# Patient Record
Sex: FEMALE | Race: WHITE | NOT HISPANIC OR LATINO
[De-identification: names, ages, dates, MRNs, and addresses within clinical notes are randomized per-mention and may not be internally consistent; named-entity substitution may affect disease eponyms.]

---

## 2017-02-06 ENCOUNTER — APPOINTMENT (OUTPATIENT)
Dept: NEUROLOGY | Facility: CLINIC | Age: 82
End: 2017-02-06

## 2017-02-06 VITALS
HEART RATE: 72 BPM | DIASTOLIC BLOOD PRESSURE: 68 MMHG | RESPIRATION RATE: 16 BRPM | SYSTOLIC BLOOD PRESSURE: 130 MMHG | WEIGHT: 150 LBS | HEIGHT: 62 IN | BODY MASS INDEX: 27.6 KG/M2

## 2017-02-14 NOTE — ASU PATIENT PROFILE, ADULT - PMH
Glaucoma    Cerebral vascular accident  2005  TIA (transient ischemic attack)  8/07  Polymyalgia rheumatica    Osteoporosis    Chronic pain  mid back  Urinary retention    GERD (gastroesophageal reflux disease)    Cholelithiases    Hyperlipemia    Carotid artery stenosis    Lung nodule  biopsied 2003, benign  COPD (chronic obstructive pulmonary disease)    Asthma Alzheimers disease    Asthma    Carotid artery stenosis    Cerebral vascular accident  2005  Cholelithiases    Chronic pain  mid back  Compression fracture of spine  T7  COPD (chronic obstructive pulmonary disease)    GERD (gastroesophageal reflux disease)    Glaucoma    HTN (hypertension)    Hyperlipemia    Lung nodule  biopsied 2003, benign  Osteoporosis    Polymyalgia rheumatica    TIA (transient ischemic attack)  8/07  Urinary retention

## 2017-02-15 ENCOUNTER — OUTPATIENT (OUTPATIENT)
Dept: OUTPATIENT SERVICES | Facility: HOSPITAL | Age: 82
LOS: 1 days | Discharge: HOME | End: 2017-02-15
Payer: MEDICARE

## 2017-02-15 ENCOUNTER — TRANSCRIPTION ENCOUNTER (OUTPATIENT)
Age: 82
End: 2017-02-15

## 2017-02-15 VITALS
SYSTOLIC BLOOD PRESSURE: 128 MMHG | RESPIRATION RATE: 25 BRPM | HEIGHT: 60 IN | OXYGEN SATURATION: 92 % | TEMPERATURE: 98 F | HEART RATE: 75 BPM | DIASTOLIC BLOOD PRESSURE: 58 MMHG | WEIGHT: 134.48 LBS

## 2017-02-15 VITALS
RESPIRATION RATE: 26 BRPM | OXYGEN SATURATION: 92 % | HEART RATE: 89 BPM | DIASTOLIC BLOOD PRESSURE: 50 MMHG | SYSTOLIC BLOOD PRESSURE: 152 MMHG

## 2017-02-15 DIAGNOSIS — M81.0 AGE-RELATED OSTEOPOROSIS WITHOUT CURRENT PATHOLOGICAL FRACTURE: ICD-10-CM

## 2017-02-15 DIAGNOSIS — J45.909 UNSPECIFIED ASTHMA, UNCOMPLICATED: ICD-10-CM

## 2017-02-15 DIAGNOSIS — J44.9 CHRONIC OBSTRUCTIVE PULMONARY DISEASE, UNSPECIFIED: ICD-10-CM

## 2017-02-15 DIAGNOSIS — I10 ESSENTIAL (PRIMARY) HYPERTENSION: ICD-10-CM

## 2017-02-15 DIAGNOSIS — H40.2210 CHRONIC ANGLE-CLOSURE GLAUCOMA, RIGHT EYE, STAGE UNSPECIFIED: ICD-10-CM

## 2017-02-15 DIAGNOSIS — G30.9 ALZHEIMER'S DISEASE, UNSPECIFIED: ICD-10-CM

## 2017-02-15 DIAGNOSIS — Z79.52 LONG TERM (CURRENT) USE OF SYSTEMIC STEROIDS: ICD-10-CM

## 2017-02-15 DIAGNOSIS — Z79.51 LONG TERM (CURRENT) USE OF INHALED STEROIDS: ICD-10-CM

## 2017-02-15 DIAGNOSIS — K21.9 GASTRO-ESOPHAGEAL REFLUX DISEASE WITHOUT ESOPHAGITIS: ICD-10-CM

## 2017-02-15 DIAGNOSIS — Z87.891 PERSONAL HISTORY OF NICOTINE DEPENDENCE: ICD-10-CM

## 2017-02-15 DIAGNOSIS — F02.80 DEMENTIA IN OTHER DISEASES CLASSIFIED ELSEWHERE, UNSPECIFIED SEVERITY, WITHOUT BEHAVIORAL DISTURBANCE, PSYCHOTIC DISTURBANCE, MOOD DISTURBANCE, AND ANXIETY: ICD-10-CM

## 2017-02-15 DIAGNOSIS — Z86.73 PERSONAL HISTORY OF TRANSIENT ISCHEMIC ATTACK (TIA), AND CEREBRAL INFARCTION WITHOUT RESIDUAL DEFICITS: ICD-10-CM

## 2017-02-15 DIAGNOSIS — E78.5 HYPERLIPIDEMIA, UNSPECIFIED: ICD-10-CM

## 2017-02-15 DIAGNOSIS — M19.90 UNSPECIFIED OSTEOARTHRITIS, UNSPECIFIED SITE: ICD-10-CM

## 2017-02-15 DIAGNOSIS — M06.9 RHEUMATOID ARTHRITIS, UNSPECIFIED: ICD-10-CM

## 2017-02-15 DIAGNOSIS — H40.1193 PRIMARY OPEN-ANGLE GLAUCOMA, UNSPECIFIED EYE, SEVERE STAGE: ICD-10-CM

## 2017-02-15 DIAGNOSIS — E78.00 PURE HYPERCHOLESTEROLEMIA, UNSPECIFIED: ICD-10-CM

## 2017-02-15 DIAGNOSIS — Z79.899 OTHER LONG TERM (CURRENT) DRUG THERAPY: ICD-10-CM

## 2017-02-15 PROCEDURE — C1783: CPT

## 2017-02-15 PROCEDURE — 66183 INSERT ANT DRAINAGE DEVICE: CPT | Mod: RT

## 2017-02-15 RX ORDER — MITOMYCIN 5 MG/10ML
1 INJECTION, POWDER, LYOPHILIZED, FOR SOLUTION INTRAVENOUS ONCE
Qty: 0 | Refills: 0 | Status: DISCONTINUED | OUTPATIENT
Start: 2017-02-15 | End: 2017-03-02

## 2017-02-15 NOTE — ASU DISCHARGE PLAN (ADULT/PEDIATRIC). - NOTIFY
Bleeding that does not stop/Fever greater than 101/Pain not relieved by Medications/Persistent Nausea and Vomiting/Swelling that continues

## 2017-02-27 ENCOUNTER — INPATIENT (INPATIENT)
Facility: HOSPITAL | Age: 82
LOS: 3 days | Discharge: HOME | End: 2017-03-03
Attending: FAMILY MEDICINE | Admitting: FAMILY MEDICINE
Payer: COMMERCIAL

## 2017-02-27 VITALS
SYSTOLIC BLOOD PRESSURE: 117 MMHG | TEMPERATURE: 98 F | HEART RATE: 85 BPM | DIASTOLIC BLOOD PRESSURE: 66 MMHG | OXYGEN SATURATION: 96 % | RESPIRATION RATE: 20 BRPM | HEIGHT: 64 IN | WEIGHT: 145.95 LBS

## 2017-02-27 DIAGNOSIS — I25.10 ATHEROSCLEROTIC HEART DISEASE OF NATIVE CORONARY ARTERY WITHOUT ANGINA PECTORIS: ICD-10-CM

## 2017-02-27 DIAGNOSIS — G30.8 OTHER ALZHEIMER'S DISEASE: ICD-10-CM

## 2017-02-27 DIAGNOSIS — J18.9 PNEUMONIA, UNSPECIFIED ORGANISM: ICD-10-CM

## 2017-02-27 DIAGNOSIS — J44.1 CHRONIC OBSTRUCTIVE PULMONARY DISEASE WITH (ACUTE) EXACERBATION: ICD-10-CM

## 2017-02-27 DIAGNOSIS — I10 ESSENTIAL (PRIMARY) HYPERTENSION: ICD-10-CM

## 2017-02-27 DIAGNOSIS — M35.3 POLYMYALGIA RHEUMATICA: ICD-10-CM

## 2017-02-27 LAB
ALBUMIN SERPL ELPH-MCNC: 3.2 G/DL — LOW (ref 3.3–5)
ALP SERPL-CCNC: 59 U/L — SIGNIFICANT CHANGE UP (ref 40–120)
ALT FLD-CCNC: 49 U/L — SIGNIFICANT CHANGE UP (ref 12–78)
ANION GAP SERPL CALC-SCNC: 11 MMOL/L — SIGNIFICANT CHANGE UP (ref 5–17)
APTT BLD: 26 SEC — LOW (ref 27.5–37.4)
AST SERPL-CCNC: 28 U/L — SIGNIFICANT CHANGE UP (ref 15–37)
BASOPHILS # BLD AUTO: 0 K/UL — SIGNIFICANT CHANGE UP (ref 0–0.2)
BASOPHILS NFR BLD AUTO: 0.2 % — SIGNIFICANT CHANGE UP (ref 0–2)
BILIRUB SERPL-MCNC: 0.6 MG/DL — SIGNIFICANT CHANGE UP (ref 0.2–1.2)
BUN SERPL-MCNC: 25 MG/DL — HIGH (ref 7–23)
CALCIUM SERPL-MCNC: 9.2 MG/DL — SIGNIFICANT CHANGE UP (ref 8.5–10.1)
CHLORIDE SERPL-SCNC: 103 MMOL/L — SIGNIFICANT CHANGE UP (ref 96–108)
CO2 SERPL-SCNC: 26 MMOL/L — SIGNIFICANT CHANGE UP (ref 22–31)
CREAT SERPL-MCNC: 1.03 MG/DL — SIGNIFICANT CHANGE UP (ref 0.5–1.3)
EOSINOPHIL # BLD AUTO: 0 K/UL — SIGNIFICANT CHANGE UP (ref 0–0.5)
EOSINOPHIL NFR BLD AUTO: 0 % — SIGNIFICANT CHANGE UP (ref 0–6)
GLUCOSE SERPL-MCNC: 116 MG/DL — HIGH (ref 70–99)
HCT VFR BLD CALC: 38 % — SIGNIFICANT CHANGE UP (ref 34.5–45)
HGB BLD-MCNC: 12.1 G/DL — SIGNIFICANT CHANGE UP (ref 11.5–15.5)
INR BLD: 1.35 RATIO — HIGH (ref 0.88–1.16)
LACTATE SERPL-SCNC: 1.4 MMOL/L — SIGNIFICANT CHANGE UP (ref 0.7–2)
LYMPHOCYTES # BLD AUTO: 0.8 K/UL — LOW (ref 1–3.3)
LYMPHOCYTES # BLD AUTO: 3.7 % — LOW (ref 13–44)
MCHC RBC-ENTMCNC: 27.4 PG — SIGNIFICANT CHANGE UP (ref 27–34)
MCHC RBC-ENTMCNC: 31.9 GM/DL — LOW (ref 32–36)
MCV RBC AUTO: 85.9 FL — SIGNIFICANT CHANGE UP (ref 80–100)
MONOCYTES # BLD AUTO: 1.1 K/UL — HIGH (ref 0–0.9)
MONOCYTES NFR BLD AUTO: 5.2 % — SIGNIFICANT CHANGE UP (ref 2–14)
NEUTROPHILS # BLD AUTO: 20 K/UL — HIGH (ref 1.8–7.4)
NEUTROPHILS NFR BLD AUTO: 90.9 % — HIGH (ref 43–77)
PLATELET # BLD AUTO: 245 K/UL — SIGNIFICANT CHANGE UP (ref 150–400)
POTASSIUM SERPL-MCNC: 4 MMOL/L — SIGNIFICANT CHANGE UP (ref 3.5–5.3)
POTASSIUM SERPL-SCNC: 4 MMOL/L — SIGNIFICANT CHANGE UP (ref 3.5–5.3)
PROT SERPL-MCNC: 6.4 GM/DL — SIGNIFICANT CHANGE UP (ref 6–8.3)
PROTHROM AB SERPL-ACNC: 14.9 SEC — HIGH (ref 10–13.1)
RAPID RVP RESULT: SIGNIFICANT CHANGE UP
RBC # BLD: 4.42 M/UL — SIGNIFICANT CHANGE UP (ref 3.8–5.2)
RBC # FLD: 16.8 % — HIGH (ref 10.3–14.5)
SODIUM SERPL-SCNC: 140 MMOL/L — SIGNIFICANT CHANGE UP (ref 135–145)
WBC # BLD: 22 K/UL — HIGH (ref 3.8–10.5)
WBC # FLD AUTO: 22 K/UL — HIGH (ref 3.8–10.5)

## 2017-02-27 PROCEDURE — 71250 CT THORAX DX C-: CPT | Mod: 26

## 2017-02-27 PROCEDURE — 99285 EMERGENCY DEPT VISIT HI MDM: CPT

## 2017-02-27 PROCEDURE — 71010: CPT | Mod: 26

## 2017-02-27 PROCEDURE — 93010 ELECTROCARDIOGRAM REPORT: CPT

## 2017-02-27 RX ORDER — ATROPINE SULFATE 1 %
1 DROPS OPHTHALMIC (EYE) DAILY
Qty: 0 | Refills: 0 | Status: DISCONTINUED | OUTPATIENT
Start: 2017-02-27 | End: 2017-03-03

## 2017-02-27 RX ORDER — ASPIRIN AND DIPYRIDAMOLE 25; 200 MG/1; MG/1
1 CAPSULE, EXTENDED RELEASE ORAL
Qty: 0 | Refills: 0 | Status: DISCONTINUED | OUTPATIENT
Start: 2017-02-27 | End: 2017-02-28

## 2017-02-27 RX ORDER — LACTULOSE 10 G/15ML
20 SOLUTION ORAL AT BEDTIME
Qty: 0 | Refills: 0 | Status: DISCONTINUED | OUTPATIENT
Start: 2017-02-27 | End: 2017-03-03

## 2017-02-27 RX ORDER — GLUCAGON INJECTION, SOLUTION 0.5 MG/.1ML
1 INJECTION, SOLUTION SUBCUTANEOUS ONCE
Qty: 0 | Refills: 0 | Status: DISCONTINUED | OUTPATIENT
Start: 2017-02-27 | End: 2017-03-03

## 2017-02-27 RX ORDER — TIOTROPIUM BROMIDE 18 UG/1
1 CAPSULE ORAL; RESPIRATORY (INHALATION) DAILY
Qty: 0 | Refills: 0 | Status: DISCONTINUED | OUTPATIENT
Start: 2017-02-27 | End: 2017-03-01

## 2017-02-27 RX ORDER — DEXTROSE 50 % IN WATER 50 %
1 SYRINGE (ML) INTRAVENOUS ONCE
Qty: 0 | Refills: 0 | Status: DISCONTINUED | OUTPATIENT
Start: 2017-02-27 | End: 2017-03-03

## 2017-02-27 RX ORDER — CEFTRIAXONE 500 MG/1
1 INJECTION, POWDER, FOR SOLUTION INTRAMUSCULAR; INTRAVENOUS ONCE
Qty: 0 | Refills: 0 | Status: COMPLETED | OUTPATIENT
Start: 2017-02-27 | End: 2017-02-27

## 2017-02-27 RX ORDER — CEFEPIME 1 G/1
1000 INJECTION, POWDER, FOR SOLUTION INTRAMUSCULAR; INTRAVENOUS EVERY 12 HOURS
Qty: 0 | Refills: 0 | Status: DISCONTINUED | OUTPATIENT
Start: 2017-02-28 | End: 2017-02-28

## 2017-02-27 RX ORDER — SODIUM CHLORIDE 9 MG/ML
1000 INJECTION, SOLUTION INTRAVENOUS
Qty: 0 | Refills: 0 | Status: DISCONTINUED | OUTPATIENT
Start: 2017-02-27 | End: 2017-02-28

## 2017-02-27 RX ORDER — HYDROXYCHLOROQUINE SULFATE 200 MG
200 TABLET ORAL
Qty: 0 | Refills: 0 | Status: DISCONTINUED | OUTPATIENT
Start: 2017-02-27 | End: 2017-02-28

## 2017-02-27 RX ORDER — CEFEPIME 1 G/1
INJECTION, POWDER, FOR SOLUTION INTRAMUSCULAR; INTRAVENOUS
Qty: 0 | Refills: 0 | Status: DISCONTINUED | OUTPATIENT
Start: 2017-02-27 | End: 2017-02-28

## 2017-02-27 RX ORDER — SODIUM CHLORIDE 9 MG/ML
1000 INJECTION INTRAMUSCULAR; INTRAVENOUS; SUBCUTANEOUS
Qty: 0 | Refills: 0 | Status: DISCONTINUED | OUTPATIENT
Start: 2017-02-27 | End: 2017-02-28

## 2017-02-27 RX ORDER — DEXTROSE 50 % IN WATER 50 %
12.5 SYRINGE (ML) INTRAVENOUS ONCE
Qty: 0 | Refills: 0 | Status: DISCONTINUED | OUTPATIENT
Start: 2017-02-27 | End: 2017-03-03

## 2017-02-27 RX ORDER — MEMANTINE HYDROCHLORIDE 10 MG/1
14 TABLET ORAL AT BEDTIME
Qty: 0 | Refills: 0 | Status: DISCONTINUED | OUTPATIENT
Start: 2017-02-27 | End: 2017-02-28

## 2017-02-27 RX ORDER — METOCLOPRAMIDE HCL 10 MG
10 TABLET ORAL EVERY 6 HOURS
Qty: 0 | Refills: 0 | Status: DISCONTINUED | OUTPATIENT
Start: 2017-02-27 | End: 2017-03-03

## 2017-02-27 RX ORDER — INSULIN LISPRO 100/ML
VIAL (ML) SUBCUTANEOUS AT BEDTIME
Qty: 0 | Refills: 0 | Status: DISCONTINUED | OUTPATIENT
Start: 2017-02-27 | End: 2017-03-03

## 2017-02-27 RX ORDER — INSULIN LISPRO 100/ML
VIAL (ML) SUBCUTANEOUS
Qty: 0 | Refills: 0 | Status: DISCONTINUED | OUTPATIENT
Start: 2017-02-27 | End: 2017-03-03

## 2017-02-27 RX ORDER — IPRATROPIUM/ALBUTEROL SULFATE 18-103MCG
3 AEROSOL WITH ADAPTER (GRAM) INHALATION EVERY 6 HOURS
Qty: 0 | Refills: 0 | Status: DISCONTINUED | OUTPATIENT
Start: 2017-02-27 | End: 2017-03-03

## 2017-02-27 RX ORDER — CEFEPIME 1 G/1
1000 INJECTION, POWDER, FOR SOLUTION INTRAMUSCULAR; INTRAVENOUS ONCE
Qty: 0 | Refills: 0 | Status: COMPLETED | OUTPATIENT
Start: 2017-02-27 | End: 2017-02-27

## 2017-02-27 RX ORDER — HEPARIN SODIUM 5000 [USP'U]/ML
5000 INJECTION INTRAVENOUS; SUBCUTANEOUS EVERY 8 HOURS
Qty: 0 | Refills: 0 | Status: DISCONTINUED | OUTPATIENT
Start: 2017-02-27 | End: 2017-03-03

## 2017-02-27 RX ORDER — TRAZODONE HCL 50 MG
50 TABLET ORAL AT BEDTIME
Qty: 0 | Refills: 0 | Status: DISCONTINUED | OUTPATIENT
Start: 2017-02-27 | End: 2017-03-03

## 2017-02-27 RX ORDER — OFLOXACIN 0.3 %
1 DROPS OPHTHALMIC (EYE)
Qty: 0 | Refills: 0 | Status: DISCONTINUED | OUTPATIENT
Start: 2017-02-27 | End: 2017-03-03

## 2017-02-27 RX ORDER — AZITHROMYCIN 500 MG/1
500 TABLET, FILM COATED ORAL ONCE
Qty: 0 | Refills: 0 | Status: COMPLETED | OUTPATIENT
Start: 2017-02-27 | End: 2017-02-27

## 2017-02-27 RX ORDER — PANTOPRAZOLE SODIUM 20 MG/1
40 TABLET, DELAYED RELEASE ORAL
Qty: 0 | Refills: 0 | Status: DISCONTINUED | OUTPATIENT
Start: 2017-02-27 | End: 2017-02-28

## 2017-02-27 RX ORDER — VANCOMYCIN HCL 1 G
1000 VIAL (EA) INTRAVENOUS ONCE
Qty: 0 | Refills: 0 | Status: COMPLETED | OUTPATIENT
Start: 2017-02-27 | End: 2017-02-27

## 2017-02-27 RX ORDER — DOCUSATE SODIUM 100 MG
100 CAPSULE ORAL THREE TIMES A DAY
Qty: 0 | Refills: 0 | Status: DISCONTINUED | OUTPATIENT
Start: 2017-02-27 | End: 2017-03-03

## 2017-02-27 RX ORDER — SODIUM CHLORIDE 9 MG/ML
2000 INJECTION, SOLUTION INTRAVENOUS
Qty: 0 | Refills: 0 | Status: DISCONTINUED | OUTPATIENT
Start: 2017-02-27 | End: 2017-02-28

## 2017-02-27 RX ORDER — SODIUM CHLORIDE 9 MG/ML
3 INJECTION INTRAMUSCULAR; INTRAVENOUS; SUBCUTANEOUS ONCE
Qty: 0 | Refills: 0 | Status: COMPLETED | OUTPATIENT
Start: 2017-02-27 | End: 2017-02-27

## 2017-02-27 RX ORDER — SIMVASTATIN 20 MG/1
20 TABLET, FILM COATED ORAL AT BEDTIME
Qty: 0 | Refills: 0 | Status: DISCONTINUED | OUTPATIENT
Start: 2017-02-27 | End: 2017-03-03

## 2017-02-27 RX ORDER — SENNA PLUS 8.6 MG/1
2 TABLET ORAL AT BEDTIME
Qty: 0 | Refills: 0 | Status: DISCONTINUED | OUTPATIENT
Start: 2017-02-27 | End: 2017-03-03

## 2017-02-27 RX ORDER — DUREZOL 0.5 MG/ML
1 EMULSION OPHTHALMIC EVERY 6 HOURS
Qty: 0 | Refills: 0 | Status: DISCONTINUED | OUTPATIENT
Start: 2017-02-27 | End: 2017-02-28

## 2017-02-27 RX ORDER — IPRATROPIUM/ALBUTEROL SULFATE 18-103MCG
3 AEROSOL WITH ADAPTER (GRAM) INHALATION ONCE
Qty: 0 | Refills: 0 | Status: COMPLETED | OUTPATIENT
Start: 2017-02-27 | End: 2017-02-27

## 2017-02-27 RX ADMIN — Medication 1 DROP(S): at 19:52

## 2017-02-27 RX ADMIN — Medication 3 MILLILITER(S): at 16:10

## 2017-02-27 RX ADMIN — SODIUM CHLORIDE 3 MILLILITER(S): 9 INJECTION INTRAMUSCULAR; INTRAVENOUS; SUBCUTANEOUS at 14:50

## 2017-02-27 RX ADMIN — CEFEPIME 100 MILLIGRAM(S): 1 INJECTION, POWDER, FOR SOLUTION INTRAMUSCULAR; INTRAVENOUS at 20:00

## 2017-02-27 RX ADMIN — CEFTRIAXONE 100 GRAM(S): 500 INJECTION, POWDER, FOR SOLUTION INTRAMUSCULAR; INTRAVENOUS at 16:08

## 2017-02-27 RX ADMIN — SODIUM CHLORIDE 100 MILLILITER(S): 9 INJECTION INTRAMUSCULAR; INTRAVENOUS; SUBCUTANEOUS at 16:07

## 2017-02-27 RX ADMIN — Medication 125 MILLIGRAM(S): at 16:10

## 2017-02-27 RX ADMIN — SODIUM CHLORIDE 100 MILLILITER(S): 9 INJECTION INTRAMUSCULAR; INTRAVENOUS; SUBCUTANEOUS at 21:05

## 2017-02-27 RX ADMIN — Medication 250 MILLIGRAM(S): at 21:05

## 2017-02-27 RX ADMIN — AZITHROMYCIN 255 MILLIGRAM(S): 500 TABLET, FILM COATED ORAL at 16:09

## 2017-02-27 NOTE — H&P ADULT - NSHPLABSRESULTS_GEN_ALL_CORE
T(C): 36.4, Max: 36.4 (02-27 @ 14:30)  HR: 95 (85 - 95)  BP: 130/47 (104/45 - 130/47)  RR: 22 (20 - 22)  SpO2: 99% (96% - 99%)  Wt(kg): --                              12.1   22.0  )-----------( 245      ( 27 Feb 2017 14:54 )             38.0     27 Feb 2017 14:54    140    |  103    |  25     ----------------------------<  116    4.0     |  26     |  1.03     Ca    9.2        27 Feb 2017 14:54    TPro  6.4    /  Alb  3.2    /  TBili  0.6    /  DBili  x      /  AST  28     /  ALT  49     /  AlkPhos  59     27 Feb 2017 14:54    PT/INR - ( 27 Feb 2017 14:54 )   PT: 14.9 sec;   INR: 1.35 ratio         PTT - ( 27 Feb 2017 14:54 )  PTT:26.0 sec  CAPILLARY BLOOD GLUCOSE    LIVER FUNCTIONS - ( 27 Feb 2017 14:54 )  Alb: 3.2 g/dL / Pro: 6.4 gm/dL / ALK PHOS: 59 U/L / ALT: 49 U/L / AST: 28 U/L / GGT: x

## 2017-02-27 NOTE — ED PROVIDER NOTE - NS ED MD SCRIBE ATTENDING SCRIBE SECTIONS
PROGRESS NOTE/PHYSICAL EXAM/HISTORY OF PRESENT ILLNESS/DISPOSITION/REVIEW OF SYSTEMS/RESULTS/PAST MEDICAL/SURGICAL/SOCIAL HISTORY

## 2017-02-27 NOTE — ED ADULT NURSE NOTE - CHPI ED SYMPTOMS NEG
no chest pain/no cough/no diaphoresis/no hemoptysis/no fever/no body aches/no headache/no edema/no chills/no wheezing

## 2017-02-27 NOTE — H&P ADULT - ASSESSMENT
87 year old female resident of [facility] with history of COPD not on home O2, CVA, CAD, Rheumatoid arthritis, PMR, Glaucoma s/p ocular surgery, Hyperlipidemia, Dementia who presents with c/o worsening SOB, cough and sputum for 2 weeks.  Pt was treated with azithromycin and medrol pack at [facility] without improvement.  Pt denies fever, chills, anorexia, malaise, myalgia, dysuria, confusion or headache.      Pt seen bed side and evaluated in presence of patient's daughter.  Pt is saturating well but is coughing and wheezing.

## 2017-02-27 NOTE — ED PROVIDER NOTE - CONSTITUTIONAL, MLM
normal... Well appearing, elderly female, awake, alert, oriented to person, place, time presenting mild respiratory distress.

## 2017-02-27 NOTE — H&P ADULT - HISTORY OF PRESENT ILLNESS
87 year old female resident of [facility] with history of COPD not on home O2, CVA, Rheumatoid arthritis, PMR, Glaucoma s/p ocular surgery, Hyperlipidemia, Dementia who presents with c/o worsening SOB, cough and sputum for 2 weeks.  Pt was treated with azithromycin and medrol pack at [place] without improvement.  Pt denies fever, chills, anorexia, malaise, myalgia, dysuria, confusion or headache.      Pt seen bed side and evaluated in presence of patient's daughter.  Pt is saturating well but is coughing and wheezing.

## 2017-02-27 NOTE — H&P ADULT - NSHPPHYSICALEXAM_GEN_ALL_CORE
PHYSICAL EXAM:      Constitutional: NAD, well-groomed, well-developed  HEENT: PERRLA, EOMI, Normal Hearing  Neck: No LAD, No JVD  Back: Normal spine flexure, No CVA tenderness  Respiratory: Wheezing b/l   Cardiovascular: S1 and S2, RRR, no M/G/R  Gastrointestinal: BS+, soft, NT/ND  Extremities: No peripheral edema  Vascular: 2+ peripheral pulses  Neurological: A/O x 3, no focal deficits  Psychiatric: Normal mood, normal affect  Musculoskeletal: 5/5 strength b/l upper and lower extremities  Skin: No rashes

## 2017-02-27 NOTE — ED PROVIDER NOTE - OBJECTIVE STATEMENT
86 y/o F with a PMHx of COPD, CVA, TIA, Alzheimers, asthma presents to the ED c/o SOB with productive cough that has been worsening over the past few days. Pt daughter at bedside states that she was recently dx with bronchitis and was advised by Dr Guardado to come into ED for evaluation. Pt currently receiving nebulizer tx presenting mild respiratory distress and denies any other acute c/o at this time.

## 2017-02-27 NOTE — ED PROVIDER NOTE - SHIFT CHANGE DETAILS
Jose MENDEZ:  Rc'd care from Dr. Alves at 16:00.  Pt pending RVP then plan is to admit.  COPD exacerbation.  Given Steroids, Duonebs, CTX, Azithro.  Elevated WBC, lungs are tight, coughing.  WBC elevated.   Recently on steroids too, failed outpatient treatment.    I have admitted the patient.  RVP neg.

## 2017-02-27 NOTE — ED PROVIDER NOTE - PROGRESS NOTE DETAILS
(got signout from prior ED MD.  Pending admissionCOPD exacerbation w/ PNA.  Covered with antibiotics.  Steroids.  Duonebs.  WIll admit.

## 2017-02-27 NOTE — ED PROVIDER NOTE - PMH
Alzheimers disease    Asthma    Carotid artery stenosis    Cerebral vascular accident  2005  Cholelithiases    Chronic pain  mid back  Compression fracture of spine  T7  COPD (chronic obstructive pulmonary disease)    GERD (gastroesophageal reflux disease)    Glaucoma    HTN (hypertension)    Hyperlipemia    Lung nodule  biopsied 2003, benign  Osteoporosis    Polymyalgia rheumatica    TIA (transient ischemic attack)  8/07  Urinary retention

## 2017-02-27 NOTE — H&P ADULT - PROBLEM SELECTOR PROBLEM 6
Coronary artery disease involving native coronary artery without angina pectoris, unspecified whether native or transplanted heart

## 2017-02-27 NOTE — H&P ADULT - PROBLEM SELECTOR PLAN 2
- Possible pneumonia   - CT chest stat  - IV antibiotics started  - RVP negative  - Gentle IV hydration

## 2017-02-27 NOTE — H&P ADULT - NSHPREVIEWOFSYSTEMS_GEN_ALL_CORE
REVIEW OF SYSTEMS:    CONSTITUTIONAL: No weakness, fevers or chills  EYES/ENT: No visual changes;  No vertigo or throat pain   NECK: No pain or stiffness  RESPIRATORY: + cough, + sputum, + wheezing, no hemoptysis; + Dyspnea  CARDIOVASCULAR: No chest pain or palpitations  GASTROINTESTINAL: No abdominal or epigastric pain. No nausea, vomiting, or hematemesis; No diarrhea or constipation. No melena or hematochezia.  GENITOURINARY: No dysuria, frequency or hematuria  NEUROLOGICAL: No numbness or weakness  SKIN: No itching, burning, rashes, or lesions   All other review of systems is negative unless indicated above.

## 2017-02-28 LAB
ANION GAP SERPL CALC-SCNC: 12 MMOL/L — SIGNIFICANT CHANGE UP (ref 5–17)
APPEARANCE UR: CLEAR — SIGNIFICANT CHANGE UP
BILIRUB UR-MCNC: NEGATIVE — SIGNIFICANT CHANGE UP
BUN SERPL-MCNC: 25 MG/DL — HIGH (ref 7–23)
CALCIUM SERPL-MCNC: 8.6 MG/DL — SIGNIFICANT CHANGE UP (ref 8.5–10.1)
CHLORIDE SERPL-SCNC: 109 MMOL/L — HIGH (ref 96–108)
CO2 SERPL-SCNC: 23 MMOL/L — SIGNIFICANT CHANGE UP (ref 22–31)
COLOR SPEC: YELLOW — SIGNIFICANT CHANGE UP
CREAT SERPL-MCNC: 0.95 MG/DL — SIGNIFICANT CHANGE UP (ref 0.5–1.3)
DIFF PNL FLD: NEGATIVE — SIGNIFICANT CHANGE UP
GLUCOSE SERPL-MCNC: 147 MG/DL — HIGH (ref 70–99)
GLUCOSE UR QL: NEGATIVE MG/DL — SIGNIFICANT CHANGE UP
HBA1C BLD-MCNC: 6 % — HIGH (ref 4–5.6)
HCT VFR BLD CALC: 33.1 % — LOW (ref 34.5–45)
HGB BLD-MCNC: 10.3 G/DL — LOW (ref 11.5–15.5)
KETONES UR-MCNC: NEGATIVE — SIGNIFICANT CHANGE UP
LEUKOCYTE ESTERASE UR-ACNC: NEGATIVE — SIGNIFICANT CHANGE UP
MAGNESIUM SERPL-MCNC: 1.9 MG/DL — SIGNIFICANT CHANGE UP (ref 1.8–2.4)
MCHC RBC-ENTMCNC: 26.5 PG — LOW (ref 27–34)
MCHC RBC-ENTMCNC: 31.1 GM/DL — LOW (ref 32–36)
MCV RBC AUTO: 85 FL — SIGNIFICANT CHANGE UP (ref 80–100)
NITRITE UR-MCNC: NEGATIVE — SIGNIFICANT CHANGE UP
PH UR: 6 — SIGNIFICANT CHANGE UP (ref 4.8–8)
PLATELET # BLD AUTO: 226 K/UL — SIGNIFICANT CHANGE UP (ref 150–400)
POTASSIUM SERPL-MCNC: 3.5 MMOL/L — SIGNIFICANT CHANGE UP (ref 3.5–5.3)
POTASSIUM SERPL-SCNC: 3.5 MMOL/L — SIGNIFICANT CHANGE UP (ref 3.5–5.3)
PROT UR-MCNC: NEGATIVE MG/DL — SIGNIFICANT CHANGE UP
RBC # BLD: 3.89 M/UL — SIGNIFICANT CHANGE UP (ref 3.8–5.2)
RBC # FLD: 16.1 % — HIGH (ref 10.3–14.5)
SODIUM SERPL-SCNC: 144 MMOL/L — SIGNIFICANT CHANGE UP (ref 135–145)
SP GR SPEC: 1.02 — SIGNIFICANT CHANGE UP (ref 1.01–1.02)
UROBILINOGEN FLD QL: NEGATIVE MG/DL — SIGNIFICANT CHANGE UP
WBC # BLD: 20.4 K/UL — HIGH (ref 3.8–10.5)
WBC # FLD AUTO: 20.4 K/UL — HIGH (ref 3.8–10.5)

## 2017-02-28 PROCEDURE — 93010 ELECTROCARDIOGRAM REPORT: CPT

## 2017-02-28 RX ORDER — DUREZOL 0.5 MG/ML
1 EMULSION OPHTHALMIC DAILY
Qty: 0 | Refills: 0 | Status: DISCONTINUED | OUTPATIENT
Start: 2017-03-02 | End: 2017-03-03

## 2017-02-28 RX ORDER — ATROPINE SULFATE 1 %
1 DROPS OPHTHALMIC (EYE)
Qty: 0 | Refills: 0 | COMMUNITY

## 2017-02-28 RX ORDER — OFLOXACIN 0.3 %
1 DROPS OPHTHALMIC (EYE)
Qty: 0 | Refills: 0 | COMMUNITY

## 2017-02-28 RX ORDER — MEMANTINE HYDROCHLORIDE 10 MG/1
5 TABLET ORAL
Qty: 0 | Refills: 0 | Status: DISCONTINUED | OUTPATIENT
Start: 2017-02-28 | End: 2017-02-28

## 2017-02-28 RX ORDER — ASPIRIN AND DIPYRIDAMOLE 25; 200 MG/1; MG/1
1 CAPSULE, EXTENDED RELEASE ORAL EVERY 12 HOURS
Qty: 0 | Refills: 0 | Status: DISCONTINUED | OUTPATIENT
Start: 2017-03-01 | End: 2017-03-01

## 2017-02-28 RX ORDER — DUREZOL 0.5 MG/ML
1 EMULSION OPHTHALMIC ONCE
Qty: 0 | Refills: 0 | Status: COMPLETED | OUTPATIENT
Start: 2017-02-28 | End: 2017-02-28

## 2017-02-28 RX ORDER — HYDROXYCHLOROQUINE SULFATE 200 MG
200 TABLET ORAL EVERY 12 HOURS
Qty: 0 | Refills: 0 | Status: DISCONTINUED | OUTPATIENT
Start: 2017-03-01 | End: 2017-03-03

## 2017-02-28 RX ORDER — PANTOPRAZOLE SODIUM 20 MG/1
40 TABLET, DELAYED RELEASE ORAL
Qty: 0 | Refills: 0 | Status: DISCONTINUED | OUTPATIENT
Start: 2017-02-28 | End: 2017-03-03

## 2017-02-28 RX ORDER — ASPIRIN AND DIPYRIDAMOLE 25; 200 MG/1; MG/1
1 CAPSULE, EXTENDED RELEASE ORAL EVERY 12 HOURS
Qty: 0 | Refills: 0 | Status: DISCONTINUED | OUTPATIENT
Start: 2017-02-28 | End: 2017-02-28

## 2017-02-28 RX ORDER — DUREZOL 0.5 MG/ML
1 EMULSION OPHTHALMIC
Qty: 0 | Refills: 0 | Status: COMPLETED | OUTPATIENT
Start: 2017-02-28 | End: 2017-03-01

## 2017-02-28 RX ORDER — MEMANTINE HYDROCHLORIDE 10 MG/1
14 TABLET ORAL AT BEDTIME
Qty: 0 | Refills: 0 | Status: DISCONTINUED | OUTPATIENT
Start: 2017-02-28 | End: 2017-03-03

## 2017-02-28 RX ORDER — CEFUROXIME AXETIL 250 MG
250 TABLET ORAL EVERY 12 HOURS
Qty: 0 | Refills: 0 | Status: DISCONTINUED | OUTPATIENT
Start: 2017-02-28 | End: 2017-03-03

## 2017-02-28 RX ORDER — SODIUM CHLORIDE 9 MG/ML
1000 INJECTION INTRAMUSCULAR; INTRAVENOUS; SUBCUTANEOUS
Qty: 0 | Refills: 0 | Status: DISCONTINUED | OUTPATIENT
Start: 2017-02-28 | End: 2017-03-02

## 2017-02-28 RX ORDER — ASPIRIN AND DIPYRIDAMOLE 25; 200 MG/1; MG/1
1 CAPSULE, EXTENDED RELEASE ORAL ONCE
Qty: 0 | Refills: 0 | Status: COMPLETED | OUTPATIENT
Start: 2017-02-28 | End: 2017-02-28

## 2017-02-28 RX ORDER — HYDROXYCHLOROQUINE SULFATE 200 MG
200 TABLET ORAL ONCE
Qty: 0 | Refills: 0 | Status: COMPLETED | OUTPATIENT
Start: 2017-02-28 | End: 2017-02-28

## 2017-02-28 RX ADMIN — Medication 1 DROP(S): at 11:34

## 2017-02-28 RX ADMIN — Medication 1 DROP(S): at 06:07

## 2017-02-28 RX ADMIN — ASPIRIN AND DIPYRIDAMOLE 1 CAPSULE(S): 25; 200 CAPSULE, EXTENDED RELEASE ORAL at 00:17

## 2017-02-28 RX ADMIN — Medication 1 DROP(S): at 00:29

## 2017-02-28 RX ADMIN — SIMVASTATIN 20 MILLIGRAM(S): 20 TABLET, FILM COATED ORAL at 22:43

## 2017-02-28 RX ADMIN — DUREZOL 1 DROP(S): 0.5 EMULSION OPHTHALMIC at 17:12

## 2017-02-28 RX ADMIN — HEPARIN SODIUM 5000 UNIT(S): 5000 INJECTION INTRAVENOUS; SUBCUTANEOUS at 22:36

## 2017-02-28 RX ADMIN — Medication 40 MILLIGRAM(S): at 13:44

## 2017-02-28 RX ADMIN — Medication 40 MILLIGRAM(S): at 22:37

## 2017-02-28 RX ADMIN — Medication 3 MILLILITER(S): at 08:27

## 2017-02-28 RX ADMIN — Medication 50 MILLIGRAM(S): at 22:42

## 2017-02-28 RX ADMIN — Medication 200 MILLIGRAM(S): at 13:43

## 2017-02-28 RX ADMIN — Medication 100 MILLIGRAM(S): at 13:42

## 2017-02-28 RX ADMIN — HEPARIN SODIUM 5000 UNIT(S): 5000 INJECTION INTRAVENOUS; SUBCUTANEOUS at 00:13

## 2017-02-28 RX ADMIN — Medication 2: at 12:05

## 2017-02-28 RX ADMIN — Medication 200 MILLIGRAM(S): at 23:48

## 2017-02-28 RX ADMIN — Medication 100 MILLIGRAM(S): at 06:06

## 2017-02-28 RX ADMIN — DUREZOL 1 DROP(S): 0.5 EMULSION OPHTHALMIC at 00:28

## 2017-02-28 RX ADMIN — Medication 3 MILLILITER(S): at 13:58

## 2017-02-28 RX ADMIN — Medication 1 TABLET(S): at 11:33

## 2017-02-28 RX ADMIN — DUREZOL 1 DROP(S): 0.5 EMULSION OPHTHALMIC at 09:06

## 2017-02-28 RX ADMIN — Medication 2: at 08:41

## 2017-02-28 RX ADMIN — HEPARIN SODIUM 5000 UNIT(S): 5000 INJECTION INTRAVENOUS; SUBCUTANEOUS at 06:06

## 2017-02-28 RX ADMIN — Medication 40 MILLIGRAM(S): at 00:12

## 2017-02-28 RX ADMIN — Medication 3 MILLILITER(S): at 19:56

## 2017-02-28 RX ADMIN — ASPIRIN AND DIPYRIDAMOLE 1 CAPSULE(S): 25; 200 CAPSULE, EXTENDED RELEASE ORAL at 13:43

## 2017-02-28 RX ADMIN — Medication 200 MILLIGRAM(S): at 00:17

## 2017-02-28 RX ADMIN — Medication 250 MILLIGRAM(S): at 17:10

## 2017-02-28 RX ADMIN — SODIUM CHLORIDE 100 MILLILITER(S): 9 INJECTION INTRAMUSCULAR; INTRAVENOUS; SUBCUTANEOUS at 15:14

## 2017-02-28 RX ADMIN — MEMANTINE HYDROCHLORIDE 5 MILLIGRAM(S): 10 TABLET ORAL at 17:10

## 2017-02-28 RX ADMIN — Medication 200 MILLIGRAM(S): at 15:34

## 2017-02-28 RX ADMIN — SIMVASTATIN 20 MILLIGRAM(S): 20 TABLET, FILM COATED ORAL at 00:13

## 2017-02-28 RX ADMIN — Medication 1 DROP(S): at 14:00

## 2017-02-28 RX ADMIN — Medication 1 DROP(S): at 23:48

## 2017-02-28 RX ADMIN — PANTOPRAZOLE SODIUM 40 MILLIGRAM(S): 20 TABLET, DELAYED RELEASE ORAL at 06:06

## 2017-02-28 RX ADMIN — Medication 40 MILLIGRAM(S): at 06:07

## 2017-02-28 RX ADMIN — CEFEPIME 100 MILLIGRAM(S): 1 INJECTION, POWDER, FOR SOLUTION INTRAMUSCULAR; INTRAVENOUS at 06:06

## 2017-02-28 RX ADMIN — Medication 2: at 17:13

## 2017-02-28 RX ADMIN — MEMANTINE HYDROCHLORIDE 14 MILLIGRAM(S): 10 TABLET ORAL at 22:40

## 2017-02-28 RX ADMIN — Medication 100 MILLIGRAM(S): at 22:35

## 2017-02-28 RX ADMIN — SODIUM CHLORIDE 50 MILLILITER(S): 9 INJECTION INTRAMUSCULAR; INTRAVENOUS; SUBCUTANEOUS at 15:35

## 2017-02-28 RX ADMIN — HEPARIN SODIUM 5000 UNIT(S): 5000 INJECTION INTRAVENOUS; SUBCUTANEOUS at 13:43

## 2017-02-28 RX ADMIN — Medication 100 MILLIGRAM(S): at 00:13

## 2017-02-28 RX ADMIN — Medication 3 MILLILITER(S): at 02:59

## 2017-02-28 RX ADMIN — Medication 1 DROP(S): at 17:14

## 2017-02-28 NOTE — CONSULT NOTE ADULT - SUBJECTIVE AND OBJECTIVE BOX
Patient is a 87y old  Female who presents with a chief complaint of Cough, SOB, sputum (27 Feb 2017 19:41)    HPI:  86 y/o female resident of [facility] with h/o COPD not on home O2, old CVA, Rheumatoid arthritis, PMR, Glaucoma s/p ocular surgery, Hyperlipidemia, Dementia was admitted on 2/28 for worsening SOB, cough and sputum for 2 weeks.  Pt was treated with azithromycin and medrol pack at [place] without improvement.  Pt denies fever, chills, anorexia, malaise, myalgia, dysuria, confusion or headache. In ER, the patient was noted with coughing and wheezing.       PMH: as above  PSH: as above  Meds: per reconciliation sheet, noted below  MEDICATIONS  (STANDING):  sodium chloride 0.9%. 1000milliLiter(s) IV Continuous <Continuous>  traZODone 50milliGRAM(s) Oral at bedtime  simvastatin 20milliGRAM(s) Oral at bedtime  tiotropium 18 MICROgram(s) Capsule 1Capsule(s) Inhalation daily  atropine 1% Solution 1Drop(s) Right EYE daily  ofloxacin 0.3% Solution 1Drop(s) Right EYE four times a day  pantoprazole    Tablet 40milliGRAM(s) Oral two times a day before meals  heparin  Injectable 5000Unit(s) SubCutaneous every 8 hours  sodium chloride 0.45%. 2000milliLiter(s) IV Continuous <Continuous>  insulin lispro (HumaLOG) corrective regimen sliding scale  SubCutaneous three times a day before meals  insulin lispro (HumaLOG) corrective regimen sliding scale  SubCutaneous at bedtime  dextrose 5%. 1000milliLiter(s) IV Continuous <Continuous>  dextrose 50% Injectable 12.5Gram(s) IV Push once  docusate sodium 100milliGRAM(s) Oral three times a day  multivitamin 1Tablet(s) Oral daily  memantine ER 14milliGRAM(s) Oral at bedtime  cefepime  IVPB 1000milliGRAM(s) IV Intermittent every 12 hours  ALBUTerol/ipratropium for Nebulization 3milliLiter(s) Nebulizer every 6 hours  methylPREDNISolone sodium succinate Injectable 40milliGRAM(s) IV Push every 8 hours  cefepime  IVPB  IV Intermittent     MEDICATIONS  (PRN):  lactulose Syrup 20Gram(s) Oral at bedtime PRN constipation  dextrose Gel 1Dose(s) Oral once PRN Blood Glucose LESS THAN 70 milliGRAM(s)/deciliter  glucagon  Injectable 1milliGRAM(s) IntraMuscular once PRN Glucose LESS THAN 70 milligrams/deciliter  metoclopramide Injectable 10milliGRAM(s) IV Push every 6 hours PRN Nausea and/or Vomiting  senna 2Tablet(s) Oral at bedtime PRN Constipation    Allergies    Aciphex (Unknown)  Macrobid (Unknown)  Percocet 10/325 (Rash)    Intolerances      Social: no smoking, no alcohol, no illegal drugs; no recent travel, no exposure to TB  FAMILY HISTORY:    ROS is limited due to confusion: the patient seems comfortable; dose not seem in pain    Vital Signs Last 24 Hrs  T(C): 37.3, Max: 37.3 (02-28 @ 12:07)  T(F): 99.1, Max: 99.1 (02-28 @ 12:07)  HR: 84 (83 - 95)  BP: 123/44 (104/45 - 130/47)  BP(mean): --  RR: 18 (18 - 22)  SpO2: 93% (93% - 99%)  Daily Height in cm: 162.56 (27 Feb 2017 14:30)    Daily     PE:    Constitutional: frail looking  HEENT: NC/AT, EOMI, PERRLA  Neck: supple  Back: no tenderness  Respiratory: basal rales  Cardiovascular: S1S2 regular, no murmurs  Abdomen: soft, not tender, not distended, positive BS  Genitourinary: deferred  Rectal: deferred  Musculoskeletal: no muscle tenderness, no joint swelling or tenderness  Extremities: no pedal edema  Neurological: AxOx3, moving all extremities, no focal deficits  Skin: no rashes    Labs:                        10.3   20.4  )-----------( 226      ( 28 Feb 2017 05:42 )             33.1     28 Feb 2017 05:42    144    |  109    |  25     ----------------------------<  147    3.5     |  23     |  0.95     Ca    8.6        28 Feb 2017 05:42  Mg     1.9       28 Feb 2017 05:42    TPro  6.4    /  Alb  3.2    /  TBili  0.6    /  DBili  x      /  AST  28     /  ALT  49     /  AlkPhos  59     27 Feb 2017 14:54     LIVER FUNCTIONS - ( 27 Feb 2017 14:54 )  Alb: 3.2 g/dL / Pro: 6.4 gm/dL / ALK PHOS: 59 U/L / ALT: 49 U/L / AST: 28 U/L / GGT: x           Rapid RVP Result: NotDetec: The FilmArray RVP Rapid uses polymerase chain reaction (PCR) and melt  curve analysis to screen for adenovirus; coronavirus HKU1, NL63, 229E,  OC43; human metapneumovirus (hMPV); human enterovirus/rhinovirus  (Entero/RV); influenza A; influenza A/H1;NotDetec: influenza A/H3; influenza  A/H1-2009; influenza B; parainfluenza viruses 1, 2, 3, 4; respiratory  syncytial virus; Bordetella pertussis; Mycoplasma pneumoniae; and  Chlamydophila pneumoniae. (02.27.17 @ 14:58)    Radiology:    CT chest:   No evidence of pneumonia. Small amount of bibasilar segmental   and subsegmental atelectasis is present..  Mild to moderate centrilobular   emphysema. Rest of the chronic findings are unchanged as above.    Advanced directives addressed: full resuscitation Patient is a 87y old  Female who presents with a chief complaint of Cough, SOB, sputum (27 Feb 2017 19:41)    HPI:  86 y/o female resident of [facility] with h/o COPD not on home O2, old CVA, Rheumatoid arthritis, PMR, Glaucoma s/p ocular surgery, Hyperlipidemia, Dementia was admitted on 2/28 for worsening SOB, cough and sputum for 2 weeks.  Pt was treated with azithromycin and medrol pack at [place] without improvement.  Pt denies fever, chills, anorexia, malaise, myalgia, dysuria, confusion or headache. In ER, the patient was noted with coughing and wheezing.       PMH: as above  PSH: as above  Meds: per reconciliation sheet, noted below  MEDICATIONS  (STANDING):  sodium chloride 0.9%. 1000milliLiter(s) IV Continuous <Continuous>  traZODone 50milliGRAM(s) Oral at bedtime  simvastatin 20milliGRAM(s) Oral at bedtime  tiotropium 18 MICROgram(s) Capsule 1Capsule(s) Inhalation daily  atropine 1% Solution 1Drop(s) Right EYE daily  ofloxacin 0.3% Solution 1Drop(s) Right EYE four times a day  pantoprazole    Tablet 40milliGRAM(s) Oral two times a day before meals  heparin  Injectable 5000Unit(s) SubCutaneous every 8 hours  sodium chloride 0.45%. 2000milliLiter(s) IV Continuous <Continuous>  insulin lispro (HumaLOG) corrective regimen sliding scale  SubCutaneous three times a day before meals  insulin lispro (HumaLOG) corrective regimen sliding scale  SubCutaneous at bedtime  dextrose 5%. 1000milliLiter(s) IV Continuous <Continuous>  dextrose 50% Injectable 12.5Gram(s) IV Push once  docusate sodium 100milliGRAM(s) Oral three times a day  multivitamin 1Tablet(s) Oral daily  memantine ER 14milliGRAM(s) Oral at bedtime  cefepime  IVPB 1000milliGRAM(s) IV Intermittent every 12 hours  ALBUTerol/ipratropium for Nebulization 3milliLiter(s) Nebulizer every 6 hours  methylPREDNISolone sodium succinate Injectable 40milliGRAM(s) IV Push every 8 hours  cefepime  IVPB  IV Intermittent     MEDICATIONS  (PRN):  lactulose Syrup 20Gram(s) Oral at bedtime PRN constipation  dextrose Gel 1Dose(s) Oral once PRN Blood Glucose LESS THAN 70 milliGRAM(s)/deciliter  glucagon  Injectable 1milliGRAM(s) IntraMuscular once PRN Glucose LESS THAN 70 milligrams/deciliter  metoclopramide Injectable 10milliGRAM(s) IV Push every 6 hours PRN Nausea and/or Vomiting  senna 2Tablet(s) Oral at bedtime PRN Constipation    Allergies    Aciphex (Unknown)  Macrobid (Unknown)  Percocet 10/325 (Rash)    Intolerances      Social: no smoking, no alcohol, no illegal drugs; no recent travel, no exposure to TB  FAMILY HISTORY:    ROS is limited due to confusion: the patient seems comfortable; dose not seem in pain; denies SOB; noted with dry cough    Vital Signs Last 24 Hrs  T(C): 37.3, Max: 37.3 (02-28 @ 12:07)  T(F): 99.1, Max: 99.1 (02-28 @ 12:07)  HR: 84 (83 - 95)  BP: 123/44 (104/45 - 130/47)  BP(mean): --  RR: 18 (18 - 22)  SpO2: 93% (93% - 99%)  Daily Height in cm: 162.56 (27 Feb 2017 14:30)    Daily     PE:    Constitutional: frail looking  HEENT: NC/AT, EOMI, PERRLA  Neck: supple  Back: no tenderness  Respiratory: basal rales  Cardiovascular: S1S2 regular, no murmurs  Abdomen: soft, not tender, not distended, positive BS  Genitourinary: deferred  Rectal: deferred  Musculoskeletal: no muscle tenderness, no joint swelling or tenderness  Extremities: no pedal edema  Neurological: AxOx3, moving all extremities, no focal deficits  Skin: no rashes    Labs:                        10.3   20.4  )-----------( 226      ( 28 Feb 2017 05:42 )             33.1     28 Feb 2017 05:42    144    |  109    |  25     ----------------------------<  147    3.5     |  23     |  0.95     Ca    8.6        28 Feb 2017 05:42  Mg     1.9       28 Feb 2017 05:42    TPro  6.4    /  Alb  3.2    /  TBili  0.6    /  DBili  x      /  AST  28     /  ALT  49     /  AlkPhos  59     27 Feb 2017 14:54     LIVER FUNCTIONS - ( 27 Feb 2017 14:54 )  Alb: 3.2 g/dL / Pro: 6.4 gm/dL / ALK PHOS: 59 U/L / ALT: 49 U/L / AST: 28 U/L / GGT: x           Rapid RVP Result: NotDetec: The FilmArray RVP Rapid uses polymerase chain reaction (PCR) and melt  curve analysis to screen for adenovirus; coronavirus HKU1, NL63, 229E,  OC43; human metapneumovirus (hMPV); human enterovirus/rhinovirus  (Entero/RV); influenza A; influenza A/H1;NotDetec: influenza A/H3; influenza  A/H1-2009; influenza B; parainfluenza viruses 1, 2, 3, 4; respiratory  syncytial virus; Bordetella pertussis; Mycoplasma pneumoniae; and  Chlamydophila pneumoniae. (02.27.17 @ 14:58)    Radiology:    CT chest:   No evidence of pneumonia. Small amount of bibasilar segmental   and subsegmental atelectasis is present..  Mild to moderate centrilobular   emphysema. Rest of the chronic findings are unchanged as above.    Advanced directives addressed: full resuscitation

## 2017-02-28 NOTE — CONSULT NOTE ADULT - SUBJECTIVE AND OBJECTIVE BOX
Chief Complaint: Shortness of breath.    HPI:  87 year old female resident of [facility] with history of COPD not on home O2, CVA, Rheumatoid arthritis - chronically on plaquenil AND prednisone 5 mg daily for management of arthritis, PMR, Glaucoma s/p ocular surgery, Hyperlipidemia, Dementia who presents with c/o worsening SOB, cough and sputum for 2 weeks.  Pt was treated with azithromycin and medrol pack at [place] without improvement.  Pt denies fever, chills, anorexia, malaise, myalgia, dysuria, confusion or headache.      At time of my visit this am, patient is awake and alert. Still coughing BUT feels as though she is improving.     PAST MEDICAL & SURGICAL HISTORY:  Compression fracture of spine: T7  Alzheimers disease  HTN (hypertension)  Glaucoma  TIA (transient ischemic attack): 8/07  Cerebral vascular accident: 2005  Polymyalgia rheumatica  Osteoporosis  Chronic pain: mid back  Urinary retention  GERD (gastroesophageal reflux disease)  Cholelithiases  Hyperlipemia  Carotid artery stenosis  Lung nodule: biopsied 2003, benign  COPD (chronic obstructive pulmonary disease)  Asthma  Gall stones  History of hysterectomy: for bleeding  Hx of cholecystectomy  Rheumatoid arthritis    REVIEW OF SYSTEMS:    CONSTITUTIONAL: C/O fatigue, No fevers or chills  EYES/ENT: No visual changes;  No vertigo or throat pain   NECK: No pain or stiffness  RESPIRATORY: + cough, wheezing, shortness of breath  CARDIOVASCULAR: No chest pain or palpitations  GASTROINTESTINAL: No abdominal or epigastric pain. No nausea, vomiting, or hematemesis; No diarrhea or constipation. No melena or hematochezia.  GENITOURINARY: No dysuria, frequency or hematuria  NEUROLOGICAL: No numbness or weakness  SKIN: No itching, burning, rashes, or lesions   All other review of systems is negative unless indicated above    Vital Signs Last 24 Hrs  T(C): 36.2, Max: 36.4 (02-27 @ 14:30)  T(F): 97.2, Max: 97.5 (02-27 @ 14:30)  HR: 88 (83 - 95)  BP: 123/44 (104/45 - 130/47)  BP(mean): --  RR: 18 (18 - 22)  SpO2: 93% (93% - 99%)      PHYSICAL EXAM:    Constitutional: NAD, awake and alert, well-developed  Neck: Soft and supple, No LAD, No JVD  Respiratory: Breath sounds are equal bilaterally, bilat wheezing,scattered rhonchi  Cardiovascular: S1 and S2, regular rate and rhythm, no Murmurs, gallops or rubs  Gastrointestinal: Bowel Sounds present, soft, nontender, nondistended, no guarding, no rebound  Extremities: No peripheral edema  Neurological: A/O x 3, no focal deficits  Skin: No rashes    Medications:  MEDICATIONS  (STANDING):  sodium chloride 0.9%. 1000milliLiter(s) IV Continuous <Continuous>  traZODone 50milliGRAM(s) Oral at bedtime  simvastatin 20milliGRAM(s) Oral at bedtime  hydroxychloroquine 200milliGRAM(s) Oral two times a day with meals  dipyridamole 200 mG/aspirin 25 mG 1Capsule(s) Oral two times a day  tiotropium 18 MICROgram(s) Capsule 1Capsule(s) Inhalation daily  atropine 1% Solution 1Drop(s) Right EYE daily  ofloxacin 0.3% Solution 1Drop(s) Right EYE four times a day  pantoprazole    Tablet 40milliGRAM(s) Oral two times a day before meals  heparin  Injectable 5000Unit(s) SubCutaneous every 8 hours  sodium chloride 0.45%. 2000milliLiter(s) IV Continuous <Continuous>  insulin lispro (HumaLOG) corrective regimen sliding scale  SubCutaneous three times a day before meals  insulin lispro (HumaLOG) corrective regimen sliding scale  SubCutaneous at bedtime  dextrose 5%. 1000milliLiter(s) IV Continuous <Continuous>  dextrose 50% Injectable 12.5Gram(s) IV Push once  docusate sodium 100milliGRAM(s) Oral three times a day  multivitamin 1Tablet(s) Oral daily  memantine ER 14milliGRAM(s) Oral at bedtime  difluprednate 0.05% Ophthalmic Emulsion 1Drop(s) Right EYE every 6 hours  cefepime  IVPB 1000milliGRAM(s) IV Intermittent every 12 hours  ALBUTerol/ipratropium for Nebulization 3milliLiter(s) Nebulizer every 6 hours  methylPREDNISolone sodium succinate Injectable 40milliGRAM(s) IV Push every 8 hours  cefepime  IVPB  IV Intermittent       Labs: All Labs Reviewed:                        10.3   20.4  )-----------( 226      ( 28 Feb 2017 05:42 )             33.1     28 Feb 2017 05:42    144    |  109    |  25     ----------------------------<  147    3.5     |  23     |  0.95     Ca    8.6        28 Feb 2017 05:42  Mg     1.9       28 Feb 2017 05:42    TPro  6.4    /  Alb  3.2    /  TBili  0.6    /  DBili  x      /  AST  28     /  ALT  49     /  AlkPhos  59     27 Feb 2017 14:54    PT/INR - ( 27 Feb 2017 14:54 )   PT: 14.9 sec;   INR: 1.35 ratio         PTT - ( 27 Feb 2017 14:54 )  PTT:26.0 sec    Rapid Respiratory Viral Panel (02.27.17 @ 14:58)    Rapid RVP Result: NotDetec: The FilmArray RVP Rapid uses polymerase chain reaction (PCR) and melt  curve analysis to screen for adenovirus; coronavirus HKU1, NL63, 229E,  OC43; human metapneumovirus (hMPV); human enterovirus/rhinovirus  (Entero/RV); influenza A; influenza A/H1;NotDetec: influenza A/H3; influenza  A/H1-2009; influenza B; parainfluenza viruses 1, 2, 3, 4; respiratory  syncytial virus; Bordetella pertussis; Mycoplasma pneumoniae; and  Chlamydophila pneumoniae.      Blood Culture:     RADIOLOGY: PROCEDURE DATE:  02/27/2017        INTERPRETATION:  Exam Date: 2/27/2017 8:34 PM    CT chest without contrast    CLINICAL INFORMATION:  possible pneumonia  r/o pneumonia    TECHNIQUE:  Contiguous axial 3 mm sections were obtained through the   chest using single helical acquisition.  In addition, 2D sagittal and   coronal reformatted images obtained.   This scan was performed using   automatic exposure control (radiation dose reduction software) to obtain   a diagnostic image quality scan with patient dose as low as reasonably   achievable.        FINDINGS: Comparison is made to CT abdomen pelvis of June 25, 2016    The thyroid gland appears intact.    Small amount of bibasilar segmental and subsegmental atelectasis is   present..  Mild to moderate centrilobular emphysema. Small cyst at the   left lung base. No consolidations..   No pleural fluid is seen.  The   trachea and major bronchi are patent.    No enlarged axillary lymph nodes are found.   No enlarged hilar lymph   nodes are recognized, allowing for the noncontrast technique.  Within the   mediastinum no pathologic lymph nodes are found.  The heart size is   normal.      Limited evaluation of the upper abdomen demonstrates a cholecystectomy..    There is very heavy calcification of the aorta, with severe stenosis of   the infrarenal abdominal aorta, as seen on prior exams.      IMPRESSION: No evidence of pneumonia. Small amount of bibasilar segmental   and subsegmental atelectasis is present..  Mild to moderate centrilobular   emphysema. Rest of the chronic findings are unchanged as above.      Assessment/Plan: 1. COPD exacerbation. NO evidence of pneumonia on CT. Agree with IV steroids, nebulized bronchodilators. Can change to oral antibiotics. O2 supplement prn.     2. Hx of RA. Chronically on prednisone 5 mg daily with PLaquenil - per rheumatologist Dr. Gale.     3. Hx of ASHD. Continue meds; per hospitalist.    4. DVT prophylaxis. S.C. heparin.     D/W patient's daughter at bedside.             Time Span:

## 2017-02-28 NOTE — PROGRESS NOTE ADULT - ASSESSMENT
*Dyspnea and cough 2ndry to Acute on chronic COPD exacerbation and Acute Bronchitis.   - CT chest - No PNA  - RVP negative  - Gentle IV hydration.   - IV solumedrol q8H with slow taper as per pulm  - bronchodilators ATC and PRN,   - continue cefepime   - 2 L O2 NC PRN  - Pulmonology consult appreciated     *Leukocytosis   -FU UA and Cultures  *HypertensionPlan:- Stable.   -continue home meds     *Polymyalgia rheumatica.    -continue to monitor    -continue Plaquenil  -hold PO steroids    *Alzheimer's dementia Plan:- Stable   -continue Namenda XR 14mg HS.       *Coronary artery disease [place]:  - s/p Cath: 12/2015: Lcx 50% stenosis, LVEF 60%  - Aggrenox and statin continued

## 2017-02-28 NOTE — PROGRESS NOTE ADULT - SUBJECTIVE AND OBJECTIVE BOX
HOSPITALIST PROGRESS NOTE:  SUBJECTIVE:  PCP: PMD: Diana Otero  Pulm: Lynette Guardado  Cardio: [place]  Chief Complaint: Patient is a 87y old  Female who presents with a chief complaint of Cough, SOB, sputum (27 Feb 2017 19:41)      HPI:  87 year old female resident of [facility] with history of COPD not on home O2, CVA, Rheumatoid arthritis, PMR, Glaucoma s/p ocular surgery, Hyperlipidemia, Dementia who presents with c/o worsening SOB, cough and sputum for 2 weeks.  Pt was treated with azithromycin and medrol pack at [place] without improvement.  Pt denies fever, chills, anorexia, malaise, myalgia, dysuria, confusion or headache.      Pt seen bed side and evaluated in presence of patient's daughter.  Pt is saturating well but is coughing and wheezing. (27 Feb 2017 19:41)    2/28: patient has no complaints.       Allergies:  Aciphex (Unknown)  Macrobid (Unknown)  Percocet 10/325 (Rash)    REVIEW OF SYSTEMS:  See HPI. All other review of systems is negative unless indicated above.     OBJECTIVE  Physical Exam:  Vital Signs:    Vital Signs Last 24 Hrs  T(C): 37.3, Max: 37.3 (02-28 @ 12:07)  T(F): 99.1, Max: 99.1 (02-28 @ 12:07)  HR: 84 (83 - 95)  BP: 123/44 (104/45 - 130/47)  BP(mean): --  RR: 18 (18 - 22)  SpO2: 93% (93% - 99%)  I&O's Summary    I & Os for current day (as of 28 Feb 2017 15:53)  =============================================  IN: 991 ml / OUT: 0 ml / NET: 991 ml      Constitutional: NAD, awake and alert, well-developed  Neurological: AAO x 3, no focal deficits  HEENT: PERRLA, EOMI, MMM  Neck: Soft and supple, No LAD, No JVD  Respiratory: Breath sounds are clear bilaterally, No wheezing or rhonchi; Positive rales on LLL  Cardiovascular: S1 and S2, regular rate and rhythm; no Murmurs, gallops or rubs  Gastrointestinal: Bowel Sounds present, soft, nontender, nondistended, no guarding, no rebound tenderness  Back: No CVA tenderness   Extremities: No peripheral edema  Vascular: 2+ peripheral pulses  Musculoskeletal: 5/5 strength b/l upper and lower extremities  Skin: No rashes  Breast: Deferred  Rectal: Deferred    MEDICATIONS  (STANDING):  traZODone 50milliGRAM(s) Oral at bedtime  simvastatin 20milliGRAM(s) Oral at bedtime  tiotropium 18 MICROgram(s) Capsule 1Capsule(s) Inhalation daily  atropine 1% Solution 1Drop(s) Right EYE daily  ofloxacin 0.3% Solution 1Drop(s) Right EYE four times a day  heparin  Injectable 5000Unit(s) SubCutaneous every 8 hours  insulin lispro (HumaLOG) corrective regimen sliding scale  SubCutaneous three times a day before meals  insulin lispro (HumaLOG) corrective regimen sliding scale  SubCutaneous at bedtime  dextrose 50% Injectable 12.5Gram(s) IV Push once  docusate sodium 100milliGRAM(s) Oral three times a day  multivitamin 1Tablet(s) Oral daily  ALBUTerol/ipratropium for Nebulization 3milliLiter(s) Nebulizer every 6 hours  methylPREDNISolone sodium succinate Injectable 40milliGRAM(s) IV Push every 8 hours  cefuroxime   Tablet 250milliGRAM(s) Oral every 12 hours  pantoprazole    Tablet 40milliGRAM(s) Oral before breakfast  difluprednate 0.05% Ophthalmic Emulsion 1Drop(s) Right EYE once  guaiFENesin    Syrup 200milliGRAM(s) Oral every 6 hours  memantine 5milliGRAM(s) Oral two times a day  sodium chloride 0.9%. 1000milliLiter(s) IV Continuous <Continuous>      LABS: All Labs Reviewed:                        10.3   20.4  )-----------( 226      ( 28 Feb 2017 05:42 )             33.1     28 Feb 2017 05:42    144    |  109    |  25     ----------------------------<  147    3.5     |  23     |  0.95     Ca    8.6        28 Feb 2017 05:42  Mg     1.9       28 Feb 2017 05:42    TPro  6.4    /  Alb  3.2    /  TBili  0.6    /  DBili  x      /  AST  28     /  ALT  49     /  AlkPhos  59     27 Feb 2017 14:54    PT/INR - ( 27 Feb 2017 14:54 )   PT: 14.9 sec;   INR: 1.35 ratio         PTT - ( 27 Feb 2017 14:54 )  PTT:26.0 sec      RADIOLOGY/EKG:  EXAM:  CT CHEST  No evidence of pneumonia. Small amount of bibasilar segmental   and subsegmental atelectasis is present..  Mild to moderate centrilobular   emphysema. Rest of the chronic findings are unchanged as above.      EXAM:  CHEST SINGLE VIEW FRONTAL      Impression:subsegmental atelectasis at the lung bases

## 2017-02-28 NOTE — CONSULT NOTE ADULT - ASSESSMENT
86 y/o female resident of [facility] with h/o COPD not on home O2, old CVA, Rheumatoid arthritis, PMR, Glaucoma s/p ocular surgery, Hyperlipidemia, Dementia was admitted on 2/28 for worsening SOB, cough and sputum for 2 weeks.  Pt was treated with azithromycin and medrol pack at [place] without improvement.  Pt denies fever, chills, anorexia, malaise, myalgia, dysuria, confusion or headache. In ER, the patient was noted with coughing and wheezing.    1. Cough. Likely acute bronchitis.   -no evidence of pneumonia on CT chest  -she 86 y/o female resident of [facility] with h/o COPD not on home O2, old CVA, Rheumatoid arthritis, PMR, Glaucoma s/p ocular surgery, Hyperlipidemia, Dementia was admitted on 2/28 for worsening SOB, cough and sputum for 2 weeks.  Pt was treated with azithromycin and medrol pack at [place] without improvement.  Pt denies fever, chills, anorexia, malaise, myalgia, dysuria, confusion or headache. In ER, the patient was noted with coughing and wheezing.    1. Cough. Likely acute bronchitis. COPD on steroids. Immunocompromised host.   -leukocytosis is likely related to steroids  -no evidence of pneumonia on CT chest  -on cefepime 1 gm IV q12h # 1-2  -would change abx to ceftin 250 mg PO q12h for 5 more days  -monitor temps  -f/u CBC  -supportive care  2. Other issues: COPD not on home O2, old CVA, Rheumatoid arthritis, PMR, Glaucoma s/p ocular surgery, Hyperlipidemia, Dementia   -care per medicine

## 2017-03-01 LAB
ANION GAP SERPL CALC-SCNC: 11 MMOL/L — SIGNIFICANT CHANGE UP (ref 5–17)
ANISOCYTOSIS BLD QL: SLIGHT — SIGNIFICANT CHANGE UP
BASOPHILS # BLD AUTO: 0 K/UL — SIGNIFICANT CHANGE UP (ref 0–0.2)
BASOPHILS NFR BLD AUTO: 0.1 % — SIGNIFICANT CHANGE UP (ref 0–2)
BUN SERPL-MCNC: 23 MG/DL — SIGNIFICANT CHANGE UP (ref 7–23)
CALCIUM SERPL-MCNC: 8.8 MG/DL — SIGNIFICANT CHANGE UP (ref 8.5–10.1)
CHLORIDE SERPL-SCNC: 112 MMOL/L — HIGH (ref 96–108)
CO2 SERPL-SCNC: 23 MMOL/L — SIGNIFICANT CHANGE UP (ref 22–31)
CREAT SERPL-MCNC: 0.94 MG/DL — SIGNIFICANT CHANGE UP (ref 0.5–1.3)
DACRYOCYTES BLD QL SMEAR: SLIGHT — SIGNIFICANT CHANGE UP
ELLIPTOCYTES BLD QL SMEAR: SLIGHT — SIGNIFICANT CHANGE UP
EOSINOPHIL # BLD AUTO: 0 K/UL — SIGNIFICANT CHANGE UP (ref 0–0.5)
EOSINOPHIL NFR BLD AUTO: 0 % — SIGNIFICANT CHANGE UP (ref 0–6)
GLUCOSE SERPL-MCNC: 122 MG/DL — HIGH (ref 70–99)
HCT VFR BLD CALC: 33.4 % — LOW (ref 34.5–45)
HGB BLD-MCNC: 10.5 G/DL — LOW (ref 11.5–15.5)
LYMPHOCYTES # BLD AUTO: 0.4 K/UL — LOW (ref 1–3.3)
LYMPHOCYTES # BLD AUTO: 1.5 % — LOW (ref 13–44)
MANUAL DIF COMMENT BLD-IMP: SIGNIFICANT CHANGE UP
MCHC RBC-ENTMCNC: 27 PG — SIGNIFICANT CHANGE UP (ref 27–34)
MCHC RBC-ENTMCNC: 31.4 GM/DL — LOW (ref 32–36)
MCV RBC AUTO: 86.2 FL — SIGNIFICANT CHANGE UP (ref 80–100)
MICROCYTES BLD QL: SLIGHT — SIGNIFICANT CHANGE UP
MONOCYTES # BLD AUTO: 1.2 K/UL — HIGH (ref 0–0.9)
MONOCYTES NFR BLD AUTO: 4.4 % — SIGNIFICANT CHANGE UP (ref 2–14)
NEUTROPHILS # BLD AUTO: 25.9 K/UL — HIGH (ref 1.8–7.4)
NEUTROPHILS NFR BLD AUTO: 94 % — HIGH (ref 43–77)
PLAT MORPH BLD: NORMAL — SIGNIFICANT CHANGE UP
PLATELET # BLD AUTO: 229 K/UL — SIGNIFICANT CHANGE UP (ref 150–400)
POIKILOCYTOSIS BLD QL AUTO: SLIGHT — SIGNIFICANT CHANGE UP
POLYCHROMASIA BLD QL SMEAR: SLIGHT — SIGNIFICANT CHANGE UP
POTASSIUM SERPL-MCNC: 3.6 MMOL/L — SIGNIFICANT CHANGE UP (ref 3.5–5.3)
POTASSIUM SERPL-SCNC: 3.6 MMOL/L — SIGNIFICANT CHANGE UP (ref 3.5–5.3)
RBC # BLD: 3.88 M/UL — SIGNIFICANT CHANGE UP (ref 3.8–5.2)
RBC # FLD: 16.6 % — HIGH (ref 10.3–14.5)
RBC BLD AUTO: (no result)
SODIUM SERPL-SCNC: 146 MMOL/L — HIGH (ref 135–145)
WBC # BLD: 27.5 K/UL — HIGH (ref 3.8–10.5)
WBC # FLD AUTO: 27.5 K/UL — HIGH (ref 3.8–10.5)

## 2017-03-01 PROCEDURE — 93010 ELECTROCARDIOGRAM REPORT: CPT

## 2017-03-01 RX ORDER — ACETAMINOPHEN 500 MG
650 TABLET ORAL EVERY 6 HOURS
Qty: 0 | Refills: 0 | Status: DISCONTINUED | OUTPATIENT
Start: 2017-03-01 | End: 2017-03-03

## 2017-03-01 RX ORDER — ASPIRIN AND DIPYRIDAMOLE 25; 200 MG/1; MG/1
1 CAPSULE, EXTENDED RELEASE ORAL EVERY 12 HOURS
Qty: 0 | Refills: 0 | Status: DISCONTINUED | OUTPATIENT
Start: 2017-03-02 | End: 2017-03-03

## 2017-03-01 RX ADMIN — Medication 1 DROP(S): at 23:29

## 2017-03-01 RX ADMIN — Medication 2: at 08:52

## 2017-03-01 RX ADMIN — ASPIRIN AND DIPYRIDAMOLE 1 CAPSULE(S): 25; 200 CAPSULE, EXTENDED RELEASE ORAL at 10:45

## 2017-03-01 RX ADMIN — Medication 650 MILLIGRAM(S): at 11:03

## 2017-03-01 RX ADMIN — PANTOPRAZOLE SODIUM 40 MILLIGRAM(S): 20 TABLET, DELAYED RELEASE ORAL at 07:07

## 2017-03-01 RX ADMIN — Medication 100 MILLIGRAM(S): at 17:24

## 2017-03-01 RX ADMIN — Medication 1 DROP(S): at 17:27

## 2017-03-01 RX ADMIN — Medication 200 MILLIGRAM(S): at 10:45

## 2017-03-01 RX ADMIN — Medication 3 MILLILITER(S): at 20:38

## 2017-03-01 RX ADMIN — HEPARIN SODIUM 5000 UNIT(S): 5000 INJECTION INTRAVENOUS; SUBCUTANEOUS at 21:43

## 2017-03-01 RX ADMIN — HEPARIN SODIUM 5000 UNIT(S): 5000 INJECTION INTRAVENOUS; SUBCUTANEOUS at 07:05

## 2017-03-01 RX ADMIN — Medication 200 MILLIGRAM(S): at 23:30

## 2017-03-01 RX ADMIN — MEMANTINE HYDROCHLORIDE 14 MILLIGRAM(S): 10 TABLET ORAL at 23:32

## 2017-03-01 RX ADMIN — ASPIRIN AND DIPYRIDAMOLE 1 CAPSULE(S): 25; 200 CAPSULE, EXTENDED RELEASE ORAL at 07:07

## 2017-03-01 RX ADMIN — Medication 3 MILLILITER(S): at 08:37

## 2017-03-01 RX ADMIN — Medication 1 DROP(S): at 10:45

## 2017-03-01 RX ADMIN — Medication: at 17:13

## 2017-03-01 RX ADMIN — Medication 3 MILLILITER(S): at 01:16

## 2017-03-01 RX ADMIN — SIMVASTATIN 20 MILLIGRAM(S): 20 TABLET, FILM COATED ORAL at 21:43

## 2017-03-01 RX ADMIN — Medication 200 MILLIGRAM(S): at 17:25

## 2017-03-01 RX ADMIN — Medication 40 MILLIGRAM(S): at 17:23

## 2017-03-01 RX ADMIN — Medication 100 MILLIGRAM(S): at 07:07

## 2017-03-01 RX ADMIN — Medication 200 MILLIGRAM(S): at 07:06

## 2017-03-01 RX ADMIN — Medication 200 MILLIGRAM(S): at 07:07

## 2017-03-01 RX ADMIN — Medication 40 MILLIGRAM(S): at 07:04

## 2017-03-01 RX ADMIN — Medication 250 MILLIGRAM(S): at 17:24

## 2017-03-01 RX ADMIN — HEPARIN SODIUM 5000 UNIT(S): 5000 INJECTION INTRAVENOUS; SUBCUTANEOUS at 17:40

## 2017-03-01 RX ADMIN — Medication 50 MILLIGRAM(S): at 23:31

## 2017-03-01 RX ADMIN — Medication 250 MILLIGRAM(S): at 07:07

## 2017-03-01 RX ADMIN — Medication 200 MILLIGRAM(S): at 23:31

## 2017-03-01 RX ADMIN — DUREZOL 1 DROP(S): 0.5 EMULSION OPHTHALMIC at 07:17

## 2017-03-01 RX ADMIN — DUREZOL 1 DROP(S): 0.5 EMULSION OPHTHALMIC at 17:24

## 2017-03-01 RX ADMIN — Medication 1 DROP(S): at 10:51

## 2017-03-01 RX ADMIN — Medication 1 DROP(S): at 07:05

## 2017-03-01 RX ADMIN — Medication 1 TABLET(S): at 10:45

## 2017-03-01 RX ADMIN — Medication 100 MILLIGRAM(S): at 21:42

## 2017-03-01 NOTE — PROGRESS NOTE ADULT - SUBJECTIVE AND OBJECTIVE BOX
Patient still has cough with no wheeze or fever and breathing comfortably     PAST MEDICAL & SURGICAL HISTORY:  Compression fracture of spine: T7  Alzheimers disease  HTN (hypertension)  Glaucoma  TIA (transient ischemic attack): 8/07  Cerebral vascular accident: 2005  Polymyalgia rheumatica  Osteoporosis  Chronic pain: mid back  Urinary retention  GERD (gastroesophageal reflux disease)  Cholelithiases  Hyperlipemia  Carotid artery stenosis  Lung nodule: biopsied 2003, benign  COPD (chronic obstructive pulmonary disease)  Asthma  Gall stones  History of hysterectomy: for bleeding  Hx of cholecystectomy      Vitals:     T(C): 36.7, Max: 37.3 (02-28 @ 12:07)  HR: 79 (74 - 96)  BP: 153/57 (137/42 - 167/58)  RR: 17 (17 - 17)  SpO2: 94% (93% - 94%)  Wt(kg): --    PHYSICAL EXAMINATION:    GENERAL: The patient is awake and alert in no apparent distress.     HEENT: Head is normocephalic and atraumatic.     NECK: Supple, no jvd .    LUNGS: Bilateral air entry with no wheezing and has few rales bilateral both bases     HEART: Regular rate and rhythm without murmur.    ABDOMEN: Soft, nontender, and nondistended.      EXTREMITIES: No edema or calf tenderness     NEUROLOGIC: Grossly intact with no new focal defiects     Medications:   traZODone 50milliGRAM(s) Oral at bedtime  simvastatin 20milliGRAM(s) Oral at bedtime  tiotropium 18 MICROgram(s) Capsule 1Capsule(s) Inhalation daily  lactulose Syrup 20Gram(s) Oral at bedtime PRN  atropine 1% Solution 1Drop(s) Right EYE daily  ofloxacin 0.3% Solution 1Drop(s) Right EYE four times a day  heparin  Injectable 5000Unit(s) SubCutaneous every 8 hours  insulin lispro (HumaLOG) corrective regimen sliding scale  SubCutaneous three times a day before meals  insulin lispro (HumaLOG) corrective regimen sliding scale  SubCutaneous at bedtime  dextrose Gel 1Dose(s) Oral once PRN  dextrose 50% Injectable 12.5Gram(s) IV Push once  glucagon  Injectable 1milliGRAM(s) IntraMuscular once PRN  metoclopramide Injectable 10milliGRAM(s) IV Push every 6 hours PRN  senna 2Tablet(s) Oral at bedtime PRN  docusate sodium 100milliGRAM(s) Oral three times a day  multivitamin 1Tablet(s) Oral daily  ALBUTerol/ipratropium for Nebulization 3milliLiter(s) Nebulizer every 6 hours  methylPREDNISolone sodium succinate Injectable 40milliGRAM(s) IV Push every 8 hours  dipyridamole 200 mG/aspirin 25 mG 1Capsule(s) Oral every 12 hours  hydroxychloroquine 200milliGRAM(s) Oral every 12 hours  cefuroxime   Tablet 250milliGRAM(s) Oral every 12 hours  pantoprazole    Tablet 40milliGRAM(s) Oral before breakfast  guaiFENesin    Syrup 200milliGRAM(s) Oral every 6 hours  sodium chloride 0.9%. 1000milliLiter(s) IV Continuous <Continuous>  memantine ER 14milliGRAM(s) Oral at bedtime  difluprednate 0.05% Ophthalmic Emulsion 1Drop(s) Right EYE two times a day

## 2017-03-01 NOTE — PROGRESS NOTE ADULT - ASSESSMENT
copd exacerbation   dementia   no gross pneumonia on the ct scan of the chest     PLAN     - Taper the solumedrol twice daily dose and symptomatic relief for the cough and discontinue spiriva while on duoneb and continue with po antibiotic and mobilization.

## 2017-03-01 NOTE — PHYSICAL THERAPY INITIAL EVALUATION ADULT - MODALITIES TREATMENT COMMENTS
pt left seated in chair post Eval; chair alarm donned; IV intact; callbell in reach; pt instructed not to get up alone; call nursing for assist; bobby well; pt denied pain post Eval; RN Renetta made aware pt OOB

## 2017-03-01 NOTE — PROGRESS NOTE ADULT - ASSESSMENT
*Dyspnea and cough 2ndry to Acute on chronic COPD exacerbation and Acute Bronchitis.   - CT chest - No PNA  - RVP negative  - Gentle IV hydration.   - taper IV solumedrol q12H as per pulm  - bronchodilators ATC and PRN,   - continue ceftin; S/P Cefepime  - 2 L O2 NC PRN  - Pulmonology consult appreciated     *Leukocytosis   -UA-Neg    *HypertensionPlan:- Stable.   -continue home meds     *Polymyalgia rheumatica.    -continue to monitor    -continue Plaquenil  -hold PO steroids    *Alzheimer's dementia Plan:- Stable   -continue Namenda XR 14mg HS.       *Coronary artery disease [place]:  - s/p Cath: 12/2015: Lcx 50% stenosis, LVEF 60%  - Aggrenox and statin continued

## 2017-03-02 LAB
ANION GAP SERPL CALC-SCNC: 11 MMOL/L — SIGNIFICANT CHANGE UP (ref 5–17)
ANISOCYTOSIS BLD QL: SLIGHT — SIGNIFICANT CHANGE UP
BASOPHILS # BLD AUTO: 0.1 K/UL — SIGNIFICANT CHANGE UP (ref 0–0.2)
BUN SERPL-MCNC: 26 MG/DL — HIGH (ref 7–23)
CALCIUM SERPL-MCNC: 8.5 MG/DL — SIGNIFICANT CHANGE UP (ref 8.5–10.1)
CHLORIDE SERPL-SCNC: 112 MMOL/L — HIGH (ref 96–108)
CO2 SERPL-SCNC: 22 MMOL/L — SIGNIFICANT CHANGE UP (ref 22–31)
CREAT SERPL-MCNC: 0.82 MG/DL — SIGNIFICANT CHANGE UP (ref 0.5–1.3)
ELLIPTOCYTES BLD QL SMEAR: SLIGHT — SIGNIFICANT CHANGE UP
EOSINOPHIL # BLD AUTO: 0 K/UL — SIGNIFICANT CHANGE UP (ref 0–0.5)
FOLATE SERPL-MCNC: 14.5 NG/ML — SIGNIFICANT CHANGE UP (ref 4.8–24.2)
GLUCOSE SERPL-MCNC: 94 MG/DL — SIGNIFICANT CHANGE UP (ref 70–99)
HCT VFR BLD CALC: 31.1 % — LOW (ref 34.5–45)
HGB BLD-MCNC: 9.9 G/DL — LOW (ref 11.5–15.5)
IRON SATN MFR SERPL: 18 % — SIGNIFICANT CHANGE UP (ref 14–50)
IRON SATN MFR SERPL: 33 UG/DL — SIGNIFICANT CHANGE UP (ref 30–160)
LYMPHOCYTES # BLD AUTO: 0.6 K/UL — LOW (ref 1–3.3)
LYMPHOCYTES # BLD AUTO: 1 % — LOW (ref 13–44)
MACROCYTES BLD QL: SLIGHT — SIGNIFICANT CHANGE UP
MAGNESIUM SERPL-MCNC: 1.8 MG/DL — SIGNIFICANT CHANGE UP (ref 1.8–2.4)
MANUAL DIF COMMENT BLD-IMP: SIGNIFICANT CHANGE UP
MCHC RBC-ENTMCNC: 27 PG — SIGNIFICANT CHANGE UP (ref 27–34)
MCHC RBC-ENTMCNC: 31.9 GM/DL — LOW (ref 32–36)
MCV RBC AUTO: 84.7 FL — SIGNIFICANT CHANGE UP (ref 80–100)
MICROCYTES BLD QL: SLIGHT — SIGNIFICANT CHANGE UP
MONOCYTES # BLD AUTO: 1.5 K/UL — HIGH (ref 0–0.9)
MONOCYTES NFR BLD AUTO: 1 % — LOW (ref 2–14)
NEUTROPHILS # BLD AUTO: 22.4 K/UL — HIGH (ref 1.8–7.4)
NEUTROPHILS NFR BLD AUTO: 97 % — HIGH (ref 43–77)
NEUTS BAND # BLD: 1 % — SIGNIFICANT CHANGE UP (ref 0–8)
PHOSPHATE SERPL-MCNC: 1.3 MG/DL — LOW (ref 2.5–4.5)
PLAT MORPH BLD: NORMAL — SIGNIFICANT CHANGE UP
PLATELET # BLD AUTO: 261 K/UL — SIGNIFICANT CHANGE UP (ref 150–400)
POIKILOCYTOSIS BLD QL AUTO: SLIGHT — SIGNIFICANT CHANGE UP
POLYCHROMASIA BLD QL SMEAR: SLIGHT — SIGNIFICANT CHANGE UP
POTASSIUM SERPL-MCNC: 3.7 MMOL/L — SIGNIFICANT CHANGE UP (ref 3.5–5.3)
POTASSIUM SERPL-SCNC: 3.7 MMOL/L — SIGNIFICANT CHANGE UP (ref 3.5–5.3)
RBC # BLD: 3.68 M/UL — LOW (ref 3.8–5.2)
RBC # FLD: 16.4 % — HIGH (ref 10.3–14.5)
RBC BLD AUTO: (no result)
SODIUM SERPL-SCNC: 145 MMOL/L — SIGNIFICANT CHANGE UP (ref 135–145)
TIBC SERPL-MCNC: 183 UG/DL — LOW (ref 220–430)
UIBC SERPL-MCNC: 150 UG/DL — SIGNIFICANT CHANGE UP (ref 110–370)
VIT B12 SERPL-MCNC: 748 PG/ML — SIGNIFICANT CHANGE UP (ref 243–894)
WBC # BLD: 24.6 K/UL — HIGH (ref 3.8–10.5)
WBC # FLD AUTO: 24.6 K/UL — HIGH (ref 3.8–10.5)

## 2017-03-02 RX ORDER — SODIUM,POTASSIUM PHOSPHATES 278-250MG
2 POWDER IN PACKET (EA) ORAL
Qty: 0 | Refills: 0 | Status: COMPLETED | OUTPATIENT
Start: 2017-03-02 | End: 2017-03-03

## 2017-03-02 RX ADMIN — Medication 3 MILLILITER(S): at 01:16

## 2017-03-02 RX ADMIN — Medication 1 DROP(S): at 05:30

## 2017-03-02 RX ADMIN — Medication 200 MILLIGRAM(S): at 19:16

## 2017-03-02 RX ADMIN — PANTOPRAZOLE SODIUM 40 MILLIGRAM(S): 20 TABLET, DELAYED RELEASE ORAL at 15:20

## 2017-03-02 RX ADMIN — Medication 200 MILLIGRAM(S): at 05:31

## 2017-03-02 RX ADMIN — HEPARIN SODIUM 5000 UNIT(S): 5000 INJECTION INTRAVENOUS; SUBCUTANEOUS at 15:21

## 2017-03-02 RX ADMIN — Medication 3 MILLILITER(S): at 20:35

## 2017-03-02 RX ADMIN — Medication 250 MILLIGRAM(S): at 05:30

## 2017-03-02 RX ADMIN — ASPIRIN AND DIPYRIDAMOLE 1 CAPSULE(S): 25; 200 CAPSULE, EXTENDED RELEASE ORAL at 19:13

## 2017-03-02 RX ADMIN — Medication 200 MILLIGRAM(S): at 23:10

## 2017-03-02 RX ADMIN — Medication 2 TABLET(S): at 19:14

## 2017-03-02 RX ADMIN — HEPARIN SODIUM 5000 UNIT(S): 5000 INJECTION INTRAVENOUS; SUBCUTANEOUS at 05:30

## 2017-03-02 RX ADMIN — DUREZOL 1 DROP(S): 0.5 EMULSION OPHTHALMIC at 15:22

## 2017-03-02 RX ADMIN — Medication 200 MILLIGRAM(S): at 15:33

## 2017-03-02 RX ADMIN — ASPIRIN AND DIPYRIDAMOLE 1 CAPSULE(S): 25; 200 CAPSULE, EXTENDED RELEASE ORAL at 05:31

## 2017-03-02 RX ADMIN — Medication 50 MILLIGRAM(S): at 21:25

## 2017-03-02 RX ADMIN — MEMANTINE HYDROCHLORIDE 14 MILLIGRAM(S): 10 TABLET ORAL at 21:25

## 2017-03-02 RX ADMIN — Medication 1 DROP(S): at 23:11

## 2017-03-02 RX ADMIN — SIMVASTATIN 20 MILLIGRAM(S): 20 TABLET, FILM COATED ORAL at 21:25

## 2017-03-02 RX ADMIN — Medication 200 MILLIGRAM(S): at 21:30

## 2017-03-02 RX ADMIN — Medication 100 MILLIGRAM(S): at 05:30

## 2017-03-02 RX ADMIN — Medication 3 MILLILITER(S): at 14:22

## 2017-03-02 RX ADMIN — Medication 1 DROP(S): at 19:18

## 2017-03-02 RX ADMIN — Medication 250 MILLIGRAM(S): at 19:15

## 2017-03-02 RX ADMIN — Medication 1 DROP(S): at 15:22

## 2017-03-02 RX ADMIN — HEPARIN SODIUM 5000 UNIT(S): 5000 INJECTION INTRAVENOUS; SUBCUTANEOUS at 21:24

## 2017-03-02 RX ADMIN — Medication 100 MILLIGRAM(S): at 11:48

## 2017-03-02 RX ADMIN — Medication 1 TABLET(S): at 15:21

## 2017-03-02 RX ADMIN — Medication 200 MILLIGRAM(S): at 15:34

## 2017-03-02 RX ADMIN — Medication 40 MILLIGRAM(S): at 05:29

## 2017-03-02 RX ADMIN — Medication 2 TABLET(S): at 15:27

## 2017-03-02 RX ADMIN — Medication 100 MILLIGRAM(S): at 21:24

## 2017-03-02 NOTE — PROGRESS NOTE ADULT - SUBJECTIVE AND OBJECTIVE BOX
SHEELA GUPTA  MRN: [id number]    S: 3/2: Breathing comfortably. Confused. Denies shortness of breath. C/O difficulty sleeping.     PAST MEDICAL & SURGICAL HISTORY:  Compression fracture of spine: T7  Alzheimers disease  HTN (hypertension)  Glaucoma  TIA (transient ischemic attack): 8/07  Cerebral vascular accident: 2005  Polymyalgia rheumatica  Osteoporosis  Chronic pain: mid back  Urinary retention  GERD (gastroesophageal reflux disease)  Cholelithiases  Hyperlipemia  Carotid artery stenosis  Lung nodule: biopsied 2003, benign  COPD (chronic obstructive pulmonary disease)  Asthma  Gall stones  History of hysterectomy: for bleeding  Hx of cholecystectomy      O: T(C): 36.5, Max: 36.7 (03-01 @ 15:38)  HR: 110 (79 - 110)  BP: 139/81 (123/83 - 139/81)  RR: 18 (17 - 22)  SpO2: 95% (94% - 95%)  Wt(kg): --    PHYSICAL EXAM:      Constitutional: NAD, awake and alert, well-developed  Neck: Soft and supple, No LAD, No JVD  Respiratory: Breath sounds are equal bilaterally, bilat wheezing,scattered rhonchi  Cardiovascular: S1 and S2, regular rate and rhythm, no Murmurs, gallops or rubs  Gastrointestinal: Bowel Sounds present, soft, nontender, nondistended, no guarding, no rebound  Extremities: No peripheral edema  Neurological: no focal deficits; oriented to person; unsure as to where she is.   Skin: No rashes                            9.9    24.6  )-----------( 261      ( 02 Mar 2017 05:50 )             31.1       02 Mar 2017 05:50    145    |  112    |  26     ----------------------------<  94     3.7     |  22     |  0.82     Ca    8.5        02 Mar 2017 05:50  Phos  1.3       02 Mar 2017 05:50  Mg     1.8       02 Mar 2017 05:50        MEDICATIONS  (STANDING):  traZODone 50milliGRAM(s) Oral at bedtime  simvastatin 20milliGRAM(s) Oral at bedtime  atropine 1% Solution 1Drop(s) Right EYE daily  ofloxacin 0.3% Solution 1Drop(s) Right EYE four times a day  heparin  Injectable 5000Unit(s) SubCutaneous every 8 hours  insulin lispro (HumaLOG) corrective regimen sliding scale  SubCutaneous three times a day before meals  insulin lispro (HumaLOG) corrective regimen sliding scale  SubCutaneous at bedtime  dextrose 50% Injectable 12.5Gram(s) IV Push once  docusate sodium 100milliGRAM(s) Oral three times a day  multivitamin 1Tablet(s) Oral daily  ALBUTerol/ipratropium for Nebulization 3milliLiter(s) Nebulizer every 6 hours  hydroxychloroquine 200milliGRAM(s) Oral every 12 hours  cefuroxime   Tablet 250milliGRAM(s) Oral every 12 hours  pantoprazole    Tablet 40milliGRAM(s) Oral before breakfast  guaiFENesin    Syrup 200milliGRAM(s) Oral every 6 hours  sodium chloride 0.9%. 1000milliLiter(s) IV Continuous <Continuous>  memantine ER 14milliGRAM(s) Oral at bedtime  difluprednate 0.05% Ophthalmic Emulsion 1Drop(s) Right EYE daily  dipyridamole 200 mG/aspirin 25 mG 1Capsule(s) Oral every 12 hours  methylPREDNISolone sodium succinate Injectable 40milliGRAM(s) IV Push two times a day    MEDICATIONS  (PRN):  lactulose Syrup 20Gram(s) Oral at bedtime PRN constipation  dextrose Gel 1Dose(s) Oral once PRN Blood Glucose LESS THAN 70 milliGRAM(s)/deciliter  glucagon  Injectable 1milliGRAM(s) IntraMuscular once PRN Glucose LESS THAN 70 milligrams/deciliter  metoclopramide Injectable 10milliGRAM(s) IV Push every 6 hours PRN Nausea and/or Vomiting  senna 2Tablet(s) Oral at bedtime PRN Constipation  acetaminophen   Tablet 650milliGRAM(s) Oral every 6 hours PRN for HA        A/P: 1. COPD exacerbation. NO evidence of pneumonia on CT. Decrease steroids, continue nebulized bronchodilators. Oral antibiotics. O2 supplement prn.     2. Hx of RA. Chronically on prednisone 5 mg daily with PLaquenil - per rheumatologist Dr. Gale.     3. Hx of ASHD. Continue meds; per hospitalist.    4. DVT prophylaxis. S.C. heparin.    5. Chronic dementia. More confused than at time of admission. May be  to high dose steroids.     6. Leukocytosis. Possibly related to steroids. Follow up CBC.     Discussed management with patient's daughter.

## 2017-03-02 NOTE — PROGRESS NOTE ADULT - ASSESSMENT
*Dyspnea and cough 2ndry to Acute on chronic COPD exacerbation and Acute Bronchitis.   - CT chest - No PNA  - RVP negative  - S/P Gentle IV hydration.   - taper IV solumedrol QD as per pulm  - bronchodilators ATC and PRN,   - continue ceftin; S/P Cefepime  - 2 L O2 NC PRN  - Pulmonology consult appreciated     *Leukocytosis 2ndry to steroids  -UA-Neg; CT chest - No PNA  -Monitor WBC    *Toxic Encephalopathy 2ndry to possible steroids with a hx of Alzheimer's dementia Plan:  -continue Namenda XR 14mg HS.  -taper steroids    *HypertensionPlan:- Stable.   -continue home meds     *Polymyalgia rheumatica.    -continue to monitor    -continue Plaquenil  -hold PO steroids      *Coronary artery disease [place]:  - s/p Cath: 12/2015: Lcx 50% stenosis, LVEF 60%  - Aggrenox and statin continued

## 2017-03-02 NOTE — CDI QUERY NOTE - NSCDIOTHERTXTBX_GEN_ALL_CORE_HH
Per pulmonary :Chronic dementia. More confused than at time of admission. May be  to high dose steroids. Documented.  Patient on SM 40mg IV qday.    Can the increased confusion due to high dose steroids be further clarified ?  ie.. Toxic- encephalopathy ?  ..... Toxic-metabolic encephalopathy ?  ..... No evidence of encephalopathy ?   ..... Other ? Please clarify

## 2017-03-02 NOTE — PROGRESS NOTE ADULT - SUBJECTIVE AND OBJECTIVE BOX
HOSPITALIST PROGRESS NOTE:  SUBJECTIVE:  PCP: PMD: Diana Otero  Pulm: Lynette Guardado  Cardio: [place]  Chief Complaint: Patient is a 87y old  Female who presents with a chief complaint of Cough, SOB, sputum (2017 19:41)      HPI:  87 year old female resident of [facility] with history of COPD not on home O2, CVA, Rheumatoid arthritis, PMR, Glaucoma s/p ocular surgery, Hyperlipidemia, Dementia who presents with c/o worsening SOB, cough and sputum for 2 weeks.  Pt was treated with azithromycin and medrol pack at [place] without improvement.  Pt denies fever, chills, anorexia, malaise, myalgia, dysuria, confusion or headache.      Pt seen bed side and evaluated in presence of patient's daughter.  Pt is saturating well but is coughing and wheezing. (2017 19:41)    : patient has no complaints.   3/1:  Patient has no complaints. Discussed management in length with daughter yesterday.  3/2: patient more confused this morning compared to yesterday.      Allergies:  Aciphex (Unknown)  Macrobid (Unknown)  Percocet 10/325 (Rash)    REVIEW OF SYSTEMS:  See HPI. All other review of systems is negative unless indicated above.     OBJECTIVE  Physical Exam:  Vital Signs:  Vital Signs Last 24 Hrs  T(C): 36.7, Max: 36.7 (- @ 06:22)  T(F): 98.1, Max: 98.1 (03- @ 15:38)  HR: 84 (74 - 96)  BP: 127/69 (127/69 - 167/58)  BP(mean): --  RR: 17 (17 - 17)  SpO2: 94% (93% - 94%)    Constitutional: NAD, awake and alert, well-developed  Neurological: AAO x 3, no focal deficits  HEENT: PERRLA, EOMI, MMM  Neck: Soft and supple, No LAD, No JVD  Respiratory: Breath sounds are clear bilaterally, No wheezing or rhonchi; Positive rales on LLL  Cardiovascular: S1 and S2, regular rate and rhythm; no Murmurs, gallops or rubs  Gastrointestinal: Bowel Sounds present, soft, nontender, nondistended, no guarding, no rebound tenderness  Back: No CVA tenderness   Extremities: No peripheral edema  Vascular: 2+ peripheral pulses  Musculoskeletal: 5/5 strength b/l upper and lower extremities  Skin: No rashes  Breast: Deferred  Rectal: Deferred    MEDICATIONS  (STANDING):  traZODone 50milliGRAM(s) Oral at bedtime  simvastatin 20milliGRAM(s) Oral at bedtime  tiotropium 18 MICROgram(s) Capsule 1Capsule(s) Inhalation daily  atropine 1% Solution 1Drop(s) Right EYE daily  ofloxacin 0.3% Solution 1Drop(s) Right EYE four times a day  heparin  Injectable 5000Unit(s) SubCutaneous every 8 hours  insulin lispro (HumaLOG) corrective regimen sliding scale  SubCutaneous three times a day before meals  insulin lispro (HumaLOG) corrective regimen sliding scale  SubCutaneous at bedtime  dextrose 50% Injectable 12.5Gram(s) IV Push once  docusate sodium 100milliGRAM(s) Oral three times a day  multivitamin 1Tablet(s) Oral daily  ALBUTerol/ipratropium for Nebulization 3milliLiter(s) Nebulizer every 6 hours  methylPREDNISolone sodium succinate Injectable 40milliGRAM(s) IV Push every 8 hours  cefuroxime   Tablet 250milliGRAM(s) Oral every 12 hours  pantoprazole    Tablet 40milliGRAM(s) Oral before breakfast  difluprednate 0.05% Ophthalmic Emulsion 1Drop(s) Right EYE once  guaiFENesin    Syrup 200milliGRAM(s) Oral every 6 hours  memantine 5milliGRAM(s) Oral two times a day  sodium chloride 0.9%. 1000milliLiter(s) IV Continuous <Continuous>    Lab Results:  CBC  CBC Full  -  ( 01 Mar 2017 07:31 )  WBC Count : 27.5 K/uL  Hemoglobin : 10.5 g/dL  Hematocrit : 33.4 %  Platelet Count - Automated : 229 K/uL  Mean Cell Volume : 86.2 fl  Mean Cell Hemoglobin : 27.0 pg  Mean Cell Hemoglobin Concentration : 31.4 gm/dL  Auto Neutrophil # : 25.9 K/uL  Auto Lymphocyte # : 0.4 K/uL  Auto Monocyte # : 1.2 K/uL  Auto Eosinophil # : 0.0 K/uL  Auto Basophil # : 0.0 K/uL  Auto Neutrophil % : 94.0 %  Auto Lymphocyte % : 1.5 %  Auto Monocyte % : 4.4 %  Auto Eosinophil % : 0.0 %  Auto Basophil % : 0.1 %    .		Differential:	[] Automated		[] Manual  Chemistry                        10.5   27.5  )-----------( 229      ( 01 Mar 2017 07:31 )             33.4     01 Mar 2017 07:31    146    |  112    |  23     ----------------------------<  122    3.6     |  23     |  0.94     Ca    8.8        01 Mar 2017 07:31  Mg     1.9       2017 05:42          Urinalysis Basic - ( 2017 16:04 )    Color: Yellow / Appearance: Clear / S.020 / pH: x  Gluc: x / Ketone: Negative  / Bili: Negative / Urobili: Negative mg/dL   Blood: x / Protein: Negative mg/dL / Nitrite: Negative   Leuk Esterase: Negative / RBC: x / WBC x   Sq Epi: x / Non Sq Epi: x / Bacteria: x      MICROBIOLOGY/CULTURES:  Culture Results:   No growth to date. ( @ 16:41)  Culture Results:   No growth to date. ( @ 14:58)      RADIOLOGY/EKG:  EXAM:  CT CHEST  No evidence of pneumonia. Small amount of bibasilar segmental   and subsegmental atelectasis is present..  Mild to moderate centrilobular   emphysema. Rest of the chronic findings are unchanged as above.      EXAM:  CHEST SINGLE VIEW FRONTAL      Impression:subsegmental atelectasis at the lung bases

## 2017-03-03 ENCOUNTER — TRANSCRIPTION ENCOUNTER (OUTPATIENT)
Age: 82
End: 2017-03-03

## 2017-03-03 VITALS
HEART RATE: 98 BPM | OXYGEN SATURATION: 95 % | TEMPERATURE: 98 F | RESPIRATION RATE: 16 BRPM | SYSTOLIC BLOOD PRESSURE: 121 MMHG | DIASTOLIC BLOOD PRESSURE: 79 MMHG

## 2017-03-03 LAB
HCT VFR BLD CALC: 35.7 % — SIGNIFICANT CHANGE UP (ref 34.5–45)
HGB BLD-MCNC: 11.4 G/DL — LOW (ref 11.5–15.5)
MCHC RBC-ENTMCNC: 26.9 PG — LOW (ref 27–34)
MCHC RBC-ENTMCNC: 31.8 GM/DL — LOW (ref 32–36)
MCV RBC AUTO: 84.6 FL — SIGNIFICANT CHANGE UP (ref 80–100)
PLATELET # BLD AUTO: 268 K/UL — SIGNIFICANT CHANGE UP (ref 150–400)
RBC # BLD: 4.22 M/UL — SIGNIFICANT CHANGE UP (ref 3.8–5.2)
RBC # FLD: 16.3 % — HIGH (ref 10.3–14.5)
WBC # BLD: 25.5 K/UL — HIGH (ref 3.8–10.5)
WBC # FLD AUTO: 25.5 K/UL — HIGH (ref 3.8–10.5)

## 2017-03-03 RX ORDER — SIMVASTATIN 20 MG/1
1 TABLET, FILM COATED ORAL
Qty: 0 | Refills: 0 | COMMUNITY

## 2017-03-03 RX ORDER — ATROPINE SULFATE 1 %
1 DROPS OPHTHALMIC (EYE)
Qty: 0 | Refills: 0 | COMMUNITY

## 2017-03-03 RX ORDER — HYDROXYCHLOROQUINE SULFATE 200 MG
1 TABLET ORAL
Qty: 0 | Refills: 0 | COMMUNITY
Start: 2017-03-03

## 2017-03-03 RX ORDER — SIMVASTATIN 20 MG/1
1 TABLET, FILM COATED ORAL
Qty: 0 | Refills: 0 | COMMUNITY
Start: 2017-03-03

## 2017-03-03 RX ORDER — MEMANTINE HYDROCHLORIDE 10 MG/1
1 TABLET ORAL
Qty: 0 | Refills: 0 | COMMUNITY
Start: 2017-03-03

## 2017-03-03 RX ORDER — ASPIRIN AND DIPYRIDAMOLE 25; 200 MG/1; MG/1
1 CAPSULE, EXTENDED RELEASE ORAL
Qty: 0 | Refills: 0 | COMMUNITY
Start: 2017-03-03

## 2017-03-03 RX ORDER — TIOTROPIUM BROMIDE 18 UG/1
1 CAPSULE ORAL; RESPIRATORY (INHALATION) DAILY
Qty: 0 | Refills: 0 | Status: DISCONTINUED | OUTPATIENT
Start: 2017-03-03 | End: 2017-03-03

## 2017-03-03 RX ORDER — CEFUROXIME AXETIL 250 MG
1 TABLET ORAL
Qty: 4 | Refills: 0 | OUTPATIENT
Start: 2017-03-03 | End: 2017-03-05

## 2017-03-03 RX ORDER — ATROPINE SULFATE 1 %
1 DROPS OPHTHALMIC (EYE)
Qty: 0 | Refills: 0 | COMMUNITY
Start: 2017-03-03

## 2017-03-03 RX ORDER — ASPIRIN AND DIPYRIDAMOLE 25; 200 MG/1; MG/1
1 CAPSULE, EXTENDED RELEASE ORAL
Qty: 0 | Refills: 0 | COMMUNITY

## 2017-03-03 RX ORDER — ALBUTEROL 90 UG/1
2.5 AEROSOL, METERED ORAL EVERY 6 HOURS
Qty: 0 | Refills: 0 | Status: DISCONTINUED | OUTPATIENT
Start: 2017-03-04 | End: 2017-03-03

## 2017-03-03 RX ORDER — HYDROXYCHLOROQUINE SULFATE 200 MG
1 TABLET ORAL
Qty: 0 | Refills: 0 | COMMUNITY

## 2017-03-03 RX ORDER — LACTOBACILLUS ACIDOPHILUS 100MM CELL
1 CAPSULE ORAL
Qty: 0 | Refills: 0 | Status: DISCONTINUED | OUTPATIENT
Start: 2017-03-03 | End: 2017-03-03

## 2017-03-03 RX ORDER — ALBUTEROL 90 UG/1
3 AEROSOL, METERED ORAL
Qty: 100 | Refills: 0 | OUTPATIENT
Start: 2017-03-03 | End: 2017-03-18

## 2017-03-03 RX ORDER — IPRATROPIUM/ALBUTEROL SULFATE 18-103MCG
3 AEROSOL WITH ADAPTER (GRAM) INHALATION EVERY 6 HOURS
Qty: 0 | Refills: 0 | Status: DISCONTINUED | OUTPATIENT
Start: 2017-03-03 | End: 2017-03-03

## 2017-03-03 RX ORDER — MEMANTINE HYDROCHLORIDE 10 MG/1
1 TABLET ORAL
Qty: 0 | Refills: 0 | COMMUNITY

## 2017-03-03 RX ADMIN — Medication 100 MILLIGRAM(S): at 13:29

## 2017-03-03 RX ADMIN — Medication 1 TABLET(S): at 13:24

## 2017-03-03 RX ADMIN — PANTOPRAZOLE SODIUM 40 MILLIGRAM(S): 20 TABLET, DELAYED RELEASE ORAL at 08:36

## 2017-03-03 RX ADMIN — Medication 200 MILLIGRAM(S): at 13:25

## 2017-03-03 RX ADMIN — Medication 20 MILLIGRAM(S): at 13:25

## 2017-03-03 RX ADMIN — DUREZOL 1 DROP(S): 0.5 EMULSION OPHTHALMIC at 13:27

## 2017-03-03 RX ADMIN — Medication 200 MILLIGRAM(S): at 06:11

## 2017-03-03 RX ADMIN — Medication 100 MILLIGRAM(S): at 06:10

## 2017-03-03 RX ADMIN — Medication 1 DROP(S): at 13:39

## 2017-03-03 RX ADMIN — Medication 650 MILLIGRAM(S): at 08:36

## 2017-03-03 RX ADMIN — HEPARIN SODIUM 5000 UNIT(S): 5000 INJECTION INTRAVENOUS; SUBCUTANEOUS at 06:12

## 2017-03-03 RX ADMIN — ASPIRIN AND DIPYRIDAMOLE 1 CAPSULE(S): 25; 200 CAPSULE, EXTENDED RELEASE ORAL at 06:13

## 2017-03-03 RX ADMIN — Medication 2 TABLET(S): at 06:13

## 2017-03-03 RX ADMIN — Medication 3 MILLILITER(S): at 02:14

## 2017-03-03 RX ADMIN — Medication 250 MILLIGRAM(S): at 06:09

## 2017-03-03 RX ADMIN — Medication 1 DROP(S): at 13:27

## 2017-03-03 RX ADMIN — Medication 1 DROP(S): at 06:15

## 2017-03-03 NOTE — DISCHARGE NOTE ADULT - MEDICATION SUMMARY - MEDICATIONS TO STOP TAKING
I will STOP taking the medications listed below when I get home from the hospital:    Medrol Dosepak 4 mg oral tablet  --  by mouth    Medrol Dosepak 4 mg oral tablet  -- started on 2/21/17    Mucinex 600 mg oral tablet, extended release  -- 1 tab(s) by mouth every 12 hours    Atropine Care 1% ophthalmic solution  -- 1 drop(s) in the right eye once a day    ofloxacin 0.3% ophthalmic solution  -- 1 drop(s) in the right eye 4 times a day until 2/28/2017

## 2017-03-03 NOTE — DISCHARGE NOTE ADULT - PLAN OF CARE
prevent exacerbation continue nebulizer treatments  f/[facility] Dr Guardado as an outpatient  continue steroid taper 20mg for 2 days, 10mg for 2 days then 5mg thereafter  encourage deep breathing and coughing exercises  continue robitussin as needed prevent recurrence continue oral antibiotic as prescribed for the whole duration of the course- please do not skip or miss dosages prveent worsening troy current antihypertensive regimen prevent worsening troy aggrenox f/u with Dr regan as needed continue nebulizer treatments  f/[facility] Dr Guardado as an outpatient  continue steroid taper 20mg for 2 days, 10mg for 2 days then 5mg thereafter  encourage deep breathing and coughing exercises  continue robitussin as needed  continue oral antibiotic as prescribed. Please do not skip or miss doses continue current antihypertensive regimen

## 2017-03-03 NOTE — DISCHARGE NOTE ADULT - MEDICATION SUMMARY - MEDICATIONS TO CHANGE
I will SWITCH the dose or number of times a day I take the medications listed below when I get home from the hospital:    predniSONE 5 mg oral tablet  -- 1 tab(s) by mouth once a day

## 2017-03-03 NOTE — PROGRESS NOTE ADULT - SUBJECTIVE AND OBJECTIVE BOX
HOSPITALIST PROGRESS NOTE:  SUBJECTIVE:  PCP: PMD: Diana Otero  Pulm: Lynette Guardado  Cardio: [place]  Chief Complaint: Patient is a 87y old  Female who presents with a chief complaint of Cough, SOB, sputum (2017 19:41)      HPI:  87 year old female resident of [facility] with history of COPD not on home O2, CVA, Rheumatoid arthritis, PMR, Glaucoma s/p ocular surgery, Hyperlipidemia, Dementia who presents with c/o worsening SOB, cough and sputum for 2 weeks.  Pt was treated with azithromycin and medrol pack at [place] without improvement.  Pt denies fever, chills, anorexia, malaise, myalgia, dysuria, confusion or headache.      Pt seen bed side and evaluated in presence of patient's daughter.  Pt is saturating well but is coughing and wheezing. (2017 19:41)    : patient has no complaints.   3/1:  Patient has no complaints. Discussed management in length with daughter yesterday.  3/2: patient more confused this morning compared to yesterday.  3/3: Discussed with daughter yesterday and would like her mother to go home today.        Allergies:  Aciphex (Unknown)  Macrobid (Unknown)  Percocet 10/325 (Rash)    REVIEW OF SYSTEMS:  See HPI. All other review of systems is negative unless indicated above.     OBJECTIVE  Vital Signs Last 24 Hrs  T(C): 36.7, Max: 37.1 (03-02 @ 22:46)  T(F): 98.1, Max: 98.8 (03- @ 22:46)  HR: 98 (88 - 101)  BP: 121/79 (117/69 - 128/66)  BP(mean): --  RR: 16 (16 - 18)  SpO2: 95% (95% - 100%)    Constitutional: NAD, awake and alert, well-developed  Neurological: AAO x 3, no focal deficits  HEENT: PERRLA, EOMI, MMM  Neck: Soft and supple, No LAD, No JVD  Respiratory: Breath sounds are clear bilaterally, No wheezing or rhonchi; Positive rales on LLL  Cardiovascular: S1 and S2, regular rate and rhythm; no Murmurs, gallops or rubs  Gastrointestinal: Bowel Sounds present, soft, nontender, nondistended, no guarding, no rebound tenderness  Back: No CVA tenderness   Extremities: No peripheral edema  Vascular: 2+ peripheral pulses  Musculoskeletal: 5/5 strength b/l upper and lower extremities  Skin: No rashes  Breast: Deferred  Rectal: Deferred    MEDICATIONS  (STANDING):  traZODone 50milliGRAM(s) Oral at bedtime  simvastatin 20milliGRAM(s) Oral at bedtime  tiotropium 18 MICROgram(s) Capsule 1Capsule(s) Inhalation daily  atropine 1% Solution 1Drop(s) Right EYE daily  ofloxacin 0.3% Solution 1Drop(s) Right EYE four times a day  heparin  Injectable 5000Unit(s) SubCutaneous every 8 hours  insulin lispro (HumaLOG) corrective regimen sliding scale  SubCutaneous three times a day before meals  insulin lispro (HumaLOG) corrective regimen sliding scale  SubCutaneous at bedtime  dextrose 50% Injectable 12.5Gram(s) IV Push once  docusate sodium 100milliGRAM(s) Oral three times a day  multivitamin 1Tablet(s) Oral daily  ALBUTerol/ipratropium for Nebulization 3milliLiter(s) Nebulizer every 6 hours  methylPREDNISolone sodium succinate Injectable 40milliGRAM(s) IV Push every 8 hours  cefuroxime   Tablet 250milliGRAM(s) Oral every 12 hours  pantoprazole    Tablet 40milliGRAM(s) Oral before breakfast  difluprednate 0.05% Ophthalmic Emulsion 1Drop(s) Right EYE once  guaiFENesin    Syrup 200milliGRAM(s) Oral every 6 hours  memantine 5milliGRAM(s) Oral two times a day  sodium chloride 0.9%. 1000milliLiter(s) IV Continuous <Continuous>    Lab Results:  CBC  CBC Full  -  ( 02 Mar 2017 05:50 )  WBC Count : 24.6 K/uL  Hemoglobin : 9.9 g/dL  Hematocrit : 31.1 %  Platelet Count - Automated : 261 K/uL  Mean Cell Volume : 84.7 fl  Mean Cell Hemoglobin : 27.0 pg  Mean Cell Hemoglobin Concentration : 31.9 gm/dL  Auto Neutrophil # : 22.4 K/uL  Auto Lymphocyte # : 0.6 K/uL  Auto Monocyte # : 1.5 K/uL  Auto Eosinophil # : 0.0 K/uL  Auto Basophil # : 0.1 K/uL  Auto Neutrophil % : 97.0 %  Auto Lymphocyte % : 1.0 %  Auto Monocyte % : 1.0 %  Auto Eosinophil % : x  Auto Basophil % : x    .		Differential:	[] Automated		[] Manual  Chemistry                        9.9    24.6  )-----------( 261      ( 02 Mar 2017 05:50 )             31.1     02 Mar 2017 05:50    145    |  112    |  26     ----------------------------<  94     3.7     |  22     |  0.82     Ca    8.5        02 Mar 2017 05:50  Phos  1.3       02 Mar 2017 05:50  Mg     1.8       02 Mar 2017 05:50        MICROBIOLOGY/CULTURES:  Culture Results:   No growth to date. ( @ 16:41)  Culture Results:   No growth to date. ( @ 14:58)    Urinalysis Basic - ( 2017 16:04 )    Color: Yellow / Appearance: Clear / S.020 / pH: x  Gluc: x / Ketone: Negative  / Bili: Negative / Urobili: Negative mg/dL   Blood: x / Protein: Negative mg/dL / Nitrite: Negative   Leuk Esterase: Negative / RBC: x / WBC x   Sq Epi: x / Non Sq Epi: x / Bacteria: x    RADIOLOGY/EKG:  EXAM:  CT CHEST  No evidence of pneumonia. Small amount of bibasilar segmental   and subsegmental atelectasis is present..  Mild to moderate centrilobular   emphysema. Rest of the chronic findings are unchanged as above.      EXAM:  CHEST SINGLE VIEW FRONTAL      Impression:subsegmental atelectasis at the lung bases HOSPITALIST PROGRESS NOTE:  SUBJECTIVE:  PCP: PMD: Diana Otero  Pulm: Lynette Guardado  Cardio: [place]  Chief Complaint: Patient is a 87y old  Female who presents with a chief complaint of Cough, SOB, sputum (2017 19:41)      HPI:  87 year old female resident of [facility] with history of COPD not on home O2, CVA, Rheumatoid arthritis, PMR, Glaucoma s/p ocular surgery, Hyperlipidemia, Dementia who presents with c/o worsening SOB, cough and sputum for 2 weeks.  Pt was treated with azithromycin and medrol pack at [place] without improvement.  Pt denies fever, chills, anorexia, malaise, myalgia, dysuria, confusion or headache.      Pt seen bed side and evaluated in presence of patient's daughter.  Pt is saturating well but is coughing and wheezing. (2017 19:41)    : patient has no complaints.   3/1:  Patient has no complaints. Discussed management in length with daughter yesterday.  3/2: patient more confused this morning compared to yesterday.  3/3: Discussed with daughter yesterday and would like her mother to go home today.  confused.       Allergies:  Aciphex (Unknown)  Macrobid (Unknown)  Percocet 10/325 (Rash)    REVIEW OF SYSTEMS:  See HPI. All other review of systems is negative unless indicated above.     OBJECTIVE  Vital Signs Last 24 Hrs  T(C): 36.7, Max: 37.1 (03-02 @ 22:46)  T(F): 98.1, Max: 98.8 (03-02 @ 22:46)  HR: 98 (88 - 101)  BP: 121/79 (117/69 - 128/66)  BP(mean): --  RR: 16 (16 - 18)  SpO2: 95% (95% - 100%)    Constitutional: NAD, awake and alert, well-developed  Neurological: AAO x 3, no focal deficits  HEENT: PERRLA, EOMI, MMM  Neck: Soft and supple, No LAD, No JVD  Respiratory: Breath sounds are clear bilaterally, No wheezing or rhonchi; Positive rales on LLL  Cardiovascular: S1 and S2, regular rate and rhythm; no Murmurs, gallops or rubs  Gastrointestinal: Bowel Sounds present, soft, nontender, nondistended, no guarding, no rebound tenderness  Back: No CVA tenderness   Extremities: No peripheral edema  Vascular: 2+ peripheral pulses  Musculoskeletal: 5/5 strength b/l upper and lower extremities  Skin: No rashes  Breast: Deferred  Rectal: Deferred    MEDICATIONS  (STANDING):  traZODone 50milliGRAM(s) Oral at bedtime  simvastatin 20milliGRAM(s) Oral at bedtime  tiotropium 18 MICROgram(s) Capsule 1Capsule(s) Inhalation daily  atropine 1% Solution 1Drop(s) Right EYE daily  ofloxacin 0.3% Solution 1Drop(s) Right EYE four times a day  heparin  Injectable 5000Unit(s) SubCutaneous every 8 hours  insulin lispro (HumaLOG) corrective regimen sliding scale  SubCutaneous three times a day before meals  insulin lispro (HumaLOG) corrective regimen sliding scale  SubCutaneous at bedtime  dextrose 50% Injectable 12.5Gram(s) IV Push once  docusate sodium 100milliGRAM(s) Oral three times a day  multivitamin 1Tablet(s) Oral daily  ALBUTerol/ipratropium for Nebulization 3milliLiter(s) Nebulizer every 6 hours  methylPREDNISolone sodium succinate Injectable 40milliGRAM(s) IV Push every 8 hours  cefuroxime   Tablet 250milliGRAM(s) Oral every 12 hours  pantoprazole    Tablet 40milliGRAM(s) Oral before breakfast  difluprednate 0.05% Ophthalmic Emulsion 1Drop(s) Right EYE once  guaiFENesin    Syrup 200milliGRAM(s) Oral every 6 hours  memantine 5milliGRAM(s) Oral two times a day  sodium chloride 0.9%. 1000milliLiter(s) IV Continuous <Continuous>    Lab Results:  CBC  CBC Full  -  ( 02 Mar 2017 05:50 )  WBC Count : 24.6 K/uL  Hemoglobin : 9.9 g/dL  Hematocrit : 31.1 %  Platelet Count - Automated : 261 K/uL  Mean Cell Volume : 84.7 fl  Mean Cell Hemoglobin : 27.0 pg  Mean Cell Hemoglobin Concentration : 31.9 gm/dL  Auto Neutrophil # : 22.4 K/uL  Auto Lymphocyte # : 0.6 K/uL  Auto Monocyte # : 1.5 K/uL  Auto Eosinophil # : 0.0 K/uL  Auto Basophil # : 0.1 K/uL  Auto Neutrophil % : 97.0 %  Auto Lymphocyte % : 1.0 %  Auto Monocyte % : 1.0 %  Auto Eosinophil % : x  Auto Basophil % : x    .		Differential:	[] Automated		[] Manual  Chemistry                        9.9    24.6  )-----------( 261      ( 02 Mar 2017 05:50 )             31.1     02 Mar 2017 05:50    145    |  112    |  26     ----------------------------<  94     3.7     |  22     |  0.82     Ca    8.5        02 Mar 2017 05:50  Phos  1.3       02 Mar 2017 05:50  Mg     1.8       02 Mar 2017 05:50        MICROBIOLOGY/CULTURES:  Culture Results:   No growth to date. ( @ 16:41)  Culture Results:   No growth to date. ( @ 14:58)    Urinalysis Basic - ( 2017 16:04 )    Color: Yellow / Appearance: Clear / S.020 / pH: x  Gluc: x / Ketone: Negative  / Bili: Negative / Urobili: Negative mg/dL   Blood: x / Protein: Negative mg/dL / Nitrite: Negative   Leuk Esterase: Negative / RBC: x / WBC x   Sq Epi: x / Non Sq Epi: x / Bacteria: x    RADIOLOGY/EKG:  EXAM:  CT CHEST  No evidence of pneumonia. Small amount of bibasilar segmental   and subsegmental atelectasis is present..  Mild to moderate centrilobular   emphysema. Rest of the chronic findings are unchanged as above.      EXAM:  CHEST SINGLE VIEW FRONTAL      Impression:subsegmental atelectasis at the lung bases

## 2017-03-03 NOTE — PROGRESS NOTE ADULT - SUBJECTIVE AND OBJECTIVE BOX
SHEELA GUPTA  MRN: [id number]    S: 3/2: Breathing comfortably. Confused. Denies shortness of breath. C/O difficulty sleeping.    3/3: More confused today. No c/o dyspnea. Breathing comfortably.     PAST MEDICAL & SURGICAL HISTORY:  Compression fracture of spine: T7  Alzheimers disease  HTN (hypertension)  Glaucoma  TIA (transient ischemic attack): 8/07  Cerebral vascular accident: 2005  Polymyalgia rheumatica  Osteoporosis  Chronic pain: mid back  Urinary retention  GERD (gastroesophageal reflux disease)  Cholelithiases  Hyperlipemia  Carotid artery stenosis  Lung nodule: biopsied 2003, benign  COPD (chronic obstructive pulmonary disease)  Asthma  Gall stones  History of hysterectomy: for bleeding  Hx of cholecystectomy      O: T(C): 36.7, Max: 37.1 (03-02 @ 22:46)  HR: 98 (88 - 110)  BP: 121/79 (117/69 - 139/81)  RR: 16 (16 - 18)  SpO2: 95% (95% - 100%)  Wt(kg): --    PHYSICAL EXAM:      Constitutional: NAD, awake and alert, well-developed  Neck: Soft and supple, No LAD, No JVD  Respiratory: Breath sounds are equal bilaterally, bilat wheezing,scattered rhonchi  Cardiovascular: S1 and S2, regular rate and rhythm, no Murmurs, gallops or rubs  Gastrointestinal: Bowel Sounds present, soft, nontender, nondistended, no guarding, no rebound  Extremities: No peripheral edema  Neurological: no focal deficits; oriented to person; unsure as to where she is.   Skin: No rashes                          9.9    24.6  )-----------( 261      ( 02 Mar 2017 05:50 )             31.1       02 Mar 2017 05:50    145    |  112    |  26     ----------------------------<  94     3.7     |  22     |  0.82     Ca    8.5        02 Mar 2017 05:50  Phos  1.3       02 Mar 2017 05:50  Mg     1.8       02 Mar 2017 05:50        MEDICATIONS  (STANDING):  traZODone 50milliGRAM(s) Oral at bedtime  simvastatin 20milliGRAM(s) Oral at bedtime  atropine 1% Solution 1Drop(s) Right EYE daily  ofloxacin 0.3% Solution 1Drop(s) Right EYE four times a day  heparin  Injectable 5000Unit(s) SubCutaneous every 8 hours  insulin lispro (HumaLOG) corrective regimen sliding scale  SubCutaneous three times a day before meals  insulin lispro (HumaLOG) corrective regimen sliding scale  SubCutaneous at bedtime  dextrose 50% Injectable 12.5Gram(s) IV Push once  docusate sodium 100milliGRAM(s) Oral three times a day  multivitamin 1Tablet(s) Oral daily  ALBUTerol/ipratropium for Nebulization 3milliLiter(s) Nebulizer every 6 hours  hydroxychloroquine 200milliGRAM(s) Oral every 12 hours  cefuroxime   Tablet 250milliGRAM(s) Oral every 12 hours  pantoprazole    Tablet 40milliGRAM(s) Oral before breakfast  guaiFENesin    Syrup 200milliGRAM(s) Oral every 6 hours  memantine ER 14milliGRAM(s) Oral at bedtime  difluprednate 0.05% Ophthalmic Emulsion 1Drop(s) Right EYE daily  dipyridamole 200 mG/aspirin 25 mG 1Capsule(s) Oral every 12 hours  methylPREDNISolone sodium succinate Injectable 40milliGRAM(s) IV Push daily    MEDICATIONS  (PRN):  lactulose Syrup 20Gram(s) Oral at bedtime PRN constipation  dextrose Gel 1Dose(s) Oral once PRN Blood Glucose LESS THAN 70 milliGRAM(s)/deciliter  glucagon  Injectable 1milliGRAM(s) IntraMuscular once PRN Glucose LESS THAN 70 milligrams/deciliter  metoclopramide Injectable 10milliGRAM(s) IV Push every 6 hours PRN Nausea and/or Vomiting  senna 2Tablet(s) Oral at bedtime PRN Constipation  acetaminophen   Tablet 650milliGRAM(s) Oral every 6 hours PRN for HA        A/P: 1. COPD exacerbation. NO evidence of pneumonia on CT. Discontinue IV steroids; change to prednisone 20 mg daily. Continue nebulized bronchodilators. Oral antibiotics. O2 supplement prn.     2. Hx of RA. Chronically on prednisone 5 mg daily with PLaquenil - per rheumatologist Dr. Gale.     3. Hx of ASHD. Continue meds; per hospitalist.    4. DVT prophylaxis. S.C. heparin.    5. Chronic dementia. More confused than at time of admission. May be  to high dose steroids.     6. Leukocytosis. Possibly related to steroids. Follow up CBC.

## 2017-03-03 NOTE — DISCHARGE NOTE ADULT - HOSPITAL COURSE
87 year old female resident of [facility] with history of COPD not on home O2, CVA, Rheumatoid arthritis, PMR, Glaucoma s/p ocular surgery, Hyperlipidemia, Dementia who presents with c/o worsening SOB, cough and sputum for 2 weeks.  Pt was treated with azithromycin and medrol pack at [place] without improvement. Patient was admitted with Acute COPD exacerbation and Acute bronchitis. Patient is medically cleared for discahreg back to [facility]- completed IV antibitoic course now being switched to oral antibiotic for 2 more days. Patient with persistent leukocytosis- has been afebrile with no signs of infection. this is probably related to steroid administration. patient will need to have her CBC rechecked in a couple of days to have WBC check and f/u with Dr Otero. Patient will also be on steroid taper as prescribed    I spoke with daughter and updated her of the plan.    VSS on discharge- medications sent to preferred pharmacy - patient was given prescriptions for Home PT, CBC and nebulizer kit.

## 2017-03-03 NOTE — DISCHARGE NOTE ADULT - MEDICATION SUMMARY - MEDICATIONS TO TAKE
I will START or STAY ON the medications listed below when I get home from the hospital:    predniSONE 10 mg oral tablet  -- 20mg by mouth once a day x2 Days  10mg once a day for 2 days then   5mg once a day   -- It is very important that you take or use this exactly as directed.  Do not skip doses or discontinue unless directed by your doctor.  Obtain medical advice before taking any non-prescription drugs as some may affect the action of this medication.  Take with food or milk.    -- Indication: For COPD exacerbation    traZODone 50 mg oral tablet  -- 1 tab(s) by mouth once a day (at bedtime)  -- Indication: For general    loperamide  --  by mouth , As Needed  -- Indication: For diarrhea as needed    simvastatin 20 mg oral tablet  -- 1 tab(s) by mouth once a day (at bedtime)  -- Indication: For hyperlipidemi    hydroxychloroquine 200 mg oral tablet  -- 1 tab(s) by mouth every 12 hours  -- Indication: For general    aspirin-dipyridamole 25 mg-200 mg oral capsule, extended release  -- 1 cap(s) by mouth every 12 hours  -- Indication: For general    albuterol 2.5 mg/3 mL (0.083%) inhalation solution  -- 3 milliliter(s) inhaled every 6 hours, As Needed -for bronchospasm  -- Indication: For COPD exacerbation    Spiriva 18 mcg inhalation capsule  -- 1 cap(s) inhaled once a day  -- Indication: For COPD exacerbation    Ceftin 250 mg oral tablet  -- 1 tab(s) by mouth every 12 hours  -- Indication: For Pneumonia    Diabetic Tuss 100 mg/5 mL oral liquid  -- 10 milliliter(s) by mouth every 6 hours  -- Indication: For COugh    lactulose 20 g oral powder for reconstitution  -- 1 each by mouth once a day (at bedtime), As Needed  -- Indication: For COnstipation    memantine 14 mg oral capsule, extended release  -- 1 cap(s) by mouth once a day (at bedtime)  -- Indication: For general    atropine 1% ophthalmic solution  -- 1 drop(s) to each affected eye once a day  -- Indication: For general    ofloxacin 0.3% ophthalmic solution  -- 1 drop(s) in the right eye 4 times a day until 2/28/2017  -- Indication: For general    Durezol 0.05% ophthalmic emulsion  -- 1 drop(s) in the right eye 2 times a day until 2/28, then 1 drop into RIGHT eye QD on 2/29 thereafter  -- Indication: For general    esomeprazole 40 mg oral delayed release capsule  -- 1 cap(s) by mouth once a day  -- Indication: For GERD    Calcium 600+D oral tablet  -- 1 tab(s) by mouth once a day  -- Indication: For supplement    Multiple Vitamins oral tablet  -- 1 tab(s) by mouth once a day  -- Indication: For supplement

## 2017-03-03 NOTE — PROGRESS NOTE ADULT - ASSESSMENT
*Dyspnea and cough 2ndry to Acute on chronic COPD exacerbation and Acute Bronchitis.   - CT chest - No PNA  - RVP negative  - S/P Gentle IV hydration.   - taper IV solumedrol QD as per pulm  - bronchodilators ATC and PRN,   - continue ceftin; S/P Cefepime  - 2 L O2 NC PRN  - Pulmonology consult appreciated     *Leukocytosis 2ndry to steroids  -UA-Neg; CT chest - No PNA  -Monitor WBC    *Toxic Encephalopathy 2ndry to possible steroids with a hx of Alzheimer's dementia Plan:  -continue Namenda XR 14mg HS.  -taper steroids    *HypertensionPlan:- Stable.   -continue home meds     *Polymyalgia rheumatica.    -continue to monitor    -continue Plaquenil  -hold PO steroids      *Coronary artery disease [place]:  - s/p Cath: 12/2015: Lcx 50% stenosis, LVEF 60%  - Aggrenox and statin continued    *Discharge instructions/Follow up/Pending labs:  D/C patient home on steroid taper to home dose of prednisone. FU with PCP Dr Flavio Ramirez to monitor WBC  message left for Dr James MOY rheumatologist Dr. Gale.   total time: 45 minutes *Dyspnea and cough 2ndry to Acute on chronic COPD exacerbation and Acute Bronchitis.   - CT chest - No PNA  - RVP negative  - S/P Gentle IV hydration.   - taper IV solumedrol QD as per pulm  - bronchodilators ATC and PRN,   - continue ceftin; S/P Cefepime  - 2 L O2 NC PRN  - Pulmonology consult appreciated     *Leukocytosis 2ndry to steroids  -UA-Neg; CT chest - No PNA  -No fever or signs of infection here  -Monitor WBC    *Toxic Encephalopathy 2ndry to possible steroids with a hx of Alzheimer's dementia Plan:  -continue Namenda XR 14mg HS.  -taper steroids    *HypertensionPlan:- Stable.   -continue home meds     *Polymyalgia rheumatica.    -continue to monitor    -continue Plaquenil  -hold PO steroids      *Coronary artery disease [place]:  - s/p Cath: 12/2015: Lcx 50% stenosis, LVEF 60%  - Aggrenox and statin continued    *Discharge instructions/Follow up/Pending labs:  D/C patient home on steroid taper to home dose of prednisone, ceftin; FU with PCP Dr Flavio Ramirez to monitor WBC  message left for Dr Ramirez  [facility] rheumatologist Dr. Gale.   total time: 45 minutes

## 2017-03-03 NOTE — DISCHARGE NOTE ADULT - SECONDARY DIAGNOSIS.
Pneumonia due to infectious organism, unspecified laterality, unspecified part of lung Essential hypertension Cerebrovascular accident (CVA), unspecified mechanism Alzheimer's dementia without behavioral disturbance, unspecified timing of dementia onset

## 2017-03-03 NOTE — DISCHARGE NOTE ADULT - CARE PLAN
Principal Discharge DX:	Chronic obstructive pulmonary disease, unspecified COPD type  Goal:	prevent exacerbation  Instructions for follow-up, activity and diet:	continue nebulizer treatments  f/uw ith Dr Guardado as an outpatient  continue steroid taper 20mg for 2 days, 10mg for 2 days then 5mg thereafter  encourage deep breathing and coughing exercises  continue robitussin as needed  Secondary Diagnosis:	Pneumonia due to infectious organism, unspecified laterality, unspecified part of lung  Goal:	prevent recurrence  Instructions for follow-up, activity and diet:	continue oral antibiotic as prescribed for the whole duration of the course- please do not skip or miss dosages  Secondary Diagnosis:	Essential hypertension  Goal:	prveent worsening  Instructions for follow-up, activity and diet:	cotninue current antihypertensive regimen  Secondary Diagnosis:	Cerebrovascular accident (CVA), unspecified mechanism  Goal:	prevent worsening  Instructions for follow-up, activity and diet:	cotninue aggrenox f/u with Dr regan as needed  Secondary Diagnosis:	Alzheimer's dementia without behavioral disturbance, unspecified timing of dementia onset Principal Discharge DX:	Chronic obstructive pulmonary disease, unspecified COPD type  Goal:	prevent exacerbation  Instructions for follow-up, activity and diet:	continue nebulizer treatments  f/uw ith Dr Guardado as an outpatient  continue steroid taper 20mg for 2 days, 10mg for 2 days then 5mg thereafter  encourage deep breathing and coughing exercises  continue robitussin as needed  continue oral antibiotic as prescribed. Please do not skip or miss doses  Secondary Diagnosis:	Essential hypertension  Goal:	prevent recurrence  Instructions for follow-up, activity and diet:	continue oral antibiotic as prescribed for the whole duration of the course- please do not skip or miss dosages  Secondary Diagnosis:	Cerebrovascular accident (CVA), unspecified mechanism  Goal:	prevent worsening  Instructions for follow-up, activity and diet:	continue current antihypertensive regimen  Secondary Diagnosis:	Alzheimer's dementia without behavioral disturbance, unspecified timing of dementia onset  Goal:	prevent worsening  Instructions for follow-up, activity and diet:	cotninue aggrenox f/u with Dr regan as needed

## 2017-03-04 LAB
CULTURE RESULTS: SIGNIFICANT CHANGE UP
CULTURE RESULTS: SIGNIFICANT CHANGE UP
SPECIMEN SOURCE: SIGNIFICANT CHANGE UP
SPECIMEN SOURCE: SIGNIFICANT CHANGE UP

## 2017-03-06 DIAGNOSIS — G30.9 ALZHEIMER'S DISEASE, UNSPECIFIED: ICD-10-CM

## 2017-03-06 DIAGNOSIS — F02.81 DEMENTIA IN OTHER DISEASES CLASSIFIED ELSEWHERE, UNSPECIFIED SEVERITY, WITH BEHAVIORAL DISTURBANCE: ICD-10-CM

## 2017-03-06 DIAGNOSIS — M35.3 POLYMYALGIA RHEUMATICA: ICD-10-CM

## 2017-03-06 DIAGNOSIS — I25.10 ATHEROSCLEROTIC HEART DISEASE OF NATIVE CORONARY ARTERY WITHOUT ANGINA PECTORIS: ICD-10-CM

## 2017-03-06 DIAGNOSIS — I10 ESSENTIAL (PRIMARY) HYPERTENSION: ICD-10-CM

## 2017-03-06 DIAGNOSIS — J44.9 CHRONIC OBSTRUCTIVE PULMONARY DISEASE, UNSPECIFIED: ICD-10-CM

## 2017-03-07 DIAGNOSIS — M54.9 DORSALGIA, UNSPECIFIED: ICD-10-CM

## 2017-03-07 DIAGNOSIS — J20.9 ACUTE BRONCHITIS, UNSPECIFIED: ICD-10-CM

## 2017-03-07 DIAGNOSIS — Z86.73 PERSONAL HISTORY OF TRANSIENT ISCHEMIC ATTACK (TIA), AND CEREBRAL INFARCTION WITHOUT RESIDUAL DEFICITS: ICD-10-CM

## 2017-03-07 DIAGNOSIS — J45.909 UNSPECIFIED ASTHMA, UNCOMPLICATED: ICD-10-CM

## 2017-03-07 DIAGNOSIS — G92 TOXIC ENCEPHALOPATHY: ICD-10-CM

## 2017-03-07 DIAGNOSIS — K21.9 GASTRO-ESOPHAGEAL REFLUX DISEASE WITHOUT ESOPHAGITIS: ICD-10-CM

## 2017-03-07 DIAGNOSIS — E78.5 HYPERLIPIDEMIA, UNSPECIFIED: ICD-10-CM

## 2017-03-07 DIAGNOSIS — T38.0X5A ADVERSE EFFECT OF GLUCOCORTICOIDS AND SYNTHETIC ANALOGUES, INITIAL ENCOUNTER: ICD-10-CM

## 2017-03-07 DIAGNOSIS — G89.29 OTHER CHRONIC PAIN: ICD-10-CM

## 2017-03-07 DIAGNOSIS — J44.0 CHRONIC OBSTRUCTIVE PULMONARY DISEASE WITH ACUTE LOWER RESPIRATORY INFECTION: ICD-10-CM

## 2017-03-07 DIAGNOSIS — D72.829 ELEVATED WHITE BLOOD CELL COUNT, UNSPECIFIED: ICD-10-CM

## 2017-03-07 DIAGNOSIS — M81.0 AGE-RELATED OSTEOPOROSIS WITHOUT CURRENT PATHOLOGICAL FRACTURE: ICD-10-CM

## 2017-03-07 DIAGNOSIS — I65.22 OCCLUSION AND STENOSIS OF LEFT CAROTID ARTERY: ICD-10-CM

## 2017-03-07 DIAGNOSIS — H40.9 UNSPECIFIED GLAUCOMA: ICD-10-CM

## 2017-03-07 DIAGNOSIS — M06.9 RHEUMATOID ARTHRITIS, UNSPECIFIED: ICD-10-CM

## 2017-03-07 DIAGNOSIS — J44.1 CHRONIC OBSTRUCTIVE PULMONARY DISEASE WITH (ACUTE) EXACERBATION: ICD-10-CM

## 2017-03-12 ENCOUNTER — INPATIENT (INPATIENT)
Facility: HOSPITAL | Age: 82
LOS: 4 days | Discharge: NURSING, REHAB OR LONG-TERM CARE | End: 2017-03-17
Attending: INTERNAL MEDICINE | Admitting: INTERNAL MEDICINE
Payer: COMMERCIAL

## 2017-03-12 VITALS
HEART RATE: 90 BPM | OXYGEN SATURATION: 92 % | SYSTOLIC BLOOD PRESSURE: 166 MMHG | TEMPERATURE: 98 F | RESPIRATION RATE: 21 BRPM | WEIGHT: 145.06 LBS | DIASTOLIC BLOOD PRESSURE: 80 MMHG

## 2017-03-12 LAB
ALBUMIN SERPL ELPH-MCNC: 2.9 G/DL — LOW (ref 3.3–5)
ALP SERPL-CCNC: 66 U/L — SIGNIFICANT CHANGE UP (ref 40–120)
ALT FLD-CCNC: 55 U/L — SIGNIFICANT CHANGE UP (ref 12–78)
ANION GAP SERPL CALC-SCNC: 10 MMOL/L — SIGNIFICANT CHANGE UP (ref 5–17)
APPEARANCE UR: CLEAR — SIGNIFICANT CHANGE UP
APTT BLD: 26.5 SEC — LOW (ref 27.5–37.4)
AST SERPL-CCNC: 38 U/L — HIGH (ref 15–37)
BASOPHILS # BLD AUTO: 0.1 K/UL — SIGNIFICANT CHANGE UP (ref 0–0.2)
BASOPHILS NFR BLD AUTO: 0.8 % — SIGNIFICANT CHANGE UP (ref 0–2)
BILIRUB SERPL-MCNC: 0.5 MG/DL — SIGNIFICANT CHANGE UP (ref 0.2–1.2)
BILIRUB UR-MCNC: NEGATIVE — SIGNIFICANT CHANGE UP
BUN SERPL-MCNC: 18 MG/DL — SIGNIFICANT CHANGE UP (ref 7–23)
CALCIUM SERPL-MCNC: 9 MG/DL — SIGNIFICANT CHANGE UP (ref 8.5–10.1)
CHLORIDE SERPL-SCNC: 106 MMOL/L — SIGNIFICANT CHANGE UP (ref 96–108)
CO2 SERPL-SCNC: 29 MMOL/L — SIGNIFICANT CHANGE UP (ref 22–31)
COLOR SPEC: YELLOW — SIGNIFICANT CHANGE UP
CREAT SERPL-MCNC: 0.95 MG/DL — SIGNIFICANT CHANGE UP (ref 0.5–1.3)
DIFF PNL FLD: NEGATIVE — SIGNIFICANT CHANGE UP
EOSINOPHIL # BLD AUTO: 0 K/UL — SIGNIFICANT CHANGE UP (ref 0–0.5)
EOSINOPHIL NFR BLD AUTO: 0.5 % — SIGNIFICANT CHANGE UP (ref 0–6)
GLUCOSE SERPL-MCNC: 99 MG/DL — SIGNIFICANT CHANGE UP (ref 70–99)
GLUCOSE UR QL: NEGATIVE MG/DL — SIGNIFICANT CHANGE UP
HCT VFR BLD CALC: 36 % — SIGNIFICANT CHANGE UP (ref 34.5–45)
HGB BLD-MCNC: 11 G/DL — LOW (ref 11.5–15.5)
INR BLD: 1.04 RATIO — SIGNIFICANT CHANGE UP (ref 0.88–1.16)
KETONES UR-MCNC: (no result)
LACTATE SERPL-SCNC: 1.3 MMOL/L — SIGNIFICANT CHANGE UP (ref 0.7–2)
LEUKOCYTE ESTERASE UR-ACNC: NEGATIVE — SIGNIFICANT CHANGE UP
LYMPHOCYTES # BLD AUTO: 0.7 K/UL — LOW (ref 1–3.3)
LYMPHOCYTES # BLD AUTO: 8.1 % — LOW (ref 13–44)
MCHC RBC-ENTMCNC: 26.7 PG — LOW (ref 27–34)
MCHC RBC-ENTMCNC: 30.5 GM/DL — LOW (ref 32–36)
MCV RBC AUTO: 87.4 FL — SIGNIFICANT CHANGE UP (ref 80–100)
MONOCYTES # BLD AUTO: 0.6 K/UL — SIGNIFICANT CHANGE UP (ref 0–0.9)
MONOCYTES NFR BLD AUTO: 6.7 % — SIGNIFICANT CHANGE UP (ref 2–14)
NEUTROPHILS # BLD AUTO: 7.4 K/UL — SIGNIFICANT CHANGE UP (ref 1.8–7.4)
NEUTROPHILS NFR BLD AUTO: 83.8 % — HIGH (ref 43–77)
NITRITE UR-MCNC: NEGATIVE — SIGNIFICANT CHANGE UP
PH UR: 6.5 — SIGNIFICANT CHANGE UP (ref 4.8–8)
PLATELET # BLD AUTO: 201 K/UL — SIGNIFICANT CHANGE UP (ref 150–400)
POTASSIUM SERPL-MCNC: 4.3 MMOL/L — SIGNIFICANT CHANGE UP (ref 3.5–5.3)
POTASSIUM SERPL-SCNC: 4.3 MMOL/L — SIGNIFICANT CHANGE UP (ref 3.5–5.3)
PROT SERPL-MCNC: 6 GM/DL — SIGNIFICANT CHANGE UP (ref 6–8.3)
PROT UR-MCNC: NEGATIVE MG/DL — SIGNIFICANT CHANGE UP
PROTHROM AB SERPL-ACNC: 11.5 SEC — SIGNIFICANT CHANGE UP (ref 10–13.1)
RBC # BLD: 4.11 M/UL — SIGNIFICANT CHANGE UP (ref 3.8–5.2)
RBC # FLD: 17.6 % — HIGH (ref 10.3–14.5)
SODIUM SERPL-SCNC: 145 MMOL/L — SIGNIFICANT CHANGE UP (ref 135–145)
SP GR SPEC: 1.01 — SIGNIFICANT CHANGE UP (ref 1.01–1.02)
TROPONIN I SERPL-MCNC: <0.015 NG/ML — SIGNIFICANT CHANGE UP (ref 0.01–0.04)
TROPONIN I SERPL-MCNC: <0.015 NG/ML — SIGNIFICANT CHANGE UP (ref 0.01–0.04)
UROBILINOGEN FLD QL: NEGATIVE MG/DL — SIGNIFICANT CHANGE UP
WBC # BLD: 8.8 K/UL — SIGNIFICANT CHANGE UP (ref 3.8–10.5)
WBC # FLD AUTO: 8.8 K/UL — SIGNIFICANT CHANGE UP (ref 3.8–10.5)

## 2017-03-12 PROCEDURE — 70450 CT HEAD/BRAIN W/O DYE: CPT | Mod: 26

## 2017-03-12 PROCEDURE — 71010: CPT | Mod: 26

## 2017-03-12 PROCEDURE — 99285 EMERGENCY DEPT VISIT HI MDM: CPT

## 2017-03-12 PROCEDURE — 93010 ELECTROCARDIOGRAM REPORT: CPT

## 2017-03-12 RX ORDER — SIMVASTATIN 20 MG/1
20 TABLET, FILM COATED ORAL AT BEDTIME
Qty: 0 | Refills: 0 | Status: DISCONTINUED | OUTPATIENT
Start: 2017-03-12 | End: 2017-03-17

## 2017-03-12 RX ORDER — VANCOMYCIN HCL 1 G
750 VIAL (EA) INTRAVENOUS ONCE
Qty: 0 | Refills: 0 | Status: COMPLETED | OUTPATIENT
Start: 2017-03-12 | End: 2017-03-12

## 2017-03-12 RX ORDER — SIMVASTATIN 20 MG/1
20 TABLET, FILM COATED ORAL AT BEDTIME
Qty: 0 | Refills: 0 | Status: DISCONTINUED | OUTPATIENT
Start: 2017-03-12 | End: 2017-03-12

## 2017-03-12 RX ORDER — ASPIRIN AND DIPYRIDAMOLE 25; 200 MG/1; MG/1
1 CAPSULE, EXTENDED RELEASE ORAL
Qty: 0 | Refills: 0 | Status: DISCONTINUED | OUTPATIENT
Start: 2017-03-12 | End: 2017-03-12

## 2017-03-12 RX ORDER — MEMANTINE HYDROCHLORIDE 10 MG/1
14 TABLET ORAL AT BEDTIME
Qty: 0 | Refills: 0 | Status: DISCONTINUED | OUTPATIENT
Start: 2017-03-12 | End: 2017-03-17

## 2017-03-12 RX ORDER — PANTOPRAZOLE SODIUM 20 MG/1
40 TABLET, DELAYED RELEASE ORAL
Qty: 0 | Refills: 0 | Status: DISCONTINUED | OUTPATIENT
Start: 2017-03-13 | End: 2017-03-17

## 2017-03-12 RX ORDER — HEPARIN SODIUM 5000 [USP'U]/ML
5000 INJECTION INTRAVENOUS; SUBCUTANEOUS EVERY 8 HOURS
Qty: 0 | Refills: 0 | Status: DISCONTINUED | OUTPATIENT
Start: 2017-03-12 | End: 2017-03-17

## 2017-03-12 RX ORDER — OFLOXACIN 0.3 %
1 DROPS OPHTHALMIC (EYE)
Qty: 0 | Refills: 0 | COMMUNITY

## 2017-03-12 RX ORDER — CEFTRIAXONE 500 MG/1
1 INJECTION, POWDER, FOR SOLUTION INTRAMUSCULAR; INTRAVENOUS ONCE
Qty: 0 | Refills: 0 | Status: COMPLETED | OUTPATIENT
Start: 2017-03-12 | End: 2017-03-12

## 2017-03-12 RX ORDER — ALBUTEROL 90 UG/1
2.5 AEROSOL, METERED ORAL ONCE
Qty: 0 | Refills: 0 | Status: COMPLETED | OUTPATIENT
Start: 2017-03-12 | End: 2017-03-12

## 2017-03-12 RX ORDER — LACTULOSE 10 G/15ML
1 SOLUTION ORAL
Qty: 0 | Refills: 0 | COMMUNITY

## 2017-03-12 RX ORDER — HYDROXYCHLOROQUINE SULFATE 200 MG
200 TABLET ORAL ONCE
Qty: 0 | Refills: 0 | Status: DISCONTINUED | OUTPATIENT
Start: 2017-03-12 | End: 2017-03-12

## 2017-03-12 RX ORDER — TIOTROPIUM BROMIDE 18 UG/1
1 CAPSULE ORAL; RESPIRATORY (INHALATION) ONCE
Qty: 0 | Refills: 0 | Status: DISCONTINUED | OUTPATIENT
Start: 2017-03-12 | End: 2017-03-12

## 2017-03-12 RX ORDER — SODIUM CHLORIDE 9 MG/ML
1000 INJECTION INTRAMUSCULAR; INTRAVENOUS; SUBCUTANEOUS
Qty: 0 | Refills: 0 | Status: DISCONTINUED | OUTPATIENT
Start: 2017-03-12 | End: 2017-03-12

## 2017-03-12 RX ORDER — ASPIRIN AND DIPYRIDAMOLE 25; 200 MG/1; MG/1
1 CAPSULE, EXTENDED RELEASE ORAL
Qty: 0 | Refills: 0 | Status: DISCONTINUED | OUTPATIENT
Start: 2017-03-12 | End: 2017-03-17

## 2017-03-12 RX ORDER — PIPERACILLIN AND TAZOBACTAM 4; .5 G/20ML; G/20ML
3.38 INJECTION, POWDER, LYOPHILIZED, FOR SOLUTION INTRAVENOUS EVERY 8 HOURS
Qty: 0 | Refills: 0 | Status: DISCONTINUED | OUTPATIENT
Start: 2017-03-12 | End: 2017-03-12

## 2017-03-12 RX ORDER — TIOTROPIUM BROMIDE 18 UG/1
1 CAPSULE ORAL; RESPIRATORY (INHALATION) DAILY
Qty: 0 | Refills: 0 | Status: DISCONTINUED | OUTPATIENT
Start: 2017-03-13 | End: 2017-03-17

## 2017-03-12 RX ORDER — PIPERACILLIN AND TAZOBACTAM 4; .5 G/20ML; G/20ML
3.38 INJECTION, POWDER, LYOPHILIZED, FOR SOLUTION INTRAVENOUS ONCE
Qty: 0 | Refills: 0 | Status: COMPLETED | OUTPATIENT
Start: 2017-03-12 | End: 2017-03-12

## 2017-03-12 RX ORDER — VANCOMYCIN HCL 1 G
750 VIAL (EA) INTRAVENOUS EVERY 24 HOURS
Qty: 0 | Refills: 0 | Status: DISCONTINUED | OUTPATIENT
Start: 2017-03-13 | End: 2017-03-14

## 2017-03-12 RX ORDER — HYDROXYCHLOROQUINE SULFATE 200 MG
200 TABLET ORAL EVERY 12 HOURS
Qty: 0 | Refills: 0 | Status: DISCONTINUED | OUTPATIENT
Start: 2017-03-12 | End: 2017-03-17

## 2017-03-12 RX ORDER — TRAZODONE HCL 50 MG
50 TABLET ORAL ONCE
Qty: 0 | Refills: 0 | Status: DISCONTINUED | OUTPATIENT
Start: 2017-03-12 | End: 2017-03-12

## 2017-03-12 RX ORDER — DUREZOL 0.5 MG/ML
1 EMULSION OPHTHALMIC
Qty: 0 | Refills: 0 | COMMUNITY

## 2017-03-12 RX ORDER — VANCOMYCIN HCL 1 G
VIAL (EA) INTRAVENOUS
Qty: 0 | Refills: 0 | Status: DISCONTINUED | OUTPATIENT
Start: 2017-03-12 | End: 2017-03-14

## 2017-03-12 RX ORDER — ALBUTEROL 90 UG/1
2.5 AEROSOL, METERED ORAL ONCE
Qty: 0 | Refills: 0 | Status: DISCONTINUED | OUTPATIENT
Start: 2017-03-12 | End: 2017-03-12

## 2017-03-12 RX ORDER — PANTOPRAZOLE SODIUM 20 MG/1
40 TABLET, DELAYED RELEASE ORAL
Qty: 0 | Refills: 0 | Status: DISCONTINUED | OUTPATIENT
Start: 2017-03-12 | End: 2017-03-12

## 2017-03-12 RX ORDER — PIPERACILLIN AND TAZOBACTAM 4; .5 G/20ML; G/20ML
3.38 INJECTION, POWDER, LYOPHILIZED, FOR SOLUTION INTRAVENOUS EVERY 8 HOURS
Qty: 0 | Refills: 0 | Status: DISCONTINUED | OUTPATIENT
Start: 2017-03-12 | End: 2017-03-17

## 2017-03-12 RX ORDER — TRAZODONE HCL 50 MG
50 TABLET ORAL AT BEDTIME
Qty: 0 | Refills: 0 | Status: DISCONTINUED | OUTPATIENT
Start: 2017-03-12 | End: 2017-03-17

## 2017-03-12 RX ORDER — LOPERAMIDE HCL 2 MG
0 TABLET ORAL
Qty: 0 | Refills: 0 | COMMUNITY

## 2017-03-12 RX ORDER — SODIUM CHLORIDE 9 MG/ML
1000 INJECTION INTRAMUSCULAR; INTRAVENOUS; SUBCUTANEOUS
Qty: 0 | Refills: 0 | Status: DISCONTINUED | OUTPATIENT
Start: 2017-03-12 | End: 2017-03-15

## 2017-03-12 RX ADMIN — PIPERACILLIN AND TAZOBACTAM 25 GRAM(S): 4; .5 INJECTION, POWDER, LYOPHILIZED, FOR SOLUTION INTRAVENOUS at 22:22

## 2017-03-12 RX ADMIN — SIMVASTATIN 20 MILLIGRAM(S): 20 TABLET, FILM COATED ORAL at 22:24

## 2017-03-12 RX ADMIN — PIPERACILLIN AND TAZOBACTAM 200 GRAM(S): 4; .5 INJECTION, POWDER, LYOPHILIZED, FOR SOLUTION INTRAVENOUS at 14:26

## 2017-03-12 RX ADMIN — Medication 150 MILLIGRAM(S): at 18:45

## 2017-03-12 RX ADMIN — HEPARIN SODIUM 5000 UNIT(S): 5000 INJECTION INTRAVENOUS; SUBCUTANEOUS at 22:23

## 2017-03-12 RX ADMIN — SODIUM CHLORIDE 250 MILLILITER(S): 9 INJECTION INTRAMUSCULAR; INTRAVENOUS; SUBCUTANEOUS at 11:58

## 2017-03-12 RX ADMIN — SODIUM CHLORIDE 80 MILLILITER(S): 9 INJECTION INTRAMUSCULAR; INTRAVENOUS; SUBCUTANEOUS at 15:12

## 2017-03-12 RX ADMIN — MEMANTINE HYDROCHLORIDE 14 MILLIGRAM(S): 10 TABLET ORAL at 22:22

## 2017-03-12 RX ADMIN — ALBUTEROL 2.5 MILLIGRAM(S): 90 AEROSOL, METERED ORAL at 15:14

## 2017-03-12 RX ADMIN — Medication 200 MILLIGRAM(S): at 22:24

## 2017-03-12 RX ADMIN — ASPIRIN AND DIPYRIDAMOLE 1 CAPSULE(S): 25; 200 CAPSULE, EXTENDED RELEASE ORAL at 18:14

## 2017-03-12 RX ADMIN — CEFTRIAXONE 100 GRAM(S): 500 INJECTION, POWDER, FOR SOLUTION INTRAMUSCULAR; INTRAVENOUS at 11:57

## 2017-03-12 NOTE — ED ADULT NURSE REASSESSMENT NOTE - NS ED NURSE REASSESS COMMENT FT1
Patient's blood pressure has been running high. Dr. Harmon made aware. Pressure on right arm is much higher then left, around 40 points higher. Daughter was asked if she could get blood pressure medicine. Daughter states she never had high blood pressure and she doesn't want anything started. She wants her own doctor to assess her tomorrow. Dr. Harmon stated patient is safe for transfer to  and that her pressure will be monitored up there.

## 2017-03-12 NOTE — ED PROVIDER NOTE - NEUROLOGICAL, MLM
Mild dysarthria. Cranial nerves II-XII intact. 5/5 strength in flexion and extension of left upper and left lower extremities. Intact sensation and proprioception. Mild dysarthria. Cranial nerves II-XII intact. 5/5 strength in flexion and extension of left upper and left lower extremities. Intact sensation in all limbs. Unable to assess gait.

## 2017-03-12 NOTE — H&P ADULT - HISTORY OF PRESENT ILLNESS
Pt is 86yo female with PMHx of Dementia, RA, COPD, former smoker, glaucoma, HTN, CVA, TIA, GERD, HLD, osteoporosis, kidney stones, presents with acute with worsening cough and confusion at night. As per the daughter, the cough is dry non productive, not associated with fever, chills, chest pain, sob. Patient is more confused at night for the last couple of nights, no focal deficits. No other constitutional symptoms.

## 2017-03-12 NOTE — ED ADULT NURSE NOTE - CHIEF COMPLAINT QUOTE
Patient cornel from [place] with a complaint of change in mental status and right sided weakness along with difficulty swallowing. Unknown time that weakness and dysphagia started. Patien't daughter and primary caregiver is at bedside.

## 2017-03-12 NOTE — ED PROVIDER NOTE - CONSTITUTIONAL, MLM
normal... Well appearing, well nourished, and in no apparent distress. Well appearing, well nourished, and in no apparent distress. Pt oriented to self. Well appearing, well nourished, and in no apparent distress. Pt oriented to self. Nontoxic appearance.

## 2017-03-12 NOTE — ED PROVIDER NOTE - DETAILS:
I agree with monty's note and documentation. Monty documented under my supervision. I have seen and examined the patient on my own. Monty documented under my supervision. I saw and examined the patient on my own. I have modified/corrected monty's documentation pertaining to history, ROS, and physical examination where necessary to reflect my historical and physical exam findings.

## 2017-03-12 NOTE — ED PROVIDER NOTE - CARE PLAN
Principal Discharge DX:	Pneumonia of right lower lobe due to infectious organism Principal Discharge DX:	Pneumonia of right lower lobe due to infectious organism  Secondary Diagnosis:	Altered mental status, unspecified altered mental status type

## 2017-03-12 NOTE — H&P ADULT - NSHPLABSRESULTS_GEN_ALL_CORE
CARDIAC MARKERS ( 12 Mar 2017 11:37 )  <0.015 ng/mL / x     / x     / x     / x                                11.0   8.8   )-----------( 201      ( 12 Mar 2017 11:37 )             36.0     12 Mar 2017 11:37    145    |  106    |  18     ----------------------------<  99     4.3     |  29     |  0.95     Ca    9.0        12 Mar 2017 11:37    TPro  6.0    /  Alb  2.9    /  TBili  0.5    /  DBili  x      /  AST  38     /  ALT  55     /  AlkPhos  66     12 Mar 2017 11:37    PT/INR - ( 12 Mar 2017 11:37 )   PT: 11.5 sec;   INR: 1.04 ratio         PTT - ( 12 Mar 2017 11:37 )  PTT:26.5 sec  CAPILLARY BLOOD GLUCOSE  84 (12 Mar 2017 11:50)    LIVER FUNCTIONS - ( 12 Mar 2017 11:37 )  Alb: 2.9 g/dL / Pro: 6.0 gm/dL / ALK PHOS: 66 U/L / ALT: 55 U/L / AST: 38 U/L / GGT: x           Urinalysis Basic - ( 12 Mar 2017 11:35 )    Color: Yellow / Appearance: Clear / S.015 / pH: x  Gluc: x / Ketone: Trace  / Bili: Negative / Urobili: Negative mg/dL   Blood: x / Protein: Negative mg/dL / Nitrite: Negative   Leuk Esterase: Negative / RBC: x / WBC x   Sq Epi: x / Non Sq Epi: x / Bacteria: x

## 2017-03-12 NOTE — ED PROVIDER NOTE - NS ED MD SCRIBE ATTENDING SCRIBE SECTIONS
PHYSICAL EXAM/PAST MEDICAL/SURGICAL/SOCIAL HISTORY/REVIEW OF SYSTEMS/HISTORY OF PRESENT ILLNESS/PROGRESS NOTE/RESULTS/DISPOSITION

## 2017-03-12 NOTE — H&P ADULT - NSHPREVIEWOFSYSTEMS_GEN_ALL_CORE
(-)Fever, chills, chest pain, sob, headache, dizziness, palpitations, abd pain, n/v/d, leg swelling  (+) cough, confusion

## 2017-03-12 NOTE — ED PROVIDER NOTE - MEDICAL DECISION MAKING DETAILS
Patient with possible PNA on xray. S/p abx. Patient with possible PNA on xray. S/p abx. Admitted inpatient for further treatment.

## 2017-03-12 NOTE — ED ADULT NURSE NOTE - PLAN OF CARE
Bedside visitors/Explanation of exam/test/NPO/Fall precautions/Seizure precautions/Side rails/Call bell

## 2017-03-12 NOTE — ED PROVIDER NOTE - OBJECTIVE STATEMENT
88 y/o female with a h/o alzheimer's, COPD, glaucoma, BIBA from assisted living facility, p/w right sided weakness and difficulty swallowing since yesterday, associated with intermittent AMS over the passed few days, cough x1 week, per daughter. Pt denies abd pain, neck pain, or CP. Pt was seen in ED last week for low O2 sat, dx with PNA. Daughter denies h/o HTN, HLD, DM. Limited hx secondary to pt h/o alzheimer's. Dr. Vo (neuro), Dr. Solis, Dr. Otero (PMD). 88 y/o female with a h/o alzheimer's, COPD, glaucoma, HTN, CVA, TIA, GERD, HLD, osteoporosis, kidney stones, rheumatic fever, BIBA from [facility] living [facility], p/w right sided weakness and difficulty swallowing since yesterday (exact time of onset unknown), associated with intermittent AMS over the passed few days, and cough x1 week, per daughter. Pt denies abd pain, neck pain, or CP. Pt was seen in ED last week for low O2 sat, dx with PNA. Pt had a UTI a few weeks ago as well. Limited hx secondary to pt h/o alzheimer's. Dr. Vo (neuro), Dr. Solis, Dr. Otero (PMD). 88 y/o female with a h/o alzheimer's, COPD, glaucoma, HTN, CVA, TIA, GERD, HLD, osteoporosis, kidney stones, rheumatic fever, BIBA from [facility], complaining of generalized muscle weakness with right sided weakness and difficulty swallowing since yesterday (exact time of onset unknown but more than 24 hours), associated with AMS (as judged by patient's daughter) over the past few days, and cough x1 week, per daughter. Pt denies abd pain, neck pain, or CP. Pt was seen in ED last week for low O2 sat, dx'ed with PNA and was released after a period of inpatient treatment. Pt had a UTI a few weeks ago as well. Limited hx secondary to pt h/o alzheimer's dx with history mainly coming from patient's daughter/records. Dr. Vo (neuro), Dr. Solis, Dr. Otero (PMD).

## 2017-03-12 NOTE — H&P ADULT - ASSESSMENT
86yo female a/w HCAP    # HCAP  - afebrile, HD stable, comfortable on 2LNC, denies cp, sob  - CXR with RLL infiltrate, reports cough  - Sputum culture  - Fidel Whitlock  - duonebs as needed    # AMS  - currently awake, alert, oriented to person and place, which is baseline for the patient  - CTH negative    # RA  - cont with prednisone, Plaquenil    # COPD  - Spiriva albuterol    # Hx of CVA  - Aggrenox    # DVT ppx    # Admit, stable 88yo female a/w HCAP    # HCAP  - afebrile, HD stable, comfortable on 2LNC, denies cp, sob  - CXR with RLL infiltrate, reports cough  - Sputum culture  - Kate Whitlocksyn  - duonebs as needed    # AMS  - currently awake, alert, oriented to person and place, which is baseline for the patient  - CTH negative    # RA  - cont with prednisone, Plaquenil    # COPD  - Spiriva albuterol    # Hx of CVA  - Aggrenox    # Dysphagia  - speech and swallow eval, keep NPO for now    # DVT ppx    # Admit, stable

## 2017-03-13 LAB
ANION GAP SERPL CALC-SCNC: 9 MMOL/L — SIGNIFICANT CHANGE UP (ref 5–17)
BUN SERPL-MCNC: 14 MG/DL — SIGNIFICANT CHANGE UP (ref 7–23)
CALCIUM SERPL-MCNC: 8.1 MG/DL — LOW (ref 8.5–10.1)
CHLORIDE SERPL-SCNC: 107 MMOL/L — SIGNIFICANT CHANGE UP (ref 96–108)
CO2 SERPL-SCNC: 27 MMOL/L — SIGNIFICANT CHANGE UP (ref 22–31)
CREAT SERPL-MCNC: 0.76 MG/DL — SIGNIFICANT CHANGE UP (ref 0.5–1.3)
GLUCOSE SERPL-MCNC: 63 MG/DL — LOW (ref 70–99)
POTASSIUM SERPL-MCNC: 4 MMOL/L — SIGNIFICANT CHANGE UP (ref 3.5–5.3)
POTASSIUM SERPL-SCNC: 4 MMOL/L — SIGNIFICANT CHANGE UP (ref 3.5–5.3)
SODIUM SERPL-SCNC: 143 MMOL/L — SIGNIFICANT CHANGE UP (ref 135–145)

## 2017-03-13 PROCEDURE — 93010 ELECTROCARDIOGRAM REPORT: CPT

## 2017-03-13 PROCEDURE — 71250 CT THORAX DX C-: CPT | Mod: 26

## 2017-03-13 RX ORDER — ALBUTEROL 90 UG/1
2.5 AEROSOL, METERED ORAL EVERY 4 HOURS
Qty: 0 | Refills: 0 | Status: DISCONTINUED | OUTPATIENT
Start: 2017-03-13 | End: 2017-03-17

## 2017-03-13 RX ADMIN — Medication 200 MILLIGRAM(S): at 22:32

## 2017-03-13 RX ADMIN — PANTOPRAZOLE SODIUM 40 MILLIGRAM(S): 20 TABLET, DELAYED RELEASE ORAL at 05:51

## 2017-03-13 RX ADMIN — SIMVASTATIN 20 MILLIGRAM(S): 20 TABLET, FILM COATED ORAL at 22:32

## 2017-03-13 RX ADMIN — HEPARIN SODIUM 5000 UNIT(S): 5000 INJECTION INTRAVENOUS; SUBCUTANEOUS at 05:49

## 2017-03-13 RX ADMIN — Medication 1 TABLET(S): at 13:24

## 2017-03-13 RX ADMIN — TIOTROPIUM BROMIDE 1 CAPSULE(S): 18 CAPSULE ORAL; RESPIRATORY (INHALATION) at 09:12

## 2017-03-13 RX ADMIN — MEMANTINE HYDROCHLORIDE 14 MILLIGRAM(S): 10 TABLET ORAL at 22:33

## 2017-03-13 RX ADMIN — Medication 1 TABLET(S): at 13:12

## 2017-03-13 RX ADMIN — SODIUM CHLORIDE 80 MILLILITER(S): 9 INJECTION INTRAMUSCULAR; INTRAVENOUS; SUBCUTANEOUS at 10:43

## 2017-03-13 RX ADMIN — PIPERACILLIN AND TAZOBACTAM 25 GRAM(S): 4; .5 INJECTION, POWDER, LYOPHILIZED, FOR SOLUTION INTRAVENOUS at 05:50

## 2017-03-13 RX ADMIN — PIPERACILLIN AND TAZOBACTAM 25 GRAM(S): 4; .5 INJECTION, POWDER, LYOPHILIZED, FOR SOLUTION INTRAVENOUS at 13:27

## 2017-03-13 RX ADMIN — HEPARIN SODIUM 5000 UNIT(S): 5000 INJECTION INTRAVENOUS; SUBCUTANEOUS at 22:32

## 2017-03-13 RX ADMIN — PIPERACILLIN AND TAZOBACTAM 25 GRAM(S): 4; .5 INJECTION, POWDER, LYOPHILIZED, FOR SOLUTION INTRAVENOUS at 22:32

## 2017-03-13 RX ADMIN — Medication 200 MILLIGRAM(S): at 10:43

## 2017-03-13 RX ADMIN — Medication 150 MILLIGRAM(S): at 13:29

## 2017-03-13 RX ADMIN — ASPIRIN AND DIPYRIDAMOLE 1 CAPSULE(S): 25; 200 CAPSULE, EXTENDED RELEASE ORAL at 05:48

## 2017-03-13 RX ADMIN — HEPARIN SODIUM 5000 UNIT(S): 5000 INJECTION INTRAVENOUS; SUBCUTANEOUS at 13:24

## 2017-03-13 RX ADMIN — ASPIRIN AND DIPYRIDAMOLE 1 CAPSULE(S): 25; 200 CAPSULE, EXTENDED RELEASE ORAL at 18:41

## 2017-03-13 NOTE — SWALLOW BEDSIDE ASSESSMENT ADULT - SLP GENERAL OBSERVATIONS
Pt was alert and interactive. Internal distractibility was noted at times. Pt was able to verbalize during structured communication tasks and during Q/A dyads. Verbal output was produced without a primary motor speech or primary linguistic pathology. No overt dysarthria, verbal apraxia or aphasia were evident on exam. Of note is that pt was forgetful at times(oriented to name/setting and not date) which I suspect is a baseline cognitive deficiency due to dementia, With that being stated, she was able to make needs known verbally and communicative integrity is  reportedly at usual state. Pt was then asked about swallowing and she denied perceiving any Dysphagia or aspiration signs with meals.

## 2017-03-13 NOTE — SWALLOW BEDSIDE ASSESSMENT ADULT - SWALLOW EVAL: RECOMMENDED DIET
No oropharyngeal swallowing contraindications were evident for being on a regular texture diet with thin liquids as she tolerates same from an oropharyngeal swallowing stance.

## 2017-03-13 NOTE — PROGRESS NOTE ADULT - SUBJECTIVE AND OBJECTIVE BOX
Patient seen and examined earlier today. Was feeling better. Cough improving. no chest pain. Denies any focal weakness at this time. Had some difficulty swallowing yesterday but able to take pills today.     Review of system- All 10 systems reviewed and is as per HPI otherwise negative.           T(C): 37.1, Max: 37.1 (-13 @ 12:11)  HR: 92 (76 - 94)  BP: 155/74 (128/70 - 194/72)  RR: 18 (16 - 23)  SpO2: 95% (95% - 98%)  Wt(kg): --    PHYSICAL EXAM:    GENERAL: Comfortable, no acute distress  HEAD:  Atraumatic, Normocephalic  EYES: EOMI, PERRLA  HEENT: Moist mucous membranes  NECK: Supple, No JVD  NERVOUS SYSTEM:  Non focal  CHEST/LUNG: rales at b/l bases.  HEART: S1, S2+, Regular rate and rhythm  ABDOMEN: Soft, Nontender, Nondistended; Bowel sounds present  GENITOURINARY- Voiding, no palpable bladder  EXTREMITIES:  No clubbing, cyanosis, or edema  MUSCULOSKELTAL- No muscle tenderness, No joint tenderness  SKIN-no rash        LABS:                        11.0   8.8   )-----------( 201      ( 12 Mar 2017 11:37 )             36.0     13 Mar 2017 05:54    143    |  107    |  14     ----------------------------<  63     4.0     |  27     |  0.76     Ca    8.1        13 Mar 2017 05:54    TPro  6.0    /  Alb  2.9    /  TBili  0.5    /  DBili  x      /  AST  38     /  ALT  55     /  AlkPhos  66     12 Mar 2017 11:37    PT/INR - ( 12 Mar 2017 11:37 )   PT: 11.5 sec;   INR: 1.04 ratio         PTT - ( 12 Mar 2017 11:37 )  PTT:26.5 sec  Urinalysis Basic - ( 12 Mar 2017 11:35 )    Color: Yellow / Appearance: Clear / S.015 / pH: x  Gluc: x / Ketone: Trace  / Bili: Negative / Urobili: Negative mg/dL   Blood: x / Protein: Negative mg/dL / Nitrite: Negative   Leuk Esterase: Negative / RBC: x / WBC x   Sq Epi: x / Non Sq Epi: x / Bacteria: x        CAPILLARY BLOOD GLUCOSE  84 (12 Mar 2017 11:50)      CARDIAC MARKERS ( 12 Mar 2017 15:00 )  <0.015 ng/mL / x     / x     / x     / x      CARDIAC MARKERS ( 12 Mar 2017 11:37 )  <0.015 ng/mL / x     / x     / x     / x                    MEDS  piperacillin/tazobactam IVPB. 3.375Gram(s) IV Intermittent every 8 hours  vancomycin  IVPB  IV Intermittent   heparin  Injectable 5000Unit(s) SubCutaneous every 8 hours  sodium chloride 0.9%. 1000milliLiter(s) IV Continuous <Continuous>  predniSONE   Tablet 5milliGRAM(s) Oral daily  traZODone 50milliGRAM(s) Oral at bedtime  simvastatin 20milliGRAM(s) Oral at bedtime  vancomycin  IVPB 750milliGRAM(s) IV Intermittent every 24 hours  hydroxychloroquine 200milliGRAM(s) Oral every 12 hours  dipyridamole 200 mG/aspirin 25 mG 1Capsule(s) Oral two times a day  tiotropium 18 MICROgram(s) Capsule 1Capsule(s) Inhalation daily  calcium carbonate  625 mG + Vitamin D (OsCal 250 + D) 1Tablet(s) Oral daily  multivitamin 1Tablet(s) Oral daily  pantoprazole    Tablet 40milliGRAM(s) Oral before breakfast  memantine ER 14milliGRAM(s) Oral at bedtime  ALBUTerol   0.5% 2.5milliGRAM(s) Nebulizer every 4 hours PRN

## 2017-03-13 NOTE — PROGRESS NOTE ADULT - ASSESSMENT
87F with PMHx of Dementia, RA on chronic prednisone+plaquenil,  COPD, former smoker, glaucoma, HTN, CVA, TIA, GERD, HLD, osteoporosis, kidney stones, presents with acute with worsening cough and confusion at night. As per the daughter, the cough is dry non productive, not associated with fever, chills, chest pain, sob. Patient was more confused at night for the last couple of nights, no focal deficits. She was admitted with HCAP.    1. Pneumonia:  -CT with bilateral consolidation at bases  -continue iv cefepime/vanc for pna due to suspected GNR and or other resistant organisms including mrsa  -seen by speech and swallow, no s/o aspiration.    2. ?transient RIght sided weakness:  -neuro exam non focal  -doubt acute CVA  -CT head neg  -if symptoms recur, will order further w/u    3. Rheumatoid arthritis:  -continue plaquenil/prednisone    4. COPD  - stable  - pulm eval appreciated  -prn nebs    5.  Hx of CVA  - Aggrenox/statin    6. DVT px

## 2017-03-13 NOTE — SWALLOW BEDSIDE ASSESSMENT ADULT - COMMENTS
Pt admitted to  from [place] due to altered mentation, cough and possible right sided weakness. Hosp course notable for right PNA, and altered mentation which is reportedly not different then baseline. Acute CVA not suspected. This profile is superimposed upon a past medical history as stated below.

## 2017-03-13 NOTE — SWALLOW BEDSIDE ASSESSMENT ADULT - SWALLOW EVAL: DIAGNOSIS
1) Patient demonstrates Oropharyngeal Swallowing abilities which subjectively appears to be within functional parameters for age. No behavioral aspiration signs were exhibited on exam. Oropharyngeal swallow status appears stable.   2) Patient demonstrates baseline cognitive deficts associated with dementia 1) Patient demonstrates Oropharyngeal Swallowing abilities which subjectively appears to be within functional parameters for age. No behavioral aspiration signs were exhibited on exam. Oropharyngeal swallow status appears stable.   2) Patient demonstrates baseline cognitive deficits associated with dementia(i.e decreased STM). With that being stated, pt was able to verbalize her intents without an overt primary motor speech or primary linguistic pathology and her communicative integrity is at reported baseline,

## 2017-03-13 NOTE — SWALLOW BEDSIDE ASSESSMENT ADULT - PHARYNGEAL PHASE
Swallow triggered in an acceptable time frame for age and laryngeal lift on palpation was functional during swallow trials/within age acceptable parameters.  No behavioral aspiration signs were exhibited on exam.

## 2017-03-13 NOTE — CONSULT NOTE ADULT - SUBJECTIVE AND OBJECTIVE BOX
Chief Complaint: Weakness; cough; worsening confusion. ?? right sided weakness PTA.     HPI:  Pt is 88yo female resident of assisted living facility sent to ER for evaluation of ? right sided weakness, increased confusion, reported difficulty swallowing and worsening cough. As per the daughter, the cough is dry non productive, not associated with fever, chills, chest pain, sob. Patient has chronic dementia and was recently hospitalize with an exacerbation of COPD.       PAST MEDICAL & SURGICAL HISTORY:  Compression fracture of spine: T7  Alzheimers disease  HTN (hypertension)  Glaucoma  TIA (transient ischemic attack): 8/07  Cerebral vascular accident: 2005  Polymyalgia rheumatica  Osteoporosis  Chronic pain: mid back  Urinary retention  GERD (gastroesophageal reflux disease)  Cholelithiases  Hyperlipemia  Carotid artery stenosis  Lung nodule: biopsied 2003, benign  COPD (chronic obstructive pulmonary disease)  Asthma  Gall stones  History of hysterectomy: for bleeding  Hx of cholecystectomy      REVIEW OF SYSTEMS:    CONSTITUTIONAL: weakness, No fever or chills  EYES/ENT: No visual changes  NECK: No pain or stiffness  RESPIRATORY: Non productive cough  CARDIOVASCULAR: No chest pain or palpitationsGASTROINTESTINAL: C/o difficulty swallowing prior to admission - resolved at time of my evaluation. No abdominal or epigastric pain. No nausea, vomiting, or hematemesis; No diarrhea or constipation. No melena or hematochezia.  GENITOURINARY: No dysuria, frequency or hematuria  NEUROLOGICAL: right sided weakness reported PTA - resolved. Chronic dementia.  SKIN: No itching, burning, rashes, or lesions   All other review of systems is negative unless indicated above    Vital Signs Last 24 Hrs  T(C): 36.7, Max: 36.7 (03-13 @ 05:46)  T(F): 98.1, Max: 98.1 (03-13 @ 05:46)  HR: 76 (72 - 94)  BP: 156/63 (128/70 - 194/72)  BP(mean): --  RR: 18 (16 - 29)  SpO2: 97% (90% - 100%)    I&O's Summary      PHYSICAL EXAM:    Constitutional: awake and alert, chronic dementia but presently awake and alert; cooperative. Oriented to person and place.  Neck: Soft and supple, No LAD, No JVD  Respiratory: Breath sounds are clear bilaterally, No wheezing; minimal crackles at right base  Cardiovascular: S1 and S2, regular rate and rhythm, no Murmurs, gallops or rubs  Gastrointestinal: Bowel Sounds present, soft, nontender, nondistended, no guarding, no rebound  Extremities: No peripheral edema  Neurological: A/O x , no focal deficits - NO right sided weakness on exam this am      Medications:  MEDICATIONS  (STANDING):  piperacillin/tazobactam IVPB. 3.375Gram(s) IV Intermittent every 8 hours  vancomycin  IVPB  IV Intermittent   heparin  Injectable 5000Unit(s) SubCutaneous every 8 hours  sodium chloride 0.9%. 1000milliLiter(s) IV Continuous <Continuous>  predniSONE   Tablet 5milliGRAM(s) Oral daily  traZODone 50milliGRAM(s) Oral at bedtime  simvastatin 20milliGRAM(s) Oral at bedtime  vancomycin  IVPB 750milliGRAM(s) IV Intermittent every 24 hours  hydroxychloroquine 200milliGRAM(s) Oral every 12 hours  dipyridamole 200 mG/aspirin 25 mG 1Capsule(s) Oral two times a day  tiotropium 18 MICROgram(s) Capsule 1Capsule(s) Inhalation daily  calcium carbonate  625 mG + Vitamin D (OsCal 250 + D) 1Tablet(s) Oral daily  multivitamin 1Tablet(s) Oral daily  pantoprazole    Tablet 40milliGRAM(s) Oral before breakfast  memantine ER 14milliGRAM(s) Oral at bedtime      Labs: All Labs Reviewed:                        11.0   8.8   )-----------( 201      ( 12 Mar 2017 11:37 )             36.0     13 Mar 2017 05:54    143    |  107    |  14     ----------------------------<  63     4.0     |  27     |  0.76     Ca    8.1        13 Mar 2017 05:54    TPro  6.0    /  Alb  2.9    /  TBili  0.5    /  DBili  x      /  AST  38     /  ALT  55     /  AlkPhos  66     12 Mar 2017 11:37    PT/INR - ( 12 Mar 2017 11:37 )   PT: 11.5 sec;   INR: 1.04 ratio         PTT - ( 12 Mar 2017 11:37 )  PTT:26.5 sec  CARDIAC MARKERS ( 12 Mar 2017 15:00 )  <0.015 ng/mL / x     / x     / x     / x      CARDIAC MARKERS ( 12 Mar 2017 11:37 )  <0.015 ng/mL / x     / x     / x     / x          Blood Culture:     RADIOLOGY:  CXR: Official report pending. My impression: Increased RLL when compared to prior.     PROCEDURE DATE:  03/12/2017        INTERPRETATION:      CT head without IV contrast        CLINICAL INFORMATION:  Altered mental status   Intracranial hemorrhage.    TECHNIQUE: Contiguous axial 5 mm sections were obtained through the head.   Sagittal and coronal 2-D reformatted images were also obtained.   This   scan was performed using automatic exposure control (radiation dose   reduction software) to obtain a diagnostic image quality scan with   patient dose as low as reasonably achievable.     FINDINGS:   CT dated 3/4/2011 available for review.    The brain demonstrates mild periventricular white matter ischemia.   No   acute cerebral cortical infarct is seen.  No intracranial hemorrhage is   found.  No mass effect is found in the brain.      The ventricles, sulci and basal cisterns appear unremarkable.         The orbits are unremarkable.  The paranasal sinuses are clear.  The nasal   cavity appears intact.  The nasopharynx is symmetric.The central skull   base, petrous temporal bones and calvarium remain intact.      IMPRESSION:   Mild periventricular white matter ischemia.               Assessment/Plan: 1. RLL infiltrate - increased since prior CXR. ? aspiration pneumonia. Agree with Zosyn. Will order chest CT which can be compared to recent prior study for further evaluation. Aspiration precautions. Speach and swallow evaluation.    2. ??? episode of right sided weakness per patient's daughter - resolved; ? TIA. Head CT noted. Further eval per hospitalist.     3. COPD. Continue Spiriva. Nebulized albuterol prn. NOT on home O2. Chronically on 5 mg prednisone daily for management of RA - NOT FOR COPD.    4. Hx of rheumatoid arthritis/PMR. On plaquenil and prednisone 5 mg daily per rheumatology (Dr. Gale).    5. DVT prophylaxis. S.C. heparin.      D/W patient's daughter at bedside.    D/W Dr. Guillory.    Time Span: 60 minutes.

## 2017-03-13 NOTE — SWALLOW BEDSIDE ASSESSMENT ADULT - ORAL PHASE
Bolus formation/transfer were mechanically functional and she cleared oral debris on own after age acceptable piecemeal deglutition. Bolus formation/transfer were mechanically functional for age and she cleared oral debris on own after age acceptable piecemeal deglutition.

## 2017-03-14 LAB
ANION GAP SERPL CALC-SCNC: 10 MMOL/L — SIGNIFICANT CHANGE UP (ref 5–17)
ANISOCYTOSIS BLD QL: SLIGHT — SIGNIFICANT CHANGE UP
BASOPHILS # BLD AUTO: 0.1 K/UL — SIGNIFICANT CHANGE UP (ref 0–0.2)
BASOPHILS NFR BLD AUTO: 0.8 % — SIGNIFICANT CHANGE UP (ref 0–2)
BUN SERPL-MCNC: 11 MG/DL — SIGNIFICANT CHANGE UP (ref 7–23)
CALCIUM SERPL-MCNC: 8.2 MG/DL — LOW (ref 8.5–10.1)
CHLORIDE SERPL-SCNC: 111 MMOL/L — HIGH (ref 96–108)
CO2 SERPL-SCNC: 23 MMOL/L — SIGNIFICANT CHANGE UP (ref 22–31)
CREAT SERPL-MCNC: 0.8 MG/DL — SIGNIFICANT CHANGE UP (ref 0.5–1.3)
DACRYOCYTES BLD QL SMEAR: SLIGHT — SIGNIFICANT CHANGE UP
EOSINOPHIL # BLD AUTO: 0.1 K/UL — SIGNIFICANT CHANGE UP (ref 0–0.5)
EOSINOPHIL NFR BLD AUTO: 0.9 % — SIGNIFICANT CHANGE UP (ref 0–6)
GLUCOSE SERPL-MCNC: 72 MG/DL — SIGNIFICANT CHANGE UP (ref 70–99)
HCT VFR BLD CALC: 33.4 % — LOW (ref 34.5–45)
HGB BLD-MCNC: 10.4 G/DL — LOW (ref 11.5–15.5)
HYPOCHROMIA BLD QL: SLIGHT — SIGNIFICANT CHANGE UP
LYMPHOCYTES # BLD AUTO: 0.9 K/UL — LOW (ref 1–3.3)
LYMPHOCYTES # BLD AUTO: 11.6 % — LOW (ref 13–44)
MACROCYTES BLD QL: SLIGHT — SIGNIFICANT CHANGE UP
MAGNESIUM SERPL-MCNC: 2 MG/DL — SIGNIFICANT CHANGE UP (ref 1.8–2.4)
MANUAL DIF COMMENT BLD-IMP: SIGNIFICANT CHANGE UP
MCHC RBC-ENTMCNC: 27 PG — SIGNIFICANT CHANGE UP (ref 27–34)
MCHC RBC-ENTMCNC: 31.3 GM/DL — LOW (ref 32–36)
MCV RBC AUTO: 86.4 FL — SIGNIFICANT CHANGE UP (ref 80–100)
MICROCYTES BLD QL: SLIGHT — SIGNIFICANT CHANGE UP
MONOCYTES # BLD AUTO: 0.7 K/UL — SIGNIFICANT CHANGE UP (ref 0–0.9)
MONOCYTES NFR BLD AUTO: 8.9 % — SIGNIFICANT CHANGE UP (ref 2–14)
NEUTROPHILS # BLD AUTO: 5.8 K/UL — SIGNIFICANT CHANGE UP (ref 1.8–7.4)
NEUTROPHILS NFR BLD AUTO: 77.8 % — HIGH (ref 43–77)
OVALOCYTES BLD QL SMEAR: SLIGHT — SIGNIFICANT CHANGE UP
PHOSPHATE SERPL-MCNC: 2.1 MG/DL — LOW (ref 2.5–4.5)
PLAT MORPH BLD: NORMAL — SIGNIFICANT CHANGE UP
PLATELET # BLD AUTO: 164 K/UL — SIGNIFICANT CHANGE UP (ref 150–400)
POIKILOCYTOSIS BLD QL AUTO: SLIGHT — SIGNIFICANT CHANGE UP
POLYCHROMASIA BLD QL SMEAR: SLIGHT — SIGNIFICANT CHANGE UP
POTASSIUM SERPL-MCNC: 3.5 MMOL/L — SIGNIFICANT CHANGE UP (ref 3.5–5.3)
POTASSIUM SERPL-SCNC: 3.5 MMOL/L — SIGNIFICANT CHANGE UP (ref 3.5–5.3)
RBC # BLD: 3.86 M/UL — SIGNIFICANT CHANGE UP (ref 3.8–5.2)
RBC # FLD: 17.5 % — HIGH (ref 10.3–14.5)
RBC BLD AUTO: (no result)
SODIUM SERPL-SCNC: 144 MMOL/L — SIGNIFICANT CHANGE UP (ref 135–145)
WBC # BLD: 7.5 K/UL — SIGNIFICANT CHANGE UP (ref 3.8–10.5)
WBC # FLD AUTO: 7.5 K/UL — SIGNIFICANT CHANGE UP (ref 3.8–10.5)

## 2017-03-14 RX ORDER — SODIUM,POTASSIUM PHOSPHATES 278-250MG
2 POWDER IN PACKET (EA) ORAL
Qty: 0 | Refills: 0 | Status: COMPLETED | OUTPATIENT
Start: 2017-03-14 | End: 2017-03-15

## 2017-03-14 RX ORDER — VANCOMYCIN HCL 1 G
750 VIAL (EA) INTRAVENOUS EVERY 12 HOURS
Qty: 0 | Refills: 0 | Status: DISCONTINUED | OUTPATIENT
Start: 2017-03-14 | End: 2017-03-16

## 2017-03-14 RX ORDER — ALBUTEROL 90 UG/1
2.5 AEROSOL, METERED ORAL EVERY 6 HOURS
Qty: 0 | Refills: 0 | Status: DISCONTINUED | OUTPATIENT
Start: 2017-03-14 | End: 2017-03-17

## 2017-03-14 RX ORDER — ALBUTEROL 90 UG/1
1 AEROSOL, METERED ORAL EVERY 4 HOURS
Qty: 0 | Refills: 0 | Status: DISCONTINUED | OUTPATIENT
Start: 2017-03-14 | End: 2017-03-17

## 2017-03-14 RX ADMIN — Medication 150 MILLIGRAM(S): at 12:52

## 2017-03-14 RX ADMIN — HEPARIN SODIUM 5000 UNIT(S): 5000 INJECTION INTRAVENOUS; SUBCUTANEOUS at 22:09

## 2017-03-14 RX ADMIN — Medication 1 TABLET(S): at 11:40

## 2017-03-14 RX ADMIN — HEPARIN SODIUM 5000 UNIT(S): 5000 INJECTION INTRAVENOUS; SUBCUTANEOUS at 06:29

## 2017-03-14 RX ADMIN — PANTOPRAZOLE SODIUM 40 MILLIGRAM(S): 20 TABLET, DELAYED RELEASE ORAL at 06:30

## 2017-03-14 RX ADMIN — ASPIRIN AND DIPYRIDAMOLE 1 CAPSULE(S): 25; 200 CAPSULE, EXTENDED RELEASE ORAL at 06:29

## 2017-03-14 RX ADMIN — HEPARIN SODIUM 5000 UNIT(S): 5000 INJECTION INTRAVENOUS; SUBCUTANEOUS at 14:17

## 2017-03-14 RX ADMIN — Medication 200 MILLIGRAM(S): at 22:09

## 2017-03-14 RX ADMIN — Medication 2 TABLET(S): at 11:39

## 2017-03-14 RX ADMIN — ASPIRIN AND DIPYRIDAMOLE 1 CAPSULE(S): 25; 200 CAPSULE, EXTENDED RELEASE ORAL at 22:08

## 2017-03-14 RX ADMIN — Medication 200 MILLIGRAM(S): at 10:58

## 2017-03-14 RX ADMIN — Medication 5 MILLIGRAM(S): at 06:30

## 2017-03-14 RX ADMIN — PIPERACILLIN AND TAZOBACTAM 25 GRAM(S): 4; .5 INJECTION, POWDER, LYOPHILIZED, FOR SOLUTION INTRAVENOUS at 14:16

## 2017-03-14 RX ADMIN — PIPERACILLIN AND TAZOBACTAM 25 GRAM(S): 4; .5 INJECTION, POWDER, LYOPHILIZED, FOR SOLUTION INTRAVENOUS at 06:29

## 2017-03-14 RX ADMIN — SODIUM CHLORIDE 80 MILLILITER(S): 9 INJECTION INTRAMUSCULAR; INTRAVENOUS; SUBCUTANEOUS at 02:45

## 2017-03-14 RX ADMIN — SODIUM CHLORIDE 80 MILLILITER(S): 9 INJECTION INTRAMUSCULAR; INTRAVENOUS; SUBCUTANEOUS at 22:16

## 2017-03-14 RX ADMIN — Medication 2 TABLET(S): at 22:58

## 2017-03-14 RX ADMIN — MEMANTINE HYDROCHLORIDE 14 MILLIGRAM(S): 10 TABLET ORAL at 22:10

## 2017-03-14 RX ADMIN — SIMVASTATIN 20 MILLIGRAM(S): 20 TABLET, FILM COATED ORAL at 22:09

## 2017-03-14 RX ADMIN — TIOTROPIUM BROMIDE 1 CAPSULE(S): 18 CAPSULE ORAL; RESPIRATORY (INHALATION) at 11:01

## 2017-03-14 RX ADMIN — PIPERACILLIN AND TAZOBACTAM 25 GRAM(S): 4; .5 INJECTION, POWDER, LYOPHILIZED, FOR SOLUTION INTRAVENOUS at 22:10

## 2017-03-14 NOTE — PROGRESS NOTE ADULT - ASSESSMENT
87F with PMHx of Dementia, RA on chronic prednisone+plaquenil,  COPD, former smoker, glaucoma, HTN, CVA, TIA, GERD, HLD, osteoporosis, kidney stones, presents with worsening cough and confusion at night. As per the daughter, the cough is dry non productive, not associated with fever, chills, chest pain, sob. Patient was more confused at night for the last couple of nights, no focal deficits. She was admitted with HCAP.    1. Pneumonia:  -CT with multifocal pneumonia  -continue iv cefepime/vanc for pna due to suspected GNR and or other resistant organisms including mrsa  -seen by speech and swallow, no s/o aspiration.  -consult ID  -pulm eval appreciated    2. ?transient RIght sided weakness:  -neuro exam non focal  -doubt acute CVA  -CT head neg  -if symptoms recur, will order further w/u    3. Rheumatoid arthritis:  -continue plaquenil/prednisone    4. COPD  - stable  - pulm eval appreciated  - prn nebs    5.  Hx of CVA  - Aggrenox/statin    6. DVT px 87F with PMHx of Dementia, RA on chronic prednisone+plaquenil,  COPD, former smoker, glaucoma, HTN, CVA, TIA, GERD, HLD, osteoporosis, kidney stones, presents with worsening cough and confusion at night. As per the daughter, the cough is dry non productive, not associated with fever, chills, chest pain, sob. Patient was more confused at night for the last couple of nights, no focal deficits. She was admitted with HCAP.    1. Pneumonia:  -CT with multifocal pneumonia  -continue iv zosyn/vanc D3 for pna due to suspected GNR and or other resistant organisms including mrsa  -seen by speech and swallow, no s/o aspiration.  -consult ID  -pulm eval appreciated    2. ?transient RIght sided weakness:  -neuro exam non focal  -doubt acute CVA  -CT head neg  -if symptoms recur, will order further w/u    3. Rheumatoid arthritis:  -continue plaquenil/prednisone    4. COPD  - stable  - pulm eval appreciated  - prn nebs    5.  Hx of CVA  - Aggrenox/statin    6. DVT px

## 2017-03-14 NOTE — PROGRESS NOTE ADULT - SUBJECTIVE AND OBJECTIVE BOX
SHEELA GUPTA  MRN: [id number]    S: 3/14: Comfortable. No complaints of shortness of breath. Cooperative.     PAST MEDICAL & SURGICAL HISTORY:  Compression fracture of spine: T7  Alzheimers disease  HTN (hypertension)  Glaucoma  TIA (transient ischemic attack): 8/07  Cerebral vascular accident: 2005  Polymyalgia rheumatica  Osteoporosis  Chronic pain: mid back  Urinary retention  GERD (gastroesophageal reflux disease)  Cholelithiases  Hyperlipemia  Carotid artery stenosis  Lung nodule: biopsied 2003, benign  COPD (chronic obstructive pulmonary disease)  Asthma  Gall stones  History of hysterectomy: for bleeding  Hx of cholecystectomy      O: T(C): 36.7, Max: 37.1 (03-13 @ 12:11)  HR: 89 (89 - 92)  BP: 138/67 (138/67 - 155/74)  RR: 19 (18 - 19)  SpO2: 94% (94% - 96%)  Wt(kg): --    PHYSICAL EXAM:      Constitutional: awake and alert, chronic dementia but presently awake and alert; cooperative. Oriented to person and place.  Neck: Soft and supple, No LAD, No JVD  Respiratory: Breath sounds are clear bilaterally.  Cardiovascular: S1 and S2, regular rate and rhythm, no Murmurs, gallops or rubs  Gastrointestinal: Bowel Sounds present, soft, nontender, nondistended, no guarding, no rebound  Extremities: No peripheral edema  Neurological: A/O x , no focal deficits - NO right sided weakness on exam this am                            10.4   7.5   )-----------( 164      ( 14 Mar 2017 06:50 )             33.4       14 Mar 2017 06:50    144    |  111    |  11     ----------------------------<  72     3.5     |  23     |  0.80     Ca    8.2        14 Mar 2017 06:50  Phos  2.1       14 Mar 2017 06:50  Mg     2.0       14 Mar 2017 06:50    TPro  6.0    /  Alb  2.9    /  TBili  0.5    /  DBili  x      /  AST  38     /  ALT  55     /  AlkPhos  66     12 Mar 2017 11:37    Radiology:    EXAM:  CT CHEST                            PROCEDURE DATE:  03/13/2017        INTERPRETATION:  EXAMINATION DATE: March 13, 2017 at 1230 hours.  CLINICAL INFORMATION: Right lower lobe infiltrate on chest x-ray.    COMPARISON: February 27, 2017.    PROCEDURE:   CT of the Chest was performed without intravenous contrast.  Sagittal and coronal reformats were performed.    FINDINGS:    LUNGS AND LARGE AIRWAYS: Consolidation in both lower lobes with scattered   patchy opacities in the lingula and right upper lobe, possibly pneumonia.   Emphysema. Unchanged cyst in the left lower lobe.  PLEURA: Trace right pleural effusion.  VESSELS: Atherosclerotic calcifications.   HEART: Heart size is normal. No pericardial effusion.  MEDIASTINUM AND DOREEN: No lymphadenopathy.  CHEST WALL AND LOWER NECK: Mass in the right breast, measuring 2.1 x 1.7   cm, unchanged in size, suspicious for neoplasm.  VISUALIZED UPPER ABDOMEN: Moderate hiatal hernia. Unchanged severe   calcification of the proximal abdominal aorta.  BONES: Unchanged compression deformities of the T4, T7, and T9 vertebral   bodies.    IMPRESSION:   Consolidation in both lower lobes with scattered patchy opacities in the   lingula and right upper lobe, possibly pneumonia. Follow-up to complete   resolution is recommended.    MEDICATIONS  (STANDING):  piperacillin/tazobactam IVPB. 3.375Gram(s) IV Intermittent every 8 hours  heparin  Injectable 5000Unit(s) SubCutaneous every 8 hours  sodium chloride 0.9%. 1000milliLiter(s) IV Continuous <Continuous>  predniSONE   Tablet 5milliGRAM(s) Oral daily  traZODone 50milliGRAM(s) Oral at bedtime  simvastatin 20milliGRAM(s) Oral at bedtime  hydroxychloroquine 200milliGRAM(s) Oral every 12 hours  dipyridamole 200 mG/aspirin 25 mG 1Capsule(s) Oral two times a day  tiotropium 18 MICROgram(s) Capsule 1Capsule(s) Inhalation daily  calcium carbonate  625 mG + Vitamin D (OsCal 250 + D) 1Tablet(s) Oral daily  multivitamin 1Tablet(s) Oral daily  pantoprazole    Tablet 40milliGRAM(s) Oral before breakfast  memantine ER 14milliGRAM(s) Oral at bedtime  potassium acid phosphate/sodium acid phosphate tablet (K-PHOS No. 2) 2Tablet(s) Oral two times a day with meals  vancomycin  IVPB 750milliGRAM(s) IV Intermittent every 12 hours    MEDICATIONS  (PRN):  ALBUTerol   0.5% 2.5milliGRAM(s) Nebulizer every 4 hours PRN Shortness of Breath and/or Wheezing        A/P: 1. Multifocal pneumonia. Continue Zosyn/Vanco. Swallow evaluation appreciated.    2. Right sided weakness - reported. Resolved; ? TIA. Head CT noted. Further eval per hospitalist.     3. COPD. Continue Spiriva. Nebulized albuterol prn. NOT on home O2. Chronically on 5 mg prednisone daily for management of RA - NOT FOR COPD.    4. Hx of rheumatoid arthritis/PMR. On plaquenil and prednisone 5 mg daily per rheumatology (Dr. Gale).    5. DVT prophylaxis. S.C. heparin.

## 2017-03-15 LAB
ANION GAP SERPL CALC-SCNC: 13 MMOL/L — SIGNIFICANT CHANGE UP (ref 5–17)
BASOPHILS # BLD AUTO: 0.1 K/UL — SIGNIFICANT CHANGE UP (ref 0–0.2)
BASOPHILS NFR BLD AUTO: 1.4 % — SIGNIFICANT CHANGE UP (ref 0–2)
BUN SERPL-MCNC: 15 MG/DL — SIGNIFICANT CHANGE UP (ref 7–23)
C DIFF BY PCR RESULT: SIGNIFICANT CHANGE UP
C DIFF TOX GENS STL QL NAA+PROBE: SIGNIFICANT CHANGE UP
CALCIUM SERPL-MCNC: 7.7 MG/DL — LOW (ref 8.5–10.1)
CHLORIDE SERPL-SCNC: 115 MMOL/L — HIGH (ref 96–108)
CO2 SERPL-SCNC: 19 MMOL/L — LOW (ref 22–31)
CREAT SERPL-MCNC: 0.91 MG/DL — SIGNIFICANT CHANGE UP (ref 0.5–1.3)
EOSINOPHIL # BLD AUTO: 0.1 K/UL — SIGNIFICANT CHANGE UP (ref 0–0.5)
EOSINOPHIL NFR BLD AUTO: 1.3 % — SIGNIFICANT CHANGE UP (ref 0–6)
GLUCOSE SERPL-MCNC: 109 MG/DL — HIGH (ref 70–99)
HCT VFR BLD CALC: 33 % — LOW (ref 34.5–45)
HGB BLD-MCNC: 10.2 G/DL — LOW (ref 11.5–15.5)
LYMPHOCYTES # BLD AUTO: 1.3 K/UL — SIGNIFICANT CHANGE UP (ref 1–3.3)
LYMPHOCYTES # BLD AUTO: 17.3 % — SIGNIFICANT CHANGE UP (ref 13–44)
MAGNESIUM SERPL-MCNC: 1.9 MG/DL — SIGNIFICANT CHANGE UP (ref 1.8–2.4)
MCHC RBC-ENTMCNC: 27 PG — SIGNIFICANT CHANGE UP (ref 27–34)
MCHC RBC-ENTMCNC: 30.8 GM/DL — LOW (ref 32–36)
MCV RBC AUTO: 87.6 FL — SIGNIFICANT CHANGE UP (ref 80–100)
MONOCYTES # BLD AUTO: 0.6 K/UL — SIGNIFICANT CHANGE UP (ref 0–0.9)
MONOCYTES NFR BLD AUTO: 8.4 % — SIGNIFICANT CHANGE UP (ref 2–14)
NEUTROPHILS # BLD AUTO: 5.5 K/UL — SIGNIFICANT CHANGE UP (ref 1.8–7.4)
NEUTROPHILS NFR BLD AUTO: 71.6 % — SIGNIFICANT CHANGE UP (ref 43–77)
PHOSPHATE SERPL-MCNC: 3.2 MG/DL — SIGNIFICANT CHANGE UP (ref 2.5–4.5)
PLATELET # BLD AUTO: 156 K/UL — SIGNIFICANT CHANGE UP (ref 150–400)
POTASSIUM SERPL-MCNC: 3.1 MMOL/L — LOW (ref 3.5–5.3)
POTASSIUM SERPL-SCNC: 3.1 MMOL/L — LOW (ref 3.5–5.3)
RBC # BLD: 3.77 M/UL — LOW (ref 3.8–5.2)
RBC # FLD: 17.7 % — HIGH (ref 10.3–14.5)
SODIUM SERPL-SCNC: 147 MMOL/L — HIGH (ref 135–145)
WBC # BLD: 7.7 K/UL — SIGNIFICANT CHANGE UP (ref 3.8–10.5)
WBC # FLD AUTO: 7.7 K/UL — SIGNIFICANT CHANGE UP (ref 3.8–10.5)
WRIGHT STN STL: NEGATIVE — SIGNIFICANT CHANGE UP

## 2017-03-15 RX ORDER — LACTOBACILLUS ACIDOPHILUS 100MM CELL
1 CAPSULE ORAL EVERY 12 HOURS
Qty: 0 | Refills: 0 | Status: DISCONTINUED | OUTPATIENT
Start: 2017-03-15 | End: 2017-03-17

## 2017-03-15 RX ORDER — POTASSIUM CHLORIDE 20 MEQ
40 PACKET (EA) ORAL ONCE
Qty: 0 | Refills: 0 | Status: COMPLETED | OUTPATIENT
Start: 2017-03-15 | End: 2017-03-15

## 2017-03-15 RX ADMIN — Medication 1 TABLET(S): at 12:24

## 2017-03-15 RX ADMIN — Medication 1 TABLET(S): at 12:25

## 2017-03-15 RX ADMIN — Medication 150 MILLIGRAM(S): at 12:57

## 2017-03-15 RX ADMIN — Medication 200 MILLIGRAM(S): at 12:25

## 2017-03-15 RX ADMIN — Medication 200 MILLIGRAM(S): at 22:49

## 2017-03-15 RX ADMIN — ASPIRIN AND DIPYRIDAMOLE 1 CAPSULE(S): 25; 200 CAPSULE, EXTENDED RELEASE ORAL at 17:30

## 2017-03-15 RX ADMIN — Medication 150 MILLIGRAM(S): at 04:06

## 2017-03-15 RX ADMIN — Medication 40 MILLIEQUIVALENT(S): at 12:25

## 2017-03-15 RX ADMIN — Medication 1 TABLET(S): at 17:34

## 2017-03-15 RX ADMIN — Medication 2 TABLET(S): at 12:51

## 2017-03-15 RX ADMIN — PIPERACILLIN AND TAZOBACTAM 25 GRAM(S): 4; .5 INJECTION, POWDER, LYOPHILIZED, FOR SOLUTION INTRAVENOUS at 22:52

## 2017-03-15 RX ADMIN — PANTOPRAZOLE SODIUM 40 MILLIGRAM(S): 20 TABLET, DELAYED RELEASE ORAL at 06:28

## 2017-03-15 RX ADMIN — SIMVASTATIN 20 MILLIGRAM(S): 20 TABLET, FILM COATED ORAL at 22:51

## 2017-03-15 RX ADMIN — MEMANTINE HYDROCHLORIDE 14 MILLIGRAM(S): 10 TABLET ORAL at 22:52

## 2017-03-15 RX ADMIN — PIPERACILLIN AND TAZOBACTAM 25 GRAM(S): 4; .5 INJECTION, POWDER, LYOPHILIZED, FOR SOLUTION INTRAVENOUS at 15:08

## 2017-03-15 RX ADMIN — Medication 5 MILLIGRAM(S): at 06:28

## 2017-03-15 RX ADMIN — HEPARIN SODIUM 5000 UNIT(S): 5000 INJECTION INTRAVENOUS; SUBCUTANEOUS at 15:06

## 2017-03-15 RX ADMIN — HEPARIN SODIUM 5000 UNIT(S): 5000 INJECTION INTRAVENOUS; SUBCUTANEOUS at 22:47

## 2017-03-15 RX ADMIN — TIOTROPIUM BROMIDE 1 CAPSULE(S): 18 CAPSULE ORAL; RESPIRATORY (INHALATION) at 07:44

## 2017-03-15 RX ADMIN — PIPERACILLIN AND TAZOBACTAM 25 GRAM(S): 4; .5 INJECTION, POWDER, LYOPHILIZED, FOR SOLUTION INTRAVENOUS at 06:51

## 2017-03-15 RX ADMIN — ASPIRIN AND DIPYRIDAMOLE 1 CAPSULE(S): 25; 200 CAPSULE, EXTENDED RELEASE ORAL at 06:51

## 2017-03-15 RX ADMIN — HEPARIN SODIUM 5000 UNIT(S): 5000 INJECTION INTRAVENOUS; SUBCUTANEOUS at 06:28

## 2017-03-15 NOTE — PHYSICAL THERAPY INITIAL EVALUATION ADULT - IMPAIRMENTS CONTRIBUTING TO GAIT DEVIATIONS, PT EVAL
impaired balance/during ambulation pt became increasingly weak / knees buckling; returned to bed supine; RN Little / Dr. Judge made aware verbally post Eval

## 2017-03-15 NOTE — CONSULT NOTE ADULT - SUBJECTIVE AND OBJECTIVE BOX
Patient is a 87y old  Female who presents with a chief complaint of Cough, AMS (12 Mar 2017 14:20)      HPI:  Pt is 88yo female with PMHx of Dementia, RA, COPD, former smoker, glaucoma, HTN, CVA, TIA, GERD, HLD, osteoporosis, kidney stones, admitted 3/12 with worsening cough and confusion at night, R sided weakness. As per the daughter, the cough is dry non productive, no fever, no chills, pt noted to be more weak and confused along with these symptoms, here afebrile, no wbc ct, CT head stable, CT chest with patchy opacities in lingular and RUL c/w PNA, was given IV vancomycin/zosyn d/t concern of infection.     Feels better  No sob, comfortable, less cough  Afebrile      PMH: as above    PSH: as above    Meds: per reconciliation sheet, noted below    MEDICATIONS  (STANDING):  piperacillin/tazobactam IVPB. 3.375Gram(s) IV Intermittent every 8 hours  heparin  Injectable 5000Unit(s) SubCutaneous every 8 hours  sodium chloride 0.9%. 1000milliLiter(s) IV Continuous <Continuous>  predniSONE   Tablet 5milliGRAM(s) Oral daily  traZODone 50milliGRAM(s) Oral at bedtime  simvastatin 20milliGRAM(s) Oral at bedtime  hydroxychloroquine 200milliGRAM(s) Oral every 12 hours  dipyridamole 200 mG/aspirin 25 mG 1Capsule(s) Oral two times a day  tiotropium 18 MICROgram(s) Capsule 1Capsule(s) Inhalation daily  calcium carbonate  625 mG + Vitamin D (OsCal 250 + D) 1Tablet(s) Oral daily  multivitamin 1Tablet(s) Oral daily  pantoprazole    Tablet 40milliGRAM(s) Oral before breakfast  memantine ER 14milliGRAM(s) Oral at bedtime  potassium acid phosphate/sodium acid phosphate tablet (K-PHOS No. 2) 2Tablet(s) Oral two times a day with meals  vancomycin  IVPB 750milliGRAM(s) IV Intermittent every 12 hours  ALBUTerol    90 MICROgram(s) HFA Inhaler 1Puff(s) Inhalation every 4 hours  potassium chloride    Tablet ER 40milliEquivalent(s) Oral once  lactobacillus acidophilus 1Tablet(s) Oral every 12 hours      Allergies    Aciphex (Unknown)  Macrobid (Unknown)  Percocet 10/325 (Rash)    Intolerances        Social: no smoking, no alcohol, no illegal drugs; no recent travel, no exposure to TB    Family history:  No pertinent family history in first degree relatives      ROS: the patient denies fever, no chills, no HA, no dizziness, no sore throat, no blurry vision, no CP, no palpitations, no abdominal pain, no diarrhea, no N/V, no dysuria, no leg pain, no claudication, no rash, no joint aches, no rectal pain or bleeding, no night sweats    Vital Signs Last 24 Hrs  T(C): 36.6, Max: 37.2 (03-14 @ 12:26)  T(F): 97.9, Max: 98.9 (03-14 @ 12:26)  HR: 72 (72 - 93)  BP: 154/51 (99/72 - 154/51)  BP(mean): --  RR: 19 (18 - 19)  SpO2: 100% (95% - 100%)      PE:  Constitutional: frail looking  HEENT: NC/AT, EOMI, PERRLA  Neck: supple  Back: no tenderness  Respiratory: clear  Cardiovascular: S1S2 regular, no murmurs  Abdomen: soft, not tender, not distended, positive BS  Genitourinary: deferred  Rectal: deferred  Musculoskeletal: no muscle tenderness, no joint swelling or tenderness  Extremities: no pedal edema  Neurological: AxOx3, moving all extremities, no focal deficits  Skin: no rashes    Labs:                        10.2   7.7   )-----------( 156      ( 15 Mar 2017 06:52 )             33.0     15 Mar 2017 06:52    147    |  115    |  15     ----------------------------<  109    3.1     |  19     |  0.91     Ca    7.7        15 Mar 2017 06:52  Phos  3.2       15 Mar 2017 06:52  Mg     1.9       15 Mar 2017 06:52                 Radiology:    Advanced directives addressed: full resuscitation Patient is a 87y old  Female who presents with a chief complaint of Cough, AMS (12 Mar 2017 14:20)      HPI:  Pt is 88yo female with PMHx of Dementia, RA, COPD, former smoker, glaucoma, HTN, CVA, TIA, GERD, HLD, osteoporosis, kidney stones, admitted 3/12 with worsening cough and confusion at night, R sided weakness. As per the daughter, the cough is dry non productive, no fever, no chills, pt noted to be more weak and confused along with these symptoms, here afebrile, no wbc ct, CT head stable, CT chest with patchy opacities in lingular and RUL c/w PNA, was given IV vancomycin/zosyn d/t concern of infection.     Feels better  No sob, comfortable, denies cough  Has had several episodes of loose stools  No abd pain  Afebrile      PMH: as above    PSH: as above    Meds: per reconciliation sheet, noted below    MEDICATIONS  (STANDING):  piperacillin/tazobactam IVPB. 3.375Gram(s) IV Intermittent every 8 hours  heparin  Injectable 5000Unit(s) SubCutaneous every 8 hours  sodium chloride 0.9%. 1000milliLiter(s) IV Continuous <Continuous>  predniSONE   Tablet 5milliGRAM(s) Oral daily  traZODone 50milliGRAM(s) Oral at bedtime  simvastatin 20milliGRAM(s) Oral at bedtime  hydroxychloroquine 200milliGRAM(s) Oral every 12 hours  dipyridamole 200 mG/aspirin 25 mG 1Capsule(s) Oral two times a day  tiotropium 18 MICROgram(s) Capsule 1Capsule(s) Inhalation daily  calcium carbonate  625 mG + Vitamin D (OsCal 250 + D) 1Tablet(s) Oral daily  multivitamin 1Tablet(s) Oral daily  pantoprazole    Tablet 40milliGRAM(s) Oral before breakfast  memantine ER 14milliGRAM(s) Oral at bedtime  potassium acid phosphate/sodium acid phosphate tablet (K-PHOS No. 2) 2Tablet(s) Oral two times a day with meals  vancomycin  IVPB 750milliGRAM(s) IV Intermittent every 12 hours  ALBUTerol    90 MICROgram(s) HFA Inhaler 1Puff(s) Inhalation every 4 hours  potassium chloride    Tablet ER 40milliEquivalent(s) Oral once  lactobacillus acidophilus 1Tablet(s) Oral every 12 hours      Allergies    Aciphex (Unknown)  Macrobid (Unknown)  Percocet 10/325 (Rash)    Intolerances        Social: no smoking, no alcohol, no illegal drugs; no recent travel, no exposure to TB    Family history:  No pertinent family history in first degree relatives      ROS: the patient denies fever, no chills, no HA, no dizziness, no sore throat, no blurry vision, no CP, no palpitations, no abdominal pain, no diarrhea, no N/V, no dysuria, no leg pain, no claudication, no rash, no joint aches, no rectal pain or bleeding, no night sweats    Vital Signs Last 24 Hrs  T(C): 36.6, Max: 37.2 (03-14 @ 12:26)  T(F): 97.9, Max: 98.9 (03-14 @ 12:26)  HR: 72 (72 - 93)  BP: 154/51 (99/72 - 154/51)  BP(mean): --  RR: 19 (18 - 19)  SpO2: 100% (95% - 100%)      PE:  Constitutional: frail looking  HEENT: NC/AT, EOMI, PERRLA  Neck: supple  Back: no tenderness  Respiratory: scattered rhonchi, decreased breath sounds  Cardiovascular: S1S2 regular, no murmurs  Abdomen: soft, not tender, not distended, positive BS  Genitourinary: deferred  Rectal: deferred  Musculoskeletal: no muscle tenderness, no joint swelling or tenderness  Extremities: no pedal edema  Neurological:  no focal deficits  Skin: no rashes    Labs:                        10.2   7.7   )-----------( 156      ( 15 Mar 2017 06:52 )             33.0     15 Mar 2017 06:52    147    |  115    |  15     ----------------------------<  109    3.1     |  19     |  0.91     Ca    7.7        15 Mar 2017 06:52  Phos  3.2       15 Mar 2017 06:52  Mg     1.9       15 Mar 2017 06:52             Radiology:   EXAM:  CT CHEST                            PROCEDURE DATE:  03/13/2017        INTERPRETATION:  EXAMINATION DATE: March 13, 2017 at 1230 hours.  CLINICAL INFORMATION: Right lower lobe infiltrate on chest x-ray.    COMPARISON: February 27, 2017.    PROCEDURE:   CT of the Chest was performed without intravenous contrast.  Sagittal and coronal reformats were performed.    FINDINGS:    LUNGS AND LARGE AIRWAYS: Consolidation in both lower lobes with scattered   patchy opacities in the lingula and right upper lobe, possibly pneumonia.   Emphysema. Unchanged cyst in the left lower lobe.  PLEURA: Trace right pleural effusion.  VESSELS: Atherosclerotic calcifications.   HEART: Heart size is normal. No pericardial effusion.  MEDIASTINUM AND DOREEN: No lymphadenopathy.  CHEST WALL AND LOWER NECK: Mass in the right breast, measuring 2.1 x 1.7   cm, unchanged in size, suspicious for neoplasm.  VISUALIZED UPPER ABDOMEN: Moderate hiatal hernia. Unchanged severe   calcification of the proximal abdominal aorta.  BONES: Unchanged compression deformities of the T4, T7, and T9 vertebral   bodies.    IMPRESSION:   Consolidation in both lower lobes with scattered patchy opacities in the   lingula and right upper lobe, possibly pneumonia. Follow-up to complete   resolution is recommended.    Advanced directives addressed: full resuscitation

## 2017-03-15 NOTE — PROGRESS NOTE ADULT - SUBJECTIVE AND OBJECTIVE BOX
Patient seen and examined earlier today. overnight diarrheax3, cough improving, feels weak and confused    Review of system- All 10 systems reviewed and is as per HPI otherwise negative.     Vital Signs Last 24 Hrs  T(C): 36.6, Max: 36.6 (03-15 @ 06:19)  T(F): 97.9, Max: 97.9 (03-15 @ 06:19)  HR: 72 (72 - 77)  BP: 154/51 (154/51 - 154/51)  BP(mean): --  RR: 19 (19 - 19)  SpO2: 100% (100% - 100%)    PHYSICAL EXAM:    GENERAL: Comfortable elderly female,  no acute distress  HEAD:  Atraumatic, Normocephalic  EYES: EOMI, PERRLA  HEENT: Moist mucous membranes  NECK: Supple, No JVD  NERVOUS SYSTEM: non focal  CHEST/LUNG: decreases bs b/l, + rhonchi  HEART: S1, S2+, Regular rate and rhythm;  ABDOMEN: Soft, Nontender, Nondistended; Bowel sounds present  GENITOURINARY- Voiding, no palpable bladder  EXTREMITIES:  No clubbing, cyanosis, or edema  MUSCULOSKELTAL- No muscle tenderness, No joint tenderness  SKIN-hyperpigmented areas b/l le.        LABS:  15 Mar 2017 06:52    147    |  115    |  15     ----------------------------<  109    3.1     |  19     |  0.91     Ca    7.7        15 Mar 2017 06:52  Phos  3.2       15 Mar 2017 06:52  Mg     1.9       15 Mar 2017 06:52                         10.2   7.7   )-----------( 156      ( 15 Mar 2017 06:52 )             33.0                          10.4   7.5   )-----------( 164      ( 14 Mar 2017 06:50 )             33.4     14 Mar 2017 06:50    144    |  111    |  11     ----------------------------<  72     3.5     |  23     |  0.80     Ca    8.2        14 Mar 2017 06:50  Phos  2.1       14 Mar 2017 06:50  Mg     2.0       14 Mar 2017 06:50    TPro  6.0    /  Alb  2.9    /  TBili  0.5    /  DBili  x      /  AST  38     /  ALT  55     /  AlkPhos  66     12 Mar 2017 11:37    PT/INR - ( 12 Mar 2017 11:37 )   PT: 11.5 sec;   INR: 1.04 ratio         PTT - ( 12 Mar 2017 11:37 )  PTT:26.5 sec  Urinalysis Basic - ( 12 Mar 2017 11:35 )    Color: Yellow / Appearance: Clear / S.015 / pH: x  Gluc: x / Ketone: Trace  / Bili: Negative / Urobili: Negative mg/dL   Blood: x / Protein: Negative mg/dL / Nitrite: Negative   Leuk Esterase: Negative / RBC: x / WBC x   Sq Epi: x / Non Sq Epi: x / Bacteria: x          CARDIAC MARKERS ( 12 Mar 2017 15:00 )  <0.015 ng/mL / x     / x     / x     / x      CARDIAC MARKERS ( 12 Mar 2017 11:37 )  <0.015 ng/mL / x     / x     / x     / x        CT CHEST          2017    Consolidation in both lower lobes with scattered patchy opacities in the   lingula and right upper lobe, possibly pneumonia. Follow-up to complete   resolution is recommended.  CT BRAIN      2017    IMPRESSION:   Mild periventricular white matter ischemia.               MEDICATIONS  (STANDING):  piperacillin/tazobactam IVPB. 3.375Gram(s) IV Intermittent every 8 hours  heparin  Injectable 5000Unit(s) SubCutaneous every 8 hours  sodium chloride 0.9%. 1000milliLiter(s) IV Continuous <Continuous>  predniSONE   Tablet 5milliGRAM(s) Oral daily  traZODone 50milliGRAM(s) Oral at bedtime  simvastatin 20milliGRAM(s) Oral at bedtime  hydroxychloroquine 200milliGRAM(s) Oral every 12 hours  dipyridamole 200 mG/aspirin 25 mG 1Capsule(s) Oral two times a day  tiotropium 18 MICROgram(s) Capsule 1Capsule(s) Inhalation daily  calcium carbonate  625 mG + Vitamin D (OsCal 250 + D) 1Tablet(s) Oral daily  multivitamin 1Tablet(s) Oral daily  pantoprazole    Tablet 40milliGRAM(s) Oral before breakfast  memantine ER 14milliGRAM(s) Oral at bedtime  vancomycin  IVPB 750milliGRAM(s) IV Intermittent every 12 hours  ALBUTerol    90 MICROgram(s) HFA Inhaler 1Puff(s) Inhalation every 4 hours  lactobacillus acidophilus 1Tablet(s) Oral every 12 hours

## 2017-03-15 NOTE — PROGRESS NOTE ADULT - SUBJECTIVE AND OBJECTIVE BOX
SHEELA GUPTA  MRN: [id number]    S: 3/14: Comfortable. No complaints of shortness of breath. Cooperative.    3/15: Weak. No respiratory complaints. She has diarrhea.     PAST MEDICAL & SURGICAL HISTORY:  Compression fracture of spine: T7  Alzheimers disease  HTN (hypertension)  Glaucoma  TIA (transient ischemic attack): 8/07  Cerebral vascular accident: 2005  Polymyalgia rheumatica  Osteoporosis  Chronic pain: mid back  Urinary retention  GERD (gastroesophageal reflux disease)  Cholelithiases  Hyperlipemia  Carotid artery stenosis  Lung nodule: biopsied 2003, benign  COPD (chronic obstructive pulmonary disease)  Asthma  Gall stones  History of hysterectomy: for bleeding  Hx of cholecystectomy      O: T(C): 36.6, Max: 36.7 (03-14 @ 16:58)  HR: 72 (72 - 92)  BP: 154/51 (99/72 - 154/51)  RR: 19 (18 - 19)  SpO2: 100% (99% - 100%)  Wt(kg): --    PHYSICAL EXAM:      Constitutional: awake and alert, chronic dementia but presently awake and alert; cooperative. Oriented to person and place.  Neck: Soft and supple, No LAD, No JVD  Respiratory: Breath sounds are clear bilaterally.  Cardiovascular: S1 and S2, regular rate and rhythm, no Murmurs, gallops or rubs  Gastrointestinal: Bowel Sounds present, soft, nontender, nondistended, no guarding, no rebound  Extremities: No peripheral edema  Neurological: A/O x 2 , no focal deficits                            10.2   7.7   )-----------( 156      ( 15 Mar 2017 06:52 )             33.0       15 Mar 2017 06:52    147    |  115    |  15     ----------------------------<  109    3.1     |  19     |  0.91     Ca    7.7        15 Mar 2017 06:52  Phos  3.2       15 Mar 2017 06:52  Mg     1.9       15 Mar 2017 06:52    Radiology:    INTERPRETATION:  EXAMINATION DATE: March 13, 2017 at 1230 hours.  CLINICAL INFORMATION: Right lower lobe infiltrate on chest x-ray.    COMPARISON: February 27, 2017.    PROCEDURE:   CT of the Chest was performed without intravenous contrast.  Sagittal and coronal reformats were performed.    FINDINGS:    LUNGS AND LARGE AIRWAYS: Consolidation in both lower lobes with scattered   patchy opacities in the lingula and right upper lobe, possibly pneumonia.   Emphysema. Unchanged cyst in the left lower lobe.  PLEURA: Trace right pleural effusion.  VESSELS: Atherosclerotic calcifications.   HEART: Heart size is normal. No pericardial effusion.  MEDIASTINUM AND DOREEN: No lymphadenopathy.  CHEST WALL AND LOWER NECK: Mass in the right breast, measuring 2.1 x 1.7   cm, unchanged in size, suspicious for neoplasm.  VISUALIZED UPPER ABDOMEN: Moderate hiatal hernia. Unchanged severe   calcification of the proximal abdominal aorta.  BONES: Unchanged compression deformities of the T4, T7, and T9 vertebral   bodies.    IMPRESSION:   Consolidation in both lower lobes with scattered patchy opacities in the   lingula and right upper lobe, possibly pneumonia. Follow-up to complete   resolution is recommended.    MEDICATIONS  (STANDING):  piperacillin/tazobactam IVPB. 3.375Gram(s) IV Intermittent every 8 hours  heparin  Injectable 5000Unit(s) SubCutaneous every 8 hours  sodium chloride 0.9%. 1000milliLiter(s) IV Continuous <Continuous>  predniSONE   Tablet 5milliGRAM(s) Oral daily  traZODone 50milliGRAM(s) Oral at bedtime  simvastatin 20milliGRAM(s) Oral at bedtime  hydroxychloroquine 200milliGRAM(s) Oral every 12 hours  dipyridamole 200 mG/aspirin 25 mG 1Capsule(s) Oral two times a day  tiotropium 18 MICROgram(s) Capsule 1Capsule(s) Inhalation daily  calcium carbonate  625 mG + Vitamin D (OsCal 250 + D) 1Tablet(s) Oral daily  multivitamin 1Tablet(s) Oral daily  pantoprazole    Tablet 40milliGRAM(s) Oral before breakfast  memantine ER 14milliGRAM(s) Oral at bedtime  vancomycin  IVPB 750milliGRAM(s) IV Intermittent every 12 hours  ALBUTerol    90 MICROgram(s) HFA Inhaler 1Puff(s) Inhalation every 4 hours  lactobacillus acidophilus 1Tablet(s) Oral every 12 hours    MEDICATIONS  (PRN):  ALBUTerol   0.5% 2.5milliGRAM(s) Nebulizer every 4 hours PRN Shortness of Breath and/or Wheezing  ALBUTerol    0.083% 2.5milliGRAM(s) Nebulizer every 6 hours PRN Shortness of Breath and/or Wheezing        A/P: 1. Multifocal pneumonia. Continue antibiotics per ID - on Zosyn/Vanco. Swallow evaluation appreciated.    2. Right sided weakness - reported. Resolved; ? TIA. Head CT noted. Further eval per hospitalist.     3. COPD. Continue Spiriva. Nebulized albuterol prn. NOT on home O2. No evidence of exacerbation at this time. Chronically on 5 mg prednisone daily for management of RA - NOT FOR COPD.    4. Hx of rheumatoid arthritis/PMR. On plaquenil and prednisone 5 mg daily per rheumatology (Dr. Gale).    5. DVT prophylaxis. S.C. heparin.     6. Diarrhea. R/O c.diff.     D/W Dr. Barton.

## 2017-03-15 NOTE — PROGRESS NOTE ADULT - ASSESSMENT
87F with PMHx of Dementia, RA on chronic prednisone+plaquenil,  COPD, former smoker, glaucoma, HTN, CVA, TIA, GERD, HLD, osteoporosis, kidney stones, presents with worsening cough and confusion at night. As per the daughter, the cough is dry non productive, not associated with fever, chills, chest pain, sob. Patient was more confused at night for the last couple of nights, no focal deficits. She was admitted with HCAP.    1. Pneumonia:  -CT with multifocal pneumonia  -continue iv zosyn/vanc  for pna due to suspected GNR and or other resistant organisms including mrsa  -seen by speech and swallow, no s/o aspiration.  -consult ID  -pulm eval appreciated    2. ?transient RIght sided weakness:  -neuro exam non focal  -doubt acute CVA  -CT head neg  -if symptoms recur, will order further w/u    3. Acute diarrhea while on IV abx r/o C diff colitis  - check stool studies  - add probiotics    4. Rheumatoid arthritis:  -continue plaquenil/prednisone with PPI    5. COPD  - stable  - pulm eval appreciated  - prn nebs  - check O2 on ambulation    6.  Hx of CVA  - Aggrenox/statin    7. DVT px    Dispo - IV abx, check O2 on ambulation, d/c planning

## 2017-03-15 NOTE — CONSULT NOTE ADULT - ASSESSMENT
Pt is 86yo female with PMHx of Dementia, RA, COPD, former smoker, glaucoma, HTN, CVA, TIA, GERD, HLD, osteoporosis, kidney stones, admitted 3/12 with worsening cough and confusion at night, R sided weakness. As per the daughter, the cough is dry non productive, no fever, no chills, pt noted to be more weak and confused along with these symptoms, here afebrile, no wbc ct, CT head stable, CT chest with patchy opacities in lingular and RUL c/w PNA, was given IV vancomycin/zosyn d/t concern of infection.     1. multifocal pna/hcap/immunocompromised host/RA/COPD    - slowly improving    - agree with IV vancomycin [id number], check trough prior to 4th dose, continue with IV zosyn 3.375q8h, day#4    - on bronchodilators for COPD    - on plaquenil/steroids for RA    - blood cx no growth    - once clinically better, will plan to switch to oral ceftin 500mg q12h to complete total 10 day course    - f/u CBC    - supportive care    - monitor temps    -tolerating abx well so far; no side effects noted    -reason for abx use and side effects reviewed with patient    2. other issues - Dementia, RA, COPD, former smoker, glaucoma, HTN, CVA, TIA, GERD, HLD, osteoporosis, kidney stones - care per medicine Pt is 86yo female with PMHx of Dementia, RA, COPD, former smoker, glaucoma, HTN, CVA, TIA, GERD, HLD, osteoporosis, kidney stones, admitted 3/12 with worsening cough and confusion at night, R sided weakness. As per the daughter, the cough is dry non productive, no fever, no chills, pt noted to be more weak and confused along with these symptoms, here afebrile, no wbc ct, CT head stable, CT chest with patchy opacities in lingular and RUL c/w PNA, was given IV vancomycin/zosyn d/t concern of infection.     1. multifocal pna/hcap/immunocompromised host/RA/COPD/diarrhea    - slowly improving    - continue with IV vancomycin [id number], check trough prior to 4th dose, continue with IV zosyn 3.375q8h, day#4    - noted to have diarrhea, stool studies/C diff PCR pending, if positive, will start oral vancomycin [id number]    - on bronchodilators for COPD    - on plaquenil/steroids for RA    - blood cx no growth    - once clinically better, will plan to switch to oral ceftin 500mg q12h to complete total 10 day course    - f/u CBC    - supportive care    - monitor temps    -tolerating abx well so far; no side effects noted    -reason for abx use and side effects reviewed with patient    2. other issues - Dementia, RA, COPD, former smoker, glaucoma, HTN, CVA, TIA, GERD, HLD, osteoporosis, kidney stones - care per medicine

## 2017-03-15 NOTE — PHYSICAL THERAPY INITIAL EVALUATION ADULT - MODALITIES TREATMENT COMMENTS
pt left in bed supine post Eval; bed alarm on; IV intact; Dr. Judge present; callbell in reach; pt instructed not to get up alone; call nursing for assist; bobby fair; pt denied pain post Eval

## 2017-03-16 LAB
ANION GAP SERPL CALC-SCNC: 12 MMOL/L — SIGNIFICANT CHANGE UP (ref 5–17)
BUN SERPL-MCNC: 12 MG/DL — SIGNIFICANT CHANGE UP (ref 7–23)
CALCIUM SERPL-MCNC: 7.8 MG/DL — LOW (ref 8.5–10.1)
CHLORIDE SERPL-SCNC: 115 MMOL/L — HIGH (ref 96–108)
CO2 SERPL-SCNC: 22 MMOL/L — SIGNIFICANT CHANGE UP (ref 22–31)
CREAT SERPL-MCNC: 0.82 MG/DL — SIGNIFICANT CHANGE UP (ref 0.5–1.3)
GLUCOSE SERPL-MCNC: 87 MG/DL — SIGNIFICANT CHANGE UP (ref 70–99)
NT-PROBNP SERPL-SCNC: 1347 PG/ML — HIGH (ref 0–450)
POTASSIUM SERPL-MCNC: 3.4 MMOL/L — LOW (ref 3.5–5.3)
POTASSIUM SERPL-SCNC: 3.4 MMOL/L — LOW (ref 3.5–5.3)
SODIUM SERPL-SCNC: 149 MMOL/L — HIGH (ref 135–145)

## 2017-03-16 PROCEDURE — 71010: CPT | Mod: 26

## 2017-03-16 RX ORDER — POTASSIUM CHLORIDE 20 MEQ
20 PACKET (EA) ORAL ONCE
Qty: 0 | Refills: 0 | Status: COMPLETED | OUTPATIENT
Start: 2017-03-16 | End: 2017-03-16

## 2017-03-16 RX ORDER — DEXTROSE MONOHYDRATE, SODIUM CHLORIDE, AND POTASSIUM CHLORIDE 50; .745; 4.5 G/1000ML; G/1000ML; G/1000ML
2000 INJECTION, SOLUTION INTRAVENOUS
Qty: 0 | Refills: 0 | Status: DISCONTINUED | OUTPATIENT
Start: 2017-03-16 | End: 2017-03-17

## 2017-03-16 RX ADMIN — MEMANTINE HYDROCHLORIDE 14 MILLIGRAM(S): 10 TABLET ORAL at 21:28

## 2017-03-16 RX ADMIN — TIOTROPIUM BROMIDE 1 CAPSULE(S): 18 CAPSULE ORAL; RESPIRATORY (INHALATION) at 10:11

## 2017-03-16 RX ADMIN — Medication 1 TABLET(S): at 13:16

## 2017-03-16 RX ADMIN — PIPERACILLIN AND TAZOBACTAM 25 GRAM(S): 4; .5 INJECTION, POWDER, LYOPHILIZED, FOR SOLUTION INTRAVENOUS at 21:23

## 2017-03-16 RX ADMIN — SIMVASTATIN 20 MILLIGRAM(S): 20 TABLET, FILM COATED ORAL at 21:29

## 2017-03-16 RX ADMIN — Medication 200 MILLIGRAM(S): at 21:28

## 2017-03-16 RX ADMIN — HEPARIN SODIUM 5000 UNIT(S): 5000 INJECTION INTRAVENOUS; SUBCUTANEOUS at 13:13

## 2017-03-16 RX ADMIN — PANTOPRAZOLE SODIUM 40 MILLIGRAM(S): 20 TABLET, DELAYED RELEASE ORAL at 06:19

## 2017-03-16 RX ADMIN — Medication 1 TABLET(S): at 17:40

## 2017-03-16 RX ADMIN — PIPERACILLIN AND TAZOBACTAM 25 GRAM(S): 4; .5 INJECTION, POWDER, LYOPHILIZED, FOR SOLUTION INTRAVENOUS at 13:13

## 2017-03-16 RX ADMIN — ASPIRIN AND DIPYRIDAMOLE 1 CAPSULE(S): 25; 200 CAPSULE, EXTENDED RELEASE ORAL at 06:18

## 2017-03-16 RX ADMIN — Medication 1 TABLET(S): at 13:12

## 2017-03-16 RX ADMIN — Medication 1 TABLET(S): at 06:19

## 2017-03-16 RX ADMIN — DEXTROSE MONOHYDRATE, SODIUM CHLORIDE, AND POTASSIUM CHLORIDE 75 MILLILITER(S): 50; .745; 4.5 INJECTION, SOLUTION INTRAVENOUS at 21:27

## 2017-03-16 RX ADMIN — Medication 5 MILLIGRAM(S): at 06:18

## 2017-03-16 RX ADMIN — ASPIRIN AND DIPYRIDAMOLE 1 CAPSULE(S): 25; 200 CAPSULE, EXTENDED RELEASE ORAL at 17:40

## 2017-03-16 RX ADMIN — HEPARIN SODIUM 5000 UNIT(S): 5000 INJECTION INTRAVENOUS; SUBCUTANEOUS at 21:29

## 2017-03-16 RX ADMIN — PIPERACILLIN AND TAZOBACTAM 25 GRAM(S): 4; .5 INJECTION, POWDER, LYOPHILIZED, FOR SOLUTION INTRAVENOUS at 06:18

## 2017-03-16 RX ADMIN — Medication 20 MILLIEQUIVALENT(S): at 13:18

## 2017-03-16 RX ADMIN — HEPARIN SODIUM 5000 UNIT(S): 5000 INJECTION INTRAVENOUS; SUBCUTANEOUS at 06:18

## 2017-03-16 RX ADMIN — Medication 150 MILLIGRAM(S): at 03:29

## 2017-03-16 RX ADMIN — ALBUTEROL 2.5 MILLIGRAM(S): 90 AEROSOL, METERED ORAL at 10:05

## 2017-03-16 RX ADMIN — Medication 200 MILLIGRAM(S): at 09:43

## 2017-03-16 RX ADMIN — DEXTROSE MONOHYDRATE, SODIUM CHLORIDE, AND POTASSIUM CHLORIDE 75 MILLILITER(S): 50; .745; 4.5 INJECTION, SOLUTION INTRAVENOUS at 09:43

## 2017-03-16 NOTE — PROGRESS NOTE ADULT - SUBJECTIVE AND OBJECTIVE BOX
Pt is 86yo female with PMHx of Dementia, RA, COPD, former smoker, glaucoma, HTN, CVA, TIA, GERD, HLD, osteoporosis, kidney stones, admitted 3/12 with worsening cough and confusion at night, R sided weakness. As per the daughter, the cough is dry non productive, no fever, no chills, pt noted to be more weak and confused along with these symptoms, here afebrile, no wbc ct, CT head stable, CT chest with patchy opacities in lingular and RUL c/w PNA, was given IV vancomycin/zosyn d/t concern of infection.     Feels better  No sob, comfortable, denies cough  Formed stool this am    MEDICATIONS  (STANDING):  piperacillin/tazobactam IVPB. 3.375Gram(s) IV Intermittent every 8 hours  heparin  Injectable 5000Unit(s) SubCutaneous every 8 hours  predniSONE   Tablet 5milliGRAM(s) Oral daily  traZODone 50milliGRAM(s) Oral at bedtime  simvastatin 20milliGRAM(s) Oral at bedtime  hydroxychloroquine 200milliGRAM(s) Oral every 12 hours  dipyridamole 200 mG/aspirin 25 mG 1Capsule(s) Oral two times a day  tiotropium 18 MICROgram(s) Capsule 1Capsule(s) Inhalation daily  calcium carbonate  625 mG + Vitamin D (OsCal 250 + D) 1Tablet(s) Oral daily  multivitamin 1Tablet(s) Oral daily  pantoprazole    Tablet 40milliGRAM(s) Oral before breakfast  memantine ER 14milliGRAM(s) Oral at bedtime  ALBUTerol    90 MICROgram(s) HFA Inhaler 1Puff(s) Inhalation every 4 hours  lactobacillus acidophilus 1Tablet(s) Oral every 12 hours  dextrose 5% + sodium chloride 0.45% with potassium chloride 20 mEq/L 2000milliLiter(s) IV Continuous <Continuous>  potassium chloride    Tablet ER 20milliEquivalent(s) Oral once      Vital Signs Last 24 Hrs  T(C): 36.6, Max: 36.6 (03-16 @ 05:35)  T(F): 97.9, Max: 97.9 (03-16 @ 05:35)  HR: 79 (76 - 91)  BP: 126/74 (102/49 - 126/74)  BP(mean): --  RR: 19 (18 - 19)  SpO2: 91% (91% - 91%)      PE:  Constitutional: frail looking  HEENT: NC/AT, EOMI, PERRLA  Neck: supple  Back: no tenderness  Respiratory:  decreased breath sounds  Cardiovascular: S1S2 regular, no murmurs  Abdomen: soft, not tender, not distended, positive BS  Genitourinary: deferred  Rectal: deferred  Musculoskeletal: no muscle tenderness, no joint swelling or tenderness  Extremities: no pedal edema  Neurological:  no focal deficits  Skin: no rashes    Labs:                                    10.2   7.7   )-----------( 156      ( 15 Mar 2017 06:52 )             33.0     16 Mar 2017 06:39    149    |  115    |  12     ----------------------------<  87     3.4     |  22     |  0.82     Ca    7.8        16 Mar 2017 06:39  Phos  3.2       15 Mar 2017 06:52  Mg     1.9       15 Mar 2017 06:52          Radiology:   EXAM:  CT CHEST                            PROCEDURE DATE:  03/13/2017        INTERPRETATION:  EXAMINATION DATE: March 13, 2017 at 1230 hours.  CLINICAL INFORMATION: Right lower lobe infiltrate on chest x-ray.    COMPARISON: February 27, 2017.    PROCEDURE:   CT of the Chest was performed without intravenous contrast.  Sagittal and coronal reformats were performed.    FINDINGS:    LUNGS AND LARGE AIRWAYS: Consolidation in both lower lobes with scattered   patchy opacities in the lingula and right upper lobe, possibly pneumonia.   Emphysema. Unchanged cyst in the left lower lobe.  PLEURA: Trace right pleural effusion.  VESSELS: Atherosclerotic calcifications.   HEART: Heart size is normal. No pericardial effusion.  MEDIASTINUM AND DOREEN: No lymphadenopathy.  CHEST WALL AND LOWER NECK: Mass in the right breast, measuring 2.1 x 1.7   cm, unchanged in size, suspicious for neoplasm.  VISUALIZED UPPER ABDOMEN: Moderate hiatal hernia. Unchanged severe   calcification of the proximal abdominal aorta.  BONES: Unchanged compression deformities of the T4, T7, and T9 vertebral   bodies.    IMPRESSION:   Consolidation in both lower lobes with scattered patchy opacities in the   lingula and right upper lobe, possibly pneumonia. Follow-up to complete   resolution is recommended.    Advanced directives addressed: full resuscitation

## 2017-03-16 NOTE — PROGRESS NOTE ADULT - ASSESSMENT
87F with PMHx of Dementia, RA on chronic prednisone+plaquenil,  COPD, former smoker, glaucoma, HTN, CVA, TIA, GERD, HLD, osteoporosis, kidney stones, presents with worsening cough and confusion at night. As per the daughter, the cough is dry non productive, not associated with fever, chills, chest pain, sob. Patient was more confused at night for the last couple of nights, no focal deficits. She was admitted with HCAP.    1. Pneumonia:  -CT with multifocal pneumonia  - repeat CXR 3/17  - stable  -continue iv zosyn/vanc  for pna due to suspected GNR and or other resistant organisms including mrsa  -seen by speech and swallow, no s/o aspiration.  -consult ID  -pulm eval appreciated    2. ?transient RIght sided weakness:  -neuro exam non focal  -doubt acute CVA  -CT head neg  -if symptoms recur, will order further w/u    3. Acute diarrhea while on IV abx  rulled out C diff colitis  - check stool studies - neg for C diff  - add probiotics    4. Rheumatoid arthritis:  -continue plaquenil/prednisone with PPI    5. COPD  - stable  - pulm nelson appreciated  - prn nebs  - check O2 on ambulation    6.  Hx of CVA  - Aggrenox/statin    7. DVT px    dispo - d/c planning for tomorrow to SUE  d/w daughter    Dispo - IV abx, check O2 on ambulation, d/c planning

## 2017-03-16 NOTE — PROGRESS NOTE ADULT - SUBJECTIVE AND OBJECTIVE BOX
SHEELA GUPTA  MRN: [id number]    S: 3/14: Comfortable. No complaints of shortness of breath. Cooperative.    3/15: Weak. No respiratory complaints. She has diarrhea.    3/16: Awake; alert; Feels better. Denies shortness of breath. Less cough. Diarrhea improved.     PAST MEDICAL & SURGICAL HISTORY:  Compression fracture of spine: T7  Alzheimers disease  HTN (hypertension)  Glaucoma  TIA (transient ischemic attack): 8/07  Cerebral vascular accident: 2005  Polymyalgia rheumatica  Osteoporosis  Chronic pain: mid back  Urinary retention  GERD (gastroesophageal reflux disease)  Cholelithiases  Hyperlipemia  Carotid artery stenosis  Lung nodule: biopsied 2003, benign  COPD (chronic obstructive pulmonary disease)  Asthma  Gall stones  History of hysterectomy: for bleeding  Hx of cholecystectomy      O: T(C): 36.6, Max: 36.6 (03-16 @ 05:35)  HR: 79 (76 - 91)  BP: 126/74 (102/49 - 126/74)  RR: 19 (18 - 19)  SpO2: 91% (91% - 91%)  Wt(kg): --    PHYSICAL EXAM:      Constitutional: awake and alert, chronic dementia but presently awake and alert; cooperative. Oriented to person and place.  Neck: Soft and supple, No LAD, No JVD  Respiratory: Breath sounds are clear bilaterally.  Cardiovascular: S1 and S2, regular rate and rhythm, no Murmurs, gallops or rubs  Gastrointestinal: Bowel Sounds present, soft, nontender, nondistended, no guarding, no rebound  Extremities: No peripheral edema  Neurological: A/O x 2 , no focal deficits                              10.2   7.7   )-----------( 156      ( 15 Mar 2017 06:52 )             33.0       16 Mar 2017 06:39    149    |  115    |  12     ----------------------------<  87     3.4     |  22     |  0.82     Ca    7.8        16 Mar 2017 06:39  Phos  3.2       15 Mar 2017 06:52  Mg     1.9       15 Mar 2017 06:52    C Diff by PCR Result: NotDetec (03.15.17 @ 10:00)    Radiology:    INTERPRETATION:  EXAMINATION DATE: March 13, 2017 at 1230 hours.  CLINICAL INFORMATION: Right lower lobe infiltrate on chest x-ray.    COMPARISON: February 27, 2017.    PROCEDURE:   CT of the Chest was performed without intravenous contrast.  Sagittal and coronal reformats were performed.    FINDINGS:    LUNGS AND LARGE AIRWAYS: Consolidation in both lower lobes with scattered   patchy opacities in the lingula and right upper lobe, possibly pneumonia.   Emphysema. Unchanged cyst in the left lower lobe.  PLEURA: Trace right pleural effusion.  VESSELS: Atherosclerotic calcifications.   HEART: Heart size is normal. No pericardial effusion.  MEDIASTINUM AND DOREEN: No lymphadenopathy.  CHEST WALL AND LOWER NECK: Mass in the right breast, measuring 2.1 x 1.7   cm, unchanged in size, suspicious for neoplasm.  VISUALIZED UPPER ABDOMEN: Moderate hiatal hernia. Unchanged severe   calcification of the proximal abdominal aorta.  BONES: Unchanged compression deformities of the T4, T7, and T9 vertebral   bodies.    IMPRESSION:   Consolidation in both lower lobes with scattered patchy opacities in the   lingula and right upper lobe, possibly pneumonia. Follow-up to complete   resolution is recommended.    MEDICATIONS  (STANDING):  piperacillin/tazobactam IVPB. 3.375Gram(s) IV Intermittent every 8 hours  heparin  Injectable 5000Unit(s) SubCutaneous every 8 hours  sodium chloride 0.9%. 1000milliLiter(s) IV Continuous <Continuous>  predniSONE   Tablet 5milliGRAM(s) Oral daily  traZODone 50milliGRAM(s) Oral at bedtime  simvastatin 20milliGRAM(s) Oral at bedtime  hydroxychloroquine 200milliGRAM(s) Oral every 12 hours  dipyridamole 200 mG/aspirin 25 mG 1Capsule(s) Oral two times a day  tiotropium 18 MICROgram(s) Capsule 1Capsule(s) Inhalation daily  calcium carbonate  625 mG + Vitamin D (OsCal 250 + D) 1Tablet(s) Oral daily  multivitamin 1Tablet(s) Oral daily  pantoprazole    Tablet 40milliGRAM(s) Oral before breakfast  memantine ER 14milliGRAM(s) Oral at bedtime  vancomycin  IVPB 750milliGRAM(s) IV Intermittent every 12 hours  ALBUTerol    90 MICROgram(s) HFA Inhaler 1Puff(s) Inhalation every 4 hours  lactobacillus acidophilus 1Tablet(s) Oral every 12 hours    MEDICATIONS  (PRN):  ALBUTerol   0.5% 2.5milliGRAM(s) Nebulizer every 4 hours PRN Shortness of Breath and/or Wheezing  ALBUTerol    0.083% 2.5milliGRAM(s) Nebulizer every 6 hours PRN Shortness of Breath and/or Wheezing      MEDICATIONS  (STANDING):  piperacillin/tazobactam IVPB. 3.375Gram(s) IV Intermittent every 8 hours  heparin  Injectable 5000Unit(s) SubCutaneous every 8 hours  predniSONE   Tablet 5milliGRAM(s) Oral daily  traZODone 50milliGRAM(s) Oral at bedtime  simvastatin 20milliGRAM(s) Oral at bedtime  hydroxychloroquine 200milliGRAM(s) Oral every 12 hours  dipyridamole 200 mG/aspirin 25 mG 1Capsule(s) Oral two times a day  tiotropium 18 MICROgram(s) Capsule 1Capsule(s) Inhalation daily  calcium carbonate  625 mG + Vitamin D (OsCal 250 + D) 1Tablet(s) Oral daily  multivitamin 1Tablet(s) Oral daily  pantoprazole    Tablet 40milliGRAM(s) Oral before breakfast  memantine ER 14milliGRAM(s) Oral at bedtime  ALBUTerol    90 MICROgram(s) HFA Inhaler 1Puff(s) Inhalation every 4 hours  lactobacillus acidophilus 1Tablet(s) Oral every 12 hours  dextrose 5% + sodium chloride 0.45% with potassium chloride 20 mEq/L 2000milliLiter(s) IV Continuous <Continuous>  potassium chloride    Tablet ER 20milliEquivalent(s) Oral once    MEDICATIONS  (PRN):  ALBUTerol   0.5% 2.5milliGRAM(s) Nebulizer every 4 hours PRN Shortness of Breath and/or Wheezing  ALBUTerol    0.083% 2.5milliGRAM(s) Nebulizer every 6 hours PRN Shortness of Breath and/or Wheezing        A/P: 1. Multifocal pneumonia. Continue antibiotics per ID - switch to p.o. antibiotics soon. Clinically improved. Follow up CXR as an outpatient to resolution.    2. Right sided weakness - reported. Resolved. Head CT noted.     3. COPD. Stable. Continue Spiriva. Nebulized albuterol prn. NOT on home O2. No evidence of exacerbation at this time. Chronically on 5 mg prednisone daily for management of RA - NOT FOR COPD.    4. Hx of rheumatoid arthritis/PMR. On plaquenil and prednisone 5 mg daily per rheumatology (Dr. Gale).    5. DVT prophylaxis. S.C. heparin.     6. Diarrhea - improved. C. diff. not detected.     Discharge planning per hospitalist.

## 2017-03-16 NOTE — PROGRESS NOTE ADULT - ASSESSMENT
Pt is 86yo female with PMHx of Dementia, RA, COPD, former smoker, glaucoma, HTN, CVA, TIA, GERD, HLD, osteoporosis, kidney stones, admitted 3/12 with worsening cough and confusion at night, R sided weakness. As per the daughter, the cough is dry non productive, no fever, no chills, pt noted to be more weak and confused along with these symptoms, here afebrile, no wbc ct, CT head stable, CT chest with patchy opacities in lingular and RUL c/w PNA, was given IV vancomycin/zosyn d/t concern of infection.     1. multifocal pna/hcap/immunocompromised host/RA/COPD/diarrhea    - slowly improving    -  completed 5 days of IV vancomycin, discontinue    - on IV zosyn 3.375q8h, day#5    - recommend switch to ceftin 500mg q12h for 5 more days for total 10 days    - noted to have diarrhea, C diff negative, formed stool this am    - on bronchodilators for COPD    - on plaquenil/steroids for RA    - blood cx no growth    - f/u CBC    - supportive care    - monitor temps    -tolerating abx well so far; no side effects noted    -reason for abx use and side effects reviewed with patient    2. other issues - Dementia, RA, COPD, former smoker, glaucoma, HTN, CVA, TIA, GERD, HLD, osteoporosis, kidney stones - care per medicine

## 2017-03-16 NOTE — PROGRESS NOTE ADULT - SUBJECTIVE AND OBJECTIVE BOX
Patient seen and examined earlier today, denies diarrhea, feels better, OOB --> chair, afebrile,  cough improving, less confused    Review of system- All 10 systems reviewed and is as per HPI otherwise negative.     Vital Signs Last 24 Hrs  T(C): 36.6, Max: 36.6 ( @ 05:35)  T(F): 97.9, Max: 97.9 ( @ 05:35)  HR: 79 (76 - 91)  BP: 126/74 (102/49 - 126/74)  BP(mean): --  RR: 19 (18 - 19)  SpO2: 91% (91% - 91%)    PHYSICAL EXAM:    GENERAL: Comfortable elderly female,  no acute distress  HEAD:  Atraumatic, Normocephalic  EYES: EOMI, PERRLA  HEENT: Moist mucous membranes  NECK: Supple, No JVD  NERVOUS SYSTEM: non focal  CHEST/LUNG: decreases bs b/l, + rhonchi  HEART: S1, S2+, Regular rate and rhythm;  ABDOMEN: Soft, Nontender, Nondistended; Bowel sounds present  GENITOURINARY- Voiding, no palpable bladder  EXTREMITIES:  No clubbing, cyanosis, or edema  MUSCULOSKELTAL- No muscle tenderness, No joint tenderness  SKIN-hyperpigmented areas b/l le.        LABS:  16 Mar 2017 06:39    149    |  115    |  12     ----------------------------<  87     3.4     |  22     |  0.82     Ca    7.8        16 Mar 2017 06:39  Phos  3.2       15 Mar 2017 06:52  Mg     1.9       15 Mar 2017 06:52                        10.2   7.7   )-----------( 156      ( 15 Mar 2017 06:52 )             33.0     15 Mar 2017 06:52    147    |  115    |  15     ----------------------------<  109    3.1     |  19     |  0.91     Ca    7.7        15 Mar 2017 06:52  Phos  3.2       15 Mar 2017 06:52  Mg     1.9       15 Mar 2017 06:52                         10.2   7.7   )-----------( 156      ( 15 Mar 2017 06:52 )             33.0                          10.4   7.5   )-----------( 164      ( 14 Mar 2017 06:50 )             33.4     14 Mar 2017 06:50    144    |  111    |  11     ----------------------------<  72     3.5     |  23     |  0.80     Ca    8.2        14 Mar 2017 06:50  Phos  2.1       14 Mar 2017 06:50  Mg     2.0       14 Mar 2017 06:50    TPro  6.0    /  Alb  2.9    /  TBili  0.5    /  DBili  x      /  AST  38     /  ALT  55     /  AlkPhos  66     12 Mar 2017 11:37    PT/INR - ( 12 Mar 2017 11:37 )   PT: 11.5 sec;   INR: 1.04 ratio         PTT - ( 12 Mar 2017 11:37 )  PTT:26.5 sec  Urinalysis Basic - ( 12 Mar 2017 11:35 )    Color: Yellow / Appearance: Clear / S.015 / pH: x  Gluc: x / Ketone: Trace  / Bili: Negative / Urobili: Negative mg/dL   Blood: x / Protein: Negative mg/dL / Nitrite: Negative   Leuk Esterase: Negative / RBC: x / WBC x   Sq Epi: x / Non Sq Epi: x / Bacteria: x          CARDIAC MARKERS ( 12 Mar 2017 15:00 )  <0.015 ng/mL / x     / x     / x     / x      CARDIAC MARKERS ( 12 Mar 2017 11:37 )  <0.015 ng/mL / x     / x     / x     / x        CHEST SINGLE VIEW FRONTAL                  2017  Bibasilar infiltrates unchanged from the prior study.    CT CHEST          2017    Consolidation in both lower lobes with scattered patchy opacities in the   lingula and right upper lobe, possibly pneumonia. Follow-up to complete   resolution is recommended.  CT BRAIN      2017    IMPRESSION:   Mild periventricular white matter ischemia.               MEDICATIONS  (STANDING):  piperacillin/tazobactam IVPB. 3.375Gram(s) IV Intermittent every 8 hours  heparin  Injectable 5000Unit(s) SubCutaneous every 8 hours  sodium chloride 0.9%. 1000milliLiter(s) IV Continuous <Continuous>  predniSONE   Tablet 5milliGRAM(s) Oral daily  traZODone 50milliGRAM(s) Oral at bedtime  simvastatin 20milliGRAM(s) Oral at bedtime  hydroxychloroquine 200milliGRAM(s) Oral every 12 hours  dipyridamole 200 mG/aspirin 25 mG 1Capsule(s) Oral two times a day  tiotropium 18 MICROgram(s) Capsule 1Capsule(s) Inhalation daily  calcium carbonate  625 mG + Vitamin D (OsCal 250 + D) 1Tablet(s) Oral daily  multivitamin 1Tablet(s) Oral daily  pantoprazole    Tablet 40milliGRAM(s) Oral before breakfast  memantine ER 14milliGRAM(s) Oral at bedtime  vancomycin  IVPB 750milliGRAM(s) IV Intermittent every 12 hours  ALBUTerol    90 MICROgram(s) HFA Inhaler 1Puff(s) Inhalation every 4 hours  lactobacillus acidophilus 1Tablet(s) Oral every 12 hours

## 2017-03-17 ENCOUNTER — TRANSCRIPTION ENCOUNTER (OUTPATIENT)
Age: 82
End: 2017-03-17

## 2017-03-17 VITALS
TEMPERATURE: 98 F | HEART RATE: 86 BPM | DIASTOLIC BLOOD PRESSURE: 94 MMHG | RESPIRATION RATE: 18 BRPM | SYSTOLIC BLOOD PRESSURE: 121 MMHG | OXYGEN SATURATION: 96 %

## 2017-03-17 LAB
ANION GAP SERPL CALC-SCNC: 11 MMOL/L — SIGNIFICANT CHANGE UP (ref 5–17)
BUN SERPL-MCNC: 9 MG/DL — SIGNIFICANT CHANGE UP (ref 7–23)
CALCIUM SERPL-MCNC: 8.3 MG/DL — LOW (ref 8.5–10.1)
CHLORIDE SERPL-SCNC: 114 MMOL/L — HIGH (ref 96–108)
CO2 SERPL-SCNC: 24 MMOL/L — SIGNIFICANT CHANGE UP (ref 22–31)
CREAT SERPL-MCNC: 0.9 MG/DL — SIGNIFICANT CHANGE UP (ref 0.5–1.3)
CULTURE RESULTS: SIGNIFICANT CHANGE UP
GLUCOSE SERPL-MCNC: 103 MG/DL — HIGH (ref 70–99)
HCT VFR BLD CALC: 30.2 % — LOW (ref 34.5–45)
HGB BLD-MCNC: 9.3 G/DL — LOW (ref 11.5–15.5)
MCHC RBC-ENTMCNC: 27.3 PG — SIGNIFICANT CHANGE UP (ref 27–34)
MCHC RBC-ENTMCNC: 30.9 GM/DL — LOW (ref 32–36)
MCV RBC AUTO: 88.6 FL — SIGNIFICANT CHANGE UP (ref 80–100)
PLATELET # BLD AUTO: 156 K/UL — SIGNIFICANT CHANGE UP (ref 150–400)
POTASSIUM SERPL-MCNC: 4 MMOL/L — SIGNIFICANT CHANGE UP (ref 3.5–5.3)
POTASSIUM SERPL-SCNC: 4 MMOL/L — SIGNIFICANT CHANGE UP (ref 3.5–5.3)
RBC # BLD: 3.4 M/UL — LOW (ref 3.8–5.2)
RBC # FLD: 18.3 % — HIGH (ref 10.3–14.5)
SODIUM SERPL-SCNC: 149 MMOL/L — HIGH (ref 135–145)
SPECIMEN SOURCE: SIGNIFICANT CHANGE UP
WBC # BLD: 7.6 K/UL — SIGNIFICANT CHANGE UP (ref 3.8–10.5)
WBC # FLD AUTO: 7.6 K/UL — SIGNIFICANT CHANGE UP (ref 3.8–10.5)

## 2017-03-17 RX ORDER — LACTOBACILLUS ACIDOPHILUS 100MM CELL
1 CAPSULE ORAL
Qty: 0 | Refills: 0 | COMMUNITY
Start: 2017-03-17

## 2017-03-17 RX ORDER — CEFOXITIN 1 G/1
1 INJECTION, POWDER, FOR SOLUTION INTRAVENOUS
Qty: 9 | Refills: 0 | OUTPATIENT
Start: 2017-03-17

## 2017-03-17 RX ADMIN — TIOTROPIUM BROMIDE 1 CAPSULE(S): 18 CAPSULE ORAL; RESPIRATORY (INHALATION) at 09:21

## 2017-03-17 RX ADMIN — PIPERACILLIN AND TAZOBACTAM 25 GRAM(S): 4; .5 INJECTION, POWDER, LYOPHILIZED, FOR SOLUTION INTRAVENOUS at 06:07

## 2017-03-17 RX ADMIN — Medication 1 TABLET(S): at 12:16

## 2017-03-17 RX ADMIN — DEXTROSE MONOHYDRATE, SODIUM CHLORIDE, AND POTASSIUM CHLORIDE 75 MILLILITER(S): 50; .745; 4.5 INJECTION, SOLUTION INTRAVENOUS at 10:18

## 2017-03-17 RX ADMIN — Medication 1 TABLET(S): at 12:15

## 2017-03-17 RX ADMIN — PANTOPRAZOLE SODIUM 40 MILLIGRAM(S): 20 TABLET, DELAYED RELEASE ORAL at 06:08

## 2017-03-17 RX ADMIN — Medication 1 TABLET(S): at 06:07

## 2017-03-17 RX ADMIN — Medication 5 MILLIGRAM(S): at 06:07

## 2017-03-17 RX ADMIN — ASPIRIN AND DIPYRIDAMOLE 1 CAPSULE(S): 25; 200 CAPSULE, EXTENDED RELEASE ORAL at 06:07

## 2017-03-17 RX ADMIN — HEPARIN SODIUM 5000 UNIT(S): 5000 INJECTION INTRAVENOUS; SUBCUTANEOUS at 06:07

## 2017-03-17 RX ADMIN — Medication 200 MILLIGRAM(S): at 10:19

## 2017-03-17 NOTE — DISCHARGE NOTE ADULT - OTHER SIGNIFICANT FINDINGS
CHEST SINGLE VIEW FRONTAL                  03/16/2017  Bibasilar infiltrates unchanged from the prior study.    CT CHEST          03/13/2017    Consolidation in both lower lobes with scattered patchy opacities in the   lingula and right upper lobe, possibly pneumonia. Follow-up to complete   resolution is recommended.  CT BRAIN      03/12/2017    IMPRESSION:   Mild periventricular white matter ischemia.               Complete Blood Count in AM (03.17.17 @ 06:44)    WBC Count: 7.6 K/uL    RBC Count: 3.40 M/uL    Hemoglobin: 9.3 g/dL    Hematocrit: 30.2 %    Mean Cell Volume: 88.6 fl    Mean Cell Hemoglobin: 27.3 pg    Mean Cell Hemoglobin Conc: 30.9 gm/dL    Red Cell Distrib Width: 18.3 %    Platelet Count - Automated: 156 K/uL    Basic Metabolic Panel in AM (03.17.17 @ 06:44)    Sodium, Serum: 149 mmol/L    Potassium, Serum: 4.0 mmol/L    Chloride, Serum: 114 mmol/L    Carbon Dioxide, Serum: 24 mmol/L    Anion Gap, Serum: 11 mmol/L    Blood Urea Nitrogen, Serum: 9 mg/dL    Creatinine, Serum: 0.90 mg/dL    Glucose, Serum: 103 mg/dL    Calcium, Total Serum: 8.3 mg/dL      Clostridium difficile Toxin by PCR (03.15.17 @ 10:00)      C Diff by PCR Result: [facility]    Culture - Blood (03.12.17 @ 11:37)    Specimen Source: .Blood Blood    Culture Results:   No growth to date.

## 2017-03-17 NOTE — DISCHARGE NOTE ADULT - CARE PLAN
Principal Discharge DX:	Pneumonia of right lower lobe due to infectious organism  Goal:	resolve  Instructions for follow-up, activity and diet:	complete antibiotics, follow up with pulmonology within 1 week  Secondary Diagnosis:	Altered mental status, unspecified altered mental status type  Instructions for follow-up, activity and diet:	resolved, due to infection  Secondary Diagnosis:	COPD (chronic obstructive pulmonary disease)  Instructions for follow-up, activity and diet:	follow up with pulmonologist  Secondary Diagnosis:	GERD (gastroesophageal reflux disease)  Secondary Diagnosis:	Glaucoma  Secondary Diagnosis:	Osteoporosis

## 2017-03-17 NOTE — PROGRESS NOTE ADULT - SUBJECTIVE AND OBJECTIVE BOX
SHEELA GUPTA  MRN: [id number]    S: 3/14: Comfortable. No complaints of shortness of breath. Cooperative.    3/15: Weak. No respiratory complaints. She has diarrhea.    3/16: Awake; alert; Feels better. Denies shortness of breath. Less cough. Diarrhea improved.     3/17: Continues to improve. Awake; alert oriented x 3. No complaints. Denies shortness of breath. No c/o cough.     PAST MEDICAL & SURGICAL HISTORY:  Compression fracture of spine: T7  Alzheimers disease  HTN (hypertension)  Glaucoma  TIA (transient ischemic attack): 8/07  Cerebral vascular accident: 2005  Polymyalgia rheumatica  Osteoporosis  Chronic pain: mid back  Urinary retention  GERD (gastroesophageal reflux disease)  Cholelithiases  Hyperlipemia  Carotid artery stenosis  Lung nodule: biopsied 2003, benign  COPD (chronic obstructive pulmonary disease)  Asthma  Gall stones  History of hysterectomy: for bleeding  Hx of cholecystectomy      O: T(C): 36.5, Max: 37 (03-16 @ 20:21)  HR: 80 (75 - 80)  BP: 154/65 (120/52 - 154/65)  RR: 18 (17 - 18)  SpO2: 100% (94% - 100%)  Wt(kg): --    PHYSICAL EXAM:      Constitutional: awake and alert, chronic dementia but presently awake and alert; cooperative. Oriented to person and place.  Neck: Soft and supple, No LAD, No JVD  Respiratory: Breath sounds are clear bilaterally.  Cardiovascular: S1 and S2, regular rate and rhythm, no Murmurs, gallops or rubs  Gastrointestinal: Bowel Sounds present, soft, nontender, nondistended, no guarding, no rebound  Extremities: No peripheral edema  Neurological: A/O x 3 , no focal deficits                            9.3    7.6   )-----------( 156      ( 17 Mar 2017 06:44 )             30.2       17 Mar 2017 06:44    149    |  114    |  9      ----------------------------<  103    4.0     |  24     |  0.90     Ca    8.3        17 Mar 2017 06:44    RADIOLOGY:    EXAM:  CHEST SINGLE VIEW FRONTAL                            PROCEDURE DATE:  03/16/2017        INTERPRETATION:  Chest portable.    Clinical History: Follow-up pneumonia    Comparison: 3/12/2017    Single AP view submitted.    The evaluation of the cardiomediastinal silhouette is limited on portable   technique.    There are bibasilar infiltrates without significant interval change in   comparison to the prior study.    Impression:    Bibasilar infiltrates unchanged from the prior study.          EXAM:  CT CHEST                        PROCEDURE DATE:  03/13/2017        INTERPRETATION:  EXAMINATION DATE: March 13, 2017 at 1230 hours.  CLINICAL INFORMATION: Right lower lobe infiltrate on chest x-ray.    COMPARISON: February 27, 2017.    PROCEDURE:   CT of the Chest was performed without intravenous contrast.  Sagittal and coronal reformats were performed.    FINDINGS:    LUNGS AND LARGE AIRWAYS: Consolidation in both lower lobes with scattered   patchy opacities in the lingula and right upper lobe, possibly pneumonia.   Emphysema. Unchanged cyst in the left lower lobe.  PLEURA: Trace right pleural effusion.  VESSELS: Atherosclerotic calcifications.   HEART: Heart size is normal. No pericardial effusion.  MEDIASTINUM AND DOREEN: No lymphadenopathy.  CHEST WALL AND LOWER NECK: Mass in the right breast, measuring 2.1 x 1.7   cm, unchanged in size, suspicious for neoplasm.  VISUALIZED UPPER ABDOMEN: Moderate hiatal hernia. Unchanged severe   calcification of the proximal abdominal aorta.  BONES: Unchanged compression deformities of the T4, T7, and T9 vertebral   bodies.    IMPRESSION:   Consolidation in both lower lobes with scattered patchy opacities in the   lingula and right upper lobe, possibly pneumonia. Follow-up to complete   resolution is recommended.          EXAM:  CT BRAIN                        PROCEDURE DATE:  03/12/2017      INTERPRETATION:      CT head without IV contrast        CLINICAL INFORMATION:  Altered mental status   Intracranial hemorrhage.    TECHNIQUE: Contiguous axial 5 mm sections were obtained through the head.   Sagittal and coronal 2-D reformatted images were also obtained.   This   scan was performed using automatic exposure control (radiation dose   reduction software) to obtain a diagnostic image quality scan with   patient dose as low as reasonably achievable.     FINDINGS:   CT dated 3/4/2011 available for review.    The brain demonstrates mild periventricular white matter ischemia.   No   acute cerebral cortical infarct is seen.  No intracranial hemorrhage is   found.  No mass effect is found in the brain.      The ventricles, sulci and basal cisterns appear unremarkable.         The orbits are unremarkable.  The paranasal sinuses are clear.  The nasal   cavity appears intact.  The nasopharynx is symmetric.The central skull   base, petrous temporal bones and calvarium remain intact.      IMPRESSION:   Mild periventricular white matter ischemia.                   MEDICATIONS  (STANDING):  piperacillin/tazobactam IVPB. 3.375Gram(s) IV Intermittent every 8 hours  heparin  Injectable 5000Unit(s) SubCutaneous every 8 hours  predniSONE   Tablet 5milliGRAM(s) Oral daily  traZODone 50milliGRAM(s) Oral at bedtime  simvastatin 20milliGRAM(s) Oral at bedtime  hydroxychloroquine 200milliGRAM(s) Oral every 12 hours  dipyridamole 200 mG/aspirin 25 mG 1Capsule(s) Oral two times a day  tiotropium 18 MICROgram(s) Capsule 1Capsule(s) Inhalation daily  calcium carbonate  625 mG + Vitamin D (OsCal 250 + D) 1Tablet(s) Oral daily  multivitamin 1Tablet(s) Oral daily  pantoprazole    Tablet 40milliGRAM(s) Oral before breakfast  memantine ER 14milliGRAM(s) Oral at bedtime  ALBUTerol    90 MICROgram(s) HFA Inhaler 1Puff(s) Inhalation every 4 hours  lactobacillus acidophilus 1Tablet(s) Oral every 12 hours  dextrose 5% + sodium chloride 0.45% with potassium chloride 20 mEq/L 2000milliLiter(s) IV Continuous <Continuous>    MEDICATIONS  (PRN):  ALBUTerol   0.5% 2.5milliGRAM(s) Nebulizer every 4 hours PRN Shortness of Breath and/or Wheezing  ALBUTerol    0.083% 2.5milliGRAM(s) Nebulizer every 6 hours PRN Shortness of Breath and/or Wheezing        A/P: 1. Multifocal pneumonia. Continue antibiotics per ID - consider change to p.o. antibiotics today (agree with ID recommendation to switch to Ceftin for @ 5 more days of therapy). Clinically improved. Follow up CXR as an outpatient to resolution.    2. Right sided weakness - reported. Resolved. Head CT result noted.     3. COPD. Stable. Continue Spiriva. Nebulized albuterol prn. NOT on home O2. No evidence of exacerbation at this time. Chronically on 5 mg prednisone daily for management of RA - NOT FOR COPD.    4. Hx of rheumatoid arthritis/PMR. On Plaquenil and prednisone 5 mg daily per rheumatology (Dr. Gale).    5. DVT prophylaxis. S.C. heparin.     Discharge planning per hospitalist.

## 2017-03-17 NOTE — DISCHARGE NOTE ADULT - MEDICATION SUMMARY - MEDICATIONS TO TAKE
I will START or STAY ON the medications listed below when I get home from the hospital:    predniSONE 5 mg oral tablet  -- 1 tab(s) by mouth once a day  -- Indication: For steroids    traZODone 50 mg oral tablet  -- 1 tab(s) by mouth once a day (at bedtime)  -- Indication: For insomnia    simvastatin 20 mg oral tablet  -- 1 tab(s) by mouth once a day (at bedtime)  -- Indication: For high cholesterol    hydroxychloroquine 200 mg oral tablet  -- 1 tab(s) by mouth every 12 hours  -- Indication: For Arthritis    aspirin-dipyridamole 25 mg-200 mg oral capsule, extended release  -- 1 cap(s) by mouth every 12 hours  -- Indication: For h/o CVA    Spiriva 18 mcg inhalation capsule  -- 1 cap(s) inhaled once a day  -- Indication: For dyspnea    memantine 14 mg oral capsule, extended release  -- 1 cap(s) by mouth once a day  -- Indication: For dementia    lactobacillus acidophilus oral capsule  -- 1  by mouth 2 times a day  -- Indication: For diarrhea    esomeprazole 40 mg oral delayed release capsule  -- 1 cap(s) by mouth once a day  -- Indication: For GERD    Calcium 600+D oral tablet  -- 1 tab(s) by mouth once a day  -- Indication: For osteopenia    Multiple Vitamins oral tablet  -- 1 tab(s) by mouth once a day  -- Indication: For vitamins

## 2017-03-17 NOTE — DISCHARGE NOTE ADULT - PATIENT PORTAL LINK FT
“You can access the [facility] Patient Portal, offered by [facility], by registering with the following website: [email or web address]”

## 2017-03-17 NOTE — DISCHARGE NOTE ADULT - HOSPITAL COURSE
87F with PMHx of Dementia, RA on chronic prednisone+plaquenil,  COPD, former smoker, glaucoma, HTN, CVA, TIA, GERD, HLD, osteoporosis, kidney stones, presents with worsening cough and confusion at night. As per the daughter, the cough is dry non productive, not associated with fever, chills, chest pain, sob. Patient was more confused at night for the last couple of nights, no focal deficits. She was admitted with HCAP.    3/17 - afebrile, denies CP, cough, dyspnea, tolerates PO diet, no diarrhea    Vital Signs Last 24 Hrs  T(C): 36.7, Max: 37 (03-16 @ 20:21)  T(F): 98.1, Max: 98.6 (03-16 @ 20:21)  HR: 86 (75 - 86)  BP: 121/94 (120/52 - 154/65)  BP(mean): --  RR: 18 (17 - 18)  SpO2: 96% (94% - 100%)    PHYSICAL EXAM:    GENERAL: Comfortable elderly female,  no acute distress  HEAD:  Atraumatic, Normocephalic  EYES: EOMI, PERRLA  HEENT: Moist mucous membranes  NECK: Supple, No JVD  NERVOUS SYSTEM: non focal  CHEST/LUNG: decreases bs b/l, + rhonchi  HEART: S1, S2+, Regular rate and rhythm;  ABDOMEN: Soft, Nontender, Nondistended; Bowel sounds present  GENITOURINARY- Voiding, no palpable bladder  EXTREMITIES:  No clubbing, cyanosis, or edema  MUSCULOSKELTAL- No muscle tenderness, No joint tenderness  SKIN-hyperpigmented areas b/l le.    1. Pneumonia:  -CT with multifocal pneumonia  - repeat CXR 3/16  - stable  -continue iv zosyn/vanc  for pna due to suspected GNR and or other resistant organisms including mrsa--> ceftin until 3/21  -seen by speech and swallow, no s/o aspiration.  -consult ID  -pulm eval appreciated    2. ?transient RIght sided weakness:  -neuro exam non focal  -doubt acute CVA  -CT head neg  -if symptoms recur, will order further w/u    3. Acute diarrhea while on IV abx  rulled out C diff colitis, resolved  - check stool studies - neg for C diff  - add probiotics    4. Rheumatoid arthritis:  -continue plaquenil/prednisone with PPI    5. COPD  - stable  - pulm eval appreciated  - prn nebs  - check O2 on ambulation - wnl    6.  Hx of CVA  - Aggrenox/statin    Disposition - medically optimized to be discharged to [facility] with close follow up with PCP, pulmonology within 1 week  complete antibiotics  return to ED if fever, abdominal pain, nausea, vomiting, chest pain, dyspnea, cough  Discharge plan discussed with patient, RN  Patient advised to follow up with PCP within 3-7 days  time spend 40 min  discharge note faxed to PCP

## 2017-03-17 NOTE — DISCHARGE NOTE ADULT - MEDICATION SUMMARY - MEDICATIONS TO STOP TAKING
I will STOP taking the medications listed below when I get home from the hospital:    lactulose 20 g oral powder for reconstitution  -- 1 each by mouth once a day (at bedtime), As Needed    ofloxacin 0.3% ophthalmic solution  -- 1 drop(s) in the right eye 4 times a day until 2/28/2017    loperamide  --  by mouth , As Needed    Diabetic Tuss 100 mg/5 mL oral liquid  -- 10 milliliter(s) by mouth every 6 hours

## 2017-03-17 NOTE — DISCHARGE NOTE ADULT - MEDICATION SUMMARY - MEDICATIONS TO CHANGE
I will SWITCH the dose or number of times a day I take the medications listed below when I get home from the hospital:    predniSONE 10 mg oral tablet  -- 20mg by mouth once a day x2 Days  10mg once a day for 2 days then   5mg once a day   -- It is very important that you take or use this exactly as directed.  Do not skip doses or discontinue unless directed by your doctor.  Obtain medical advice before taking any non-prescription drugs as some may affect the action of this medication.  Take with food or milk.

## 2017-03-17 NOTE — DISCHARGE NOTE ADULT - SECONDARY DIAGNOSIS.
Altered mental status, unspecified altered mental status type COPD (chronic obstructive pulmonary disease) GERD (gastroesophageal reflux disease) Glaucoma Osteoporosis

## 2017-03-17 NOTE — DISCHARGE NOTE ADULT - CARE PROVIDER_API CALL
Lev Newell), Internal Medicine  [address] [place] [address]  Phone: [phone]  Fax: [phone]    Saul Guardado (MD), Internal Medicine; Pulmonary Disease; Sleep Medicine  [address] [place] [address]  Phone: [phone]  Fax: [phone]    Flavio Otero), Family Medicine  [address] [place] [address]  Phone: [phone]  Fax: [phone]

## 2017-03-17 NOTE — DISCHARGE NOTE ADULT - PLAN OF CARE
resolve complete antibiotics, follow up with pulmonology within 1 week resolved, due to infection follow up with pulmonologist

## 2017-03-20 DIAGNOSIS — K21.9 GASTRO-ESOPHAGEAL REFLUX DISEASE WITHOUT ESOPHAGITIS: ICD-10-CM

## 2017-03-20 DIAGNOSIS — R13.10 DYSPHAGIA, UNSPECIFIED: ICD-10-CM

## 2017-03-20 DIAGNOSIS — E78.5 HYPERLIPIDEMIA, UNSPECIFIED: ICD-10-CM

## 2017-03-20 DIAGNOSIS — R41.82 ALTERED MENTAL STATUS, UNSPECIFIED: ICD-10-CM

## 2017-03-20 DIAGNOSIS — Z90.710 ACQUIRED ABSENCE OF BOTH CERVIX AND UTERUS: ICD-10-CM

## 2017-03-20 DIAGNOSIS — Z86.73 PERSONAL HISTORY OF TRANSIENT ISCHEMIC ATTACK (TIA), AND CEREBRAL INFARCTION WITHOUT RESIDUAL DEFICITS: ICD-10-CM

## 2017-03-20 DIAGNOSIS — G30.9 ALZHEIMER'S DISEASE, UNSPECIFIED: ICD-10-CM

## 2017-03-20 DIAGNOSIS — J45.909 UNSPECIFIED ASTHMA, UNCOMPLICATED: ICD-10-CM

## 2017-03-20 DIAGNOSIS — Z87.891 PERSONAL HISTORY OF NICOTINE DEPENDENCE: ICD-10-CM

## 2017-03-20 DIAGNOSIS — F02.80 DEMENTIA IN OTHER DISEASES CLASSIFIED ELSEWHERE, UNSPECIFIED SEVERITY, WITHOUT BEHAVIORAL DISTURBANCE, PSYCHOTIC DISTURBANCE, MOOD DISTURBANCE, AND ANXIETY: ICD-10-CM

## 2017-03-20 DIAGNOSIS — M54.9 DORSALGIA, UNSPECIFIED: ICD-10-CM

## 2017-03-20 DIAGNOSIS — H40.9 UNSPECIFIED GLAUCOMA: ICD-10-CM

## 2017-03-20 DIAGNOSIS — Z79.82 LONG TERM (CURRENT) USE OF ASPIRIN: ICD-10-CM
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
## 2017-03-20 DIAGNOSIS — G89.29 OTHER CHRONIC PAIN: ICD-10-CM

## 2017-03-20 DIAGNOSIS — R33.9 RETENTION OF URINE, UNSPECIFIED: ICD-10-CM

## 2017-03-20 DIAGNOSIS — M80.88XA OTHER OSTEOPOROSIS WITH CURRENT PATHOLOGICAL FRACTURE, VERTEBRA(E), INITIAL ENCOUNTER FOR FRACTURE: ICD-10-CM

## 2017-03-20 DIAGNOSIS — Z88.5 ALLERGY STATUS TO NARCOTIC AGENT: ICD-10-CM

## 2017-03-20 DIAGNOSIS — I65.29 OCCLUSION AND STENOSIS OF UNSPECIFIED CAROTID ARTERY: ICD-10-CM

## 2017-03-20 DIAGNOSIS — Z87.442 PERSONAL HISTORY OF URINARY CALCULI: ICD-10-CM

## 2017-03-20 DIAGNOSIS — Z88.8 ALLERGY STATUS TO OTHER DRUGS, MEDICAMENTS AND BIOLOGICAL SUBSTANCES STATUS: ICD-10-CM

## 2017-03-20 DIAGNOSIS — Z88.1 ALLERGY STATUS TO OTHER ANTIBIOTIC AGENTS STATUS: ICD-10-CM

## 2017-03-20 DIAGNOSIS — I10 ESSENTIAL (PRIMARY) HYPERTENSION: ICD-10-CM
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
## 2017-03-20 DIAGNOSIS — Z90.49 ACQUIRED ABSENCE OF OTHER SPECIFIED PARTS OF DIGESTIVE TRACT: ICD-10-CM

## 2017-03-20 DIAGNOSIS — Z79.52 LONG TERM (CURRENT) USE OF SYSTEMIC STEROIDS: ICD-10-CM

## 2017-03-20 DIAGNOSIS — J18.9 PNEUMONIA, UNSPECIFIED ORGANISM: ICD-10-CM

## 2017-03-20 DIAGNOSIS — K52.1 TOXIC GASTROENTERITIS AND COLITIS: ICD-10-CM

## 2017-03-20 DIAGNOSIS — J44.0 CHRONIC OBSTRUCTIVE PULMONARY DISEASE WITH (ACUTE) LOWER RESPIRATORY INFECTION: ICD-10-CM

## 2017-03-20 DIAGNOSIS — M35.3 POLYMYALGIA RHEUMATICA: ICD-10-CM

## 2017-03-20 DIAGNOSIS — T36.95XA ADVERSE EFFECT OF UNSPECIFIED SYSTEMIC ANTIBIOTIC, INITIAL ENCOUNTER: ICD-10-CM

## 2017-04-12 ENCOUNTER — INPATIENT (INPATIENT)
Facility: HOSPITAL | Age: 82
LOS: 4 days | Discharge: NURSING, REHAB OR LONG-TERM CARE | End: 2017-04-17
Attending: INTERNAL MEDICINE | Admitting: INTERNAL MEDICINE
Payer: COMMERCIAL

## 2017-04-12 VITALS
HEIGHT: 63 IN | DIASTOLIC BLOOD PRESSURE: 59 MMHG | OXYGEN SATURATION: 99 % | RESPIRATION RATE: 18 BRPM | HEART RATE: 84 BPM | WEIGHT: 132.94 LBS | SYSTOLIC BLOOD PRESSURE: 134 MMHG | TEMPERATURE: 98 F

## 2017-04-12 DIAGNOSIS — D73.5 INFARCTION OF SPLEEN: ICD-10-CM

## 2017-04-12 LAB
ALBUMIN SERPL ELPH-MCNC: 3.4 G/DL — SIGNIFICANT CHANGE UP (ref 3.3–5)
ALP SERPL-CCNC: 63 U/L — SIGNIFICANT CHANGE UP (ref 40–120)
ALT FLD-CCNC: 28 U/L — SIGNIFICANT CHANGE UP (ref 12–78)
ANION GAP SERPL CALC-SCNC: 10 MMOL/L — SIGNIFICANT CHANGE UP (ref 5–17)
AST SERPL-CCNC: 15 U/L — SIGNIFICANT CHANGE UP (ref 15–37)
BASOPHILS # BLD AUTO: 0.1 K/UL — SIGNIFICANT CHANGE UP (ref 0–0.2)
BASOPHILS NFR BLD AUTO: 1.1 % — SIGNIFICANT CHANGE UP (ref 0–2)
BILIRUB SERPL-MCNC: 0.2 MG/DL — SIGNIFICANT CHANGE UP (ref 0.2–1.2)
BLD GP AB SCN SERPL QL: SIGNIFICANT CHANGE UP
BUN SERPL-MCNC: 28 MG/DL — HIGH (ref 7–23)
CALCIUM SERPL-MCNC: 8.9 MG/DL — SIGNIFICANT CHANGE UP (ref 8.5–10.1)
CHLORIDE SERPL-SCNC: 108 MMOL/L — SIGNIFICANT CHANGE UP (ref 96–108)
CO2 SERPL-SCNC: 25 MMOL/L — SIGNIFICANT CHANGE UP (ref 22–31)
CREAT SERPL-MCNC: 1.08 MG/DL — SIGNIFICANT CHANGE UP (ref 0.5–1.3)
EOSINOPHIL # BLD AUTO: 0.1 K/UL — SIGNIFICANT CHANGE UP (ref 0–0.5)
EOSINOPHIL NFR BLD AUTO: 1.1 % — SIGNIFICANT CHANGE UP (ref 0–6)
GLUCOSE SERPL-MCNC: 132 MG/DL — HIGH (ref 70–99)
HCT VFR BLD CALC: 37.1 % — SIGNIFICANT CHANGE UP (ref 34.5–45)
HGB BLD-MCNC: 11.9 G/DL — SIGNIFICANT CHANGE UP (ref 11.5–15.5)
INR BLD: 1.05 RATIO — SIGNIFICANT CHANGE UP (ref 0.88–1.16)
LYMPHOCYTES # BLD AUTO: 1.8 K/UL — SIGNIFICANT CHANGE UP (ref 1–3.3)
LYMPHOCYTES # BLD AUTO: 19.6 % — SIGNIFICANT CHANGE UP (ref 13–44)
MCHC RBC-ENTMCNC: 29.3 PG — SIGNIFICANT CHANGE UP (ref 27–34)
MCHC RBC-ENTMCNC: 31.9 GM/DL — LOW (ref 32–36)
MCV RBC AUTO: 91.8 FL — SIGNIFICANT CHANGE UP (ref 80–100)
MONOCYTES # BLD AUTO: 0.8 K/UL — SIGNIFICANT CHANGE UP (ref 0–0.9)
MONOCYTES NFR BLD AUTO: 9 % — SIGNIFICANT CHANGE UP (ref 2–14)
NEUTROPHILS # BLD AUTO: 6.5 K/UL — SIGNIFICANT CHANGE UP (ref 1.8–7.4)
NEUTROPHILS NFR BLD AUTO: 69.1 % — SIGNIFICANT CHANGE UP (ref 43–77)
PLATELET # BLD AUTO: 207 K/UL — SIGNIFICANT CHANGE UP (ref 150–400)
POTASSIUM SERPL-MCNC: 4 MMOL/L — SIGNIFICANT CHANGE UP (ref 3.5–5.3)
POTASSIUM SERPL-SCNC: 4 MMOL/L — SIGNIFICANT CHANGE UP (ref 3.5–5.3)
PROT SERPL-MCNC: 6.2 GM/DL — SIGNIFICANT CHANGE UP (ref 6–8.3)
PROTHROM AB SERPL-ACNC: 11.3 SEC — SIGNIFICANT CHANGE UP (ref 9.8–12.7)
RBC # BLD: 4.05 M/UL — SIGNIFICANT CHANGE UP (ref 3.8–5.2)
RBC # FLD: 17.2 % — HIGH (ref 10.3–14.5)
SODIUM SERPL-SCNC: 143 MMOL/L — SIGNIFICANT CHANGE UP (ref 135–145)
TYPE + AB SCN PNL BLD: SIGNIFICANT CHANGE UP
WBC # BLD: 9.4 K/UL — SIGNIFICANT CHANGE UP (ref 3.8–10.5)
WBC # FLD AUTO: 9.4 K/UL — SIGNIFICANT CHANGE UP (ref 3.8–10.5)

## 2017-04-12 PROCEDURE — 73562 X-RAY EXAM OF KNEE 3: CPT | Mod: 26,RT

## 2017-04-12 PROCEDURE — 71260 CT THORAX DX C+: CPT | Mod: 26

## 2017-04-12 PROCEDURE — 99285 EMERGENCY DEPT VISIT HI MDM: CPT

## 2017-04-12 PROCEDURE — 93010 ELECTROCARDIOGRAM REPORT: CPT

## 2017-04-12 PROCEDURE — 73552 X-RAY EXAM OF FEMUR 2/>: CPT | Mod: 26

## 2017-04-12 PROCEDURE — 71010: CPT | Mod: 26

## 2017-04-12 PROCEDURE — 70450 CT HEAD/BRAIN W/O DYE: CPT | Mod: 26

## 2017-04-12 PROCEDURE — 73502 X-RAY EXAM HIP UNI 2-3 VIEWS: CPT | Mod: 26

## 2017-04-12 RX ORDER — DOCUSATE SODIUM 100 MG
200 CAPSULE ORAL DAILY
Qty: 0 | Refills: 0 | Status: DISCONTINUED | OUTPATIENT
Start: 2017-04-12 | End: 2017-04-17

## 2017-04-12 RX ORDER — PANTOPRAZOLE SODIUM 20 MG/1
40 TABLET, DELAYED RELEASE ORAL
Qty: 0 | Refills: 0 | Status: DISCONTINUED | OUTPATIENT
Start: 2017-04-12 | End: 2017-04-17

## 2017-04-12 RX ORDER — IPRATROPIUM/ALBUTEROL SULFATE 18-103MCG
3 AEROSOL WITH ADAPTER (GRAM) INHALATION THREE TIMES A DAY
Qty: 0 | Refills: 0 | Status: DISCONTINUED | OUTPATIENT
Start: 2017-04-12 | End: 2017-04-17

## 2017-04-12 RX ORDER — GLUCAGON INJECTION, SOLUTION 0.5 MG/.1ML
1 INJECTION, SOLUTION SUBCUTANEOUS ONCE
Qty: 0 | Refills: 0 | Status: DISCONTINUED | OUTPATIENT
Start: 2017-04-12 | End: 2017-04-13

## 2017-04-12 RX ORDER — MORPHINE SULFATE 50 MG/1
4 CAPSULE, EXTENDED RELEASE ORAL ONCE
Qty: 0 | Refills: 0 | Status: DISCONTINUED | OUTPATIENT
Start: 2017-04-12 | End: 2017-04-12

## 2017-04-12 RX ORDER — SODIUM CHLORIDE 9 MG/ML
1000 INJECTION INTRAMUSCULAR; INTRAVENOUS; SUBCUTANEOUS
Qty: 0 | Refills: 0 | Status: DISCONTINUED | OUTPATIENT
Start: 2017-04-12 | End: 2017-04-13

## 2017-04-12 RX ORDER — TRAZODONE HCL 50 MG
50 TABLET ORAL AT BEDTIME
Qty: 0 | Refills: 0 | Status: DISCONTINUED | OUTPATIENT
Start: 2017-04-12 | End: 2017-04-17

## 2017-04-12 RX ORDER — MEMANTINE HYDROCHLORIDE 10 MG/1
1 TABLET ORAL
Qty: 0 | Refills: 0 | COMMUNITY

## 2017-04-12 RX ORDER — SENNA PLUS 8.6 MG/1
2 TABLET ORAL AT BEDTIME
Qty: 0 | Refills: 0 | Status: DISCONTINUED | OUTPATIENT
Start: 2017-04-12 | End: 2017-04-17

## 2017-04-12 RX ORDER — DEXTROSE 50 % IN WATER 50 %
1 SYRINGE (ML) INTRAVENOUS ONCE
Qty: 0 | Refills: 0 | Status: DISCONTINUED | OUTPATIENT
Start: 2017-04-12 | End: 2017-04-13

## 2017-04-12 RX ORDER — INSULIN LISPRO 100/ML
VIAL (ML) SUBCUTANEOUS
Qty: 0 | Refills: 0 | Status: DISCONTINUED | OUTPATIENT
Start: 2017-04-12 | End: 2017-04-13

## 2017-04-12 RX ORDER — MEMANTINE HYDROCHLORIDE 10 MG/1
14 TABLET ORAL DAILY
Qty: 0 | Refills: 0 | Status: DISCONTINUED | OUTPATIENT
Start: 2017-04-12 | End: 2017-04-17

## 2017-04-12 RX ORDER — ONDANSETRON 8 MG/1
4 TABLET, FILM COATED ORAL EVERY 6 HOURS
Qty: 0 | Refills: 0 | Status: COMPLETED | OUTPATIENT
Start: 2017-04-12 | End: 2017-04-12

## 2017-04-12 RX ORDER — MORPHINE SULFATE 50 MG/1
6 CAPSULE, EXTENDED RELEASE ORAL ONCE
Qty: 0 | Refills: 0 | Status: DISCONTINUED | OUTPATIENT
Start: 2017-04-12 | End: 2017-04-12

## 2017-04-12 RX ORDER — HYDROXYCHLOROQUINE SULFATE 200 MG
200 TABLET ORAL EVERY 12 HOURS
Qty: 0 | Refills: 0 | Status: DISCONTINUED | OUTPATIENT
Start: 2017-04-12 | End: 2017-04-17

## 2017-04-12 RX ORDER — SIMVASTATIN 20 MG/1
20 TABLET, FILM COATED ORAL AT BEDTIME
Qty: 0 | Refills: 0 | Status: DISCONTINUED | OUTPATIENT
Start: 2017-04-12 | End: 2017-04-17

## 2017-04-12 RX ORDER — TRAZODONE HCL 50 MG
1 TABLET ORAL
Qty: 0 | Refills: 0 | COMMUNITY

## 2017-04-12 RX ORDER — TIOTROPIUM BROMIDE 18 UG/1
1 CAPSULE ORAL; RESPIRATORY (INHALATION)
Qty: 0 | Refills: 0 | COMMUNITY

## 2017-04-12 RX ORDER — DEXTROSE 50 % IN WATER 50 %
12.5 SYRINGE (ML) INTRAVENOUS ONCE
Qty: 0 | Refills: 0 | Status: DISCONTINUED | OUTPATIENT
Start: 2017-04-12 | End: 2017-04-13

## 2017-04-12 RX ORDER — DEXTROSE 50 % IN WATER 50 %
25 SYRINGE (ML) INTRAVENOUS ONCE
Qty: 0 | Refills: 0 | Status: DISCONTINUED | OUTPATIENT
Start: 2017-04-12 | End: 2017-04-13

## 2017-04-12 RX ORDER — SODIUM CHLORIDE 9 MG/ML
1000 INJECTION, SOLUTION INTRAVENOUS
Qty: 0 | Refills: 0 | Status: DISCONTINUED | OUTPATIENT
Start: 2017-04-12 | End: 2017-04-12

## 2017-04-12 RX ORDER — HEPARIN SODIUM 5000 [USP'U]/ML
5000 INJECTION INTRAVENOUS; SUBCUTANEOUS EVERY 8 HOURS
Qty: 0 | Refills: 0 | Status: COMPLETED | OUTPATIENT
Start: 2017-04-12 | End: 2017-04-12

## 2017-04-12 RX ORDER — MORPHINE SULFATE 50 MG/1
6 CAPSULE, EXTENDED RELEASE ORAL
Qty: 0 | Refills: 0 | Status: DISCONTINUED | OUTPATIENT
Start: 2017-04-12 | End: 2017-04-16

## 2017-04-12 RX ORDER — MORPHINE SULFATE 50 MG/1
4 CAPSULE, EXTENDED RELEASE ORAL
Qty: 0 | Refills: 0 | Status: DISCONTINUED | OUTPATIENT
Start: 2017-04-12 | End: 2017-04-16

## 2017-04-12 RX ORDER — SODIUM CHLORIDE 9 MG/ML
1000 INJECTION, SOLUTION INTRAVENOUS
Qty: 0 | Refills: 0 | Status: DISCONTINUED | OUTPATIENT
Start: 2017-04-12 | End: 2017-04-13

## 2017-04-12 RX ADMIN — HEPARIN SODIUM 5000 UNIT(S): 5000 INJECTION INTRAVENOUS; SUBCUTANEOUS at 13:09

## 2017-04-12 RX ADMIN — MORPHINE SULFATE 4 MILLIGRAM(S): 50 CAPSULE, EXTENDED RELEASE ORAL at 01:17

## 2017-04-12 RX ADMIN — Medication 1 TABLET(S): at 18:57

## 2017-04-12 RX ADMIN — MORPHINE SULFATE 4 MILLIGRAM(S): 50 CAPSULE, EXTENDED RELEASE ORAL at 02:55

## 2017-04-12 RX ADMIN — SENNA PLUS 2 TABLET(S): 8.6 TABLET ORAL at 23:09

## 2017-04-12 RX ADMIN — MORPHINE SULFATE 4 MILLIGRAM(S): 50 CAPSULE, EXTENDED RELEASE ORAL at 01:35

## 2017-04-12 RX ADMIN — MORPHINE SULFATE 6 MILLIGRAM(S): 50 CAPSULE, EXTENDED RELEASE ORAL at 11:07

## 2017-04-12 RX ADMIN — ONDANSETRON 4 MILLIGRAM(S): 8 TABLET, FILM COATED ORAL at 13:33

## 2017-04-12 RX ADMIN — MORPHINE SULFATE 4 MILLIGRAM(S): 50 CAPSULE, EXTENDED RELEASE ORAL at 03:00

## 2017-04-12 RX ADMIN — MEMANTINE HYDROCHLORIDE 14 MILLIGRAM(S): 10 TABLET ORAL at 16:13

## 2017-04-12 RX ADMIN — SODIUM CHLORIDE 63 MILLILITER(S): 9 INJECTION INTRAMUSCULAR; INTRAVENOUS; SUBCUTANEOUS at 11:44

## 2017-04-12 RX ADMIN — Medication 200 MILLIGRAM(S): at 11:44

## 2017-04-12 RX ADMIN — Medication 200 MILLIGRAM(S): at 16:12

## 2017-04-12 RX ADMIN — HEPARIN SODIUM 5000 UNIT(S): 5000 INJECTION INTRAVENOUS; SUBCUTANEOUS at 23:09

## 2017-04-12 RX ADMIN — MORPHINE SULFATE 6 MILLIGRAM(S): 50 CAPSULE, EXTENDED RELEASE ORAL at 13:30

## 2017-04-12 RX ADMIN — MORPHINE SULFATE 4 MILLIGRAM(S): 50 CAPSULE, EXTENDED RELEASE ORAL at 06:51

## 2017-04-12 RX ADMIN — Medication 200 MILLIGRAM(S): at 23:10

## 2017-04-12 RX ADMIN — MORPHINE SULFATE 6 MILLIGRAM(S): 50 CAPSULE, EXTENDED RELEASE ORAL at 13:07

## 2017-04-12 RX ADMIN — Medication 3 MILLILITER(S): at 14:44

## 2017-04-12 RX ADMIN — Medication 1 TABLET(S): at 11:44

## 2017-04-12 RX ADMIN — Medication 10 MILLIGRAM(S): at 11:44

## 2017-04-12 RX ADMIN — PANTOPRAZOLE SODIUM 40 MILLIGRAM(S): 20 TABLET, DELAYED RELEASE ORAL at 16:12

## 2017-04-12 NOTE — CONSULT NOTE ADULT - SUBJECTIVE AND OBJECTIVE BOX
87y Female community ambulator with walker presents c/o R hip pain and inability to ambulate sp fall out of bed. Denies HS/LOC. Denies numbness/tingling. Denies fever/chills. Denies pain/injury elsewhere.     PMHx/PSHx: HTN, HLD, RA, COPD, Dementia, CVA, TIA, GERD  MEDICATIONS  (STANDING):    Allergies    Aciphex (Unknown)  Macrobid (Unknown)  Percocet 10/325 (Rash)    Intolerances                              11.9   9.4   )-----------( 207      ( 12 Apr 2017 01:07 )             37.1     12 Apr 2017 01:07    143    |  108    |  28     ----------------------------<  132    4.0     |  25     |  1.08     Ca    8.9        12 Apr 2017 01:07    TPro  6.2    /  Alb  3.4    /  TBili  0.2    /  DBili  x      /  AST  15     /  ALT  28     /  AlkPhos  63     12 Apr 2017 01:07    PT/INR - ( 12 Apr 2017 01:07 )   PT: 11.3 sec;   INR: 1.05 ratio           Vital Signs Last 24 Hrs  T(C): 36.7, Max: 36.7 (04-12 @ 00:45)  T(F): 98, Max: 98 (04-12 @ 00:45)  HR: 84 (84 - 84)  BP: 134/59 (134/59 - 134/59)  BP(mean): --  RR: 18 (18 - 18)  SpO2: 99% (99% - 99%)    Imaging: XR demonstrates R subtrochanteric hip fracture    Physical Exam  Gen: NAD  RLE: skin intact, shortened leg, unable to SLR RLE, + log roll RLE, +ttp hip/groin, no ttp elsewhere, +ehl/fhl/ta/gs function, no calf ttp, dp/pt pulse intact, compartments soft  Secondary survey: benign, nv intact, able to SLR contralateral leg, negative log roll contralateral leg, no bony ttp elsewhere, pt has skin tear over right elbow    A/P: 87y Female with R subtrochanteric hip fracture  Pain control  NWB RLE, bedrest  NPO  Hold anticoagulation  IVF while NPO  FU labs/imaging  Ca/Vit D  Outpt osteoporosis workup  Admit to medical team  Medical clearance/optimization for possible OR today 4/12/17  Will discuss with Dr Haynes

## 2017-04-12 NOTE — ED PROVIDER NOTE - CARDIAC, MLM
Normal rate, regular rhythm.  Heart sounds S1, S2.  No murmurs, rubs or gallops. 1+ DP pulses bilaterally.

## 2017-04-12 NOTE — H&P ADULT - HISTORY OF PRESENT ILLNESS
86 yo F hx of Dementia, RA on chronic prednisone,  non-O2 dependent COPD, glaucoma, HTN, distant TIA, GERD, HLD, osteoporosis, kidney stones, recent PNA, presents with CC R hip pain s/p fall out of bed.  Pt states she was readjusting herself in bed, and accidentally rolled out of bed on to floor.  C/o R hip pain, unable to stand or ambulate following.  Denies head trauma, or LOC.  C/o left sided chest wall pain, but states this is old 2/2 prior rib fx; no new or worsening pain of this area.  Denies any other injuries. Reports inadequate pain relief with last MSO4 dose; otherwise, denies focal complaints.  Able to climb multiple flights of stairs & ambulates daily w/o CP/SOB/palpitations/dizziness. > METS

## 2017-04-12 NOTE — H&P ADULT - ASSESSMENT
87F with aforementioned PMHx, now a/w    * Right subtrochanteric hip fracture 2/2 mechanical fall from bed  - med-surg  - increase analgesia  - bedrest, NWB  - hold Aggrenox- SCD's only for DVT PPx  - Ortho eval appreciated, plan for OR 4/13- NPO after midnight    * Suspicion for anterior splenic infarct, asymptomatic, incidental finding on CT(chest); Pt non-toxic, normal WBC, afebrile- likely 2/2 underlying RA & associated vasculopathy  - no specific intervention @ this point  - GI eval for further recommendations    * Steroid-dependent RA, stable  - double steroid dose during acute stressor period  - c/w Plaquenil    * Non-O2 dependent COPD, stable  - hold Spiriva, switch to Duonebs TID while inpatient  - O2 PRN    * HTN, stable  - c/w current management    * dementia, stable  - c/w current management  - Pt's daughter provided Namenda XR    * Hyperglycemia, suspect mild DM 2/2 long-term steroid use  - check HbA1C  - ISS    * DVT PPX- SCD's     * Full code    Pt is medically optimized for OR. Asymptomatic splenic infarct is not contraindication to necessary orthopedic intervention.

## 2017-04-12 NOTE — ED PROVIDER NOTE - MEDICAL DECISION MAKING DETAILS
R hip fx.  CT WNL.  Labs WNL.  EKG NSR, nonischemic.  Morphine given for pain with improvement.  Orthopedic resident saw pt.  NPO for now, Dr. Haynes to see in AM.  Admit to medicine given hx of dementia, multiple comorbidities. R hip fx.  CT head WNL.  CT chest pending, but appears improved from prior on my read.  Labs WNL.  EKG NSR, nonischemic.  Morphine given for pain with improvement.  Orthopedic resident saw pt.  NPO for now, Dr. Haynes to see in AM.  Admit to medicine given hx of dementia, multiple comorbidities.

## 2017-04-12 NOTE — CONSULT NOTE ADULT - SUBJECTIVE AND OBJECTIVE BOX
HPI:Patient admitted for a fall  CT revealed incidental anterior Splenic infarct    86 yo F hx of Dementia, RA on chronic prednisone,  non-O2 dependent COPD, glaucoma, HTN, distant TIA, GERD, HLD, osteoporosis, kidney stones, recent PNA, presents with CC R hip pain s/p fall out of bed.  Pt states she was readjusting herself in bed, and accidentally rolled out of bed on to floor.  C/o R hip pain, unable to stand or ambulate following.  Denies head trauma, or LOC.  C/o left sided chest wall pain, but states this is old 2/2 prior rib fx; no new or worsening pain of this area.  Denies any other injuries. Reports inadequate pain relief with last MSO4 dose; otherwise, denies focal complaints.  Able to climb multiple flights of stairs & ambulates daily w/o CP/SOB/palpitations/dizziness. > METS (12 Apr 2017 11:18)      PAST MEDICAL & SURGICAL HISTORY:  Compression fracture of spine: T7  Alzheimers disease  HTN (hypertension)  Glaucoma  TIA (transient ischemic attack): 8/07  Cerebral vascular accident: 2005  Polymyalgia rheumatica  Osteoporosis  Chronic pain: mid back  Urinary retention  GERD (gastroesophageal reflux disease)  Cholelithiases  Hyperlipemia  Carotid artery stenosis  Lung nodule: biopsied 2003, benign  COPD (chronic obstructive pulmonary disease)  Asthma  Gall stones  History of hysterectomy: for bleeding  Hx of cholecystectomy      MEDICATIONS  (STANDING):  heparin  Injectable 5000Unit(s) SubCutaneous every 8 hours  predniSONE   Tablet 10milliGRAM(s) Oral daily  simvastatin 20milliGRAM(s) Oral at bedtime  hydroxychloroquine 200milliGRAM(s) Oral every 12 hours  docusate sodium 200milliGRAM(s) Oral daily  calcium carbonate 1250 mG + Vitamin D (OsCal 500 + D) 1Tablet(s) Oral daily  pantoprazole    Tablet 40milliGRAM(s) Oral before breakfast  multivitamin 1Tablet(s) Oral daily  memantine ER 14milliGRAM(s) Oral daily  sodium chloride 0.9%. 1000milliLiter(s) IV Continuous <Continuous>  senna 2Tablet(s) Oral at bedtime  ALBUTerol/ipratropium for Nebulization 3milliLiter(s) Nebulizer three times a day  insulin lispro (HumaLOG) corrective regimen sliding scale  SubCutaneous three times a day before meals  dextrose 5%. 1000milliLiter(s) IV Continuous <Continuous>  dextrose 50% Injectable 12.5Gram(s) IV Push once  dextrose 50% Injectable 25Gram(s) IV Push once  dextrose 50% Injectable 25Gram(s) IV Push once    MEDICATIONS  (PRN):  traZODone 50milliGRAM(s) Oral at bedtime PRN insomnia  morphine  - Injectable 4milliGRAM(s) IV Push every 3 hours PRN Moderate Pain (4 - 6)  morphine  - Injectable 6milliGRAM(s) IV Push every 3 hours PRN Severe Pain (7 - 10)  dextrose Gel 1Dose(s) Oral once PRN Blood Glucose LESS THAN 70 milliGRAM(s)/deciLiter  glucagon  Injectable 1milliGRAM(s) IntraMuscular once PRN Glucose <70 milliGRAM(s)/deciLiter      Allergies    Aciphex (Unknown)  Macrobid (Unknown)  Percocet 10/325 (Rash)    Intolerances        SOCIAL HISTORY:    FAMILY HISTORY:  No pertinent family history in first degree relatives   Non-contributory    REVIEW OF SYSTEMS      General:	    Respiratory and Thorax:  	  Cardiovascular:	    Gastrointestinal:	    Musculoskeletal:	   Vital Signs Last 24 Hrs  T(C): 36.4, Max: 36.7 (04-12 @ 00:45)  T(F): 97.6, Max: 98 (04-12 @ 00:45)  HR: 88 (79 - 88)  BP: 125/64 (125/64 - 134/59)  BP(mean): --  RR: 20 (18 - 20)  SpO2: 98% (97% - 99%)    HEENT :No Pallor.No icterus. EOMI,PERLAA  Chest : Clear to Auscultation  CVS : S1S2 Normal.No murmurs.  Abdomen: Soft.Non tender .Normal bowel sounds.No Organomegaly.  CNS: Alert.Oriented to Time,Place and Person.No focal deficit.  EXT: Normal Range of motion.No pitting edema.    LABS:                        11.9   9.4   )-----------( 207      ( 12 Apr 2017 01:07 )             37.1     04-12    143  |  108  |  28<H>  ----------------------------<  132<H>  4.0   |  25  |  1.08    Ca    8.9      12 Apr 2017 01:07    TPro  6.2  /  Alb  3.4  /  TBili  0.2  /  DBili  x   /  AST  15  /  ALT  28  /  AlkPhos  63  04-12    PT/INR - ( 12 Apr 2017 01:07 )   PT: 11.3 sec;   INR: 1.05 ratio           LIVER FUNCTIONS - ( 12 Apr 2017 01:07 )  Alb: 3.4 g/dL / Pro: 6.2 gm/dL / ALK PHOS: 63 U/L / ALT: 28 U/L / AST: 15 U/L / GGT: x             RADIOLOGY & ADDITIONAL STUDIES:

## 2017-04-12 NOTE — PROGRESS NOTE ADULT - ASSESSMENT
87F with R subtrochanteric hip fracture    Pending medical optimization, plan for OR 4/13/17  NWB affected extremity  Pain control  NPO after midnight except meds  Hold chemical anticoagulation after midnight  IV fluids while NPO  Discussed with attending, who agrees with above

## 2017-04-12 NOTE — ED PROVIDER NOTE - OBJECTIVE STATEMENT
88 yo F hx of Dementia, RA on chronic prednisone+plaquenil,  COPD, former smoker, glaucoma, HTN, CVA, TIA, GERD, HLD, osteoporosis, kidney stones, recent PNA, presents with CC R hip pain s/p fall out of bed.  Pt states she was readjusting herself in bed, and accidentally rolled out of bed on to floor, just PTA.  C/o R hip pain, unable to stand or ambulate following.  Denies head trauma, or LOC.  C/o left sided chest wall pain, but states this is old 2/2 prior rib fx; no new or worsening pain of this area.  Denies any other injuries.  Pt is on Aggrenox.  No other concerns.  Pt ambulates with walker at baseline.

## 2017-04-12 NOTE — H&P ADULT - NSHPLABSRESULTS_GEN_ALL_CORE
T(C): 36.4, Max: 36.7 (04-12 @ 00:45)  HR: 84 (79 - 84)  BP: 128/60 (128/60 - 134/59)  RR: 18 (18 - 18)  SpO2: 98% (97% - 99%)  Wt(kg): --      EKG: NSR @ 81bpm, no acute ischemic changes                        11.9   9.4   )-----------( 207      ( 12 Apr 2017 01:07 )             37.1     12 Apr 2017 01:07    143    |  108    |  28     ----------------------------<  132    4.0     |  25     |  1.08     Ca    8.9        12 Apr 2017 01:07    TPro  6.2    /  Alb  3.4    /  TBili  0.2    /  DBili  x      /  AST  15     /  ALT  28     /  AlkPhos  63     12 Apr 2017 01:07    PT/INR - ( 12 Apr 2017 01:07 )   PT: 11.3 sec;   INR: 1.05 ratio           CAPILLARY BLOOD GLUCOSE    LIVER FUNCTIONS - ( 12 Apr 2017 01:07 )  Alb: 3.4 g/dL / Pro: 6.2 gm/dL / ALK PHOS: 63 U/L / ALT: 28 U/L / AST: 15 U/L / GGT: x

## 2017-04-12 NOTE — PATIENT PROFILE ADULT. - VISION (WITH CORRECTIVE LENSES IF THE PATIENT USUALLY WEARS THEM):
Partially impaired: cannot see medication labels or newsprint, but can see obstacles in path, and the surrounding layout; can count fingers at arm's length/recent Glacoma surgery

## 2017-04-13 LAB
ANION GAP SERPL CALC-SCNC: 7 MMOL/L — SIGNIFICANT CHANGE UP (ref 5–17)
APPEARANCE UR: CLEAR — SIGNIFICANT CHANGE UP
APTT BLD: 29.2 SEC — SIGNIFICANT CHANGE UP (ref 27.5–37.4)
BACTERIA # UR AUTO: (no result)
BILIRUB UR-MCNC: NEGATIVE — SIGNIFICANT CHANGE UP
BUN SERPL-MCNC: 24 MG/DL — HIGH (ref 7–23)
CALCIUM SERPL-MCNC: 8.8 MG/DL — SIGNIFICANT CHANGE UP (ref 8.5–10.1)
CHLORIDE SERPL-SCNC: 110 MMOL/L — HIGH (ref 96–108)
CO2 SERPL-SCNC: 26 MMOL/L — SIGNIFICANT CHANGE UP (ref 22–31)
COLOR SPEC: YELLOW — SIGNIFICANT CHANGE UP
CREAT SERPL-MCNC: 0.76 MG/DL — SIGNIFICANT CHANGE UP (ref 0.5–1.3)
DIFF PNL FLD: NEGATIVE — SIGNIFICANT CHANGE UP
EPI CELLS # UR: SIGNIFICANT CHANGE UP
GLUCOSE SERPL-MCNC: 96 MG/DL — SIGNIFICANT CHANGE UP (ref 70–99)
GLUCOSE UR QL: NEGATIVE MG/DL — SIGNIFICANT CHANGE UP
HBA1C BLD-MCNC: 5.5 % — SIGNIFICANT CHANGE UP (ref 4–5.6)
HCT VFR BLD CALC: 32.4 % — LOW (ref 34.5–45)
HGB BLD-MCNC: 10.2 G/DL — LOW (ref 11.5–15.5)
INR BLD: 1.21 RATIO — HIGH (ref 0.88–1.16)
KETONES UR-MCNC: NEGATIVE — SIGNIFICANT CHANGE UP
LEUKOCYTE ESTERASE UR-ACNC: NEGATIVE — SIGNIFICANT CHANGE UP
MAGNESIUM SERPL-MCNC: 2 MG/DL — SIGNIFICANT CHANGE UP (ref 1.8–2.4)
MCHC RBC-ENTMCNC: 29.1 PG — SIGNIFICANT CHANGE UP (ref 27–34)
MCHC RBC-ENTMCNC: 31.5 GM/DL — LOW (ref 32–36)
MCV RBC AUTO: 92.5 FL — SIGNIFICANT CHANGE UP (ref 80–100)
NITRITE UR-MCNC: NEGATIVE — SIGNIFICANT CHANGE UP
PH UR: 6 — SIGNIFICANT CHANGE UP (ref 4.8–8)
PHOSPHATE SERPL-MCNC: 2.9 MG/DL — SIGNIFICANT CHANGE UP (ref 2.5–4.5)
PLATELET # BLD AUTO: 170 K/UL — SIGNIFICANT CHANGE UP (ref 150–400)
POTASSIUM SERPL-MCNC: 4 MMOL/L — SIGNIFICANT CHANGE UP (ref 3.5–5.3)
POTASSIUM SERPL-SCNC: 4 MMOL/L — SIGNIFICANT CHANGE UP (ref 3.5–5.3)
PROT UR-MCNC: 15 MG/DL
PROTHROM AB SERPL-ACNC: 13.1 SEC — HIGH (ref 9.8–12.7)
RBC # BLD: 3.5 M/UL — LOW (ref 3.8–5.2)
RBC # FLD: 16.6 % — HIGH (ref 10.3–14.5)
RBC CASTS # UR COMP ASSIST: (no result) /HPF (ref 0–4)
SODIUM SERPL-SCNC: 143 MMOL/L — SIGNIFICANT CHANGE UP (ref 135–145)
SP GR SPEC: 1.02 — SIGNIFICANT CHANGE UP (ref 1.01–1.02)
UROBILINOGEN FLD QL: NEGATIVE MG/DL — SIGNIFICANT CHANGE UP
WBC # BLD: 11.6 K/UL — HIGH (ref 3.8–10.5)
WBC # FLD AUTO: 11.6 K/UL — HIGH (ref 3.8–10.5)
WBC UR QL: SIGNIFICANT CHANGE UP

## 2017-04-13 RX ORDER — ONDANSETRON 8 MG/1
4 TABLET, FILM COATED ORAL EVERY 6 HOURS
Qty: 0 | Refills: 0 | Status: DISCONTINUED | OUTPATIENT
Start: 2017-04-13 | End: 2017-04-17

## 2017-04-13 RX ORDER — SODIUM CHLORIDE 9 MG/ML
1000 INJECTION, SOLUTION INTRAVENOUS
Qty: 0 | Refills: 0 | Status: DISCONTINUED | OUTPATIENT
Start: 2017-04-13 | End: 2017-04-14

## 2017-04-13 RX ORDER — HEPARIN SODIUM 5000 [USP'U]/ML
5000 INJECTION INTRAVENOUS; SUBCUTANEOUS EVERY 8 HOURS
Qty: 0 | Refills: 0 | Status: COMPLETED | OUTPATIENT
Start: 2017-04-13 | End: 2017-04-13

## 2017-04-13 RX ORDER — HYDROMORPHONE HYDROCHLORIDE 2 MG/ML
0.5 INJECTION INTRAMUSCULAR; INTRAVENOUS; SUBCUTANEOUS ONCE
Qty: 0 | Refills: 0 | Status: DISCONTINUED | OUTPATIENT
Start: 2017-04-13 | End: 2017-04-13

## 2017-04-13 RX ORDER — HYDROMORPHONE HYDROCHLORIDE 2 MG/ML
0.5 INJECTION INTRAMUSCULAR; INTRAVENOUS; SUBCUTANEOUS EVERY 6 HOURS
Qty: 0 | Refills: 0 | Status: DISCONTINUED | OUTPATIENT
Start: 2017-04-13 | End: 2017-04-13

## 2017-04-13 RX ORDER — SODIUM CHLORIDE 9 MG/ML
1000 INJECTION, SOLUTION INTRAVENOUS
Qty: 0 | Refills: 0 | Status: DISCONTINUED | OUTPATIENT
Start: 2017-04-13 | End: 2017-04-13

## 2017-04-13 RX ADMIN — HEPARIN SODIUM 5000 UNIT(S): 5000 INJECTION INTRAVENOUS; SUBCUTANEOUS at 22:23

## 2017-04-13 RX ADMIN — SODIUM CHLORIDE 83 MILLILITER(S): 9 INJECTION, SOLUTION INTRAVENOUS at 11:20

## 2017-04-13 RX ADMIN — HYDROMORPHONE HYDROCHLORIDE 0.5 MILLIGRAM(S): 2 INJECTION INTRAMUSCULAR; INTRAVENOUS; SUBCUTANEOUS at 09:40

## 2017-04-13 RX ADMIN — SENNA PLUS 2 TABLET(S): 8.6 TABLET ORAL at 22:23

## 2017-04-13 RX ADMIN — Medication 3 MILLILITER(S): at 14:02

## 2017-04-13 RX ADMIN — PANTOPRAZOLE SODIUM 40 MILLIGRAM(S): 20 TABLET, DELAYED RELEASE ORAL at 06:49

## 2017-04-13 RX ADMIN — HYDROMORPHONE HYDROCHLORIDE 0.5 MILLIGRAM(S): 2 INJECTION INTRAMUSCULAR; INTRAVENOUS; SUBCUTANEOUS at 18:30

## 2017-04-13 RX ADMIN — HYDROMORPHONE HYDROCHLORIDE 0.5 MILLIGRAM(S): 2 INJECTION INTRAMUSCULAR; INTRAVENOUS; SUBCUTANEOUS at 09:28

## 2017-04-13 RX ADMIN — MORPHINE SULFATE 6 MILLIGRAM(S): 50 CAPSULE, EXTENDED RELEASE ORAL at 06:47

## 2017-04-13 RX ADMIN — Medication 200 MILLIGRAM(S): at 06:48

## 2017-04-13 RX ADMIN — MORPHINE SULFATE 6 MILLIGRAM(S): 50 CAPSULE, EXTENDED RELEASE ORAL at 22:40

## 2017-04-13 RX ADMIN — HYDROMORPHONE HYDROCHLORIDE 0.5 MILLIGRAM(S): 2 INJECTION INTRAMUSCULAR; INTRAVENOUS; SUBCUTANEOUS at 18:13

## 2017-04-13 RX ADMIN — MORPHINE SULFATE 6 MILLIGRAM(S): 50 CAPSULE, EXTENDED RELEASE ORAL at 03:39

## 2017-04-13 RX ADMIN — MEMANTINE HYDROCHLORIDE 14 MILLIGRAM(S): 10 TABLET ORAL at 12:50

## 2017-04-13 RX ADMIN — Medication 10 MILLIGRAM(S): at 06:48

## 2017-04-13 RX ADMIN — MORPHINE SULFATE 6 MILLIGRAM(S): 50 CAPSULE, EXTENDED RELEASE ORAL at 22:23

## 2017-04-13 RX ADMIN — SODIUM CHLORIDE 63 MILLILITER(S): 9 INJECTION INTRAMUSCULAR; INTRAVENOUS; SUBCUTANEOUS at 03:40

## 2017-04-13 RX ADMIN — MORPHINE SULFATE 6 MILLIGRAM(S): 50 CAPSULE, EXTENDED RELEASE ORAL at 07:00

## 2017-04-13 RX ADMIN — SIMVASTATIN 20 MILLIGRAM(S): 20 TABLET, FILM COATED ORAL at 00:10

## 2017-04-13 RX ADMIN — MORPHINE SULFATE 6 MILLIGRAM(S): 50 CAPSULE, EXTENDED RELEASE ORAL at 05:27

## 2017-04-13 RX ADMIN — SIMVASTATIN 20 MILLIGRAM(S): 20 TABLET, FILM COATED ORAL at 22:23

## 2017-04-13 RX ADMIN — ONDANSETRON 4 MILLIGRAM(S): 8 TABLET, FILM COATED ORAL at 09:27

## 2017-04-13 RX ADMIN — Medication 3 MILLILITER(S): at 19:54

## 2017-04-13 NOTE — CHART NOTE - NSCHARTNOTEFT_GEN_A_CORE
Notified by RN that the patient retained about 900 mls of urine in the bladder scan -Straight cath the patient.

## 2017-04-13 NOTE — PROGRESS NOTE ADULT - SUBJECTIVE AND OBJECTIVE BOX
Pt S&E. Pain controlled. No acute events overnight  AVSS  Gen: NAD  RLE:  SILT L2-S1  +EHL/FHL/TA/Gastroc  DP+  Soft compartments, - calf ttp

## 2017-04-13 NOTE — PROGRESS NOTE ADULT - SUBJECTIVE AND OBJECTIVE BOX
88 yo F hx of Dementia, RA on chronic prednisone,  non-O2 dependent COPD, glaucoma, HTN, distant TIA, GERD, HLD, osteoporosis, kidney stones, recent PNA, presents with CC R hip pain s/p fall out of bed.  Pt states she was readjusting herself in bed, and accidentally rolled out of bed on to floor.  C/o R hip pain, unable to stand or ambulate following.  Denies head trauma, or LOC.  C/o left sided chest wall pain, but states this is old 2/2 prior rib fx; no new or worsening pain of this area.  Denies any other injuries. Reports inadequate pain relief with last MSO4 dose; otherwise, denies focal complaints.  Able to climb multiple flights of stairs & ambulates daily w/o CP/SOB/palpitations/dizziness. > METS 	  : No O/N events. No new complaints. Pain well-controlled.       Physical Exam:  · Constitutional	Well-developed, well nourished	  · Neck	No bruits; no thyromegaly or nodules	  · Respiratory	Breath Sounds equal & clear to percussion & auscultation, no accessory muscle use	  · Cardiovascular	Regular rate & rhythm, normal S1, S2; no murmurs, gallops or rubs; no S3, S4	  · Gastrointestinal	Soft, non-tender, no hepatosplenomegaly, normal bowel sounds	  · Extremities	detailed exam	  · Extremities Details	normal	  · Vascular	Equal and normal pulses (carotid, femoral, dorsalis pedis)	  · Musculoskeletal	detailed exam	  · Musculoskeletal Details	decreased ROM due to pain; diminished strength	      T(C): 36.7, Max: 37.2 (04-12 @ 15:23)  HR: 95 (67 - 95)  BP: 123/51 (107/54 - 152/63)  RR: 16 (15 - 16)  SpO2: 100% (94% - 100%)  Wt(kg): --                              10.2   11.6  )-----------( 170      ( 2017 04:45 )             32.4     2017 04:46    143    |  110    |  24     ----------------------------<  96     4.0     |  26     |  0.76     Ca    8.8        2017 04:46  Phos  2.9       2017 04:46  Mg     2.0       2017 04:46    TPro  6.2    /  Alb  3.4    /  TBili  0.2    /  DBili  x      /  AST  15     /  ALT  28     /  AlkPhos  63     2017 01:07    PT/INR - ( 2017 04:46 )   PT: 13.1 sec;   INR: 1.21 ratio         PTT - ( 2017 04:46 )  PTT:29.2 sec  CAPILLARY BLOOD GLUCOSE  130 (2017 12:09)  102 (2017 09:01)  106 (2017 21:45)  126 (2017 15:23)    LIVER FUNCTIONS - ( 2017 01:07 )  Alb: 3.4 g/dL / Pro: 6.2 gm/dL / ALK PHOS: 63 U/L / ALT: 28 U/L / AST: 15 U/L / GGT: x           Urinalysis Basic - ( 2017 04:10 )    Color: Yellow / Appearance: Clear / S.020 / pH: x  Gluc: x / Ketone: Negative  / Bili: Negative / Urobili: Negative mg/dL   Blood: x / Protein: 15 mg/dL / Nitrite: Negative   Leuk Esterase: Negative / RBC: 6-10 /HPF / WBC 3-5   Sq Epi: x / Non Sq Epi: Few / Bacteria: Few      Hemoglobin A1C, Whole Blood: 5.5 % ( @ 04:46)          Assessment and Plan:   Assessment:  · Assessment		  87F with aforementioned PMHx, now a/w    * Right subtrochanteric hip fracture 2/2 mechanical fall from bed  - PRN analgesia  - bedrest, NWB  - hold Aggrenox- SCD's only for DVT PPx  - Ortho eval appreciated, pending OR today, NPO since last midnight    * Incidental anterior splenic infarct, asymptomatic, incidental finding on CT(chest); Pt non-toxic, normal WBC, afebrile- likely 2/2 underlying RA & associated vasculopathy  - no specific intervention @ this point  - GI eval appreciated    * Steroid-dependent RA, stable  - double steroid dose during acute stressor period  - c/w Plaquenil    * Non-O2 dependent COPD, stable  - hold Spiriva, switch to Duonebs TID while inpatient  - O2 PRN    * HTN, stable  - c/w current management    * dementia, stable  - c/w current management  - Pt's daughter provided Namenda XR    * Hyperglycemia, HbA1C wnl  - D/C BGM/ISS    * DVT PPX- SCD's     * Full code    Pt is medically optimized for OR. Asymptomatic splenic infarct is not contraindication to necessary orthopedic intervention.

## 2017-04-14 ENCOUNTER — TRANSCRIPTION ENCOUNTER (OUTPATIENT)
Age: 82
End: 2017-04-14

## 2017-04-14 LAB
ANION GAP SERPL CALC-SCNC: 8 MMOL/L — SIGNIFICANT CHANGE UP (ref 5–17)
ANION GAP SERPL CALC-SCNC: 9 MMOL/L — SIGNIFICANT CHANGE UP (ref 5–17)
APTT BLD: 26.7 SEC — LOW (ref 27.5–37.4)
BUN SERPL-MCNC: 25 MG/DL — HIGH (ref 7–23)
BUN SERPL-MCNC: 25 MG/DL — HIGH (ref 7–23)
CALCIUM SERPL-MCNC: 8.2 MG/DL — LOW (ref 8.5–10.1)
CALCIUM SERPL-MCNC: 9 MG/DL — SIGNIFICANT CHANGE UP (ref 8.5–10.1)
CHLORIDE SERPL-SCNC: 108 MMOL/L — SIGNIFICANT CHANGE UP (ref 96–108)
CHLORIDE SERPL-SCNC: 110 MMOL/L — HIGH (ref 96–108)
CO2 SERPL-SCNC: 25 MMOL/L — SIGNIFICANT CHANGE UP (ref 22–31)
CO2 SERPL-SCNC: 28 MMOL/L — SIGNIFICANT CHANGE UP (ref 22–31)
CREAT SERPL-MCNC: 0.89 MG/DL — SIGNIFICANT CHANGE UP (ref 0.5–1.3)
CREAT SERPL-MCNC: 0.92 MG/DL — SIGNIFICANT CHANGE UP (ref 0.5–1.3)
GLUCOSE SERPL-MCNC: 111 MG/DL — HIGH (ref 70–99)
GLUCOSE SERPL-MCNC: 141 MG/DL — HIGH (ref 70–99)
HCT VFR BLD CALC: 28 % — LOW (ref 34.5–45)
HCT VFR BLD CALC: 30.2 % — LOW (ref 34.5–45)
HGB BLD-MCNC: 10 G/DL — LOW (ref 11.5–15.5)
HGB BLD-MCNC: 9.1 G/DL — LOW (ref 11.5–15.5)
INR BLD: 1.13 RATIO — SIGNIFICANT CHANGE UP (ref 0.88–1.16)
MAGNESIUM SERPL-MCNC: 2 MG/DL — SIGNIFICANT CHANGE UP (ref 1.8–2.4)
MCHC RBC-ENTMCNC: 30 PG — SIGNIFICANT CHANGE UP (ref 27–34)
MCHC RBC-ENTMCNC: 30.1 PG — SIGNIFICANT CHANGE UP (ref 27–34)
MCHC RBC-ENTMCNC: 32.4 GM/DL — SIGNIFICANT CHANGE UP (ref 32–36)
MCHC RBC-ENTMCNC: 33.1 GM/DL — SIGNIFICANT CHANGE UP (ref 32–36)
MCV RBC AUTO: 91 FL — SIGNIFICANT CHANGE UP (ref 80–100)
MCV RBC AUTO: 92.7 FL — SIGNIFICANT CHANGE UP (ref 80–100)
PHOSPHATE SERPL-MCNC: 2.6 MG/DL — SIGNIFICANT CHANGE UP (ref 2.5–4.5)
PLATELET # BLD AUTO: 146 K/UL — LOW (ref 150–400)
PLATELET # BLD AUTO: 174 K/UL — SIGNIFICANT CHANGE UP (ref 150–400)
POTASSIUM SERPL-MCNC: 4.3 MMOL/L — SIGNIFICANT CHANGE UP (ref 3.5–5.3)
POTASSIUM SERPL-MCNC: 4.5 MMOL/L — SIGNIFICANT CHANGE UP (ref 3.5–5.3)
POTASSIUM SERPL-SCNC: 4.3 MMOL/L — SIGNIFICANT CHANGE UP (ref 3.5–5.3)
POTASSIUM SERPL-SCNC: 4.5 MMOL/L — SIGNIFICANT CHANGE UP (ref 3.5–5.3)
PROTHROM AB SERPL-ACNC: 12.2 SEC — SIGNIFICANT CHANGE UP (ref 9.8–12.7)
RBC # BLD: 3.02 M/UL — LOW (ref 3.8–5.2)
RBC # BLD: 3.32 M/UL — LOW (ref 3.8–5.2)
RBC # FLD: 15.5 % — HIGH (ref 10.3–14.5)
RBC # FLD: 16.6 % — HIGH (ref 10.3–14.5)
SODIUM SERPL-SCNC: 144 MMOL/L — SIGNIFICANT CHANGE UP (ref 135–145)
SODIUM SERPL-SCNC: 144 MMOL/L — SIGNIFICANT CHANGE UP (ref 135–145)
WBC # BLD: 13.2 K/UL — HIGH (ref 3.8–10.5)
WBC # BLD: 19.6 K/UL — HIGH (ref 3.8–10.5)
WBC # FLD AUTO: 13.2 K/UL — HIGH (ref 3.8–10.5)
WBC # FLD AUTO: 19.6 K/UL — HIGH (ref 3.8–10.5)

## 2017-04-14 RX ORDER — ASCORBIC ACID 60 MG
500 TABLET,CHEWABLE ORAL
Qty: 0 | Refills: 0 | Status: DISCONTINUED | OUTPATIENT
Start: 2017-04-14 | End: 2017-04-17

## 2017-04-14 RX ORDER — BENZOCAINE AND MENTHOL 5; 1 G/100ML; G/100ML
1 LIQUID ORAL
Qty: 0 | Refills: 0 | Status: DISCONTINUED | OUTPATIENT
Start: 2017-04-14 | End: 2017-04-17

## 2017-04-14 RX ORDER — ENOXAPARIN SODIUM 100 MG/ML
40 INJECTION SUBCUTANEOUS EVERY 24 HOURS
Qty: 0 | Refills: 0 | Status: DISCONTINUED | OUTPATIENT
Start: 2017-04-15 | End: 2017-04-17

## 2017-04-14 RX ORDER — ACETAMINOPHEN 500 MG
650 TABLET ORAL EVERY 6 HOURS
Qty: 0 | Refills: 0 | Status: DISCONTINUED | OUTPATIENT
Start: 2017-04-14 | End: 2017-04-17

## 2017-04-14 RX ORDER — FOLIC ACID 0.8 MG
1 TABLET ORAL DAILY
Qty: 0 | Refills: 0 | Status: DISCONTINUED | OUTPATIENT
Start: 2017-04-14 | End: 2017-04-17

## 2017-04-14 RX ORDER — FENTANYL CITRATE 50 UG/ML
50 INJECTION INTRAVENOUS
Qty: 0 | Refills: 0 | Status: DISCONTINUED | OUTPATIENT
Start: 2017-04-14 | End: 2017-04-14

## 2017-04-14 RX ORDER — LABETALOL HCL 100 MG
10 TABLET ORAL ONCE
Qty: 0 | Refills: 0 | Status: COMPLETED | OUTPATIENT
Start: 2017-04-14 | End: 2017-04-14

## 2017-04-14 RX ORDER — ONDANSETRON 8 MG/1
4 TABLET, FILM COATED ORAL ONCE
Qty: 0 | Refills: 0 | Status: DISCONTINUED | OUTPATIENT
Start: 2017-04-14 | End: 2017-04-14

## 2017-04-14 RX ORDER — MAGNESIUM HYDROXIDE 400 MG/1
30 TABLET, CHEWABLE ORAL DAILY
Qty: 0 | Refills: 0 | Status: DISCONTINUED | OUTPATIENT
Start: 2017-04-14 | End: 2017-04-17

## 2017-04-14 RX ORDER — FAMOTIDINE 10 MG/ML
20 INJECTION INTRAVENOUS EVERY 12 HOURS
Qty: 0 | Refills: 0 | Status: DISCONTINUED | OUTPATIENT
Start: 2017-04-14 | End: 2017-04-17

## 2017-04-14 RX ORDER — FERROUS SULFATE 325(65) MG
325 TABLET ORAL
Qty: 0 | Refills: 0 | Status: DISCONTINUED | OUTPATIENT
Start: 2017-04-14 | End: 2017-04-17

## 2017-04-14 RX ORDER — LABETALOL HCL 100 MG
10 TABLET ORAL ONCE
Qty: 0 | Refills: 0 | Status: DISCONTINUED | OUTPATIENT
Start: 2017-04-14 | End: 2017-04-17

## 2017-04-14 RX ORDER — DIPHENHYDRAMINE HCL 50 MG
50 CAPSULE ORAL EVERY 4 HOURS
Qty: 0 | Refills: 0 | Status: DISCONTINUED | OUTPATIENT
Start: 2017-04-14 | End: 2017-04-17

## 2017-04-14 RX ORDER — ONDANSETRON 8 MG/1
4 TABLET, FILM COATED ORAL EVERY 6 HOURS
Qty: 0 | Refills: 0 | Status: DISCONTINUED | OUTPATIENT
Start: 2017-04-14 | End: 2017-04-17

## 2017-04-14 RX ORDER — CEFAZOLIN SODIUM 1 G
2000 VIAL (EA) INJECTION EVERY 6 HOURS
Qty: 0 | Refills: 0 | Status: COMPLETED | OUTPATIENT
Start: 2017-04-14 | End: 2017-04-15

## 2017-04-14 RX ORDER — DIPHENHYDRAMINE HCL 50 MG
25 CAPSULE ORAL AT BEDTIME
Qty: 0 | Refills: 0 | Status: DISCONTINUED | OUTPATIENT
Start: 2017-04-14 | End: 2017-04-17

## 2017-04-14 RX ORDER — SODIUM CHLORIDE 9 MG/ML
1000 INJECTION, SOLUTION INTRAVENOUS
Qty: 0 | Refills: 0 | Status: DISCONTINUED | OUTPATIENT
Start: 2017-04-14 | End: 2017-04-15

## 2017-04-14 RX ADMIN — Medication 0.5 MILLIGRAM(S): at 14:52

## 2017-04-14 RX ADMIN — Medication 3 MILLILITER(S): at 07:51

## 2017-04-14 RX ADMIN — MORPHINE SULFATE 6 MILLIGRAM(S): 50 CAPSULE, EXTENDED RELEASE ORAL at 04:36

## 2017-04-14 RX ADMIN — PANTOPRAZOLE SODIUM 40 MILLIGRAM(S): 20 TABLET, DELAYED RELEASE ORAL at 06:33

## 2017-04-14 RX ADMIN — Medication 3 MILLILITER(S): at 20:44

## 2017-04-14 RX ADMIN — Medication 10 MILLIGRAM(S): at 06:33

## 2017-04-14 RX ADMIN — MORPHINE SULFATE 6 MILLIGRAM(S): 50 CAPSULE, EXTENDED RELEASE ORAL at 11:06

## 2017-04-14 RX ADMIN — Medication 10 MILLIGRAM(S): at 19:33

## 2017-04-14 RX ADMIN — Medication 200 MILLIGRAM(S): at 06:33

## 2017-04-14 RX ADMIN — SODIUM CHLORIDE 75 MILLILITER(S): 9 INJECTION, SOLUTION INTRAVENOUS at 00:00

## 2017-04-14 RX ADMIN — MORPHINE SULFATE 6 MILLIGRAM(S): 50 CAPSULE, EXTENDED RELEASE ORAL at 04:50

## 2017-04-14 RX ADMIN — MORPHINE SULFATE 6 MILLIGRAM(S): 50 CAPSULE, EXTENDED RELEASE ORAL at 10:37

## 2017-04-14 RX ADMIN — Medication 3 MILLILITER(S): at 14:37

## 2017-04-14 NOTE — DISCHARGE NOTE ADULT - MEDICATION SUMMARY - MEDICATIONS TO TAKE
I will START or STAY ON the medications listed below when I get home from the hospital:    predniSONE 5 mg oral tablet  -- 1 tab(s) by mouth once a day  -- Indication: For RA    acetaminophen 500 mg oral tablet  -- 2 tab(s) by mouth every 8 hours, As Needed for pain or fever  -- Indication: For pain    traMADol 50 mg oral tablet  -- 1 tab(s) by mouth every 4 hours, As needed, Moderate Pain (4 - 6)  -- Indication: For pain    enoxaparin  -- 40 milligram(s) subcutaneous once a day for duration of rehab  -- Indication: For proph    traZODone 50 mg oral tablet  -- 1 tab(s) by mouth once a day (at bedtime), As Needed  -- Indication: For dementia    simvastatin 20 mg oral tablet  -- 1 tab(s) by mouth once a day (at bedtime)  -- Indication: For Cholestrol    hydroxychloroquine 200 mg oral tablet  -- 1 tab(s) by mouth every 12 hours  -- Indication: For RA    Aggrenox 25 mg-200 mg oral capsule, extended release  -- 1 cap(s) by mouth 2 times a day  -- Indication: For proph    Spiriva 18 mcg inhalation capsule  -- 1 cap(s) inhaled once a day  -- Indication: For COPD    bisacodyl 10 mg rectal suppository  -- 1 suppository(ies) rectally once a day, As needed, If no bowel movement  -- Indication: For bowel regimen    senna oral tablet  -- 2 tab(s) by mouth once a day (at bedtime)  -- Indication: For bowel regimen    docusate sodium 100 mg oral capsule  -- 2 cap(s) by mouth once a day  -- Indication: For bowel regimen    Namenda XR 14 mg oral capsule, extended release  -- 1 cap(s) by mouth once a day  -- Indication: For dementia    esomeprazole 40 mg oral delayed release capsule  -- 1 cap(s) by mouth once a day  -- Indication: For GERD    Calcium 600+D oral tablet  -- 1 tab(s) by mouth once a day  -- Indication: For vitamin    Multiple Vitamins oral tablet  -- 1 tab(s) by mouth once a day  -- Indication: For vitamin    ascorbic acid 500 mg oral tablet  -- 1 tab(s) by mouth 2 times a day  -- Indication: For vitamin    folic acid 1 mg oral tablet  -- 1 tab(s) by mouth once a day  -- Indication: For vitamin

## 2017-04-14 NOTE — DISCHARGE NOTE ADULT - PLAN OF CARE
Return to baseline ADLs 1.	Pain control  2.	Walking with full weight bearing as tolerated, with assistive devices as needed.  3.	DVT prophylaxis  4.	Physical therapy as needed  5.	Follow up with Dr. Haynes as outpatient in 10-14 Days after discharge from the hospital or rehab. Call office for appointment.  6.	Remove old and place new Aquacel bandage every 7 days until staples removed. Remove staples post-op day 14.  7.	Ice and elevate affected area as needed  8.	Keep dressing/splint/cast clean and dry

## 2017-04-14 NOTE — PROGRESS NOTE ADULT - ASSESSMENT
A/P: 87F with right FN Fx    pain control  nwb  to OR Today  NPO except medications  Hold all anticoagulation  IVF

## 2017-04-14 NOTE — PROGRESS NOTE ADULT - SUBJECTIVE AND OBJECTIVE BOX
86 yo F hx of Dementia, RA on chronic prednisone,  non-O2 dependent COPD, glaucoma, HTN, distant TIA, GERD, HLD, osteoporosis, kidney stones, recent PNA, presents with CC R hip pain s/p fall out of bed.  Pt states she was readjusting herself in bed, and accidentally rolled out of bed on to floor.  C/o R hip pain, unable to stand or ambulate following.  Denies head trauma, or LOC.  C/o left sided chest wall pain, but states this is old 2/2 prior rib fx; no new or worsening pain of this area.  Denies any other injuries. Reports inadequate pain relief with last MSO4 dose; otherwise, denies focal complaints.  Able to climb multiple flights of stairs & ambulates daily w/o CP/SOB/palpitations/dizziness. > METS 	  : No O/N events. No new complaints. Pain well-controlled.   : No O/N events. Surgery postponed until today d/t OR availability. No focal complaints.       Physical Exam:  · Constitutional	Well-developed, well nourished	  · Neck	No bruits; no thyromegaly or nodules	  · Respiratory	Breath Sounds equal & clear to percussion & auscultation, no accessory muscle use	  · Cardiovascular	Regular rate & rhythm, normal S1, S2; no murmurs, gallops or rubs; no S3, S4	  · Gastrointestinal	Soft, non-tender, no hepatosplenomegaly, normal bowel sounds	  · Extremities	detailed exam	  · Extremities Details	normal	  · Vascular	Equal and normal pulses (carotid, femoral, dorsalis pedis)	  · Musculoskeletal	detailed exam	  · Musculoskeletal Details	decreased ROM due to pain; diminished strength  T(C): 36.9, Max: 37 (-14 @ 08:03) HR: 95 (76 - 98) BP: 147/48 (126/55 - 154/47) RR: 16 (16 - 16) SpO2: 97% (93% - 98%) Wt(kg): --                         9.1   13.2  )-----------( 146      ( 2017 05:11 )            28.0   2017 05:11  144    |  108    |  25    ----------------------------<  111   4.3     |  28     |  0.89   Ca    9.0        2017 05:11 Phos  2.6       2017 05:11 Mg     2.0       2017 05:11   PT/INR - ( 2017 05:11 )   PT: 12.2 sec;   INR: 1.13 ratio      PTT - ( 2017 05:11 )  PTT:26.7 sec CAPILLARY BLOOD GLUCOSE 130 (2017 12:09)   Urinalysis Basic - ( 2017 04:10 )  Color: Yellow / Appearance: Clear / S.020 / pH: x Gluc: x / Ketone: Negative  / Bili: Negative / Urobili: Negative mg/dL  Blood: x / Protein: 15 mg/dL / Nitrite: Negative  Leuk Esterase: Negative / RBC: 6-10 /HPF / WBC 3-5  Sq Epi: x / Non Sq Epi: Few / Bacteria: Few   Hemoglobin A1C, Whole Blood: 5.5 % ( @ 04:46)    	                Assessment and Plan:   Assessment:  · Assessment		  87F with aforementioned PMHx, now a/w    * Right subtrochanteric hip fracture 2/2 mechanical fall from bed  - PRN analgesia  - bedrest, NWB  - hold Aggrenox- SCD's only for DVT PPx  - to OR today    * Incidental anterior splenic infarct, asymptomatic, incidental finding on CT(chest); Pt non-toxic, normal WBC, afebrile- likely 2/2 underlying RA & associated vasculopathy  - no specific intervention @ this point  - GI eval appreciated    * Steroid-dependent RA, stable  - double steroid dose during acute stressor period  - c/w Plaquenil    * Non-O2 dependent COPD, stable  - hold Spiriva, switch to Duonebs TID while inpatient  - O2 PRN    * HTN, stable  - c/w current management    * dementia, stable  - c/w current management  - Pt's daughter provided Namenda XR    * Hyperglycemia, HbA1C wnl  - D/C BGM/ISS    * DVT PPX- SCD's     * Full code    Pt is medically optimized for OR. Asymptomatic splenic infarct is not contraindication to necessary orthopedic intervention.

## 2017-04-14 NOTE — DISCHARGE NOTE ADULT - CARE PROVIDER_API CALL
Marshall Haynes (MD), Orthopaedic Surgery  [address] [place] [address]  Phone: [phone]  Fax: [phone]

## 2017-04-14 NOTE — DISCHARGE NOTE ADULT - PATIENT PORTAL LINK FT
“You can access the Follow[employer] Patient Portal, offered by [facility], by registering with the following website: [email or web address]”

## 2017-04-14 NOTE — DISCHARGE NOTE ADULT - CARE PLAN
Principal Discharge DX:	Closed fracture of right hip, initial encounter  Goal:	Return to baseline ADLs  Instructions for follow-up, activity and diet:	1.	Pain control  2.	Walking with full weight bearing as tolerated, with assistive devices as needed.  3.	DVT prophylaxis  4.	Physical therapy as needed  5.	Follow up with Dr. Haynes as outpatient in 10-14 Days after discharge from the hospital or rehab. Call office for appointment.  6.	Remove old and place new Aquacel bandage every 7 days until staples removed. Remove staples post-op day 14.  7.	Ice and elevate affected area as needed  8.	Keep dressing/splint/cast clean and dry

## 2017-04-14 NOTE — DISCHARGE NOTE ADULT - HOSPITAL COURSE
Patient is a 87y old  Female who presents with a chief complaint of hip fracture, fell out of bed (14 Apr 2017 19:30)  HPI:  86 yo F hx of Dementia, RA on chronic prednisone,  non-O2 dependent COPD, glaucoma, HTN, distant TIA, GERD, HLD, osteoporosis, kidney stones, recent PNA, presents with CC R hip pain s/p fall out of bed.  Pt states she was readjusting herself in bed, and accidentally rolled out of bed on to floor.  C/o R hip pain, unable to stand or ambulate following.  Denies head trauma, or LOC.  C/o left sided chest wall pain, but states this is old 2/2 prior rib fx; no new or worsening pain of this area.   Hospital course: s/p RT hip IMN 4/14/17. Postop- urinary retention, Tellez placed 4/17/17 prior to discharge- for voiding trial on Thursday 4/20/17 at rehab    T(C): 36.7, Max: 37.3 (04-16 @ 23:15)  HR: 96 (94 - 103)  BP: 92/52 (92/52 - 140/47)  RR: 16 (16 - 16)  SpO2: 96% (93% - 100%)  Wt(kg): --  LABS:                        8.6    12.1  )-----------( 153      ( 17 Apr 2017 05:36 )             26.3     04-17    146<H>  |  112<H>  |  33<H>  ----------------------------<  97  3.7   |  26  |  0.63    Ca    8.0<L>      17 Apr 2017 05:36  Mg     2.1     04-17    PHYSICAL EXAM:  GENERAL: NAD, well-groomed, well-developed  HEAD:  Atraumatic, Normocephalic  EYES: EOMI, PERRLA, conjunctiva and sclera clear  HEENT: Moist mucous membranes  NECK: Supple, No JVD  NERVOUS SYSTEM:  Confused  CHEST/LUNG: Clear to auscultation bilaterally; No rales, rhonchi, wheezing, or rubs  HEART: Regular rate and rhythm; No murmurs, rubs, or gallops  ABDOMEN: Soft, Nontender, Nondistended; Bowel sounds present  GENITOURINARY- bladder distention  EXTREMITIES:  2+ Peripheral Pulses, No clubbing, cyanosis, or edema  MUSCULOSKELTAL- RT hip dressing dry    predniSONE   Tablet 10milliGRAM(s) Oral daily  traZODone 50milliGRAM(s) Oral at bedtime PRN  simvastatin 20milliGRAM(s) Oral at bedtime  hydroxychloroquine 200milliGRAM(s) Oral every 12 hours  docusate sodium 200milliGRAM(s) Oral daily  calcium carbonate 1250 mG + Vitamin D (OsCal 500 + D) 1Tablet(s) Oral daily  pantoprazole    Tablet 40milliGRAM(s) Oral before breakfast  memantine ER 14milliGRAM(s) Oral daily  senna 2Tablet(s) Oral at bedtime  ALBUTerol/ipratropium for Nebulization 3milliLiter(s) Nebulizer three times a day  ondansetron Injectable 4milliGRAM(s) IV Push every 6 hours PRN  LORazepam   Injectable 0.5milliGRAM(s) IntraMuscular two times a day PRN  enoxaparin Injectable 40milliGRAM(s) SubCutaneous every 24 hours  acetaminophen   Tablet 650milliGRAM(s) Oral every 6 hours PRN  aluminum hydroxide/magnesium hydroxide/simethicone Suspension 30milliLiter(s) Oral four times a day PRN  famotidine    Tablet 20milliGRAM(s) Oral every 12 hours  ondansetron Injectable 4milliGRAM(s) IV Push every 6 hours PRN  magnesium hydroxide Suspension 30milliLiter(s) Oral daily PRN  bisacodyl Suppository 10milliGRAM(s) Rectal daily PRN  diphenhydrAMINE   Capsule 25milliGRAM(s) Oral at bedtime PRN  ferrous    sulfate 325milliGRAM(s) Oral three times a day with meals  folic acid 1milliGRAM(s) Oral daily  multivitamin 1Tablet(s) Oral daily  ascorbic acid 500milliGRAM(s) Oral two times a day  diphenhydrAMINE   Capsule 50milliGRAM(s) Oral every 4 hours PRN  benzocaine 15 mG/menthol 3.6 mG Lozenge 1Lozenge Oral every 2 hours PRN  labetalol Injectable 10milliGRAM(s) IV Push once PRN  dipyridamole 200 mG/aspirin 25 mG 1Capsule(s) Oral two times a day  bethanechol 10milliGRAM(s) Oral three times a day  LORazepam     Tablet 0.5milliGRAM(s) Oral every 6 hours PRN  HYDROmorphone  Injectable 0.5milliGRAM(s) IV Push every 3 hours PRN  traMADol 50milliGRAM(s) Oral every 4 hours PRN  sodium chloride 0.45%. 1000milliLiter(s) IV Continuous <Continuous>    Assessment/Plan  #RT hip fracture s/p IMN/anemia acute blood loss postop  Ortho stable for discharge. Outpatient f/u with DR Haynes in 2 weeks  #Urinary retention postop  Tellez to be placed prior to discharge. Voiding trial on Thursday 4/20/17 at rehab  #Toxic metabolic encephalopathy with inpatient delirium- improving  #Steroid-dependant RA- cont Prednsione PO on discharge  #Anterior splenic infarct- incidental finding, outpatient f/u with PMD  #Non-O2 dependant COPD, stable  #Hypernatremia  #HTN  #Dementia  #Hyperglycemia not consistent with diabetes  #Dispo- subacute rehab today

## 2017-04-15 LAB
ANION GAP SERPL CALC-SCNC: 10 MMOL/L — SIGNIFICANT CHANGE UP (ref 5–17)
BUN SERPL-MCNC: 29 MG/DL — HIGH (ref 7–23)
CALCIUM SERPL-MCNC: 8.2 MG/DL — LOW (ref 8.5–10.1)
CHLORIDE SERPL-SCNC: 109 MMOL/L — HIGH (ref 96–108)
CO2 SERPL-SCNC: 25 MMOL/L — SIGNIFICANT CHANGE UP (ref 22–31)
CREAT SERPL-MCNC: 0.8 MG/DL — SIGNIFICANT CHANGE UP (ref 0.5–1.3)
GLUCOSE SERPL-MCNC: 124 MG/DL — HIGH (ref 70–99)
HCT VFR BLD CALC: 28.6 % — LOW (ref 34.5–45)
HGB BLD-MCNC: 9.7 G/DL — LOW (ref 11.5–15.5)
MAGNESIUM SERPL-MCNC: 1.9 MG/DL — SIGNIFICANT CHANGE UP (ref 1.8–2.4)
MCHC RBC-ENTMCNC: 30.3 PG — SIGNIFICANT CHANGE UP (ref 27–34)
MCHC RBC-ENTMCNC: 33.9 GM/DL — SIGNIFICANT CHANGE UP (ref 32–36)
MCV RBC AUTO: 89.5 FL — SIGNIFICANT CHANGE UP (ref 80–100)
PLATELET # BLD AUTO: 154 K/UL — SIGNIFICANT CHANGE UP (ref 150–400)
POTASSIUM SERPL-MCNC: 4.5 MMOL/L — SIGNIFICANT CHANGE UP (ref 3.5–5.3)
POTASSIUM SERPL-SCNC: 4.5 MMOL/L — SIGNIFICANT CHANGE UP (ref 3.5–5.3)
RBC # BLD: 3.19 M/UL — LOW (ref 3.8–5.2)
RBC # FLD: 15 % — HIGH (ref 10.3–14.5)
SODIUM SERPL-SCNC: 144 MMOL/L — SIGNIFICANT CHANGE UP (ref 135–145)
WBC # BLD: 17.7 K/UL — HIGH (ref 3.8–10.5)
WBC # FLD AUTO: 17.7 K/UL — HIGH (ref 3.8–10.5)

## 2017-04-15 PROCEDURE — 99223 1ST HOSP IP/OBS HIGH 75: CPT

## 2017-04-15 RX ORDER — ASPIRIN AND DIPYRIDAMOLE 25; 200 MG/1; MG/1
1 CAPSULE, EXTENDED RELEASE ORAL
Qty: 0 | Refills: 0 | Status: DISCONTINUED | OUTPATIENT
Start: 2017-04-15 | End: 2017-04-17

## 2017-04-15 RX ORDER — BETHANECHOL CHLORIDE 25 MG
10 TABLET ORAL THREE TIMES A DAY
Qty: 0 | Refills: 0 | Status: DISCONTINUED | OUTPATIENT
Start: 2017-04-15 | End: 2017-04-17

## 2017-04-15 RX ORDER — RISPERIDONE 4 MG/1
0.5 TABLET ORAL
Qty: 0 | Refills: 0 | Status: DISCONTINUED | OUTPATIENT
Start: 2017-04-15 | End: 2017-04-15

## 2017-04-15 RX ADMIN — MORPHINE SULFATE 6 MILLIGRAM(S): 50 CAPSULE, EXTENDED RELEASE ORAL at 11:09

## 2017-04-15 RX ADMIN — PANTOPRAZOLE SODIUM 40 MILLIGRAM(S): 20 TABLET, DELAYED RELEASE ORAL at 05:58

## 2017-04-15 RX ADMIN — MORPHINE SULFATE 6 MILLIGRAM(S): 50 CAPSULE, EXTENDED RELEASE ORAL at 11:39

## 2017-04-15 RX ADMIN — Medication 100 MILLIGRAM(S): at 00:02

## 2017-04-15 RX ADMIN — SIMVASTATIN 20 MILLIGRAM(S): 20 TABLET, FILM COATED ORAL at 22:34

## 2017-04-15 RX ADMIN — MORPHINE SULFATE 6 MILLIGRAM(S): 50 CAPSULE, EXTENDED RELEASE ORAL at 08:01

## 2017-04-15 RX ADMIN — SODIUM CHLORIDE 75 MILLILITER(S): 9 INJECTION, SOLUTION INTRAVENOUS at 05:57

## 2017-04-15 RX ADMIN — FAMOTIDINE 20 MILLIGRAM(S): 10 INJECTION INTRAVENOUS at 05:57

## 2017-04-15 RX ADMIN — FAMOTIDINE 20 MILLIGRAM(S): 10 INJECTION INTRAVENOUS at 18:55

## 2017-04-15 RX ADMIN — Medication 3 MILLILITER(S): at 14:26

## 2017-04-15 RX ADMIN — Medication 200 MILLIGRAM(S): at 05:58

## 2017-04-15 RX ADMIN — Medication 1 TABLET(S): at 12:16

## 2017-04-15 RX ADMIN — Medication 10 MILLIGRAM(S): at 05:58

## 2017-04-15 RX ADMIN — MORPHINE SULFATE 6 MILLIGRAM(S): 50 CAPSULE, EXTENDED RELEASE ORAL at 08:31

## 2017-04-15 RX ADMIN — Medication 0.5 MILLIGRAM(S): at 22:34

## 2017-04-15 RX ADMIN — ENOXAPARIN SODIUM 40 MILLIGRAM(S): 100 INJECTION SUBCUTANEOUS at 05:57

## 2017-04-15 RX ADMIN — Medication 100 MILLIGRAM(S): at 05:56

## 2017-04-15 RX ADMIN — Medication 3 MILLILITER(S): at 08:22

## 2017-04-15 RX ADMIN — Medication 0.5 MILLIGRAM(S): at 14:30

## 2017-04-15 RX ADMIN — Medication 10 MILLIGRAM(S): at 22:34

## 2017-04-15 RX ADMIN — MEMANTINE HYDROCHLORIDE 14 MILLIGRAM(S): 10 TABLET ORAL at 12:15

## 2017-04-15 RX ADMIN — Medication 200 MILLIGRAM(S): at 12:17

## 2017-04-15 RX ADMIN — Medication 200 MILLIGRAM(S): at 18:54

## 2017-04-15 RX ADMIN — ASPIRIN AND DIPYRIDAMOLE 1 CAPSULE(S): 25; 200 CAPSULE, EXTENDED RELEASE ORAL at 18:55

## 2017-04-15 NOTE — PHYSICAL THERAPY INITIAL EVALUATION ADULT - PERTINENT HX OF CURRENT PROBLEM, REHAB EVAL
Pt. s/p fall out of bed and right Hip Fx. s/p Right IMN 4/14/17, +anterior splenic infarct, CT Head (-).

## 2017-04-15 NOTE — CONSULT NOTE ADULT - SUBJECTIVE AND OBJECTIVE BOX
HPI  HPI:  88 yo F hx of Dementia, RA on chronic prednisone,  non-O2 dependent COPD, glaucoma, HTN, distant TIA, GERD, HLD, osteoporosis, kidney stones, recent PNA, presents with CC R hip pain s/p fall out of bed.  Pt states she was readjusting herself in bed, and accidentally rolled out of bed on to floor.  C/o R hip pain, unable to stand or ambulate following.  Denies head trauma, or LOC.  C/o left sided chest wall pain, but states this is old 2/2 prior rib fx; no new or worsening pain of this area.  Denies any other injuries. Reports inadequate pain relief with last MSO4 dose; otherwise, denies focal complaints.  Able to climb multiple flights of stairs & ambulates daily w/o CP/SOB/palpitations/dizziness. > METS (12 Apr 2017 11:18)      Patient is a 87y old  Female who presents with a chief complaint of hip fracture, fell out of bed (14 Apr 2017 19:30)      Consulted by Dr. Haynes for VTE prophylaxis, risk stratification, and anticoagulation management.    PAST MEDICAL & SURGICAL HISTORY:  Compression fracture of spine: T7  Alzheimers disease  HTN (hypertension)  Glaucoma  TIA (transient ischemic attack): 8/07  Cerebral vascular accident: 2005  Polymyalgia rheumatica  Osteoporosis  Chronic pain: mid back  Urinary retention  GERD (gastroesophageal reflux disease)  Cholelithiases  Hyperlipemia  Carotid artery stenosis  Lung nodule: biopsied 2003, benign  COPD (chronic obstructive pulmonary disease)  Asthma  Gall stones  History of hysterectomy: for bleeding  Hx of cholecystectomy      BMI: 23.6    CrCl: 41.0    Caprini VTE Risk Score: 8 (high)    IMPROVE Bleeding Risk Score: 3.5 (mod)    Falls Risk:   High ( x )  Mod (  )  Low (  )      FAMILY HISTORY:  No pertinent family history in first degree relatives    Denies any personal or familial history of clotting or bleeding disorders.    Allergies    Aciphex (Unknown)  Macrobid (Unknown)  Percocet 10/325 (Rash)    Intolerances        REVIEW OF SYSTEMS    (  )Fever	     (  )Constipation	(  )SOB				(  )Headache	(  )Dysuria  (  )Chills	     (  )Melena	(  )Dyspnea present on exertion	                    (  )Dizziness                    (  )Polyuria  (  )Nausea	     (  )Hematochezia	(  )Cough			                    (  )Syncope   	(  )Hematuria  (  )Vomiting    (  )Chest Pain	(  )Wheezing			(  )Weakness  (  )Diarrhea     (  )Palpitations	(  )Anorexia			(  )Myalgia    All other review of systems negative: Yes    Unable to obtain review of systems due to:       PHYSICAL EXAM:    Constitutional: Appears Well    Neurological: alert- resting    Skin: Warm    Respiratory and Thorax: normal effort; Breath sounds: normal; No rales/wheezing/rhonchi  	  Cardiovascular: S1, S2, regular, NMBR	    Gastrointestinal: BS + x 4Q, nontender	    Genitourinary:  Bladder nondistended, nontender    Musculoskeletal:   General Right:   no muscle/joint tenderness,   normal tone, no joint swelling,   ROM: limited/full	    General Left:   no muscle/joint tenderness,   normal tone, no joint swelling,   ROM: limited/full    Hip:  Right: Dressing CDI; Drain: hemovac ; Type of drng.: serosang.; Amt. of drng: small             Lower extrems:   Right: no calf tenderness              negative francisca's sign               + pedal pulses    Left:   no calf tenderness              negative francisca's sign               + pedal pulses                          9.7    17.7  )-----------( 154      ( 15 Apr 2017 05:35 )             28.6       04-15    144  |  109<H>  |  29<H>  ----------------------------<  124<H>  4.5   |  25  |  0.80    Ca    8.2<L>      15 Apr 2017 05:35  Phos  2.6     04-14  Mg     1.9     04-15        PT/INR - ( 14 Apr 2017 05:11 )   PT: 12.2 sec;   INR: 1.13 ratio         PTT - ( 14 Apr 2017 05:11 )  PTT:26.7 sec				    MEDICATIONS  (STANDING):  predniSONE   Tablet 10milliGRAM(s) Oral daily  simvastatin 20milliGRAM(s) Oral at bedtime  hydroxychloroquine 200milliGRAM(s) Oral every 12 hours  docusate sodium 200milliGRAM(s) Oral daily  calcium carbonate 1250 mG + Vitamin D (OsCal 500 + D) 1Tablet(s) Oral daily  pantoprazole    Tablet 40milliGRAM(s) Oral before breakfast  memantine ER 14milliGRAM(s) Oral daily  senna 2Tablet(s) Oral at bedtime  ALBUTerol/ipratropium for Nebulization 3milliLiter(s) Nebulizer three times a day  enoxaparin Injectable 40milliGRAM(s) SubCutaneous every 24 hours  lactated ringers. 1000milliLiter(s) IV Continuous <Continuous>  famotidine    Tablet 20milliGRAM(s) Oral every 12 hours  ferrous    sulfate 325milliGRAM(s) Oral three times a day with meals  folic acid 1milliGRAM(s) Oral daily  multivitamin 1Tablet(s) Oral daily  ascorbic acid 500milliGRAM(s) Oral two times a day      Vital Signs Last 24 Hrs  T(C): 37.1, Max: 37.1 (04-15 @ 07:28)  T(F): 98.7, Max: 98.7 (04-15 @ 07:28)  HR: 76 (73 - 120)  BP: 144/56 (107/73 - 180/55)  BP(mean): 82 (82 - 82)  RR: 16 (15 - 20)  SpO2: 99% (97% - 100%)    DVT Prophylaxis:  LMWH                   (x  )  Heparin SQ           (  )  Coumadin             (  )  Xarelto                  (  )  Eliquis                   (  )  Venodynes           ( x )  Ambulation          ( x )  UFH                       (  )  Contraindicated  (  )

## 2017-04-15 NOTE — PROGRESS NOTE ADULT - SUBJECTIVE AND OBJECTIVE BOX
86 yo F hx of Dementia, RA on chronic prednisone,  non-O2 dependent COPD, glaucoma, HTN, distant TIA, GERD, HLD, osteoporosis, kidney stones, recent PNA, presents with CC R hip pain s/p fall out of bed.  Pt states she was readjusting herself in bed, and accidentally rolled out of bed on to floor.  C/o R hip pain, unable to stand or ambulate following.  Denies head trauma, or LOC.  C/o left sided chest wall pain, but states this is old 2/2 prior rib fx; no new or worsening pain of this area.  Denies any other injuries. Reports inadequate pain relief with last MSO4 dose; otherwise, denies focal complaints.  Able to climb multiple flights of stairs & ambulates daily w/o CP/SOB/palpitations/dizziness. > METS 	  4/13: No O/N events. No new complaints. Pain well-controlled.   4/14: No O/N events. Surgery postponed until today d/t OR availability. No focal complaints.   4/15: No O/N events. Very confused today. Denies focal complaints. Persistent urinary retention requiring PRN straight cath.       Physical Exam:  · Constitutional	Well-developed, well nourished	  · Neck	No bruits; no thyromegaly or nodules	  · Respiratory	Breath Sounds equal & clear to percussion & auscultation, no accessory muscle use	  · Cardiovascular	Regular rate & rhythm, normal S1, S2; no murmurs, gallops or rubs; no S3, S4	  · Gastrointestinal	Soft, non-tender, no hepatosplenomegaly, normal bowel sounds	  · Extremities	detailed exam	  · Extremities Details	normal	  · Vascular	Equal and normal pulses (carotid, femoral, dorsalis pedis)	  · Musculoskeletal	detailed exam	  · Musculoskeletal Details	decreased ROM due to pain; diminished strength  T(C): 37.1, Max: 37.1 (04-15 @ 07:28) HR: 76 (73 - 99) BP: 144/56 (120/72 - 180/55) RR: 16 (15 - 19) SpO2: 99% (99% - 100%) Wt(kg): --                         9.7   17.7  )-----------( 154      ( 15 Apr 2017 05:35 )            28.6   15 Apr 2017 05:35  144    |  109    |  29    ----------------------------<  124   4.5     |  25     |  0.80   Ca    8.2        15 Apr 2017 05:35 Phos  2.6       14 Apr 2017 05:11 Mg     1.9       15 Apr 2017 05:35   PT/INR - ( 14 Apr 2017 05:11 )   PT: 12.2 sec;   INR: 1.13 ratio      PTT - ( 14 Apr 2017 05:11 )  PTT:26.7 sec CAPILLARY BLOOD GLUCOSE    Hemoglobin A1C, Whole Blood: 5.5 % (04-13 @ 04:46)    	            Assessment and Plan:   Assessment:  · Assessment		  87F with aforementioned PMHx, now a/w    * Right subtrochanteric hip fracture 2/2 mechanical fall from bed s/p IMN POD #1  - PRN analgesia  - daily PT  - resume Aggrenox    * Incidental anterior splenic infarct, asymptomatic, incidental finding on CT(chest); Pt non-toxic, normal WBC, afebrile- likely 2/2 underlying RA & associated vasculopathy  - no specific intervention @ this point  - GI eval appreciated    * Urinary retention, multifactorial 2/2 anesthesia/narcotics  - c/w straight cath PRN  - add bethaecol TID  - heplock    * Toxic-metabolic encephalopathy with inpatient-related delirium  - start low-dose riseridone  - orient frequently    * Steroid-dependent RA, stable  - double steroid dose during acute stressor period, will resume home 5mg dose upon D/C  - c/w Plaquenil    * Non-O2 dependent COPD, stable  - hold Spiriva, switch to Duonebs TID while inpatient  - O2 PRN    * HTN, stable  - c/w current management    * dementia, stable  - c/w current management  - Pt's daughter provided Namenda XR    * Hyperglycemia, HbA1C wnl  - D/C BGM/ISS    * DVT PPX- Lovenox    * Full code    Pt is medically optimized for OR. Asymptomatic splenic infarct is not contraindication to necessary orthopedic intervention.

## 2017-04-15 NOTE — PHYSICAL THERAPY INITIAL EVALUATION ADULT - ADDITIONAL COMMENTS
at an assisted living. meals in dining [place]. Supervised shower for safety, by dtr. who visits 5x/wk.

## 2017-04-15 NOTE — PHYSICAL THERAPY INITIAL EVALUATION ADULT - MD ORDER
Right LE WBAT, "PT eval and treat"4/14/17 19:25. Right LE WBAT, "PT eval and treat"4/14/17 19:25.  Ambulate with assist.

## 2017-04-15 NOTE — PHYSICAL THERAPY INITIAL EVALUATION ADULT - LEVEL OF INDEPENDENCE: BED TO CHAIR, REHAB EVAL
unable to perform/pt. confused, started to resist PT attempts to mobilize in seated with CNA present to assist also. Concern for safety. Pt. assisted back to supine.

## 2017-04-15 NOTE — PROGRESS NOTE ADULT - SUBJECTIVE AND OBJECTIVE BOX
Patient seen and examined. Pain controlled.    Physical exam  VS: Afebrile, vital signs stable  Gen: NAD  RLE: Dressing clean, dry, and intact. +EHL/FHL/TA/GSC. SILT L3-S1. +Dorsalis pedis pulse, capillary refill brisk. Compartments soft and nontender.

## 2017-04-15 NOTE — CONSULT NOTE ADULT - ASSESSMENT
This is a 87 year old female s/p right IM nail for hip fracture- with high thrombosis risk- on Lovenox.   Spoke with daughter at bedside- patient on Aggrenox 25/200 BID at home for TIA- ok with ortho to resume.    Plan:  Resume Aggrenox 200/25mg PO BID  Cont. Lovenox 40 mg SQ Daily  Daily CBC/BMP  Venodynes  Enc Amb.    Thank you for this consult, will continue to monitor.

## 2017-04-16 LAB
ANION GAP SERPL CALC-SCNC: 8 MMOL/L — SIGNIFICANT CHANGE UP (ref 5–17)
BLD GP AB SCN SERPL QL: SIGNIFICANT CHANGE UP
BUN SERPL-MCNC: 33 MG/DL — HIGH (ref 7–23)
CALCIUM SERPL-MCNC: 8.1 MG/DL — LOW (ref 8.5–10.1)
CHLORIDE SERPL-SCNC: 113 MMOL/L — HIGH (ref 96–108)
CO2 SERPL-SCNC: 26 MMOL/L — SIGNIFICANT CHANGE UP (ref 22–31)
CREAT SERPL-MCNC: 0.71 MG/DL — SIGNIFICANT CHANGE UP (ref 0.5–1.3)
GLUCOSE SERPL-MCNC: 92 MG/DL — SIGNIFICANT CHANGE UP (ref 70–99)
HCT VFR BLD CALC: 25.1 % — LOW (ref 34.5–45)
HGB BLD-MCNC: 8.1 G/DL — LOW (ref 11.5–15.5)
MAGNESIUM SERPL-MCNC: 2.1 MG/DL — SIGNIFICANT CHANGE UP (ref 1.8–2.4)
MCHC RBC-ENTMCNC: 29 PG — SIGNIFICANT CHANGE UP (ref 27–34)
MCHC RBC-ENTMCNC: 32.1 GM/DL — SIGNIFICANT CHANGE UP (ref 32–36)
MCV RBC AUTO: 90.4 FL — SIGNIFICANT CHANGE UP (ref 80–100)
PLATELET # BLD AUTO: 178 K/UL — SIGNIFICANT CHANGE UP (ref 150–400)
POTASSIUM SERPL-MCNC: 3.8 MMOL/L — SIGNIFICANT CHANGE UP (ref 3.5–5.3)
POTASSIUM SERPL-SCNC: 3.8 MMOL/L — SIGNIFICANT CHANGE UP (ref 3.5–5.3)
RBC # BLD: 2.78 M/UL — LOW (ref 3.8–5.2)
RBC # FLD: 15.6 % — HIGH (ref 10.3–14.5)
SODIUM SERPL-SCNC: 147 MMOL/L — HIGH (ref 135–145)
TYPE + AB SCN PNL BLD: SIGNIFICANT CHANGE UP
WBC # BLD: 15.8 K/UL — HIGH (ref 3.8–10.5)
WBC # FLD AUTO: 15.8 K/UL — HIGH (ref 3.8–10.5)

## 2017-04-16 PROCEDURE — 99233 SBSQ HOSP IP/OBS HIGH 50: CPT

## 2017-04-16 RX ORDER — SENNA PLUS 8.6 MG/1
2 TABLET ORAL
Qty: 0 | Refills: 0 | COMMUNITY
Start: 2017-04-16

## 2017-04-16 RX ORDER — FOLIC ACID 0.8 MG
1 TABLET ORAL
Qty: 0 | Refills: 0 | COMMUNITY
Start: 2017-04-16

## 2017-04-16 RX ORDER — FERROUS SULFATE 325(65) MG
1 TABLET ORAL
Qty: 180 | Refills: 0 | OUTPATIENT
Start: 2017-04-16 | End: 2017-07-15

## 2017-04-16 RX ORDER — HYDROMORPHONE HYDROCHLORIDE 2 MG/ML
0.5 INJECTION INTRAMUSCULAR; INTRAVENOUS; SUBCUTANEOUS
Qty: 0 | Refills: 0 | Status: DISCONTINUED | OUTPATIENT
Start: 2017-04-16 | End: 2017-04-17

## 2017-04-16 RX ORDER — OXYCODONE HYDROCHLORIDE 5 MG/1
5 TABLET ORAL EVERY 4 HOURS
Qty: 0 | Refills: 0 | Status: DISCONTINUED | OUTPATIENT
Start: 2017-04-16 | End: 2017-04-16

## 2017-04-16 RX ORDER — TRAMADOL HYDROCHLORIDE 50 MG/1
1 TABLET ORAL
Qty: 0 | Refills: 0 | COMMUNITY
Start: 2017-04-16

## 2017-04-16 RX ORDER — ASCORBIC ACID 60 MG
1 TABLET,CHEWABLE ORAL
Qty: 0 | Refills: 0 | COMMUNITY
Start: 2017-04-16

## 2017-04-16 RX ORDER — SODIUM CHLORIDE 9 MG/ML
1000 INJECTION, SOLUTION INTRAVENOUS
Qty: 0 | Refills: 0 | Status: DISCONTINUED | OUTPATIENT
Start: 2017-04-16 | End: 2017-04-17

## 2017-04-16 RX ORDER — DOCUSATE SODIUM 100 MG
2 CAPSULE ORAL
Qty: 0 | Refills: 0 | COMMUNITY

## 2017-04-16 RX ORDER — ENOXAPARIN SODIUM 100 MG/ML
40 INJECTION SUBCUTANEOUS
Qty: 0 | Refills: 0 | COMMUNITY
Start: 2017-04-16

## 2017-04-16 RX ORDER — TRAMADOL HYDROCHLORIDE 50 MG/1
50 TABLET ORAL EVERY 4 HOURS
Qty: 0 | Refills: 0 | Status: DISCONTINUED | OUTPATIENT
Start: 2017-04-16 | End: 2017-04-17

## 2017-04-16 RX ADMIN — PANTOPRAZOLE SODIUM 40 MILLIGRAM(S): 20 TABLET, DELAYED RELEASE ORAL at 06:11

## 2017-04-16 RX ADMIN — ENOXAPARIN SODIUM 40 MILLIGRAM(S): 100 INJECTION SUBCUTANEOUS at 06:11

## 2017-04-16 RX ADMIN — HYDROMORPHONE HYDROCHLORIDE 0.5 MILLIGRAM(S): 2 INJECTION INTRAMUSCULAR; INTRAVENOUS; SUBCUTANEOUS at 08:37

## 2017-04-16 RX ADMIN — ASPIRIN AND DIPYRIDAMOLE 1 CAPSULE(S): 25; 200 CAPSULE, EXTENDED RELEASE ORAL at 17:27

## 2017-04-16 RX ADMIN — Medication 650 MILLIGRAM(S): at 06:11

## 2017-04-16 RX ADMIN — Medication 3 MILLILITER(S): at 15:00

## 2017-04-16 RX ADMIN — Medication 3 MILLILITER(S): at 09:02

## 2017-04-16 RX ADMIN — SIMVASTATIN 20 MILLIGRAM(S): 20 TABLET, FILM COATED ORAL at 21:25

## 2017-04-16 RX ADMIN — Medication 200 MILLIGRAM(S): at 06:11

## 2017-04-16 RX ADMIN — SODIUM CHLORIDE 42 MILLILITER(S): 9 INJECTION, SOLUTION INTRAVENOUS at 17:24

## 2017-04-16 RX ADMIN — Medication 3 MILLILITER(S): at 20:59

## 2017-04-16 RX ADMIN — Medication 10 MILLIGRAM(S): at 06:11

## 2017-04-16 RX ADMIN — FAMOTIDINE 20 MILLIGRAM(S): 10 INJECTION INTRAVENOUS at 06:11

## 2017-04-16 RX ADMIN — HYDROMORPHONE HYDROCHLORIDE 0.5 MILLIGRAM(S): 2 INJECTION INTRAMUSCULAR; INTRAVENOUS; SUBCUTANEOUS at 09:07

## 2017-04-16 RX ADMIN — Medication 50 MILLIGRAM(S): at 17:25

## 2017-04-16 RX ADMIN — FAMOTIDINE 20 MILLIGRAM(S): 10 INJECTION INTRAVENOUS at 17:30

## 2017-04-16 RX ADMIN — Medication 10 MILLIGRAM(S): at 13:41

## 2017-04-16 RX ADMIN — Medication 500 MILLIGRAM(S): at 17:26

## 2017-04-16 RX ADMIN — MEMANTINE HYDROCHLORIDE 14 MILLIGRAM(S): 10 TABLET ORAL at 13:13

## 2017-04-16 RX ADMIN — Medication 650 MILLIGRAM(S): at 20:31

## 2017-04-16 RX ADMIN — Medication 200 MILLIGRAM(S): at 17:26

## 2017-04-16 RX ADMIN — Medication 10 MILLIGRAM(S): at 21:25

## 2017-04-16 RX ADMIN — Medication 325 MILLIGRAM(S): at 17:29

## 2017-04-16 RX ADMIN — ASPIRIN AND DIPYRIDAMOLE 1 CAPSULE(S): 25; 200 CAPSULE, EXTENDED RELEASE ORAL at 06:11

## 2017-04-16 NOTE — PROGRESS NOTE ADULT - ASSESSMENT
This is a 87 year old female s/p right IM nail for hip fracture- with high thrombosis risk- on Lovenox.   Spoke with daughter at bedside- patient on Aggrenox 25/200 BID at home for TIA- ok with ortho to resume.    4/16: Spoke with daughter at bedside- anticipating discharge to rehab harsh/Tuesday.   Plan:  Cont. Lovenox 40 mg SQ Daily  Daily CBC/BMP  Venodynes  Enc Amb.    Will continue to monitor.

## 2017-04-16 NOTE — PROGRESS NOTE ADULT - SUBJECTIVE AND OBJECTIVE BOX
88 yo F hx of Dementia, RA on chronic prednisone,  non-O2 dependent COPD, glaucoma, HTN, distant TIA, GERD, HLD, osteoporosis, kidney stones, recent PNA, presents with CC R hip pain s/p fall out of bed.  Pt states she was readjusting herself in bed, and accidentally rolled out of bed on to floor.  C/o R hip pain, unable to stand or ambulate following.  Denies head trauma, or LOC.  C/o left sided chest wall pain, but states this is old 2/2 prior rib fx; no new or worsening pain of this area.  Denies any other injuries. Reports inadequate pain relief with last MSO4 dose; otherwise, denies focal complaints.  Able to climb multiple flights of stairs & ambulates daily w/o CP/SOB/palpitations/dizziness. > METS 	  4/13: No O/N events. No new complaints. Pain well-controlled.   4/14: No O/N events. Surgery postponed until today d/t OR availability. No focal complaints.   4/15: No O/N events. Very confused today. Denies focal complaints. Persistent urinary retention requiring PRN straight cath.   4/16: No O/N events. Sleepy but arousable. Much calmer today. Denies focal complaints. Poor appetite.      Physical Exam:  · Constitutional	Well-developed, well nourished	  · Neck	No bruits; no thyromegaly or nodules	  · Respiratory	Breath Sounds equal & clear to percussion & auscultation, no accessory muscle use	  · Cardiovascular	Regular rate & rhythm, normal S1, S2; no murmurs, gallops or rubs; no S3, S4	  · Gastrointestinal	Soft, non-tender, no hepatosplenomegaly, normal bowel sounds	  · Extremities	detailed exam	  · Extremities Details	normal	  · Vascular	Equal and normal pulses (carotid, femoral, dorsalis pedis)	  · Musculoskeletal	detailed exam	  · Musculoskeletal Details	decreased ROM due to pain; diminished strength  T(C): 36.4, Max: 36.9 (04-15 @ 15:15) HR: 100 (95 - 100) BP: 148/71 (129/45 - 148/71) RR: 16 (16 - 16) SpO2: 100% (92% - 100%) Wt(kg): --                         8.1   15.8  )-----------( 178      ( 16 Apr 2017 05:52 )            25.1   16 Apr 2017 05:52  147    |  113    |  33    ----------------------------<  92    3.8     |  26     |  0.71   Ca    8.1        16 Apr 2017 05:52 Mg     2.1       16 Apr 2017 05:52  	          Assessment and Plan:   Assessment:  · Assessment		  87F with aforementioned PMHx, now a/w    * Right subtrochanteric hip fracture 2/2 mechanical fall from bed s/p IMN POD #2  - PRN analgesia  - daily PT  - resume Aggrenox, a/w Lovenox for DVT PPx  - anticipate SUE 4/17    * Incidental anterior splenic infarct, asymptomatic, incidental finding on CT(chest); Pt non-toxic, normal WBC, afebrile- likely 2/2 underlying RA & associated vasculopathy  - no specific intervention @ this point  - GI eval appreciated    * Urinary retention, multifactorial 2/2 anesthesia/narcotics  - c/w straight cath PRN to avoid Tellez  - add bethanecol TID    * Toxic-metabolic encephalopathy with inpatient-related delirium- improving  - orient frequently  - will avoid risperdal after d/w daughter    * Steroid-dependent RA, stable  - double steroid dose during acute stressor period, will resume home 5mg dose upon D/C  - c/w Plaquenil    * Non-O2 dependent COPD, stable  - hold Spiriva, switch to Duonebs TID while inpatient  - O2 PRN    * hypernatremia, 2/2 poor oral hydration  - encourage hydration  - start gentle hypotonic IVF    * HTN, stable  - c/w current management    * dementia, stable  - c/w current management  - Pt's daughter provided Namenda XR    * Hyperglycemia, HbA1C wnl, cortisol induced 2/2 trauma    * DVT PPX- Lovenox    * Full code    Dispo: daily PT, nutritional supplements. Plan for SUE 4/17.

## 2017-04-16 NOTE — PROGRESS NOTE ADULT - SUBJECTIVE AND OBJECTIVE BOX
HPI  HPI:  86 yo F hx of Dementia, RA on chronic prednisone,  non-O2 dependent COPD, glaucoma, HTN, distant TIA, GERD, HLD, osteoporosis, kidney stones, recent PNA, presents with CC R hip pain s/p fall out of bed.  Pt states she was readjusting herself in bed, and accidentally rolled out of bed on to floor.  C/o R hip pain, unable to stand or ambulate following.  Denies head trauma, or LOC.  C/o left sided chest wall pain, but states this is old 2/2 prior rib fx; no new or worsening pain of this area.  Denies any other injuries. Reports inadequate pain relief with last MSO4 dose; otherwise, denies focal complaints.  Able to climb multiple flights of stairs & ambulates daily w/o CP/SOB/palpitations/dizziness. > METS (12 Apr 2017 11:18)      Patient is a 87y old  Female who presents with a chief complaint of hip fracture, fell out of bed (14 Apr 2017 19:30)      Consulted by Dr. Haynes for VTE prophylaxis, risk stratification, and anticoagulation management.    PAST MEDICAL & SURGICAL HISTORY:  Compression fracture of spine: T7  Alzheimers disease  HTN (hypertension)  Glaucoma  TIA (transient ischemic attack): 8/07  Cerebral vascular accident: 2005  Polymyalgia rheumatica  Osteoporosis  Chronic pain: mid back  Urinary retention  GERD (gastroesophageal reflux disease)  Cholelithiases  Hyperlipemia  Carotid artery stenosis  Lung nodule: biopsied 2003, benign  COPD (chronic obstructive pulmonary disease)  Asthma  Gall stones  History of hysterectomy: for bleeding  Hx of cholecystectomy      BMI: 23.6    CrCl: 41.0    Caprini VTE Risk Score: 8 (high)    IMPROVE Bleeding Risk Score: 3.5 (mod)    Falls Risk:   High ( x )  Mod (  )  Low (  )      FAMILY HISTORY:  No pertinent family history in first degree relatives    Denies any personal or familial history of clotting or bleeding disorders.    Allergies    Aciphex (Unknown)  Macrobid (Unknown)  Percocet 10/325 (Rash)    Intolerances        REVIEW OF SYSTEMS    (  )Fever	     (  )Constipation	(  )SOB				(  )Headache	(  )Dysuria  (  )Chills	     (  )Melena	(  )Dyspnea present on exertion	                    (  )Dizziness                    (  )Polyuria  (  )Nausea	     (  )Hematochezia	(  )Cough			                    (  )Syncope   	(  )Hematuria  (  )Vomiting    (  )Chest Pain	(  )Wheezing			(  )Weakness  (  )Diarrhea     (  )Palpitations	(  )Anorexia			(  )Myalgia    All other review of systems negative: Yes    Unable to obtain review of systems due to:       PHYSICAL EXAM:    Constitutional: Appears somnolent    Neurological: arousable- resting    Skin: Warm    Respiratory and Thorax: normal effort; Breath sounds: normal; No rales/wheezing/rhonchi  	  Cardiovascular: S1, S2, regular, NMBR	    Gastrointestinal: BS + x 4Q, nontender	    Genitourinary:  Bladder nondistended, nontender    Musculoskeletal:   General Right:   no muscle/joint tenderness,   normal tone, no joint swelling,   ROM: limited/full	    General Left:   no muscle/joint tenderness,   normal tone, no joint swelling,   ROM: limited/full    Hip:  Right: Dressing CDI; Drain: hemovac ; Type of drng.: serosang.; Amt. of drng: small             Lower extrems:   Right: no calf tenderness              negative francisca's sign               + pedal pulses    Left:   no calf tenderness              negative francisca's sign               + pedal pulses                                   8.1    15.8  )-----------( 178      ( 16 Apr 2017 05:52 )             25.1       04-16    147<H>  |  113<H>  |  33<H>  ----------------------------<  92  3.8   |  26  |  0.71    Ca    8.1<L>      16 Apr 2017 05:52  Mg     2.1     04-16        		    MEDICATIONS  (STANDING):  predniSONE   Tablet 10milliGRAM(s) Oral daily  simvastatin 20milliGRAM(s) Oral at bedtime  hydroxychloroquine 200milliGRAM(s) Oral every 12 hours  docusate sodium 200milliGRAM(s) Oral daily  calcium carbonate 1250 mG + Vitamin D (OsCal 500 + D) 1Tablet(s) Oral daily  pantoprazole    Tablet 40milliGRAM(s) Oral before breakfast  memantine ER 14milliGRAM(s) Oral daily  senna 2Tablet(s) Oral at bedtime  ALBUTerol/ipratropium for Nebulization 3milliLiter(s) Nebulizer three times a day  enoxaparin Injectable 40milliGRAM(s) SubCutaneous every 24 hours  lactated ringers. 1000milliLiter(s) IV Continuous <Continuous>  famotidine    Tablet 20milliGRAM(s) Oral every 12 hours  ferrous    sulfate 325milliGRAM(s) Oral three times a day with meals  folic acid 1milliGRAM(s) Oral daily  multivitamin 1Tablet(s) Oral daily  ascorbic acid 500milliGRAM(s) Oral two times a day      Vital Signs Last 24 Hrs  T(C): 36.4, Max: 36.9 (04-15 @ 15:15)  T(F): 97.5, Max: 98.4 (04-15 @ 15:15)  HR: 100 (95 - 100)  BP: 148/71 (129/45 - 148/71)  BP(mean): --  RR: 16 (16 - 16)  SpO2: 100% (92% - 100%)    DVT Prophylaxis:  LMWH                   (x  )  Heparin SQ           (  )  Coumadin             (  )  Xarelto                  (  )  Eliquis                   (  )  Venodynes           ( x )  Ambulation          ( x )  UFH                       (  )  Contraindicated  (  )

## 2017-04-16 NOTE — PROGRESS NOTE ADULT - SUBJECTIVE AND OBJECTIVE BOX
Patient seen and examined. Pain controlled.    Physical exam  VS: Afebrile, vital signs stable  Gen: NAD  RLE: One dressing discontinued overnight. +EHL/FHL/TA/GSC. SILT L3-S1. +Dorsalis pedis pulse, capillary refill brisk. Compartments soft and nontender.

## 2017-04-17 VITALS
OXYGEN SATURATION: 93 % | HEART RATE: 96 BPM | DIASTOLIC BLOOD PRESSURE: 61 MMHG | SYSTOLIC BLOOD PRESSURE: 134 MMHG | RESPIRATION RATE: 17 BRPM | TEMPERATURE: 99 F

## 2017-04-17 LAB
ANION GAP SERPL CALC-SCNC: 8 MMOL/L — SIGNIFICANT CHANGE UP (ref 5–17)
BUN SERPL-MCNC: 33 MG/DL — HIGH (ref 7–23)
CALCIUM SERPL-MCNC: 8 MG/DL — LOW (ref 8.5–10.1)
CHLORIDE SERPL-SCNC: 112 MMOL/L — HIGH (ref 96–108)
CO2 SERPL-SCNC: 26 MMOL/L — SIGNIFICANT CHANGE UP (ref 22–31)
CREAT SERPL-MCNC: 0.63 MG/DL — SIGNIFICANT CHANGE UP (ref 0.5–1.3)
GLUCOSE SERPL-MCNC: 97 MG/DL — SIGNIFICANT CHANGE UP (ref 70–99)
HCT VFR BLD CALC: 26.3 % — LOW (ref 34.5–45)
HGB BLD-MCNC: 8.6 G/DL — LOW (ref 11.5–15.5)
MAGNESIUM SERPL-MCNC: 2.1 MG/DL — SIGNIFICANT CHANGE UP (ref 1.8–2.4)
MCHC RBC-ENTMCNC: 29.2 PG — SIGNIFICANT CHANGE UP (ref 27–34)
MCHC RBC-ENTMCNC: 32.5 GM/DL — SIGNIFICANT CHANGE UP (ref 32–36)
MCV RBC AUTO: 89.9 FL — SIGNIFICANT CHANGE UP (ref 80–100)
PLATELET # BLD AUTO: 153 K/UL — SIGNIFICANT CHANGE UP (ref 150–400)
POTASSIUM SERPL-MCNC: 3.7 MMOL/L — SIGNIFICANT CHANGE UP (ref 3.5–5.3)
POTASSIUM SERPL-SCNC: 3.7 MMOL/L — SIGNIFICANT CHANGE UP (ref 3.5–5.3)
RBC # BLD: 2.93 M/UL — LOW (ref 3.8–5.2)
RBC # FLD: 16 % — HIGH (ref 10.3–14.5)
SODIUM SERPL-SCNC: 146 MMOL/L — HIGH (ref 135–145)
WBC # BLD: 12.1 K/UL — HIGH (ref 3.8–10.5)
WBC # FLD AUTO: 12.1 K/UL — HIGH (ref 3.8–10.5)

## 2017-04-17 PROCEDURE — 99233 SBSQ HOSP IP/OBS HIGH 50: CPT

## 2017-04-17 RX ADMIN — ENOXAPARIN SODIUM 40 MILLIGRAM(S): 100 INJECTION SUBCUTANEOUS at 06:20

## 2017-04-17 RX ADMIN — Medication 500 MILLIGRAM(S): at 06:17

## 2017-04-17 RX ADMIN — Medication 325 MILLIGRAM(S): at 18:04

## 2017-04-17 RX ADMIN — Medication 3 MILLILITER(S): at 10:18

## 2017-04-17 RX ADMIN — Medication 1 TABLET(S): at 11:19

## 2017-04-17 RX ADMIN — FAMOTIDINE 20 MILLIGRAM(S): 10 INJECTION INTRAVENOUS at 18:04

## 2017-04-17 RX ADMIN — Medication 10 MILLIGRAM(S): at 13:20

## 2017-04-17 RX ADMIN — Medication 10 MILLIGRAM(S): at 06:13

## 2017-04-17 RX ADMIN — Medication 200 MILLIGRAM(S): at 18:04

## 2017-04-17 RX ADMIN — PANTOPRAZOLE SODIUM 40 MILLIGRAM(S): 20 TABLET, DELAYED RELEASE ORAL at 06:17

## 2017-04-17 RX ADMIN — Medication 500 MILLIGRAM(S): at 18:04

## 2017-04-17 RX ADMIN — MEMANTINE HYDROCHLORIDE 14 MILLIGRAM(S): 10 TABLET ORAL at 13:34

## 2017-04-17 RX ADMIN — Medication 325 MILLIGRAM(S): at 13:20

## 2017-04-17 RX ADMIN — FAMOTIDINE 20 MILLIGRAM(S): 10 INJECTION INTRAVENOUS at 06:17

## 2017-04-17 RX ADMIN — Medication 1 MILLIGRAM(S): at 11:20

## 2017-04-17 RX ADMIN — Medication 1 TABLET(S): at 11:20

## 2017-04-17 RX ADMIN — Medication 10 MILLIGRAM(S): at 06:17

## 2017-04-17 RX ADMIN — ASPIRIN AND DIPYRIDAMOLE 1 CAPSULE(S): 25; 200 CAPSULE, EXTENDED RELEASE ORAL at 18:04

## 2017-04-17 RX ADMIN — Medication 200 MILLIGRAM(S): at 06:17

## 2017-04-17 RX ADMIN — ASPIRIN AND DIPYRIDAMOLE 1 CAPSULE(S): 25; 200 CAPSULE, EXTENDED RELEASE ORAL at 06:21

## 2017-04-17 NOTE — PROGRESS NOTE ADULT - ASSESSMENT
This is a 87 year old female s/p right IM nail for hip fracture- with high thrombosis risk- on Lovenox.   Spoke with daughter at bedside- patient on Aggrenox 25/200 BID at home for TIA- ok with ortho to resume.      Plan:  Cont. Lovenox 40 mg SQ Daily  Daily CBC/BMP  Venodynes  Enc Amb.    Will continue to monitor.

## 2017-04-17 NOTE — PROGRESS NOTE ADULT - SUBJECTIVE AND OBJECTIVE BOX
HPI  HPI:  86 yo F hx of Dementia, RA on chronic prednisone,  non-O2 dependent COPD, glaucoma, HTN, distant TIA, GERD, HLD, osteoporosis, kidney stones, recent PNA, presents with CC R hip pain s/p fall out of bed.  Pt states she was readjusting herself in bed, and accidentally rolled out of bed on to floor.  C/o R hip pain, unable to stand or ambulate following.  Denies head trauma, or LOC.  C/o left sided chest wall pain, but states this is old 2/2 prior rib fx; no new or worsening pain of this area.  Denies any other injuries. Reports inadequate pain relief with last MSO4 dose; otherwise, denies focal complaints.  Able to climb multiple flights of stairs & ambulates daily w/o CP/SOB/palpitations/dizziness. > METS (12 Apr 2017 11:18)      Patient is a 87y old  Female who presents with a chief complaint of hip fracture, fell out of bed (14 Apr 2017 19:30)      Consulted by Dr. Haynes for VTE prophylaxis, risk stratification, and anticoagulation management.    PAST MEDICAL & SURGICAL HISTORY:  Compression fracture of spine: T7  Alzheimers disease  HTN (hypertension)  Glaucoma  TIA (transient ischemic attack): 8/07  Cerebral vascular accident: 2005  Polymyalgia rheumatica  Osteoporosis  Chronic pain: mid back  Urinary retention  GERD (gastroesophageal reflux disease)  Cholelithiases  Hyperlipemia  Carotid artery stenosis  Lung nodule: biopsied 2003, benign  COPD (chronic obstructive pulmonary disease)  Asthma  Gall stones  History of hysterectomy: for bleeding  Hx of cholecystectomy      BMI: 23.6    CrCl: 41.0    Caprini VTE Risk Score: 8 (high)    IMPROVE Bleeding Risk Score: 3.5 (mod)    Falls Risk:   High ( x )  Mod (  )  Low (  )      FAMILY HISTORY:  No pertinent family history in first degree relatives    Denies any personal or familial history of clotting or bleeding disorders.    Allergies    Aciphex (Unknown)  Macrobid (Unknown)  Percocet 10/325 (Rash)    Intolerances        REVIEW OF SYSTEMS    (  )Fever	     (  )Constipation	(  )SOB				(  )Headache	(  )Dysuria  (  )Chills	     (  )Melena	(  )Dyspnea present on exertion	                    (  )Dizziness                    (  )Polyuria  (  )Nausea	     (  )Hematochezia	(  )Cough			                    (  )Syncope   	(  )Hematuria  (  )Vomiting    (  )Chest Pain	(  )Wheezing			(  )Weakness  (  )Diarrhea     (  )Palpitations	(  )Anorexia			(  )Myalgia    All other review of systems negative: Yes    Unable to obtain review of systems due to:       PHYSICAL EXAM:    Constitutional: Appears somnolent    Neurological: arousable- resting    Skin: Warm    Respiratory and Thorax: normal effort; Breath sounds: normal; No rales/wheezing/rhonchi  	  Cardiovascular: S1, S2, regular, NMBR	    Gastrointestinal: BS + x 4Q, nontender	    Genitourinary:  Bladder nondistended, nontender    Musculoskeletal:   General Right:   + muscle/joint tenderness,   normal tone, no joint swelling,   ROM: limitedl	    General Left:   no muscle/joint tenderness,   normal tone, no joint swelling,   ROM: limited/    Hip:  Right: Dressing CDI;              Lower extrems:   Right: no calf tenderness              negative francisca's sign               + pedal pulses    Left:   no calf tenderness              negative francisca's sign               + pedal pulses                            8.6    12.1  )-----------( 153      ( 17 Apr 2017 05:36 )             26.3       04-17    146<H>  |  112<H>  |  33<H>  ----------------------------<  97  3.7   |  26  |  0.63    Ca    8.0<L>      17 Apr 2017 05:36  Mg     2.1     04-17                                     8.1    15.8  )-----------( 178      ( 16 Apr 2017 05:52 )             25.1       04-16    147<H>  |  113<H>  |  33<H>  ----------------------------<  92  3.8   |  26  |  0.71    Ca    8.1<L>      16 Apr 2017 05:52  Mg     2.1     04-16        		    MEDICATIONS  (STANDING):  predniSONE   Tablet 10milliGRAM(s) Oral daily  simvastatin 20milliGRAM(s) Oral at bedtime  hydroxychloroquine 200milliGRAM(s) Oral every 12 hours  docusate sodium 200milliGRAM(s) Oral daily  calcium carbonate 1250 mG + Vitamin D (OsCal 500 + D) 1Tablet(s) Oral daily  pantoprazole    Tablet 40milliGRAM(s) Oral before breakfast  memantine ER 14milliGRAM(s) Oral daily  senna 2Tablet(s) Oral at bedtime  ALBUTerol/ipratropium for Nebulization 3milliLiter(s) Nebulizer three times a day  enoxaparin Injectable 40milliGRAM(s) SubCutaneous every 24 hours  lactated ringers. 1000milliLiter(s) IV Continuous <Continuous>  famotidine    Tablet 20milliGRAM(s) Oral every 12 hours  ferrous    sulfate 325milliGRAM(s) Oral three times a day with meals  folic acid 1milliGRAM(s) Oral daily  multivitamin 1Tablet(s) Oral daily  ascorbic acid 500milliGRAM(s) Oral two times a day      Vital Signs Last 24 Hrs  T(C): 36.7, Max: 37.3 (04-16 @ 23:15)  T(F): 98, Max: 99.1 (04-16 @ 23:15)  HR: 96 (94 - 103)  BP: 92/52 (92/52 - 140/47)  BP(mean): --  RR: 16 (16 - 16)  SpO2: 96% (93% - 100%)    DVT Prophylaxis:  LMWH                   (x  )  Heparin SQ           (  )  Coumadin             (  )  Xarelto                  (  )  Eliquis                   (  )  Venodynes           ( x )  Ambulation          ( x )  UFH                       (  )  Contraindicated  (  )

## 2017-04-20 DIAGNOSIS — R33.9 RETENTION OF URINE, UNSPECIFIED: ICD-10-CM

## 2017-04-20 DIAGNOSIS — Z79.899 OTHER LONG TERM (CURRENT) DRUG THERAPY: ICD-10-CM

## 2017-04-20 DIAGNOSIS — E87.0 HYPEROSMOLALITY AND HYPERNATREMIA: ICD-10-CM

## 2017-04-20 DIAGNOSIS — K21.9 GASTRO-ESOPHAGEAL REFLUX DISEASE WITHOUT ESOPHAGITIS: ICD-10-CM

## 2017-04-20 DIAGNOSIS — E78.5 HYPERLIPIDEMIA, UNSPECIFIED: ICD-10-CM

## 2017-04-20 DIAGNOSIS — D73.5 INFARCTION OF SPLEEN: ICD-10-CM

## 2017-04-20 DIAGNOSIS — S72.21XA DISPLACED SUBTROCHANTERIC FRACTURE OF RIGHT FEMUR, INITIAL ENCOUNTER FOR CLOSED FRACTURE: ICD-10-CM

## 2017-04-20 DIAGNOSIS — M81.0 AGE-RELATED OSTEOPOROSIS WITHOUT CURRENT PATHOLOGICAL FRACTURE: ICD-10-CM

## 2017-04-20 DIAGNOSIS — Z87.891 PERSONAL HISTORY OF NICOTINE DEPENDENCE: ICD-10-CM

## 2017-04-20 DIAGNOSIS — D62 ACUTE POSTHEMORRHAGIC ANEMIA: ICD-10-CM

## 2017-04-20 DIAGNOSIS — F03.90 UNSPECIFIED DEMENTIA, UNSPECIFIED SEVERITY, WITHOUT BEHAVIORAL DISTURBANCE, PSYCHOTIC DISTURBANCE, MOOD DISTURBANCE, AND ANXIETY: ICD-10-CM

## 2017-04-20 DIAGNOSIS — Z86.73 PERSONAL HISTORY OF TRANSIENT ISCHEMIC ATTACK (TIA), AND CEREBRAL INFARCTION WITHOUT RESIDUAL DEFICITS: ICD-10-CM

## 2017-04-20 DIAGNOSIS — I10 ESSENTIAL (PRIMARY) HYPERTENSION: ICD-10-CM

## 2017-04-20 DIAGNOSIS — M06.9 RHEUMATOID ARTHRITIS, UNSPECIFIED: ICD-10-CM

## 2017-04-20 DIAGNOSIS — Z88.8 ALLERGY STATUS TO OTHER DRUGS, MEDICAMENTS AND BIOLOGICAL SUBSTANCES STATUS: ICD-10-CM
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
## 2017-04-20 DIAGNOSIS — R73.9 HYPERGLYCEMIA, UNSPECIFIED: ICD-10-CM
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
## 2017-04-20 DIAGNOSIS — G92 TOXIC ENCEPHALOPATHY: ICD-10-CM

## 2017-04-20 DIAGNOSIS — H40.9 UNSPECIFIED GLAUCOMA: ICD-10-CM

## 2017-04-20 DIAGNOSIS — J44.9 CHRONIC OBSTRUCTIVE PULMONARY DISEASE, UNSPECIFIED: ICD-10-CM

## 2017-07-09 ENCOUNTER — EMERGENCY (EMERGENCY)
Facility: HOSPITAL | Age: 82
LOS: 1 days | Discharge: HOME | End: 2017-07-09
Admitting: EMERGENCY MEDICINE
Payer: COMMERCIAL
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
## 2017-07-09 DIAGNOSIS — G30.9 ALZHEIMER'S DISEASE, UNSPECIFIED: ICD-10-CM

## 2017-07-09 DIAGNOSIS — R69 ILLNESS, UNSPECIFIED: ICD-10-CM
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
## 2017-07-09 DIAGNOSIS — Z86.73 PERSONAL HISTORY OF TRANSIENT ISCHEMIC ATTACK (TIA), AND CEREBRAL INFARCTION WITHOUT RESIDUAL DEFICITS: ICD-10-CM

## 2017-07-09 DIAGNOSIS — I25.10 ATHEROSCLEROTIC HEART DISEASE OF NATIVE CORONARY ARTERY WITHOUT ANGINA PECTORIS: ICD-10-CM

## 2017-07-09 DIAGNOSIS — K21.9 GASTRO-ESOPHAGEAL REFLUX DISEASE WITHOUT ESOPHAGITIS: ICD-10-CM

## 2017-07-09 DIAGNOSIS — F02.80 DEMENTIA IN OTHER DISEASES CLASSIFIED ELSEWHERE, UNSPECIFIED SEVERITY, WITHOUT BEHAVIORAL DISTURBANCE, PSYCHOTIC DISTURBANCE, MOOD DISTURBANCE, AND ANXIETY: ICD-10-CM

## 2017-07-09 DIAGNOSIS — Z91.81 HISTORY OF FALLING: ICD-10-CM

## 2017-07-09 DIAGNOSIS — J44.9 CHRONIC OBSTRUCTIVE PULMONARY DISEASE, UNSPECIFIED: ICD-10-CM

## 2017-07-09 DIAGNOSIS — E78.5 HYPERLIPIDEMIA, UNSPECIFIED: ICD-10-CM

## 2017-07-09 DIAGNOSIS — S40.011A CONTUSION OF RIGHT SHOULDER, INITIAL ENCOUNTER: ICD-10-CM

## 2017-07-09 DIAGNOSIS — I10 ESSENTIAL (PRIMARY) HYPERTENSION: ICD-10-CM

## 2017-07-09 PROCEDURE — 99284 EMERGENCY DEPT VISIT MOD MDM: CPT

## 2017-07-09 PROCEDURE — 73060 X-RAY EXAM OF HUMERUS: CPT | Mod: 26,RT

## 2017-07-09 PROCEDURE — 73030 X-RAY EXAM OF SHOULDER: CPT | Mod: 26,RT

## 2017-08-08 ENCOUNTER — APPOINTMENT (OUTPATIENT)
Dept: NEUROLOGY | Facility: CLINIC | Age: 82
End: 2017-08-08
Payer: MEDICARE

## 2017-08-08 VITALS
WEIGHT: 135 LBS | BODY MASS INDEX: 24.84 KG/M2 | HEART RATE: 72 BPM | DIASTOLIC BLOOD PRESSURE: 78 MMHG | SYSTOLIC BLOOD PRESSURE: 100 MMHG | HEIGHT: 62 IN

## 2017-08-08 DIAGNOSIS — M54.81 OCCIPITAL NEURALGIA: ICD-10-CM

## 2017-08-08 DIAGNOSIS — M48.06 SPINAL STENOSIS, LUMBAR REGION: ICD-10-CM

## 2017-08-08 PROCEDURE — 99214 OFFICE O/P EST MOD 30 MIN: CPT

## 2017-08-22 ENCOUNTER — INPATIENT (INPATIENT)
Facility: HOSPITAL | Age: 82
LOS: 5 days | Discharge: NURSING, REHAB OR LONG-TERM CARE | End: 2017-08-28
Attending: FAMILY MEDICINE | Admitting: FAMILY MEDICINE
Payer: COMMERCIAL

## 2017-08-22 VITALS
HEART RATE: 88 BPM | OXYGEN SATURATION: 96 % | RESPIRATION RATE: 18 BRPM | SYSTOLIC BLOOD PRESSURE: 148 MMHG | TEMPERATURE: 98 F | WEIGHT: 134.92 LBS | DIASTOLIC BLOOD PRESSURE: 74 MMHG | HEIGHT: 62 IN

## 2017-08-22 DIAGNOSIS — A04.7 ENTEROCOLITIS DUE TO CLOSTRIDIUM DIFFICILE: ICD-10-CM

## 2017-08-22 DIAGNOSIS — N63 UNSPECIFIED LUMP IN BREAST: ICD-10-CM

## 2017-08-22 LAB
ALBUMIN SERPL ELPH-MCNC: 3 G/DL — LOW (ref 3.3–5)
ALP SERPL-CCNC: 69 U/L — SIGNIFICANT CHANGE UP (ref 40–120)
ALT FLD-CCNC: 32 U/L — SIGNIFICANT CHANGE UP (ref 12–78)
ANION GAP SERPL CALC-SCNC: 8 MMOL/L — SIGNIFICANT CHANGE UP (ref 5–17)
AST SERPL-CCNC: 26 U/L — SIGNIFICANT CHANGE UP (ref 15–37)
BASOPHILS # BLD AUTO: 0.1 K/UL — SIGNIFICANT CHANGE UP (ref 0–0.2)
BASOPHILS NFR BLD AUTO: 1.2 % — SIGNIFICANT CHANGE UP (ref 0–2)
BILIRUB SERPL-MCNC: 0.4 MG/DL — SIGNIFICANT CHANGE UP (ref 0.2–1.2)
BUN SERPL-MCNC: 25 MG/DL — HIGH (ref 7–23)
CALCIUM SERPL-MCNC: 9.3 MG/DL — SIGNIFICANT CHANGE UP (ref 8.5–10.1)
CHLORIDE SERPL-SCNC: 107 MMOL/L — SIGNIFICANT CHANGE UP (ref 96–108)
CO2 SERPL-SCNC: 27 MMOL/L — SIGNIFICANT CHANGE UP (ref 22–31)
CREAT SERPL-MCNC: 0.88 MG/DL — SIGNIFICANT CHANGE UP (ref 0.5–1.3)
EOSINOPHIL # BLD AUTO: 0.2 K/UL — SIGNIFICANT CHANGE UP (ref 0–0.5)
EOSINOPHIL NFR BLD AUTO: 2.4 % — SIGNIFICANT CHANGE UP (ref 0–6)
GLUCOSE SERPL-MCNC: 87 MG/DL — SIGNIFICANT CHANGE UP (ref 70–99)
HCT VFR BLD CALC: 35 % — SIGNIFICANT CHANGE UP (ref 34.5–45)
HGB BLD-MCNC: 11.6 G/DL — SIGNIFICANT CHANGE UP (ref 11.5–15.5)
INR BLD: 1.14 RATIO — SIGNIFICANT CHANGE UP (ref 0.88–1.16)
LACTATE SERPL-SCNC: 1.1 MMOL/L — SIGNIFICANT CHANGE UP (ref 0.7–2)
LYMPHOCYTES # BLD AUTO: 1.4 K/UL — SIGNIFICANT CHANGE UP (ref 1–3.3)
LYMPHOCYTES # BLD AUTO: 18.8 % — SIGNIFICANT CHANGE UP (ref 13–44)
MAGNESIUM SERPL-MCNC: 2 MG/DL — SIGNIFICANT CHANGE UP (ref 1.6–2.6)
MCHC RBC-ENTMCNC: 28.5 PG — SIGNIFICANT CHANGE UP (ref 27–34)
MCHC RBC-ENTMCNC: 33.2 GM/DL — SIGNIFICANT CHANGE UP (ref 32–36)
MCV RBC AUTO: 85.8 FL — SIGNIFICANT CHANGE UP (ref 80–100)
MONOCYTES # BLD AUTO: 0.7 K/UL — SIGNIFICANT CHANGE UP (ref 0–0.9)
MONOCYTES NFR BLD AUTO: 9.9 % — SIGNIFICANT CHANGE UP (ref 2–14)
NEUTROPHILS # BLD AUTO: 4.9 K/UL — SIGNIFICANT CHANGE UP (ref 1.8–7.4)
NEUTROPHILS NFR BLD AUTO: 67.7 % — SIGNIFICANT CHANGE UP (ref 43–77)
PLATELET # BLD AUTO: 205 K/UL — SIGNIFICANT CHANGE UP (ref 150–400)
POTASSIUM SERPL-MCNC: 4.2 MMOL/L — SIGNIFICANT CHANGE UP (ref 3.5–5.3)
POTASSIUM SERPL-SCNC: 4.2 MMOL/L — SIGNIFICANT CHANGE UP (ref 3.5–5.3)
PROT SERPL-MCNC: 5.8 GM/DL — LOW (ref 6–8.3)
PROTHROM AB SERPL-ACNC: 12.3 SEC — SIGNIFICANT CHANGE UP (ref 9.8–12.7)
RBC # BLD: 4.08 M/UL — SIGNIFICANT CHANGE UP (ref 3.8–5.2)
RBC # FLD: 15.6 % — HIGH (ref 10.3–14.5)
SODIUM SERPL-SCNC: 142 MMOL/L — SIGNIFICANT CHANGE UP (ref 135–145)
WBC # BLD: 7.3 K/UL — SIGNIFICANT CHANGE UP (ref 3.8–10.5)
WBC # FLD AUTO: 7.3 K/UL — SIGNIFICANT CHANGE UP (ref 3.8–10.5)

## 2017-08-22 PROCEDURE — 93010 ELECTROCARDIOGRAM REPORT: CPT

## 2017-08-22 PROCEDURE — 99285 EMERGENCY DEPT VISIT HI MDM: CPT

## 2017-08-22 PROCEDURE — 71250 CT THORAX DX C-: CPT | Mod: 26

## 2017-08-22 RX ORDER — MEMANTINE HYDROCHLORIDE 10 MG/1
1 TABLET ORAL
Qty: 0 | Refills: 0 | COMMUNITY

## 2017-08-22 RX ORDER — HYDROXYCHLOROQUINE SULFATE 200 MG
200 TABLET ORAL EVERY 12 HOURS
Qty: 0 | Refills: 0 | Status: DISCONTINUED | OUTPATIENT
Start: 2017-08-22 | End: 2017-08-28

## 2017-08-22 RX ORDER — TRAZODONE HCL 50 MG
1 TABLET ORAL
Qty: 0 | Refills: 0 | COMMUNITY

## 2017-08-22 RX ORDER — ACETAMINOPHEN 500 MG
500 TABLET ORAL EVERY 8 HOURS
Qty: 0 | Refills: 0 | Status: DISCONTINUED | OUTPATIENT
Start: 2017-08-22 | End: 2017-08-28

## 2017-08-22 RX ORDER — FOLIC ACID 0.8 MG
1 TABLET ORAL DAILY
Qty: 0 | Refills: 0 | Status: DISCONTINUED | OUTPATIENT
Start: 2017-08-22 | End: 2017-08-23

## 2017-08-22 RX ORDER — ENOXAPARIN SODIUM 100 MG/ML
40 INJECTION SUBCUTANEOUS EVERY 24 HOURS
Qty: 0 | Refills: 0 | Status: DISCONTINUED | OUTPATIENT
Start: 2017-08-22 | End: 2017-08-28

## 2017-08-22 RX ORDER — IPRATROPIUM/ALBUTEROL SULFATE 18-103MCG
3 AEROSOL WITH ADAPTER (GRAM) INHALATION EVERY 4 HOURS
Qty: 0 | Refills: 0 | Status: DISCONTINUED | OUTPATIENT
Start: 2017-08-22 | End: 2017-08-23

## 2017-08-22 RX ORDER — DOCUSATE SODIUM 100 MG
200 CAPSULE ORAL DAILY
Qty: 0 | Refills: 0 | Status: DISCONTINUED | OUTPATIENT
Start: 2017-08-22 | End: 2017-08-28

## 2017-08-22 RX ORDER — CEFEPIME 1 G/1
1000 INJECTION, POWDER, FOR SOLUTION INTRAMUSCULAR; INTRAVENOUS ONCE
Qty: 0 | Refills: 0 | Status: COMPLETED | OUTPATIENT
Start: 2017-08-22 | End: 2017-08-22

## 2017-08-22 RX ORDER — ASPIRIN AND DIPYRIDAMOLE 25; 200 MG/1; MG/1
1 CAPSULE, EXTENDED RELEASE ORAL
Qty: 0 | Refills: 0 | Status: DISCONTINUED | OUTPATIENT
Start: 2017-08-22 | End: 2017-08-28

## 2017-08-22 RX ORDER — VANCOMYCIN HCL 1 G
1000 VIAL (EA) INTRAVENOUS ONCE
Qty: 0 | Refills: 0 | Status: COMPLETED | OUTPATIENT
Start: 2017-08-22 | End: 2017-08-22

## 2017-08-22 RX ORDER — SODIUM CHLORIDE 9 MG/ML
1000 INJECTION INTRAMUSCULAR; INTRAVENOUS; SUBCUTANEOUS ONCE
Qty: 0 | Refills: 0 | Status: COMPLETED | OUTPATIENT
Start: 2017-08-22 | End: 2017-08-22

## 2017-08-22 RX ORDER — PIPERACILLIN AND TAZOBACTAM 4; .5 G/20ML; G/20ML
3.38 INJECTION, POWDER, LYOPHILIZED, FOR SOLUTION INTRAVENOUS EVERY 8 HOURS
Qty: 0 | Refills: 0 | Status: DISCONTINUED | OUTPATIENT
Start: 2017-08-22 | End: 2017-08-23

## 2017-08-22 RX ORDER — SIMVASTATIN 20 MG/1
20 TABLET, FILM COATED ORAL AT BEDTIME
Qty: 0 | Refills: 0 | Status: DISCONTINUED | OUTPATIENT
Start: 2017-08-22 | End: 2017-08-28

## 2017-08-22 RX ORDER — TIOTROPIUM BROMIDE 18 UG/1
1 CAPSULE ORAL; RESPIRATORY (INHALATION) DAILY
Qty: 0 | Refills: 0 | Status: DISCONTINUED | OUTPATIENT
Start: 2017-08-22 | End: 2017-08-28

## 2017-08-22 RX ORDER — SODIUM CHLORIDE 9 MG/ML
1000 INJECTION INTRAMUSCULAR; INTRAVENOUS; SUBCUTANEOUS
Qty: 0 | Refills: 0 | Status: DISCONTINUED | OUTPATIENT
Start: 2017-08-22 | End: 2017-08-23

## 2017-08-22 RX ORDER — ASCORBIC ACID 60 MG
500 TABLET,CHEWABLE ORAL DAILY
Qty: 0 | Refills: 0 | Status: DISCONTINUED | OUTPATIENT
Start: 2017-08-22 | End: 2017-08-28

## 2017-08-22 RX ADMIN — SODIUM CHLORIDE 1000 MILLILITER(S): 9 INJECTION INTRAMUSCULAR; INTRAVENOUS; SUBCUTANEOUS at 17:43

## 2017-08-22 RX ADMIN — CEFEPIME 100 MILLIGRAM(S): 1 INJECTION, POWDER, FOR SOLUTION INTRAMUSCULAR; INTRAVENOUS at 17:42

## 2017-08-22 RX ADMIN — Medication 250 MILLIGRAM(S): at 18:10

## 2017-08-22 NOTE — ED PROVIDER NOTE - PROGRESS NOTE DETAILS
physical exam was performed and agree with resident exam. Pavithra MENDEZ: Lungs with wheezing in full lung fields, non productive cough

## 2017-08-22 NOTE — ED PROVIDER NOTE - ATTENDING CONTRIBUTION TO CARE
I, Ajit Arshad MD,  performed the initial face to face bedside interview with this patient regarding history of present illness, review of symptoms and relevant past medical, social and family history.  I completed an independent physical examination.  I was the initial provider who evaluated this patient. I have signed out the follow up of any pending tests (i.e. labs, radiological studies) to the resident.  I have communicated the patient’s plan of care and disposition with the resident.  The history, relevant review of systems, past medical and surgical history, medical decision making, and physical examination was documented by the scribe in my presence and I attest to the accuracy of the documentation. I, Ajit Arshad MD,  performed the initial face to face bedside interview with this patient regarding history of present illness, review of symptoms and relevant past medical, social and family history.  I completed an independent physical examination.  I was the initial provider who evaluated this patient. I have signed out the follow up of any pending tests (i.e. labs, radiological studies) to the resident.  I have communicated the patient’s plan of care and disposition with the resident.

## 2017-08-22 NOTE — ED STATDOCS - PROGRESS NOTE DETAILS
89 y/o F with a PMHx of Alzheimer's, asthma, CVA, COPD, GERD, HTN, HLD, osteoporosis, TIA presents to the ED c/o worsening cough, failed outpatient Levaquin being sent in by [facility]. Pt being sent to [facility] for further eval and tx. PMD [facility]ter[facility].

## 2017-08-22 NOTE — ED PROVIDER NOTE - OBJECTIVE STATEMENT
88 year old female with history of copd not on home oxygen, HTN, HLD, in with shortness of breath, cough, lethargy x 2 days.  States a couple weeks ago was placed on levaquin for a pneumonia, initially improved, but then began having tendon pain after around 6 days and was taken off the antibiotics.  Was ok for a couple days, then began to worsen.  Had cxr done today that showed worsening basiliar infiltrates on left side, sent in for failed outpatient treatment of pneumonia. Dr. sp hanson pmd

## 2017-08-22 NOTE — ED ADULT NURSE NOTE - OBJECTIVE STATEMENT
Pt states she was sent by PCP for SOB and possible PNA.  As per patients daughter, patient satting 88% with oxygen in place.  Pt stable at this time.  Vitals stable.  NC in place.

## 2017-08-22 NOTE — ED PROVIDER NOTE - MEDICAL DECISION MAKING DETAILS
88F in with shortness of breath, cough, lethargy, likely failed outpatient treatment for pneumonia, ct chest, cbc, cmp, blood cultures, lactate, iv fluids, vanco, cefepime, admit

## 2017-08-23 DIAGNOSIS — Z98.890 OTHER SPECIFIED POSTPROCEDURAL STATES: Chronic | ICD-10-CM

## 2017-08-23 DIAGNOSIS — E78.5 HYPERLIPIDEMIA, UNSPECIFIED: ICD-10-CM

## 2017-08-23 DIAGNOSIS — M06.9 RHEUMATOID ARTHRITIS, UNSPECIFIED: ICD-10-CM

## 2017-08-23 DIAGNOSIS — Z29.9 ENCOUNTER FOR PROPHYLACTIC MEASURES, UNSPECIFIED: ICD-10-CM

## 2017-08-23 DIAGNOSIS — J44.9 CHRONIC OBSTRUCTIVE PULMONARY DISEASE, UNSPECIFIED: ICD-10-CM

## 2017-08-23 DIAGNOSIS — K21.9 GASTRO-ESOPHAGEAL REFLUX DISEASE WITHOUT ESOPHAGITIS: ICD-10-CM

## 2017-08-23 DIAGNOSIS — J96.01 ACUTE RESPIRATORY FAILURE WITH HYPOXIA: ICD-10-CM

## 2017-08-23 DIAGNOSIS — N63 UNSPECIFIED LUMP IN BREAST: ICD-10-CM

## 2017-08-23 DIAGNOSIS — G30.9 ALZHEIMER'S DISEASE, UNSPECIFIED: ICD-10-CM

## 2017-08-23 DIAGNOSIS — G45.9 TRANSIENT CEREBRAL ISCHEMIC ATTACK, UNSPECIFIED: ICD-10-CM

## 2017-08-23 LAB
ALBUMIN SERPL ELPH-MCNC: 2.7 G/DL — LOW (ref 3.3–5)
ALP SERPL-CCNC: 64 U/L — SIGNIFICANT CHANGE UP (ref 40–120)
ALT FLD-CCNC: 27 U/L — SIGNIFICANT CHANGE UP (ref 12–78)
ANION GAP SERPL CALC-SCNC: 8 MMOL/L — SIGNIFICANT CHANGE UP (ref 5–17)
AST SERPL-CCNC: 23 U/L — SIGNIFICANT CHANGE UP (ref 15–37)
BASE EXCESS BLDA CALC-SCNC: -5 MMOL/L — LOW (ref -2–2)
BASOPHILS # BLD AUTO: 0.1 K/UL — SIGNIFICANT CHANGE UP (ref 0–0.2)
BASOPHILS NFR BLD AUTO: 0.7 % — SIGNIFICANT CHANGE UP (ref 0–2)
BILIRUB SERPL-MCNC: 0.5 MG/DL — SIGNIFICANT CHANGE UP (ref 0.2–1.2)
BLOOD GAS COMMENTS ARTERIAL: SIGNIFICANT CHANGE UP
BUN SERPL-MCNC: 20 MG/DL — SIGNIFICANT CHANGE UP (ref 7–23)
CALCIUM SERPL-MCNC: 8.9 MG/DL — SIGNIFICANT CHANGE UP (ref 8.5–10.1)
CHLORIDE SERPL-SCNC: 107 MMOL/L — SIGNIFICANT CHANGE UP (ref 96–108)
CO2 SERPL-SCNC: 25 MMOL/L — SIGNIFICANT CHANGE UP (ref 22–31)
CREAT SERPL-MCNC: 0.71 MG/DL — SIGNIFICANT CHANGE UP (ref 0.5–1.3)
EOSINOPHIL # BLD AUTO: 0.2 K/UL — SIGNIFICANT CHANGE UP (ref 0–0.5)
EOSINOPHIL NFR BLD AUTO: 3.2 % — SIGNIFICANT CHANGE UP (ref 0–6)
GAS PNL BLDA: SIGNIFICANT CHANGE UP
GLUCOSE SERPL-MCNC: 72 MG/DL — SIGNIFICANT CHANGE UP (ref 70–99)
HCO3 BLDA-SCNC: 18 MMOL/L — LOW (ref 21–29)
HCT VFR BLD CALC: 33.5 % — LOW (ref 34.5–45)
HGB BLD-MCNC: 10.8 G/DL — LOW (ref 11.5–15.5)
LACTATE SERPL-SCNC: 0.8 MMOL/L — SIGNIFICANT CHANGE UP (ref 0.7–2)
LACTATE SERPL-SCNC: 1.2 MMOL/L — SIGNIFICANT CHANGE UP (ref 0.7–2)
LYMPHOCYTES # BLD AUTO: 1 K/UL — SIGNIFICANT CHANGE UP (ref 1–3.3)
LYMPHOCYTES # BLD AUTO: 13.9 % — SIGNIFICANT CHANGE UP (ref 13–44)
MAGNESIUM SERPL-MCNC: 2 MG/DL — SIGNIFICANT CHANGE UP (ref 1.6–2.6)
MCHC RBC-ENTMCNC: 27.7 PG — SIGNIFICANT CHANGE UP (ref 27–34)
MCHC RBC-ENTMCNC: 32.4 GM/DL — SIGNIFICANT CHANGE UP (ref 32–36)
MCV RBC AUTO: 85.5 FL — SIGNIFICANT CHANGE UP (ref 80–100)
MONOCYTES # BLD AUTO: 0.8 K/UL — SIGNIFICANT CHANGE UP (ref 0–0.9)
MONOCYTES NFR BLD AUTO: 10.3 % — SIGNIFICANT CHANGE UP (ref 2–14)
NEUTROPHILS # BLD AUTO: 5.3 K/UL — SIGNIFICANT CHANGE UP (ref 1.8–7.4)
NEUTROPHILS NFR BLD AUTO: 71.8 % — SIGNIFICANT CHANGE UP (ref 43–77)
PCO2 BLDA: 31 MMHG — LOW (ref 32–46)
PH BLDA: 7.39 — SIGNIFICANT CHANGE UP (ref 7.35–7.45)
PHOSPHATE SERPL-MCNC: 3.5 MG/DL — SIGNIFICANT CHANGE UP (ref 2.5–4.5)
PLATELET # BLD AUTO: 195 K/UL — SIGNIFICANT CHANGE UP (ref 150–400)
PO2 BLDA: 75 — SIGNIFICANT CHANGE UP
POTASSIUM SERPL-MCNC: 4.3 MMOL/L — SIGNIFICANT CHANGE UP (ref 3.5–5.3)
POTASSIUM SERPL-SCNC: 4.3 MMOL/L — SIGNIFICANT CHANGE UP (ref 3.5–5.3)
PROT SERPL-MCNC: 5.4 GM/DL — LOW (ref 6–8.3)
RBC # BLD: 3.92 M/UL — SIGNIFICANT CHANGE UP (ref 3.8–5.2)
RBC # FLD: 15.5 % — HIGH (ref 10.3–14.5)
SAO2 % BLDA: 94 — SIGNIFICANT CHANGE UP
SODIUM SERPL-SCNC: 140 MMOL/L — SIGNIFICANT CHANGE UP (ref 135–145)
WBC # BLD: 7.4 K/UL — SIGNIFICANT CHANGE UP (ref 3.8–10.5)
WBC # FLD AUTO: 7.4 K/UL — SIGNIFICANT CHANGE UP (ref 3.8–10.5)

## 2017-08-23 RX ORDER — ALBUTEROL 90 UG/1
2.5 AEROSOL, METERED ORAL THREE TIMES A DAY
Qty: 0 | Refills: 0 | Status: DISCONTINUED | OUTPATIENT
Start: 2017-08-23 | End: 2017-08-23

## 2017-08-23 RX ORDER — PANTOPRAZOLE SODIUM 20 MG/1
40 TABLET, DELAYED RELEASE ORAL
Qty: 0 | Refills: 0 | Status: DISCONTINUED | OUTPATIENT
Start: 2017-08-23 | End: 2017-08-28

## 2017-08-23 RX ORDER — SODIUM CHLORIDE 9 MG/ML
1000 INJECTION INTRAMUSCULAR; INTRAVENOUS; SUBCUTANEOUS
Qty: 0 | Refills: 0 | Status: DISCONTINUED | OUTPATIENT
Start: 2017-08-23 | End: 2017-08-25

## 2017-08-23 RX ORDER — ALBUTEROL 90 UG/1
2.5 AEROSOL, METERED ORAL THREE TIMES A DAY
Qty: 0 | Refills: 0 | Status: DISCONTINUED | OUTPATIENT
Start: 2017-08-23 | End: 2017-08-28

## 2017-08-23 RX ADMIN — Medication 5 MILLIGRAM(S): at 07:32

## 2017-08-23 RX ADMIN — SIMVASTATIN 20 MILLIGRAM(S): 20 TABLET, FILM COATED ORAL at 02:17

## 2017-08-23 RX ADMIN — Medication 500 MILLIGRAM(S): at 01:55

## 2017-08-23 RX ADMIN — SODIUM CHLORIDE 75 MILLILITER(S): 9 INJECTION INTRAMUSCULAR; INTRAVENOUS; SUBCUTANEOUS at 01:25

## 2017-08-23 RX ADMIN — ASPIRIN AND DIPYRIDAMOLE 1 CAPSULE(S): 25; 200 CAPSULE, EXTENDED RELEASE ORAL at 01:20

## 2017-08-23 RX ADMIN — TIOTROPIUM BROMIDE 1 CAPSULE(S): 18 CAPSULE ORAL; RESPIRATORY (INHALATION) at 08:23

## 2017-08-23 RX ADMIN — ASPIRIN AND DIPYRIDAMOLE 1 CAPSULE(S): 25; 200 CAPSULE, EXTENDED RELEASE ORAL at 07:33

## 2017-08-23 RX ADMIN — Medication 1 TABLET(S): at 13:36

## 2017-08-23 RX ADMIN — Medication 1 TABLET(S): at 13:35

## 2017-08-23 RX ADMIN — SIMVASTATIN 20 MILLIGRAM(S): 20 TABLET, FILM COATED ORAL at 21:45

## 2017-08-23 RX ADMIN — PANTOPRAZOLE SODIUM 40 MILLIGRAM(S): 20 TABLET, DELAYED RELEASE ORAL at 13:35

## 2017-08-23 RX ADMIN — Medication 200 MILLIGRAM(S): at 18:21

## 2017-08-23 RX ADMIN — Medication 200 MILLIGRAM(S): at 07:32

## 2017-08-23 RX ADMIN — Medication 200 MILLIGRAM(S): at 01:21

## 2017-08-23 RX ADMIN — ASPIRIN AND DIPYRIDAMOLE 1 CAPSULE(S): 25; 200 CAPSULE, EXTENDED RELEASE ORAL at 18:18

## 2017-08-23 RX ADMIN — SODIUM CHLORIDE 50 MILLILITER(S): 9 INJECTION INTRAMUSCULAR; INTRAVENOUS; SUBCUTANEOUS at 16:00

## 2017-08-23 RX ADMIN — Medication 500 MILLIGRAM(S): at 13:36

## 2017-08-23 RX ADMIN — Medication 200 MILLIGRAM(S): at 01:20

## 2017-08-23 RX ADMIN — PIPERACILLIN AND TAZOBACTAM 25 GRAM(S): 4; .5 INJECTION, POWDER, LYOPHILIZED, FOR SOLUTION INTRAVENOUS at 06:21

## 2017-08-23 RX ADMIN — ALBUTEROL 2.5 MILLIGRAM(S): 90 AEROSOL, METERED ORAL at 19:33

## 2017-08-23 RX ADMIN — Medication 600 MILLIGRAM(S): at 18:23

## 2017-08-23 RX ADMIN — ENOXAPARIN SODIUM 40 MILLIGRAM(S): 100 INJECTION SUBCUTANEOUS at 06:16

## 2017-08-23 RX ADMIN — ALBUTEROL 2.5 MILLIGRAM(S): 90 AEROSOL, METERED ORAL at 14:08

## 2017-08-23 RX ADMIN — Medication 40 MILLIGRAM(S): at 18:23

## 2017-08-23 RX ADMIN — Medication 600 MILLIGRAM(S): at 06:16

## 2017-08-23 RX ADMIN — Medication 500 MILLIGRAM(S): at 01:25

## 2017-08-23 NOTE — PATIENT PROFILE ADULT. - TEACHING/LEARNING LEARNING PREFERENCES
video/pictorial/individual instruction/computer/internet/group instruction/skill demonstration/written material

## 2017-08-23 NOTE — PHYSICAL THERAPY INITIAL EVALUATION ADULT - ADDITIONAL COMMENTS
per dtr patient completed SUE 2 weeks ago at [facility] and returned to [facility]

## 2017-08-23 NOTE — PROGRESS NOTE ADULT - SUBJECTIVE AND OBJECTIVE BOX
87 yo F hx of Dementia, RA on chronic prednisone,  non-O2 dependent COPD, glaucoma, HTN, distant TIA, GERD, HLD, osteoporosis, kidney stones, hx of recurrent PNA, last admission april 2017 for hip fx s/p hip surgery, presents with shortness of breath, cough, lethargy x 2 days.  States a couple weeks ago was placed on levaquin for a pneumonia, initially improved, but then began having tendon pain after around 6 days and was taken off the antibiotics.  Was ok for a couple days, then 2 days ago began to worsen with above sx .  Had cxr done as outpatient  today that showed worsening basiliar infiltrates on left side, sent in for failed outpatient treatment of pneumonia by Dr Guardado.  Per daughter patient's oxygen saturation in Dr Oliveros office was 87% RA EKG sinus rhythm 91bpm,with occasional APC's anterior septal infarct changes which were present on EKG done in March 2017 CT chest -report reviewed In Ed patient was given IVF ,cefepime and vanco ,lactate was 1.1 Patient denies chest pain ,no orthopnea ,no leg swelling ,no abdominal pain, nausea or vomiting Per daughter patient had slight increase in her temperature at home 99 which is not usual for her    8/23 - Pt. seen and examined in bed, daughter at bedside. Pt. just had loose, watery BM. Will order cdiff. 	      Review of Systems:  Other Review of Systems: All other review of systems negative, except as noted in HPI	          Physical Exam:  Vital Signs Last 24 Hrs T(C): 36.9 (23 Aug 2017 11:01), Max: 37.7 (22 Aug 2017 17:40) T(F): 98.5 (23 Aug 2017 11:01), Max: 99.8 (22 Aug 2017 17:40) HR: 95 (23 Aug 2017 11:01) (77 - 96) BP: 152/56 (23 Aug 2017 11:01) (112/57 - 156/75) BP(mean): -- RR: 18 (23 Aug 2017 11:01) (15 - 18) SpO2: 97% (23 Aug 2017 11:01) (96% - 100%)  Constitutional: NAD on nasal cannula, drowsy, but follows all commands appears weak HEENT: Atraumatic, SARA, Normal, No congestion Respiratory: inspiratory crackle in B/l lung bases, no rhonchi/wheeze Cardiovascular: N S1S2; JUVENAL present Gastrointestinal: Abdomen soft, non tender, Bowel sounds present Extremities: No edema, peripheral pulses present Neurological: AAO x 3, no gross focal motor deficits Skin: Non cellulitic Back: No CVA tenderness  Musculoskeletal: non tender 	      Labs and Results:                       10.8  7.4   )-----------( 195      ( 23 Aug 2017 07:55 )            33.5  08-23  140  |  107  |  20 ----------------------------<  72 4.3   |  25  |  0.71  Ca    8.9      23 Aug 2017 07:55 Phos  3.5     08-23 Mg     2.0     08-23  TPro  5.4<L>  /  Alb  2.7<L>  /  TBili  0.5  /  DBili  x   /  AST  23  /  ALT  27  /  AlkPhos  64  08-23  MEDICATIONS  (STANDING): dipyridamole 200 mG/aspirin 25 mG 1 Capsule(s) Oral two times a day hydroxychloroquine 200 milliGRAM(s) Oral every 12 hours simvastatin 20 milliGRAM(s) Oral at bedtime tiotropium 18 MICROgram(s) Capsule 1 Capsule(s) Inhalation daily docusate sodium 200 milliGRAM(s) Oral daily calcium carbonate  625 mG + Vitamin D (OsCal 250 + D) 1 Tablet(s) Oral daily multivitamin 1 Tablet(s) Oral daily ascorbic acid 500 milliGRAM(s) Oral daily enoxaparin Injectable 40 milliGRAM(s) SubCutaneous every 24 hours guaiFENesin  milliGRAM(s) Oral every 12 hours methylPREDNISolone sodium succinate Injectable 40 milliGRAM(s) IV Push every 12 hours ALBUTerol   0.5% 2.5 milliGRAM(s) Nebulizer three times a day  MEDICATIONS  (PRN): acetaminophen   Tablet. 500 milliGRAM(s) Oral every 8 hours PRN Moderate Pain (4 - 6)  	    Assessment and Plan:   87 yo F with PMHx as above who presented with SOB, cough, lethargy.    # Acute respiratory failure   - CT results reviewed - unlikely PNA  - d/c ABX (afebrile, white count WNL)  - S&S eval to rule out dysphagia: appreciate nutrition consult, reg diet  - c/w nebs/spiriva  - pulmonary consult appreciated, c/w Solumedrol IV     # Diarrhea  - r/o cdiff  - pt. just completed output ABX tx      # COPD (chronic obstructive pulmonary disease)  - hx of COPD, no acute exacerbation at this time  - continue neb prn, spiriva.     # Alzheimers disease  - may take home namenda as similar strength not available on formulary.     # GERD  - pantoprazole 40mg daily (home dose of nexium is not formulary)     # Hyperlipemia  - continue lipitor.     # hx TIA  - continue aggrenox.    # Breast nodule  - will need out patient diagnostic mammo and f/u with PMD after discharge ASAP.     # Rheumatoid arthritis  - dc PO prednisone for now as pt is getting steroids IV  - c/w hydrochloroquine     # Prophylactic measure  - lovenox SC   - PT eval

## 2017-08-23 NOTE — SWALLOW BEDSIDE ASSESSMENT ADULT - ORAL PHASE
Within functional limits/observable lingual and buccal action for bolus formation and cohesion: reduced ROM and reduced speed for processing: AP transport within timely limits for age: rotary-lateral mastication with lingual sweep for clearance..

## 2017-08-23 NOTE — H&P ADULT - HISTORY OF PRESENT ILLNESS
86 yo F hx of Dementia, RA on chronic prednisone,  non-O2 dependent COPD, glaucoma, HTN, distant TIA, GERD, HLD, osteoporosis, kidney stones, hx of recurrent PNA,last admission april 2017 for hip fx s/p hip surgery, 89 yo F hx of Dementia, RA on chronic prednisone,  non-O2 dependent COPD, glaucoma, HTN, distant TIA, GERD, HLD, osteoporosis, kidney stones, hx of recurrent PNA,last admission april 2017 for hip fx s/p hip surgery, presents with shortness of breath, cough, lethargy x 2 days.  States a couple weeks ago was placed on levaquin for a pneumonia, initially improved, but then began having tendon pain after around 6 days and was taken off the antibiotics.  Was ok for a couple days, then 2 days ago began to worsen with above sx .  Had cxr done as outpatient  today that showed worsening basiliar infiltrates on left side, sent in for failed outpatient treatment of pneumonia by Dr Guardado.  Per daughter patient's oxygen saturation in Dr Oliveros office was 87% RA EKG sinus rhythm 91bpm,with occasional APC'santerior septal infarct changes which were present on EKG done in March 2017 CT chest -report reviewed In Ed patient was given IVF ,cefepime and vanco ,lactate was 1.1 Patient denies chest pain ,no orthopnea ,no leg swelling ,no abdominal pain,nausea or vomiting Per daughter patient had slight increase in her temperature at home 99 which is not usual for her

## 2017-08-23 NOTE — SWALLOW BEDSIDE ASSESSMENT ADULT - NS SPL SWALLOW CLINIC TRIAL FT
Pt presents at this time with oral-pharyngeal swallow skill within age-appropriate functional limits in a setting of underlying dementia, GERD, COPD, and acute onset of pneumonia.  Her underlying conditions put her at increased risk for aspiration, but she remains tolerant of regular consistency.  1. regular consistency. 2 thin liquids. 3 do not rx straw, given COPD.  4 meds as tolerated. 5 Assist as necessary.    Disc with Nsg  and with Md   Service will folow for tolerance.

## 2017-08-23 NOTE — SWALLOW BEDSIDE ASSESSMENT ADULT - SLP GENERAL OBSERVATIONS
pt seated in bed, drowsy but rousable and able to participate: minimal verbal output: unclear if this is baseline or present reduction 2/2 acute event.  Able to follow simple oral motor commands: reduced ROM: voluntary swallow , voluntary cough which set off a bout of wet coughing.  NO other non-nutritive cough or nutritive coughing.

## 2017-08-23 NOTE — H&P ADULT - PROBLEM SELECTOR PLAN 1
Acute hypoxic respiratory failure/Suspect aspiration PNA /possible superimposed mucus plugging causing subsegmental bibasilar atelectasis   Hemodynamically stable on nasal cannula  #Admit to med surg  #IV zosyn,blood cx,serial lactate  # swallow eval to r/o dysphagia  #neb prn,mucinex  # slow IV hydration  #pulmonary consult  #At this time I do not suspect acute CHF clinically(CT chest- no signs of CHF or pleural effusion  #unlikely PE as patient with cough and crackles on ausculation

## 2017-08-23 NOTE — PATIENT PROFILE ADULT. - PAIN CHRONIC, PROFILE
Aditi Jackson is a 69 year old female presenting for   Chief Complaint   Patient presents with   • Arm Pain     upper arm for a month     Denies Latex allergy or sensitivity.    Medication verified and med list updated  Refills needed today: No    Health Maintenance Summary     Topic Due On Due Status Completed On    MAMMOGRAM - BREAST CANCER SCREENING Mar 2, 2018 Not Due Mar 2, 2016    Colorectal Cancer Screening - Colonoscopy Jun 27, 2018 Not Due Jun 27, 2008    Osteoporosis Screening  Completed Jan 29, 2014    Immunization-Zoster  Completed Dec 6, 2016    Immunization - Pneumococcal  Completed Nov 4, 2016    Immunization - TDAP Pregnancy  Hidden     Medicare Wellness Visit Mar 30, 2018 Not Due Mar 30, 2017    IMMUNIZATION - DTaP/Tdap/Td Feb 8, 2026 Not Due Feb 8, 2016    Immunization-Influenza Sep 1, 2017 Not Due Oct 14, 2016            Patient is up to date, no discussion needed.       yes

## 2017-08-23 NOTE — PROGRESS NOTE ADULT - ATTENDING COMMENTS
Pt. seen and examined with MILVIA Young. Agree with assessment and plan as above. Changes made where appropriate. Pt. seen and examined with MILVIA Young. Agree with assessment and plan as above. Changes made where appropriate.  - pulm note appreciated  - continue solumedrol  - d/c abx as ct chest negative

## 2017-08-23 NOTE — SWALLOW BEDSIDE ASSESSMENT ADULT - SLP PERTINENT HISTORY OF CURRENT PROBLEM
ptr, white female, age 88m  hx of Dementia, RA on chronic prednisone,  non-O2 dependent COPD, glaucoma, HTN, distant TIA, GERD, HLD, osteoporosis, kidney stones, hx of recurrent PNA: Recent admission April 2017 for hip fx s/p hip surgery,

## 2017-08-23 NOTE — SWALLOW BEDSIDE ASSESSMENT ADULT - ORAL PREPARATORY PHASE
orientation to eating routines; anticipatory mouth opening: anterior bite for chewable: lip seal on utensil: graded mouth closure immediate oral processing/Within functional limits

## 2017-08-23 NOTE — H&P ADULT - ASSESSMENT
89 yo F hx of Dementia, RA on chronic prednisone,  non-O2 dependent COPD, glaucoma, HTN, distant TIA, GERD, HLD, osteoporosis, kidney stones, hx of recurrent PNA,last admission april 2017 for hip fx s/p hip surgery,  presents with shortness of breath, cough, lethargy x2 days

## 2017-08-23 NOTE — PHYSICAL THERAPY INITIAL EVALUATION ADULT - CRITERIA FOR SKILLED THERAPEUTIC INTERVENTIONS
risk reduction/prevention/rehab potential/functional limitations in following categories/impairments found/anticipated discharge recommendation/predicted duration of therapy intervention/therapy frequency

## 2017-08-23 NOTE — SWALLOW BEDSIDE ASSESSMENT ADULT - SWALLOW EVAL: RECOMMENDED FEEDING/EATING TECHNIQUES
encourage self-feeding if pt does not spontaneously feed herself./maintain upright posture during/after eating for 30 mins/allow for swallow between intakes/small sips/bites

## 2017-08-23 NOTE — H&P ADULT - PSH
Gall stones    History of hip surgery    History of hysterectomy  for bleeding  Hx of cholecystectomy

## 2017-08-23 NOTE — PHYSICAL THERAPY INITIAL EVALUATION ADULT - IMPAIRMENTS FOUND, PT EVAL
gait, locomotion, and balance/aerobic capacity/endurance/muscle strength/ventilation and respiration/gas exchange

## 2017-08-23 NOTE — PHYSICAL THERAPY INITIAL EVALUATION ADULT - PERTINENT HX OF CURRENT PROBLEM, REHAB EVAL
increased lethargy after stopping course of outpatient Levaquin,prescribed for PNA by Dr Guardado,had developed tendiniopathy

## 2017-08-23 NOTE — SWALLOW BEDSIDE ASSESSMENT ADULT - COMMENTS
pt presents  with shortness of breath, cough, lethargy x 2 days.  States a couple weeks ago was placed on levaquin for a pneumonia, initially improved, but then began having tendon pain after around 6 days and was taken off the antibiotics.  Was ok for a couple days, then 2 days ago began to worsen with above sx .  Had cxr done as outpatient  today that showed worsening basiliar infiltrates on left side, sent in for failed outpatient treatment of pneumonia by Dr Guardado.   Per daughter patient's oxygen saturation in Dr Oliveros office was 87% RA  EKG sinus rhythm 91bpm,with occasional APC'santerior septal infarct changes which were present on EKG done in March 2017  CT chest -report reviewed  In Ed patient was given IVF ,cefepime and vanco ,lactate was 1.1  Patient denies chest pain ,no orthopnea ,no leg swelling ,no abdominal pain,nausea or vomiting  Per daughter patient had slight increase in her temperature at home 99 which is not usual for her

## 2017-08-23 NOTE — H&P ADULT - NSHPLABSRESULTS_GEN_ALL_CORE
11.6   7.3   )-----------( 205      ( 22 Aug 2017 17:30 )             35.0       CBC Full  -  ( 22 Aug 2017 17:30 )  WBC Count : 7.3 K/uL  Hemoglobin : 11.6 g/dL  Hematocrit : 35.0 %  Platelet Count - Automated : 205 K/uL  Mean Cell Volume : 85.8 fl  Mean Cell Hemoglobin : 28.5 pg  Mean Cell Hemoglobin Concentration : 33.2 gm/dL  Auto Neutrophil # : 4.9 K/uL  Auto Lymphocyte # : 1.4 K/uL  Auto Monocyte # : 0.7 K/uL  Auto Eosinophil # : 0.2 K/uL  Auto Basophil # : 0.1 K/uL  Auto Neutrophil % : 67.7 %  Auto Lymphocyte % : 18.8 %  Auto Monocyte % : 9.9 %  Auto Eosinophil % : 2.4 %  Auto Basophil % : 1.2 %      08-22    142  |  107  |  25<H>  ----------------------------<  87  4.2   |  27  |  0.88    Ca    9.3      22 Aug 2017 17:30  Mg     2.0     08-22    TPro  5.8<L>  /  Alb  3.0<L>  /  TBili  0.4  /  DBili  x   /  AST  26  /  ALT  32  /  AlkPhos  69  08-22      LIVER FUNCTIONS - ( 22 Aug 2017 17:30 )  Alb: 3.0 g/dL / Pro: 5.8 gm/dL / ALK PHOS: 69 U/L / ALT: 32 U/L / AST: 26 U/L / GGT: x             PT/INR - ( 22 Aug 2017 17:30 )   PT: 12.3 sec;   INR: 1.14 ratio

## 2017-08-23 NOTE — SWALLOW BEDSIDE ASSESSMENT ADULT - PHARYNGEAL PHASE
laryngeal lift observable and palpable: no indications of poor clearance in pharynx or of outright aspiration, on any bolus consistency offered..

## 2017-08-23 NOTE — H&P ADULT - NSHPPHYSICALEXAM_GEN_ALL_CORE
PHYSICAL EXAM:    Daily Height in cm: 157.48 (22 Aug 2017 16:51)    Daily     ICU Vital Signs Last 24 Hrs  T(C): 36.7 (22 Aug 2017 19:07), Max: 37.7 (22 Aug 2017 17:40)  T(F): 98.1 (22 Aug 2017 19:07), Max: 99.8 (22 Aug 2017 17:40)  HR: 77 (23 Aug 2017 00:14) (77 - 88)  BP: 134/67 (23 Aug 2017 00:14) (134/67 - 156/75)  BP(mean): --  ABP: --  ABP(mean): --  RR: 15 (23 Aug 2017 00:14) (15 - 18)  SpO2: 100% (23 Aug 2017 00:14) (96% - 100%)      Constitutional:NAD on nasal cannula in ER,drowsy,but follows all commands ,apperas weak  HEENT: Atraumatic, SARA, Normal, No congestion  Respiratory: inspiratory crackle in B/l lung bases, no rhonchi/wheeze  Cardiovascular: N S1S2; JUVENAL present  Gastrointestinal: Abdomen soft, non tender, Bowel Ssounds present  Extremities: No edema, peripheral pulses present  Neurological: AAO x 3, no gross focal motor deficits  Skin: Non cellulitic,     Back: No CVA tenderness   Musculoskeletal: non tender  Breasts: Deferred  Genitourinary: deferred  Rectal: Deferred

## 2017-08-24 LAB
ANION GAP SERPL CALC-SCNC: 8 MMOL/L — SIGNIFICANT CHANGE UP (ref 5–17)
BUN SERPL-MCNC: 17 MG/DL — SIGNIFICANT CHANGE UP (ref 7–23)
C DIFF BY PCR RESULT: DETECTED
C DIFF TOX GENS STL QL NAA+PROBE: SIGNIFICANT CHANGE UP
CALCIUM SERPL-MCNC: 8.6 MG/DL — SIGNIFICANT CHANGE UP (ref 8.5–10.1)
CHLORIDE SERPL-SCNC: 110 MMOL/L — HIGH (ref 96–108)
CO2 SERPL-SCNC: 23 MMOL/L — SIGNIFICANT CHANGE UP (ref 22–31)
CREAT SERPL-MCNC: 0.76 MG/DL — SIGNIFICANT CHANGE UP (ref 0.5–1.3)
GLUCOSE SERPL-MCNC: 111 MG/DL — HIGH (ref 70–99)
HCT VFR BLD CALC: 33.3 % — LOW (ref 34.5–45)
HGB BLD-MCNC: 10.6 G/DL — LOW (ref 11.5–15.5)
MCHC RBC-ENTMCNC: 27.4 PG — SIGNIFICANT CHANGE UP (ref 27–34)
MCHC RBC-ENTMCNC: 32 GM/DL — SIGNIFICANT CHANGE UP (ref 32–36)
MCV RBC AUTO: 85.7 FL — SIGNIFICANT CHANGE UP (ref 80–100)
PLATELET # BLD AUTO: 191 K/UL — SIGNIFICANT CHANGE UP (ref 150–400)
POTASSIUM SERPL-MCNC: 4.2 MMOL/L — SIGNIFICANT CHANGE UP (ref 3.5–5.3)
POTASSIUM SERPL-SCNC: 4.2 MMOL/L — SIGNIFICANT CHANGE UP (ref 3.5–5.3)
RBC # BLD: 3.88 M/UL — SIGNIFICANT CHANGE UP (ref 3.8–5.2)
RBC # FLD: 15.5 % — HIGH (ref 10.3–14.5)
SODIUM SERPL-SCNC: 141 MMOL/L — SIGNIFICANT CHANGE UP (ref 135–145)
WBC # BLD: 6.3 K/UL — SIGNIFICANT CHANGE UP (ref 3.8–10.5)
WBC # FLD AUTO: 6.3 K/UL — SIGNIFICANT CHANGE UP (ref 3.8–10.5)

## 2017-08-24 RX ORDER — VANCOMYCIN HCL 1 G
125 VIAL (EA) INTRAVENOUS EVERY 6 HOURS
Qty: 0 | Refills: 0 | Status: DISCONTINUED | OUTPATIENT
Start: 2017-08-24 | End: 2017-08-28

## 2017-08-24 RX ORDER — MEMANTINE HYDROCHLORIDE 10 MG/1
10 TABLET ORAL
Qty: 0 | Refills: 0 | Status: DISCONTINUED | OUTPATIENT
Start: 2017-08-24 | End: 2017-08-24

## 2017-08-24 RX ORDER — LANOLIN ALCOHOL/MO/W.PET/CERES
3 CREAM (GRAM) TOPICAL AT BEDTIME
Qty: 0 | Refills: 0 | Status: DISCONTINUED | OUTPATIENT
Start: 2017-08-24 | End: 2017-08-28

## 2017-08-24 RX ORDER — MEMANTINE HYDROCHLORIDE 10 MG/1
21 TABLET ORAL DAILY
Qty: 0 | Refills: 0 | Status: DISCONTINUED | OUTPATIENT
Start: 2017-08-24 | End: 2017-08-28

## 2017-08-24 RX ADMIN — MEMANTINE HYDROCHLORIDE 21 MILLIGRAM(S): 10 TABLET ORAL at 17:41

## 2017-08-24 RX ADMIN — Medication 125 MILLIGRAM(S): at 17:41

## 2017-08-24 RX ADMIN — ENOXAPARIN SODIUM 40 MILLIGRAM(S): 100 INJECTION SUBCUTANEOUS at 06:00

## 2017-08-24 RX ADMIN — ASPIRIN AND DIPYRIDAMOLE 1 CAPSULE(S): 25; 200 CAPSULE, EXTENDED RELEASE ORAL at 06:00

## 2017-08-24 RX ADMIN — Medication 200 MILLIGRAM(S): at 17:41

## 2017-08-24 RX ADMIN — Medication 600 MILLIGRAM(S): at 17:40

## 2017-08-24 RX ADMIN — PANTOPRAZOLE SODIUM 40 MILLIGRAM(S): 20 TABLET, DELAYED RELEASE ORAL at 08:17

## 2017-08-24 RX ADMIN — Medication 500 MILLIGRAM(S): at 08:18

## 2017-08-24 RX ADMIN — ALBUTEROL 2.5 MILLIGRAM(S): 90 AEROSOL, METERED ORAL at 20:17

## 2017-08-24 RX ADMIN — TIOTROPIUM BROMIDE 1 CAPSULE(S): 18 CAPSULE ORAL; RESPIRATORY (INHALATION) at 08:03

## 2017-08-24 RX ADMIN — Medication 40 MILLIGRAM(S): at 06:00

## 2017-08-24 RX ADMIN — SIMVASTATIN 20 MILLIGRAM(S): 20 TABLET, FILM COATED ORAL at 22:06

## 2017-08-24 RX ADMIN — Medication 200 MILLIGRAM(S): at 06:00

## 2017-08-24 RX ADMIN — Medication 125 MILLIGRAM(S): at 11:05

## 2017-08-24 RX ADMIN — Medication 600 MILLIGRAM(S): at 06:00

## 2017-08-24 RX ADMIN — ASPIRIN AND DIPYRIDAMOLE 1 CAPSULE(S): 25; 200 CAPSULE, EXTENDED RELEASE ORAL at 17:40

## 2017-08-24 RX ADMIN — ALBUTEROL 2.5 MILLIGRAM(S): 90 AEROSOL, METERED ORAL at 13:10

## 2017-08-24 RX ADMIN — Medication 1 TABLET(S): at 08:17

## 2017-08-24 RX ADMIN — ALBUTEROL 2.5 MILLIGRAM(S): 90 AEROSOL, METERED ORAL at 08:03

## 2017-08-24 RX ADMIN — Medication 1 TABLET(S): at 08:18

## 2017-08-24 NOTE — CONSULT NOTE ADULT - SUBJECTIVE AND OBJECTIVE BOX
Patient is a 88y old  Female who presents with a chief complaint of   HPI:  89 y/o Female with h/o Dementia, RA on chronic prednisone,  non-O2 dependent COPD, glaucoma, HTN, distant TIA, GERD, HLD, osteoporosis, kidney stones, recurrent PNA,  hip fx s/p hip surgery was admitted on 8/24 for shortness of breath, cough, lethargy x 2 days.  States a couple weeks ago was placed on levaquin for pneumonia for 6 days. She had persistent symptoms and a repeat CXR done as outpatient showed worsening basiliar infiltrates on left side. In ER, the patient was placed on cefepime and vanco IV. Noted with loose stools.       PMH: as above  PSH: as above  Meds: per reconciliation sheet, noted below  MEDICATIONS  (STANDING):  dipyridamole 200 mG/aspirin 25 mG 1 Capsule(s) Oral two times a day  hydroxychloroquine 200 milliGRAM(s) Oral every 12 hours  simvastatin 20 milliGRAM(s) Oral at bedtime  tiotropium 18 MICROgram(s) Capsule 1 Capsule(s) Inhalation daily  docusate sodium 200 milliGRAM(s) Oral daily  calcium carbonate  625 mG + Vitamin D (OsCal 250 + D) 1 Tablet(s) Oral daily  multivitamin 1 Tablet(s) Oral daily  ascorbic acid 500 milliGRAM(s) Oral daily  enoxaparin Injectable 40 milliGRAM(s) SubCutaneous every 24 hours  guaiFENesin  milliGRAM(s) Oral every 12 hours  pantoprazole    Tablet 40 milliGRAM(s) Oral before breakfast  sodium chloride 0.9%. 1000 milliLiter(s) (50 mL/Hr) IV Continuous <Continuous>  ALBUTerol    0.083% 2.5 milliGRAM(s) Nebulizer three times a day  methylPREDNISolone sodium succinate Injectable 20 milliGRAM(s) IV Push daily  vancomycin    Solution 125 milliGRAM(s) Oral every 6 hours  memantine 10 milliGRAM(s) Oral two times a day    MEDICATIONS  (PRN):  acetaminophen   Tablet. 500 milliGRAM(s) Oral every 8 hours PRN Moderate Pain (4 - 6)    Allergies    Aciphex (Unknown)  Macrobid (Unknown)  Percocet 10/325 (Rash)    Intolerances      Social: no smoking, no alcohol, no illegal drugs; no recent travel, no exposure to TB  FAMILY HISTORY:  No pertinent family history in first degree relatives    ROS: the patient denies fever, no chills, no HA, no dizziness, no sore throat, no blurry vision, no CP, no palpitations, no SOB, has dry cough, no abdominal pain, has diarrhea, no N/V, no dysuria, no leg pain, no claudication, no rash, no joint aches, no rectal pain or bleeding, no night sweats; feels weak    Vital Signs Last 24 Hrs  T(C): 36.6 (24 Aug 2017 10:40), Max: 36.8 (23 Aug 2017 17:51)  T(F): 97.9 (24 Aug 2017 10:40), Max: 98.3 (23 Aug 2017 17:51)  HR: 79 (24 Aug 2017 10:40) (75 - 92)  BP: 119/57 (24 Aug 2017 10:40) (119/57 - 153/64)  BP(mean): --  RR: 18 (24 Aug 2017 10:40) (18 - 18)  SpO2: 94% (24 Aug 2017 10:40) (94% - 100%)  Daily     Daily     PE:    Constitutional: frail looking  HEENT: NC/AT, EOMI, PERRLA  Neck: supple  Back: no tenderness  Respiratory: decreased BS at bases  Cardiovascular: S1S2 regular, no murmurs  Abdomen: soft, not tender, not distended, positive BS  Genitourinary: deferred  Rectal: deferred  Musculoskeletal: no muscle tenderness, no joint swelling or tenderness  Extremities: no pedal edema  Neurological: AxOx3, moving all extremities, no focal deficits  Skin: no rashes    Labs:                        10.6   6.3   )-----------( 191      ( 24 Aug 2017 07:03 )             33.3     08-24    141  |  110<H>  |  17  ----------------------------<  111<H>  4.2   |  23  |  0.76    Ca    8.6      24 Aug 2017 07:03  Phos  3.5     08-23  Mg     2.0     08-23    TPro  5.4<L>  /  Alb  2.7<L>  /  TBili  0.5  /  DBili  x   /  AST  23  /  ALT  27  /  AlkPhos  64  08-23     LIVER FUNCTIONS - ( 23 Aug 2017 07:55 )  Alb: 2.7 g/dL / Pro: 5.4 gm/dL / ALK PHOS: 64 U/L / ALT: 27 U/L / AST: 23 U/L / GGT: x           Clostridium difficile Toxin by PCR (08.23.17 @ 12:33)    Clostridium difficile Toxin by PCR: The results of this test should be interpreted with consideration of all  clinical and laboratory findings. This test determines the presence of  the C. difficile tcdB gene at a given time and is not intended to  identify antibiotic associated disease or C. difficile infection without  clinical context. Successful treatment is based on the resolution of  clinical symptoms. This test should not be used as a "test of cure"  because C. difficile DNA will persist after successful treatment. Repeat  testing will not be permitted.    This test is performed on the BD MAX system using Real-Time PCR and  fluorogenic target-specific hybridization.    C Diff by PCR Result: Detected    Culture - Blood (08.22.17 @ 17:30)    Specimen Source: .Blood None    Culture Results:   No growth to date.          Radiology:    < from: CT Chest No Cont (08.22.17 @ 18:31) >  Subsegmental atelectasis and scarring in the lung bases. Diffuse   parenchymal emphysematous changes.    Asymmetric nodularity to the right breast.    Extensive irregular atheromatous calcification of thoracic and abdominal   aorta    < end of copied text >      Advanced directives addressed: full resuscitation Patient is a 88y old  Female who presents with a chief complaint of   HPI:  89 y/o Female with h/o Dementia, RA on chronic prednisone,  non-O2 dependent COPD, glaucoma, HTN, distant TIA, GERD, HLD, osteoporosis, kidney stones, recurrent PNA,  hip fx s/p hip surgery was admitted on 8/24 for shortness of breath, cough, lethargy x 2 days.  States a couple weeks ago was placed on levaquin for pneumonia for 6 days. She had persistent symptoms and a repeat CXR done as outpatient showed worsening basiliar infiltrates on left side. In ER, the patient was placed on cefepime and vanco IV. Noted with loose stools.       PMH: as above  PSH: as above  Meds: per reconciliation sheet, noted below  MEDICATIONS  (STANDING):  dipyridamole 200 mG/aspirin 25 mG 1 Capsule(s) Oral two times a day  hydroxychloroquine 200 milliGRAM(s) Oral every 12 hours  simvastatin 20 milliGRAM(s) Oral at bedtime  tiotropium 18 MICROgram(s) Capsule 1 Capsule(s) Inhalation daily  docusate sodium 200 milliGRAM(s) Oral daily  calcium carbonate  625 mG + Vitamin D (OsCal 250 + D) 1 Tablet(s) Oral daily  multivitamin 1 Tablet(s) Oral daily  ascorbic acid 500 milliGRAM(s) Oral daily  enoxaparin Injectable 40 milliGRAM(s) SubCutaneous every 24 hours  guaiFENesin  milliGRAM(s) Oral every 12 hours  pantoprazole    Tablet 40 milliGRAM(s) Oral before breakfast  sodium chloride 0.9%. 1000 milliLiter(s) (50 mL/Hr) IV Continuous <Continuous>  ALBUTerol    0.083% 2.5 milliGRAM(s) Nebulizer three times a day  methylPREDNISolone sodium succinate Injectable 20 milliGRAM(s) IV Push daily  vancomycin    Solution 125 milliGRAM(s) Oral every 6 hours  memantine 10 milliGRAM(s) Oral two times a day    MEDICATIONS  (PRN):  acetaminophen   Tablet. 500 milliGRAM(s) Oral every 8 hours PRN Moderate Pain (4 - 6)    Allergies    Aciphex (Unknown)  Macrobid (Unknown)  Percocet 10/325 (Rash)    Intolerances      Social: no smoking, no alcohol, no illegal drugs; no recent travel, no exposure to TB  FAMILY HISTORY:  No pertinent family history in first degree relatives    ROS: the patient denies fever, no chills, no HA, no dizziness, no sore throat, no blurry vision, no CP, no palpitations, mild SOB, has dry cough, no abdominal pain, has diarrhea, no N/V, no dysuria, no leg pain, no claudication, no rash, no joint aches, no rectal pain or bleeding, no night sweats; feels weak    Vital Signs Last 24 Hrs  T(C): 36.6 (24 Aug 2017 10:40), Max: 36.8 (23 Aug 2017 17:51)  T(F): 97.9 (24 Aug 2017 10:40), Max: 98.3 (23 Aug 2017 17:51)  HR: 79 (24 Aug 2017 10:40) (75 - 92)  BP: 119/57 (24 Aug 2017 10:40) (119/57 - 153/64)  BP(mean): --  RR: 18 (24 Aug 2017 10:40) (18 - 18)  SpO2: 94% (24 Aug 2017 10:40) (94% - 100%)  Daily     Daily     PE:    Constitutional: frail looking  HEENT: NC/AT, EOMI, PERRLA  Neck: supple  Back: no tenderness  Respiratory: decreased BS at bases  Cardiovascular: S1S2 regular, no murmurs  Abdomen: soft, not tender, not distended, positive BS  Genitourinary: deferred  Rectal: deferred  Musculoskeletal: no muscle tenderness, no joint swelling or tenderness  Extremities: no pedal edema  Neurological: AxOx3, moving all extremities, no focal deficits  Skin: no rashes    Labs:                        10.6   6.3   )-----------( 191      ( 24 Aug 2017 07:03 )             33.3     08-24    141  |  110<H>  |  17  ----------------------------<  111<H>  4.2   |  23  |  0.76    Ca    8.6      24 Aug 2017 07:03  Phos  3.5     08-23  Mg     2.0     08-23    TPro  5.4<L>  /  Alb  2.7<L>  /  TBili  0.5  /  DBili  x   /  AST  23  /  ALT  27  /  AlkPhos  64  08-23     LIVER FUNCTIONS - ( 23 Aug 2017 07:55 )  Alb: 2.7 g/dL / Pro: 5.4 gm/dL / ALK PHOS: 64 U/L / ALT: 27 U/L / AST: 23 U/L / GGT: x           Clostridium difficile Toxin by PCR (08.23.17 @ 12:33)    Clostridium difficile Toxin by PCR: The results of this test should be interpreted with consideration of all  clinical and laboratory findings. This test determines the presence of  the C. difficile tcdB gene at a given time and is not intended to  identify antibiotic associated disease or C. difficile infection without  clinical context. Successful treatment is based on the resolution of  clinical symptoms. This test should not be used as a "test of cure"  because C. difficile DNA will persist after successful treatment. Repeat  testing will not be permitted.    This test is performed on the BD MAX system using Real-Time PCR and  fluorogenic target-specific hybridization.    C Diff by PCR Result: Detected    Culture - Blood (08.22.17 @ 17:30)    Specimen Source: .Blood None    Culture Results:   No growth to date.          Radiology:    < from: CT Chest No Cont (08.22.17 @ 18:31) >  Subsegmental atelectasis and scarring in the lung bases. Diffuse   parenchymal emphysematous changes.    Asymmetric nodularity to the right breast.    Extensive irregular atheromatous calcification of thoracic and abdominal   aorta    < end of copied text >      Advanced directives addressed: full resuscitation

## 2017-08-24 NOTE — PROGRESS NOTE ADULT - SUBJECTIVE AND OBJECTIVE BOX
87 yo F hx of Dementia, RA on chronic prednisone,  non-O2 dependent COPD, glaucoma, HTN, distant TIA, GERD, HLD, osteoporosis, kidney stones, hx of recurrent PNA, last admission april 2017 for hip fx s/p hip surgery, presents with shortness of breath, cough, lethargy x 2 days.  States a couple weeks ago was placed on levaquin for a pneumonia, initially improved, but then began having tendon pain after around 6 days and was taken off the antibiotics.  Was ok for a couple days, then 2 days ago began to worsen with above sx .  Had cxr done as outpatient  today that showed worsening basiliar infiltrates on left side, sent in for failed outpatient treatment of pneumonia by Dr Guardado.  Per daughter patient's oxygen saturation in Dr Oliveros office was 87% RA EKG sinus rhythm 91bpm,with occasional APC's anterior septal infarct changes which were present on EKG done in March 2017 CT chest -report reviewed In Ed patient was given IVF ,cefepime and vanco ,lactate was 1.1 Patient denies chest pain ,no orthopnea ,no leg swelling ,no abdominal pain, nausea or vomiting Per daughter patient had slight increase in her temperature at home 99 which is not usual for her    8/23 - Pt. seen and examined in bed, daughter at bedside. Pt. just had loose, watery BM. Will order cdiff.   8/24 - Pt. feels well, still having loose stools. cdiff positive. 	      Review of Systems:  Other Review of Systems: All other review of systems negative, except as noted in HPI	          Physical Exam:  Vital Signs Last 24 Hrs T(C): 36.6 (24 Aug 2017 10:40), Max: 36.8 (23 Aug 2017 17:51) T(F): 97.9 (24 Aug 2017 10:40), Max: 98.3 (23 Aug 2017 17:51) HR: 79 (24 Aug 2017 10:40) (75 - 92) BP: 119/57 (24 Aug 2017 10:40) (119/57 - 153/64) BP(mean): -- RR: 18 (24 Aug 2017 10:40) (18 - 18) SpO2: 94% (24 Aug 2017 10:40) (94% - 100%)  Constitutional: NAD on nasal cannula, drowsy, but follows all commands appears weak HEENT: Atraumatic, SARA, Normal, No congestion Respiratory: inspiratory crackle in B/l lung bases, no rhonchi/wheeze Cardiovascular: N S1S2; JUVENAL present Gastrointestinal: Abdomen soft, non tender, Bowel sounds present Extremities: No edema, peripheral pulses present Neurological: AAO x 3, no gross focal motor deficits Skin: Non cellulitic Back: No CVA tenderness  Musculoskeletal: non tender 	      Labs and Results:                                 10.6  6.3   )-----------( 191      ( 24 Aug 2017 07:03 )            33.3  08-24  141  |  110<H>  |  17 ----------------------------<  111<H> 4.2   |  23  |  0.76  Ca    8.6      24 Aug 2017 07:03 Phos  3.5     08-23 Mg     2.0     08-23  TPro  5.4<L>  /  Alb  2.7<L>  /  TBili  0.5  /  DBili  x   /  AST  23  /  ALT  27  /  AlkPhos  64  08-23   MEDICATIONS  (STANDING): dipyridamole 200 mG/aspirin 25 mG 1 Capsule(s) Oral two times a day hydroxychloroquine 200 milliGRAM(s) Oral every 12 hours simvastatin 20 milliGRAM(s) Oral at bedtime tiotropium 18 MICROgram(s) Capsule 1 Capsule(s) Inhalation daily docusate sodium 200 milliGRAM(s) Oral daily calcium carbonate  625 mG + Vitamin D (OsCal 250 + D) 1 Tablet(s) Oral daily multivitamin 1 Tablet(s) Oral daily ascorbic acid 500 milliGRAM(s) Oral daily enoxaparin Injectable 40 milliGRAM(s) SubCutaneous every 24 hours guaiFENesin  milliGRAM(s) Oral every 12 hours pantoprazole    Tablet 40 milliGRAM(s) Oral before breakfast sodium chloride 0.9%. 1000 milliLiter(s) (50 mL/Hr) IV Continuous <Continuous> ALBUTerol    0.083% 2.5 milliGRAM(s) Nebulizer three times a day methylPREDNISolone sodium succinate Injectable 20 milliGRAM(s) IV Push daily vancomycin    Solution 125 milliGRAM(s) Oral every 6 hours memantine 10 milliGRAM(s) Oral two times a day  MEDICATIONS  (PRN): acetaminophen   Tablet. 500 milliGRAM(s) Oral every 8 hours PRN Moderate Pain (4 - 6)  	    Assessment and Plan:   87 yo F with PMHx as above who presented with SOB, cough, lethargy.    # Acute respiratory failure   - CT results reviewed - unlikely PNA  - d/c ABX (afebrile, white count WNL)  - ID consult appreciated  - S&S eval to rule out dysphagia: appreciate nutrition consult, reg diet  - c/w nebs/spiriva  - pulmonary consult appreciated, c/w Solumedrol IV     # Diarrhea  - cdiff positive  - Vanco 125mg PO Q6  - ID consult appreciated      # COPD (chronic obstructive pulmonary disease)  - hx of COPD, no acute exacerbation at this time  - continue neb prn, spiriva.     # Alzheimers disease  - may take home namenda as similar strength not available on formulary.     # GERD  - pantoprazole 40mg daily (home dose of nexium is not formulary)     # Hyperlipemia  - continue lipitor.     # hx TIA  - continue aggrenox.    # Breast nodule  - will need out patient diagnostic mammo and f/u with PMD after discharge ASAP.     # Rheumatoid arthritis  - dc PO prednisone for now as pt is getting steroids IV  - c/w hydrochloroquine     # DVT ppx  - lovenox SC   - PT eval

## 2017-08-24 NOTE — PROGRESS NOTE ADULT - ATTENDING COMMENTS
Pt. seen and examined with MILVIA Young. Agree with assessment and plan as above. Changes made where appropriate. Pt. seen and examined with MILVIA Young. Agree with assessment and plan as above. Changes made where appropriate.  -  ID note appreciated and started on vanco po for c.diff  - family prefers namenda xr so will continue pts own medication  - plan for dc tomorrow

## 2017-08-24 NOTE — PROGRESS NOTE ADULT - SUBJECTIVE AND OBJECTIVE BOX
SUBJECTIVE     Patient still feels week and has diarrhea along with  mild cough and no fever and has CDT positive assay           PAST MEDICAL & SURGICAL HISTORY:  Compression fracture of spine: T7  Alzheimers disease  HTN (hypertension)  Glaucoma  TIA (transient ischemic attack): 8/07  Cerebral vascular accident: 2005  Polymyalgia rheumatica  Osteoporosis  Chronic pain: mid back  Urinary retention  GERD (gastroesophageal reflux disease)  Cholelithiases  Hyperlipemia  Carotid artery stenosis  Lung nodule: biopsied 2003, benign  COPD (chronic obstructive pulmonary disease)  Asthma  History of hip surgery  Gall stones  History of hysterectomy: for bleeding  Hx of cholecystectomy          OBJECTIVE       Vital Signs Last 24 Hrs  T(C): 36.3 (24 Aug 2017 05:07), Max: 36.9 (23 Aug 2017 11:01)  T(F): 97.4 (24 Aug 2017 05:07), Max: 98.5 (23 Aug 2017 11:01)  HR: 87 (24 Aug 2017 08:06) (75 - 95)  BP: 141/60 (24 Aug 2017 05:07) (141/60 - 153/64)  BP(mean): --  RR: 18 (24 Aug 2017 05:07) (18 - 18)  SpO2: 96% (24 Aug 2017 05:07) (96% - 100%)    PHYSICAL EXAM:    Constitutional: appears weak and breathing comfortably with no acute sob      HEENT: Normo cephalic atruamtic     Neck: Soft and supple, No J.V.D     Respiratory: vesicular breathing ,has few rales over the bases with no acute wheeze today     Cardiovascular: S1 and S2, regular rate .     Gastrointestinal:  soft, nontender,     Extremities: No peripheral edema    Neurological: A/O x 3, no focal deficits.      Medications:  MEDICATIONS  (STANDING):  dipyridamole 200 mG/aspirin 25 mG 1 Capsule(s) Oral two times a day  hydroxychloroquine 200 milliGRAM(s) Oral every 12 hours  simvastatin 20 milliGRAM(s) Oral at bedtime  tiotropium 18 MICROgram(s) Capsule 1 Capsule(s) Inhalation daily  docusate sodium 200 milliGRAM(s) Oral daily  calcium carbonate  625 mG + Vitamin D (OsCal 250 + D) 1 Tablet(s) Oral daily  multivitamin 1 Tablet(s) Oral daily  ascorbic acid 500 milliGRAM(s) Oral daily  enoxaparin Injectable 40 milliGRAM(s) SubCutaneous every 24 hours  guaiFENesin  milliGRAM(s) Oral every 12 hours  methylPREDNISolone sodium succinate Injectable 40 milliGRAM(s) IV Push every 12 hours  pantoprazole    Tablet 40 milliGRAM(s) Oral before breakfast  sodium chloride 0.9%. 1000 milliLiter(s) (50 mL/Hr) IV Continuous <Continuous>  ALBUTerol    0.083% 2.5 milliGRAM(s) Nebulizer three times a day      Labs: All Labs Reviewed:                        10.6   6.3   )-----------( 191      ( 24 Aug 2017 07:03 )             33.3     08-24    141  |  110<H>  |  17  ----------------------------<  111<H>  4.2   |  23  |  0.76    Ca    8.6      24 Aug 2017 07:03  Phos  3.5     08-23  Mg     2.0     08-23    TPro  5.4<L>  /  Alb  2.7<L>  /  TBili  0.5  /  DBili  x   /  AST  23  /  ALT  27  /  AlkPhos  64  08-23

## 2017-08-24 NOTE — CONSULT NOTE ADULT - ASSESSMENT
87 y/o Female with h/o Dementia, RA on chronic prednisone,  non-O2 dependent COPD, glaucoma, HTN, distant TIA, GERD, HLD, osteoporosis, kidney stones, recurrent PNA,  hip fx s/p hip surgery was admitted on 8/24 for shortness of breath, cough, lethargy x 2 days.  States a couple weeks ago was placed on levaquin for pneumonia for 6 days. She had persistent symptoms and a repeat CXR done as outpatient showed worsening basiliar infiltrates on left side. In ER, the patient was placed on cefepime and vanco IV. Noted with loose stools.     1. Diarrheal syndrome. CDAD. COPD.  -doubt pneumonia  -no clinical evidence of toxic megacolon  -agree with vancomycin 125 mg PO q6h  -contact isolation  -monitor BM pattern  -would hold off other abx therapy  -monitor temps  -f/u CBC  -supportive care  2. Other issues:   -care per medicine

## 2017-08-25 ENCOUNTER — TRANSCRIPTION ENCOUNTER (OUTPATIENT)
Age: 82
End: 2017-08-25

## 2017-08-25 LAB
ANION GAP SERPL CALC-SCNC: 8 MMOL/L — SIGNIFICANT CHANGE UP (ref 5–17)
BUN SERPL-MCNC: 21 MG/DL — SIGNIFICANT CHANGE UP (ref 7–23)
CALCIUM SERPL-MCNC: 8.2 MG/DL — LOW (ref 8.5–10.1)
CHLORIDE SERPL-SCNC: 114 MMOL/L — HIGH (ref 96–108)
CO2 SERPL-SCNC: 24 MMOL/L — SIGNIFICANT CHANGE UP (ref 22–31)
CREAT SERPL-MCNC: 0.68 MG/DL — SIGNIFICANT CHANGE UP (ref 0.5–1.3)
GLUCOSE SERPL-MCNC: 81 MG/DL — SIGNIFICANT CHANGE UP (ref 70–99)
HCT VFR BLD CALC: 32.2 % — LOW (ref 34.5–45)
HGB BLD-MCNC: 10 G/DL — LOW (ref 11.5–15.5)
MCHC RBC-ENTMCNC: 26.9 PG — LOW (ref 27–34)
MCHC RBC-ENTMCNC: 31 GM/DL — LOW (ref 32–36)
MCV RBC AUTO: 86.7 FL — SIGNIFICANT CHANGE UP (ref 80–100)
PLATELET # BLD AUTO: 186 K/UL — SIGNIFICANT CHANGE UP (ref 150–400)
POTASSIUM SERPL-MCNC: 3.8 MMOL/L — SIGNIFICANT CHANGE UP (ref 3.5–5.3)
POTASSIUM SERPL-SCNC: 3.8 MMOL/L — SIGNIFICANT CHANGE UP (ref 3.5–5.3)
RBC # BLD: 3.71 M/UL — LOW (ref 3.8–5.2)
RBC # FLD: 15.6 % — HIGH (ref 10.3–14.5)
SODIUM SERPL-SCNC: 146 MMOL/L — HIGH (ref 135–145)
WBC # BLD: 6.4 K/UL — SIGNIFICANT CHANGE UP (ref 3.8–10.5)
WBC # FLD AUTO: 6.4 K/UL — SIGNIFICANT CHANGE UP (ref 3.8–10.5)

## 2017-08-25 RX ORDER — CYCLOBENZAPRINE HYDROCHLORIDE 10 MG/1
5 TABLET, FILM COATED ORAL EVERY 8 HOURS
Qty: 0 | Refills: 0 | Status: DISCONTINUED | OUTPATIENT
Start: 2017-08-25 | End: 2017-08-28

## 2017-08-25 RX ORDER — CYCLOBENZAPRINE HYDROCHLORIDE 10 MG/1
5 TABLET, FILM COATED ORAL ONCE
Qty: 0 | Refills: 0 | Status: COMPLETED | OUTPATIENT
Start: 2017-08-25 | End: 2017-08-25

## 2017-08-25 RX ORDER — BUDESONIDE, MICRONIZED 100 %
0.5 POWDER (GRAM) MISCELLANEOUS
Qty: 0 | Refills: 0 | Status: DISCONTINUED | OUTPATIENT
Start: 2017-08-25 | End: 2017-08-28

## 2017-08-25 RX ORDER — VANCOMYCIN HCL 1 G
1 VIAL (EA) INTRAVENOUS
Qty: 56 | Refills: 0 | OUTPATIENT
Start: 2017-08-25 | End: 2017-09-08

## 2017-08-25 RX ADMIN — Medication 600 MILLIGRAM(S): at 06:37

## 2017-08-25 RX ADMIN — PANTOPRAZOLE SODIUM 40 MILLIGRAM(S): 20 TABLET, DELAYED RELEASE ORAL at 08:48

## 2017-08-25 RX ADMIN — ENOXAPARIN SODIUM 40 MILLIGRAM(S): 100 INJECTION SUBCUTANEOUS at 06:37

## 2017-08-25 RX ADMIN — ALBUTEROL 2.5 MILLIGRAM(S): 90 AEROSOL, METERED ORAL at 14:38

## 2017-08-25 RX ADMIN — CYCLOBENZAPRINE HYDROCHLORIDE 5 MILLIGRAM(S): 10 TABLET, FILM COATED ORAL at 21:42

## 2017-08-25 RX ADMIN — Medication 1 TABLET(S): at 12:40

## 2017-08-25 RX ADMIN — Medication 125 MILLIGRAM(S): at 17:04

## 2017-08-25 RX ADMIN — Medication 100 MILLIGRAM(S): at 21:36

## 2017-08-25 RX ADMIN — ASPIRIN AND DIPYRIDAMOLE 1 CAPSULE(S): 25; 200 CAPSULE, EXTENDED RELEASE ORAL at 06:36

## 2017-08-25 RX ADMIN — SIMVASTATIN 20 MILLIGRAM(S): 20 TABLET, FILM COATED ORAL at 21:36

## 2017-08-25 RX ADMIN — Medication 3 MILLIGRAM(S): at 00:59

## 2017-08-25 RX ADMIN — Medication 1 TABLET(S): at 12:36

## 2017-08-25 RX ADMIN — Medication 0.5 MILLIGRAM(S): at 19:43

## 2017-08-25 RX ADMIN — Medication 200 MILLIGRAM(S): at 17:04

## 2017-08-25 RX ADMIN — Medication 500 MILLIGRAM(S): at 12:38

## 2017-08-25 RX ADMIN — Medication 500 MILLIGRAM(S): at 14:57

## 2017-08-25 RX ADMIN — Medication 600 MILLIGRAM(S): at 17:06

## 2017-08-25 RX ADMIN — CYCLOBENZAPRINE HYDROCHLORIDE 5 MILLIGRAM(S): 10 TABLET, FILM COATED ORAL at 15:44

## 2017-08-25 RX ADMIN — ASPIRIN AND DIPYRIDAMOLE 1 CAPSULE(S): 25; 200 CAPSULE, EXTENDED RELEASE ORAL at 17:03

## 2017-08-25 RX ADMIN — Medication 125 MILLIGRAM(S): at 00:48

## 2017-08-25 RX ADMIN — Medication 100 MILLIGRAM(S): at 00:56

## 2017-08-25 RX ADMIN — Medication 125 MILLIGRAM(S): at 12:38

## 2017-08-25 RX ADMIN — ALBUTEROL 2.5 MILLIGRAM(S): 90 AEROSOL, METERED ORAL at 14:52

## 2017-08-25 RX ADMIN — MEMANTINE HYDROCHLORIDE 21 MILLIGRAM(S): 10 TABLET ORAL at 12:37

## 2017-08-25 RX ADMIN — ALBUTEROL 2.5 MILLIGRAM(S): 90 AEROSOL, METERED ORAL at 19:42

## 2017-08-25 RX ADMIN — TIOTROPIUM BROMIDE 1 CAPSULE(S): 18 CAPSULE ORAL; RESPIRATORY (INHALATION) at 14:53

## 2017-08-25 RX ADMIN — Medication 20 MILLIGRAM(S): at 06:37

## 2017-08-25 RX ADMIN — Medication 500 MILLIGRAM(S): at 15:30

## 2017-08-25 RX ADMIN — Medication 200 MILLIGRAM(S): at 06:37

## 2017-08-25 RX ADMIN — Medication 125 MILLIGRAM(S): at 06:37

## 2017-08-25 NOTE — PROGRESS NOTE ADULT - SUBJECTIVE AND OBJECTIVE BOX
SUBJECTIVE     Patient has cough and diarrhea is improving .  no wheezing or sob   has mild leg edema   she was started on po vancomycin           PAST MEDICAL & SURGICAL HISTORY:  Compression fracture of spine: T7  Alzheimers disease  HTN (hypertension)  Glaucoma  TIA (transient ischemic attack): 8/07  Cerebral vascular accident: 2005  Polymyalgia rheumatica  Osteoporosis  Chronic pain: mid back  Urinary retention  GERD (gastroesophageal reflux disease)  Cholelithiases  Hyperlipemia  Carotid artery stenosis  Lung nodule: biopsied 2003, benign  COPD (chronic obstructive pulmonary disease)  Asthma  History of hip surgery  Gall stones  History of hysterectomy: for bleeding  Hx of cholecystectomy          OBJECTIVE       Vital Signs Last 24 Hrs  T(C): 36.4 (25 Aug 2017 06:10), Max: 36.7 (24 Aug 2017 16:52)  T(F): 97.6 (25 Aug 2017 06:10), Max: 98 (24 Aug 2017 16:52)  HR: 72 (25 Aug 2017 06:10) (72 - 83)  BP: 118/54 (25 Aug 2017 06:10) (108/50 - 119/57)  BP(mean): --  RR: 17 (24 Aug 2017 16:52) (17 - 18)  SpO2: 92% (25 Aug 2017 06:10) (92% - 95%)    PHYSICAL EXAM:    Constitutional: appears weak and breathing comfortably with no acute sob      HEENT: Normo cephalic atruamtic     Neck: Soft and supple, No J.V.D     Respiratory: vesicular breathing ,has  rales over the bases with no acute wheeze     Cardiovascular: S1 and S2, regular rate .     Gastrointestinal:  soft, nontender,     Extremities: No peripheral edema    Neurological: A/O x 3, no focal deficits.      Medications:  MEDICATIONS  (STANDING):  dipyridamole 200 mG/aspirin 25 mG 1 Capsule(s) Oral two times a day  hydroxychloroquine 200 milliGRAM(s) Oral every 12 hours  simvastatin 20 milliGRAM(s) Oral at bedtime  tiotropium 18 MICROgram(s) Capsule 1 Capsule(s) Inhalation daily  docusate sodium 200 milliGRAM(s) Oral daily  calcium carbonate  625 mG + Vitamin D (OsCal 250 + D) 1 Tablet(s) Oral daily  multivitamin 1 Tablet(s) Oral daily  ascorbic acid 500 milliGRAM(s) Oral daily  enoxaparin Injectable 40 milliGRAM(s) SubCutaneous every 24 hours  guaiFENesin  milliGRAM(s) Oral every 12 hours  methylPREDNISolone sodium succinate Injectable 40 milliGRAM(s) IV Push every 12 hours  pantoprazole    Tablet 40 milliGRAM(s) Oral before breakfast  sodium chloride 0.9%. 1000 milliLiter(s) (50 mL/Hr) IV Continuous <Continuous>  ALBUTerol    0.083% 2.5 milliGRAM(s) Nebulizer three times a day                            10.0   6.4   )-----------( 186      ( 25 Aug 2017 07:06 )             32.2   08-25    146<H>  |  114<H>  |  21  ----------------------------<  81  3.8   |  24  |  0.68    Ca    8.2<L>      25 Aug 2017 07:06

## 2017-08-25 NOTE — DISCHARGE NOTE ADULT - MEDICATION SUMMARY - MEDICATIONS TO TAKE
I will START or STAY ON the medications listed below when I get home from the hospital:    predniSONE 5 mg oral tablet  -- 1 tab(s) by mouth once a day  -- Indication: For COPD (chronic obstructive pulmonary disease)    acetaminophen 500 mg oral tablet  -- 1 tab(s) by mouth every 8 hours, As Needed  -- Indication: For for pain    traZODone 50 mg oral tablet  -- 1 tab(s) by mouth once a day (at bedtime), As Needed  -- Indication: For Antidepressant    simvastatin 20 mg oral tablet  -- 1 tab(s) by mouth once a day (at bedtime)  -- Indication: For Hyperlipemia    hydroxychloroquine 200 mg oral tablet  -- 1 tab(s) by mouth every 12 hours  -- Indication: For Rheumatoid arthritis    Aggrenox 25 mg-200 mg oral capsule, extended release  -- 1 cap(s) by mouth 2 times a day  -- Indication: For TIA (transient ischemic attack)    Spiriva 18 mcg inhalation capsule  -- 1 cap(s) inhaled once a day  -- Indication: For COPD (chronic obstructive pulmonary disease)    vancomycin 125 mg oral capsule  -- 1 cap(s) by mouth every 6 hours  -- Finish all this medication unless otherwise directed by prescriber.    -- Indication: For Cdiff    docusate sodium 100 mg oral capsule  -- 2 cap(s) by mouth once a day  -- Indication: For COnstipation    Namenda XR 14 mg oral capsule, extended release  -- 1 cap(s) by mouth once a day  -- Indication: For Alzheimers disease    esomeprazole 40 mg oral delayed release capsule  -- 1 cap(s) by mouth once a day  -- Indication: For GERD (gastroesophageal reflux disease)    Calcium 600+D oral tablet  -- 1 tab(s) by mouth once a day  -- Indication: For vitamin    Multiple Vitamins oral tablet  -- 1 tab(s) by mouth once a day  -- Indication: For vitamin    folic acid 1 mg oral tablet  -- 1 tab(s) by mouth once a day  -- Indication: For vitamin    ascorbic acid 500 mg oral tablet  -- 1 tab(s) by mouth once a day  -- Indication: For vitamin I will START or STAY ON the medications listed below when I get home from the hospital:    budesonide 0.5 mg/2 mL inhalation suspension  --  inhaled   -- Indication: For inhalation    predniSONE 20 mg oral tablet  -- 1 tab(s) by mouth once a day  -- Indication: For steroid taper    predniSONE 10 mg oral tablet  -- 1 tab(s) by mouth once a day  -- Indication: For steroid taper    predniSONE 5 mg oral tablet  -- 1 tab(s) by mouth once a day  -- Indication: For for RA    acetaminophen 500 mg oral tablet  -- 1 tab(s) by mouth every 8 hours, As Needed  -- Indication: For for pain    traZODone 50 mg oral tablet  -- 1 tab(s) by mouth once a day (at bedtime), As Needed  -- Indication: For Antidepressant    simvastatin 20 mg oral tablet  -- 1 tab(s) by mouth once a day (at bedtime)  -- Indication: For Hyperlipemia    hydroxychloroquine 200 mg oral tablet  -- 1 tab(s) by mouth every 12 hours  -- Indication: For Rheumatoid arthritis    Aggrenox 25 mg-200 mg oral capsule, extended release  -- 1 cap(s) by mouth 2 times a day  -- Indication: For TIA (transient ischemic attack)    Spiriva 18 mcg inhalation capsule  -- 1 cap(s) inhaled once a day  -- Indication: For COPD (chronic obstructive pulmonary disease)    guaiFENesin 100 mg/5 mL oral liquid  -- 10 milliliter(s) by mouth every 6 hours, As needed, Cough  -- Indication: For for cough    vancomycin 125 mg oral capsule  -- 1 cap(s) by mouth every 6 hours  -- Finish all this medication unless otherwise directed by prescriber.    -- Indication: For Cdiff    docusate sodium 100 mg oral capsule  -- 2 cap(s) by mouth once a day  -- Indication: For COnstipation    Namenda XR 14 mg oral capsule, extended release  -- 1 cap(s) by mouth once a day  -- Indication: For Alzheimers disease    cyclobenzaprine 5 mg oral tablet  -- 1 tab(s) by mouth every 8 hours, As needed, Muscle Spasm  -- Indication: For for back muscle spasm    esomeprazole 40 mg oral delayed release capsule  -- 1 cap(s) by mouth once a day  -- Indication: For GERD (gastroesophageal reflux disease)    Calcium 600+D oral tablet  -- 1 tab(s) by mouth once a day  -- Indication: For vitamin    Multiple Vitamins oral tablet  -- 1 tab(s) by mouth once a day  -- Indication: For vitamin    folic acid 1 mg oral tablet  -- 1 tab(s) by mouth once a day  -- Indication: For vitamin    ascorbic acid 500 mg oral tablet  -- 1 tab(s) by mouth once a day  -- Indication: For vitamin

## 2017-08-25 NOTE — DISCHARGE NOTE ADULT - CARE PLAN
Principal Discharge DX:	Acute respiratory failure with hypoxia  Goal:	To adhere to medication regimen  Instructions for follow-up, activity and diet:	- Complete treatment of vancomycin for c.diff diarrhea  - Stay hydrated

## 2017-08-25 NOTE — DISCHARGE NOTE ADULT - PLAN OF CARE
To adhere to medication regimen - Complete treatment of vancomycin for c.diff diarrhea  - Stay hydrated

## 2017-08-25 NOTE — DISCHARGE NOTE ADULT - HOSPITAL COURSE
87 yo F hx of Dementia, RA on chronic prednisone,  non-O2 dependent COPD, glaucoma, HTN, distant TIA, GERD, HLD, osteoporosis, kidney stones, hx of recurrent PNA, last admission april 2017 for hip fx s/p hip surgery, presents with shortness of breath, cough, lethargy x 2 days.  States a couple weeks ago was placed on levaquin for a pneumonia, initially improved, but then began having tendon pain after around 6 days and was taken off the antibiotics.  Was ok for a couple days, then 2 days ago began to worsen with above sx .  Had cxr done  as outpatient today that showed worsening basiliar infiltrates on left side, sent in for failed outpatient treatment of pneumonia by Dr Guardado.  Per daughter patient's oxygen saturation in Dr Oliveros office was 87% RA  8/23 - Pt. seen and examined in bed, daughter at bedside. Pt. just had loose, watery BM. Will order cdiff.   8/24 - Pt. feels well, still having loose stools. cdiff positive.   8/25 - Pt. feels well, no further loose stools.	      Review of Systems:  Other Review of Systems: All other review of systems negative, except as noted in HPI	          Physical Exam:  Vital Signs Last 24 Hrs T(C): 36.3 (25 Aug 2017 11:36), Max: 36.7 (24 Aug 2017 16:52) T(F): 97.4 (25 Aug 2017 11:36), Max: 98 (24 Aug 2017 16:52) HR: 87 (25 Aug 2017 11:36) (72 - 87) BP: 150/61 (25 Aug 2017 11:36) (108/50 - 150/61) BP(mean): -- RR: 18 (25 Aug 2017 11:36) (17 - 18) SpO2: 99% (25 Aug 2017 11:36) (92% - 99%)  Constitutional: NAD  HEENT: Atraumatic, SARA, Normal, No congestion Respiratory: diminished breath sounds, no rhonchi/wheeze/crackles Cardiovascular: N S1S2 Gastrointestinal: Abdomen soft, non tender, Bowel sounds present Extremities: No edema, peripheral pulses present Neurological: AAO x 3, no gross focal motor deficits Skin: Non cellulitic Back: No CVA tenderness  Musculoskeletal: non tender 	      Labs and Results:            All labs reviewed. 	    Assessment and Plan:   87 yo F with PMHx as above who presented with SOB, cough, lethargy.    # Acute respiratory failure   - CT results reviewed - unlikely PNA  - d/c ABX (afebrile, white count WNL)  - ID consult appreciated  - c/w nebs/spiriva  - pulmonary consult appreciated    # Diarrhea  - cdiff positive  - Vanco 125mg PO Q6 x 14 more days RX  - ID consult appreciated      # COPD (chronic obstructive pulmonary disease)  - hx of COPD, no acute exacerbation at this time  - continue neb prn, spiriva.       # Breast nodule  - will need out patient diagnostic mammo and f/u with PMD after discharge ASAP. Pt's daughter Pat aware and has RX for mammogram. 87 yo F hx of Dementia, RA on chronic prednisone,  non-O2 dependent COPD, glaucoma, HTN, distant TIA, GERD, HLD, osteoporosis, kidney stones, hx of recurrent PNA, last admission april 2017 for hip fx s/p hip surgery, presents with shortness of breath, cough, lethargy x 2 days.  States a couple weeks ago was placed on levaquin for a pneumonia, initially improved, but then began having tendon pain after around 6 days and was taken off the antibiotics.  Was ok for a couple days, then 2 days ago began to worsen with above sx .  Had cxr done  as outpatient today that showed worsening basiliar infiltrates on left side, sent in for failed outpatient treatment of pneumonia by Dr Guardado.  Per daughter patient's oxygen saturation in Dr Oliveros office was 87% RA.  pt admitted and had CT chest which showed no evidence of PNA and abx discontinued. pt was noted to have watery diarrhea and found be positive for c.diff and started on po vanco on 8/24 and now with resolution of diarrhea  hospital course further c/b muscle spasms and started on felxeril 8/25.    	      Review of Systems:  Other Review of Systems: All other review of systems negative, except as noted in HPI	          Physical Exam:  Vital Signs Last 24 Hrs T(C): 36.3 (25 Aug 2017 11:36), Max: 36.7 (24 Aug 2017 16:52) T(F): 97.4 (25 Aug 2017 11:36), Max: 98 (24 Aug 2017 16:52) HR: 87 (25 Aug 2017 11:36) (72 - 87) BP: 150/61 (25 Aug 2017 11:36) (108/50 - 150/61) BP(mean): -- RR: 18 (25 Aug 2017 11:36) (17 - 18) SpO2: 99% (25 Aug 2017 11:36) (92% - 99%)  Constitutional: NAD  HEENT: Atraumatic, SARA, Normal, No congestion Respiratory: diminished breath sounds, no rhonchi/wheeze/crackles Cardiovascular: + s1S2, RRR Gastrointestinal: Abdomen soft, non tender, Bowel sounds present Extremities: No edema, peripheral pulses present Neurological: AAO x 3, no gross focal motor deficits, forgetful at times  Skin: Non cellulitic Back: No CVA tenderness  Musculoskeletal: non tender 	      Labs and Results:            All labs reviewed. 	    Assessment and Plan:   87 yo F with PMHx as above who presented with SOB, cough, lethargy.    # Acute respiratory failure - resolved.   - CT results reviewed - unlikely PNA  - d/c ABX (afebrile, white count WNL)  - ID consult appreciated  - c/w nebs/spiriva  - pulmonary consult appreciated    # Diarrhea  - cdiff positive  - Vanco 125mg PO Q6 x 14 more days RX  - ID consult appreciated      # COPD (chronic obstructive pulmonary disease)  - hx of COPD, no acute exacerbation at this time  - continue neb prn, spiriva.       # Breast nodule  - will need out patient diagnostic mammo and f/u with PMD after discharge ASAP. Pt's daughter Pat aware and has RX for mammogram.     total time spent on d/c 35 mins 89 yo F hx of Dementia, RA on chronic prednisone,  non-O2 dependent COPD, glaucoma, HTN, distant TIA, GERD, HLD, osteoporosis, kidney stones, hx of recurrent PNA, last admission april 2017 for hip fx s/p hip surgery, presents with shortness of breath, cough, lethargy x 2 days.  States a couple weeks ago was placed on levaquin for a pneumonia, initially improved, but then began having tendon pain after around 6 days and was taken off the antibiotics.  Was ok for a couple days, then 2 days ago began to worsen with above sx .  Had cxr done  as outpatient today that showed worsening basiliar infiltrates on left side, sent in for failed outpatient treatment of pneumonia by Dr Guardado.  Per daughter patient's oxygen saturation in Dr Oliveros office was 87% RA.  pt admitted and had CT chest which showed no evidence of PNA and abx discontinued. pt was noted to have watery diarrhea and found be positive for c.diff and started on po vanco on 8/24 and now with resolution of diarrhea.   Hospital course further c/b muscle spasms and started on flexeril 8/25. Will discharge on Prednisone taper (for COPD exac/cough) 20 mg until Wed 8/30 and then 10mg until Saturday 9/2 and then resume home dose of 5mg daily.  Of note, incidental breast nodule detected. Pt. will need outptt diagnostic mammo and f/u with PMD after discharge ASAP.  Pt's daughter Pat aware and has RX for mammogram.   Total time spent on d/c 35 mins   	        Physical Exam:  Vital Signs Last 24 Hrs T(C): 36.6 (28 Aug 2017 06:01), Max: 36.7 (27 Aug 2017 21:40) T(F): 97.9 (28 Aug 2017 06:01), Max: 98 (27 Aug 2017 21:40) HR: 88 (28 Aug 2017 07:44) (77 - 89) BP: 159/74 (28 Aug 2017 06:01) (113/52 - 159/74) BP(mean): -- RR: 17 (27 Aug 2017 21:40) (17 - 17) SpO2: 96% (28 Aug 2017 06:01) (96% - 99%)   Constitutional: NAD  HEENT: Atraumatic, SARA, Normal, No congestion Respiratory: diminished breath sounds, no rhonchi/wheeze/crackles Cardiovascular: + s1S2, RRR Gastrointestinal: Abdomen soft, non tender, Bowel sounds present Extremities: No edema, peripheral pulses present Neurological: AAO x 3, no gross focal motor deficits, forgetful at times  Skin: Non cellulitic Back: No CVA tenderness  Musculoskeletal: non tender

## 2017-08-25 NOTE — DISCHARGE NOTE ADULT - PATIENT PORTAL LINK FT
“You can access the [employer] Patient Portal, offered by [facility], by registering with the following website: [email or web address]”

## 2017-08-25 NOTE — DISCHARGE NOTE ADULT - CARE PROVIDER_API CALL
Flavio Otero), Family Medicine  [address] [place] [address]  Phone: [phone]  Fax: [phone]    Saul Guardado), Internal Medicine; Pulmonary Disease; Sleep Medicine  [address] [place] [address]  Phone: [phone]  Fax: [phone]

## 2017-08-26 RX ADMIN — Medication 0.5 MILLIGRAM(S): at 19:36

## 2017-08-26 RX ADMIN — ASPIRIN AND DIPYRIDAMOLE 1 CAPSULE(S): 25; 200 CAPSULE, EXTENDED RELEASE ORAL at 05:33

## 2017-08-26 RX ADMIN — Medication 1 TABLET(S): at 13:41

## 2017-08-26 RX ADMIN — Medication 125 MILLIGRAM(S): at 13:42

## 2017-08-26 RX ADMIN — ENOXAPARIN SODIUM 40 MILLIGRAM(S): 100 INJECTION SUBCUTANEOUS at 05:32

## 2017-08-26 RX ADMIN — ASPIRIN AND DIPYRIDAMOLE 1 CAPSULE(S): 25; 200 CAPSULE, EXTENDED RELEASE ORAL at 18:42

## 2017-08-26 RX ADMIN — Medication 125 MILLIGRAM(S): at 05:33

## 2017-08-26 RX ADMIN — Medication 5 MILLIGRAM(S): at 05:32

## 2017-08-26 RX ADMIN — CYCLOBENZAPRINE HYDROCHLORIDE 5 MILLIGRAM(S): 10 TABLET, FILM COATED ORAL at 22:10

## 2017-08-26 RX ADMIN — Medication 600 MILLIGRAM(S): at 05:32

## 2017-08-26 RX ADMIN — MEMANTINE HYDROCHLORIDE 21 MILLIGRAM(S): 10 TABLET ORAL at 13:43

## 2017-08-26 RX ADMIN — TIOTROPIUM BROMIDE 1 CAPSULE(S): 18 CAPSULE ORAL; RESPIRATORY (INHALATION) at 08:02

## 2017-08-26 RX ADMIN — Medication 500 MILLIGRAM(S): at 13:40

## 2017-08-26 RX ADMIN — Medication 200 MILLIGRAM(S): at 18:42

## 2017-08-26 RX ADMIN — Medication 0.5 MILLIGRAM(S): at 07:55

## 2017-08-26 RX ADMIN — Medication 125 MILLIGRAM(S): at 23:52

## 2017-08-26 RX ADMIN — CYCLOBENZAPRINE HYDROCHLORIDE 5 MILLIGRAM(S): 10 TABLET, FILM COATED ORAL at 13:40

## 2017-08-26 RX ADMIN — Medication 200 MILLIGRAM(S): at 05:32

## 2017-08-26 RX ADMIN — Medication 40 MILLIGRAM(S): at 13:42

## 2017-08-26 RX ADMIN — ALBUTEROL 2.5 MILLIGRAM(S): 90 AEROSOL, METERED ORAL at 13:39

## 2017-08-26 RX ADMIN — Medication 1 TABLET(S): at 13:39

## 2017-08-26 RX ADMIN — Medication 125 MILLIGRAM(S): at 18:42

## 2017-08-26 RX ADMIN — ALBUTEROL 2.5 MILLIGRAM(S): 90 AEROSOL, METERED ORAL at 19:36

## 2017-08-26 RX ADMIN — SIMVASTATIN 20 MILLIGRAM(S): 20 TABLET, FILM COATED ORAL at 22:08

## 2017-08-26 RX ADMIN — Medication 600 MILLIGRAM(S): at 18:41

## 2017-08-26 RX ADMIN — ALBUTEROL 2.5 MILLIGRAM(S): 90 AEROSOL, METERED ORAL at 07:55

## 2017-08-26 RX ADMIN — PANTOPRAZOLE SODIUM 40 MILLIGRAM(S): 20 TABLET, DELAYED RELEASE ORAL at 13:41

## 2017-08-26 NOTE — PROGRESS NOTE ADULT - SUBJECTIVE AND OBJECTIVE BOX
87 yo F hx of Dementia, RA on chronic prednisone,  non-O2 dependent COPD, glaucoma, HTN, distant TIA, GERD, HLD, osteoporosis, kidney stones, hx of recurrent PNA, last admission april 2017 for hip fx s/p hip surgery, presents with shortness of breath, cough, lethargy x 2 days.  States a couple weeks ago was placed on levaquin for a pneumonia, initially improved, but then began having tendon pain after around 6 days and was taken off the antibiotics.  Was ok for a couple days, then 2 days ago began to worsen with above sx .  Had cxr done as outpatient  today that showed worsening basiliar infiltrates on left side, sent in for failed outpatient treatment of pneumonia by Dr Guardado.  Per daughter patient's oxygen saturation in Dr Oliveros office was 87% RA EKG sinus rhythm 91bpm,with occasional APC's anterior septal infarct changes which were present on EKG done in March 2017 CT chest -report reviewed In Ed patient was given IVF ,cefepime and vanco ,lactate was 1.1 Patient denies chest pain ,no orthopnea ,no leg swelling ,no abdominal pain, nausea or vomiting Per daughter patient had slight increase in her temperature at home 99 which is not usual for her    8/23 - Pt. seen and examined in bed, daughter at bedside. Pt. just had loose, watery BM. Will order cdiff.   8/24 - Pt. feels well, still having loose stools. cdiff positive.  8/26 - pt d/c held ydy due to muscle spasm in back which is improving with flexril but this AM with worsening cough.  dtr updated	      Review of Systems:  Other Review of Systems: All other review of systems negative, except as noted in HPI	          Physical Exam: ICU Vital Signs Last 24 Hrs T(C): 36.6 (26 Aug 2017 11:50), Max: 36.6 (26 Aug 2017 11:50) T(F): 97.8 (26 Aug 2017 11:50), Max: 97.8 (26 Aug 2017 11:50) HR: 87 (26 Aug 2017 11:50) (76 - 87) BP: 151/51 (26 Aug 2017 11:50) (121/59 - 151/65) BP(mean): -- ABP: -- ABP(mean): -- RR: 17 (25 Aug 2017 17:07) (17 - 17) SpO2: 99% (26 Aug 2017 11:50) (94% - 99%)  Constitutional: NAD on nasal cannula, drowsy, but follows all commands appears weak HEENT: Atraumatic, SARA, Normal, No congestion Respiratory: inspiratory crackle in B/l lung bases, no rhonchi/wheeze Cardiovascular: N S1S2; JUVENAL present Gastrointestinal: Abdomen soft, non tender, Bowel sounds present Extremities: No edema, peripheral pulses present Neurological: AAO x 3, no gross focal motor deficits Skin: Non cellulitic Back: No CVA tenderness  Musculoskeletal: non tender 	      Labs and Results:                                       10.0  6.4   )-----------( 186      ( 25 Aug 2017 07:06 )            32.2   08-25  146<H>  |  114<H>  |  21 ----------------------------<  81 3.8   |  24  |  0.68  Ca    8.2<L>      25 Aug 2017 07:06       MEDICATIONS  (STANDING): dipyridamole 200 mG/aspirin 25 mG 1 Capsule(s) Oral two times a day hydroxychloroquine 200 milliGRAM(s) Oral every 12 hours simvastatin 20 milliGRAM(s) Oral at bedtime tiotropium 18 MICROgram(s) Capsule 1 Capsule(s) Inhalation daily docusate sodium 200 milliGRAM(s) Oral daily calcium carbonate  625 mG + Vitamin D (OsCal 250 + D) 1 Tablet(s) Oral daily multivitamin 1 Tablet(s) Oral daily ascorbic acid 500 milliGRAM(s) Oral daily enoxaparin Injectable 40 milliGRAM(s) SubCutaneous every 24 hours guaiFENesin  milliGRAM(s) Oral every 12 hours pantoprazole    Tablet 40 milliGRAM(s) Oral before breakfast sodium chloride 0.9%. 1000 milliLiter(s) (50 mL/Hr) IV Continuous <Continuous> ALBUTerol    0.083% 2.5 milliGRAM(s) Nebulizer three times a day methylPREDNISolone sodium succinate Injectable 20 milliGRAM(s) IV Push daily vancomycin    Solution 125 milliGRAM(s) Oral every 6 hours memantine 10 milliGRAM(s) Oral two times a day  MEDICATIONS  (PRN): acetaminophen   Tablet. 500 milliGRAM(s) Oral every 8 hours PRN Moderate Pain (4 - 6)  	    Assessment and Plan:   87 yo F with PMHx as above who presented with SOB, cough, lethargy.    # Acute hypoxic  respiratory failure - resolving   - CT results reviewed - unlikely PNA  - d/c ABX (afebrile, white count WNL)  - ID consult appreciated  - S&S eval to rule out dysphagia: appreciate nutrition consult, reg diet  - c/w nebs/spiriva  - pulmonary consult appreciated, prednisone with quick taper planned    # Diarrhea  - cdiff positive  - Vanco 125mg PO Q6  - ID consult appreciated      # COPD (chronic obstructive pulmonary disease) - acute exacerbation with worsening cough  - case d/w dr bruce - will taper prednisone quickly given cdiff  - hx of COPD  - continue neb prn, spiriva  - robitussin prn .     # Alzheimers disease  - may take home namenda as similar strength not available on formulary.     # GERD  - pantoprazole 40mg daily (home dose of nexium is not formulary)     # Hyperlipemia  - continue lipitor.     # hx TIA  - continue aggrenox.    # Breast nodule  - will need out patient diagnostic mammo and f/u with PMD after discharge ASAP.     # Rheumatoid arthritis  - dc PO prednisone for now as pt is getting steroids IV  - c/w hydrochloroquine     # muscle spasms - flexeril prn     # DVT ppx  - lovenox SC   - PT eval

## 2017-08-26 NOTE — PROGRESS NOTE ADULT - SUBJECTIVE AND OBJECTIVE BOX
SUBJECTIVE     Patient still has cough in the morning and difficult to bring the sputum.  she has no diarrhaea   complines of back pain with the cough . fells weak and tired           PAST MEDICAL & SURGICAL HISTORY:  Compression fracture of spine: T7  Alzheimers disease  HTN (hypertension)  Glaucoma  TIA (transient ischemic attack): 8/07  Cerebral vascular accident: 2005  Polymyalgia rheumatica  Osteoporosis  Chronic pain: mid back  Urinary retention  GERD (gastroesophageal reflux disease)  Cholelithiases  Hyperlipemia  Carotid artery stenosis  Lung nodule: biopsied 2003, benign  COPD (chronic obstructive pulmonary disease)  Asthma  History of hip surgery  Gall stones  History of hysterectomy: for bleeding  Hx of cholecystectomy          OBJECTIVE      Vital Signs Last 24 Hrs  T(C): 36.4 (26 Aug 2017 05:56), Max: 36.4 (26 Aug 2017 05:56)  T(F): 97.6 (26 Aug 2017 05:56), Max: 97.6 (26 Aug 2017 05:56)  HR: 79 (26 Aug 2017 08:28) (76 - 87)  BP: 121/59 (26 Aug 2017 05:56) (121/59 - 151/65)  BP(mean): --  ABP: --  ABP(mean): --  RR: 17 (25 Aug 2017 17:07) (17 - 18)  SpO2: 95% (26 Aug 2017 05:56) (94% - 99%)      PHYSICAL EXAM:    Constitutional: appears weak and breathing comfortably with no acute sob      HEENT: Normo cephalic atruamtic     Neck: Soft and supple, No J.V.D     Respiratory: vesicular breathing ,has  rales over the bases with no acute wheeze and could not take deep breaths with the cough     Cardiovascular: S1 and S2, regular rate .     Gastrointestinal:  soft, nontender,     Extremities: No peripheral edema    Neurological: A/O x 3, no focal deficits.      MEDICATIONS  (STANDING):  dipyridamole 200 mG/aspirin 25 mG 1 Capsule(s) Oral two times a day  hydroxychloroquine 200 milliGRAM(s) Oral every 12 hours  simvastatin 20 milliGRAM(s) Oral at bedtime  tiotropium 18 MICROgram(s) Capsule 1 Capsule(s) Inhalation daily  docusate sodium 200 milliGRAM(s) Oral daily  calcium carbonate  625 mG + Vitamin D (OsCal 250 + D) 1 Tablet(s) Oral daily  multivitamin 1 Tablet(s) Oral daily  ascorbic acid 500 milliGRAM(s) Oral daily  enoxaparin Injectable 40 milliGRAM(s) SubCutaneous every 24 hours  guaiFENesin  milliGRAM(s) Oral every 12 hours  pantoprazole    Tablet 40 milliGRAM(s) Oral before breakfast  ALBUTerol    0.083% 2.5 milliGRAM(s) Nebulizer three times a day  vancomycin    Solution 125 milliGRAM(s) Oral every 6 hours  memantine ER 21 milliGRAM(s) Oral daily  buDESOnide   0.5 milliGRAM(s) Respule 0.5 milliGRAM(s) Inhalation two times a day                            10.0   6.4   )-----------( 186      ( 25 Aug 2017 07:06 )             32.2   08-25    146<H>  |  114<H>  |  21  ----------------------------<  81  3.8   |  24  |  0.68    Ca    8.2<L>      25 Aug 2017 07:06

## 2017-08-27 RX ADMIN — MEMANTINE HYDROCHLORIDE 21 MILLIGRAM(S): 10 TABLET ORAL at 11:58

## 2017-08-27 RX ADMIN — Medication 125 MILLIGRAM(S): at 11:56

## 2017-08-27 RX ADMIN — Medication 200 MILLIGRAM(S): at 18:28

## 2017-08-27 RX ADMIN — SIMVASTATIN 20 MILLIGRAM(S): 20 TABLET, FILM COATED ORAL at 21:37

## 2017-08-27 RX ADMIN — Medication 1 TABLET(S): at 11:57

## 2017-08-27 RX ADMIN — Medication 0.5 MILLIGRAM(S): at 19:08

## 2017-08-27 RX ADMIN — Medication 125 MILLIGRAM(S): at 18:28

## 2017-08-27 RX ADMIN — ALBUTEROL 2.5 MILLIGRAM(S): 90 AEROSOL, METERED ORAL at 11:43

## 2017-08-27 RX ADMIN — Medication 40 MILLIGRAM(S): at 06:13

## 2017-08-27 RX ADMIN — Medication 0.5 MILLIGRAM(S): at 11:43

## 2017-08-27 RX ADMIN — ASPIRIN AND DIPYRIDAMOLE 1 CAPSULE(S): 25; 200 CAPSULE, EXTENDED RELEASE ORAL at 18:27

## 2017-08-27 RX ADMIN — Medication 200 MILLIGRAM(S): at 06:14

## 2017-08-27 RX ADMIN — Medication 1 TABLET(S): at 11:55

## 2017-08-27 RX ADMIN — Medication 200 MILLIGRAM(S): at 06:13

## 2017-08-27 RX ADMIN — ENOXAPARIN SODIUM 40 MILLIGRAM(S): 100 INJECTION SUBCUTANEOUS at 06:13

## 2017-08-27 RX ADMIN — TIOTROPIUM BROMIDE 1 CAPSULE(S): 18 CAPSULE ORAL; RESPIRATORY (INHALATION) at 11:43

## 2017-08-27 RX ADMIN — Medication 125 MILLIGRAM(S): at 06:23

## 2017-08-27 RX ADMIN — Medication 600 MILLIGRAM(S): at 18:27

## 2017-08-27 RX ADMIN — ASPIRIN AND DIPYRIDAMOLE 1 CAPSULE(S): 25; 200 CAPSULE, EXTENDED RELEASE ORAL at 06:14

## 2017-08-27 RX ADMIN — Medication 500 MILLIGRAM(S): at 11:57

## 2017-08-27 RX ADMIN — Medication 125 MILLIGRAM(S): at 23:10

## 2017-08-27 RX ADMIN — ALBUTEROL 2.5 MILLIGRAM(S): 90 AEROSOL, METERED ORAL at 19:09

## 2017-08-27 RX ADMIN — Medication 600 MILLIGRAM(S): at 06:13

## 2017-08-27 RX ADMIN — PANTOPRAZOLE SODIUM 40 MILLIGRAM(S): 20 TABLET, DELAYED RELEASE ORAL at 06:16

## 2017-08-27 NOTE — PROGRESS NOTE ADULT - SUBJECTIVE AND OBJECTIVE BOX
87 yo F hx of Dementia, RA on chronic prednisone,  non-O2 dependent COPD, glaucoma, HTN, distant TIA, GERD, HLD, osteoporosis, kidney stones, hx of recurrent PNA, last admission april 2017 for hip fx s/p hip surgery, presents with shortness of breath, cough, lethargy x 2 days.  States a couple weeks ago was placed on levaquin for a pneumonia, initially improved, but then began having tendon pain after around 6 days and was taken off the antibiotics.  Was ok for a couple days, then 2 days ago began to worsen with above sx .  Had cxr done as outpatient  today that showed worsening basiliar infiltrates on left side, sent in for failed outpatient treatment of pneumonia by Dr Guardado.  Per daughter patient's oxygen saturation in Dr Oliveros office was 87% RA EKG sinus rhythm 91bpm,with occasional APC's anterior septal infarct changes which were present on EKG done in March 2017 CT chest -report reviewed In Ed patient was given IVF ,cefepime and vanco ,lactate was 1.1 Patient denies chest pain ,no orthopnea ,no leg swelling ,no abdominal pain, nausea or vomiting Per daughter patient had slight increase in her temperature at home 99 which is not usual for her    8/23 - Pt. seen and examined in bed, daughter at bedside. Pt. just had loose, watery BM. Will order cdiff.   8/24 - Pt. feels well, still having loose stools. cdiff positive.  8/26 - pt d/c held ydy due to muscle spasm in back which is improving with flexril but this AM with worsening cough.  dtr updated 8/27 - feeling much better today. cough persists but back spasms resolved. no cp, sob 	      Review of Systems:  Other Review of Systems: All other review of systems negative, except as noted in HPI	          Physical Exam: ICU Vital Signs Last 24 Hrs ICU Vital Signs Last 24 Hrs T(C): 36.3 (27 Aug 2017 12:19), Max: 36.9 (26 Aug 2017 17:48) T(F): 97.4 (27 Aug 2017 12:19), Max: 98.4 (26 Aug 2017 17:48) HR: 85 (27 Aug 2017 12:19) (74 - 94) BP: 141/52 (27 Aug 2017 12:19) (137/77 - 158/64) BP(mean): -- ABP: -- ABP(mean): -- RR: 17 (27 Aug 2017 06:37) (17 - 17) SpO2: 99% (27 Aug 2017 12:19) (95% - 99%)  Constitutional: NAD on nasal cannula, drowsy, but follows all commands appears weak HEENT: Atraumatic, SARA, Normal, No congestion Respiratory: inspiratory crackle in B/l lung bases, no rhonchi/wheeze Cardiovascular: Nl S1S2; JUVENAL present Gastrointestinal: Abdomen soft, non tender, Bowel sounds present Extremities: No edema, peripheral pulses present Neurological: AAO x 3, no gross focal motor deficits Skin: Non cellulitic Back: No CVA tenderness  Musculoskeletal: non tender 	      Labs and Results:                 labs reviewed     MEDICATIONS  (STANDING): dipyridamole 200 mG/aspirin 25 mG 1 Capsule(s) Oral two times a day hydroxychloroquine 200 milliGRAM(s) Oral every 12 hours simvastatin 20 milliGRAM(s) Oral at bedtime tiotropium 18 MICROgram(s) Capsule 1 Capsule(s) Inhalation daily docusate sodium 200 milliGRAM(s) Oral daily calcium carbonate  625 mG + Vitamin D (OsCal 250 + D) 1 Tablet(s) Oral daily multivitamin 1 Tablet(s) Oral daily ascorbic acid 500 milliGRAM(s) Oral daily enoxaparin Injectable 40 milliGRAM(s) SubCutaneous every 24 hours guaiFENesin  milliGRAM(s) Oral every 12 hours pantoprazole    Tablet 40 milliGRAM(s) Oral before breakfast sodium chloride 0.9%. 1000 milliLiter(s) (50 mL/Hr) IV Continuous <Continuous> ALBUTerol    0.083% 2.5 milliGRAM(s) Nebulizer three times a day methylPREDNISolone sodium succinate Injectable 20 milliGRAM(s) IV Push daily vancomycin    Solution 125 milliGRAM(s) Oral every 6 hours memantine 10 milliGRAM(s) Oral two times a day  MEDICATIONS  (PRN): acetaminophen   Tablet. 500 milliGRAM(s) Oral every 8 hours PRN Moderate Pain (4 - 6)  	    Assessment and Plan:   87 yo F with PMHx as above who presented with SOB, cough, lethargy.    # Acute hypoxic  respiratory failure - resolving   - CT results reviewed - unlikely PNA  - d/c ABX (afebrile, white count WNL)  - ID consult appreciated  - S&S eval to rule out dysphagia: appreciate nutrition consult, reg diet  - c/w nebs/spiriva  - pulmonary consult appreciated, prednisone with quick taper planned with 20 mg for 3 days starting tomorrow then back to standing dose for RA     # Diarrhea  - cdiff positive  - Vanco 125mg PO Q6h for total 14 days   - ID consult appreciated      # COPD (chronic obstructive pulmonary disease) - acute exacerbation with worsening cough  - case d/w dr bruce - will taper prednisone quickly given cdiff  - hx of COPD  - continue neb prn, spiriva  - robitussin prn .     # Alzheimers disease  - may take home namenda as similar strength not available on formulary.     # GERD  - pantoprazole 40mg daily (home dose of nexium is not formulary)     # Hyperlipemia  - continue lipitor.     # hx TIA  - continue aggrenox.    # Breast nodule  - will need out patient diagnostic mammo and f/u with PMD after discharge ASAP.     # Rheumatoid arthritis  - dc PO prednisone for now as pt is getting steroids IV  - c/w hydrochloroquine     # muscle spasms - flexeril prn     # DVT ppx  - lovenox SC   - PT eval

## 2017-08-27 NOTE — PROGRESS NOTE ADULT - SUBJECTIVE AND OBJECTIVE BOX
SUBJECTIVE     Patient feels her cough is better today . she able to spit the sputum .  No diarrhea and abdominal pain   no fever       PAST MEDICAL & SURGICAL HISTORY:  Compression fracture of spine: T7  Alzheimers disease  HTN (hypertension)  Glaucoma  TIA (transient ischemic attack): 8/07  Cerebral vascular accident: 2005  Polymyalgia rheumatica  Osteoporosis  Chronic pain: mid back  Urinary retention  GERD (gastroesophageal reflux disease)  Cholelithiases  Hyperlipemia  Carotid artery stenosis  Lung nodule: biopsied 2003, benign  COPD (chronic obstructive pulmonary disease)  Asthma  History of hip surgery  Gall stones  History of hysterectomy: for bleeding  Hx of cholecystectomy          OBJECTIVE     Vital Signs Last 24 Hrs  T(C): 36.3 (27 Aug 2017 06:37), Max: 36.9 (26 Aug 2017 17:48)  T(F): 97.4 (27 Aug 2017 06:37), Max: 98.4 (26 Aug 2017 17:48)  HR: 74 (27 Aug 2017 06:37) (74 - 94)  BP: 137/77 (27 Aug 2017 06:37) (137/77 - 158/64)  BP(mean): --  RR: 17 (27 Aug 2017 06:37) (17 - 17)  SpO2: 98% (27 Aug 2017 06:37) (95% - 99%)      PHYSICAL EXAM:    Constitutional: appears weak and breathing comfortably on 02 by the nasal canula      HEENT: Normo cephalic atruamtic     Neck: Soft and supple, No J.V.D     Respiratory: vesicular breathing few rales over the bases with no wheeze and decreased breath sounds in the bases     Cardiovascular: S1 and S2, regular rate .     Gastrointestinal:  soft, nontender,     Extremities: No peripheral edema    Neurological: A/O x 3, no focal deficits had baseline dementia       MEDICATIONS  (STANDING):  dipyridamole 200 mG/aspirin 25 mG 1 Capsule(s) Oral two times a day  hydroxychloroquine 200 milliGRAM(s) Oral every 12 hours  simvastatin 20 milliGRAM(s) Oral at bedtime  tiotropium 18 MICROgram(s) Capsule 1 Capsule(s) Inhalation daily  docusate sodium 200 milliGRAM(s) Oral daily  calcium carbonate  625 mG + Vitamin D (OsCal 250 + D) 1 Tablet(s) Oral daily  multivitamin 1 Tablet(s) Oral daily  ascorbic acid 500 milliGRAM(s) Oral daily  enoxaparin Injectable 40 milliGRAM(s) SubCutaneous every 24 hours  guaiFENesin  milliGRAM(s) Oral every 12 hours  pantoprazole    Tablet 40 milliGRAM(s) Oral before breakfast  ALBUTerol    0.083% 2.5 milliGRAM(s) Nebulizer three times a day  vancomycin    Solution 125 milliGRAM(s) Oral every 6 hours  memantine ER 21 milliGRAM(s) Oral daily  buDESOnide   0.5 milliGRAM(s) Respule 0.5 milliGRAM(s) Inhalation two times a day  predniSONE   Tablet 40 milliGRAM(s) Oral daily

## 2017-08-28 VITALS
DIASTOLIC BLOOD PRESSURE: 66 MMHG | SYSTOLIC BLOOD PRESSURE: 152 MMHG | TEMPERATURE: 97 F | RESPIRATION RATE: 18 BRPM | HEART RATE: 95 BPM | OXYGEN SATURATION: 100 %

## 2017-08-28 RX ORDER — CYCLOBENZAPRINE HYDROCHLORIDE 10 MG/1
1 TABLET, FILM COATED ORAL
Qty: 0 | Refills: 0 | COMMUNITY
Start: 2017-08-28

## 2017-08-28 RX ORDER — BUDESONIDE, MICRONIZED 100 %
0 POWDER (GRAM) MISCELLANEOUS
Qty: 0 | Refills: 0 | COMMUNITY
Start: 2017-08-28

## 2017-08-28 RX ADMIN — Medication 200 MILLIGRAM(S): at 17:30

## 2017-08-28 RX ADMIN — Medication 600 MILLIGRAM(S): at 06:14

## 2017-08-28 RX ADMIN — Medication 125 MILLIGRAM(S): at 17:31

## 2017-08-28 RX ADMIN — Medication 600 MILLIGRAM(S): at 17:30

## 2017-08-28 RX ADMIN — ASPIRIN AND DIPYRIDAMOLE 1 CAPSULE(S): 25; 200 CAPSULE, EXTENDED RELEASE ORAL at 17:30

## 2017-08-28 RX ADMIN — Medication 125 MILLIGRAM(S): at 06:25

## 2017-08-28 RX ADMIN — ENOXAPARIN SODIUM 40 MILLIGRAM(S): 100 INJECTION SUBCUTANEOUS at 06:14

## 2017-08-28 RX ADMIN — Medication 1 TABLET(S): at 11:12

## 2017-08-28 RX ADMIN — ALBUTEROL 2.5 MILLIGRAM(S): 90 AEROSOL, METERED ORAL at 07:40

## 2017-08-28 RX ADMIN — ALBUTEROL 2.5 MILLIGRAM(S): 90 AEROSOL, METERED ORAL at 13:29

## 2017-08-28 RX ADMIN — Medication 200 MILLIGRAM(S): at 06:14

## 2017-08-28 RX ADMIN — Medication 1 TABLET(S): at 11:13

## 2017-08-28 RX ADMIN — PANTOPRAZOLE SODIUM 40 MILLIGRAM(S): 20 TABLET, DELAYED RELEASE ORAL at 06:14

## 2017-08-28 RX ADMIN — TIOTROPIUM BROMIDE 1 CAPSULE(S): 18 CAPSULE ORAL; RESPIRATORY (INHALATION) at 07:40

## 2017-08-28 RX ADMIN — Medication 125 MILLIGRAM(S): at 11:13

## 2017-08-28 RX ADMIN — Medication 500 MILLIGRAM(S): at 11:13

## 2017-08-28 RX ADMIN — MEMANTINE HYDROCHLORIDE 21 MILLIGRAM(S): 10 TABLET ORAL at 11:13

## 2017-08-28 RX ADMIN — ALBUTEROL 2.5 MILLIGRAM(S): 90 AEROSOL, METERED ORAL at 19:20

## 2017-08-28 RX ADMIN — Medication 200 MILLIGRAM(S): at 11:13

## 2017-08-28 RX ADMIN — ASPIRIN AND DIPYRIDAMOLE 1 CAPSULE(S): 25; 200 CAPSULE, EXTENDED RELEASE ORAL at 06:14

## 2017-08-28 RX ADMIN — SIMVASTATIN 20 MILLIGRAM(S): 20 TABLET, FILM COATED ORAL at 17:32

## 2017-08-28 RX ADMIN — Medication 0.5 MILLIGRAM(S): at 19:22

## 2017-08-28 RX ADMIN — Medication 200 MILLIGRAM(S): at 06:13

## 2017-08-28 RX ADMIN — Medication 0.5 MILLIGRAM(S): at 07:40

## 2017-08-28 RX ADMIN — Medication 20 MILLIGRAM(S): at 06:15

## 2017-08-28 NOTE — PROVIDER CONTACT NOTE (OTHER) - NAME OF MD/NP/PA/DO NOTIFIED:
INFECTIOUS DISEASE, DR. CANTOR RECEIVING CONSULT.
Anirudh GILLETTE Nursing Supervisor
DR. GRIMALDO (CARDIOLOGY)

## 2017-08-28 NOTE — PROGRESS NOTE ADULT - ASSESSMENT
Patient is seen and consult dictated     - copd with symptoms of exacerbation   - bilateral atelectasis with no gross pneumonia in the ct scan of the chest apart from scarring   - dementia   - R.A on chronic prednisone therapy   - TIA     PLAN     - discontinue zosyn and patient already completed antibiotic therapy   - start on solumedrol and continue with spiriva and albuterol nebs   - check room air abg  to rule out hypercarbia   - hold low dose prednisone while on iv steroids   - discontinue fluids and start diet after swallow evaluation   - patient condition was discussed with the daughter at bed side
- copd with mild exacerbation of symptoms with  mild atelectatic changes   - cdt colitis with diarrhea likely related with the recent levaquin therapy .  - dementia   - poor po intake with the recent infection    PLAN     - would lower the steroids to low dose given cdt colitis and improvement in the wheezing   -start of therapy for the cdt colitis and antibiotics were already discontinued yesterday.   - keep on fluids with the diarrhea and encourage po intake   - continue with the spiriva and nebs along with symptomatic relief for the cough .  - monitor for the complications of cdt coliits
- copd with mild exacerbation of symptoms with  mild atelectatic changes with continued cough   - cdt colitis with diarrhea likely related with the recent levaquin therapy .  - dementia   - bilateral lower lobe atelectatic changes     PLAN     - continue with the solumedrol iv in low dose for the copd  add pulmicort neb   - continue with the po vancomycin   - encourage po intake with discontinuation of fluids   - continue with the spiriva and nebs along with symptomatic relief for the cough .  - monitor for the complications of cdt coliits
- copd with mild exacerbation of symptoms with  mild atelectatic changes with continued cough today   - cdt colitis  on po vanco  - dementia   - bilateral lower lobe atelectatic changes     PLAN     - would recommend po prednisone 40 mg daily dose and slow taper to her dose of 5 mg her baseline dose for the R.A    - continue with the po vancomycin    - continue with the spiriva and pulmicort  nebs along with symptomatic relief for the cough .  - monitor closely for the improvement in the cough with the po prednisone .
- copd with mild exacerbation of symptoms with cough and some productive sputum on po prednisone   - cdt colitis  on po vanco with no new diarrhea   - dementia   - bilateral lower lobe atelectatic changes with the recent ct scan     PLAN     - continue po predinsone 40 mg daily for another day then taper to 20 mg for 3 days then taper to her baseline dose for the R.A   - continue with the po vancomycin for the recommended duration as per the I.D    - continue with the spiriva and pulmicort  nebs along with symptomatic relief for the cough .  - check sat on room air for the evaluation of home therapy. discharge planning as per the medicine and if develops fever at facility need to repeat cxr.   - encourage ambulation .
- copd with mild exacerbation of symptoms with some what improvement in the cough today while on po prednisone   - cdt colitis  on po vanco  - dementia   - bilateral lower lobe atelectatic changes     PLAN     - continue po predinsone 40 mg daily for another day then taper to 20 mg for 3 days then taper to her baseline dose for the R.A   - continue with the po vancomycin for the recommended duration as per the I.D    - continue with the spiriva and pulmicort  nebs along with symptomatic relief for the cough .  - check sat on room air for the evaluation of home therapy. dischage planning as per the medicine as the patient is comfortable today
87 y/o Female with h/o Dementia, RA on chronic prednisone,  non-O2 dependent COPD, glaucoma, HTN, distant TIA, GERD, HLD, osteoporosis, kidney stones, recurrent PNA,  hip fx s/p hip surgery was admitted on 8/24 for shortness of breath, cough, lethargy x 2 days.  States a couple weeks ago was placed on levaquin for pneumonia for 6 days. She had persistent symptoms and a repeat CXR done as outpatient showed worsening basiliar infiltrates on left side. In ER, the patient was placed on cefepime and vanco IV. Noted with loose stools.     1. Diarrheal syndrome. CDAD. COPD.  -loose stools improving  -no clinical evidence of toxic megacolon  -agree with vancomycin 125 mg PO q6h  -contact isolation  -monitor BM pattern  -would hold off other abx therapy  -monitor temps  -f/u CBC  -supportive care  2. Other issues:   -care per medicine

## 2017-08-28 NOTE — PROGRESS NOTE ADULT - SUBJECTIVE AND OBJECTIVE BOX
SUBJECTIVE     Patient still has cough and was able to bring the sputum with no fever or wheezing . no bowel movement since yesterday     PAST MEDICAL & SURGICAL HISTORY:  Compression fracture of spine: T7  Alzheimers disease  HTN (hypertension)  Glaucoma  TIA (transient ischemic attack): 8/07  Cerebral vascular accident: 2005  Polymyalgia rheumatica  Osteoporosis  Chronic pain: mid back  Urinary retention  GERD (gastroesophageal reflux disease)  Cholelithiases  Hyperlipemia  Carotid artery stenosis  Lung nodule: biopsied 2003, benign  COPD (chronic obstructive pulmonary disease)  Asthma  History of hip surgery  Gall stones  History of hysterectomy: for bleeding  Hx of cholecystectomy          OBJECTIVE     Vital Signs Last 24 Hrs  T(C): 36.6 (28 Aug 2017 06:01), Max: 36.7 (27 Aug 2017 21:40)  T(F): 97.9 (28 Aug 2017 06:01), Max: 98 (27 Aug 2017 21:40)  HR: 88 (28 Aug 2017 07:44) (77 - 89)  BP: 159/74 (28 Aug 2017 06:01) (113/52 - 159/74)  BP(mean): --  RR: 17 (27 Aug 2017 21:40) (17 - 17)  SpO2: 96% (28 Aug 2017 06:01) (96% - 99%)    PHYSICAL EXAM:    Constitutional: appears weak and breathing comfortably on 02 by the nasal canula      HEENT: Normo cephalic atruamtic     Neck: Soft and supple, No J.V.D     Respiratory: vesicular breathing few rales over the bases with no wheeze and decreased breath sounds in the bases with no new change since yesterday     Cardiovascular: S1 and S2, regular rate .     Gastrointestinal:  soft, nontender,     Extremities: No peripheral edema    Neurological: A/O x 3, no focal deficits had baseline dementia       MEDICATIONS  (STANDING):  dipyridamole 200 mG/aspirin 25 mG 1 Capsule(s) Oral two times a day  hydroxychloroquine 200 milliGRAM(s) Oral every 12 hours  simvastatin 20 milliGRAM(s) Oral at bedtime  tiotropium 18 MICROgram(s) Capsule 1 Capsule(s) Inhalation daily  docusate sodium 200 milliGRAM(s) Oral daily  calcium carbonate  625 mG + Vitamin D (OsCal 250 + D) 1 Tablet(s) Oral daily  multivitamin 1 Tablet(s) Oral daily  ascorbic acid 500 milliGRAM(s) Oral daily  enoxaparin Injectable 40 milliGRAM(s) SubCutaneous every 24 hours  guaiFENesin  milliGRAM(s) Oral every 12 hours  pantoprazole    Tablet 40 milliGRAM(s) Oral before breakfast  ALBUTerol    0.083% 2.5 milliGRAM(s) Nebulizer three times a day  vancomycin    Solution 125 milliGRAM(s) Oral every 6 hours  memantine ER 21 milliGRAM(s) Oral daily  buDESOnide   0.5 milliGRAM(s) Respule 0.5 milliGRAM(s) Inhalation two times a day  predniSONE   Tablet 20 milliGRAM(s) Oral daily

## 2017-08-28 NOTE — PROVIDER CONTACT NOTE (OTHER) - SITUATION
Spoke with Theodora Eason pt. & family arrived safely with discharge instructions.
Pt. daughter will not wait for transport to take patient as they said they would be here at 5:00 and it is now 8:00 - called ambulance service said they will be another 45 mins, made dgt aware.
SERVICE CALLED, MD AWARE OF CONSULT

## 2017-08-28 NOTE — PROVIDER CONTACT NOTE (OTHER) - ASSESSMENT
Nursing supervisor made aware, did not wish to come speak to family.  Theodora called and made aware of situation - daughter also called and they were already aware and have a wheelchair ready for pt upon arrival.

## 2017-08-28 NOTE — PROGRESS NOTE ADULT - SUBJECTIVE AND OBJECTIVE BOX
Patient is a 88y old  Female who presents with a chief complaint of   HPI:  87 y/o Female with h/o Dementia, RA on chronic prednisone,  non-O2 dependent COPD, glaucoma, HTN, distant TIA, GERD, HLD, osteoporosis, kidney stones, recurrent PNA,  hip fx s/p hip surgery was admitted on 8/24 for shortness of breath, cough, lethargy x 2 days.  States a couple weeks ago was placed on levaquin for pneumonia for 6 days. She had persistent symptoms and a repeat CXR done as outpatient showed worsening basiliar infiltrates on left side. In ER, the patient was placed on cefepime and vanco IV. Noted with loose stools.     Weak looking  Diarrhea is improving  No fever or chills    MEDICATIONS  (STANDING):  dipyridamole 200 mG/aspirin 25 mG 1 Capsule(s) Oral two times a day  hydroxychloroquine 200 milliGRAM(s) Oral every 12 hours  simvastatin 20 milliGRAM(s) Oral at bedtime  tiotropium 18 MICROgram(s) Capsule 1 Capsule(s) Inhalation daily  docusate sodium 200 milliGRAM(s) Oral daily  calcium carbonate  625 mG + Vitamin D (OsCal 250 + D) 1 Tablet(s) Oral daily  multivitamin 1 Tablet(s) Oral daily  ascorbic acid 500 milliGRAM(s) Oral daily  enoxaparin Injectable 40 milliGRAM(s) SubCutaneous every 24 hours  guaiFENesin  milliGRAM(s) Oral every 12 hours  pantoprazole    Tablet 40 milliGRAM(s) Oral before breakfast  ALBUTerol    0.083% 2.5 milliGRAM(s) Nebulizer three times a day  vancomycin    Solution 125 milliGRAM(s) Oral every 6 hours  memantine ER 21 milliGRAM(s) Oral daily  buDESOnide   0.5 milliGRAM(s) Respule 0.5 milliGRAM(s) Inhalation two times a day  predniSONE   Tablet 20 milliGRAM(s) Oral daily    MEDICATIONS  (PRN):  acetaminophen   Tablet. 500 milliGRAM(s) Oral every 8 hours PRN Moderate Pain (4 - 6)  melatonin 3 milliGRAM(s) Oral at bedtime PRN Insomnia  cyclobenzaprine 5 milliGRAM(s) Oral every 8 hours PRN Muscle Spasm  guaiFENesin    Syrup 200 milliGRAM(s) Oral every 6 hours PRN Cough      Vital Signs Last 24 Hrs  T(C): 36.7 (28 Aug 2017 11:03), Max: 36.7 (27 Aug 2017 21:40)  T(F): 98.1 (28 Aug 2017 11:03), Max: 98.1 (28 Aug 2017 11:03)  HR: 92 (28 Aug 2017 11:03) (78 - 92)  BP: 141/68 (28 Aug 2017 11:03) (113/52 - 159/74)  BP(mean): --  RR: 17 (27 Aug 2017 21:40) (17 - 17)  SpO2: 96% (28 Aug 2017 11:03) (96% - 99%)    Physical Exam:        Constitutional: frail looking  HEENT: NC/AT, EOMI, PERRLA  Neck: supple  Back: no tenderness  Respiratory: decreased BS at bases  Cardiovascular: S1S2 regular, no murmurs  Abdomen: soft, not tender, not distended, positive BS  Genitourinary: deferred  Rectal: deferred  Musculoskeletal: no muscle tenderness, no joint swelling or tenderness  Extremities: no pedal edema  Neurological: AxOx3, moving all extremities, no focal deficits  Skin: no rashes    Labs:                        10.6   6.3   )-----------( 191      ( 24 Aug 2017 07:03 )             33.3     08-24    141  |  110<H>  |  17  ----------------------------<  111<H>  4.2   |  23  |  0.76    Ca    8.6      24 Aug 2017 07:03  Phos  3.5     08-23  Mg     2.0     08-23    TPro  5.4<L>  /  Alb  2.7<L>  /  TBili  0.5  /  DBili  x   /  AST  23  /  ALT  27  /  AlkPhos  64  08-23     LIVER FUNCTIONS - ( 23 Aug 2017 07:55 )  Alb: 2.7 g/dL / Pro: 5.4 gm/dL / ALK PHOS: 64 U/L / ALT: 27 U/L / AST: 23 U/L / GGT: x           Clostridium difficile Toxin by PCR (08.23.17 @ 12:33)    Clostridium difficile Toxin by PCR: The results of this test should be interpreted with consideration of all  clinical and laboratory findings. This test determines the presence of  the C. difficile tcdB gene at a given time and is not intended to  identify antibiotic associated disease or C. difficile infection without  clinical context. Successful treatment is based on the resolution of  clinical symptoms. This test should not be used as a "test of cure"  because C. difficile DNA will persist after successful treatment. Repeat  testing will not be permitted.    This test is performed on the BD MAX system using Real-Time PCR and  fluorogenic target-specific hybridization.    C Diff by PCR Result: Detected    Culture - Blood (08.22.17 @ 17:30)    Specimen Source: .Blood None    Culture Results:   No growth to date.          Radiology:    < from: CT Chest No Cont (08.22.17 @ 18:31) >  Subsegmental atelectasis and scarring in the lung bases. Diffuse   parenchymal emphysematous changes.    Asymmetric nodularity to the right breast.    Extensive irregular atheromatous calcification of thoracic and abdominal   aorta    < end of copied text >      Advanced directives addressed: full resuscitation

## 2017-08-28 NOTE — PROVIDER CONTACT NOTE (OTHER) - BACKGROUND
Daughter refused to wait for ambulance although advised against it - took patient via wheelchair to car with discharge packet instructed to give to Theodora.

## 2017-08-30 ENCOUNTER — INPATIENT (INPATIENT)
Facility: HOSPITAL | Age: 82
LOS: 5 days | Discharge: HOME | End: 2017-09-05
Attending: INTERNAL MEDICINE | Admitting: INTERNAL MEDICINE
Payer: COMMERCIAL

## 2017-08-30 VITALS — WEIGHT: 130.07 LBS | HEIGHT: 61 IN

## 2017-08-30 DIAGNOSIS — Z98.890 OTHER SPECIFIED POSTPROCEDURAL STATES: Chronic | ICD-10-CM

## 2017-08-30 LAB
ADD ON TEST-SPECIMEN IN LAB: SIGNIFICANT CHANGE UP
ALBUMIN SERPL ELPH-MCNC: 2.8 G/DL — LOW (ref 3.3–5)
ALP SERPL-CCNC: 79 U/L — SIGNIFICANT CHANGE UP (ref 40–120)
ALT FLD-CCNC: 45 U/L — SIGNIFICANT CHANGE UP (ref 12–78)
ANION GAP SERPL CALC-SCNC: 8 MMOL/L — SIGNIFICANT CHANGE UP (ref 5–17)
APPEARANCE UR: CLEAR — SIGNIFICANT CHANGE UP
APTT BLD: 28.1 SEC — SIGNIFICANT CHANGE UP (ref 27.5–37.4)
AST SERPL-CCNC: 22 U/L — SIGNIFICANT CHANGE UP (ref 15–37)
BACTERIA # UR AUTO: (no result)
BASOPHILS # BLD AUTO: 0.1 K/UL — SIGNIFICANT CHANGE UP (ref 0–0.2)
BILIRUB SERPL-MCNC: 0.5 MG/DL — SIGNIFICANT CHANGE UP (ref 0.2–1.2)
BILIRUB UR-MCNC: NEGATIVE — SIGNIFICANT CHANGE UP
BUN SERPL-MCNC: 39 MG/DL — HIGH (ref 7–23)
CALCIUM SERPL-MCNC: 9.8 MG/DL — SIGNIFICANT CHANGE UP (ref 8.5–10.1)
CHLORIDE SERPL-SCNC: 103 MMOL/L — SIGNIFICANT CHANGE UP (ref 96–108)
CO2 SERPL-SCNC: 26 MMOL/L — SIGNIFICANT CHANGE UP (ref 22–31)
COLOR SPEC: YELLOW — SIGNIFICANT CHANGE UP
COMMENT - URINE: SIGNIFICANT CHANGE UP
CREAT SERPL-MCNC: 1.19 MG/DL — SIGNIFICANT CHANGE UP (ref 0.5–1.3)
DIFF PNL FLD: NEGATIVE — SIGNIFICANT CHANGE UP
EOSINOPHIL # BLD AUTO: 0.1 K/UL — SIGNIFICANT CHANGE UP (ref 0–0.5)
EOSINOPHIL NFR BLD AUTO: 1 % — SIGNIFICANT CHANGE UP (ref 0–6)
EPI CELLS # UR: SIGNIFICANT CHANGE UP
GLUCOSE SERPL-MCNC: 115 MG/DL — HIGH (ref 70–99)
GLUCOSE UR QL: NEGATIVE MG/DL — SIGNIFICANT CHANGE UP
HCT VFR BLD CALC: 35.9 % — SIGNIFICANT CHANGE UP (ref 34.5–45)
HGB BLD-MCNC: 12 G/DL — SIGNIFICANT CHANGE UP (ref 11.5–15.5)
INR BLD: 1.36 RATIO — HIGH (ref 0.88–1.16)
KETONES UR-MCNC: (no result)
LACTATE SERPL-SCNC: 1.6 MMOL/L — SIGNIFICANT CHANGE UP (ref 0.7–2)
LEUKOCYTE ESTERASE UR-ACNC: (no result)
LYMPHOCYTES # BLD AUTO: 1.9 K/UL — SIGNIFICANT CHANGE UP (ref 1–3.3)
LYMPHOCYTES # BLD AUTO: 2 % — LOW (ref 13–44)
MANUAL DIF COMMENT BLD-IMP: SIGNIFICANT CHANGE UP
MCHC RBC-ENTMCNC: 28.4 PG — SIGNIFICANT CHANGE UP (ref 27–34)
MCHC RBC-ENTMCNC: 33.4 GM/DL — SIGNIFICANT CHANGE UP (ref 32–36)
MCV RBC AUTO: 85.1 FL — SIGNIFICANT CHANGE UP (ref 80–100)
MONOCYTES # BLD AUTO: 1.4 K/UL — HIGH (ref 0–0.9)
MONOCYTES NFR BLD AUTO: 4 % — SIGNIFICANT CHANGE UP (ref 2–14)
NEUTROPHILS # BLD AUTO: 23.6 K/UL — HIGH (ref 1.8–7.4)
NEUTROPHILS NFR BLD AUTO: 93 % — HIGH (ref 43–77)
NITRITE UR-MCNC: NEGATIVE — SIGNIFICANT CHANGE UP
PH UR: 6 — SIGNIFICANT CHANGE UP (ref 5–8)
PLAT MORPH BLD: NORMAL — SIGNIFICANT CHANGE UP
PLATELET # BLD AUTO: 293 K/UL — SIGNIFICANT CHANGE UP (ref 150–400)
POLYCHROMASIA BLD QL SMEAR: SLIGHT — SIGNIFICANT CHANGE UP
POTASSIUM SERPL-MCNC: 4.6 MMOL/L — SIGNIFICANT CHANGE UP (ref 3.5–5.3)
POTASSIUM SERPL-SCNC: 4.6 MMOL/L — SIGNIFICANT CHANGE UP (ref 3.5–5.3)
PROT SERPL-MCNC: 6.2 GM/DL — SIGNIFICANT CHANGE UP (ref 6–8.3)
PROT UR-MCNC: 15 MG/DL
PROTHROM AB SERPL-ACNC: 14.8 SEC — HIGH (ref 9.8–12.7)
RBC # BLD: 4.22 M/UL — SIGNIFICANT CHANGE UP (ref 3.8–5.2)
RBC # FLD: 15.6 % — HIGH (ref 10.3–14.5)
RBC BLD AUTO: SIGNIFICANT CHANGE UP
RBC CASTS # UR COMP ASSIST: SIGNIFICANT CHANGE UP /HPF (ref 0–4)
SODIUM SERPL-SCNC: 137 MMOL/L — SIGNIFICANT CHANGE UP (ref 135–145)
SP GR SPEC: 1.02 — SIGNIFICANT CHANGE UP (ref 1.01–1.02)
TROPONIN I SERPL-MCNC: <0.015 NG/ML — SIGNIFICANT CHANGE UP (ref 0.01–0.04)
TROPONIN I SERPL-MCNC: <0.015 NG/ML — SIGNIFICANT CHANGE UP (ref 0.01–0.04)
UROBILINOGEN FLD QL: NEGATIVE MG/DL — SIGNIFICANT CHANGE UP
WBC # BLD: 27.1 K/UL — HIGH (ref 3.8–10.5)
WBC # FLD AUTO: 27.1 K/UL — HIGH (ref 3.8–10.5)
WBC UR QL: SIGNIFICANT CHANGE UP

## 2017-08-30 PROCEDURE — 99285 EMERGENCY DEPT VISIT HI MDM: CPT

## 2017-08-30 PROCEDURE — 93010 ELECTROCARDIOGRAM REPORT: CPT

## 2017-08-30 PROCEDURE — 71010: CPT | Mod: 26

## 2017-08-30 RX ORDER — CEFEPIME 1 G/1
1000 INJECTION, POWDER, FOR SOLUTION INTRAMUSCULAR; INTRAVENOUS ONCE
Qty: 0 | Refills: 0 | Status: COMPLETED | OUTPATIENT
Start: 2017-08-30 | End: 2017-08-30

## 2017-08-30 RX ORDER — ALBUTEROL 90 UG/1
2.5 AEROSOL, METERED ORAL ONCE
Qty: 0 | Refills: 0 | Status: COMPLETED | OUTPATIENT
Start: 2017-08-30 | End: 2017-08-30

## 2017-08-30 RX ORDER — IPRATROPIUM BROMIDE 0.2 MG/ML
500 SOLUTION, NON-ORAL INHALATION ONCE
Qty: 0 | Refills: 0 | Status: COMPLETED | OUTPATIENT
Start: 2017-08-30 | End: 2017-08-30

## 2017-08-30 RX ORDER — SODIUM CHLORIDE 9 MG/ML
3 INJECTION INTRAMUSCULAR; INTRAVENOUS; SUBCUTANEOUS ONCE
Qty: 0 | Refills: 0 | Status: COMPLETED | OUTPATIENT
Start: 2017-08-30 | End: 2017-08-30

## 2017-08-30 RX ORDER — VANCOMYCIN HCL 1 G
1000 VIAL (EA) INTRAVENOUS ONCE
Qty: 0 | Refills: 0 | Status: COMPLETED | OUTPATIENT
Start: 2017-08-30 | End: 2017-08-30

## 2017-08-30 RX ADMIN — SODIUM CHLORIDE 3 MILLILITER(S): 9 INJECTION INTRAMUSCULAR; INTRAVENOUS; SUBCUTANEOUS at 19:08

## 2017-08-30 RX ADMIN — Medication 250 MILLIGRAM(S): at 20:18

## 2017-08-30 RX ADMIN — Medication 125 MILLIGRAM(S): at 18:51

## 2017-08-30 RX ADMIN — ALBUTEROL 2.5 MILLIGRAM(S): 90 AEROSOL, METERED ORAL at 18:49

## 2017-08-30 RX ADMIN — CEFEPIME 100 MILLIGRAM(S): 1 INJECTION, POWDER, FOR SOLUTION INTRAMUSCULAR; INTRAVENOUS at 20:00

## 2017-08-30 RX ADMIN — Medication 500 MICROGRAM(S): at 18:49

## 2017-08-30 NOTE — ED PROVIDER NOTE - OBJECTIVE STATEMENT
89 y/o female with PMHx presents to the ED [facility] who was admitted here last week for cough. Daughter states that initially she was told pt has PNA but was told by Dr. Ojeda that pt does not have PNA. Pt tested positive for C.diff, is on Vancomycin orally. Pt was discharged 4 days ago but the cough has not improved. +green phlegm production. +CP from coughing. +mild CP when pt is not coughing. No fever in ED. +dyspnea, worsening. Daughter notes pt has felt warm but no temperatures taking. +nausea this morning. +weakness. Pulm Dr. Hubbard. PMD Dr. Otero

## 2017-08-30 NOTE — ED PROVIDER NOTE - DIAGNOSIS COUNSELING, MDM
conducted a detailed discussion... I had a detailed discussion with the patient and daughter/guardian regarding the historical points, exam findings, and any diagnostic results supporting the admit diagnosis.

## 2017-08-30 NOTE — ED PROVIDER NOTE - MUSCULOSKELETAL, MLM
Spine appears normal, range of motion is not limited, no muscle or joint tenderness. No edema. JUÁREZ x4

## 2017-08-30 NOTE — ED PROVIDER NOTE - MEDICAL DECISION MAKING DETAILS
Elderly female +COPD, hospitalized last week for COPD exacerbation, questionable PNA, developed C. diff from abx, currently jill PO Vanco. BIBA from [facility] regarding worsening SOB, cough with purulent sputum. EKG +new LBBB. Plan: EKG, O2, monitor, Duonebs, IV steroids, labs including BNP, Silke, blood clultures, lactate, IV abx,. expect readmission.

## 2017-08-30 NOTE — ED ADULT NURSE REASSESSMENT NOTE - NS ED NURSE REASSESS COMMENT FT1
Pt sleeping, easily arousable. vs as charted. no resp distress noted at this time. lung sounds coarse at the bases and diminished throughout. pt and family updated on plan of care. pt positioned for comfort. will continue to monitor.

## 2017-08-30 NOTE — ED PROVIDER NOTE - CONSTITUTIONAL, MLM
normal... Elderly white female in mild respiratory distress, ill appearing. awake, alert, oriented to person, place, time/situation.

## 2017-08-30 NOTE — ED PROVIDER NOTE - ENMT, MLM
Airway patent, Nasal mucosa clear. Mouth with mildly dry mucosa. Throat has no vesicles, no oropharyngeal exudates and uvula is midline. Neck no accessory muscle uses, supple, non tender. No Obvious JVD.

## 2017-08-30 NOTE — ED PROVIDER NOTE - CARE PLAN
Principal Discharge DX:	HCAP (healthcare-associated pneumonia)  Secondary Diagnosis:	LBBB (left bundle branch block)  Secondary Diagnosis:	C. difficile colitis

## 2017-08-30 NOTE — ED PROVIDER NOTE - CARDIAC, MLM
Tachycardic, regular rhythm.  Heart sounds S1, S2.  No murmurs, rubs or gallops. Normal radial pulse.

## 2017-08-30 NOTE — ED PROVIDER NOTE - PROGRESS NOTE DETAILS
614 [facility] cardiology paged ED attending Dr. Phillips discussed case with Dr. Blackburn, cardiology. CPORT turned down by DR. Blackburn, tx for suspected PNA. Oxygenation, demand ischemia. Pt does not meet superSIRS or severe sepsis criteria.

## 2017-08-31 LAB
ALBUMIN SERPL ELPH-MCNC: 2.6 G/DL — LOW (ref 3.3–5)
ALP SERPL-CCNC: 67 U/L — SIGNIFICANT CHANGE UP (ref 40–120)
ALT FLD-CCNC: 35 U/L — SIGNIFICANT CHANGE UP (ref 12–78)
ANION GAP SERPL CALC-SCNC: 10 MMOL/L — SIGNIFICANT CHANGE UP (ref 5–17)
AST SERPL-CCNC: 12 U/L — LOW (ref 15–37)
BASOPHILS # BLD AUTO: 0 K/UL — SIGNIFICANT CHANGE UP (ref 0–0.2)
BASOPHILS NFR BLD AUTO: 0.3 % — SIGNIFICANT CHANGE UP (ref 0–2)
BILIRUB SERPL-MCNC: 0.4 MG/DL — SIGNIFICANT CHANGE UP (ref 0.2–1.2)
BUN SERPL-MCNC: 29 MG/DL — HIGH (ref 7–23)
CALCIUM SERPL-MCNC: 9 MG/DL — SIGNIFICANT CHANGE UP (ref 8.5–10.1)
CHLORIDE SERPL-SCNC: 107 MMOL/L — SIGNIFICANT CHANGE UP (ref 96–108)
CO2 SERPL-SCNC: 24 MMOL/L — SIGNIFICANT CHANGE UP (ref 22–31)
CREAT SERPL-MCNC: 0.84 MG/DL — SIGNIFICANT CHANGE UP (ref 0.5–1.3)
CULTURE RESULTS: NO GROWTH — SIGNIFICANT CHANGE UP
EOSINOPHIL # BLD AUTO: 0 K/UL — SIGNIFICANT CHANGE UP (ref 0–0.5)
EOSINOPHIL NFR BLD AUTO: 0.1 % — SIGNIFICANT CHANGE UP (ref 0–6)
GLUCOSE SERPL-MCNC: 128 MG/DL — HIGH (ref 70–99)
HCT VFR BLD CALC: 32.8 % — LOW (ref 34.5–45)
HGB BLD-MCNC: 10.6 G/DL — LOW (ref 11.5–15.5)
LACTATE SERPL-SCNC: 0.8 MMOL/L — SIGNIFICANT CHANGE UP (ref 0.7–2)
LACTATE SERPL-SCNC: 1 MMOL/L — SIGNIFICANT CHANGE UP (ref 0.7–2)
LYMPHOCYTES # BLD AUTO: 0.5 K/UL — LOW (ref 1–3.3)
LYMPHOCYTES # BLD AUTO: 3.5 % — LOW (ref 13–44)
MAGNESIUM SERPL-MCNC: 2.1 MG/DL — SIGNIFICANT CHANGE UP (ref 1.6–2.6)
MCHC RBC-ENTMCNC: 27.9 PG — SIGNIFICANT CHANGE UP (ref 27–34)
MCHC RBC-ENTMCNC: 32.1 GM/DL — SIGNIFICANT CHANGE UP (ref 32–36)
MCV RBC AUTO: 86.8 FL — SIGNIFICANT CHANGE UP (ref 80–100)
MONOCYTES # BLD AUTO: 0.5 K/UL — SIGNIFICANT CHANGE UP (ref 0–0.9)
MONOCYTES NFR BLD AUTO: 3.3 % — SIGNIFICANT CHANGE UP (ref 2–14)
NEUTROPHILS # BLD AUTO: 14.5 K/UL — HIGH (ref 1.8–7.4)
NEUTROPHILS NFR BLD AUTO: 92.8 % — HIGH (ref 43–77)
PHOSPHATE SERPL-MCNC: 3.9 MG/DL — SIGNIFICANT CHANGE UP (ref 2.5–4.5)
PLATELET # BLD AUTO: 227 K/UL — SIGNIFICANT CHANGE UP (ref 150–400)
POTASSIUM SERPL-MCNC: 4.2 MMOL/L — SIGNIFICANT CHANGE UP (ref 3.5–5.3)
POTASSIUM SERPL-SCNC: 4.2 MMOL/L — SIGNIFICANT CHANGE UP (ref 3.5–5.3)
PROT SERPL-MCNC: 5.7 GM/DL — LOW (ref 6–8.3)
RBC # BLD: 3.78 M/UL — LOW (ref 3.8–5.2)
RBC # FLD: 15.1 % — HIGH (ref 10.3–14.5)
SODIUM SERPL-SCNC: 141 MMOL/L — SIGNIFICANT CHANGE UP (ref 135–145)
SPECIMEN SOURCE: SIGNIFICANT CHANGE UP
TROPONIN I SERPL-MCNC: <0.015 NG/ML — SIGNIFICANT CHANGE UP (ref 0.01–0.04)
WBC # BLD: 15.6 K/UL — HIGH (ref 3.8–10.5)
WBC # FLD AUTO: 15.6 K/UL — HIGH (ref 3.8–10.5)

## 2017-08-31 PROCEDURE — 93010 ELECTROCARDIOGRAM REPORT: CPT

## 2017-08-31 PROCEDURE — 74177 CT ABD & PELVIS W/CONTRAST: CPT | Mod: 26

## 2017-08-31 PROCEDURE — 93306 TTE W/DOPPLER COMPLETE: CPT | Mod: 26

## 2017-08-31 PROCEDURE — 71260 CT THORAX DX C+: CPT | Mod: 26

## 2017-08-31 RX ORDER — IPRATROPIUM/ALBUTEROL SULFATE 18-103MCG
3 AEROSOL WITH ADAPTER (GRAM) INHALATION EVERY 4 HOURS
Qty: 0 | Refills: 0 | Status: DISCONTINUED | OUTPATIENT
Start: 2017-08-31 | End: 2017-09-05

## 2017-08-31 RX ORDER — ASPIRIN AND DIPYRIDAMOLE 25; 200 MG/1; MG/1
1 CAPSULE, EXTENDED RELEASE ORAL
Qty: 0 | Refills: 0 | Status: DISCONTINUED | OUTPATIENT
Start: 2017-08-31 | End: 2017-09-05

## 2017-08-31 RX ORDER — SIMVASTATIN 20 MG/1
20 TABLET, FILM COATED ORAL AT BEDTIME
Qty: 0 | Refills: 0 | Status: DISCONTINUED | OUTPATIENT
Start: 2017-08-30 | End: 2017-09-05

## 2017-08-31 RX ORDER — IPRATROPIUM/ALBUTEROL SULFATE 18-103MCG
3 AEROSOL WITH ADAPTER (GRAM) INHALATION EVERY 6 HOURS
Qty: 0 | Refills: 0 | Status: DISCONTINUED | OUTPATIENT
Start: 2017-08-31 | End: 2017-09-05

## 2017-08-31 RX ORDER — HEPARIN SODIUM 5000 [USP'U]/ML
5000 INJECTION INTRAVENOUS; SUBCUTANEOUS EVERY 8 HOURS
Qty: 0 | Refills: 0 | Status: DISCONTINUED | OUTPATIENT
Start: 2017-08-31 | End: 2017-09-05

## 2017-08-31 RX ORDER — VANCOMYCIN HCL 1 G
500 VIAL (EA) INTRAVENOUS EVERY 12 HOURS
Qty: 0 | Refills: 0 | Status: DISCONTINUED | OUTPATIENT
Start: 2017-08-31 | End: 2017-09-05

## 2017-08-31 RX ORDER — VANCOMYCIN HCL 1 G
125 VIAL (EA) INTRAVENOUS ONCE
Qty: 0 | Refills: 0 | Status: COMPLETED | OUTPATIENT
Start: 2017-08-31 | End: 2017-08-31

## 2017-08-31 RX ORDER — PANTOPRAZOLE SODIUM 20 MG/1
40 TABLET, DELAYED RELEASE ORAL
Qty: 0 | Refills: 0 | Status: DISCONTINUED | OUTPATIENT
Start: 2017-08-31 | End: 2017-09-05

## 2017-08-31 RX ORDER — METRONIDAZOLE 500 MG
500 TABLET ORAL EVERY 8 HOURS
Qty: 0 | Refills: 0 | Status: DISCONTINUED | OUTPATIENT
Start: 2017-08-31 | End: 2017-08-31

## 2017-08-31 RX ORDER — BUDESONIDE, MICRONIZED 100 %
0.5 POWDER (GRAM) MISCELLANEOUS
Qty: 0 | Refills: 0 | Status: DISCONTINUED | OUTPATIENT
Start: 2017-08-31 | End: 2017-09-05

## 2017-08-31 RX ORDER — VANCOMYCIN HCL 1 G
125 VIAL (EA) INTRAVENOUS EVERY 6 HOURS
Qty: 0 | Refills: 0 | Status: DISCONTINUED | OUTPATIENT
Start: 2017-08-31 | End: 2017-09-05

## 2017-08-31 RX ORDER — ACETAMINOPHEN 500 MG
500 TABLET ORAL EVERY 8 HOURS
Qty: 0 | Refills: 0 | Status: DISCONTINUED | OUTPATIENT
Start: 2017-08-31 | End: 2017-09-05

## 2017-08-31 RX ORDER — HYDROXYCHLOROQUINE SULFATE 200 MG
200 TABLET ORAL ONCE
Qty: 0 | Refills: 0 | Status: COMPLETED | OUTPATIENT
Start: 2017-08-31 | End: 2017-08-31

## 2017-08-31 RX ORDER — CEFEPIME 1 G/1
2000 INJECTION, POWDER, FOR SOLUTION INTRAMUSCULAR; INTRAVENOUS EVERY 24 HOURS
Qty: 0 | Refills: 0 | Status: DISCONTINUED | OUTPATIENT
Start: 2017-08-31 | End: 2017-09-05

## 2017-08-31 RX ORDER — TIOTROPIUM BROMIDE 18 UG/1
1 CAPSULE ORAL; RESPIRATORY (INHALATION) DAILY
Qty: 0 | Refills: 0 | Status: DISCONTINUED | OUTPATIENT
Start: 2017-08-31 | End: 2017-09-05

## 2017-08-31 RX ORDER — CALCIUM CARBONATE 500(1250)
1 TABLET ORAL EVERY 24 HOURS
Qty: 0 | Refills: 0 | Status: DISCONTINUED | OUTPATIENT
Start: 2017-08-31 | End: 2017-09-05

## 2017-08-31 RX ORDER — VANCOMYCIN HCL 1 G
500 VIAL (EA) INTRAVENOUS ONCE
Qty: 0 | Refills: 0 | Status: COMPLETED | OUTPATIENT
Start: 2017-08-31 | End: 2017-08-31

## 2017-08-31 RX ORDER — HYDROXYCHLOROQUINE SULFATE 200 MG
200 TABLET ORAL EVERY 12 HOURS
Qty: 0 | Refills: 0 | Status: DISCONTINUED | OUTPATIENT
Start: 2017-08-31 | End: 2017-09-05

## 2017-08-31 RX ORDER — VANCOMYCIN HCL 1 G
VIAL (EA) INTRAVENOUS
Qty: 0 | Refills: 0 | Status: DISCONTINUED | OUTPATIENT
Start: 2017-08-31 | End: 2017-09-05

## 2017-08-31 RX ORDER — SODIUM CHLORIDE 9 MG/ML
1000 INJECTION INTRAMUSCULAR; INTRAVENOUS; SUBCUTANEOUS
Qty: 0 | Refills: 0 | Status: DISCONTINUED | OUTPATIENT
Start: 2017-08-31 | End: 2017-09-05

## 2017-08-31 RX ORDER — MEMANTINE HYDROCHLORIDE 10 MG/1
1 TABLET ORAL
Qty: 0 | Refills: 0 | COMMUNITY

## 2017-08-31 RX ORDER — CALCIUM CARBONATE 500(1250)
1 TABLET ORAL DAILY
Qty: 0 | Refills: 0 | Status: DISCONTINUED | OUTPATIENT
Start: 2017-08-31 | End: 2017-08-31

## 2017-08-31 RX ORDER — ASPIRIN AND DIPYRIDAMOLE 25; 200 MG/1; MG/1
1 CAPSULE, EXTENDED RELEASE ORAL ONCE
Qty: 0 | Refills: 0 | Status: COMPLETED | OUTPATIENT
Start: 2017-08-31 | End: 2017-08-31

## 2017-08-31 RX ADMIN — Medication 5 MILLIGRAM(S): at 08:55

## 2017-08-31 RX ADMIN — SODIUM CHLORIDE 80 MILLILITER(S): 9 INJECTION INTRAMUSCULAR; INTRAVENOUS; SUBCUTANEOUS at 08:53

## 2017-08-31 RX ADMIN — Medication 1 TABLET(S): at 12:28

## 2017-08-31 RX ADMIN — Medication 500 MILLIGRAM(S): at 14:57

## 2017-08-31 RX ADMIN — SIMVASTATIN 20 MILLIGRAM(S): 20 TABLET, FILM COATED ORAL at 22:17

## 2017-08-31 RX ADMIN — CEFEPIME 100 MILLIGRAM(S): 1 INJECTION, POWDER, FOR SOLUTION INTRAMUSCULAR; INTRAVENOUS at 08:44

## 2017-08-31 RX ADMIN — HEPARIN SODIUM 5000 UNIT(S): 5000 INJECTION INTRAVENOUS; SUBCUTANEOUS at 22:17

## 2017-08-31 RX ADMIN — PANTOPRAZOLE SODIUM 40 MILLIGRAM(S): 20 TABLET, DELAYED RELEASE ORAL at 08:44

## 2017-08-31 RX ADMIN — Medication 100 MILLIGRAM(S): at 18:16

## 2017-08-31 RX ADMIN — TIOTROPIUM BROMIDE 1 CAPSULE(S): 18 CAPSULE ORAL; RESPIRATORY (INHALATION) at 10:20

## 2017-08-31 RX ADMIN — Medication 500 MILLIGRAM(S): at 12:28

## 2017-08-31 RX ADMIN — ASPIRIN AND DIPYRIDAMOLE 1 CAPSULE(S): 25; 200 CAPSULE, EXTENDED RELEASE ORAL at 18:15

## 2017-08-31 RX ADMIN — Medication 200 MILLIGRAM(S): at 18:15

## 2017-08-31 RX ADMIN — ASPIRIN AND DIPYRIDAMOLE 1 CAPSULE(S): 25; 200 CAPSULE, EXTENDED RELEASE ORAL at 01:05

## 2017-08-31 RX ADMIN — Medication 200 MILLIGRAM(S): at 01:05

## 2017-08-31 RX ADMIN — Medication 125 MILLIGRAM(S): at 08:54

## 2017-08-31 RX ADMIN — Medication 125 MILLIGRAM(S): at 18:15

## 2017-08-31 RX ADMIN — Medication 100 MILLIGRAM(S): at 10:55

## 2017-08-31 RX ADMIN — Medication 1 TABLET(S): at 15:03

## 2017-08-31 RX ADMIN — Medication 125 MILLIGRAM(S): at 12:29

## 2017-08-31 RX ADMIN — Medication 500 MILLIGRAM(S): at 22:17

## 2017-08-31 RX ADMIN — HEPARIN SODIUM 5000 UNIT(S): 5000 INJECTION INTRAVENOUS; SUBCUTANEOUS at 14:59

## 2017-08-31 RX ADMIN — Medication 125 MILLIGRAM(S): at 01:05

## 2017-08-31 RX ADMIN — Medication 0.5 MILLIGRAM(S): at 20:11

## 2017-08-31 RX ADMIN — SIMVASTATIN 20 MILLIGRAM(S): 20 TABLET, FILM COATED ORAL at 01:05

## 2017-08-31 NOTE — CONSULT NOTE ADULT - ASSESSMENT
87 y/o Female with h/o dementia, RA on chronic prednisone, O2 dependent COPD, glaucoma, HTN, distant TIA, GERD, HLD, osteoporosis, kidney stones, hx of recurrent PNA, right hip fx s/p hip surgery, recent hospitalization for PNA and C diff colitis, treated with abx then discharged on vanco po was admitted on 8/31 for persistent weakness. Daughter reports persistent cough +green phlegm production. +CP from coughing. In ED, she was noted with dyspnea; no fever. The patient was found to have new LBBB and abdominal tenderness. She received cefepime IV , IV vanco, metronidazole IV and vancomycin PO.    1. Multifocal pneumonia. Recent CDAD partially treated.   -obtain BC x 2, sputum c/s  - 89 y/o Female with h/o dementia, RA on chronic prednisone, O2 dependent COPD, glaucoma, HTN, distant TIA, GERD, HLD, osteoporosis, kidney stones, hx of recurrent PNA, right hip fx s/p hip surgery, recent hospitalization for PNA and C diff colitis, treated with abx then discharged on vanco po was admitted on 8/31 for persistent weakness. Daughter reports persistent cough +green phlegm production. +CP from coughing. In ED, she was noted with dyspnea; no fever. The patient was found to have new LBBB and abdominal tenderness. She received cefepime IV , IV vanco, metronidazole IV and vancomycin PO.    1. Multifocal pneumonia. Recent CDAD partially treated. COPD. Immunocompromised host.  -obtain BC x 2, sputum c/s  -no clinical evidence of toxic megacolon  -start vancomycin 500 mg IV q12h, cefepime 2 gm IV qd; continue vancomycin 125 mg PO q6h  -reason for abx use and side effects reviewed with patient; monitor BMP and vancomycin trough levels   -plan for CT chest  -monitor temps  -f/u CBC  -supportive care  2. Other issues:   -care per medicine 89 y/o Female with h/o dementia, RA on chronic prednisone, O2 dependent COPD, glaucoma, HTN, distant TIA, GERD, HLD, osteoporosis, kidney stones, hx of recurrent PNA, right hip fx s/p hip surgery, recent hospitalization for PNA and C diff colitis, treated with abx then discharged on vanco po was admitted on 8/31 for persistent weakness. Daughter reports persistent cough +green phlegm production. +CP from coughing. In ED, she was noted with dyspnea; no fever. The patient was found to have new LBBB and abdominal tenderness. She received cefepime IV , IV vanco, metronidazole IV and vancomycin PO.    1. Multifocal pneumonia. Recent CDAD partially treated. COPD. Immunocompromised host.  -leukocytosis  -obtain BC x 2, sputum c/s  -no clinical evidence of toxic megacolon  -start vancomycin 500 mg IV q12h, cefepime 2 gm IV qd; continue vancomycin 125 mg PO q6h  -reason for abx use and side effects reviewed with patient; monitor BMP and vancomycin trough levels   -plan for CT chest  -monitor temps  -f/u CBC  -supportive care  2. Other issues:   -care per medicine

## 2017-08-31 NOTE — H&P ADULT - NSHPLABSRESULTS_GEN_ALL_CORE
12.0   27.1  )-----------( 293      ( 30 Aug 2017 18:27 )             35.9       CBC Full  -  ( 30 Aug 2017 18:27 )  WBC Count : 27.1 K/uL  Hemoglobin : 12.0 g/dL  Hematocrit : 35.9 %  Platelet Count - Automated : 293 K/uL  Mean Cell Volume : 85.1 fl  Mean Cell Hemoglobin : 28.4 pg  Mean Cell Hemoglobin Concentration : 33.4 gm/dL  Auto Neutrophil # : 23.6 K/uL  Auto Lymphocyte # : 1.9 K/uL  Auto Monocyte # : 1.4 K/uL  Auto Eosinophil # : 0.1 K/uL  Auto Basophil # : 0.1 K/uL  Auto Neutrophil % : 93.0 %  Auto Lymphocyte % : 2.0 %  Auto Monocyte % : 4.0 %  Auto Eosinophil % : 1.0 %  Auto Basophil % : x          137  |  103  |  39<H>  ----------------------------<  115<H>  4.6   |  26  |  1.19    Ca    9.8      30 Aug 2017 18:27    TPro  6.2  /  Alb  2.8<L>  /  TBili  0.5  /  DBili  x   /  AST  22  /  ALT  45  /  AlkPhos  79  0830      LIVER FUNCTIONS - ( 30 Aug 2017 18:27 )  Alb: 2.8 g/dL / Pro: 6.2 gm/dL / ALK PHOS: 79 U/L / ALT: 45 U/L / AST: 22 U/L / GGT: x             PT/INR - ( 30 Aug 2017 18:27 )   PT: 14.8 sec;   INR: 1.36 ratio         PTT - ( 30 Aug 2017 18:27 )  PTT:28.1 sec    CARDIAC MARKERS ( 31 Aug 2017 03:20 )  <0.015 ng/mL / x     / x     / x     / x      CARDIAC MARKERS ( 30 Aug 2017 23:29 )  <0.015 ng/mL / x     / x     / x     / x      CARDIAC MARKERS ( 30 Aug 2017 18:27 )  <0.015 ng/mL / x     / x     / x     / x            Urinalysis Basic - ( 30 Aug 2017 20:00 )    Color: Yellow / Appearance: Clear / S.020 / pH: x  Gluc: x / Ketone: Small  / Bili: Negative / Urobili: Negative mg/dL   Blood: x / Protein: 15 mg/dL / Nitrite: Negative   Leuk Esterase: Trace / RBC: 0-2 /HPF / WBC 3-5   Sq Epi: x / Non Sq Epi: Few / Bacteria: Moderate

## 2017-08-31 NOTE — H&P ADULT - ASSESSMENT
89 yo F hx of Dementia, RA on chronic prednisone,  O2 dependent COPD,home O2 started since last discharge 8/28/17, glaucoma, HTN, distant TIA, GERD, HLD, osteoporosis, kidney stones, hx of recurrent PNA,hx of r hip fx s/p hip surgery april 2017,   [facility] for [facility] at which time PNA was ruled out and diagnosed with acute c diff colitis and discharged on vanco po.presents with worsened cough    A/P  # Acute HCAP/vs aspiration PNA/mucus plugging  #/Underlying COPD without acute exacerbation  #unlikely acute CHF  - Admit to medicine  -IV cefepime,IV vanco  -pulmonary/ID consult  -IVF  -supplemental o2  -Neb tx,pulmicort,guaifenesin  -consider dysphagia w/u      #Abdominal tenderness diffused /Hx of c diff colitis R/O worsening of acute c diff colitis vs diverticulitis ,  less likely ischemic colitis (lactate negative)/no clinical signs of toxic megacolon at this time  - Stat CT abd/pelvis (no signs of acute abdomen at this time)  -GI consult  -serial lactate      #new LBBB-  demand ischemia/unlikely Acute coronary syndrome  -admitted to tele  -echo  -continue antiplatelets,statin  -Echo  -cardio consult    #hx of alzeihmers,gerd,TIA,RA,HLD  continue meds    #hx of breast nodule-outpatient f/u    #Dvt prophylaxis- heparin Sc

## 2017-08-31 NOTE — CONSULT NOTE ADULT - ASSESSMENT
Abd distension/c diff toxin    bladder distension marked on ct scan   urinary retention      leon catheter   urology consult if needed  continue po vanco per ID  clear liquids if leon resolves her distension

## 2017-08-31 NOTE — PROGRESS NOTE ADULT - ASSESSMENT
89 yo F hx of Dementia, RA on chronic prednisone,  COPD, glaucoma, HTN, distant TIA, GERD, HLD, osteoporosis, kidney stones, hx of recurrent PNA,hx of r hip fx s/p hip surgery april 2017,  presents to the ED [facility] for cough    # Multifocal PNA/HCAP  - cover from gram negative and other resistent bacteria  - continue IV abx - vancomycin and cefepime  - pulmonary consult appreciated  - ID consult appreciated  - CT chest pending  - duonebs PRN  - O2 supplementation  - f/u cultures    # Diffuse abdominal tenderness - r/o worsening c. diff vs other pathology  - no acute abdomen on exam, less likely ischemic colitis as lactate normal  - CT abdomen and pelvis pending  - GI consult    #new LBBB-  demand ischemia/unlikely Acute coronary syndrome  -continue antiplatelets, statin  -Echo  -cardio consult    # c. diff  - continue PO vancomycin  - d/c flagyl  - monitor for diarrhea    #COPD  - stable, no exacerbation  - continue spiriva and pulmicort  - pulm consult appreciated    #RA  - continue Plaquenil  - continue prednisone    #HLD  - continue statin    #GERD  - continue protonix    *distant TIA  - continue antiplatelets and statin    #hx of breast nodule-outpatient f/u      #Dvt prophylaxis  - heparin sq

## 2017-08-31 NOTE — ED ADULT NURSE REASSESSMENT NOTE - NS ED NURSE REASSESS COMMENT FT1
pt sleeping, easily arousable. vs as charted. pt positioned for comfort. family updated on plan of care. will continue to monitor.

## 2017-08-31 NOTE — H&P ADULT - NSHPPHYSICALEXAM_GEN_ALL_CORE
PHYSICAL EXAM:    Daily Height in cm: 154.94 (30 Aug 2017 17:53)    Daily     ICU Vital Signs Last 24 Hrs  T(C): 37.2 (31 Aug 2017 05:46), Max: 37.2 (31 Aug 2017 00:21)  T(F): 98.9 (31 Aug 2017 05:46), Max: 98.9 (31 Aug 2017 00:21)  HR: 70 (31 Aug 2017 05:46) (70 - 95)  BP: 121/56 (31 Aug 2017 05:46) (121/56 - 139/73)  BP(mean): --  ABP: --  ABP(mean): --  RR: 20 (31 Aug 2017 05:46) (20 - 22)  SpO2: 96% (31 Aug 2017 05:46) (96% - 100%)      Constitutional:Appears fragile weak  HEENT: Atraumatic, SARA, Normal, No congestion  Respiratory: inspiratory crackles B/l lung bases , no rhonchi/wheeze  Cardiovascular: N S1S2; JUVENAL present  Gastrointestinal: Abdomen generalised tenderness soft,no guarding,no rebound, Bowel Ssounds present  Extremities: No edema, peripheral pulses present  Neurological: AAO x 3, no gross focal motor deficits  Skin: Non cellulitic    Back: No CVA tenderness   Musculoskeletal: non tender  Breasts: Deferred  Genitourinary: deferred  Rectal: Deferred

## 2017-08-31 NOTE — CONSULT NOTE ADULT - SUBJECTIVE AND OBJECTIVE BOX
HPI:  87 yo F hx of Dementia, RA on chronic prednisone,  O2 dependent COPD,home O2 started since last discharge 8/28/17, glaucoma, HTN, distant TIA, GERD, HLD, osteoporosis, kidney stones, hx of recurrent Pneumonia presents with pneumonia on cefempime and iv vanco  has c diff diarrhea -pt developed diarrhea last Wednesday  on iv flagyl (stopped) then po vancomycin 125 mg qid  had small bm Tuesday  has noted mild cramping yesterday then a little more diffuse today and abd bloating today  no n/v or fever or chills  denies rectal bleeding  primary gi Dr. Ferrara    PMHx  Compression fracture of spine: T7  Alzheimers disease  HTN (hypertension)  Glaucoma  TIA (transient ischemic attack): 8/07  Cerebral vascular accident: 2005  Polymyalgia rheumatica  Osteoporosis  Chronic pain: mid back  Urinary retention  GERD (gastroesophageal reflux disease)  Cholelithiases  Hyperlipemia  Carotid artery stenosis  Lung nodule: biopsied 2003, benign  COPD (chronic obstructive pulmonary disease)  Asthma  History of hip surgery  Gall stones  History of hysterectomy: for bleeding  Hx of cholecystectomy      Allergies    Aciphex (Unknown)  Macrobid (Unknown)  Percocet 10/325 (Rash)    Intolerances        MEDICATIONS  (STANDING):  simvastatin 20 milliGRAM(s) Oral at bedtime  heparin  Injectable 5000 Unit(s) SubCutaneous every 8 hours  sodium chloride 0.9%. 1000 milliLiter(s) (80 mL/Hr) IV Continuous <Continuous>  buDESOnide   0.5 milliGRAM(s) Respule 0.5 milliGRAM(s) Inhalation two times a day  predniSONE   Tablet 5 milliGRAM(s) Oral daily  dipyridamole 200 mG/aspirin 25 mG 1 Capsule(s) Oral two times a day  hydroxychloroquine 200 milliGRAM(s) Oral every 12 hours  tiotropium 18 MICROgram(s) Capsule 1 Capsule(s) Inhalation daily  vancomycin    Solution 125 milliGRAM(s) Oral every 6 hours  pantoprazole    Tablet 40 milliGRAM(s) Oral before breakfast  cefepime  IVPB 2000 milliGRAM(s) IV Intermittent every 24 hours  vancomycin  IVPB   IV Intermittent   vancomycin  IVPB 500 milliGRAM(s) IV Intermittent every 12 hours  acetaminophen   Tablet. 500 milliGRAM(s) Oral every 8 hours  multivitamin 1 Tablet(s) Oral daily  Namenda XR 21 milliGRAM(s) 21 milliGRAM(s) Oral at bedtime  calcium carbonate 500 mG (Tums) Chewable 1 Tablet(s) Chew every 24 hours    MEDICATIONS  (PRN):  guaiFENesin    Syrup 200 milliGRAM(s) Oral every 6 hours PRN Cough  ALBUTerol/ipratropium for Nebulization 3 milliLiter(s) Nebulizer every 6 hours PRN Shortness of Breath and/or Wheezing  ALBUTerol/ipratropium for Nebulization 3 milliLiter(s) Nebulizer every 4 hours PRN Shortness of Breath and/or Wheezing      FAMILY HISTORY:  No pertinent family history in first degree relatives      Social History:    REVIEW OF SYSTEMS      General:	No fever or chills    Skin/Breast: No jaundice or rash   		  ENMT: denies sore throat or thrush    Respiratory and Thorax: Denies dyspnea or cough or shortness of breath  	  Cardiovascular: Denies chest pain or palpitations 	    Gastrointestinal: Denies jaundice or pruritis    Genitourinary: Denies dysuria or hematuria	    Musculoskeletal: Denies muscular pain or swelling	    Neurological: Denies confusion or tremor	    Hematology/Lymphatics: Denies easy bruising or bleeding 	    Endocrine:	Denies polyphagia or polyuria    See above hx otherwise neg for any major organ systems    PHYSICAL EXAM:    Vital Signs Last 24 Hrs  T(C): 36.3 (31 Aug 2017 09:55), Max: 37.2 (31 Aug 2017 00:21)  T(F): 97.4 (31 Aug 2017 09:55), Max: 98.9 (31 Aug 2017 00:21)  HR: 72 (31 Aug 2017 09:55) (70 - 95)  BP: 128/58 (31 Aug 2017 09:55) (121/56 - 139/73)  BP(mean): --  RR: 24 (31 Aug 2017 09:55) (20 - 24)  SpO2: 99% (31 Aug 2017 09:55) (93% - 100%)    Constitutional: no acute distress    ENMT: NC/AT scl anicteric opm pink no lesions     Neck: supple. No jvd or LN    Respiratory: Clear     Cardiovascular: RRR s1s2     Gastrointestinal: Pos bs , soft , diffusely tender and mod distended , no r/g        Back: No CVA tenderness    Extremities: NO cce     Neurological: Alert and oriented x 3     Skin: No rash or jaundice     Date/Time:08-31 @ 08:33    Albumin: 2.6  ALT/SGPT: 35  Alk Phos: 67  AST/SGOT: 12  Bilirubin Direct: --  Bilirubin Total: 0.4  Ca: 9.0  eGFR : 72  eGFR Non-: 62  Lipase: --  Amylase: --  INR: --  PTT: --  Date/Time:08-30 @ 18:27    Albumin: 2.8  ALT/SGPT: 45  Alk Phos: 79  AST/SGOT: 22  Bilirubin Direct: --  Bilirubin Total: 0.5  Ca: 9.8  eGFR : 47  eGFR Non-: 41  Lipase: --  Amylase: --  INR: 1.36  PTT: --      08-31    141  |  107  |  29<H>  ----------------------------<  128<H>  4.2   |  24  |  0.84    Ca    9.0      31 Aug 2017 08:33  Phos  3.9     08-31  Mg     2.1     08-31    TPro  5.7<L>  /  Alb  2.6<L>  /  TBili  0.4  /  DBili  x   /  AST  12<L>  /  ALT  35  /  AlkPhos  67  08-31                            10.6   15.6  )-----------( 227      ( 31 Aug 2017 08:33 )             32.8   < from: CT Abdomen and Pelvis w/ Oral Cont and w/ IV Cont (08.31.17 @ 11:42) >    EXAM:  CT ABDOMEN AND PELVIS OC IC                            PROCEDURE DATE:  08/31/2017          INTERPRETATION:  EXAMINATION DATE: August 31, 2017 at 1132 hours.  CLINICAL INFORMATION: Cough, purulent sputum. Severe COPD.    COMPARISON: CT chestfrom August 22, 2017 and April 12, 2017 and CT   abdomen and pelvis from June 25, 2016.    PROCEDURE:   CT of the Chest, Abdomen and Pelvis was performed with intravenous   contrast.   Intravenous contrast: 90 mL Omnipaque 350. 10 mL discarded.  Oralcontrast: positive contrast was administered.  Sagittal and coronal reformats were performed.    FINDINGS:    CHEST:     LUNGS AND LARGE AIRWAYS: Patchy consolidation in both lower lobes,   consistent with pneumonia.  PLEURA: No pleural effusion.  VESSELS: Severe atherosclerotic calcifications.   HEART: Heart size is normal. No pericardial effusion.  MEDIASTINUM AND DOREEN: No lymphadenopathy.  CHEST WALL AND LOWER NECK: Irregular mass in the right breast has   increased in size, measuring 2.6 x 2.5 cm, previously 1.9 x 1.6 cm on   April 12, 2017, suspicious for neoplasm.    ABDOMEN AND PELVIS:    LIVER: Within normal limits.  BILE DUCTS: Unchanged prominence of the common bile duct, measuring 1.2   cm.   GALLBLADDER: Status post cholecystectomy.  SPLEEN: Within normal limits.  PANCREAS: Within normal limits.  ADRENALS: Within normal limits.  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     BLADDER: Markedly distended.  REPRODUCTIVE ORGANS: Status posthysterectomy.    BOWEL: Moderate-sized hiatal hernia. No bowel obstruction.     PERITONEUM: No ascites.  VESSELS:  Severe atherosclerotic calcifications with significant   narrowing of the proximal abdominal aorta.  RETROPERITONEUM: No lymphadenopathy.    ABDOMINAL WALL: Within normal limits.  BONES: Multiple old left-sided rib fractures. Orthopedic screws are   present in the right hip.    IMPRESSION:   1.  Bilateral lower lobe pneumonia.  2.  Increased size of irregular mass in the right breast, suspicious for   neoplasm.                    IMELDA CHAPMAN   This document has been electronically signed. Aug 31 2017 11:59AM                < end of copied text >

## 2017-08-31 NOTE — PROGRESS NOTE ADULT - SUBJECTIVE AND OBJECTIVE BOX
HPI:  87 yo F hx of Dementia, RA on chronic prednisone,  COPD, glaucoma, HTN, distant TIA, GERD, HLD, osteoporosis, kidney stones, hx of recurrent PNA,hx of r hip fx s/p hip surgery 2017,  presents to the ED [facility] who was admitted here last week for cough at which time PNA was ruled out and diagnosed with acute c diff colitis and discharged on vanco po.   . daughter reports but the cough has not improved. +green phlegm production. +CP from coughing. +mild CP when pt is not coughing. No fever in ED. +dyspnea, worsening. Daughter notes pt has felt warm but no temperatures taking. +nausea this morning. +weakness.  In ed patient was found to have new LBBB,case was discused with dr rojas by dr grubbs-PCI not indicated ,unlikely ACS,cardiac enzymes negative x3   patient was also found to have abdominal tenderness on my exam generalised   In ED CXR- B/l lower lung infilrates R>left  EKG new LBBB,sinus tach- Patient was given cefepime IV ,IV vanco in ED (31 Aug 2017 07:01)    : Pt seen and examined. Frail and weak appearing. Confused. Poor historian. Complains of cough with yellow sputum. Denies any abdominal pain but very tender to palpation. Awaiting CT abdomen and chest. Will discuss plan with daughter after CT scan results. Denies any chest pain or palpitations. On tele sinus rhythm 70-90s.    REVIEW OF SYSTEMS:  General: NAD, hemodynamically stable, (-)  fever, (-) chills, (-) weakness  HEENT:  Eyes:  No visual loss, blurred vision, double vision or yellow sclerae. Ears, Nose, Throat:  No hearing loss, sneezing, congestion, runny nose or sore throat.  SKIN:  No rash or itching.  CARDIOVASCULAR:  No chest pain, chest pressure or chest discomfort. No palpitations or edema.  RESPIRATORY: (+) cough  GASTROINTESTINAL:  No anorexia, nausea, vomiting . (+) abdominal tenderness no diarrhea for past 48 hrs  NEUROLOGICAL:  No headache, dizziness, syncope, paralysis, ataxia, numbness or tingling in the extremities. No change in bowel or bladder control.  MUSCULOSKELETAL:  No muscle, back pain, joint pain or stiffness.  HEMATOLOGIC:  No anemia, bleeding or bruising.  LYMPHATICS:  No enlarged nodes. No history of splenectomy.  ENDOCRINOLOGIC:  No reports of sweating, cold or heat intolerance. No polyuria or polydipsia.  ALLERGIES:  No history of asthma, hives, eczema or rhinitis.    Vital Signs Last 24 Hrs  T(C): 36.5 (31 Aug 2017 07:30), Max: 37.2 (31 Aug 2017 00:21)  T(F): 97.7 (31 Aug 2017 07:30), Max: 98.9 (31 Aug 2017 00:21)  HR: 77 (31 Aug 2017 07:30) (70 - 95)  BP: 124/46 (31 Aug 2017 07:30) (121/56 - 139/73)  RR: 20 (31 Aug 2017 07:30) (20 - 22)  SpO2: 93% (31 Aug 2017 07:30) (93% - 100%)    PHYSICAL EXAM:      Constitutional: NAD, weak, frail appearing  HEENT: PERRLA, EOMI, Normal Hearing  Neck: No LAD, No JVD  Back: Normal spine flexure, No CVA tenderness  Cardiovascular: S1 and S2, RRR, no M/G/R  Respiratory: decreased breath sounds b/l, b/l rhonchi  Gastrointestinal: BS+, soft, diffuse tenderness to palpation, no guarding or rebound tenderness  Extremities: No peripheral edema  Vascular: 2+ peripheral pulses  Neurological: A/O x 1, no focal deficits  Psychiatric: Normal mood, normal affect  Skin: No rashes    Labs    CARDIAC MARKERS ( 31 Aug 2017 03:20 )  <0.015 ng/mL / x     / x     / x     / x      CARDIAC MARKERS ( 30 Aug 2017 23:29 )  <0.015 ng/mL / x     / x     / x     / x      CARDIAC MARKERS ( 30 Aug 2017 18:27 )  <0.015 ng/mL / x     / x     / x     / x                                10.6   15.6  )-----------( 227      ( 31 Aug 2017 08:33 )             32.8     31 Aug 2017 08:33    141    |  107    |  29     ----------------------------<  128    4.2     |  24     |  0.84     Ca    9.0        31 Aug 2017 08:33  Phos  3.9       31 Aug 2017 08:33  Mg     2.1       31 Aug 2017 08:33    TPro  5.7    /  Alb  2.6    /  TBili  0.4    /  DBili  x      /  AST  12     /  ALT  35     /  AlkPhos  67     31 Aug 2017 08:33    PT/INR - ( 30 Aug 2017 18:27 )   PT: 14.8 sec;   INR: 1.36 ratio         PTT - ( 30 Aug 2017 18:27 )  PTT:28.1 sec  CAPILLARY BLOOD GLUCOSE        LIVER FUNCTIONS - ( 31 Aug 2017 08:33 )  Alb: 2.6 g/dL / Pro: 5.7 gm/dL / ALK PHOS: 67 U/L / ALT: 35 U/L / AST: 12 U/L / GGT: x           Urinalysis Basic - ( 30 Aug 2017 20:00 )    Color: Yellow / Appearance: Clear / S.020 / pH: x  Gluc: x / Ketone: Small  / Bili: Negative / Urobili: Negative mg/dL   Blood: x / Protein: 15 mg/dL / Nitrite: Negative   Leuk Esterase: Trace / RBC: 0-2 /HPF / WBC 3-5   Sq Epi: x / Non Sq Epi: Few / Bacteria: Moderate    MEDICATIONS  (STANDING):  simvastatin 20 milliGRAM(s) Oral at bedtime  heparin  Injectable 5000 Unit(s) SubCutaneous every 8 hours  sodium chloride 0.9%. 1000 milliLiter(s) (80 mL/Hr) IV Continuous <Continuous>  buDESOnide   0.5 milliGRAM(s) Respule 0.5 milliGRAM(s) Inhalation two times a day  predniSONE   Tablet 5 milliGRAM(s) Oral daily  dipyridamole 200 mG/aspirin 25 mG 1 Capsule(s) Oral two times a day  hydroxychloroquine 200 milliGRAM(s) Oral every 12 hours  tiotropium 18 MICROgram(s) Capsule 1 Capsule(s) Inhalation daily  vancomycin    Solution 125 milliGRAM(s) Oral every 6 hours  pantoprazole    Tablet 40 milliGRAM(s) Oral before breakfast  cefepime  IVPB 2000 milliGRAM(s) IV Intermittent every 24 hours  vancomycin  IVPB 500 milliGRAM(s) IV Intermittent once  vancomycin  IVPB   IV Intermittent   vancomycin  IVPB 500 milliGRAM(s) IV Intermittent every 12 hours    MEDICATIONS  (PRN):  guaiFENesin    Syrup 200 milliGRAM(s) Oral every 6 hours PRN Cough  ALBUTerol/ipratropium for Nebulization 3 milliLiter(s) Nebulizer every 6 hours PRN Shortness of Breath and/or Wheezing  ALBUTerol/ipratropium for Nebulization 3 milliLiter(s) Nebulizer every 4 hours PRN Shortness of Breath and/or Wheezing

## 2017-08-31 NOTE — CONSULT NOTE ADULT - ASSESSMENT
Abnormal EKG- LBBB- I think this is rate related aberrancy.  Monitor for now.    Chest pain- atypical- likely musculoskeletal secondary to coughing.  Cardiac enzymes negative so far.   She had  left heart cath in 10/6/2015 which showed nonobstructive CAD.    CAD- continue outpt meds.    Other medical issues including PNA- Management per primary team.   Thank you for allowing me to participate in the care of this patient. Please feel free to contact me with any questions.

## 2017-08-31 NOTE — CONSULT NOTE ADULT - SUBJECTIVE AND OBJECTIVE BOX
Patient is a 88y old  Female who presents with a chief complaint of SOB.    HPI:  89 yo F hx of Dementia, RA on chronic prednisone,  O2 dependent COPD,home O2 started since last discharge 17, glaucoma, HTN, distant TIA, GERD, HLD, osteoporosis, kidney stones, hx of recurrent PNA, hx of r hip fx s/p hip surgery 2017,  presents to the ED [facility] who was admitted here last week for cough at which time PNA was ruled out and diagnosed with acute c diff colitis and discharged on vanco po. Her daughter reports that her cough never improved and she had CP with coughing.    Pt s daughter at bedside and pt denies any CP currently.  Pt was noted to have LBBB that was new on EKG and cardiology was consulted.      PAST MEDICAL & SURGICAL HISTORY:  Compression fracture of spine: T7  Alzheimers disease  HTN (hypertension)  Glaucoma  TIA (transient ischemic attack):   Cerebral vascular accident:   Polymyalgia rheumatica  Osteoporosis  Chronic pain: mid back  Urinary retention  GERD (gastroesophageal reflux disease)  Cholelithiases  Hyperlipemia  Carotid artery stenosis  Lung nodule: biopsied , benign  COPD (chronic obstructive pulmonary disease)  Asthma  History of hip surgery  Gall stones  History of hysterectomy: for bleeding  Hx of cholecystectomy      MEDICATIONS  (STANDING):  simvastatin 20 milliGRAM(s) Oral at bedtime  heparin  Injectable 5000 Unit(s) SubCutaneous every 8 hours  sodium chloride 0.9%. 1000 milliLiter(s) (80 mL/Hr) IV Continuous <Continuous>  buDESOnide   0.5 milliGRAM(s) Respule 0.5 milliGRAM(s) Inhalation two times a day  predniSONE   Tablet 5 milliGRAM(s) Oral daily  dipyridamole 200 mG/aspirin 25 mG 1 Capsule(s) Oral two times a day  hydroxychloroquine 200 milliGRAM(s) Oral every 12 hours  tiotropium 18 MICROgram(s) Capsule 1 Capsule(s) Inhalation daily  vancomycin    Solution 125 milliGRAM(s) Oral every 6 hours  pantoprazole    Tablet 40 milliGRAM(s) Oral before breakfast  cefepime  IVPB 2000 milliGRAM(s) IV Intermittent every 24 hours  vancomycin  IVPB   IV Intermittent   vancomycin  IVPB 500 milliGRAM(s) IV Intermittent every 12 hours  acetaminophen   Tablet. 500 milliGRAM(s) Oral every 8 hours  multivitamin 1 Tablet(s) Oral daily  Namenda XR 21 milliGRAM(s) 21 milliGRAM(s) Oral at bedtime  calcium carbonate 500 mG (Tums) Chewable 1 Tablet(s) Chew every 24 hours    MEDICATIONS  (PRN):  guaiFENesin    Syrup 200 milliGRAM(s) Oral every 6 hours PRN Cough  ALBUTerol/ipratropium for Nebulization 3 milliLiter(s) Nebulizer every 6 hours PRN Shortness of Breath and/or Wheezing  ALBUTerol/ipratropium for Nebulization 3 milliLiter(s) Nebulizer every 4 hours PRN Shortness of Breath and/or Wheezing      FAMILY HISTORY:  No pertinent family history in first degree relatives      SOCIAL HISTORY:  non smoker, no alcohol use       REVIEW OF SYSTEMS:  CONSTITUTIONAL:  No night sweats.  No fatigue, malaise, lethargy.  No fever or chills.  HEENT:  Eyes:  No visual changes.  No eye pain.      ENT:  No runny nose.  No epistaxis.  No sinus pain.  No sore throat.  No odynophagia.  No ear pain.  No congestion.  RESPIRATORY:  No cough.  No wheeze.  No hemoptysis.  No shortness of breath.  CARDIOVASCULAR:  No chest pains.  No palpitations. No shortness of breath, No orthopnea or PND.  GASTROINTESTINAL:  No abdominal pain.  No nausea or vomiting.  No diarrhea or constipation.  No hematemesis.  No hematochezia.  No melena.  GENITOURINARY:  No urgency.  No frequency.  No dysuria.  No hematuria.  No obstructive symptoms.  No discharge.  No pain.  No significant abnormal bleeding.  MUSCULOSKELETAL:  No musculoskeletal pain.  No joint swelling.  No arthritis.  NEUROLOGICAL:  No tingling or numbness or weakness.  PSYCHIATRIC:  No confusion  SKIN:  No rashes.  No lesions.  No wounds.  ENDOCRINE:  No unexplained weight loss.  No polydipsia.  No polyuria.  No polyphagia.  HEMATOLOGIC:  No anemia.  No purpura.  No petechiae.  No prolonged or excessive bleeding.   ALLERGIC AND IMMUNOLOGIC:  No pruritus.  No swelling.         Vital Signs Last 24 Hrs  T(C): 36.3 (31 Aug 2017 09:55), Max: 37.2 (31 Aug 2017 00:21)  T(F): 97.4 (31 Aug 2017 09:55), Max: 98.9 (31 Aug 2017 00:21)  HR: 72 (31 Aug 2017 09:55) (70 - 95)  BP: 128/58 (31 Aug 2017 09:55) (121/56 - 139/73)  BP(mean): --  RR: 24 (31 Aug 2017 09:55) (20 - 24)  SpO2: 99% (31 Aug 2017 09:55) (93% - 100%)    PHYSICAL EXAM-    Constitutional: The patient appears to be normal, well developed, well nourished and alert and oriented to time, place and person. The patient does not appear acutely ill.     Head: Head is normocephalic and atraumatic.      Neck: The patient's neck is supple without enlargement, has no palpable thyromegaly nor thyroid nodules and has no jugular venous distention. No audible carotid bruits. There are strong carotid pulses bilaterally. No JVD.     Cardiovascular: Regular rate and rhythm without S3, S4. No murmurs or rubs are appreciated.      Respiratory: crackles b/l basal.    Abdomen: Soft, nontender, nondistended with positive bowel sounds.      Extremity: No tenderness. No  pitting edema No skin discoloration No clubbing No cyanosis.     Neurologic: The patient is alert and oriented.      Skin: No rash, no obvious lesions noted.      Psychiatric: The patient appears to be emotionally stable.      INTERPRETATION OF TELEMETRY:  sinus rythm.    EC17- sinsu tachycardia 104/min, LBBB.  17- sinus rythm, poor R wave progression and no LBBB.      I&O's Detail      LABS:                        10.6   15.6  )-----------( 227      ( 31 Aug 2017 08:33 )             32.8     08-    141  |  107  |  29<H>  ----------------------------<  128<H>  4.2   |  24  |  0.84    Ca    9.0      31 Aug 2017 08:33  Phos  3.9     08  Mg     2.1         TPro  5.7<L>  /  Alb  2.6<L>  /  TBili  0.4  /  DBili  x   /  AST  12<L>  /  ALT  35  /  AlkPhos  67  08-31    CARDIAC MARKERS ( 31 Aug 2017 03:20 )  <0.015 ng/mL / x     / x     / x     / x      CARDIAC MARKERS ( 30 Aug 2017 23:29 )  <0.015 ng/mL / x     / x     / x     / x      CARDIAC MARKERS ( 30 Aug 2017 18:27 )  <0.015 ng/mL / x     / x     / x     / x          PT/INR - ( 30 Aug 2017 18:27 )   PT: 14.8 sec;   INR: 1.36 ratio         PTT - ( 30 Aug 2017 18:27 )  PTT:28.1 sec  Urinalysis Basic - ( 30 Aug 2017 20:00 )    Color: Yellow / Appearance: Clear / S.020 / pH: x  Gluc: x / Ketone: Small  / Bili: Negative / Urobili: Negative mg/dL   Blood: x / Protein: 15 mg/dL / Nitrite: Negative   Leuk Esterase: Trace / RBC: 0-2 /HPF / WBC 3-5   Sq Epi: x / Non Sq Epi: Few / Bacteria: Moderate      I&O's Summary    BNP  RADIOLOGY & ADDITIONAL STUDIES:

## 2017-08-31 NOTE — ED ADULT NURSE REASSESSMENT NOTE - NS ED NURSE REASSESS COMMENT FT1
Pt sleeping, easily arousable. Daughter at bedside and updated on plan of care. Vs as charted. no resp distress noted at this time. personal hygiene care offered, pts family refused at this time. will continue to monitor.

## 2017-08-31 NOTE — ED ADULT NURSE REASSESSMENT NOTE - NS ED NURSE REASSESS COMMENT FT1
Pt sleeping, easily arousable. no resp distress noted. pt and family updated on plan of care multiple times. pt positioned for comfort. no c/o pain at this time. will continue to monitor.

## 2017-08-31 NOTE — H&P ADULT - HISTORY OF PRESENT ILLNESS
· HPI Objective Statement: 89 y/o female with PMHx [facility]. Daughter states that initially she was told pt has PNA but was told by Dr. Ojeda that pt does not have PNA. Pt tested positive for C.diff, is on Vancomycin orally. Pt was discharged 4 days ago but the cough has not improved. +green phlegm production. +CP from coughing. +mild CP when pt is not coughing. No fever in ED. +dyspnea, worsening. Daughter notes pt has felt warm but no temperatures taking. +nausea this morning. +weakness. Pulm Dr. Hubbard. PMD Dr. Otero	  · Presenting Symptoms: CHEST PAIN, COUGH, SHORTNESS OF BREATH, +nausea, weakness	  · Negative Findings: no chills, no diaphoresis	  · Timing: worsening	  · Duration: day(s)	  · Quality: green mucous	  · Context: unknown	  · Aggravating Factors: none	  · Relieving Factors: none 89 yo F hx of Dementia, RA on chronic prednisone,  O2 dependent COPD,home O2 started since last discharge 8/28/17, glaucoma, HTN, distant TIA, GERD, HLD, osteoporosis, kidney stones, hx of recurrent PNA,hx of r hip fx s/p hip surgery april 2017,  [facility] at which time PNA was ruled out and diagnosed with acute c diff colitis and discharged on vanco po.   . daughter reports but the cough has not improved. +green phlegm production. +CP from coughing. +mild CP when pt is not coughing. No fever in ED. +dyspnea, worsening. Daughter notes pt has felt warm but no temperatures taking. +nausea this morning. +weakness.  In ed patient was found to have new LBBB,case was discused with dr rojas by dr grubbs-PCI not indicated ,unlikely ACS,cardiac enzymes negative x3   patient was also found to have abdominal tenderness on my exam generalised   In ED CXR- B/l lower lung infilrates R>left  EKG new LBBB,sinus tach- Patient was given cefepime IV ,IV vanco in ED

## 2017-08-31 NOTE — CONSULT NOTE ADULT - SUBJECTIVE AND OBJECTIVE BOX
Patient is a 88y old  Female who presents with a chief complaint of SOB (31 Aug 2017 07:01)    HPI:  87 y/o Female with h/o dementia, RA on chronic prednisone, O2 dependent COPD, glaucoma, HTN, distant TIA, GERD, HLD, osteoporosis, kidney stones, hx of recurrent PNA, right hip fx s/p hip surgery, recent hospitalization for PNA and C diff colitis, treated with abx then discharged on vanco po was admitted on  for persistent weakness. Daughter reports persistent cough +green phlegm production. +CP from coughing. InED, she was noted with dyspnea; no fever. The patient was found to have new LBBB and abdominal tenderness. She received cefepime IV , IV vanco, metronidazole IV and vancomycin PO.    PMH: as above  PSH: as above  Meds: per reconciliation sheet, noted below  MEDICATIONS  (STANDING):  simvastatin 20 milliGRAM(s) Oral at bedtime  heparin  Injectable 5000 Unit(s) SubCutaneous every 8 hours  sodium chloride 0.9%. 1000 milliLiter(s) (80 mL/Hr) IV Continuous <Continuous>  buDESOnide   0.5 milliGRAM(s) Respule 0.5 milliGRAM(s) Inhalation two times a day  predniSONE   Tablet 5 milliGRAM(s) Oral daily  dipyridamole 200 mG/aspirin 25 mG 1 Capsule(s) Oral two times a day  hydroxychloroquine 200 milliGRAM(s) Oral every 12 hours  tiotropium 18 MICROgram(s) Capsule 1 Capsule(s) Inhalation daily  vancomycin    Solution 125 milliGRAM(s) Oral every 6 hours  pantoprazole    Tablet 40 milliGRAM(s) Oral before breakfast  cefepime  IVPB 2000 milliGRAM(s) IV Intermittent every 24 hours  metroNIDAZOLE  IVPB 500 milliGRAM(s) IV Intermittent every 8 hours    MEDICATIONS  (PRN):  guaiFENesin    Syrup 200 milliGRAM(s) Oral every 6 hours PRN Cough  ALBUTerol/ipratropium for Nebulization 3 milliLiter(s) Nebulizer every 6 hours PRN Shortness of Breath and/or Wheezing  ALBUTerol/ipratropium for Nebulization 3 milliLiter(s) Nebulizer every 4 hours PRN Shortness of Breath and/or Wheezing    Allergies    Aciphex (Unknown)  Macrobid (Unknown)  Percocet 10/325 (Rash)    Intolerances      Social: no smoking, no alcohol, no illegal drugs; no recent travel, no exposure to TB  FAMILY HISTORY:  No pertinent family history in first degree relatives    ROS is limited: the patient dose not seem in pain    Vital Signs Last 24 Hrs  T(C): 36.5 (31 Aug 2017 07:30), Max: 37.2 (31 Aug 2017 00:21)  T(F): 97.7 (31 Aug 2017 07:30), Max: 98.9 (31 Aug 2017 00:21)  HR: 77 (31 Aug 2017 07:30) (70 - 95)  BP: 124/46 (31 Aug 2017 07:30) (121/56 - 139/73)  BP(mean): --  RR: 20 (31 Aug 2017 07:30) (20 - 22)  SpO2: 93% (31 Aug 2017 07:30) (93% - 100%)  Daily Height in cm: 154.94 (30 Aug 2017 17:53)    Daily     PE:    Constitutional: frail looking  HEENT: NC/AT, EOMI, PERRLA  Neck: supple  Back: no tenderness  Respiratory: crackles at bases  Cardiovascular: S1S2 regular, no murmurs  Abdomen: soft, not tender, not distended, positive BS  Genitourinary: deferred  Rectal: deferred  Musculoskeletal: no muscle tenderness, no joint swelling or tenderness  Extremities: no pedal edema  Neurological: confused, moving all extremities  Skin: no rashes    Labs:                        10.6   15.6  )-----------( 227      ( 31 Aug 2017 08:33 )             32.8     08-31    141  |  107  |  29<H>  ----------------------------<  128<H>  4.2   |  24  |  0.84    Ca    9.0      31 Aug 2017 08:33  Phos  3.9     08-  Mg     2.1     -    TPro  5.7<L>  /  Alb  2.6<L>  /  TBili  0.4  /  DBili  x   /  AST  12<L>  /  ALT  35  /  AlkPhos  67  08-31     LIVER FUNCTIONS - ( 31 Aug 2017 08:33 )  Alb: 2.6 g/dL / Pro: 5.7 gm/dL / ALK PHOS: 67 U/L / ALT: 35 U/L / AST: 12 U/L / GGT: x           Urinalysis Basic - ( 30 Aug 2017 20:00 )    Color: Yellow / Appearance: Clear / S.020 / pH: x  Gluc: x / Ketone: Small  / Bili: Negative / Urobili: Negative mg/dL   Blood: x / Protein: 15 mg/dL / Nitrite: Negative   Leuk Esterase: Trace / RBC: 0-2 /HPF / WBC 3-5   Sq Epi: x / Non Sq Epi: Few / Bacteria: Moderate          Radiology:    < from: Xray Chest 1 View AP/PA. (17 @ 19:49) >  Findings are likely indicative of multifocal pneumonia for which clinical   correlation is recommended.    < end of copied text >      Advanced directives addressed: full resuscitation Patient is a 88y old  Female who presents with a chief complaint of SOB (31 Aug 2017 07:01)    HPI:  89 y/o Female with h/o dementia, RA on chronic prednisone, O2 dependent COPD, glaucoma, HTN, distant TIA, GERD, HLD, osteoporosis, kidney stones, hx of recurrent PNA, right hip fx s/p hip surgery, recent hospitalization for PNA and C diff colitis, treated with abx then discharged on vanco po was admitted on  for persistent weakness. Daughter reports persistent cough +green phlegm production. +CP from coughing. InED, she was noted with dyspnea; no fever. The patient was found to have new LBBB and abdominal tenderness. She received cefepime IV , IV vanco, metronidazole IV and vancomycin PO.     No diarrhea reported over last 48 h    PMH: as above  PSH: as above  Meds: per reconciliation sheet, noted below  MEDICATIONS  (STANDING):  simvastatin 20 milliGRAM(s) Oral at bedtime  heparin  Injectable 5000 Unit(s) SubCutaneous every 8 hours  sodium chloride 0.9%. 1000 milliLiter(s) (80 mL/Hr) IV Continuous <Continuous>  buDESOnide   0.5 milliGRAM(s) Respule 0.5 milliGRAM(s) Inhalation two times a day  predniSONE   Tablet 5 milliGRAM(s) Oral daily  dipyridamole 200 mG/aspirin 25 mG 1 Capsule(s) Oral two times a day  hydroxychloroquine 200 milliGRAM(s) Oral every 12 hours  tiotropium 18 MICROgram(s) Capsule 1 Capsule(s) Inhalation daily  vancomycin    Solution 125 milliGRAM(s) Oral every 6 hours  pantoprazole    Tablet 40 milliGRAM(s) Oral before breakfast  cefepime  IVPB 2000 milliGRAM(s) IV Intermittent every 24 hours  metroNIDAZOLE  IVPB 500 milliGRAM(s) IV Intermittent every 8 hours    MEDICATIONS  (PRN):  guaiFENesin    Syrup 200 milliGRAM(s) Oral every 6 hours PRN Cough  ALBUTerol/ipratropium for Nebulization 3 milliLiter(s) Nebulizer every 6 hours PRN Shortness of Breath and/or Wheezing  ALBUTerol/ipratropium for Nebulization 3 milliLiter(s) Nebulizer every 4 hours PRN Shortness of Breath and/or Wheezing    Allergies    Aciphex (Unknown)  Macrobid (Unknown)  Percocet 10/325 (Rash)    Intolerances      Social: no smoking, no alcohol, no illegal drugs; no recent travel, no exposure to TB  FAMILY HISTORY:  No pertinent family history in first degree relatives    ROS is limited: the patient dose not seem in pain    Vital Signs Last 24 Hrs  T(C): 36.5 (31 Aug 2017 07:30), Max: 37.2 (31 Aug 2017 00:21)  T(F): 97.7 (31 Aug 2017 07:30), Max: 98.9 (31 Aug 2017 00:21)  HR: 77 (31 Aug 2017 07:30) (70 - 95)  BP: 124/46 (31 Aug 2017 07:30) (121/56 - 139/73)  BP(mean): --  RR: 20 (31 Aug 2017 07:30) (20 - 22)  SpO2: 93% (31 Aug 2017 07:30) (93% - 100%)  Daily Height in cm: 154.94 (30 Aug 2017 17:53)    Daily     PE:    Constitutional: frail looking  HEENT: NC/AT, EOMI, PERRLA  Neck: supple  Back: no tenderness  Respiratory: crackles at bases  Cardiovascular: S1S2 regular, no murmurs  Abdomen: soft, not tender, not distended, positive BS  Genitourinary: deferred  Rectal: deferred  Musculoskeletal: no muscle tenderness, no joint swelling or tenderness  Extremities: no pedal edema  Neurological: confused, moving all extremities  Skin: no rashes    Labs:                        10.6   15.6  )-----------( 227      ( 31 Aug 2017 08:33 )             32.8     08-31    141  |  107  |  29<H>  ----------------------------<  128<H>  4.2   |  24  |  0.84    Ca    9.0      31 Aug 2017 08:33  Phos  3.9     08-31  Mg     2.1     08-    TPro  5.7<L>  /  Alb  2.6<L>  /  TBili  0.4  /  DBili  x   /  AST  12<L>  /  ALT  35  /  AlkPhos  67  08-31     LIVER FUNCTIONS - ( 31 Aug 2017 08:33 )  Alb: 2.6 g/dL / Pro: 5.7 gm/dL / ALK PHOS: 67 U/L / ALT: 35 U/L / AST: 12 U/L / GGT: x           Urinalysis Basic - ( 30 Aug 2017 20:00 )    Color: Yellow / Appearance: Clear / S.020 / pH: x  Gluc: x / Ketone: Small  / Bili: Negative / Urobili: Negative mg/dL   Blood: x / Protein: 15 mg/dL / Nitrite: Negative   Leuk Esterase: Trace / RBC: 0-2 /HPF / WBC 3-5   Sq Epi: x / Non Sq Epi: Few / Bacteria: Moderate          Radiology:    < from: Xray Chest 1 View AP/PA. (17 @ 19:49) >  Findings are likely indicative of multifocal pneumonia for which clinical   correlation is recommended.    < end of copied text >      Advanced directives addressed: full resuscitation

## 2017-08-31 NOTE — CONSULT NOTE ADULT - SUBJECTIVE AND OBJECTIVE BOX
Chief Complaint:    HPI:  89 yo F hx of Dementia, RA on chronic prednisone,  O2 dependent COPD,home O2 started since last discharge 8/28/17, glaucoma, HTN, distant TIA, GERD, HLD, osteoporosis, kidney stones, hx of recurrent PNA,hx of r hip fx s/p hip surgery april 2017,  presents to the ED [facility] who was admitted here last week for cough at which time PNA was ruled out and diagnosed with acute c diff colitis and discharged on vanco po.   . daughter reports but the cough has not improved. +green phlegm production. +CP from coughing. +mild CP when pt is not coughing. No fever in ED. +dyspnea, worsening. Daughter notes pt has felt warm but no temperatures taking. +nausea this morning. +weakness.  In ed patient was found to have new LBBB,case was discused with dr rojas by dr grubbs-PCI not indicated ,unlikely ACS,cardiac enzymes negative x3   patient was also found to have abdominal tenderness on my exam generalised   In ED CXR- B/l lower lung infilrates R>left  EKG new LBBB,sinus tach- Patient was given cefepime IV ,IV vanco in ED (31 Aug 2017 07:01)    8/31/2017; 8:45 AM.     Pulmonary consult requested. Events noted; as per admission HPI above. Patient awake; oriented to person and place. Very weak. She recognizes me. She is in no acute distress and is breathing comfortably with nasal cannula oxygen in place. Admits to cough. Does not c/o abdominal pain BUT abdomen is tender to palpation.         PAST MEDICAL & SURGICAL HISTORY:  Compression fracture of spine: T7  Alzheimers disease  HTN (hypertension)  Glaucoma  TIA (transient ischemic attack): 8/07  Cerebral vascular accident: 2005  Polymyalgia rheumatica  Osteoporosis  Chronic pain: mid back  Urinary retention  GERD (gastroesophageal reflux disease)  Cholelithiases  Hyperlipemia  Carotid artery stenosis  Lung nodule: biopsied 2003, benign  COPD (chronic obstructive pulmonary disease)  Asthma  History of hip surgery  Gall stones  History of hysterectomy: for bleeding  Hx of cholecystectomy    Social Hx: Former smoker; stopped at @ age 69.    REVIEW OF SYSTEMS:    As above.    All other review of systems is negative unless indicated above    Vital Signs Last 24 Hrs  T(C): 36.5 (31 Aug 2017 07:30), Max: 37.2 (31 Aug 2017 00:21)  T(F): 97.7 (31 Aug 2017 07:30), Max: 98.9 (31 Aug 2017 00:21)  HR: 77 (31 Aug 2017 07:30) (70 - 95)  BP: 124/46 (31 Aug 2017 07:30) (121/56 - 139/73)  BP(mean): --  RR: 20 (31 Aug 2017 07:30) (20 - 22)  SpO2: 93% (31 Aug 2017 07:30) (93% - 100%)    I&O's Summary      PHYSICAL EXAM:    Constitutional: NAD, weak  Neck: Soft and supple, No LAD, No JVD  Respiratory: Breath sounds are equal bilaterally, No wheezing. Scattered rhonchi.   Cardiovascular: S1 and S2, regular rate and rhythm, no Murmurs, gallops or rubs  Gastrointestinal: Bowel Sounds present. Diffusely tender.   Extremities: No peripheral edema  Neurological: A/O x 2, no focal deficits  Skin: No rashes    Medications:  MEDICATIONS  (STANDING):  simvastatin 20 milliGRAM(s) Oral at bedtime  heparin  Injectable 5000 Unit(s) SubCutaneous every 8 hours  sodium chloride 0.9%. 1000 milliLiter(s) (80 mL/Hr) IV Continuous <Continuous>  buDESOnide   0.5 milliGRAM(s) Respule 0.5 milliGRAM(s) Inhalation two times a day  predniSONE   Tablet 5 milliGRAM(s) Oral daily  dipyridamole 200 mG/aspirin 25 mG 1 Capsule(s) Oral two times a day  hydroxychloroquine 200 milliGRAM(s) Oral every 12 hours  tiotropium 18 MICROgram(s) Capsule 1 Capsule(s) Inhalation daily  vancomycin    Solution 125 milliGRAM(s) Oral every 6 hours  pantoprazole    Tablet 40 milliGRAM(s) Oral before breakfast  cefepime  IVPB 2000 milliGRAM(s) IV Intermittent every 24 hours  metroNIDAZOLE  IVPB 500 milliGRAM(s) IV Intermittent every 8 hours      Labs: All Labs Reviewed:                        10.6   15.6  )-----------( 227      ( 31 Aug 2017 08:33 )             32.8     08-30    137  |  103  |  39<H>  ----------------------------<  115<H>  4.6   |  26  |  1.19    Ca    9.8      30 Aug 2017 18:27    TPro  6.2  /  Alb  2.8<L>  /  TBili  0.5  /  DBili  x   /  AST  22  /  ALT  45  /  AlkPhos  79  08-30    PT/INR - ( 30 Aug 2017 18:27 )   PT: 14.8 sec;   INR: 1.36 ratio         PTT - ( 30 Aug 2017 18:27 )  PTT:28.1 sec  CARDIAC MARKERS ( 31 Aug 2017 03:20 )  <0.015 ng/mL / x     / x     / x     / x      CARDIAC MARKERS ( 30 Aug 2017 23:29 )  <0.015 ng/mL / x     / x     / x     / x      CARDIAC MARKERS ( 30 Aug 2017 18:27 )  <0.015 ng/mL / x     / x     / x     / x          Blood Culture:     RADIOLOGY:      EXAM:  CHEST SINGLE VIEW FRONTAL                            PROCEDURE DATE:  08/30/2017          INTERPRETATION:  Portable chest radiograph dated 8/30/2017.    COMPARISON: 4/12/17.    CLINICAL INFORMATION: cough and chest pain.    FINDINGS:    The airway is midline.  Infiltrates and atelectatic changes in the lower lobes,the lingula and   the right middle, bilaterally. A small bilateral pleural effusions is   suspected..   Cardiomegaly and atherosclerotic aorta are again noted.    IMPRESSION:  Findings are likely indicative of multifocal pneumonia for which clinical   correlation is recommended.                NATALIA STOVALL M.D., ATTENDING RADIOLOGIST  This document has been electronically signed. Aug 31 2017  8:50AM        Assessment/Plan:    1. Bilateral pulmonary infiltrates. Continue Cefepime pending ID eval. Follow up culture results. To be seen by ID.     2. COPD. Continue nebulized bronchodilators. Patient is not on home O2. She is NOT on chronic steroid therapy for COPD but is chronically on 5 mg of prednisone daily for rheumatoid arthritis.      3. C.diff colitis - abdominal tenderness. On p.o. Vanco and flagyl. To have abdominal CT. GI evaluation.     4. Hx of RA - chronically treated with Plaquenil and prednisone per rheumatologist.    5. Chronic dementia. Patient has mild confusion at baseline.    6. LBBB. Per cardiology/hospitalist service. .      Will follow.         Time Span: 60 minutes.

## 2017-09-01 DIAGNOSIS — K21.9 GASTRO-ESOPHAGEAL REFLUX DISEASE WITHOUT ESOPHAGITIS: ICD-10-CM

## 2017-09-01 DIAGNOSIS — R06.02 SHORTNESS OF BREATH: ICD-10-CM

## 2017-09-01 DIAGNOSIS — J96.01 ACUTE RESPIRATORY FAILURE WITH HYPOXIA: ICD-10-CM

## 2017-09-01 DIAGNOSIS — M62.838 OTHER MUSCLE SPASM: ICD-10-CM

## 2017-09-01 DIAGNOSIS — J98.11 ATELECTASIS: ICD-10-CM

## 2017-09-01 DIAGNOSIS — Z86.73 PERSONAL HISTORY OF TRANSIENT ISCHEMIC ATTACK (TIA), AND CEREBRAL INFARCTION WITHOUT RESIDUAL DEFICITS: ICD-10-CM

## 2017-09-01 DIAGNOSIS — M54.9 DORSALGIA, UNSPECIFIED: ICD-10-CM

## 2017-09-01 DIAGNOSIS — N63 UNSPECIFIED LUMP IN BREAST: ICD-10-CM

## 2017-09-01 DIAGNOSIS — M06.9 RHEUMATOID ARTHRITIS, UNSPECIFIED: ICD-10-CM

## 2017-09-01 DIAGNOSIS — J44.9 CHRONIC OBSTRUCTIVE PULMONARY DISEASE, UNSPECIFIED: ICD-10-CM

## 2017-09-01 DIAGNOSIS — G30.9 ALZHEIMER'S DISEASE, UNSPECIFIED: ICD-10-CM

## 2017-09-01 DIAGNOSIS — M81.0 AGE-RELATED OSTEOPOROSIS WITHOUT CURRENT PATHOLOGICAL FRACTURE: ICD-10-CM

## 2017-09-01 DIAGNOSIS — I65.29 OCCLUSION AND STENOSIS OF UNSPECIFIED CAROTID ARTERY: ICD-10-CM

## 2017-09-01 DIAGNOSIS — H40.9 UNSPECIFIED GLAUCOMA: ICD-10-CM

## 2017-09-01 DIAGNOSIS — Z87.891 PERSONAL HISTORY OF NICOTINE DEPENDENCE: ICD-10-CM

## 2017-09-01 DIAGNOSIS — Z88.8 ALLERGY STATUS TO OTHER DRUGS, MEDICAMENTS AND BIOLOGICAL SUBSTANCES: ICD-10-CM

## 2017-09-01 DIAGNOSIS — E78.5 HYPERLIPIDEMIA, UNSPECIFIED: ICD-10-CM

## 2017-09-01 DIAGNOSIS — A04.7 ENTEROCOLITIS DUE TO CLOSTRIDIUM DIFFICILE: ICD-10-CM

## 2017-09-01 DIAGNOSIS — F03.90 UNSPECIFIED DEMENTIA, UNSPECIFIED SEVERITY, WITHOUT BEHAVIORAL DISTURBANCE, PSYCHOTIC DISTURBANCE, MOOD DISTURBANCE, AND ANXIETY: ICD-10-CM

## 2017-09-01 DIAGNOSIS — I10 ESSENTIAL (PRIMARY) HYPERTENSION: ICD-10-CM

## 2017-09-01 LAB
ANION GAP SERPL CALC-SCNC: 5 MMOL/L — SIGNIFICANT CHANGE UP (ref 5–17)
BUN SERPL-MCNC: 23 MG/DL — SIGNIFICANT CHANGE UP (ref 7–23)
CALCIUM SERPL-MCNC: 8.9 MG/DL — SIGNIFICANT CHANGE UP (ref 8.5–10.1)
CHLORIDE SERPL-SCNC: 111 MMOL/L — HIGH (ref 96–108)
CO2 SERPL-SCNC: 27 MMOL/L — SIGNIFICANT CHANGE UP (ref 22–31)
CREAT SERPL-MCNC: 0.72 MG/DL — SIGNIFICANT CHANGE UP (ref 0.5–1.3)
GLUCOSE SERPL-MCNC: 89 MG/DL — SIGNIFICANT CHANGE UP (ref 70–99)
HCT VFR BLD CALC: 30.6 % — LOW (ref 34.5–45)
HGB BLD-MCNC: 9.9 G/DL — LOW (ref 11.5–15.5)
MCHC RBC-ENTMCNC: 28.2 PG — SIGNIFICANT CHANGE UP (ref 27–34)
MCHC RBC-ENTMCNC: 32.2 GM/DL — SIGNIFICANT CHANGE UP (ref 32–36)
MCV RBC AUTO: 87.8 FL — SIGNIFICANT CHANGE UP (ref 80–100)
PLATELET # BLD AUTO: 227 K/UL — SIGNIFICANT CHANGE UP (ref 150–400)
POTASSIUM SERPL-MCNC: 3.9 MMOL/L — SIGNIFICANT CHANGE UP (ref 3.5–5.3)
POTASSIUM SERPL-SCNC: 3.9 MMOL/L — SIGNIFICANT CHANGE UP (ref 3.5–5.3)
RBC # BLD: 3.49 M/UL — LOW (ref 3.8–5.2)
RBC # FLD: 15.1 % — HIGH (ref 10.3–14.5)
SODIUM SERPL-SCNC: 143 MMOL/L — SIGNIFICANT CHANGE UP (ref 135–145)
WBC # BLD: 13.5 K/UL — HIGH (ref 3.8–10.5)
WBC # FLD AUTO: 13.5 K/UL — HIGH (ref 3.8–10.5)

## 2017-09-01 PROCEDURE — 93010 ELECTROCARDIOGRAM REPORT: CPT

## 2017-09-01 RX ADMIN — HEPARIN SODIUM 5000 UNIT(S): 5000 INJECTION INTRAVENOUS; SUBCUTANEOUS at 07:15

## 2017-09-01 RX ADMIN — ASPIRIN AND DIPYRIDAMOLE 1 CAPSULE(S): 25; 200 CAPSULE, EXTENDED RELEASE ORAL at 17:16

## 2017-09-01 RX ADMIN — Medication 500 MILLIGRAM(S): at 22:31

## 2017-09-01 RX ADMIN — SIMVASTATIN 20 MILLIGRAM(S): 20 TABLET, FILM COATED ORAL at 22:31

## 2017-09-01 RX ADMIN — Medication 200 MILLIGRAM(S): at 17:16

## 2017-09-01 RX ADMIN — Medication 0.5 MILLIGRAM(S): at 08:13

## 2017-09-01 RX ADMIN — HEPARIN SODIUM 5000 UNIT(S): 5000 INJECTION INTRAVENOUS; SUBCUTANEOUS at 13:07

## 2017-09-01 RX ADMIN — Medication 200 MILLIGRAM(S): at 07:14

## 2017-09-01 RX ADMIN — Medication 1 TABLET(S): at 13:07

## 2017-09-01 RX ADMIN — Medication 500 MILLIGRAM(S): at 13:09

## 2017-09-01 RX ADMIN — Medication 125 MILLIGRAM(S): at 13:03

## 2017-09-01 RX ADMIN — Medication 100 MILLIGRAM(S): at 17:16

## 2017-09-01 RX ADMIN — Medication 5 MILLIGRAM(S): at 07:15

## 2017-09-01 RX ADMIN — ASPIRIN AND DIPYRIDAMOLE 1 CAPSULE(S): 25; 200 CAPSULE, EXTENDED RELEASE ORAL at 07:15

## 2017-09-01 RX ADMIN — HEPARIN SODIUM 5000 UNIT(S): 5000 INJECTION INTRAVENOUS; SUBCUTANEOUS at 22:31

## 2017-09-01 RX ADMIN — Medication 0.5 MILLIGRAM(S): at 20:59

## 2017-09-01 RX ADMIN — Medication 500 MILLIGRAM(S): at 07:15

## 2017-09-01 RX ADMIN — Medication 125 MILLIGRAM(S): at 01:08

## 2017-09-01 RX ADMIN — TIOTROPIUM BROMIDE 1 CAPSULE(S): 18 CAPSULE ORAL; RESPIRATORY (INHALATION) at 08:13

## 2017-09-01 RX ADMIN — PANTOPRAZOLE SODIUM 40 MILLIGRAM(S): 20 TABLET, DELAYED RELEASE ORAL at 07:15

## 2017-09-01 RX ADMIN — CEFEPIME 100 MILLIGRAM(S): 1 INJECTION, POWDER, FOR SOLUTION INTRAMUSCULAR; INTRAVENOUS at 13:29

## 2017-09-01 RX ADMIN — Medication 125 MILLIGRAM(S): at 13:04

## 2017-09-01 RX ADMIN — Medication 100 MILLIGRAM(S): at 07:14

## 2017-09-01 RX ADMIN — Medication 125 MILLIGRAM(S): at 17:16

## 2017-09-01 NOTE — PROGRESS NOTE ADULT - ASSESSMENT
89 yo F hx of Dementia, RA on chronic prednisone,  COPD, glaucoma, HTN, distant TIA, GERD, HLD, osteoporosis, kidney stones, hx of recurrent PNA,hx of r hip fx s/p hip surgery april 2017,  presents to the ED BIBEMS for cough    # Multifocal PNA/HCAP  - suspected GNR bacteria  - continue IV abx - vancomycin and cefepime  - pulmonary consult appreciated  - ID consult appreciated  - CT chest noted  - duonebs PRN  - O2 supplementation  - f/u cultures    # Diffuse abdominal tenderness - RESOLVED  - due to bladder distention, now s/p leon for bladder decrompression  - CT abdomen and pelvis reviewed  - GI consult appreciated    #new LBBB-  likely abberency  -continue antiplatelets, statin  -Echo with LVEF of 60%  -cardio consult appreciated    # c. diff  - continue PO vancomycin  - d/c flagyl  - monitor for diarrhea    #COPD  - stable, no exacerbation  - continue spiriva and pulmicort  - pulm consult appreciated    #RA  - continue Plaquenil  - continue prednisone    #HLD  - continue statin    #GERD  - continue protonix    *distant TIA  - continue antiplatelets and statin    #hx of breast nodule  - increased sixe from previous imaging  - discussed with pt and daughter  - outpatient f/u      #Dvt prophylaxis  - heparin sq

## 2017-09-01 NOTE — PROGRESS NOTE ADULT - SUBJECTIVE AND OBJECTIVE BOX
Cardiology Progress Note:    HPI: 89 yo F hx of Dementia, RA on chronic prednisone,  O2 dependent COPD,home O2 started since last discharge 17, glaucoma, HTN, distant TIA, GERD, HLD, osteoporosis, kidney stones, hx of recurrent PNA, hx of r hip fx s/p hip surgery 2017,  presents to the ED VANI who was admitted here last week for cough at which time PNA was ruled out and diagnosed with acute c diff colitis and discharged on vanco po. Her daughter reports that her cough never improved and she had CP with coughing. Pt s daughter at bedside and pt denies any CP currently.  Pt was noted to have LBBB that was new on EKG and cardiology was consulted.    - Case d/w with pt and daughter at bedside. No CP/SOB. SR on tele. No events last pm.     PAST MEDICAL & SURGICAL HISTORY:  Compression fracture of spine: T7  Alzheimers disease  HTN (hypertension)  Glaucoma  TIA (transient ischemic attack):   Cerebral vascular accident:   Polymyalgia rheumatica  Osteoporosis  Chronic pain: mid back  Urinary retention  GERD (gastroesophageal reflux disease)  Cholelithiases  Hyperlipemia  Carotid artery stenosis  Lung nodule: biopsied , benign  COPD (chronic obstructive pulmonary disease)  Asthma  History of hip surgery  Gall stones  History of hysterectomy: for bleeding  Hx of cholecystectomy    MEDICATIONS  (STANDING):  simvastatin 20 milliGRAM(s) Oral at bedtime  heparin  Injectable 5000 Unit(s) SubCutaneous every 8 hours  sodium chloride 0.9%. 1000 milliLiter(s) (80 mL/Hr) IV Continuous <Continuous>  buDESOnide   0.5 milliGRAM(s) Respule 0.5 milliGRAM(s) Inhalation two times a day  predniSONE   Tablet 5 milliGRAM(s) Oral daily  dipyridamole 200 mG/aspirin 25 mG 1 Capsule(s) Oral two times a day  hydroxychloroquine 200 milliGRAM(s) Oral every 12 hours  tiotropium 18 MICROgram(s) Capsule 1 Capsule(s) Inhalation daily  vancomycin    Solution 125 milliGRAM(s) Oral every 6 hours  pantoprazole    Tablet 40 milliGRAM(s) Oral before breakfast  cefepime  IVPB 2000 milliGRAM(s) IV Intermittent every 24 hours  vancomycin  IVPB   IV Intermittent   vancomycin  IVPB 500 milliGRAM(s) IV Intermittent every 12 hours  acetaminophen   Tablet. 500 milliGRAM(s) Oral every 8 hours  multivitamin 1 Tablet(s) Oral daily  Namenda XR 21 milliGRAM(s) 21 milliGRAM(s) Oral at bedtime  calcium carbonate 500 mG (Tums) Chewable 1 Tablet(s) Chew every 24 hours    MEDICATIONS  (PRN):  guaiFENesin    Syrup 200 milliGRAM(s) Oral every 6 hours PRN Cough  ALBUTerol/ipratropium for Nebulization 3 milliLiter(s) Nebulizer every 6 hours PRN Shortness of Breath and/or Wheezing  ALBUTerol/ipratropium for Nebulization 3 milliLiter(s) Nebulizer every 4 hours PRN Shortness of Breath and/or Wheezing    FAMILY HISTORY: No pertinent family history in first degree relatives    SOCIAL HISTORY: non smoker, no alcohol use     REVIEW OF SYSTEMS:  CONSTITUTIONAL:  No night sweats.  No fatigue, malaise, lethargy.  No fever or chills.  HEENT:  Eyes:  No visual changes.  No eye pain.      ENT:  No runny nose.  No epistaxis.  No sinus pain.  No sore throat.  No odynophagia.  No ear pain.  No congestion.  RESPIRATORY:  No cough.  No wheeze.  No hemoptysis.  No shortness of breath.  CARDIOVASCULAR:  No chest pains.  No palpitations. No shortness of breath, No orthopnea or PND.  GASTROINTESTINAL:  No abdominal pain.  No nausea or vomiting.  No diarrhea or constipation.  No hematemesis.  No hematochezia.  No melena.  GENITOURINARY:  No urgency.  No frequency.  No dysuria.  No hematuria.  No obstructive symptoms.  No discharge.  No pain.  No significant abnormal bleeding.  MUSCULOSKELETAL:  No musculoskeletal pain.  No joint swelling.  No arthritis.  NEUROLOGICAL:  No tingling or numbness or weakness.  PSYCHIATRIC:  No confusion  SKIN:  No rashes.  No lesions.  No wounds.  ENDOCRINE:  No unexplained weight loss.  No polydipsia.  No polyuria.  No polyphagia.  HEMATOLOGIC:  No anemia.  No purpura.  No petechiae.  No prolonged or excessive bleeding.   ALLERGIC AND IMMUNOLOGIC:  No pruritus.  No swelling.     Vital Signs Last 24 Hrs  T(C): 36.3 (31 Aug 2017 09:55), Max: 37.2 (31 Aug 2017 00:21)  T(F): 97.4 (31 Aug 2017 09:55), Max: 98.9 (31 Aug 2017 00:21)  HR: 72 (31 Aug 2017 09:55) (70 - 95)  BP: 128/58 (31 Aug 2017 09:55) (121/56 - 139/73)  BP(mean): --  RR: 24 (31 Aug 2017 09:55) (20 - 24)  SpO2: 99% (31 Aug 2017 09:55) (93% - 100%)    PHYSICAL EXAM-  Constitutional: Confused.   Head: Head is normocephalic and atraumatic.    Neck: The patient's neck is supple without enlargement, has no palpable thyromegaly nor thyroid nodules and has no jugular venous distention. No audible carotid bruits. There are strong carotid pulses bilaterally. No JVD.   Cardiovascular: Regular rate and rhythm without S3, S4. No murmurs or rubs are appreciated.    Respiratory: crackles b/l basal.  Abdomen: Soft, nontender, nondistended with positive bowel sounds.    Extremity: No tenderness. No  pitting edema No skin discoloration No clubbing No cyanosis.   Neurologic: confused at baseline.      INTERPRETATION OF TELEMETRY:  sinus rythm.    EC17- sinsu tachycardia 104/min, LBBB.  17- sinus rythm, poor R wave progression and no LBBB.    LABS:                        10.6   15.6  )-----------( 227      ( 31 Aug 2017 08:33 )             32.8         141  |  107  |  29<H>  ----------------------------<  128<H>  4.2   |  24  |  0.84    Ca    9.0      31 Aug 2017 08:33  Phos  3.9       Mg     2.1         TPro  5.7<L>  /  Alb  2.6<L>  /  TBili  0.4  /  DBili  x   /  AST  12<L>  /  ALT  35  /  AlkPhos  67  31    CARDIAC MARKERS ( 31 Aug 2017 03:20 )  <0.015 ng/mL / x     / x     / x     / x      CARDIAC MARKERS ( 30 Aug 2017 23:29 )  <0.015 ng/mL / x     / x     / x     / x      CARDIAC MARKERS ( 30 Aug 2017 18:27 )  <0.015 ng/mL / x     / x     / x     / x        PT/INR - ( 30 Aug 2017 18:27 )   PT: 14.8 sec;   INR: 1.36 ratio       PTT - ( 30 Aug 2017 18:27 )  PTT:28.1 sec  Urinalysis Basic - ( 30 Aug 2017 20:00 )    Color: Yellow / Appearance: Clear / S.020 / pH: x  Gluc: x / Ketone: Small  / Bili: Negative / Urobili: Negative mg/dL   Blood: x / Protein: 15 mg/dL / Nitrite: Negative   Leuk Esterase: Trace / RBC: 0-2 /HPF / WBC 3-5   Sq Epi: x / Non Sq Epi: Few / Bacteria: Moderate    BNP    RADIOLOGY & ADDITIONAL STUDIES: CT chest- Bilateral lower lobe pneumonia.  2.  Increased size of irregular mass in the right breast, suspicious for  neoplasm.      2Decho- Normal left ventricular size and systolic function.Mild concentric left   ventricular hypertrophy is present. Estimated left ventricular ejection   fraction is 60%.   Mild diastolic dysfunction (stage I).   Mild aortic regurgitation.   Moderate tricuspid valve regurgitation.   Mild pulmonary hypertension.   Mild pulmonic valvular regurgitation.   The IVC is dilated with decreased respiratory variation, suggesting   elevated right atrial pressure.

## 2017-09-01 NOTE — PROGRESS NOTE ADULT - SUBJECTIVE AND OBJECTIVE BOX
HPI:  87 yo F hx of Dementia, RA on chronic prednisone,  COPD, glaucoma, HTN, distant TIA, GERD, HLD, osteoporosis, kidney stones, hx of recurrent PNA,hx of r hip fx s/p hip surgery 2017,  presents to the ED [facility] who was admitted here last week for cough at which time PNA was ruled out and diagnosed with acute c diff colitis and discharged on vanco po.   . daughter reports but the cough has not improved. +green phlegm production. +CP from coughing. +mild CP when pt is not coughing. No fever in ED. +dyspnea, worsening. Daughter notes pt has felt warm but no temperatures taking. +nausea this morning. +weakness.  In ed patient was found to have new LBBB,case was discused with dr rojas by dr grubbs-PCI not indicated ,unlikely ACS,cardiac enzymes negative x3   patient was also found to have abdominal tenderness on my exam generalised   In ED CXR- B/l lower lung infilrates R>left  EKG new LBBB,sinus tach- Patient was given cefepime IV ,IV vanco in ED (31 Aug 2017 07:01)    : Pt seen and examined. Frail and weak appearing. Confused. Poor historian. Complains of cough with yellow sputum. Denies any abdominal pain but very tender to palpation. Awaiting CT abdomen and chest. Will discuss plan with daughter after CT scan results. Denies any chest pain or palpitations. On tele sinus rhythm 70-90s.  : Pt seen and examined. No new complaints. Abdominal pain resolved after bladder decompression. Had BM yesterday. no chest pain or SOB. Much improved. Discussed care with Daughter Danya over the phone. She was requesting a mammogram for breast nodule, explained NO mammogram can be done inpatient. She understands and will follow up as outpatient.     REVIEW OF SYSTEMS:  General: NAD, hemodynamically stable, (-)  fever, (-) chills, (-) weakness  HEENT:  Eyes:  No visual loss, blurred vision, double vision or yellow sclerae. Ears, Nose, Throat:  No hearing loss, sneezing, congestion, runny nose or sore throat.  SKIN:  No rash or itching.  CARDIOVASCULAR:  No chest pain, chest pressure or chest discomfort. No palpitations or edema.  RESPIRATORY: (+) cough  GASTROINTESTINAL:  No anorexia, nausea, vomiting . (+) abdominal tenderness no diarrhea for past 48 hrs  NEUROLOGICAL:  No headache, dizziness, syncope, paralysis, ataxia, numbness or tingling in the extremities. No change in bowel or bladder control.  MUSCULOSKELETAL:  No muscle, back pain, joint pain or stiffness.  HEMATOLOGIC:  No anemia, bleeding or bruising.  LYMPHATICS:  No enlarged nodes. No history of splenectomy.  ENDOCRINOLOGIC:  No reports of sweating, cold or heat intolerance. No polyuria or polydipsia.  ALLERGIES:  No history of asthma, hives, eczema or rhinitis.    Vital Signs Last 24 Hrs  T(C): 36.4 (31 Aug 2017 20:36), Max: 36.7 (31 Aug 2017 18:16)  T(F): 97.6 (31 Aug 2017 20:36), Max: 98 (31 Aug 2017 18:16)  HR: 79 (01 Sep 2017 10:00) (72 - 79)  BP: 120/53 (01 Sep 2017 10:00) (110/49 - 120/53)  BP(mean): --  RR: 19 (01 Sep 2017 10:00) (19 - 20)  SpO2: 93% (01 Sep 2017 10:00) (93% - 99%)    PHYSICAL EXAM:      Constitutional: NAD, weak, frail appearing  HEENT: PERRLA, EOMI, Normal Hearing  Neck: No LAD, No JVD  Back: Normal spine flexure, No CVA tenderness  Cardiovascular: S1 and S2, RRR, no M/G/R  Respiratory: decreased breath sounds b/l, b/l rhonchi  Gastrointestinal: BS+, soft, no tenderness to palpation, no guarding or rebound tenderness  Extremities: No peripheral edema  Vascular: 2+ peripheral pulses  Neurological: A/O x 1, no focal deficits  Psychiatric: Normal mood, normal affect  Skin: No rashes    Labs    CARDIAC MARKERS ( 31 Aug 2017 03:20 )  <0.015 ng/mL / x     / x     / x     / x      CARDIAC MARKERS ( 30 Aug 2017 23:29 )  <0.015 ng/mL / x     / x     / x     / x      CARDIAC MARKERS ( 30 Aug 2017 18:27 )  <0.015 ng/mL / x     / x     / x     / x                                9.9    13.5  )-----------( 227      ( 01 Sep 2017 06:26 )             30.6     01 Sep 2017 06:26    143    |  111    |  23     ----------------------------<  89     3.9     |  27     |  0.72     Ca    8.9        01 Sep 2017 06:26  Phos  3.9       31 Aug 2017 08:33  Mg     2.1       31 Aug 2017 08:33    TPro  5.7    /  Alb  2.6    /  TBili  0.4    /  DBili  x      /  AST  12     /  ALT  35     /  AlkPhos  67     31 Aug 2017 08:33    PT/INR - ( 30 Aug 2017 18:27 )   PT: 14.8 sec;   INR: 1.36 ratio         PTT - ( 30 Aug 2017 18:27 )  PTT:28.1 sec  CAPILLARY BLOOD GLUCOSE        LIVER FUNCTIONS - ( 31 Aug 2017 08:33 )  Alb: 2.6 g/dL / Pro: 5.7 gm/dL / ALK PHOS: 67 U/L / ALT: 35 U/L / AST: 12 U/L / GGT: x           Urinalysis Basic - ( 30 Aug 2017 20:00 )    Color: Yellow / Appearance: Clear / S.020 / pH: x  Gluc: x / Ketone: Small  / Bili: Negative / Urobili: Negative mg/dL   Blood: x / Protein: 15 mg/dL / Nitrite: Negative   Leuk Esterase: Trace / RBC: 0-2 /HPF / WBC 3-5   Sq Epi: x / Non Sq Epi: Few / Bacteria: Moderate            MEDICATIONS  (STANDING):  simvastatin 20 milliGRAM(s) Oral at bedtime  heparin  Injectable 5000 Unit(s) SubCutaneous every 8 hours  sodium chloride 0.9%. 1000 milliLiter(s) (80 mL/Hr) IV Continuous <Continuous>  buDESOnide   0.5 milliGRAM(s) Respule 0.5 milliGRAM(s) Inhalation two times a day  predniSONE   Tablet 5 milliGRAM(s) Oral daily  dipyridamole 200 mG/aspirin 25 mG 1 Capsule(s) Oral two times a day  hydroxychloroquine 200 milliGRAM(s) Oral every 12 hours  tiotropium 18 MICROgram(s) Capsule 1 Capsule(s) Inhalation daily  vancomycin    Solution 125 milliGRAM(s) Oral every 6 hours  pantoprazole    Tablet 40 milliGRAM(s) Oral before breakfast  cefepime  IVPB 2000 milliGRAM(s) IV Intermittent every 24 hours  vancomycin  IVPB   IV Intermittent   vancomycin  IVPB 500 milliGRAM(s) IV Intermittent every 12 hours  acetaminophen   Tablet. 500 milliGRAM(s) Oral every 8 hours  multivitamin 1 Tablet(s) Oral daily  Namenda XR 21 milliGRAM(s) 21 milliGRAM(s) Oral at bedtime  calcium carbonate 500 mG (Tums) Chewable 1 Tablet(s) Chew every 24 hours    MEDICATIONS  (PRN):  guaiFENesin    Syrup 200 milliGRAM(s) Oral every 6 hours PRN Cough  ALBUTerol/ipratropium for Nebulization 3 milliLiter(s) Nebulizer every 6 hours PRN Shortness of Breath and/or Wheezing  ALBUTerol/ipratropium for Nebulization 3 milliLiter(s) Nebulizer every 4 hours PRN Shortness of Breath and/or Wheezing

## 2017-09-01 NOTE — PROGRESS NOTE ADULT - ASSESSMENT
89 y/o Female with h/o dementia, RA on chronic prednisone, O2 dependent COPD, glaucoma, HTN, distant TIA, GERD, HLD, osteoporosis, kidney stones, hx of recurrent PNA, right hip fx s/p hip surgery, recent hospitalization for PNA and C diff colitis, treated with abx then discharged on vanco po was admitted on 8/31 for persistent weakness. Daughter reports persistent cough +green phlegm production. +CP from coughing. In ED, she was noted with dyspnea; no fever. The patient was found to have new LBBB and abdominal tenderness. She received cefepime IV , IV vanco, metronidazole IV and vancomycin PO.    1. Multifocal pneumonia. Recent CDAD partially treated. COPD. Immunocompromised host.  -leukocytosis is improving  -f/u BC x 2, sputum c/s  -no clinical evidence of toxic megacolon  -on vancomycin 500 mg IV q12h, cefepime 2 gm IV qd # 2 and vancomycin 125 mg PO q6h  -tolerating abx well so far; no side effects noted  -obtain vancomycin trough level  -CT chest reviewed  -continue abx coverage  -monitor temps  -f/u CBC  -supportive care  2. Other issues:   -care per medicine

## 2017-09-01 NOTE — PROGRESS NOTE ADULT - SUBJECTIVE AND OBJECTIVE BOX
SHEELA GUPTA  MRN: [id number]    S: HPI:  89 yo F hx of Dementia, RA on chronic prednisone,  O2 dependent COPD,home O2 started since last discharge 8/28/17, glaucoma, HTN, distant TIA, GERD, HLD, osteoporosis, kidney stones, hx of recurrent PNA,hx of r hip fx s/p hip surgery april 2017,  presents to the ED BIBEMS who was admitted here last week for cough at which time PNA was ruled out and diagnosed with acute c diff colitis and discharged on vanco po.   . daughter reports but the cough has not improved. +green phlegm production. +CP from coughing. +mild CP when pt is not coughing. No fever in ED. +dyspnea, worsening. Daughter notes pt has felt warm but no temperatures taking. +nausea this morning. +weakness.  In ed patient was found to have new LBBB,case was discused with dr rojas by dr grubbs-PCI not indicated ,unlikely ACS,cardiac enzymes negative x3   patient was also found to have abdominal tenderness on my exam generalised   In ED CXR- B/l lower lung infilrates R>left  EKG new LBBB,sinus tach- Patient was given cefepime IV ,IV vanco in ED (31 Aug 2017 07:01)    8/31/2017; 8:45 AM.     Pulmonary consult requested. Events noted; as per admission HPI above. Patient awake; oriented to person and place. Very weak. She recognizes me. She is in no acute distress and is breathing comfortably with nasal cannula oxygen in place. Admits to cough. Does not c/o abdominal pain BUT abdomen is tender to palpation.     9/1: Awake; alert. Sitting up in chair eating breakfast. NO c/o abdominal pain or diarrhea. Has had a productive cough. No chest pain or dyspnea. Daughter at bedside.    PAST MEDICAL & SURGICAL HISTORY:  Compression fracture of spine: T7  Alzheimers disease  HTN (hypertension)  Glaucoma  TIA (transient ischemic attack): 8/07  Cerebral vascular accident: 2005  Polymyalgia rheumatica  Osteoporosis  Chronic pain: mid back  Urinary retention  GERD (gastroesophageal reflux disease)  Cholelithiases  Hyperlipemia  Carotid artery stenosis  Lung nodule: biopsied 2003, benign  COPD (chronic obstructive pulmonary disease)  Asthma  History of hip surgery  Gall stones  History of hysterectomy: for bleeding  Hx of cholecystectomy      O: T(C): 36.4 (08-31-17 @ 20:36), Max: 36.7 (08-31-17 @ 18:16)  HR: 72 (08-31-17 @ 20:36) (72 - 76)  BP: 110/49 (08-31-17 @ 20:36) (110/49 - 128/58)  RR: 19 (08-31-17 @ 20:36) (19 - 24)  SpO2: 98% (08-31-17 @ 20:36) (98% - 99%)  Wt(kg): --    PHYSICAL EXAM:      GENERAL: Comfortable.    NEURO: awake/alert. Oriented x 3    NECK: no JVD    CHEST: No wheezing. Scattered rhonchi which clear after cough    CARDIAC: RR    ABD: soft; non-tender    EXT: no edema      LABS:                          9.9    13.5  )-----------( 227      ( 01 Sep 2017 06:26 )             30.6       09-01    143  |  111<H>  |  23  ----------------------------<  89  3.9   |  27  |  0.72    Ca    8.9      01 Sep 2017 06:26  Phos  3.9     08-31  Mg     2.1     08-31    TPro  5.7<L>  /  Alb  2.6<L>  /  TBili  0.4  /  DBili  x   /  AST  12<L>  /  ALT  35  /  AlkPhos  67  08-31    RADIOLOGY:      EXAM:  CT CHEST IC                            PROCEDURE DATE:  08/31/2017      INTERPRETATION:  EXAMINATION DATE: August 31, 2017 at 1132 hours.  CLINICAL INFORMATION: Cough, purulent sputum. Severe COPD.    COMPARISON: CT chest from August 22,2017 and April 12, 2017 and CT   abdomen and pelvis from June 25, 2016.    PROCEDURE:   CT of the Chest, Abdomen and Pelvis was performed with intravenous   contrast.   Intravenous contrast: 90 mL Omnipaque 350. 10 mL discarded.  Oral contrast: positive contrast was administered.  Sagittal and coronal reformats were performed.    FINDINGS:    CHEST:     LUNGS AND LARGE AIRWAYS: Patchy consolidation in both lower lobes,   consistent with pneumonia.  PLEURA: No pleural effusion.  VESSELS: Severe atherosclerotic calcifications.   HEART: Heart size is normal. No pericardial effusion.  MEDIASTINUM AND DOREEN: No lymphadenopathy.  CHEST WALL AND LOWER NECK: Irregular mass in the right breast has   increased in size, measuring 2.6 x 2.5 cm, previously1.9 x 1.6 cm on   April 12, 2017, suspicious for neoplasm.    ABDOMEN AND PELVIS:    LIVER: Within normal limits.  BILE DUCTS: Unchanged prominence of the common bile duct, measuring 1.2   cm.   GALLBLADDER: Status post cholecystectomy.  SPLEEN: Within normal limits.  PANCREAS: Within normal limits.  ADRENALS: Within normal limits.  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     BLADDER: Markedly distended.  REPRODUCTIVE ORGANS: Status post hysterectomy.    BOWEL: Moderate-sized hiatal hernia. No bowel obstruction.     PERITONEUM: No ascites.  VESSELS:  Severe atherosclerotic calcifications with significant   narrowing of the proximal abdominal aorta.  RETROPERITONEUM: No lymphadenopathy.    ABDOMINAL WALL: Within normal limits.  BONES: Multiple old left-sided rib fractures. Orthopedic screws are   present in the right hip.    IMPRESSION:   1.  Bilateral lower lobe pneumonia.  2.  Increased size of irregular mass in the right breast, suspicious for  neoplasm.      IMELDA CHAPMAN   This document has been electronically signed. Aug 31 2017 11:59AM        MEDICATIONS  (STANDING):  simvastatin 20 milliGRAM(s) Oral at bedtime  heparin  Injectable 5000 Unit(s) SubCutaneous every 8 hours  sodium chloride 0.9%. 1000 milliLiter(s) (80 mL/Hr) IV Continuous <Continuous>  buDESOnide   0.5 milliGRAM(s) Respule 0.5 milliGRAM(s) Inhalation two times a day  predniSONE   Tablet 5 milliGRAM(s) Oral daily  dipyridamole 200 mG/aspirin 25 mG 1 Capsule(s) Oral two times a day  hydroxychloroquine 200 milliGRAM(s) Oral every 12 hours  tiotropium 18 MICROgram(s) Capsule 1 Capsule(s) Inhalation daily  vancomycin    Solution 125 milliGRAM(s) Oral every 6 hours  pantoprazole    Tablet 40 milliGRAM(s) Oral before breakfast  cefepime  IVPB 2000 milliGRAM(s) IV Intermittent every 24 hours  vancomycin  IVPB   IV Intermittent   vancomycin  IVPB 500 milliGRAM(s) IV Intermittent every 12 hours  acetaminophen   Tablet. 500 milliGRAM(s) Oral every 8 hours  multivitamin 1 Tablet(s) Oral daily  Namenda XR 21 milliGRAM(s) 21 milliGRAM(s) Oral at bedtime  calcium carbonate 500 mG (Tums) Chewable 1 Tablet(s) Chew every 24 hours    MEDICATIONS  (PRN):  guaiFENesin    Syrup 200 milliGRAM(s) Oral every 6 hours PRN Cough  ALBUTerol/ipratropium for Nebulization 3 milliLiter(s) Nebulizer every 6 hours PRN Shortness of Breath and/or Wheezing  ALBUTerol/ipratropium for Nebulization 3 milliLiter(s) Nebulizer every 4 hours PRN Shortness of Breath and/or Wheezing        A/P: 1. Bilateral pneumonia. Continue antibiotics per ID for HCAP. O2 supplement prn.    2. COPD. Continue nebulized bronchodilators/budesonide. NO exacerbation. No need for IV steroids at this time.     3. C.Diff colitis. Continue treatment per ID. No diarrhea - abdominal pain resolved after bladder distention addressed AND patient had BM. CT result noted.     4. Right breast mass - suspicious for malignancy. Patient had been scheduled for outpatient mammo. Further eval per hospitalist service.      5. RA. On Plaquenil and prednisone 5 mg daily per rheumatologist.     6. LBBB. Per cardiology.     D/W daughter at bedside.     Will follow.

## 2017-09-01 NOTE — [facility]I QUERY NOTE - [id number]_GEN_ALL_CORE_HH
Per documentation....  # Multifocal PNA/HCAP  - cover from gram negative and other resistent bacteria  - continue IV abx - vancomycin and cefepime  Patient recently discharged from the hospital    Please clarify the likely/ suspected/ or probable type of HCAP you are treating/ covering with IV cefepime and vanco ?  ie... HCAP suspected GNR bacteria ?  ..... Likely GNR Pneumonia ?  ..... Probable MRSA pneumonia ?  ..... Other ? Please clarify

## 2017-09-01 NOTE — PROGRESS NOTE ADULT - SUBJECTIVE AND OBJECTIVE BOX
denies abd pain  no n/v  eating meals  three soft formed bm's        Allergies    Aciphex (Unknown)  Macrobid (Unknown)  Percocet 10/325 (Rash)    Intolerances        MEDICATIONS  (STANDING):  simvastatin 20 milliGRAM(s) Oral at bedtime  heparin  Injectable 5000 Unit(s) SubCutaneous every 8 hours  sodium chloride 0.9%. 1000 milliLiter(s) (80 mL/Hr) IV Continuous <Continuous>  buDESOnide   0.5 milliGRAM(s) Respule 0.5 milliGRAM(s) Inhalation two times a day  predniSONE   Tablet 5 milliGRAM(s) Oral daily  dipyridamole 200 mG/aspirin 25 mG 1 Capsule(s) Oral two times a day  hydroxychloroquine 200 milliGRAM(s) Oral every 12 hours  tiotropium 18 MICROgram(s) Capsule 1 Capsule(s) Inhalation daily  vancomycin    Solution 125 milliGRAM(s) Oral every 6 hours  pantoprazole    Tablet 40 milliGRAM(s) Oral before breakfast  cefepime  IVPB 2000 milliGRAM(s) IV Intermittent every 24 hours  vancomycin  IVPB   IV Intermittent   vancomycin  IVPB 500 milliGRAM(s) IV Intermittent every 12 hours  acetaminophen   Tablet. 500 milliGRAM(s) Oral every 8 hours  multivitamin 1 Tablet(s) Oral daily  Namenda XR 21 milliGRAM(s) 21 milliGRAM(s) Oral at bedtime  calcium carbonate 500 mG (Tums) Chewable 1 Tablet(s) Chew every 24 hours    MEDICATIONS  (PRN):  guaiFENesin    Syrup 200 milliGRAM(s) Oral every 6 hours PRN Cough  ALBUTerol/ipratropium for Nebulization 3 milliLiter(s) Nebulizer every 6 hours PRN Shortness of Breath and/or Wheezing  ALBUTerol/ipratropium for Nebulization 3 milliLiter(s) Nebulizer every 4 hours PRN Shortness of Breath and/or Wheezing      REVIEW OF SYSTEMS      General:	No fever or chills    Skin/Breast: No jaundice or rash   		  ENMT: denies sore throat or thrush    Respiratory and Thorax: Denies dyspnea or cough or shortness of breath  	  Cardiovascular: Denies chest pain or palpitations 	    Gastrointestinal: Denies jaundice or pruritis    Genitourinary: Denies dysuria or hematuria	    Musculoskeletal: Denies muscular pain or swelling	    Neurological: Denies confusion or tremor	    Hematology/Lymphatics: Denies easy bruising or bleeding 	    Endocrine:	Denies polyphagia or polyuria    See above hx otherwise neg for any major organ systems    PHYSICAL EXAM:    Vital Signs Last 24 Hrs  T(C): 36.3 (01 Sep 2017 16:55), Max: 36.7 (31 Aug 2017 18:16)  T(F): 97.4 (01 Sep 2017 16:55), Max: 98 (31 Aug 2017 18:16)  HR: 83 (01 Sep 2017 16:55) (72 - 83)  BP: 160/62 (01 Sep 2017 16:55) (110/49 - 160/62)  BP(mean): --  RR: 17 (01 Sep 2017 16:55) (17 - 20)  SpO2: 93% (01 Sep 2017 16:55) (93% - 99%)    Constitutional: no acute distress    ENMT: NC/AT scl anicteric opm pink no lesions     Neck: supple. No jvd or LN    Respiratory: Clear     Cardiovascular: RRR s1s2     Gastrointestinal: Pos bs , soft , not tender, no hepatosplenomegaly,  no mass      Back: No CVA tenderness    Extremities: NO cce     Neurological: Alert and oriented x 3     Skin: No rash or jaundice    Date/Time:09-01 @ 06:26    ALT/SGPT: --  Albumin: --  AST/SGOT: --  Bilirubin Direct: --  Bilirubin Total: --  Ca: 8.9  eGFR : 87  eGFR Non-: 75  Lipase: --  Amylase: --  INR: --  PTT: --        Date/Time:08-31 @ 08:33    ALT/SGPT: 35  Albumin: 2.6  AST/SGOT: 12  Bilirubin Direct: --  Bilirubin Total: 0.4  Ca: 9.0  eGFR : 72  eGFR Non-: 62  Lipase: --  Amylase: --  INR: --  PTT: --        Date/Time:08-30 @ 18:27    ALT/SGPT: 45  Albumin: 2.8  AST/SGOT: 22  Bilirubin Direct: --  Bilirubin Total: 0.5  Ca: 9.8  eGFR : 47  eGFR Non-: 41  Lipase: --  Amylase: --  INR: 1.36  PTT: --            09-01    143  |  111<H>  |  23  ----------------------------<  89  3.9   |  27  |  0.72    Ca    8.9      01 Sep 2017 06:26  Phos  3.9     08-31  Mg     2.1     08-31    TPro  5.7<L>  /  Alb  2.6<L>  /  TBili  0.4  /  DBili  x   /  AST  12<L>  /  ALT  35  /  AlkPhos  67  08-31                            9.9    13.5  )-----------( 227      ( 01 Sep 2017 06:26 )             30.6

## 2017-09-01 NOTE — PROGRESS NOTE ADULT - SUBJECTIVE AND OBJECTIVE BOX
Patient is a 88y old  Female who presents with a chief complaint of SOB (31 Aug 2017 07:01)    HPI:  87 y/o Female with h/o dementia, RA on chronic prednisone, O2 dependent COPD, glaucoma, HTN, distant TIA, GERD, HLD, osteoporosis, kidney stones, hx of recurrent PNA, right hip fx s/p hip surgery, recent hospitalization for PNA and C diff colitis, treated with abx then discharged on vanco po was admitted on  for persistent weakness. Daughter reports persistent cough +green phlegm production. +CP from coughing. InED, she was noted with dyspnea; no fever. The patient was found to have new LBBB and abdominal tenderness. She received cefepime IV , IV vanco, metronidazole IV and vancomycin PO.     Weak looking  No SOB at rest; dry cough    MEDICATIONS  (STANDING):  simvastatin 20 milliGRAM(s) Oral at bedtime  heparin  Injectable 5000 Unit(s) SubCutaneous every 8 hours  sodium chloride 0.9%. 1000 milliLiter(s) (80 mL/Hr) IV Continuous <Continuous>  buDESOnide   0.5 milliGRAM(s) Respule 0.5 milliGRAM(s) Inhalation two times a day  predniSONE   Tablet 5 milliGRAM(s) Oral daily  dipyridamole 200 mG/aspirin 25 mG 1 Capsule(s) Oral two times a day  hydroxychloroquine 200 milliGRAM(s) Oral every 12 hours  tiotropium 18 MICROgram(s) Capsule 1 Capsule(s) Inhalation daily  vancomycin    Solution 125 milliGRAM(s) Oral every 6 hours  pantoprazole    Tablet 40 milliGRAM(s) Oral before breakfast  cefepime  IVPB 2000 milliGRAM(s) IV Intermittent every 24 hours  vancomycin  IVPB   IV Intermittent   vancomycin  IVPB 500 milliGRAM(s) IV Intermittent every 12 hours  acetaminophen   Tablet. 500 milliGRAM(s) Oral every 8 hours  multivitamin 1 Tablet(s) Oral daily  Namenda XR 21 milliGRAM(s) 21 milliGRAM(s) Oral at bedtime  calcium carbonate 500 mG (Tums) Chewable 1 Tablet(s) Chew every 24 hours    MEDICATIONS  (PRN):  guaiFENesin    Syrup 200 milliGRAM(s) Oral every 6 hours PRN Cough  ALBUTerol/ipratropium for Nebulization 3 milliLiter(s) Nebulizer every 6 hours PRN Shortness of Breath and/or Wheezing  ALBUTerol/ipratropium for Nebulization 3 milliLiter(s) Nebulizer every 4 hours PRN Shortness of Breath and/or Wheezing      Vital Signs Last 24 Hrs  T(C): 36.4 (31 Aug 2017 20:36), Max: 36.7 (31 Aug 2017 18:16)  T(F): 97.6 (31 Aug 2017 20:36), Max: 98 (31 Aug 2017 18:16)  HR: 79 (01 Sep 2017 10:00) (72 - 79)  BP: 120/53 (01 Sep 2017 10:00) (110/49 - 120/53)  BP(mean): --  RR: 19 (01 Sep 2017 10:00) (19 - 20)  SpO2: 93% (01 Sep 2017 10:00) (93% - 99%)    Physical Exam:        Constitutional: frail looking  HEENT: NC/AT, EOMI, PERRLA  Neck: supple  Back: no tenderness  Respiratory: crackles at bases  Cardiovascular: S1S2 regular, no murmurs  Abdomen: soft, not tender, not distended, positive BS  Genitourinary: deferred  Rectal: deferred  Musculoskeletal: no muscle tenderness, no joint swelling or tenderness  Extremities: no pedal edema  Neurological: confused, moving all extremities  Skin: no rashes    MEDICATIONS  (STANDING):  simvastatin 20 milliGRAM(s) Oral at bedtime  heparin  Injectable 5000 Unit(s) SubCutaneous every 8 hours  sodium chloride 0.9%. 1000 milliLiter(s) (80 mL/Hr) IV Continuous <Continuous>  buDESOnide   0.5 milliGRAM(s) Respule 0.5 milliGRAM(s) Inhalation two times a day  predniSONE   Tablet 5 milliGRAM(s) Oral daily  dipyridamole 200 mG/aspirin 25 mG 1 Capsule(s) Oral two times a day  hydroxychloroquine 200 milliGRAM(s) Oral every 12 hours  tiotropium 18 MICROgram(s) Capsule 1 Capsule(s) Inhalation daily  vancomycin    Solution 125 milliGRAM(s) Oral every 6 hours  pantoprazole    Tablet 40 milliGRAM(s) Oral before breakfast  cefepime  IVPB 2000 milliGRAM(s) IV Intermittent every 24 hours  vancomycin  IVPB   IV Intermittent   vancomycin  IVPB 500 milliGRAM(s) IV Intermittent every 12 hours  acetaminophen   Tablet. 500 milliGRAM(s) Oral every 8 hours  multivitamin 1 Tablet(s) Oral daily  Namenda XR 21 milliGRAM(s) 21 milliGRAM(s) Oral at bedtime  calcium carbonate 500 mG (Tums) Chewable 1 Tablet(s) Chew every 24 hours    MEDICATIONS  (PRN):  guaiFENesin    Syrup 200 milliGRAM(s) Oral every 6 hours PRN Cough  ALBUTerol/ipratropium for Nebulization 3 milliLiter(s) Nebulizer every 6 hours PRN Shortness of Breath and/or Wheezing  ALBUTerol/ipratropium for Nebulization 3 milliLiter(s) Nebulizer every 4 hours PRN Shortness of Breath and/or Wheezing      Vital Signs Last 24 Hrs  T(C): 36.4 (31 Aug 2017 20:36), Max: 36.7 (31 Aug 2017 18:16)  T(F): 97.6 (31 Aug 2017 20:36), Max: 98 (31 Aug 2017 18:16)  HR: 79 (01 Sep 2017 10:00) (72 - 79)  BP: 120/53 (01 Sep 2017 10:00) (110/49 - 120/53)  BP(mean): --  RR: 19 (01 Sep 2017 10:00) (19 - 20)  SpO2: 93% (01 Sep 2017 10:00) (93% - 99%)    Constitutional: frail looking  HEENT: NC/AT, EOMI, PERRLA  Neck: supple  Back: no tenderness  Respiratory: clear  Cardiovascular: S1S2 regular, no murmurs  Abdomen: soft, not tender, not distended, positive BS  Genitourinary: deferred  Rectal: deferred  Musculoskeletal: no muscle tenderness, no joint swelling or tenderness  Extremities: no pedal edema  Neurological: AxOx3, moving all extremities, no focal deficits  Skin: no rashes      CBC Full  -  ( 01 Sep 2017 06:26 )  WBC Count : 13.5 K/uL  Hemoglobin : 9.9 g/dL  Hematocrit : 30.6 %  Platelet Count - Automated : 227 K/uL  Mean Cell Volume : 87.8 fl  Mean Cell Hemoglobin : 28.2 pg  Mean Cell Hemoglobin Concentration : 32.2 gm/dL  Auto Neutrophil # : x  Auto Lymphocyte # : x  Auto Monocyte # : x  Auto Eosinophil # : x  Auto Basophil # : x  Auto Neutrophil % : x  Auto Lymphocyte % : x  Auto Monocyte % : x  Auto Eosinophil % : x  Auto Basophil % : x    -    143  |  111<H>  |  23  ----------------------------<  89  3.9   |  27  |  0.72    Ca    8.9      01 Sep 2017 06:26  Phos  3.9     08-31  Mg     2.1     08-31    TPro  5.7<L>  /  Alb  2.6<L>  /  TBili  0.4  /  DBili  x   /  AST  12<L>  /  ALT  35  /  AlkPhos  67  08-31    Vancomycin levels:   Cultures:                                 10.6   15.6  )-----------( 227      ( 31 Aug 2017 08:33 )             32.8     08-31    141  |  107  |  29<H>  ----------------------------<  128<H>  4.2   |  24  |  0.84    Ca    9.0      31 Aug 2017 08:33  Phos  3.9     08-31  Mg     2.1     08-31    TPro  5.7<L>  /  Alb  2.6<L>  /  TBili  0.4  /  DBili  x   /  AST  12<L>  /  ALT  35  /  AlkPhos  67  08-31     LIVER FUNCTIONS - ( 31 Aug 2017 08:33 )  Alb: 2.6 g/dL / Pro: 5.7 gm/dL / ALK PHOS: 67 U/L / ALT: 35 U/L / AST: 12 U/L / GGT: x           Urinalysis Basic - ( 30 Aug 2017 20:00 )    Color: Yellow / Appearance: Clear / S.020 / pH: x  Gluc: x / Ketone: Small  / Bili: Negative / Urobili: Negative mg/dL   Blood: x / Protein: 15 mg/dL / Nitrite: Negative   Leuk Esterase: Trace / RBC: 0-2 /HPF / WBC 3-5   Sq Epi: x / Non Sq Epi: Few / Bacteria: Moderate    Culture - Urine (17 @ 20:00)    Specimen Source: .Urine Catheterized    Culture Results:   No growth    Culture - Blood (17 @ 19:47)    Specimen Source: .Blood Blood-Peripheral    Culture Results:   No growth to date.          Radiology:    < from: Xray Chest 1 View AP/PA. (17 @ 19:49) >  Findings are likely indicative of multifocal pneumonia for which clinical   correlation is recommended.    < end of copied text >    < from: CT Chest w/ IV Cont (17 @ 11:42) >  1.  Bilateral lower lobe pneumonia.  2.  Increased size of irregular mass in the right breast, suspicious for  neoplasm.    < end of copied text >      Advanced directives addressed: full resuscitation

## 2017-09-01 NOTE — PROGRESS NOTE ADULT - ASSESSMENT
1. Abnormal EKG- LBBB- likely rate related aberrancy. 2Decho- reviewed- Nl LV fxn, mod TR, mild AI. BP/HR well controlled.    2. Chest pain- atypical- likely musculoskeletal secondary to coughing and PNA. Cardiac enzymes negative so far. She had  left heart cath in 10/6/2015 which showed nonobstructive CAD. Nl LV fxn on echo.    3. PNA- pulm following, cont abx.     4. CDiff- GI following, cont abx.     5. Can hold further cardiac w/u for now. Daughter and pt wish to discontinue tele- we can discontinue for now.    6. DVT proph.

## 2017-09-01 NOTE — PROGRESS NOTE ADULT - ASSESSMENT
c diff diarrhea      po vancomycin  continue present diet.   pt appears comfortable and happy with progress

## 2017-09-02 LAB
ANION GAP SERPL CALC-SCNC: 7 MMOL/L — SIGNIFICANT CHANGE UP (ref 5–17)
BUN SERPL-MCNC: 21 MG/DL — SIGNIFICANT CHANGE UP (ref 7–23)
CALCIUM SERPL-MCNC: 9.1 MG/DL — SIGNIFICANT CHANGE UP (ref 8.5–10.1)
CHLORIDE SERPL-SCNC: 109 MMOL/L — HIGH (ref 96–108)
CO2 SERPL-SCNC: 24 MMOL/L — SIGNIFICANT CHANGE UP (ref 22–31)
CREAT SERPL-MCNC: 0.83 MG/DL — SIGNIFICANT CHANGE UP (ref 0.5–1.3)
GLUCOSE SERPL-MCNC: 106 MG/DL — HIGH (ref 70–99)
HCT VFR BLD CALC: 35 % — SIGNIFICANT CHANGE UP (ref 34.5–45)
HGB BLD-MCNC: 11.4 G/DL — LOW (ref 11.5–15.5)
MCHC RBC-ENTMCNC: 28 PG — SIGNIFICANT CHANGE UP (ref 27–34)
MCHC RBC-ENTMCNC: 32.5 GM/DL — SIGNIFICANT CHANGE UP (ref 32–36)
MCV RBC AUTO: 86.2 FL — SIGNIFICANT CHANGE UP (ref 80–100)
PLATELET # BLD AUTO: 239 K/UL — SIGNIFICANT CHANGE UP (ref 150–400)
POTASSIUM SERPL-MCNC: 4.1 MMOL/L — SIGNIFICANT CHANGE UP (ref 3.5–5.3)
POTASSIUM SERPL-SCNC: 4.1 MMOL/L — SIGNIFICANT CHANGE UP (ref 3.5–5.3)
RBC # BLD: 4.06 M/UL — SIGNIFICANT CHANGE UP (ref 3.8–5.2)
RBC # FLD: 15.9 % — HIGH (ref 10.3–14.5)
SODIUM SERPL-SCNC: 140 MMOL/L — SIGNIFICANT CHANGE UP (ref 135–145)
VANCOMYCIN TROUGH SERPL-MCNC: 13.1 UG/ML — SIGNIFICANT CHANGE UP (ref 10–20)
WBC # BLD: 15.9 K/UL — HIGH (ref 3.8–10.5)
WBC # FLD AUTO: 15.9 K/UL — HIGH (ref 3.8–10.5)

## 2017-09-02 RX ORDER — ACETAMINOPHEN 500 MG
650 TABLET ORAL EVERY 8 HOURS
Qty: 0 | Refills: 0 | Status: DISCONTINUED | OUTPATIENT
Start: 2017-09-02 | End: 2017-09-05

## 2017-09-02 RX ADMIN — Medication 1 TABLET(S): at 14:55

## 2017-09-02 RX ADMIN — HEPARIN SODIUM 5000 UNIT(S): 5000 INJECTION INTRAVENOUS; SUBCUTANEOUS at 14:55

## 2017-09-02 RX ADMIN — Medication 5 MILLIGRAM(S): at 05:20

## 2017-09-02 RX ADMIN — Medication 125 MILLIGRAM(S): at 05:19

## 2017-09-02 RX ADMIN — Medication 100 MILLIGRAM(S): at 21:59

## 2017-09-02 RX ADMIN — CEFEPIME 100 MILLIGRAM(S): 1 INJECTION, POWDER, FOR SOLUTION INTRAMUSCULAR; INTRAVENOUS at 07:56

## 2017-09-02 RX ADMIN — Medication 40 MILLIGRAM(S): at 17:53

## 2017-09-02 RX ADMIN — Medication 200 MILLIGRAM(S): at 05:20

## 2017-09-02 RX ADMIN — Medication 0.5 MILLIGRAM(S): at 20:18

## 2017-09-02 RX ADMIN — Medication 200 MILLIGRAM(S): at 17:53

## 2017-09-02 RX ADMIN — Medication 3 MILLILITER(S): at 15:14

## 2017-09-02 RX ADMIN — Medication 100 MILLIGRAM(S): at 14:55

## 2017-09-02 RX ADMIN — Medication 650 MILLIGRAM(S): at 21:58

## 2017-09-02 RX ADMIN — Medication 125 MILLIGRAM(S): at 12:01

## 2017-09-02 RX ADMIN — Medication 500 MILLIGRAM(S): at 05:20

## 2017-09-02 RX ADMIN — Medication 500 MILLIGRAM(S): at 22:01

## 2017-09-02 RX ADMIN — PANTOPRAZOLE SODIUM 40 MILLIGRAM(S): 20 TABLET, DELAYED RELEASE ORAL at 05:19

## 2017-09-02 RX ADMIN — HEPARIN SODIUM 5000 UNIT(S): 5000 INJECTION INTRAVENOUS; SUBCUTANEOUS at 05:19

## 2017-09-02 RX ADMIN — Medication 500 MILLIGRAM(S): at 14:55

## 2017-09-02 RX ADMIN — ASPIRIN AND DIPYRIDAMOLE 1 CAPSULE(S): 25; 200 CAPSULE, EXTENDED RELEASE ORAL at 05:19

## 2017-09-02 RX ADMIN — Medication 125 MILLIGRAM(S): at 01:51

## 2017-09-02 RX ADMIN — Medication 1 TABLET(S): at 12:01

## 2017-09-02 RX ADMIN — Medication 0.5 MILLIGRAM(S): at 09:09

## 2017-09-02 RX ADMIN — Medication 100 MILLIGRAM(S): at 17:54

## 2017-09-02 RX ADMIN — SIMVASTATIN 20 MILLIGRAM(S): 20 TABLET, FILM COATED ORAL at 22:00

## 2017-09-02 RX ADMIN — Medication 650 MILLIGRAM(S): at 08:45

## 2017-09-02 RX ADMIN — HEPARIN SODIUM 5000 UNIT(S): 5000 INJECTION INTRAVENOUS; SUBCUTANEOUS at 21:59

## 2017-09-02 RX ADMIN — Medication 3 MILLILITER(S): at 09:09

## 2017-09-02 RX ADMIN — Medication 100 MILLIGRAM(S): at 05:19

## 2017-09-02 RX ADMIN — Medication 40 MILLIGRAM(S): at 12:02

## 2017-09-02 RX ADMIN — ASPIRIN AND DIPYRIDAMOLE 1 CAPSULE(S): 25; 200 CAPSULE, EXTENDED RELEASE ORAL at 17:53

## 2017-09-02 RX ADMIN — Medication 125 MILLIGRAM(S): at 17:55

## 2017-09-02 NOTE — PROGRESS NOTE ADULT - SUBJECTIVE AND OBJECTIVE BOX
no diarrhea since last night  no abd pain  no n/v  bobby diet      Aciphex (Unknown)  Macrobid (Unknown)  Percocet 10/325 (Rash)    Intolerances        MEDICATIONS  (STANDING):  simvastatin 20 milliGRAM(s) Oral at bedtime  heparin  Injectable 5000 Unit(s) SubCutaneous every 8 hours  sodium chloride 0.9%. 1000 milliLiter(s) (80 mL/Hr) IV Continuous <Continuous>  buDESOnide   0.5 milliGRAM(s) Respule 0.5 milliGRAM(s) Inhalation two times a day  dipyridamole 200 mG/aspirin 25 mG 1 Capsule(s) Oral two times a day  hydroxychloroquine 200 milliGRAM(s) Oral every 12 hours  tiotropium 18 MICROgram(s) Capsule 1 Capsule(s) Inhalation daily  vancomycin    Solution 125 milliGRAM(s) Oral every 6 hours  pantoprazole    Tablet 40 milliGRAM(s) Oral before breakfast  cefepime  IVPB 2000 milliGRAM(s) IV Intermittent every 24 hours  vancomycin  IVPB   IV Intermittent   vancomycin  IVPB 500 milliGRAM(s) IV Intermittent every 12 hours  acetaminophen   Tablet. 500 milliGRAM(s) Oral every 8 hours  multivitamin 1 Tablet(s) Oral daily  Namenda XR 21 milliGRAM(s) 21 milliGRAM(s) Oral at bedtime  calcium carbonate 500 mG (Tums) Chewable 1 Tablet(s) Chew every 24 hours  methylPREDNISolone sodium succinate Injectable 40 milliGRAM(s) IV Push every 6 hours    MEDICATIONS  (PRN):  guaiFENesin    Syrup 200 milliGRAM(s) Oral every 6 hours PRN Cough  ALBUTerol/ipratropium for Nebulization 3 milliLiter(s) Nebulizer every 6 hours PRN Shortness of Breath and/or Wheezing  ALBUTerol/ipratropium for Nebulization 3 milliLiter(s) Nebulizer every 4 hours PRN Shortness of Breath and/or Wheezing  acetaminophen   Tablet. 650 milliGRAM(s) Oral every 8 hours PRN Moderate Pain (4 - 6)      REVIEW OF SYSTEMS      General:	No fever or chills    Skin/Breast: No jaundice or rash   		  ENMT: denies sore throat or thrush    Respiratory and Thorax: Denies dyspnea or cough or shortness of breath  	  Cardiovascular: Denies chest pain or palpitations 	    Gastrointestinal: Denies jaundice or pruritis    Genitourinary: Denies dysuria or hematuria	    Musculoskeletal: Denies muscular pain or swelling	    Neurological: Denies confusion or tremor	    Hematology/Lymphatics: Denies easy bruising or bleeding 	    Endocrine:	Denies polyphagia or polyuria    See above hx otherwise neg for any major organ systems    PHYSICAL EXAM:    Vital Signs Last 24 Hrs  T(C): 36.3 (02 Sep 2017 04:50), Max: 36.5 (01 Sep 2017 20:31)  T(F): 97.4 (02 Sep 2017 04:50), Max: 97.7 (01 Sep 2017 20:31)  HR: 88 (02 Sep 2017 04:50) (82 - 93)  BP: 151/77 (02 Sep 2017 04:50) (138/58 - 160/62)  BP(mean): --  RR: 16 (02 Sep 2017 04:50) (16 - 17)  SpO2: 91% (02 Sep 2017 04:50) (91% - 98%)    Constitutional: no acute distress    ENMT: NC/AT scl anicteric opm pink no lesions     Neck: supple. No jvd or LN    Respiratory: Clear     Cardiovascular: RRR s1s2     Gastrointestinal: Pos bs , soft , not tender, no hepatosplenomegaly,  no mass        Back: No CVA tenderness    Extremities: NO cce     Neurological: Alert and oriented x 3     Skin: No rash or jaundice    Date/Time:09-02 @ 08:15    ALT/SGPT: --  Albumin: --  AST/SGOT: --  Bilirubin Direct: --  Bilirubin Total: --  Ca: 9.1  eGFR : 73  eGFR Non-: 63  Lipase: --  Amylase: --  INR: --  PTT: --        Date/Time:09-01 @ 06:26    ALT/SGPT: --  Albumin: --  AST/SGOT: --  Bilirubin Direct: --  Bilirubin Total: --  Ca: 8.9  eGFR : 87  eGFR Non-: 75  Lipase: --  Amylase: --  INR: --  PTT: --        Date/Time:08-31 @ 08:33    ALT/SGPT: 35  Albumin: 2.6  AST/SGOT: 12  Bilirubin Direct: --  Bilirubin Total: 0.4  Ca: 9.0  eGFR : 72  eGFR Non-: 62  Lipase: --  Amylase: --  INR: --  PTT: --        Date/Time:08-30 @ 18:27    ALT/SGPT: 45  Albumin: 2.8  AST/SGOT: 22  Bilirubin Direct: --  Bilirubin Total: 0.5  Ca: 9.8  eGFR : 47  eGFR Non-: 41  Lipase: --  Amylase: --  INR: 1.36  PTT: --            09-02    140  |  109<H>  |  21  ----------------------------<  106<H>  4.1   |  24  |  0.83    Ca    9.1      02 Sep 2017 08:15                              11.4   15.9  )-----------( 239      ( 02 Sep 2017 08:15 )             35.0

## 2017-09-02 NOTE — PROGRESS NOTE ADULT - SUBJECTIVE AND OBJECTIVE BOX
SHEELA GUPTA  MRN: [id number]    S: 89 yo F hx of dementia, RA on chronic prednisone,  O2 dependent COPD,home O2 started since last discharge 8/28/17, glaucoma, HTN, distant TIA, GERD, HLD, osteoporosis, kidney stones, hx of recurrent PNA,hx of r hip fx s/p hip surgery april 2017,  presents to the ED [facility] who was admitted here last week for cough at which time PNA was ruled out and diagnosed with acute c diff colitis and discharged on vanco po.   . daughter reports but the cough has not improved. +green phlegm production. +CP from coughing. +mild CP when pt is not coughing. No fever in ED. +dyspnea, worsening. Daughter notes pt has felt warm but no temperatures taking. +nausea this morning. +weakness.  In ed patient was found to have new LBBB,case was discused with dr rojas by dr grubbs-PCI not indicated ,unlikely ACS,cardiac enzymes negative x3   patient was also found to have abdominal tenderness on my exam generalised   In ED CXR- B/l lower lung infilrates R>left  EKG new LBBB,sinus tach- Patient was given cefepime IV ,IV vanco in ED (31 Aug 2017 07:01)    8/31/2017; 8:45 AM.     Pulmonary consult requested. Events noted; as per admission HPI above. Patient awake; oriented to person and place. Very weak. She recognizes me. She is in no acute distress and is breathing comfortably with nasal cannula oxygen in place. Admits to cough. Does not c/o abdominal pain BUT abdomen is tender to palpation.     9/1: Awake; alert. Sitting up in chair eating breakfast. NO c/o abdominal pain or diarrhea. Has had a productive cough. No chest pain or dyspnea. Daughter at bedside.    9/2: More coughing and wheezing this morning. Feels tired; did not sleep well because of cough.     PAST MEDICAL & SURGICAL HISTORY:  Compression fracture of spine: T7  Alzheimers disease  HTN (hypertension)  Glaucoma  TIA (transient ischemic attack): 8/07  Cerebral vascular accident: 2005  Polymyalgia rheumatica  Osteoporosis  Chronic pain: mid back  Urinary retention  GERD (gastroesophageal reflux disease)  Cholelithiases  Hyperlipemia  Carotid artery stenosis  Lung nodule: biopsied 2003, benign  COPD (chronic obstructive pulmonary disease)  Asthma  History of hip surgery  Gall stones  History of hysterectomy: for bleeding  Hx of cholecystectomy      O: T(C): 36.3 (09-02-17 @ 04:50), Max: 36.5 (09-01-17 @ 20:31)  HR: 88 (09-02-17 @ 04:50) (82 - 93)  BP: 151/77 (09-02-17 @ 04:50) (138/58 - 160/62)  RR: 16 (09-02-17 @ 04:50) (16 - 17)  SpO2: 91% (09-02-17 @ 04:50) (91% - 98%)  Wt(kg): --    PHYSICAL EXAM:      GENERAL: Tired; no distress - coughing    NEURO: awake/alert; oriented x 3.    NECK: no JVD    CHEST: scattered rhonchi    CARDIAC: RRR    ABD: soft; non-tender.    EXT: no edema      LABS:                        11.4   15.9  )-----------( 239      ( 02 Sep 2017 08:15 )             35.0       09-02    140  |  109<H>  |  21  ----------------------------<  106<H>  4.1   |  24  |  0.83    Ca    9.1      02 Sep 2017 08:15        MEDICATIONS  (STANDING):  simvastatin 20 milliGRAM(s) Oral at bedtime  heparin  Injectable 5000 Unit(s) SubCutaneous every 8 hours  sodium chloride 0.9%. 1000 milliLiter(s) (80 mL/Hr) IV Continuous <Continuous>  buDESOnide   0.5 milliGRAM(s) Respule 0.5 milliGRAM(s) Inhalation two times a day  dipyridamole 200 mG/aspirin 25 mG 1 Capsule(s) Oral two times a day  hydroxychloroquine 200 milliGRAM(s) Oral every 12 hours  tiotropium 18 MICROgram(s) Capsule 1 Capsule(s) Inhalation daily  vancomycin    Solution 125 milliGRAM(s) Oral every 6 hours  pantoprazole    Tablet 40 milliGRAM(s) Oral before breakfast  cefepime  IVPB 2000 milliGRAM(s) IV Intermittent every 24 hours  vancomycin  IVPB   IV Intermittent   vancomycin  IVPB 500 milliGRAM(s) IV Intermittent every 12 hours  acetaminophen   Tablet. 500 milliGRAM(s) Oral every 8 hours  multivitamin 1 Tablet(s) Oral daily  Namenda XR 21 milliGRAM(s) 21 milliGRAM(s) Oral at bedtime  calcium carbonate 500 mG (Tums) Chewable 1 Tablet(s) Chew every 24 hours  methylPREDNISolone sodium succinate Injectable 40 milliGRAM(s) IV Push every 6 hours    MEDICATIONS  (PRN):  guaiFENesin    Syrup 200 milliGRAM(s) Oral every 6 hours PRN Cough  ALBUTerol/ipratropium for Nebulization 3 milliLiter(s) Nebulizer every 6 hours PRN Shortness of Breath and/or Wheezing  ALBUTerol/ipratropium for Nebulization 3 milliLiter(s) Nebulizer every 4 hours PRN Shortness of Breath and/or Wheezing  acetaminophen   Tablet. 650 milliGRAM(s) Oral every 8 hours PRN Moderate Pain (4 - 6)        A/P: 1. Bilateral pneumonia. Continue antibiotics per ID for HCAP. O2 supplement prn.    2. COPD. Increased cough and wheezing. Will start IV solumedrol. Continue nebulized bronchodilators/budesonide.      3. C.Diff colitis. Continue treatment per ID. No diarrhea or abdominal pain.     4. Right breast mass - suspicious for malignancy. Patient had been scheduled for outpatient mammo. Further eval per hospitalist service.      5. RA. On Plaquenil and prednisone per rheumatology as an outpatient.  Discontinue prednisone while on IV steroids.     D/W daughter at bedside.     Will continue to follow.

## 2017-09-02 NOTE — PHYSICAL THERAPY INITIAL EVALUATION ADULT - GENERAL OBSERVATIONS, REHAB EVAL
Patient received in bed on 3N, no tele monitor, transfer to med/surg pending.  2L of O2 via nc in use. Isolation for C-diff.  Patient  pleasant and cooperative.

## 2017-09-02 NOTE — PROGRESS NOTE ADULT - ASSESSMENT
Pneumonia  C diff diarrhea    complete po vanco per ID  Pt advised to follow up with her primary Gi Dr. Ferrara within ten days of discharge.

## 2017-09-02 NOTE — PROGRESS NOTE ADULT - SUBJECTIVE AND OBJECTIVE BOX
HPI:  87 yo F hx of Dementia, RA on chronic prednisone,  COPD, glaucoma, HTN, distant TIA, GERD, HLD, osteoporosis, kidney stones, hx of recurrent PNA,hx of r hip fx s/p hip surgery april 2017,  presents to the ED [facility] who was admitted here last week for cough at which time PNA was ruled out and diagnosed with acute c diff colitis and discharged on vanco po.   . daughter reports but the cough has not improved. +green phlegm production. +CP from coughing. +mild CP when pt is not coughing. No fever in ED. +dyspnea, worsening. Daughter notes pt has felt warm but no temperatures taking. +nausea this morning. +weakness.  In ed patient was found to have new LBBB,case was discused with dr rojas by dr grubbs-PCI not indicated ,unlikely ACS,cardiac enzymes negative x3   patient was also found to have abdominal tenderness on my exam generalised   In ED CXR- B/l lower lung infilrates R>left  EKG new LBBB,sinus tach- Patient was given cefepime IV ,IV vanco in ED (31 Aug 2017 07:01)    8/31: Pt seen and examined. Frail and weak appearing. Confused. Poor historian. Complains of cough with yellow sputum. Denies any abdominal pain but very tender to palpation. Awaiting CT abdomen and chest. Will discuss plan with daughter after CT scan results. Denies any chest pain or palpitations. On tele sinus rhythm 70-90s.    9/1: Pt seen and examined. No new complaints. Abdominal pain resolved after bladder decompression. Had BM yesterday. no chest pain or SOB. Much improved. Discussed care with Daughter Danya over the phone. She was requesting a mammogram for breast nodule, explained NO mammogram can be done inpatient. She understands and will follow up as outpatient.     9/2: Pt lying in bed with frequent cough. She states the cough is causing some chest discomfort.  Her daughter at bedside agrees that her cough is relatively new and causing her a lot of distress. Denies fever, chills, N, V.    REVIEW OF SYSTEMS: All other review of systems is negative unless indicated above.    Vital Signs Last 24 Hrs  T(C): 36.2 (02 Sep 2017 12:48), Max: 36.5 (01 Sep 2017 20:31)  T(F): 97.1 (02 Sep 2017 12:48), Max: 97.7 (01 Sep 2017 20:31)  HR: 95 (02 Sep 2017 12:48) (82 - 95)  BP: 118/59 (02 Sep 2017 12:48) (118/59 - 160/62)  BP(mean): --  RR: 17 (02 Sep 2017 12:48) (16 - 17)  SpO2: 94% (02 Sep 2017 12:48) (91% - 98%)    PHYSICAL EXAM:  Constitutional: NAD, weak, frail appearing  HEENT: PERRLA, EOMI, Normal Hearing  Neck: No LAD, No JVD  Back: Normal spine flexure, No CVA tenderness  Cardiovascular: S1 and S2, RRR, no M/G/R  Respiratory: decreased breath sounds b/l, b/l rhonchi  Gastrointestinal: BS+, soft, no tenderness to palpation, no guarding or rebound tenderness  Extremities: No peripheral edema  Vascular: 2+ peripheral pulses  Neurological: A/O x 1, no focal deficits  Psychiatric: Normal mood, normal affect  Skin: No rashes    MEDICATIONS  (STANDING):  simvastatin 20 milliGRAM(s) Oral at bedtime  heparin  Injectable 5000 Unit(s) SubCutaneous every 8 hours  sodium chloride 0.9%. 1000 milliLiter(s) (80 mL/Hr) IV Continuous <Continuous>  buDESOnide   0.5 milliGRAM(s) Respule 0.5 milliGRAM(s) Inhalation two times a day  dipyridamole 200 mG/aspirin 25 mG 1 Capsule(s) Oral two times a day  hydroxychloroquine 200 milliGRAM(s) Oral every 12 hours  tiotropium 18 MICROgram(s) Capsule 1 Capsule(s) Inhalation daily  vancomycin    Solution 125 milliGRAM(s) Oral every 6 hours  pantoprazole    Tablet 40 milliGRAM(s) Oral before breakfast  cefepime  IVPB 2000 milliGRAM(s) IV Intermittent every 24 hours  vancomycin  IVPB   IV Intermittent   vancomycin  IVPB 500 milliGRAM(s) IV Intermittent every 12 hours  acetaminophen   Tablet. 500 milliGRAM(s) Oral every 8 hours  multivitamin 1 Tablet(s) Oral daily  Namenda XR 21 milliGRAM(s) 21 milliGRAM(s) Oral at bedtime  calcium carbonate 500 mG (Tums) Chewable 1 Tablet(s) Chew every 24 hours  methylPREDNISolone sodium succinate Injectable 40 milliGRAM(s) IV Push every 6 hours  benzonatate 100 milliGRAM(s) Oral three times a day    MEDICATIONS  (PRN):  guaiFENesin    Syrup 200 milliGRAM(s) Oral every 6 hours PRN Cough  ALBUTerol/ipratropium for Nebulization 3 milliLiter(s) Nebulizer every 6 hours PRN Shortness of Breath and/or Wheezing  ALBUTerol/ipratropium for Nebulization 3 milliLiter(s) Nebulizer every 4 hours PRN Shortness of Breath and/or Wheezing  acetaminophen   Tablet. 650 milliGRAM(s) Oral every 8 hours PRN Moderate Pain (4 - 6)                              11.4   15.9  )-----------( 239      ( 02 Sep 2017 08:15 )             35.0     09-02    140  |  109<H>  |  21  ----------------------------<  106<H>  4.1   |  24  |  0.83    Ca    9.1      02 Sep 2017 08:15      CAPILLARY BLOOD GLUCOSE          Assessment and Plan:   		  87 yo F hx of Dementia, RA on chronic prednisone,  COPD, glaucoma, HTN, distant TIA, GERD, HLD, osteoporosis, kidney stones, hx of recurrent PNA,hx of r hip fx s/p hip surgery april 2017,  presents to the ED BIBEMS for cough and found to have pneumonia with hospital course complicated by mild COPD exacerbation.    # Multifocal PNA/HCAP  - suspected GNR bacteria  - continue IV abx - vancomycin and cefepime  - pulmonary consult appreciated  - ID consult appreciated  - CT chest noted  - duonebs PRN  - O2 supplementation  - f/u cultures NTD  - PT consult appreciated --> no skilled PT needs.    #COPD with mild exacerbation:  - stable, no exacerbation  - continue spiriva and pulmicort  - pulm consult appreciated - start IV steroids  - anti-tussives    # Diffuse abdominal tenderness - RESOLVED  - due to bladder distention, now s/p leon for bladder decompression  - CT abdomen and pelvis reviewed  - GI consult appreciated    #new LBBB-  likely aberrancy  -continue antiplatelets, statin  -Echo with LVEF of 60%  -cardio consult appreciated    # c. diff  - continue PO vancomycin  - d/c flagyl  - monitor for diarrhea    #RA  - continue Plaquenil  - continue prednisone    #HLD  - continue statin    #GERD  - continue protonix    *distant TIA  - continue antiplatelets and statin    #hx of breast nodule  - increased sixe from previous imaging  - discussed with pt and daughter  - outpatient f/u      #Dvt prophylaxis  - heparin sq      Attending Statement:  40 minutes spent on total encounter; more than 50% of the visit was spent counseling and/or coordinating care by the attending physician.

## 2017-09-03 LAB
ANION GAP SERPL CALC-SCNC: 9 MMOL/L — SIGNIFICANT CHANGE UP (ref 5–17)
BUN SERPL-MCNC: 21 MG/DL — SIGNIFICANT CHANGE UP (ref 7–23)
CALCIUM SERPL-MCNC: 8.9 MG/DL — SIGNIFICANT CHANGE UP (ref 8.5–10.1)
CHLORIDE SERPL-SCNC: 110 MMOL/L — HIGH (ref 96–108)
CO2 SERPL-SCNC: 24 MMOL/L — SIGNIFICANT CHANGE UP (ref 22–31)
CREAT SERPL-MCNC: 0.74 MG/DL — SIGNIFICANT CHANGE UP (ref 0.5–1.3)
GLUCOSE SERPL-MCNC: 144 MG/DL — HIGH (ref 70–99)
HCT VFR BLD CALC: 32 % — LOW (ref 34.5–45)
HGB BLD-MCNC: 10 G/DL — LOW (ref 11.5–15.5)
MCHC RBC-ENTMCNC: 27.1 PG — SIGNIFICANT CHANGE UP (ref 27–34)
MCHC RBC-ENTMCNC: 31.3 GM/DL — LOW (ref 32–36)
MCV RBC AUTO: 86.5 FL — SIGNIFICANT CHANGE UP (ref 80–100)
PLATELET # BLD AUTO: 208 K/UL — SIGNIFICANT CHANGE UP (ref 150–400)
POTASSIUM SERPL-MCNC: 4 MMOL/L — SIGNIFICANT CHANGE UP (ref 3.5–5.3)
POTASSIUM SERPL-SCNC: 4 MMOL/L — SIGNIFICANT CHANGE UP (ref 3.5–5.3)
RBC # BLD: 3.7 M/UL — LOW (ref 3.8–5.2)
RBC # FLD: 15.4 % — HIGH (ref 10.3–14.5)
SODIUM SERPL-SCNC: 143 MMOL/L — SIGNIFICANT CHANGE UP (ref 135–145)
WBC # BLD: 12.4 K/UL — HIGH (ref 3.8–10.5)
WBC # FLD AUTO: 12.4 K/UL — HIGH (ref 3.8–10.5)

## 2017-09-03 RX ADMIN — Medication 200 MILLIGRAM(S): at 05:51

## 2017-09-03 RX ADMIN — Medication 125 MILLIGRAM(S): at 17:41

## 2017-09-03 RX ADMIN — SIMVASTATIN 20 MILLIGRAM(S): 20 TABLET, FILM COATED ORAL at 22:02

## 2017-09-03 RX ADMIN — Medication 40 MILLIGRAM(S): at 05:53

## 2017-09-03 RX ADMIN — Medication 125 MILLIGRAM(S): at 00:08

## 2017-09-03 RX ADMIN — Medication 40 MILLIGRAM(S): at 17:35

## 2017-09-03 RX ADMIN — Medication 125 MILLIGRAM(S): at 12:01

## 2017-09-03 RX ADMIN — HEPARIN SODIUM 5000 UNIT(S): 5000 INJECTION INTRAVENOUS; SUBCUTANEOUS at 22:02

## 2017-09-03 RX ADMIN — Medication 500 MILLIGRAM(S): at 14:08

## 2017-09-03 RX ADMIN — Medication 125 MILLIGRAM(S): at 05:53

## 2017-09-03 RX ADMIN — Medication 0.5 MILLIGRAM(S): at 20:22

## 2017-09-03 RX ADMIN — Medication 100 MILLIGRAM(S): at 05:49

## 2017-09-03 RX ADMIN — Medication 500 MILLIGRAM(S): at 22:30

## 2017-09-03 RX ADMIN — ASPIRIN AND DIPYRIDAMOLE 1 CAPSULE(S): 25; 200 CAPSULE, EXTENDED RELEASE ORAL at 17:35

## 2017-09-03 RX ADMIN — CEFEPIME 100 MILLIGRAM(S): 1 INJECTION, POWDER, FOR SOLUTION INTRAMUSCULAR; INTRAVENOUS at 07:45

## 2017-09-03 RX ADMIN — HEPARIN SODIUM 5000 UNIT(S): 5000 INJECTION INTRAVENOUS; SUBCUTANEOUS at 05:51

## 2017-09-03 RX ADMIN — Medication 100 MILLIGRAM(S): at 14:08

## 2017-09-03 RX ADMIN — Medication 650 MILLIGRAM(S): at 05:49

## 2017-09-03 RX ADMIN — PANTOPRAZOLE SODIUM 40 MILLIGRAM(S): 20 TABLET, DELAYED RELEASE ORAL at 07:45

## 2017-09-03 RX ADMIN — Medication 125 MILLIGRAM(S): at 23:45

## 2017-09-03 RX ADMIN — Medication 500 MILLIGRAM(S): at 22:01

## 2017-09-03 RX ADMIN — Medication 500 MILLIGRAM(S): at 05:55

## 2017-09-03 RX ADMIN — ASPIRIN AND DIPYRIDAMOLE 1 CAPSULE(S): 25; 200 CAPSULE, EXTENDED RELEASE ORAL at 05:49

## 2017-09-03 RX ADMIN — Medication 40 MILLIGRAM(S): at 00:08

## 2017-09-03 RX ADMIN — Medication 40 MILLIGRAM(S): at 12:01

## 2017-09-03 RX ADMIN — Medication 0.5 MILLIGRAM(S): at 09:01

## 2017-09-03 RX ADMIN — Medication 100 MILLIGRAM(S): at 22:01

## 2017-09-03 RX ADMIN — Medication 100 MILLIGRAM(S): at 17:35

## 2017-09-03 RX ADMIN — Medication 1 TABLET(S): at 12:01

## 2017-09-03 RX ADMIN — Medication 3 MILLILITER(S): at 09:01

## 2017-09-03 RX ADMIN — Medication 100 MILLIGRAM(S): at 05:54

## 2017-09-03 RX ADMIN — Medication 1 TABLET(S): at 14:08

## 2017-09-03 RX ADMIN — Medication 200 MILLIGRAM(S): at 17:36

## 2017-09-03 RX ADMIN — HEPARIN SODIUM 5000 UNIT(S): 5000 INJECTION INTRAVENOUS; SUBCUTANEOUS at 14:08

## 2017-09-03 NOTE — PROGRESS NOTE ADULT - SUBJECTIVE AND OBJECTIVE BOX
SHEELA GUPTA  MRN: [id number]    S:  87 yo F hx of dementia, RA on chronic prednisone,  O2 dependent COPD,home O2 started since last discharge 8/28/17, glaucoma, HTN, distant TIA, GERD, HLD, osteoporosis, kidney stones, hx of recurrent PNA,hx of r hip fx s/p hip surgery april 2017,  presents to the ED [facility] who was admitted here last week for cough at which time PNA was ruled out and diagnosed with acute c diff colitis and discharged on vanco po.   . daughter reports but the cough has not improved. +green phlegm production. +CP from coughing. +mild CP when pt is not coughing. No fever in ED. +dyspnea, worsening. Daughter notes pt has felt warm but no temperatures taking. +nausea this morning. +weakness.  In ed patient was found to have new LBBB,case was discused with dr rojas by dr grubbs-PCI not indicated ,unlikely ACS,cardiac enzymes negative x3   patient was also found to have abdominal tenderness on my exam generalised   In ED CXR- B/l lower lung infilrates R>left  EKG new LBBB,sinus tach- Patient was given cefepime IV ,IV vanco in ED (31 Aug 2017 07:01)    8/31/2017; 8:45 AM.     Pulmonary consult requested. Events noted; as per admission HPI above. Patient awake; oriented to person and place. Very weak. She recognizes me. She is in no acute distress and is breathing comfortably with nasal cannula oxygen in place. Admits to cough. Does not c/o abdominal pain BUT abdomen is tender to palpation.     9/1: Awake; alert. Sitting up in chair eating breakfast. NO c/o abdominal pain or diarrhea. Has had a productive cough. No chest pain or dyspnea. Daughter at bedside.    9/2: More coughing and wheezing this morning. Feels tired; did not sleep well because of cough.     9/3: Much better today. Less cough. Sitting up in chair. No c/o cough, chest pain or shortness of breath. Walked in the [place] with PT yesterday.      PAST MEDICAL & SURGICAL HISTORY:  Compression fracture of spine: T7  Alzheimers disease  HTN (hypertension)  Glaucoma  TIA (transient ischemic attack): 8/07  Cerebral vascular accident: 2005  Polymyalgia rheumatica  Osteoporosis  Chronic pain: mid back  Urinary retention  GERD (gastroesophageal reflux disease)  Cholelithiases  Hyperlipemia  Carotid artery stenosis  Lung nodule: biopsied 2003, benign  COPD (chronic obstructive pulmonary disease)  Asthma  History of hip surgery  Gall stones  History of hysterectomy: for bleeding  Hx of cholecystectomy      O: T(C): 36.6 (09-03-17 @ 12:57), Max: 36.8 (09-02-17 @ 17:05)  HR: 85 (09-03-17 @ 12:57) (74 - 88)  BP: 126/57 (09-03-17 @ 12:57) (120/54 - 151/73)  RR: 17 (09-03-17 @ 12:57) (16 - 17)  SpO2: 96% (09-03-17 @ 12:57) (94% - 98%)  Wt(kg): --    PHYSICAL EXAM:      GENERAL: comfortable    NEURO: awake and alert    NECK: no JVD    CHEST: Clear    CARDIAC:    EXT: no edema      LABS:                        10.0   12.4  )-----------( 208      ( 03 Sep 2017 06:23 )             32.0       09-03    143  |  110<H>  |  21  ----------------------------<  144<H>  4.0   |  24  |  0.74    Ca    8.9      03 Sep 2017 06:23    RADIOLOGY:    EXAM:  CT CHEST IC                        PROCEDURE DATE:  08/31/2017          INTERPRETATION:  EXAMINATION DATE: August 31, 2017 at 1132 hours.  CLINICAL INFORMATION: Cough, purulent sputum. Severe COPD.    COMPARISON: CT chest from August 22,2017 and April 12, 2017 and CT   abdomen and pelvis from June 25, 2016.    PROCEDURE:   CT of the Chest, Abdomen and Pelvis was performed with intravenous   contrast.   Intravenous contrast: 90 mL Omnipaque 350. 10 mL discarded.  Oral contrast: positive contrast was administered.  Sagittal and coronal reformats were performed.    FINDINGS:    CHEST:     LUNGS AND LARGE AIRWAYS: Patchy consolidation in both lower lobes,   consistent with pneumonia.  PLEURA: No pleural effusion.  VESSELS: Severe atherosclerotic calcifications.   HEART: Heart size is normal. No pericardial effusion.  MEDIASTINUM AND DOREEN: No lymphadenopathy.  CHEST WALL AND LOWER NECK: Irregular mass in the right breast has   increased in size, measuring 2.6 x 2.5 cm, previously1.9 x 1.6 cm on   April 12, 2017, suspicious for neoplasm.    ABDOMEN AND PELVIS:    LIVER: Within normal limits.  BILE DUCTS: Unchanged prominence of the common bile duct, measuring 1.2   cm.   GALLBLADDER: Status post cholecystectomy.  SPLEEN: Within normal limits.  PANCREAS: Within normal limits.  ADRENALS: Within normal limits.  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     BLADDER: Markedly distended.  REPRODUCTIVE ORGANS: Status post hysterectomy.    BOWEL: Moderate-sized hiatal hernia. No bowel obstruction.     PERITONEUM: No ascites.  VESSELS:  Severe atherosclerotic calcifications with significant   narrowing of the proximal abdominal aorta.  RETROPERITONEUM: No lymphadenopathy.    ABDOMINAL WALL: Within normal limits.  BONES: Multiple old left-sided rib fractures. Orthopedic screws are   present in the right hip.    IMPRESSION:   1.  Bilateral lower lobe pneumonia.  2.  Increased size of irregular mass in the right breast, suspicious for  neoplasm.    IMELDA CHAPMAN           MEDICATIONS  (STANDING):  simvastatin 20 milliGRAM(s) Oral at bedtime  heparin  Injectable 5000 Unit(s) SubCutaneous every 8 hours  sodium chloride 0.9%. 1000 milliLiter(s) (80 mL/Hr) IV Continuous <Continuous>  buDESOnide   0.5 milliGRAM(s) Respule 0.5 milliGRAM(s) Inhalation two times a day  dipyridamole 200 mG/aspirin 25 mG 1 Capsule(s) Oral two times a day  hydroxychloroquine 200 milliGRAM(s) Oral every 12 hours  tiotropium 18 MICROgram(s) Capsule 1 Capsule(s) Inhalation daily  vancomycin    Solution 125 milliGRAM(s) Oral every 6 hours  pantoprazole    Tablet 40 milliGRAM(s) Oral before breakfast  cefepime  IVPB 2000 milliGRAM(s) IV Intermittent every 24 hours  vancomycin  IVPB   IV Intermittent   vancomycin  IVPB 500 milliGRAM(s) IV Intermittent every 12 hours  acetaminophen   Tablet. 500 milliGRAM(s) Oral every 8 hours  multivitamin 1 Tablet(s) Oral daily  Namenda XR 21 milliGRAM(s) 21 milliGRAM(s) Oral at bedtime  calcium carbonate 500 mG (Tums) Chewable 1 Tablet(s) Chew every 24 hours  benzonatate 100 milliGRAM(s) Oral three times a day  methylPREDNISolone sodium succinate Injectable 40 milliGRAM(s) IV Push every 12 hours    MEDICATIONS  (PRN):  guaiFENesin    Syrup 200 milliGRAM(s) Oral every 6 hours PRN Cough  ALBUTerol/ipratropium for Nebulization 3 milliLiter(s) Nebulizer every 6 hours PRN Shortness of Breath and/or Wheezing  ALBUTerol/ipratropium for Nebulization 3 milliLiter(s) Nebulizer every 4 hours PRN Shortness of Breath and/or Wheezing  acetaminophen   Tablet. 650 milliGRAM(s) Oral every 8 hours PRN Moderate Pain (4 - 6)        A/P: 1. Bilateral pneumonia. Continue antibiotics per ID for HCAP. O2 supplement prn. Repeat CXR in AM.     2. COPD. Much improved post treatment with IV steroids; will decrease solumedrol. Continue nebulized bronchodilators/budesonide.      3. C.Diff colitis. Continue treatment per ID. No diarrhea or abdominal pain.     4. Right breast mass - suspicious for malignancy. Patient had been scheduled for outpatient mammo. Further eval per hospitalist service.      5. RA. On Plaquenil and 5 mg prednisone daily per rheumatology as an outpatient.  Resume prednisone when solumedrol is discontinued.     D/W daughter at bedside.     Will continue to follow.

## 2017-09-03 NOTE — PROGRESS NOTE ADULT - SUBJECTIVE AND OBJECTIVE BOX
HPI:  89 yo F hx of Dementia, RA on chronic prednisone,  COPD, glaucoma, HTN, distant TIA, GERD, HLD, osteoporosis, kidney stones, hx of recurrent PNA,hx of r hip fx s/p hip surgery april 2017,  presents to the ED [facility] who was admitted here last week for cough at which time PNA was ruled out and diagnosed with acute c diff colitis and discharged on vanco po.   . daughter reports but the cough has not improved. +green phlegm production. +CP from coughing. +mild CP when pt is not coughing. No fever in ED. +dyspnea, worsening. Daughter notes pt has felt warm but no temperatures taking. +nausea this morning. +weakness.  In ed patient was found to have new LBBB,case was discused with dr rojas by dr grubbs-PCI not indicated ,unlikely ACS,cardiac enzymes negative x3   patient was also found to have abdominal tenderness on my exam generalised   In ED CXR- B/l lower lung infilrates R>left  EKG new LBBB,sinus tach- Patient was given cefepime IV ,IV vanco in ED (31 Aug 2017 07:01)    8/31: Pt seen and examined. Frail and weak appearing. Confused. Poor historian. Complains of cough with yellow sputum. Denies any abdominal pain but very tender to palpation. Awaiting CT abdomen and chest. Will discuss plan with daughter after CT scan results. Denies any chest pain or palpitations. On tele sinus rhythm 70-90s.    9/1: Pt seen and examined. No new complaints. Abdominal pain resolved after bladder decompression. Had BM yesterday. no chest pain or SOB. Much improved. Discussed care with Daughter Danya over the phone. She was requesting a mammogram for breast nodule, explained NO mammogram can be done inpatient. She understands and will follow up as outpatient.     9/2: Pt lying in bed with frequent cough. She states the cough is causing some chest discomfort.  Her daughter at bedside agrees that her cough is relatively new and causing her a lot of distress. Denies fever, chills, N, V.    9/3: Walking in hallways much more comfortable. She is currently not coughing. Denies fever, chills, N, V, CP, SOB.    REVIEW OF SYSTEMS: All other review of systems is negative unless indicated above.    Vital Signs Last 24 Hrs  T(C): 36.6 (03 Sep 2017 12:57), Max: 36.8 (02 Sep 2017 17:05)  T(F): 97.9 (03 Sep 2017 12:57), Max: 98.3 (02 Sep 2017 17:05)  HR: 85 (03 Sep 2017 12:57) (74 - 88)  BP: 126/57 (03 Sep 2017 12:57) (120/54 - 151/73)  BP(mean): --  RR: 17 (03 Sep 2017 12:57) (16 - 17)  SpO2: 96% (03 Sep 2017 12:57) (94% - 98%)    PHYSICAL EXAM:  Constitutional: NAD, weak, frail appearing  HEENT: PERRLA, EOMI, Normal Hearing  Neck: No LAD, No JVD  Back: Normal spine flexure, No CVA tenderness  Cardiovascular: S1 and S2, RRR, no M/G/R  Respiratory: decreased breath sounds b/l, b/l rhonchi  Gastrointestinal: BS+, soft, no tenderness to palpation, no guarding or rebound tenderness  Extremities: No peripheral edema  Vascular: 2+ peripheral pulses  Neurological: A/O x 1, no focal deficits  Psychiatric: Normal mood, normal affect  Skin: No rashes    MEDICATIONS  (STANDING):  simvastatin 20 milliGRAM(s) Oral at bedtime  heparin  Injectable 5000 Unit(s) SubCutaneous every 8 hours  sodium chloride 0.9%. 1000 milliLiter(s) (80 mL/Hr) IV Continuous <Continuous>  buDESOnide   0.5 milliGRAM(s) Respule 0.5 milliGRAM(s) Inhalation two times a day  dipyridamole 200 mG/aspirin 25 mG 1 Capsule(s) Oral two times a day  hydroxychloroquine 200 milliGRAM(s) Oral every 12 hours  tiotropium 18 MICROgram(s) Capsule 1 Capsule(s) Inhalation daily  vancomycin    Solution 125 milliGRAM(s) Oral every 6 hours  pantoprazole    Tablet 40 milliGRAM(s) Oral before breakfast  cefepime  IVPB 2000 milliGRAM(s) IV Intermittent every 24 hours  vancomycin  IVPB   IV Intermittent   vancomycin  IVPB 500 milliGRAM(s) IV Intermittent every 12 hours  acetaminophen   Tablet. 500 milliGRAM(s) Oral every 8 hours  multivitamin 1 Tablet(s) Oral daily  Namenda XR 21 milliGRAM(s) 21 milliGRAM(s) Oral at bedtime  calcium carbonate 500 mG (Tums) Chewable 1 Tablet(s) Chew every 24 hours  benzonatate 100 milliGRAM(s) Oral three times a day  methylPREDNISolone sodium succinate Injectable 40 milliGRAM(s) IV Push every 12 hours    MEDICATIONS  (PRN):  guaiFENesin    Syrup 200 milliGRAM(s) Oral every 6 hours PRN Cough  ALBUTerol/ipratropium for Nebulization 3 milliLiter(s) Nebulizer every 6 hours PRN Shortness of Breath and/or Wheezing  ALBUTerol/ipratropium for Nebulization 3 milliLiter(s) Nebulizer every 4 hours PRN Shortness of Breath and/or Wheezing  acetaminophen   Tablet. 650 milliGRAM(s) Oral every 8 hours PRN Moderate Pain (4 - 6)                              10.0   12.4  )-----------( 208      ( 03 Sep 2017 06:23 )             32.0     09-03    143  |  110<H>  |  21  ----------------------------<  144<H>  4.0   |  24  |  0.74    Ca    8.9      03 Sep 2017 06:23      CAPILLARY BLOOD GLUCOSE        Assessment and Plan:   		  89 yo F hx of Dementia, RA on chronic prednisone,  COPD, glaucoma, HTN, distant TIA, GERD, HLD, osteoporosis, kidney stones, hx of recurrent PNA,hx of r hip fx s/p hip surgery april 2017,  presents to the ED BIBEMS for cough and found to have pneumonia with hospital course complicated by mild COPD exacerbation.    # Multifocal PNA/HCAP  - suspected GNR bacteria  - continue IV abx - vancomycin and cefepime  - pulmonary consult appreciated  - ID consult appreciated  - CT chest noted  - duonebs PRN  - O2 supplementation  - f/u cultures NTD  - PT consult appreciated --> no skilled PT needs.    #COPD with mild exacerbation: improving.  - stable, no exacerbation  - continue spiriva and pulmicort  - pulm consult appreciated - start IV steroids w/ taper  - anti-tussives    # Diffuse abdominal tenderness - RESOLVED  - due to bladder distention, now s/p leon for bladder decompression  - CT abdomen and pelvis reviewed  - GI consult appreciated    #new LBBB-  likely aberrancy  -continue antiplatelets, statin  -Echo with LVEF of 60%  -cardio consult appreciated    # c. diff  - continue PO vancomycin  - d/c flagyl  - monitor for diarrhea    #RA  - continue Plaquenil  - continue prednisone    #HLD  - continue statin    #GERD  - continue protonix    *distant TIA  - continue antiplatelets and statin    #hx of breast nodule  - increased sixe from previous imaging  - discussed with pt and daughter  - outpatient f/u      #Dvt prophylaxis  - heparin sq    dispo: anticipate d/c Tuesday if remains stable.      Attending Statement:  40 minutes spent on total encounter; more than 50% of the visit was spent counseling and/or coordinating care by the attending physician.

## 2017-09-04 LAB
ANION GAP SERPL CALC-SCNC: 7 MMOL/L — SIGNIFICANT CHANGE UP (ref 5–17)
BUN SERPL-MCNC: 22 MG/DL — SIGNIFICANT CHANGE UP (ref 7–23)
CALCIUM SERPL-MCNC: 9 MG/DL — SIGNIFICANT CHANGE UP (ref 8.5–10.1)
CHLORIDE SERPL-SCNC: 110 MMOL/L — HIGH (ref 96–108)
CO2 SERPL-SCNC: 26 MMOL/L — SIGNIFICANT CHANGE UP (ref 22–31)
CREAT SERPL-MCNC: 0.79 MG/DL — SIGNIFICANT CHANGE UP (ref 0.5–1.3)
GLUCOSE SERPL-MCNC: 107 MG/DL — HIGH (ref 70–99)
HCT VFR BLD CALC: 32.4 % — LOW (ref 34.5–45)
HGB BLD-MCNC: 10.4 G/DL — LOW (ref 11.5–15.5)
MCHC RBC-ENTMCNC: 27.6 PG — SIGNIFICANT CHANGE UP (ref 27–34)
MCHC RBC-ENTMCNC: 31.9 GM/DL — LOW (ref 32–36)
MCV RBC AUTO: 86.4 FL — SIGNIFICANT CHANGE UP (ref 80–100)
PLATELET # BLD AUTO: 245 K/UL — SIGNIFICANT CHANGE UP (ref 150–400)
POTASSIUM SERPL-MCNC: 4 MMOL/L — SIGNIFICANT CHANGE UP (ref 3.5–5.3)
POTASSIUM SERPL-SCNC: 4 MMOL/L — SIGNIFICANT CHANGE UP (ref 3.5–5.3)
RBC # BLD: 3.75 M/UL — LOW (ref 3.8–5.2)
RBC # FLD: 15.5 % — HIGH (ref 10.3–14.5)
SODIUM SERPL-SCNC: 143 MMOL/L — SIGNIFICANT CHANGE UP (ref 135–145)
WBC # BLD: 21.4 K/UL — HIGH (ref 3.8–10.5)
WBC # FLD AUTO: 21.4 K/UL — HIGH (ref 3.8–10.5)

## 2017-09-04 PROCEDURE — 71020: CPT | Mod: 26

## 2017-09-04 RX ORDER — TRAZODONE HCL 50 MG
50 TABLET ORAL ONCE
Qty: 0 | Refills: 0 | Status: COMPLETED | OUTPATIENT
Start: 2017-09-04 | End: 2017-09-04

## 2017-09-04 RX ADMIN — ASPIRIN AND DIPYRIDAMOLE 1 CAPSULE(S): 25; 200 CAPSULE, EXTENDED RELEASE ORAL at 17:16

## 2017-09-04 RX ADMIN — Medication 125 MILLIGRAM(S): at 11:24

## 2017-09-04 RX ADMIN — Medication 125 MILLIGRAM(S): at 23:01

## 2017-09-04 RX ADMIN — Medication 200 MILLIGRAM(S): at 17:16

## 2017-09-04 RX ADMIN — Medication 500 MILLIGRAM(S): at 22:55

## 2017-09-04 RX ADMIN — HEPARIN SODIUM 5000 UNIT(S): 5000 INJECTION INTRAVENOUS; SUBCUTANEOUS at 21:21

## 2017-09-04 RX ADMIN — TIOTROPIUM BROMIDE 1 CAPSULE(S): 18 CAPSULE ORAL; RESPIRATORY (INHALATION) at 07:41

## 2017-09-04 RX ADMIN — Medication 0.5 MILLIGRAM(S): at 07:40

## 2017-09-04 RX ADMIN — Medication 50 MILLIGRAM(S): at 21:20

## 2017-09-04 RX ADMIN — Medication 500 MILLIGRAM(S): at 17:45

## 2017-09-04 RX ADMIN — Medication 100 MILLIGRAM(S): at 06:33

## 2017-09-04 RX ADMIN — Medication 200 MILLIGRAM(S): at 06:34

## 2017-09-04 RX ADMIN — Medication 0.5 MILLIGRAM(S): at 21:13

## 2017-09-04 RX ADMIN — Medication 500 MILLIGRAM(S): at 21:21

## 2017-09-04 RX ADMIN — Medication 125 MILLIGRAM(S): at 06:35

## 2017-09-04 RX ADMIN — HEPARIN SODIUM 5000 UNIT(S): 5000 INJECTION INTRAVENOUS; SUBCUTANEOUS at 06:33

## 2017-09-04 RX ADMIN — Medication 1 TABLET(S): at 17:16

## 2017-09-04 RX ADMIN — Medication 125 MILLIGRAM(S): at 17:17

## 2017-09-04 RX ADMIN — Medication 100 MILLIGRAM(S): at 06:34

## 2017-09-04 RX ADMIN — Medication 100 MILLIGRAM(S): at 17:21

## 2017-09-04 RX ADMIN — PANTOPRAZOLE SODIUM 40 MILLIGRAM(S): 20 TABLET, DELAYED RELEASE ORAL at 06:34

## 2017-09-04 RX ADMIN — HEPARIN SODIUM 5000 UNIT(S): 5000 INJECTION INTRAVENOUS; SUBCUTANEOUS at 17:16

## 2017-09-04 RX ADMIN — ASPIRIN AND DIPYRIDAMOLE 1 CAPSULE(S): 25; 200 CAPSULE, EXTENDED RELEASE ORAL at 06:34

## 2017-09-04 RX ADMIN — Medication 40 MILLIGRAM(S): at 06:34

## 2017-09-04 RX ADMIN — Medication 500 MILLIGRAM(S): at 07:01

## 2017-09-04 RX ADMIN — Medication 1 TABLET(S): at 11:24

## 2017-09-04 RX ADMIN — Medication 500 MILLIGRAM(S): at 17:16

## 2017-09-04 RX ADMIN — Medication 500 MILLIGRAM(S): at 06:33

## 2017-09-04 RX ADMIN — CEFEPIME 100 MILLIGRAM(S): 1 INJECTION, POWDER, FOR SOLUTION INTRAMUSCULAR; INTRAVENOUS at 11:21

## 2017-09-04 RX ADMIN — SIMVASTATIN 20 MILLIGRAM(S): 20 TABLET, FILM COATED ORAL at 21:21

## 2017-09-04 NOTE — PHYSICAL THERAPY INITIAL EVALUATION ADULT - DID THE PATIENT HAVE SURGERY?
Pt belongs retrieved includes 2 cell phone and a  (all three of which are placed in lock box).  Also retrieved was a pair of black shoes and red robe. All other items placed in nurses station with pt sticker.   n/a

## 2017-09-04 NOTE — PHYSICAL THERAPY INITIAL EVALUATION ADULT - PERTINENT HX OF CURRENT PROBLEM, REHAB EVAL
89 yo F admitted with c/o chest pain from coughing.  Recent admit with PNA and C-diff. Asked to re-evaluate patient by family member.

## 2017-09-04 NOTE — PROGRESS NOTE ADULT - SUBJECTIVE AND OBJECTIVE BOX
SHEELA GUPTA  MRN: [id number]    S: 89 yo F hx of dementia, RA on chronic prednisone,  O2 dependent COPD,home O2 started since last discharge 8/28/17, glaucoma, HTN, distant TIA, GERD, HLD, osteoporosis, kidney stones, hx of recurrent PNA,hx of r hip fx s/p hip surgery april 2017,  presents to the ED [facility] who was admitted here last week for cough at which time PNA was ruled out and diagnosed with acute c diff colitis and discharged on vanco po.   . daughter reports but the cough has not improved. +green phlegm production. +CP from coughing. +mild CP when pt is not coughing. No fever in ED. +dyspnea, worsening. Daughter notes pt has felt warm but no temperatures taking. +nausea this morning. +weakness.  In ed patient was found to have new LBBB,case was discused with dr rojas by dr grubbs-PCI not indicated ,unlikely ACS,cardiac enzymes negative x3   patient was also found to have abdominal tenderness on my exam generalised   In ED CXR- B/l lower lung infilrates R>left  EKG new LBBB,sinus tach- Patient was given cefepime IV ,IV vanco in ED (31 Aug 2017 07:01)    8/31/2017; 8:45 AM.     Pulmonary consult requested. Events noted; as per admission HPI above. Patient awake; oriented to person and place. Very weak. She recognizes me. She is in no acute distress and is breathing comfortably with nasal cannula oxygen in place. Admits to cough. Does not c/o abdominal pain BUT abdomen is tender to palpation.     9/1: Awake; alert. Sitting up in chair eating breakfast. NO c/o abdominal pain or diarrhea. Has had a productive cough. No chest pain or dyspnea. Daughter at bedside.    9/2: More coughing and wheezing this morning. Feels tired; did not sleep well because of cough.     9/3: Much better today. Less cough. Sitting up in chair. No c/o cough, chest pain or shortness of breath. Walked in the [place] with PT yesterday.       9/4: Awake/alert. Comfortable sitting up in chair. R.A. O2 sat 95%.    PAST MEDICAL & SURGICAL HISTORY:  Compression fracture of spine: T7  Alzheimers disease  HTN (hypertension)  Glaucoma  TIA (transient ischemic attack): 8/07  Cerebral vascular accident: 2005  Polymyalgia rheumatica  Osteoporosis  Chronic pain: mid back  Urinary retention  GERD (gastroesophageal reflux disease)  Cholelithiases  Hyperlipemia  Carotid artery stenosis  Lung nodule: biopsied 2003, benign  COPD (chronic obstructive pulmonary disease)  Asthma  History of hip surgery  Gall stones  History of hysterectomy: for bleeding  Hx of cholecystectomy      O: T(C): 36.3 (09-04-17 @ 05:58), Max: 36.7 (09-03-17 @ 14:37)  HR: 80 (09-04-17 @ 06:43) (67 - 85)  BP: 136/77 (09-04-17 @ 06:43) (109/66 - 147/74)  RR: 16 (09-04-17 @ 05:58) (16 - 18)  SpO2: 95% (09-04-17 @ 05:58) (94% - 99%)  Wt(kg): --    PHYSICAL EXAM:      GENERAL: comfortable    NEURO: awake and alert    NECK: no JVD    CHEST: Clear    CARDIAC:    EXT: no edema    LABS:                       10.4   21.4  )-----------( 245      ( 04 Sep 2017 06:52 )             32.4       09-04    143  |  110<H>  |  22  ----------------------------<  107<H>  4.0   |  26  |  0.79    Ca    9.0      04 Sep 2017 06:52    RADIOLOGY:      EXAM:  CT CHEST IC                        PROCEDURE DATE:  08/31/2017          INTERPRETATION:  EXAMINATION DATE: August 31, 2017 at 1132 hours.  CLINICAL INFORMATION: Cough, purulent sputum. Severe COPD.    COMPARISON: CT chest from August 22,2017 and April 12, 2017 and CT   abdomen and pelvis from June 25, 2016.    PROCEDURE:   CT of the Chest, Abdomen and Pelvis was performed with intravenous   contrast.   Intravenous contrast: 90 mL Omnipaque 350. 10 mL discarded.  Oral contrast: positive contrast was administered.  Sagittal and coronal reformats were performed.    FINDINGS:    CHEST:     LUNGS AND LARGE AIRWAYS: Patchy consolidation in both lower lobes,   consistent with pneumonia.  PLEURA: No pleural effusion.  VESSELS: Severe atherosclerotic calcifications.   HEART: Heart size is normal. No pericardial effusion.  MEDIASTINUM AND DOREEN: No lymphadenopathy.  CHEST WALL AND LOWER NECK: Irregular mass in the right breast has   increased in size, measuring 2.6 x 2.5 cm, previously1.9 x 1.6 cm on   April 12, 2017, suspicious for neoplasm.    ABDOMEN AND PELVIS:    LIVER: Within normal limits.  BILE DUCTS: Unchanged prominence of the common bile duct, measuring 1.2   cm.   GALLBLADDER: Status post cholecystectomy.  SPLEEN: Within normal limits.  PANCREAS: Within normal limits.  ADRENALS: Within normal limits.  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     BLADDER: Markedly distended.  REPRODUCTIVE ORGANS: Status post hysterectomy.    BOWEL: Moderate-sized hiatal hernia. No bowel obstruction.     PERITONEUM: No ascites.  VESSELS:  Severe atherosclerotic calcifications with significant   narrowing of the proximal abdominal aorta.  RETROPERITONEUM: No lymphadenopathy.    ABDOMINAL WALL: Within normal limits.  BONES: Multiple old left-sided rib fractures. Orthopedic screws are   present in the right hip.    IMPRESSION:   1.  Bilateral lower lobe pneumonia.  2.  Increased size of irregular mass in the right breast, suspicious for  neoplasm.    [facility]     MEDICATIONS  (STANDING):  simvastatin 20 milliGRAM(s) Oral at bedtime  heparin  Injectable 5000 Unit(s) SubCutaneous every 8 hours  sodium chloride 0.9%. 1000 milliLiter(s) (80 mL/Hr) IV Continuous <Continuous>  buDESOnide   0.5 milliGRAM(s) Respule 0.5 milliGRAM(s) Inhalation two times a day  dipyridamole 200 mG/aspirin 25 mG 1 Capsule(s) Oral two times a day  hydroxychloroquine 200 milliGRAM(s) Oral every 12 hours  tiotropium 18 MICROgram(s) Capsule 1 Capsule(s) Inhalation daily  vancomycin    Solution 125 milliGRAM(s) Oral every 6 hours  pantoprazole    Tablet 40 milliGRAM(s) Oral before breakfast  cefepime  IVPB 2000 milliGRAM(s) IV Intermittent every 24 hours  vancomycin  IVPB   IV Intermittent   vancomycin  IVPB 500 milliGRAM(s) IV Intermittent every 12 hours  acetaminophen   Tablet. 500 milliGRAM(s) Oral every 8 hours  multivitamin 1 Tablet(s) Oral daily  Namenda XR 21 milliGRAM(s) 21 milliGRAM(s) Oral at bedtime  calcium carbonate 500 mG (Tums) Chewable 1 Tablet(s) Chew every 24 hours    MEDICATIONS  (PRN):  guaiFENesin    Syrup 200 milliGRAM(s) Oral every 6 hours PRN Cough  ALBUTerol/ipratropium for Nebulization 3 milliLiter(s) Nebulizer every 6 hours PRN Shortness of Breath and/or Wheezing  ALBUTerol/ipratropium for Nebulization 3 milliLiter(s) Nebulizer every 4 hours PRN Shortness of Breath and/or Wheezing  acetaminophen   Tablet. 650 milliGRAM(s) Oral every 8 hours PRN Moderate Pain (4 - 6)        A/P: 1. Bilateral pneumonia. Continue antibiotics per ID for HCAP. O2 supplement prn. Repeat CXR ordered for this AM - not yet done. Switch to oral antibiotics per ID.     2. COPD. Much improved post treatment with IV steroids; Will d/c solumedrol and start prednisone 40 mg daily which can be reduced to her usual dose of 5 mg daily (as part of chronic tx for RA per rheumatologist) over the next @ week. Continue nebulized bronchodilators/budesonide.      3. C.Diff colitis. Continue treatment per ID. No diarrhea or abdominal pain.     4. Right breast mass - suspicious for malignancy. Patient had been scheduled for outpatient mammo. Continue outpatient evaluation per PCP.      5. RA. On Plaquenil and 5 mg prednisone daily per rheumatology as an outpatient.     6. Leukocytosis. ? related to high dose steroids.      D/W daughter at bedside.     Will continue to follow.

## 2017-09-04 NOTE — PHYSICAL THERAPY INITIAL EVALUATION ADULT - MANUAL MUSCLE TESTING RESULTS, REHAB EVAL
no strength deficits were identified/excpept R hip flexors 4/5 with mild pain.
no strength deficits were identified/excpept R hip flexors 4/5 with mild pain.

## 2017-09-04 NOTE — PHYSICAL THERAPY INITIAL EVALUATION ADULT - ACTIVE RANGE OF MOTION EXAMINATION, REHAB EVAL
no Active ROM deficits were identified/stiffness in R hip noted
stiffness in R hip noted/no Active ROM deficits were identified

## 2017-09-04 NOTE — PROGRESS NOTE ADULT - SUBJECTIVE AND OBJECTIVE BOX
HPI:  87 yo F hx of Dementia, RA on chronic prednisone,  COPD, glaucoma, HTN, distant TIA, GERD, HLD, osteoporosis, kidney stones, hx of recurrent PNA,hx of r hip fx s/p hip surgery april 2017,  presents to the ED [facility] who was admitted here last week for cough at which time PNA was ruled out and diagnosed with acute c diff colitis and discharged on vanco po.   . daughter reports but the cough has not improved. +green phlegm production. +CP from coughing. +mild CP when pt is not coughing. No fever in ED. +dyspnea, worsening. Daughter notes pt has felt warm but no temperatures taking. +nausea this morning. +weakness.  In ed patient was found to have new LBBB,case was discused with dr rojas by dr grubbs-PCI not indicated ,unlikely ACS,cardiac enzymes negative x3   patient was also found to have abdominal tenderness on my exam generalised   In ED CXR- B/l lower lung infilrates R>left  EKG new LBBB,sinus tach- Patient was given cefepime IV ,IV vanco in ED (31 Aug 2017 07:01)    8/31: Pt seen and examined. Frail and weak appearing. Confused. Poor historian. Complains of cough with yellow sputum. Denies any abdominal pain but very tender to palpation. Awaiting CT abdomen and chest. Will discuss plan with daughter after CT scan results. Denies any chest pain or palpitations. On tele sinus rhythm 70-90s.    9/1: Pt seen and examined. No new complaints. Abdominal pain resolved after bladder decompression. Had BM yesterday. no chest pain or SOB. Much improved. Discussed care with Daughter Danya over the phone. She was requesting a mammogram for breast nodule, explained NO mammogram can be done inpatient. She understands and will follow up as outpatient.     9/2: Pt lying in bed with frequent cough. She states the cough is causing some chest discomfort.  Her daughter at bedside agrees that her cough is relatively new and causing her a lot of distress. Denies fever, chills, N, V.    9/3: Walking in hallways much more comfortable. She is currently not coughing. Denies fever, chills, N, V, CP, SOB.    9/4: Sitting in chair sipping her coffee.  Appears well and at her baseline. No cough, fever, chills, N, V, abd pain, CP, SOB.    REVIEW OF SYSTEMS: All other review of systems is negative unless indicated above.    Vital Signs Last 24 Hrs  T(C): 36.4 (04 Sep 2017 11:49), Max: 36.7 (03 Sep 2017 14:37)  T(F): 97.6 (04 Sep 2017 11:49), Max: 98.1 (03 Sep 2017 15:45)  HR: 73 (04 Sep 2017 11:49) (67 - 85)  BP: 164/63 (04 Sep 2017 11:49) (109/66 - 164/63)  BP(mean): --  RR: 17 (04 Sep 2017 11:49) (16 - 18)  SpO2: 98% (04 Sep 2017 11:49) (94% - 99%)      PHYSICAL EXAM:  Constitutional: NAD, weak, frail appearing  HEENT: PERRLA, EOMI, Normal Hearing  Neck: No LAD, No JVD  Back: Normal spine flexure, No CVA tenderness  Cardiovascular: S1 and S2, RRR, no M/G/R  Respiratory: decreased breath sounds, mild crackles at base.  Gastrointestinal: BS+, soft, no tenderness to palpation, no guarding or rebound tenderness  Extremities: No peripheral edema  Vascular: 2+ peripheral pulses  Neurological: A/O x 1, no focal deficits  Psychiatric: Normal mood, normal affect  Skin: No rashes    MEDICATIONS  (STANDING):  simvastatin 20 milliGRAM(s) Oral at bedtime  heparin  Injectable 5000 Unit(s) SubCutaneous every 8 hours  sodium chloride 0.9%. 1000 milliLiter(s) (80 mL/Hr) IV Continuous <Continuous>  buDESOnide   0.5 milliGRAM(s) Respule 0.5 milliGRAM(s) Inhalation two times a day  dipyridamole 200 mG/aspirin 25 mG 1 Capsule(s) Oral two times a day  hydroxychloroquine 200 milliGRAM(s) Oral every 12 hours  tiotropium 18 MICROgram(s) Capsule 1 Capsule(s) Inhalation daily  vancomycin    Solution 125 milliGRAM(s) Oral every 6 hours  pantoprazole    Tablet 40 milliGRAM(s) Oral before breakfast  cefepime  IVPB 2000 milliGRAM(s) IV Intermittent every 24 hours  vancomycin  IVPB   IV Intermittent   vancomycin  IVPB 500 milliGRAM(s) IV Intermittent every 12 hours  acetaminophen   Tablet. 500 milliGRAM(s) Oral every 8 hours  multivitamin 1 Tablet(s) Oral daily  Namenda XR 21 milliGRAM(s) 21 milliGRAM(s) Oral at bedtime  calcium carbonate 500 mG (Tums) Chewable 1 Tablet(s) Chew every 24 hours    MEDICATIONS  (PRN):  guaiFENesin    Syrup 200 milliGRAM(s) Oral every 6 hours PRN Cough  ALBUTerol/ipratropium for Nebulization 3 milliLiter(s) Nebulizer every 6 hours PRN Shortness of Breath and/or Wheezing  ALBUTerol/ipratropium for Nebulization 3 milliLiter(s) Nebulizer every 4 hours PRN Shortness of Breath and/or Wheezing  acetaminophen   Tablet. 650 milliGRAM(s) Oral every 8 hours PRN Moderate Pain (4 - 6)                              10.4   21.4  )-----------( 245      ( 04 Sep 2017 06:52 )             32.4     09-04    143  |  110<H>  |  22  ----------------------------<  107<H>  4.0   |  26  |  0.79    Ca    9.0      04 Sep 2017 06:52      CAPILLARY BLOOD GLUCOSE  110 (04 Sep 2017 08:39)      Assessment and Plan:   		  87 yo F hx of Dementia, RA on chronic prednisone,  COPD, glaucoma, HTN, distant TIA, GERD, HLD, osteoporosis, kidney stones, hx of recurrent PNA,hx of r hip fx s/p hip surgery april 2017,  presents to the ED BIBEMS for cough and found to have pneumonia with hospital course complicated by mild COPD exacerbation.    # Multifocal PNA/HCAP: RESOLVING.  - suspected GNR bacteria  - continue IV abx - vancomycin and cefepime  - pulmonary consult appreciated  - ID consult appreciated  - CT chest noted  - duonebs PRN  - O2 supplementation  - f/u cultures NTD  - PT consult appreciated --> no skilled PT needs.    #COPD with mild exacerbation: improving.  - stable, no exacerbation  - continue spiriva and pulmicort  - pulm consult appreciated - IV steroids w/ taper  - anti-tussives    # Diffuse abdominal tenderness - RESOLVED  - due to bladder distention, now s/p leon for bladder decompression  - CT abdomen and pelvis reviewed  - GI consult appreciated    #new LBBB-  likely aberrancy  -continue antiplatelets, statin  -Echo with LVEF of 60%  -cardio consult appreciated    # c. diff  - continue PO vancomycin  - d/c flagyl  - monitor for diarrhea    #RA  - continue Plaquenil  - continue prednisone    #HLD  - continue statin    #GERD  - continue protonix    *distant TIA  - continue antiplatelets and statin    #hx of breast nodule  - increased sixe from previous imaging  - discussed with pt and daughter  - outpatient f/u      #Dvt prophylaxis  - heparin sq    dispo: anticipate d/c tomorrow if remains stable.      Attending Statement:  40 minutes spent on total encounter; more than 50% of the visit was spent counseling and/or coordinating care by the attending physician.

## 2017-09-04 NOTE — PHYSICAL THERAPY INITIAL EVALUATION ADULT - CRITERIA FOR SKILLED THERAPEUTIC INTERVENTIONS
Patient independent in functional mobility with RW-PLOF. no skilled need in this setting.  May ambulate per nursing.  Will d/c from PT. RN informed.

## 2017-09-04 NOTE — PHYSICAL THERAPY INITIAL EVALUATION ADULT - ADDITIONAL COMMENTS
s/p IMN R hip in April 2017, s/p SUE then returned to [place].  Daughter Diana involved with patient. Patient reported participating in exercises at [facility].
s/p IMN R hip in April 2017, s/p SUE then returned to [place].  Daughter Diana involved with patient. Patient reported participating in exercises at [facility].

## 2017-09-04 NOTE — PHYSICAL THERAPY INITIAL EVALUATION ADULT - MODALITIES TREATMENT COMMENTS
Patient left out of bed in chair with chair alarm active and call bell in reach.
Patient left out of bed in chair with O2 replaced, call bell in reach and chair alarm in use. Patient independent in functional mobility w/ RW, no skilled need in this setting.  May ambulate per nursing.  Will d/c from PT. RN informed.

## 2017-09-05 ENCOUNTER — TRANSCRIPTION ENCOUNTER (OUTPATIENT)
Age: 82
End: 2017-09-05

## 2017-09-05 VITALS
TEMPERATURE: 98 F | DIASTOLIC BLOOD PRESSURE: 78 MMHG | HEART RATE: 60 BPM | SYSTOLIC BLOOD PRESSURE: 146 MMHG | OXYGEN SATURATION: 97 % | RESPIRATION RATE: 17 BRPM

## 2017-09-05 LAB
ANION GAP SERPL CALC-SCNC: 8 MMOL/L — SIGNIFICANT CHANGE UP (ref 5–17)
BUN SERPL-MCNC: 22 MG/DL — SIGNIFICANT CHANGE UP (ref 7–23)
CALCIUM SERPL-MCNC: 9.1 MG/DL — SIGNIFICANT CHANGE UP (ref 8.5–10.1)
CHLORIDE SERPL-SCNC: 109 MMOL/L — HIGH (ref 96–108)
CO2 SERPL-SCNC: 26 MMOL/L — SIGNIFICANT CHANGE UP (ref 22–31)
CREAT SERPL-MCNC: 0.87 MG/DL — SIGNIFICANT CHANGE UP (ref 0.5–1.3)
CULTURE RESULTS: SIGNIFICANT CHANGE UP
CULTURE RESULTS: SIGNIFICANT CHANGE UP
GLUCOSE SERPL-MCNC: 98 MG/DL — SIGNIFICANT CHANGE UP (ref 70–99)
HCT VFR BLD CALC: 35 % — SIGNIFICANT CHANGE UP (ref 34.5–45)
HGB BLD-MCNC: 11.1 G/DL — LOW (ref 11.5–15.5)
MCHC RBC-ENTMCNC: 28 PG — SIGNIFICANT CHANGE UP (ref 27–34)
MCHC RBC-ENTMCNC: 31.7 GM/DL — LOW (ref 32–36)
MCV RBC AUTO: 88.2 FL — SIGNIFICANT CHANGE UP (ref 80–100)
PLATELET # BLD AUTO: 278 K/UL — SIGNIFICANT CHANGE UP (ref 150–400)
POTASSIUM SERPL-MCNC: 3.9 MMOL/L — SIGNIFICANT CHANGE UP (ref 3.5–5.3)
POTASSIUM SERPL-SCNC: 3.9 MMOL/L — SIGNIFICANT CHANGE UP (ref 3.5–5.3)
RBC # BLD: 3.97 M/UL — SIGNIFICANT CHANGE UP (ref 3.8–5.2)
RBC # FLD: 16.2 % — HIGH (ref 10.3–14.5)
SODIUM SERPL-SCNC: 143 MMOL/L — SIGNIFICANT CHANGE UP (ref 135–145)
SPECIMEN SOURCE: SIGNIFICANT CHANGE UP
SPECIMEN SOURCE: SIGNIFICANT CHANGE UP
WBC # BLD: 18.2 K/UL — HIGH (ref 3.8–10.5)
WBC # FLD AUTO: 18.2 K/UL — HIGH (ref 3.8–10.5)

## 2017-09-05 RX ORDER — MEMANTINE HYDROCHLORIDE 10 MG/1
1 TABLET ORAL
Qty: 0 | Refills: 0 | COMMUNITY

## 2017-09-05 RX ORDER — FOLIC ACID 0.8 MG
1 TABLET ORAL
Qty: 0 | Refills: 0 | COMMUNITY

## 2017-09-05 RX ORDER — ASCORBIC ACID 60 MG
1 TABLET,CHEWABLE ORAL
Qty: 0 | Refills: 0 | COMMUNITY

## 2017-09-05 RX ORDER — TRAZODONE HCL 50 MG
1 TABLET ORAL
Qty: 0 | Refills: 0 | COMMUNITY

## 2017-09-05 RX ORDER — FERROUS SULFATE 325(65) MG
1 TABLET ORAL
Qty: 0 | Refills: 0 | COMMUNITY

## 2017-09-05 RX ORDER — TRAZODONE HCL 50 MG
1 TABLET ORAL
Qty: 12 | Refills: 0 | OUTPATIENT
Start: 2017-09-05

## 2017-09-05 RX ORDER — DOCUSATE SODIUM 100 MG
2 CAPSULE ORAL
Qty: 0 | Refills: 0 | COMMUNITY

## 2017-09-05 RX ADMIN — Medication 100 MILLIGRAM(S): at 05:42

## 2017-09-05 RX ADMIN — TIOTROPIUM BROMIDE 1 CAPSULE(S): 18 CAPSULE ORAL; RESPIRATORY (INHALATION) at 07:56

## 2017-09-05 RX ADMIN — Medication 40 MILLIGRAM(S): at 11:08

## 2017-09-05 RX ADMIN — HEPARIN SODIUM 5000 UNIT(S): 5000 INJECTION INTRAVENOUS; SUBCUTANEOUS at 14:21

## 2017-09-05 RX ADMIN — CEFEPIME 100 MILLIGRAM(S): 1 INJECTION, POWDER, FOR SOLUTION INTRAMUSCULAR; INTRAVENOUS at 10:02

## 2017-09-05 RX ADMIN — ASPIRIN AND DIPYRIDAMOLE 1 CAPSULE(S): 25; 200 CAPSULE, EXTENDED RELEASE ORAL at 05:43

## 2017-09-05 RX ADMIN — HEPARIN SODIUM 5000 UNIT(S): 5000 INJECTION INTRAVENOUS; SUBCUTANEOUS at 05:43

## 2017-09-05 RX ADMIN — Medication 500 MILLIGRAM(S): at 05:42

## 2017-09-05 RX ADMIN — PANTOPRAZOLE SODIUM 40 MILLIGRAM(S): 20 TABLET, DELAYED RELEASE ORAL at 05:45

## 2017-09-05 RX ADMIN — Medication 1 TABLET(S): at 11:08

## 2017-09-05 RX ADMIN — Medication 200 MILLIGRAM(S): at 05:43

## 2017-09-05 RX ADMIN — Medication 125 MILLIGRAM(S): at 05:44

## 2017-09-05 RX ADMIN — Medication 0.5 MILLIGRAM(S): at 07:55

## 2017-09-05 RX ADMIN — Medication 40 MILLIGRAM(S): at 05:44

## 2017-09-05 RX ADMIN — Medication 500 MILLIGRAM(S): at 14:21

## 2017-09-05 RX ADMIN — Medication 125 MILLIGRAM(S): at 11:08

## 2017-09-05 RX ADMIN — Medication 1 TABLET(S): at 14:21

## 2017-09-05 NOTE — DISCHARGE NOTE ADULT - MEDICATION SUMMARY - MEDICATIONS TO STOP TAKING
I will STOP taking the medications listed below when I get home from the hospital:    budesonide 0.5 mg/2 mL inhalation suspension  --  inhaled    cyclobenzaprine 5 mg oral tablet  -- 1 tab(s) by mouth every 8 hours, As needed, Muscle Spasm    guaiFENesin 100 mg/5 mL oral liquid  -- 10 milliliter(s) by mouth every 6 hours, As needed, Cough

## 2017-09-05 NOTE — DISCHARGE NOTE ADULT - HOSPITAL COURSE
Progress Note Adult-Hospitalist Attending [Charted Location: HNT [id number]] [Authored: 04-Sep-2017 11:57] - for Visit: [id number], Complete, Entered, Signed in Full, General        · Subjective and Objective: 	  HPI:  89 yo F hx of Dementia, RA on chronic prednisone,  COPD, glaucoma, HTN, distant TIA, GERD, HLD, osteoporosis, kidney stones, hx of recurrent PNA,hx of r hip fx s/p hip surgery april 2017,  presents to the ED VANI who was admitted here last week for cough at which time PNA was ruled out and diagnosed with acute c diff colitis and discharged on vanco po.   . daughter reports but the cough has not improved. +green phlegm production. +CP from coughing. +mild CP when pt is not coughing. No fever in ED. +dyspnea, worsening. Daughter notes pt has felt warm but no temperatures taking. +nausea this morning. +weakness.  In ed patient was found to have new LBBB,case was discused with dr rojas by dr grubbs-PCI not indicated ,unlikely ACS,cardiac enzymes negative x3   patient was also found to have abdominal tenderness on my exam generalised   In ED CXR- B/l lower lung infilrates R>left  EKG new LBBB,sinus tach- Patient was given cefepime IV ,IV vanco in ED (31 Aug 2017 07:01)    8/31: Pt seen and examined. Frail and weak appearing. Confused. Poor historian. Complains of cough with yellow sputum. Denies any abdominal pain but very tender to palpation. Awaiting CT abdomen and chest. Will discuss plan with daughter after CT scan results. Denies any chest pain or palpitations. On tele sinus rhythm 70-90s.    9/1: Pt seen and examined. No new complaints. Abdominal pain resolved after bladder decompression. Had BM yesterday. no chest pain or SOB. Much improved. Discussed care with Daughter Danya over the phone. She was requesting a mammogram for breast nodule, explained NO mammogram can be done inpatient. She understands and will follow up as outpatient.     9/2: Pt lying in bed with frequent cough. She states the cough is causing some chest discomfort.  Her daughter at bedside agrees that her cough is relatively new and causing her a lot of distress. Denies fever, chills, N, V.  9/3: Walking in hallways much more comfortable. She is currently not coughing. Denies fever, chills, N, V, CP, SOB.  9/4: Sitting in chair sipping her coffee.  Appears well and at her baseline. No cough, fever, chills, N, V, abd pain, CP, SOB.  9/5: Status quo. HD stable. Feeling well. At baseline. To go to [place]. She was treated for a pneumonia and COPD exacerbation with IV abx and steroid taper. She was continued on oral vanco for cdiff infection for which she will complete after 14 days. She is improved, and doing well.    REVIEW OF SYSTEMS: All other review of systems is negative unless indicated above.    Vital Signs Last 24 Hrs  T(C): 36.6 (05 Sep 2017 11:14), Max: 36.7 (05 Sep 2017 04:38)  T(F): 97.8 (05 Sep 2017 11:14), Max: 98 (05 Sep 2017 04:38)  HR: 60 (05 Sep 2017 11:14) (60 - 91)  BP: 146/78 (05 Sep 2017 11:14) (138/65 - 146/78)  RR: 17 (05 Sep 2017 11:14) (17 - 18)  SpO2: 97% (05 Sep 2017 11:14) (91% - 99%)    PHYSICAL EXAM:  Constitutional: NAD, weak, frail appearing  HEENT: PERRLA, EOMI, Normal Hearing  Neck: No LAD, No JVD  Back: Normal spine flexure, No CVA tenderness  Cardiovascular: S1 and S2, RRR, no M/G/R  Respiratory: decreased breath sounds, mild crackles at base.  Gastrointestinal: BS+, soft, no tenderness to palpation, no guarding or rebound tenderness  Extremities: No peripheral edema  Vascular: 2+ peripheral pulses  Neurological: A/O x 1, no focal deficits  Psychiatric: Normal mood, normal affect  Skin: No rashes    meds/labs: reviewed.      Assessment and Plan:   		  89 yo F hx of Dementia, RA on chronic prednisone,  COPD, glaucoma, HTN, distant TIA, GERD, HLD, osteoporosis, kidney stones, hx of recurrent PNA,hx of r hip fx s/p hip surgery april 2017,  presents to the ED BIBEMS for cough and found to have pneumonia with hospital course complicated by mild COPD exacerbation.    Multifocal PNA/HCAP: RESOLVING.  - suspected GNR bacteria s/p vanco and cefepime.- f/u cultures NTD    COPD with mild exacerbation: improving.- continue spiriva and oral prednisone taper as outpatient.    bladder distension - RESOLVED s/p leon.    LBBB-  likely aberrancy -continue antiplatelets, statin -Echo with LVEF of 60% -cardio consult appreciated to continue med management.    c. diff - continue PO vancomycin for 14 more days until 9/19.    RA - continue Plaquenil and prednisone    HLD - continue statin    GERD - continue protonix    distant TIA - continue antiplatelets and statin    hx of breast nodule - outpatient f/u        Attending Statement:  40 minutes spent on total encounter; more than 50% of the visit was spent counseling and/or coordinating care by the attending physician.

## 2017-09-05 NOTE — DISCHARGE NOTE ADULT - MEDICATION SUMMARY - MEDICATIONS TO TAKE
I will START or STAY ON the medications listed below when I get home from the hospital:    predniSONE 10 mg oral tablet  -- 4 tab(s) by mouth once a day then 2 tabs daily x 2 days then 1 tab daily x 2 days then back to 5mg daily  -- Indication: For Copd exacerbation    acetaminophen 500 mg oral tablet  -- 1 tab(s) by mouth every 8 hours, As Needed  -- Indication: For pain    simvastatin 20 mg oral tablet  -- 1 tab(s) by mouth once a day (at bedtime)  -- Indication: For Hyperlipidemia    hydroxychloroquine 200 mg oral tablet  -- 1 tab(s) by mouth every 12 hours  -- Indication: For rheumatoid arthritis    Aggrenox 25 mg-200 mg oral capsule, extended release  -- 1 cap(s) by mouth 2 times a day  -- Indication: For secondary prevention    Spiriva 18 mcg inhalation capsule  -- 1 cap(s) inhaled once a day  -- Indication: For Copd    vancomycin 125 mg oral capsule  -- 1 cap(s) by mouth every 6 hours for 14 days to end 9/19/17.  -- Finish all this medication unless otherwise directed by prescriber.    -- Indication: For C. difficile colitis    Namenda XR 21 mg oral capsule, extended release  -- 1 cap(s) by mouth once a day (at bedtime)  -- Indication: For dementia    esomeprazole 40 mg oral delayed release capsule  -- 1 cap(s) by mouth once a day  -- Indication: For gerd    Calcium 600+D oral tablet  -- 1 tab(s) by mouth once a day  -- Indication: For Calcium supplements    Multiple Vitamins oral tablet  -- 1 tab(s) by mouth once a day  -- Indication: For vitamins I will START or STAY ON the medications listed below when I get home from the hospital:    predniSONE 10 mg oral tablet  -- 4 tab(s) by mouth once a day then 2 tabs daily x 2 days then 1 tab daily x 2 days then back to 5mg daily  -- Indication: For Copd exacerbation    acetaminophen 500 mg oral tablet  -- 1 tab(s) by mouth every 8 hours, As Needed  -- Indication: For pain    traZODone 50 mg oral tablet  -- 1 tab(s) by mouth once a day (at bedtime), As Needed  -- Indication: For insomnia    simvastatin 20 mg oral tablet  -- 1 tab(s) by mouth once a day (at bedtime)  -- Indication: For Hyperlipidemia    hydroxychloroquine 200 mg oral tablet  -- 1 tab(s) by mouth every 12 hours  -- Indication: For rheumatoid arthritis    Aggrenox 25 mg-200 mg oral capsule, extended release  -- 1 cap(s) by mouth 2 times a day  -- Indication: For secondary prevention    Spiriva 18 mcg inhalation capsule  -- 1 cap(s) inhaled once a day  -- Indication: For Copd    vancomycin 125 mg oral capsule  -- 1 cap(s) by mouth every 6 hours for 14 days to end 9/19/17.  -- Finish all this medication unless otherwise directed by prescriber.    -- Indication: For C. difficile colitis    Namenda XR 21 mg oral capsule, extended release  -- 1 cap(s) by mouth once a day (at bedtime)  -- Indication: For dementia    esomeprazole 40 mg oral delayed release capsule  -- 1 cap(s) by mouth once a day  -- Indication: For gerd    Calcium 600+D oral tablet  -- 1 tab(s) by mouth once a day  -- Indication: For Calcium supplements    Multiple Vitamins oral tablet  -- 1 tab(s) by mouth once a day  -- Indication: For vitamins

## 2017-09-05 NOTE — DISCHARGE NOTE ADULT - CARE PROVIDERS DIRECT ADDRESSES
,[email or web address],[email or web address]

## 2017-09-05 NOTE — PROGRESS NOTE ADULT - SUBJECTIVE AND OBJECTIVE BOX
SHEELA GUPTA  MRN: [id number]    S: 87 yo F hx of dementia, RA on chronic prednisone,  O2 dependent COPD,home O2 started since last discharge 8/28/17, glaucoma, HTN, distant TIA, GERD, HLD, osteoporosis, kidney stones, hx of recurrent PNA,hx of r hip fx s/p hip surgery april 2017,  presents to the ED VANI who was admitted here last week for cough at which time PNA was ruled out and diagnosed with acute c diff colitis and discharged on vanco po.   . daughter reports but the cough has not improved. +green phlegm production. +CP from coughing. +mild CP when pt is not coughing. No fever in ED. +dyspnea, worsening. Daughter notes pt has felt warm but no temperatures taking. +nausea this morning. +weakness.  In ed patient was found to have new LBBB,case was discused with dr rojas by dr grubbs-PCI not indicated ,unlikely ACS,cardiac enzymes negative x3   patient was also found to have abdominal tenderness on my exam generalised   In ED CXR- B/l lower lung infilrates R>left  EKG new LBBB,sinus tach- Patient was given cefepime IV ,IV vanco in ED (31 Aug 2017 07:01)    8/31/2017; 8:45 AM.     Pulmonary consult requested. Events noted; as per admission HPI above. Patient awake; oriented to person and place. Very weak. She recognizes me. She is in no acute distress and is breathing comfortably with nasal cannula oxygen in place. Admits to cough. Does not c/o abdominal pain BUT abdomen is tender to palpation.     9/1: Awake; alert. Sitting up in chair eating breakfast. NO c/o abdominal pain or diarrhea. Has had a productive cough. No chest pain or dyspnea. Daughter at bedside.    9/2: More coughing and wheezing this morning. Feels tired; did not sleep well because of cough.     9/3: Much better today. Less cough. Sitting up in chair. No c/o cough, chest pain or shortness of breath. Walked in the [place] with PT yesterday.       9/4: Awake/alert. Comfortable sitting up in chair. R.A. O2 sat 95%.    9/5: No complaints. Denies cough or shortness of breath. No diarrhea. Awake and alert.     PAST MEDICAL & SURGICAL HISTORY:  Compression fracture of spine: T7  Alzheimers disease  HTN (hypertension)  Glaucoma  TIA (transient ischemic attack): 8/07  Cerebral vascular accident: 2005  Polymyalgia rheumatica  Osteoporosis  Chronic pain: mid back  Urinary retention  GERD (gastroesophageal reflux disease)  Cholelithiases  Hyperlipemia  Carotid artery stenosis  Lung nodule: biopsied 2003, benign  COPD (chronic obstructive pulmonary disease)  Asthma  History of hip surgery  Gall stones  History of hysterectomy: for bleeding  Hx of cholecystectomy      O: T(C): 36.7 (09-05-17 @ 04:38), Max: 36.7 (09-05-17 @ 04:38)  HR: 68 (09-05-17 @ 07:55) (68 - 91)  BP: 138/65 (09-05-17 @ 04:38) (138/65 - 164/63)  RR: 18 (09-05-17 @ 04:38) (17 - 18)  SpO2: 91% (09-05-17 @ 04:38) (91% - 99%)  Wt(kg): --    PHYSICAL EXAM:      GENERAL: comfortable    NEURO: awake and alert    NECK: no JVD    CHEST: Clear    CARDIAC:    EXT: no edema    LABS:                         11.1   18.2  )-----------( 278      ( 05 Sep 2017 07:16 )             35.0       09-05    143  |  109<H>  |  22  ----------------------------<  98  3.9   |  26  |  0.87    Ca    9.1      05 Sep 2017 07:16        MEDICATIONS  (STANDING):  simvastatin 20 milliGRAM(s) Oral at bedtime  heparin  Injectable 5000 Unit(s) SubCutaneous every 8 hours  sodium chloride 0.9%. 1000 milliLiter(s) (80 mL/Hr) IV Continuous <Continuous>  buDESOnide   0.5 milliGRAM(s) Respule 0.5 milliGRAM(s) Inhalation two times a day  dipyridamole 200 mG/aspirin 25 mG 1 Capsule(s) Oral two times a day  hydroxychloroquine 200 milliGRAM(s) Oral every 12 hours  tiotropium 18 MICROgram(s) Capsule 1 Capsule(s) Inhalation daily  vancomycin    Solution 125 milliGRAM(s) Oral every 6 hours  pantoprazole    Tablet 40 milliGRAM(s) Oral before breakfast  cefepime  IVPB 2000 milliGRAM(s) IV Intermittent every 24 hours  vancomycin  IVPB   IV Intermittent   vancomycin  IVPB 500 milliGRAM(s) IV Intermittent every 12 hours  acetaminophen   Tablet. 500 milliGRAM(s) Oral every 8 hours  multivitamin 1 Tablet(s) Oral daily  Namenda XR 21 milliGRAM(s) 21 milliGRAM(s) Oral at bedtime  calcium carbonate 500 mG (Tums) Chewable 1 Tablet(s) Chew every 24 hours  methylPREDNISolone sodium succinate Injectable 40 milliGRAM(s) IV Push daily    MEDICATIONS  (PRN):  guaiFENesin    Syrup 200 milliGRAM(s) Oral every 6 hours PRN Cough  ALBUTerol/ipratropium for Nebulization 3 milliLiter(s) Nebulizer every 6 hours PRN Shortness of Breath and/or Wheezing  ALBUTerol/ipratropium for Nebulization 3 milliLiter(s) Nebulizer every 4 hours PRN Shortness of Breath and/or Wheezing  acetaminophen   Tablet. 650 milliGRAM(s) Oral every 8 hours PRN Moderate Pain (4 - 6)        A/P: 1. Bilateral pneumonia. Continue antibiotics per ID for HCAP. O2 supplement prn. CXR improved. Switch to oral antibiotics per ID.     2. COPD. Much improved post treatment with IV steroids; Will d/c solumedrol and start prednisone 40 mg daily which can be reduced to her usual dose of 5 mg daily (as part of chronic tx for RA per rheumatologist) over the next @ week. Continue nebulized bronchodilators/budesonide.      3. C.Diff colitis. Continue treatment per ID. No diarrhea or abdominal pain.     4. Right breast mass - suspicious for malignancy. Patient had been scheduled for outpatient mammo. Continue outpatient evaluation per PCP.      5. RA. On Plaquenil and 5 mg prednisone daily per rheumatology as an outpatient.     6. Leukocytosis. ? related to high dose steroids.      D/W daughter at bedside.     Will continue to follow.

## 2017-09-05 NOTE — PROGRESS NOTE ADULT - ASSESSMENT
89 y/o Female with h/o dementia, RA on chronic prednisone, O2 dependent COPD, glaucoma, HTN, distant TIA, GERD, HLD, osteoporosis, kidney stones, hx of recurrent PNA, right hip fx s/p hip surgery, recent hospitalization for PNA and C diff colitis, treated with abx then discharged on vanco po was admitted on 8/31 for persistent weakness. Daughter reports persistent cough +green phlegm production. +CP from coughing. In ED, she was noted with dyspnea; no fever. The patient was found to have new LBBB and abdominal tenderness. She received cefepime IV , IV vanco, metronidazole IV and vancomycin PO.    1. Multifocal pneumonia resolved. Recent CDAD partially treated. COPD. Immunocompromised host.  -leukocytosis may be related to steroids  -f/u BC x 2, sputum c/s  -no clinical evidence of toxic megacolon  -on vancomycin 500 mg IV q12h, cefepime 2 gm IV qd # 7 and vancomycin 125 mg PO q6h  -tolerating abx well so far; no side effects noted  -d/c vanco IV and cefepime  -continue vancomycin PO for 14 days  -monitor temps  -f/u CBC  -supportive care  2. Other issues:   -care per medicine

## 2017-09-05 NOTE — DISCHARGE NOTE ADULT - PATIENT PORTAL LINK FT
“You can access the Follow[employer] Patient Portal, offered by [facility], by registering with the following website: [email or web address]”

## 2017-09-05 NOTE — DISCHARGE NOTE ADULT - CARE PLAN
Principal Discharge DX:	HCAP (healthcare-associated pneumonia)  Goal:	You completed an antibiotic course.  Instructions for follow-up, activity and diet:	follow up with your pcp.  Secondary Diagnosis:	C. difficile colitis  Goal:	continue oral vanco for 14 more days (until 9/19).  Secondary Diagnosis:	Alzheimers disease  Goal:	Supportive care.  Secondary Diagnosis:	Chronic obstructive pulmonary disease, unspecified COPD type  Goal:	Steroid taper as prescribed.  Instructions for follow-up, activity and diet:	Follow up with your lung doctor.

## 2017-09-05 NOTE — PROGRESS NOTE ADULT - SUBJECTIVE AND OBJECTIVE BOX
Patient is a 88y old  Female who presents with a chief complaint of SOB (31 Aug 2017 07:01)    HPI:  89 y/o Female with h/o dementia, RA on chronic prednisone, O2 dependent COPD, glaucoma, HTN, distant TIA, GERD, HLD, osteoporosis, kidney stones, hx of recurrent PNA, right hip fx s/p hip surgery, recent hospitalization for PNA and C diff colitis, treated with abx then discharged on vanco po was admitted on  for persistent weakness. Daughter reports persistent cough +green phlegm production. +CP from coughing. InED, she was noted with dyspnea; no fever. The patient was found to have new LBBB and abdominal tenderness. She received cefepime IV , IV vanco, metronidazole IV and vancomycin PO.     Weak looking  No SOB at rest; dry cough    MEDICATIONS  (STANDING):  simvastatin 20 milliGRAM(s) Oral at bedtime  heparin  Injectable 5000 Unit(s) SubCutaneous every 8 hours  sodium chloride 0.9%. 1000 milliLiter(s) (80 mL/Hr) IV Continuous <Continuous>  buDESOnide   0.5 milliGRAM(s) Respule 0.5 milliGRAM(s) Inhalation two times a day  predniSONE   Tablet 5 milliGRAM(s) Oral daily  dipyridamole 200 mG/aspirin 25 mG 1 Capsule(s) Oral two times a day  hydroxychloroquine 200 milliGRAM(s) Oral every 12 hours  tiotropium 18 MICROgram(s) Capsule 1 Capsule(s) Inhalation daily  vancomycin    Solution 125 milliGRAM(s) Oral every 6 hours  pantoprazole    Tablet 40 milliGRAM(s) Oral before breakfast  cefepime  IVPB 2000 milliGRAM(s) IV Intermittent every 24 hours  vancomycin  IVPB   IV Intermittent   vancomycin  IVPB 500 milliGRAM(s) IV Intermittent every 12 hours  acetaminophen   Tablet. 500 milliGRAM(s) Oral every 8 hours  multivitamin 1 Tablet(s) Oral daily  Namenda XR 21 milliGRAM(s) 21 milliGRAM(s) Oral at bedtime  calcium carbonate 500 mG (Tums) Chewable 1 Tablet(s) Chew every 24 hours    MEDICATIONS  (PRN):  guaiFENesin    Syrup 200 milliGRAM(s) Oral every 6 hours PRN Cough  ALBUTerol/ipratropium for Nebulization 3 milliLiter(s) Nebulizer every 6 hours PRN Shortness of Breath and/or Wheezing  ALBUTerol/ipratropium for Nebulization 3 milliLiter(s) Nebulizer every 4 hours PRN Shortness of Breath and/or Wheezing      Vital Signs Last 24 Hrs  T(C): 36.4 (31 Aug 2017 20:36), Max: 36.7 (31 Aug 2017 18:16)  T(F): 97.6 (31 Aug 2017 20:36), Max: 98 (31 Aug 2017 18:16)  HR: 79 (01 Sep 2017 10:00) (72 - 79)  BP: 120/53 (01 Sep 2017 10:00) (110/49 - 120/53)  BP(mean): --  RR: 19 (01 Sep 2017 10:00) (19 - 20)  SpO2: 93% (01 Sep 2017 10:00) (93% - 99%)    Physical Exam:        Constitutional: frail looking  HEENT: NC/AT, EOMI, PERRLA  Neck: supple  Back: no tenderness  Respiratory: crackles at bases  Cardiovascular: S1S2 regular, no murmurs  Abdomen: soft, not tender, not distended, positive BS  Genitourinary: deferred  Rectal: deferred  Musculoskeletal: no muscle tenderness, no joint swelling or tenderness  Extremities: no pedal edema  Neurological: confused, moving all extremities  Skin: no rashes    MEDICATIONS  (STANDING):  simvastatin 20 milliGRAM(s) Oral at bedtime  heparin  Injectable 5000 Unit(s) SubCutaneous every 8 hours  sodium chloride 0.9%. 1000 milliLiter(s) (80 mL/Hr) IV Continuous <Continuous>  buDESOnide   0.5 milliGRAM(s) Respule 0.5 milliGRAM(s) Inhalation two times a day  predniSONE   Tablet 5 milliGRAM(s) Oral daily  dipyridamole 200 mG/aspirin 25 mG 1 Capsule(s) Oral two times a day  hydroxychloroquine 200 milliGRAM(s) Oral every 12 hours  tiotropium 18 MICROgram(s) Capsule 1 Capsule(s) Inhalation daily  vancomycin    Solution 125 milliGRAM(s) Oral every 6 hours  pantoprazole    Tablet 40 milliGRAM(s) Oral before breakfast  cefepime  IVPB 2000 milliGRAM(s) IV Intermittent every 24 hours  vancomycin  IVPB   IV Intermittent   vancomycin  IVPB 500 milliGRAM(s) IV Intermittent every 12 hours  acetaminophen   Tablet. 500 milliGRAM(s) Oral every 8 hours  multivitamin 1 Tablet(s) Oral daily  Namenda XR 21 milliGRAM(s) 21 milliGRAM(s) Oral at bedtime  calcium carbonate 500 mG (Tums) Chewable 1 Tablet(s) Chew every 24 hours    MEDICATIONS  (PRN):  guaiFENesin    Syrup 200 milliGRAM(s) Oral every 6 hours PRN Cough  ALBUTerol/ipratropium for Nebulization 3 milliLiter(s) Nebulizer every 6 hours PRN Shortness of Breath and/or Wheezing  ALBUTerol/ipratropium for Nebulization 3 milliLiter(s) Nebulizer every 4 hours PRN Shortness of Breath and/or Wheezing      Vital Signs Last 24 Hrs  T(C): 36.4 (31 Aug 2017 20:36), Max: 36.7 (31 Aug 2017 18:16)  T(F): 97.6 (31 Aug 2017 20:36), Max: 98 (31 Aug 2017 18:16)  HR: 79 (01 Sep 2017 10:00) (72 - 79)  BP: 120/53 (01 Sep 2017 10:00) (110/49 - 120/53)  BP(mean): --  RR: 19 (01 Sep 2017 10:00) (19 - 20)  SpO2: 93% (01 Sep 2017 10:00) (93% - 99%)    Constitutional: frail looking  HEENT: NC/AT, EOMI, PERRLA  Neck: supple  Back: no tenderness  Respiratory: clear  Cardiovascular: S1S2 regular, no murmurs  Abdomen: soft, not tender, not distended, positive BS  Genitourinary: deferred  Rectal: deferred  Musculoskeletal: no muscle tenderness, no joint swelling or tenderness  Extremities: no pedal edema  Neurological: AxOx3, moving all extremities, no focal deficits  Skin: no rashes    Labs:                        11.1   18.2  )-----------( 278      ( 05 Sep 2017 07:16 )             35.0     09-05    143  |  109<H>  |  22  ----------------------------<  98  3.9   |  26  |  0.87    Ca    9.1      05 Sep 2017 07:16             Cultures:     CBC Full  -  ( 01 Sep 2017 06:26 )  WBC Count : 13.5 K/uL  Hemoglobin : 9.9 g/dL  Hematocrit : 30.6 %  Platelet Count - Automated : 227 K/uL  Mean Cell Volume : 87.8 fl  Mean Cell Hemoglobin : 28.2 pg  Mean Cell Hemoglobin Concentration : 32.2 gm/dL  Auto Neutrophil # : x  Auto Lymphocyte # : x  Auto Monocyte # : x  Auto Eosinophil # : x  Auto Basophil # : x  Auto Neutrophil % : x  Auto Lymphocyte % : x  Auto Monocyte % : x  Auto Eosinophil % : x  Auto Basophil % : x        143  |  111<H>  |  23  ----------------------------<  89  3.9   |  27  |  0.72    Ca    8.9      01 Sep 2017 06:26  Phos  3.9     08-31  Mg     2.1     08-31    TPro  5.7<L>  /  Alb  2.6<L>  /  TBili  0.4  /  DBili  x   /  AST  12<L>  /  ALT  35  /  AlkPhos  67  08-31    Vancomycin levels:   Cultures:                                 10.6   15.6  )-----------( 227      ( 31 Aug 2017 08:33 )             32.8     08-31    141  |  107  |  29<H>  ----------------------------<  128<H>  4.2   |  24  |  0.84    Ca    9.0      31 Aug 2017 08:33  Phos  3.9     08-31  Mg     2.1     08-31    TPro  5.7<L>  /  Alb  2.6<L>  /  TBili  0.4  /  DBili  x   /  AST  12<L>  /  ALT  35  /  AlkPhos  67  08-31     LIVER FUNCTIONS - ( 31 Aug 2017 08:33 )  Alb: 2.6 g/dL / Pro: 5.7 gm/dL / ALK PHOS: 67 U/L / ALT: 35 U/L / AST: 12 U/L / GGT: x           Urinalysis Basic - ( 30 Aug 2017 20:00 )    Color: Yellow / Appearance: Clear / S.020 / pH: x  Gluc: x / Ketone: Small  / Bili: Negative / Urobili: Negative mg/dL   Blood: x / Protein: 15 mg/dL / Nitrite: Negative   Leuk Esterase: Trace / RBC: 0-2 /HPF / WBC 3-5   Sq Epi: x / Non Sq Epi: Few / Bacteria: Moderate    Culture - Urine (17 @ 20:00)    Specimen Source: .Urine Catheterized    Culture Results:   No growth    Culture - Blood (17 @ 19:47)    Specimen Source: .Blood Blood-Peripheral    Culture Results:   No growth to date.          Radiology:    < from: Xray Chest 1 View AP/PA. (17 @ 19:49) >  Findings are likely indicative of multifocal pneumonia for which clinical   correlation is recommended.    < end of copied text >    < from: CT Chest w/ IV Cont (17 @ 11:42) >  1.  Bilateral lower lobe pneumonia.  2.  Increased size of irregular mass in the right breast, suspicious for  neoplasm.    < end of copied text >      Advanced directives addressed: full resuscitation

## 2017-09-05 NOTE — DISCHARGE NOTE ADULT - SECONDARY DIAGNOSIS.
C. difficile colitis Alzheimers disease Chronic obstructive pulmonary disease, unspecified COPD type

## 2017-09-05 NOTE — DISCHARGE NOTE ADULT - CARE PROVIDER_API CALL
Flavio Otero), Family Medicine  [address] [place] [address]  Phone: [phone]  Fax: [phone]    Saul Guardado), Internal Medicine; Pulmonary Disease; Sleep Medicine  [address] [place] [address]  Phone: [phone]  Fax: [phone]

## 2017-09-05 NOTE — DISCHARGE NOTE ADULT - PLAN OF CARE
You completed an antibiotic course. follow up with your pcp. continue oral vanco for 14 more days (until 9/19). Supportive care. Steroid taper as prescribed. Follow up with your lung doctor.

## 2017-09-11 DIAGNOSIS — I10 ESSENTIAL (PRIMARY) HYPERTENSION: ICD-10-CM

## 2017-09-11 DIAGNOSIS — F02.80 DEMENTIA IN OTHER DISEASES CLASSIFIED ELSEWHERE, UNSPECIFIED SEVERITY, WITHOUT BEHAVIORAL DISTURBANCE, PSYCHOTIC DISTURBANCE, MOOD DISTURBANCE, AND ANXIETY: ICD-10-CM

## 2017-09-11 DIAGNOSIS — I24.8 OTHER FORMS OF ACUTE ISCHEMIC HEART DISEASE: ICD-10-CM

## 2017-09-11 DIAGNOSIS — A04.7 ENTEROCOLITIS DUE TO CLOSTRIDIUM DIFFICILE: ICD-10-CM

## 2017-09-11 DIAGNOSIS — Z99.81 DEPENDENCE ON SUPPLEMENTAL OXYGEN: ICD-10-CM

## 2017-09-11 DIAGNOSIS — J18.9 PNEUMONIA, UNSPECIFIED ORGANISM: ICD-10-CM

## 2017-09-11 DIAGNOSIS — Z86.73 PERSONAL HISTORY OF TRANSIENT ISCHEMIC ATTACK (TIA), AND CEREBRAL INFARCTION WITHOUT RESIDUAL DEFICITS: ICD-10-CM

## 2017-09-11 DIAGNOSIS — M81.0 AGE-RELATED OSTEOPOROSIS WITHOUT CURRENT PATHOLOGICAL FRACTURE: ICD-10-CM

## 2017-09-11 DIAGNOSIS — N32.89 OTHER SPECIFIED DISORDERS OF BLADDER: ICD-10-CM

## 2017-09-11 DIAGNOSIS — J15.6 PNEUMONIA DUE TO OTHER GRAM-NEGATIVE BACTERIA: ICD-10-CM

## 2017-09-11 DIAGNOSIS — J44.1 CHRONIC OBSTRUCTIVE PULMONARY DISEASE WITH (ACUTE) EXACERBATION: ICD-10-CM

## 2017-09-11 DIAGNOSIS — N63 UNSPECIFIED LUMP IN BREAST: ICD-10-CM

## 2017-09-11 DIAGNOSIS — K21.9 GASTRO-ESOPHAGEAL REFLUX DISEASE WITHOUT ESOPHAGITIS: ICD-10-CM

## 2017-09-11 DIAGNOSIS — Z90.710 ACQUIRED ABSENCE OF BOTH CERVIX AND UTERUS: ICD-10-CM

## 2017-09-11 DIAGNOSIS — I44.7 LEFT BUNDLE-BRANCH BLOCK, UNSPECIFIED: ICD-10-CM

## 2017-09-11 DIAGNOSIS — D45 POLYCYTHEMIA VERA: ICD-10-CM

## 2017-09-11 DIAGNOSIS — H40.9 UNSPECIFIED GLAUCOMA: ICD-10-CM

## 2017-09-11 DIAGNOSIS — M06.9 RHEUMATOID ARTHRITIS, UNSPECIFIED: ICD-10-CM

## 2017-09-11 DIAGNOSIS — E78.5 HYPERLIPIDEMIA, UNSPECIFIED: ICD-10-CM

## 2017-09-11 DIAGNOSIS — Z87.891 PERSONAL HISTORY OF NICOTINE DEPENDENCE: ICD-10-CM

## 2017-09-11 DIAGNOSIS — J44.0 CHRONIC OBSTRUCTIVE PULMONARY DISEASE WITH (ACUTE) LOWER RESPIRATORY INFECTION: ICD-10-CM

## 2017-09-11 DIAGNOSIS — G30.9 ALZHEIMER'S DISEASE, UNSPECIFIED: ICD-10-CM

## 2017-11-18 NOTE — DISCHARGE NOTE ADULT - CARE PROVIDER_API CALL
- - -
Flavio Otero), Family Medicine  [address] [place] [address]  Phone: [phone]  Fax: [phone]    Beena Gale), Internal Medicine; Rheumatology  [address] [place] [address]  Phone: [phone]  Fax: [phone]    Saul Guardado), Critical Care Medicine; Internal Medicine; Pulmonary Disease; Sleep Medicine  [address] [place] [address]  Phone: [phone]  Fax: [phone]

## 2017-12-26 ENCOUNTER — LABORATORY RESULT (OUTPATIENT)
Age: 82
End: 2017-12-26

## 2017-12-26 ENCOUNTER — APPOINTMENT (OUTPATIENT)
Dept: BREAST CENTER | Facility: CLINIC | Age: 82
End: 2017-12-26
Payer: MEDICARE

## 2017-12-26 VITALS
BODY MASS INDEX: 23.55 KG/M2 | DIASTOLIC BLOOD PRESSURE: 82 MMHG | HEIGHT: 62 IN | WEIGHT: 128 LBS | HEART RATE: 68 BPM | SYSTOLIC BLOOD PRESSURE: 144 MMHG

## 2017-12-26 DIAGNOSIS — F15.90 OTHER STIMULANT USE, UNSPECIFIED, UNCOMPLICATED: ICD-10-CM

## 2017-12-26 PROCEDURE — 99205 OFFICE O/P NEW HI 60 MIN: CPT | Mod: 25

## 2017-12-26 PROCEDURE — 19083 BX BREAST 1ST LESION US IMAG: CPT

## 2018-01-01 ENCOUNTER — TRANSCRIPTION ENCOUNTER (OUTPATIENT)
Age: 83
End: 2018-01-01

## 2018-01-01 ENCOUNTER — LABORATORY RESULT (OUTPATIENT)
Age: 83
End: 2018-01-01

## 2018-01-01 ENCOUNTER — APPOINTMENT (OUTPATIENT)
Dept: NUCLEAR MEDICINE | Facility: CLINIC | Age: 83
End: 2018-01-01
Payer: MEDICARE

## 2018-01-01 ENCOUNTER — MEDICATION RENEWAL (OUTPATIENT)
Age: 83
End: 2018-01-01

## 2018-01-01 ENCOUNTER — INPATIENT (INPATIENT)
Facility: HOSPITAL | Age: 83
LOS: 3 days | Discharge: NURSING, REHAB OR LONG-TERM CARE | End: 2018-10-04
Attending: FAMILY MEDICINE | Admitting: FAMILY MEDICINE
Payer: COMMERCIAL

## 2018-01-01 ENCOUNTER — RX RENEWAL (OUTPATIENT)
Age: 83
End: 2018-01-01

## 2018-01-01 ENCOUNTER — OUTPATIENT (OUTPATIENT)
Dept: OUTPATIENT SERVICES | Facility: HOSPITAL | Age: 83
LOS: 1 days | End: 2018-01-01
Payer: MEDICARE

## 2018-01-01 ENCOUNTER — APPOINTMENT (OUTPATIENT)
Dept: NEUROLOGY | Facility: CLINIC | Age: 83
End: 2018-01-01
Payer: MEDICARE

## 2018-01-01 ENCOUNTER — APPOINTMENT (OUTPATIENT)
Dept: HEMATOLOGY ONCOLOGY | Facility: CLINIC | Age: 83
End: 2018-01-01
Payer: MEDICARE

## 2018-01-01 ENCOUNTER — FORM ENCOUNTER (OUTPATIENT)
Age: 83
End: 2018-01-01

## 2018-01-01 ENCOUNTER — APPOINTMENT (OUTPATIENT)
Dept: HEMATOLOGY ONCOLOGY | Facility: CLINIC | Age: 83
End: 2018-01-01

## 2018-01-01 VITALS
OXYGEN SATURATION: 99 % | RESPIRATION RATE: 17 BRPM | TEMPERATURE: 98 F | DIASTOLIC BLOOD PRESSURE: 52 MMHG | HEART RATE: 86 BPM | SYSTOLIC BLOOD PRESSURE: 107 MMHG

## 2018-01-01 VITALS
HEART RATE: 80 BPM | HEIGHT: 62 IN | BODY MASS INDEX: 25.03 KG/M2 | SYSTOLIC BLOOD PRESSURE: 120 MMHG | WEIGHT: 136 LBS | DIASTOLIC BLOOD PRESSURE: 66 MMHG | TEMPERATURE: 98.1 F

## 2018-01-01 VITALS
HEIGHT: 62 IN | SYSTOLIC BLOOD PRESSURE: 96 MMHG | BODY MASS INDEX: 23.37 KG/M2 | HEART RATE: 80 BPM | WEIGHT: 127 LBS | DIASTOLIC BLOOD PRESSURE: 66 MMHG

## 2018-01-01 VITALS
OXYGEN SATURATION: 97 % | HEART RATE: 78 BPM | RESPIRATION RATE: 20 BRPM | SYSTOLIC BLOOD PRESSURE: 120 MMHG | DIASTOLIC BLOOD PRESSURE: 68 MMHG | WEIGHT: 199.96 LBS | HEIGHT: 66 IN

## 2018-01-01 VITALS
HEART RATE: 76 BPM | SYSTOLIC BLOOD PRESSURE: 110 MMHG | HEIGHT: 62 IN | DIASTOLIC BLOOD PRESSURE: 60 MMHG | WEIGHT: 127 LBS | BODY MASS INDEX: 23.37 KG/M2

## 2018-01-01 DIAGNOSIS — N81.10 CYSTOCELE, UNSPECIFIED: ICD-10-CM

## 2018-01-01 DIAGNOSIS — R25.1 TREMOR, UNSPECIFIED: ICD-10-CM

## 2018-01-01 DIAGNOSIS — I65.29 OCCLUSION AND STENOSIS OF UNSPECIFIED CAROTID ARTERY: ICD-10-CM

## 2018-01-01 DIAGNOSIS — G89.29 OTHER CHRONIC PAIN: ICD-10-CM

## 2018-01-01 DIAGNOSIS — G93.41 METABOLIC ENCEPHALOPATHY: ICD-10-CM

## 2018-01-01 DIAGNOSIS — C50.919 MALIGNANT NEOPLASM OF UNSPECIFIED SITE OF UNSPECIFIED FEMALE BREAST: ICD-10-CM

## 2018-01-01 DIAGNOSIS — F02.80 DEMENTIA IN OTHER DISEASES CLASSIFIED ELSEWHERE, UNSPECIFIED SEVERITY, WITHOUT BEHAVIORAL DISTURBANCE, PSYCHOTIC DISTURBANCE, MOOD DISTURBANCE, AND ANXIETY: ICD-10-CM

## 2018-01-01 DIAGNOSIS — Z98.890 OTHER SPECIFIED POSTPROCEDURAL STATES: Chronic | ICD-10-CM

## 2018-01-01 DIAGNOSIS — Z66 DO NOT RESUSCITATE: ICD-10-CM

## 2018-01-01 DIAGNOSIS — R33.9 RETENTION OF URINE, UNSPECIFIED: ICD-10-CM

## 2018-01-01 DIAGNOSIS — J45.909 UNSPECIFIED ASTHMA, UNCOMPLICATED: ICD-10-CM

## 2018-01-01 DIAGNOSIS — G62.9 POLYNEUROPATHY, UNSPECIFIED: ICD-10-CM

## 2018-01-01 DIAGNOSIS — R19.7 DIARRHEA, UNSPECIFIED: ICD-10-CM

## 2018-01-01 DIAGNOSIS — M54.9 DORSALGIA, UNSPECIFIED: ICD-10-CM

## 2018-01-01 DIAGNOSIS — Z51.5 ENCOUNTER FOR PALLIATIVE CARE: ICD-10-CM

## 2018-01-01 DIAGNOSIS — Z86.73 PERSONAL HISTORY OF TRANSIENT ISCHEMIC ATTACK (TIA), AND CEREBRAL INFARCTION WITHOUT RESIDUAL DEFICITS: ICD-10-CM

## 2018-01-01 DIAGNOSIS — I65.22 OCCLUSION AND STENOSIS OF LEFT CAROTID ARTERY: ICD-10-CM

## 2018-01-01 DIAGNOSIS — R40.0 SOMNOLENCE: ICD-10-CM

## 2018-01-01 DIAGNOSIS — D72.829 ELEVATED WHITE BLOOD CELL COUNT, UNSPECIFIED: ICD-10-CM

## 2018-01-01 DIAGNOSIS — M35.3 POLYMYALGIA RHEUMATICA: ICD-10-CM

## 2018-01-01 DIAGNOSIS — R55 SYNCOPE AND COLLAPSE: ICD-10-CM

## 2018-01-01 DIAGNOSIS — K21.9 GASTRO-ESOPHAGEAL REFLUX DISEASE WITHOUT ESOPHAGITIS: ICD-10-CM

## 2018-01-01 DIAGNOSIS — M81.0 AGE-RELATED OSTEOPOROSIS WITHOUT CURRENT PATHOLOGICAL FRACTURE: ICD-10-CM

## 2018-01-01 DIAGNOSIS — G45.9 TRANSIENT CEREBRAL ISCHEMIC ATTACK, UNSPECIFIED: ICD-10-CM

## 2018-01-01 DIAGNOSIS — R41.82 ALTERED MENTAL STATUS, UNSPECIFIED: ICD-10-CM

## 2018-01-01 DIAGNOSIS — R05 COUGH: ICD-10-CM

## 2018-01-01 DIAGNOSIS — F03.90 UNSPECIFIED DEMENTIA WITHOUT BEHAVIORAL DISTURBANCE: ICD-10-CM

## 2018-01-01 DIAGNOSIS — R09.02 HYPOXEMIA: ICD-10-CM

## 2018-01-01 DIAGNOSIS — H40.9 UNSPECIFIED GLAUCOMA: ICD-10-CM

## 2018-01-01 DIAGNOSIS — Z90.49 ACQUIRED ABSENCE OF OTHER SPECIFIED PARTS OF DIGESTIVE TRACT: ICD-10-CM

## 2018-01-01 DIAGNOSIS — I10 ESSENTIAL (PRIMARY) HYPERTENSION: ICD-10-CM

## 2018-01-01 DIAGNOSIS — S22.000A WEDGE COMPRESSION FRACTURE OF UNSPECIFIED THORACIC VERTEBRA, INITIAL ENCOUNTER FOR CLOSED FRACTURE: ICD-10-CM

## 2018-01-01 DIAGNOSIS — J44.9 CHRONIC OBSTRUCTIVE PULMONARY DISEASE, UNSPECIFIED: ICD-10-CM

## 2018-01-01 DIAGNOSIS — G30.9 ALZHEIMER'S DISEASE, UNSPECIFIED: ICD-10-CM

## 2018-01-01 DIAGNOSIS — M06.9 RHEUMATOID ARTHRITIS, UNSPECIFIED: ICD-10-CM

## 2018-01-01 DIAGNOSIS — E86.0 DEHYDRATION: ICD-10-CM

## 2018-01-01 DIAGNOSIS — M25.551 PAIN IN RIGHT HIP: ICD-10-CM

## 2018-01-01 DIAGNOSIS — D64.9 ANEMIA, UNSPECIFIED: ICD-10-CM

## 2018-01-01 DIAGNOSIS — E78.5 HYPERLIPIDEMIA, UNSPECIFIED: ICD-10-CM

## 2018-01-01 DIAGNOSIS — Z90.710 ACQUIRED ABSENCE OF BOTH CERVIX AND UTERUS: ICD-10-CM

## 2018-01-01 DIAGNOSIS — Z92.21 PERSONAL HISTORY OF ANTINEOPLASTIC CHEMOTHERAPY: ICD-10-CM

## 2018-01-01 DIAGNOSIS — F03.90 UNSPECIFIED DEMENTIA W/OUT BEHAVIORAL DISTURBANCE: ICD-10-CM

## 2018-01-01 DIAGNOSIS — A04.72 ENTEROCOLITIS DUE TO CLOSTRIDIUM DIFFICILE, NOT SPECIFIED AS RECURRENT: ICD-10-CM

## 2018-01-01 DIAGNOSIS — R26.9 UNSPECIFIED ABNORMALITIES OF GAIT AND MOBILITY: ICD-10-CM

## 2018-01-01 LAB
ALBUMIN SERPL ELPH-MCNC: 3.1 G/DL — LOW (ref 3.3–5)
ALBUMIN SERPL ELPH-MCNC: 3.4 G/DL — SIGNIFICANT CHANGE UP (ref 3.3–5)
ALP SERPL-CCNC: 48 U/L — SIGNIFICANT CHANGE UP (ref 40–120)
ALP SERPL-CCNC: 54 U/L — SIGNIFICANT CHANGE UP (ref 40–120)
ALT FLD-CCNC: 20 U/L — SIGNIFICANT CHANGE UP (ref 12–78)
ALT FLD-CCNC: 22 U/L — SIGNIFICANT CHANGE UP (ref 12–78)
ANION GAP SERPL CALC-SCNC: 7 MMOL/L — SIGNIFICANT CHANGE UP (ref 5–17)
ANION GAP SERPL CALC-SCNC: 8 MMOL/L — SIGNIFICANT CHANGE UP (ref 5–17)
APPEARANCE UR: CLEAR — SIGNIFICANT CHANGE UP
APTT BLD: 29.2 SEC — SIGNIFICANT CHANGE UP (ref 27.5–37.4)
AST SERPL-CCNC: 18 U/L — SIGNIFICANT CHANGE UP (ref 15–37)
AST SERPL-CCNC: 24 U/L — SIGNIFICANT CHANGE UP (ref 15–37)
BACTERIA # UR AUTO: NEGATIVE — SIGNIFICANT CHANGE UP
BASOPHILS # BLD AUTO: 0.03 K/UL — SIGNIFICANT CHANGE UP (ref 0–0.2)
BASOPHILS # BLD AUTO: 0.05 K/UL — SIGNIFICANT CHANGE UP (ref 0–0.2)
BASOPHILS NFR BLD AUTO: 0.3 % — SIGNIFICANT CHANGE UP (ref 0–2)
BASOPHILS NFR BLD AUTO: 0.6 % — SIGNIFICANT CHANGE UP (ref 0–2)
BILIRUB SERPL-MCNC: 0.4 MG/DL — SIGNIFICANT CHANGE UP (ref 0.2–1.2)
BILIRUB SERPL-MCNC: 0.4 MG/DL — SIGNIFICANT CHANGE UP (ref 0.2–1.2)
BILIRUB UR-MCNC: NEGATIVE — SIGNIFICANT CHANGE UP
BUN SERPL-MCNC: 16 MG/DL — SIGNIFICANT CHANGE UP (ref 7–23)
BUN SERPL-MCNC: 16 MG/DL — SIGNIFICANT CHANGE UP (ref 7–23)
BUN SERPL-MCNC: 17 MG/DL — SIGNIFICANT CHANGE UP (ref 7–23)
BUN SERPL-MCNC: 17 MG/DL — SIGNIFICANT CHANGE UP (ref 7–23)
C DIFF BY PCR RESULT: SIGNIFICANT CHANGE UP
C DIFF TOX GENS STL QL NAA+PROBE: SIGNIFICANT CHANGE UP
CALCIUM SERPL-MCNC: 8.8 MG/DL — SIGNIFICANT CHANGE UP (ref 8.5–10.1)
CALCIUM SERPL-MCNC: 8.9 MG/DL — SIGNIFICANT CHANGE UP (ref 8.5–10.1)
CALCIUM SERPL-MCNC: 8.9 MG/DL — SIGNIFICANT CHANGE UP (ref 8.5–10.1)
CALCIUM SERPL-MCNC: 9 MG/DL — SIGNIFICANT CHANGE UP (ref 8.5–10.1)
CHLORIDE SERPL-SCNC: 104 MMOL/L — SIGNIFICANT CHANGE UP (ref 96–108)
CHLORIDE SERPL-SCNC: 106 MMOL/L — SIGNIFICANT CHANGE UP (ref 96–108)
CHLORIDE SERPL-SCNC: 110 MMOL/L — HIGH (ref 96–108)
CHLORIDE SERPL-SCNC: 112 MMOL/L — HIGH (ref 96–108)
CO2 SERPL-SCNC: 24 MMOL/L — SIGNIFICANT CHANGE UP (ref 22–31)
CO2 SERPL-SCNC: 25 MMOL/L — SIGNIFICANT CHANGE UP (ref 22–31)
CO2 SERPL-SCNC: 26 MMOL/L — SIGNIFICANT CHANGE UP (ref 22–31)
CO2 SERPL-SCNC: 26 MMOL/L — SIGNIFICANT CHANGE UP (ref 22–31)
COLOR SPEC: YELLOW — SIGNIFICANT CHANGE UP
COMMENT - URINE: SIGNIFICANT CHANGE UP
CREAT SERPL-MCNC: 0.73 MG/DL — SIGNIFICANT CHANGE UP (ref 0.5–1.3)
CREAT SERPL-MCNC: 0.75 MG/DL — SIGNIFICANT CHANGE UP (ref 0.5–1.3)
CREAT SERPL-MCNC: 0.82 MG/DL — SIGNIFICANT CHANGE UP (ref 0.5–1.3)
CREAT SERPL-MCNC: 0.89 MG/DL — SIGNIFICANT CHANGE UP (ref 0.5–1.3)
DIFF PNL FLD: NEGATIVE — SIGNIFICANT CHANGE UP
EOSINOPHIL # BLD AUTO: 0.05 K/UL — SIGNIFICANT CHANGE UP (ref 0–0.5)
EOSINOPHIL # BLD AUTO: 0.11 K/UL — SIGNIFICANT CHANGE UP (ref 0–0.5)
EOSINOPHIL NFR BLD AUTO: 0.5 % — SIGNIFICANT CHANGE UP (ref 0–6)
EOSINOPHIL NFR BLD AUTO: 1.3 % — SIGNIFICANT CHANGE UP (ref 0–6)
EPI CELLS # UR: SIGNIFICANT CHANGE UP
GLUCOSE SERPL-MCNC: 100 MG/DL — HIGH (ref 70–99)
GLUCOSE SERPL-MCNC: 100 MG/DL — HIGH (ref 70–99)
GLUCOSE SERPL-MCNC: 104 MG/DL — HIGH (ref 70–99)
GLUCOSE SERPL-MCNC: 74 MG/DL — SIGNIFICANT CHANGE UP (ref 70–99)
GLUCOSE UR QL: NEGATIVE MG/DL — SIGNIFICANT CHANGE UP
HCT VFR BLD CALC: 31.8 % — LOW (ref 34.5–45)
HCT VFR BLD CALC: 34.8 % — SIGNIFICANT CHANGE UP (ref 34.5–45)
HCT VFR BLD CALC: 35 % — SIGNIFICANT CHANGE UP (ref 34.5–45)
HCT VFR BLD CALC: 35.3 %
HCT VFR BLD CALC: 36.9 % — SIGNIFICANT CHANGE UP (ref 34.5–45)
HCT VFR BLD CALC: 37.3 % — SIGNIFICANT CHANGE UP (ref 34.5–45)
HGB BLD-MCNC: 10.2 G/DL — LOW (ref 11.5–15.5)
HGB BLD-MCNC: 10.7 G/DL — LOW (ref 11.5–15.5)
HGB BLD-MCNC: 11.4 G/DL
HGB BLD-MCNC: 11.4 G/DL — LOW (ref 11.5–15.5)
HGB BLD-MCNC: 11.5 G/DL — SIGNIFICANT CHANGE UP (ref 11.5–15.5)
HGB BLD-MCNC: 11.9 G/DL — SIGNIFICANT CHANGE UP (ref 11.5–15.5)
IMM GRANULOCYTES NFR BLD AUTO: 0.4 % — SIGNIFICANT CHANGE UP (ref 0–1.5)
IMM GRANULOCYTES NFR BLD AUTO: 0.5 % — SIGNIFICANT CHANGE UP (ref 0–1.5)
INR BLD: 1.11 RATIO — SIGNIFICANT CHANGE UP (ref 0.88–1.16)
INR BLD: 1.13 RATIO — SIGNIFICANT CHANGE UP (ref 0.88–1.16)
KETONES UR-MCNC: ABNORMAL
LEUKOCYTE ESTERASE UR-ACNC: NEGATIVE — SIGNIFICANT CHANGE UP
LYMPHOCYTES # BLD AUTO: 0.76 K/UL — LOW (ref 1–3.3)
LYMPHOCYTES # BLD AUTO: 1.03 K/UL — SIGNIFICANT CHANGE UP (ref 1–3.3)
LYMPHOCYTES # BLD AUTO: 12.5 % — LOW (ref 13–44)
LYMPHOCYTES # BLD AUTO: 8 % — LOW (ref 13–44)
MAGNESIUM SERPL-MCNC: 2.1 MG/DL — SIGNIFICANT CHANGE UP (ref 1.6–2.6)
MCHC RBC-ENTMCNC: 28.2 PG — SIGNIFICANT CHANGE UP (ref 27–34)
MCHC RBC-ENTMCNC: 28.8 PG — SIGNIFICANT CHANGE UP (ref 27–34)
MCHC RBC-ENTMCNC: 29 PG — SIGNIFICANT CHANGE UP (ref 27–34)
MCHC RBC-ENTMCNC: 29 PG — SIGNIFICANT CHANGE UP (ref 27–34)
MCHC RBC-ENTMCNC: 29.1 PG — SIGNIFICANT CHANGE UP (ref 27–34)
MCHC RBC-ENTMCNC: 30.6 GM/DL — LOW (ref 32–36)
MCHC RBC-ENTMCNC: 30.6 GM/DL — LOW (ref 32–36)
MCHC RBC-ENTMCNC: 30.6 PG
MCHC RBC-ENTMCNC: 32.1 GM/DL — SIGNIFICANT CHANGE UP (ref 32–36)
MCHC RBC-ENTMCNC: 32.2 GM/DL — SIGNIFICANT CHANGE UP (ref 32–36)
MCHC RBC-ENTMCNC: 32.4 GM/DL
MCHC RBC-ENTMCNC: 33 GM/DL — SIGNIFICANT CHANGE UP (ref 32–36)
MCV RBC AUTO: 87.7 FL — SIGNIFICANT CHANGE UP (ref 80–100)
MCV RBC AUTO: 89.8 FL — SIGNIFICANT CHANGE UP (ref 80–100)
MCV RBC AUTO: 90.6 FL — SIGNIFICANT CHANGE UP (ref 80–100)
MCV RBC AUTO: 92.3 FL — SIGNIFICANT CHANGE UP (ref 80–100)
MCV RBC AUTO: 94.3 FL — SIGNIFICANT CHANGE UP (ref 80–100)
MCV RBC AUTO: 94.4 FL
MONOCYTES # BLD AUTO: 0.42 K/UL — SIGNIFICANT CHANGE UP (ref 0–0.9)
MONOCYTES # BLD AUTO: 0.78 K/UL — SIGNIFICANT CHANGE UP (ref 0–0.9)
MONOCYTES NFR BLD AUTO: 4.4 % — SIGNIFICANT CHANGE UP (ref 2–14)
MONOCYTES NFR BLD AUTO: 9.5 % — SIGNIFICANT CHANGE UP (ref 2–14)
NEUTROPHILS # BLD AUTO: 6.23 K/UL — SIGNIFICANT CHANGE UP (ref 1.8–7.4)
NEUTROPHILS # BLD AUTO: 8.22 K/UL — HIGH (ref 1.8–7.4)
NEUTROPHILS NFR BLD AUTO: 75.6 % — SIGNIFICANT CHANGE UP (ref 43–77)
NEUTROPHILS NFR BLD AUTO: 86.4 % — HIGH (ref 43–77)
NITRITE UR-MCNC: NEGATIVE — SIGNIFICANT CHANGE UP
NRBC # BLD: 0 /100 WBCS — SIGNIFICANT CHANGE UP (ref 0–0)
PH UR: 5 — SIGNIFICANT CHANGE UP (ref 5–8)
PHOSPHATE SERPL-MCNC: 3.4 MG/DL — SIGNIFICANT CHANGE UP (ref 2.5–4.5)
PLATELET # BLD AUTO: 155 K/UL — SIGNIFICANT CHANGE UP (ref 150–400)
PLATELET # BLD AUTO: 174 K/UL — SIGNIFICANT CHANGE UP (ref 150–400)
PLATELET # BLD AUTO: 174 K/UL — SIGNIFICANT CHANGE UP (ref 150–400)
PLATELET # BLD AUTO: 183 K/UL
PLATELET # BLD AUTO: 188 K/UL — SIGNIFICANT CHANGE UP (ref 150–400)
PLATELET # BLD AUTO: 198 K/UL — SIGNIFICANT CHANGE UP (ref 150–400)
POTASSIUM SERPL-MCNC: 3.5 MMOL/L — SIGNIFICANT CHANGE UP (ref 3.5–5.3)
POTASSIUM SERPL-MCNC: 3.7 MMOL/L — SIGNIFICANT CHANGE UP (ref 3.5–5.3)
POTASSIUM SERPL-MCNC: 3.9 MMOL/L — SIGNIFICANT CHANGE UP (ref 3.5–5.3)
POTASSIUM SERPL-MCNC: 4.4 MMOL/L — SIGNIFICANT CHANGE UP (ref 3.5–5.3)
POTASSIUM SERPL-SCNC: 3.5 MMOL/L — SIGNIFICANT CHANGE UP (ref 3.5–5.3)
POTASSIUM SERPL-SCNC: 3.7 MMOL/L — SIGNIFICANT CHANGE UP (ref 3.5–5.3)
POTASSIUM SERPL-SCNC: 3.9 MMOL/L — SIGNIFICANT CHANGE UP (ref 3.5–5.3)
POTASSIUM SERPL-SCNC: 4.4 MMOL/L — SIGNIFICANT CHANGE UP (ref 3.5–5.3)
PROCALCITONIN SERPL-MCNC: 0.06 NG/ML — SIGNIFICANT CHANGE UP (ref 0.02–0.1)
PROT SERPL-MCNC: 6.1 GM/DL — SIGNIFICANT CHANGE UP (ref 6–8.3)
PROT SERPL-MCNC: 6.4 GM/DL — SIGNIFICANT CHANGE UP (ref 6–8.3)
PROT UR-MCNC: 15 MG/DL
PROTHROM AB SERPL-ACNC: 12 SEC — SIGNIFICANT CHANGE UP (ref 9.8–12.7)
PROTHROM AB SERPL-ACNC: 12.2 SEC — SIGNIFICANT CHANGE UP (ref 9.8–12.7)
RAPID RVP RESULT: SIGNIFICANT CHANGE UP
RBC # BLD: 3.51 M/UL — LOW (ref 3.8–5.2)
RBC # BLD: 3.71 M/UL — LOW (ref 3.8–5.2)
RBC # BLD: 3.74 M/UL
RBC # BLD: 3.97 M/UL — SIGNIFICANT CHANGE UP (ref 3.8–5.2)
RBC # BLD: 4.04 M/UL — SIGNIFICANT CHANGE UP (ref 3.8–5.2)
RBC # BLD: 4.11 M/UL — SIGNIFICANT CHANGE UP (ref 3.8–5.2)
RBC # FLD: 13.8 % — SIGNIFICANT CHANGE UP (ref 10.3–14.5)
RBC # FLD: 14.2 % — SIGNIFICANT CHANGE UP (ref 10.3–14.5)
RBC # FLD: 14.3 %
RBC # FLD: 14.6 % — HIGH (ref 10.3–14.5)
RBC # FLD: 14.6 % — HIGH (ref 10.3–14.5)
RBC # FLD: 14.7 % — HIGH (ref 10.3–14.5)
RBC CASTS # UR COMP ASSIST: SIGNIFICANT CHANGE UP /HPF (ref 0–4)
SODIUM SERPL-SCNC: 137 MMOL/L — SIGNIFICANT CHANGE UP (ref 135–145)
SODIUM SERPL-SCNC: 140 MMOL/L — SIGNIFICANT CHANGE UP (ref 135–145)
SODIUM SERPL-SCNC: 143 MMOL/L — SIGNIFICANT CHANGE UP (ref 135–145)
SODIUM SERPL-SCNC: 144 MMOL/L — SIGNIFICANT CHANGE UP (ref 135–145)
SP GR SPEC: 1.02 — SIGNIFICANT CHANGE UP (ref 1.01–1.02)
TROPONIN I SERPL-MCNC: <0.015 NG/ML — SIGNIFICANT CHANGE UP (ref 0.01–0.04)
UROBILINOGEN FLD QL: NEGATIVE MG/DL — SIGNIFICANT CHANGE UP
WBC # BLD: 11.32 K/UL — HIGH (ref 3.8–10.5)
WBC # BLD: 8.24 K/UL — SIGNIFICANT CHANGE UP (ref 3.8–10.5)
WBC # BLD: 8.24 K/UL — SIGNIFICANT CHANGE UP (ref 3.8–10.5)
WBC # BLD: 9.52 K/UL — SIGNIFICANT CHANGE UP (ref 3.8–10.5)
WBC # BLD: 9.66 K/UL — SIGNIFICANT CHANGE UP (ref 3.8–10.5)
WBC # FLD AUTO: 11.32 K/UL — HIGH (ref 3.8–10.5)
WBC # FLD AUTO: 8.24 K/UL — SIGNIFICANT CHANGE UP (ref 3.8–10.5)
WBC # FLD AUTO: 8.24 K/UL — SIGNIFICANT CHANGE UP (ref 3.8–10.5)
WBC # FLD AUTO: 9.52 K/UL — SIGNIFICANT CHANGE UP (ref 3.8–10.5)
WBC # FLD AUTO: 9.6 K/UL
WBC # FLD AUTO: 9.66 K/UL — SIGNIFICANT CHANGE UP (ref 3.8–10.5)
WBC UR QL: SIGNIFICANT CHANGE UP

## 2018-01-01 PROCEDURE — 99214 OFFICE O/P EST MOD 30 MIN: CPT

## 2018-01-01 PROCEDURE — 93010 ELECTROCARDIOGRAM REPORT: CPT

## 2018-01-01 PROCEDURE — 78815 PET IMAGE W/CT SKULL-THIGH: CPT | Mod: 26,PS

## 2018-01-01 PROCEDURE — 73552 X-RAY EXAM OF FEMUR 2/>: CPT | Mod: 26,RT

## 2018-01-01 PROCEDURE — 71250 CT THORAX DX C-: CPT | Mod: 26

## 2018-01-01 PROCEDURE — 99215 OFFICE O/P EST HI 40 MIN: CPT | Mod: 25

## 2018-01-01 PROCEDURE — 85025 COMPLETE CBC W/AUTO DIFF WBC: CPT

## 2018-01-01 PROCEDURE — 36415 COLL VENOUS BLD VENIPUNCTURE: CPT

## 2018-01-01 PROCEDURE — 99222 1ST HOSP IP/OBS MODERATE 55: CPT

## 2018-01-01 PROCEDURE — 71045 X-RAY EXAM CHEST 1 VIEW: CPT | Mod: 26

## 2018-01-01 PROCEDURE — A9552: CPT

## 2018-01-01 PROCEDURE — 99285 EMERGENCY DEPT VISIT HI MDM: CPT

## 2018-01-01 PROCEDURE — 78815 PET IMAGE W/CT SKULL-THIGH: CPT

## 2018-01-01 PROCEDURE — 73502 X-RAY EXAM HIP UNI 2-3 VIEWS: CPT | Mod: 26,RT

## 2018-01-01 PROCEDURE — 74177 CT ABD & PELVIS W/CONTRAST: CPT | Mod: 26

## 2018-01-01 RX ORDER — ASPIRIN AND DIPYRIDAMOLE 25; 200 MG/1; MG/1
25-200 CAPSULE, EXTENDED RELEASE ORAL TWICE DAILY
Qty: 180 | Refills: 1 | Status: DISCONTINUED | COMMUNITY
Start: 2017-02-06 | End: 2018-01-01

## 2018-01-01 RX ORDER — TRAZODONE HYDROCHLORIDE 100 MG/1
100 TABLET ORAL
Qty: 7 | Refills: 0 | Status: COMPLETED | COMMUNITY
Start: 2018-01-01

## 2018-01-01 RX ORDER — ALPRAZOLAM 0.25 MG
0.25 TABLET ORAL AT BEDTIME
Qty: 0 | Refills: 0 | Status: DISCONTINUED | OUTPATIENT
Start: 2018-01-01 | End: 2018-01-01

## 2018-01-01 RX ORDER — DOCUSATE SODIUM 100 MG
1 CAPSULE ORAL
Qty: 0 | Refills: 0 | COMMUNITY
Start: 2018-01-01

## 2018-01-01 RX ORDER — VANCOMYCIN HYDROCHLORIDE 125 MG/1
125 CAPSULE ORAL 4 TIMES DAILY
Qty: 40 | Refills: 0 | Status: DISCONTINUED | COMMUNITY
Start: 2018-03-21 | End: 2018-01-01

## 2018-01-01 RX ORDER — ASPIRIN AND DIPYRIDAMOLE 25; 200 MG/1; MG/1
1 CAPSULE, EXTENDED RELEASE ORAL
Qty: 0 | Refills: 0 | Status: DISCONTINUED | OUTPATIENT
Start: 2018-01-01 | End: 2018-01-01

## 2018-01-01 RX ORDER — TIOTROPIUM BROMIDE 18 UG/1
1 CAPSULE ORAL; RESPIRATORY (INHALATION) DAILY
Qty: 0 | Refills: 0 | Status: DISCONTINUED | OUTPATIENT
Start: 2018-01-01 | End: 2018-01-01

## 2018-01-01 RX ORDER — HEPARIN SODIUM 5000 [USP'U]/ML
5000 INJECTION INTRAVENOUS; SUBCUTANEOUS EVERY 8 HOURS
Qty: 0 | Refills: 0 | Status: DISCONTINUED | OUTPATIENT
Start: 2018-01-01 | End: 2018-01-01

## 2018-01-01 RX ORDER — MORPHINE SULFATE 50 MG/1
4 CAPSULE, EXTENDED RELEASE ORAL ONCE
Qty: 0 | Refills: 0 | Status: DISCONTINUED | OUTPATIENT
Start: 2018-01-01 | End: 2018-01-01

## 2018-01-01 RX ORDER — TRAZODONE HCL 50 MG
50 TABLET ORAL AT BEDTIME
Qty: 0 | Refills: 0 | Status: DISCONTINUED | OUTPATIENT
Start: 2018-01-01 | End: 2018-01-01

## 2018-01-01 RX ORDER — DOXYCYCLINE HYCLATE 100 MG/1
100 CAPSULE ORAL
Qty: 20 | Refills: 0 | Status: COMPLETED | COMMUNITY
Start: 2018-01-01

## 2018-01-01 RX ORDER — VANCOMYCIN HCL 1 G
125 VIAL (EA) INTRAVENOUS EVERY 6 HOURS
Qty: 0 | Refills: 0 | Status: DISCONTINUED | OUTPATIENT
Start: 2018-01-01 | End: 2018-01-01

## 2018-01-01 RX ORDER — ASPRIN AND EXTENDED-RELEASE DIPYRIDAMOLE 25; 200 MG/1; MG/1
25-200 CAPSULE ORAL
Qty: 180 | Refills: 1 | Status: ACTIVE | COMMUNITY
Start: 2017-08-08 | End: 1900-01-01

## 2018-01-01 RX ORDER — PANTOPRAZOLE SODIUM 20 MG/1
40 TABLET, DELAYED RELEASE ORAL
Qty: 0 | Refills: 0 | Status: DISCONTINUED | OUTPATIENT
Start: 2018-01-01 | End: 2018-01-01

## 2018-01-01 RX ORDER — MEMANTINE HYDROCHLORIDE 21 MG/1
21 CAPSULE, EXTENDED RELEASE ORAL
Qty: 90 | Refills: 1 | Status: ACTIVE | COMMUNITY
Start: 2017-08-08 | End: 1900-01-01

## 2018-01-01 RX ORDER — VANCOMYCIN HCL 1 G
500 VIAL (EA) INTRAVENOUS EVERY 6 HOURS
Qty: 0 | Refills: 0 | Status: DISCONTINUED | OUTPATIENT
Start: 2018-01-01 | End: 2018-01-01

## 2018-01-01 RX ORDER — ACETAMINOPHEN 500 MG
975 TABLET ORAL THREE TIMES A DAY
Qty: 0 | Refills: 0 | Status: DISCONTINUED | OUTPATIENT
Start: 2018-01-01 | End: 2018-01-01

## 2018-01-01 RX ORDER — CEFDINIR 300 MG/1
300 CAPSULE ORAL
Qty: 14 | Refills: 0 | Status: COMPLETED | COMMUNITY
Start: 2018-03-03

## 2018-01-01 RX ORDER — ONDANSETRON 8 MG/1
4 TABLET, FILM COATED ORAL ONCE
Qty: 0 | Refills: 0 | Status: COMPLETED | OUTPATIENT
Start: 2018-01-01 | End: 2018-01-01

## 2018-01-01 RX ORDER — TAMSULOSIN HYDROCHLORIDE 0.4 MG/1
0.4 CAPSULE ORAL AT BEDTIME
Qty: 0 | Refills: 0 | Status: DISCONTINUED | OUTPATIENT
Start: 2018-01-01 | End: 2018-01-01

## 2018-01-01 RX ORDER — LEVOTHYROXINE SODIUM 125 MCG
25 TABLET ORAL DAILY
Qty: 0 | Refills: 0 | Status: DISCONTINUED | OUTPATIENT
Start: 2018-01-01 | End: 2018-01-01

## 2018-01-01 RX ORDER — TAMSULOSIN HYDROCHLORIDE 0.4 MG/1
1 CAPSULE ORAL
Qty: 0 | Refills: 0 | COMMUNITY
Start: 2018-01-01

## 2018-01-01 RX ORDER — HYDROXYCHLOROQUINE SULFATE 200 MG
200 TABLET ORAL EVERY 12 HOURS
Qty: 0 | Refills: 0 | Status: DISCONTINUED | OUTPATIENT
Start: 2018-01-01 | End: 2018-01-01

## 2018-01-01 RX ORDER — DOCUSATE SODIUM 100 MG
100 CAPSULE ORAL
Qty: 0 | Refills: 0 | Status: DISCONTINUED | OUTPATIENT
Start: 2018-01-01 | End: 2018-01-01

## 2018-01-01 RX ORDER — SIMVASTATIN 20 MG/1
20 TABLET, FILM COATED ORAL AT BEDTIME
Qty: 0 | Refills: 0 | Status: DISCONTINUED | OUTPATIENT
Start: 2018-01-01 | End: 2018-01-01

## 2018-01-01 RX ORDER — SODIUM CHLORIDE 9 MG/ML
1000 INJECTION INTRAMUSCULAR; INTRAVENOUS; SUBCUTANEOUS ONCE
Qty: 0 | Refills: 0 | Status: COMPLETED | OUTPATIENT
Start: 2018-01-01 | End: 2018-01-01

## 2018-01-01 RX ORDER — MEMANTINE HYDROCHLORIDE 10 MG/1
21 TABLET ORAL DAILY
Qty: 0 | Refills: 0 | Status: DISCONTINUED | OUTPATIENT
Start: 2018-01-01 | End: 2018-01-01

## 2018-01-01 RX ORDER — ONDANSETRON 8 MG/1
4 TABLET, FILM COATED ORAL EVERY 6 HOURS
Qty: 0 | Refills: 0 | Status: DISCONTINUED | OUTPATIENT
Start: 2018-01-01 | End: 2018-01-01

## 2018-01-01 RX ORDER — LACTOBACILLUS ACIDOPHILUS 100MM CELL
1 CAPSULE ORAL DAILY
Qty: 0 | Refills: 0 | Status: DISCONTINUED | OUTPATIENT
Start: 2018-01-01 | End: 2018-01-01

## 2018-01-01 RX ORDER — ALBUTEROL 90 UG/1
2.5 AEROSOL, METERED ORAL EVERY 4 HOURS
Qty: 0 | Refills: 0 | Status: DISCONTINUED | OUTPATIENT
Start: 2018-01-01 | End: 2018-01-01

## 2018-01-01 RX ORDER — TRAMADOL HYDROCHLORIDE 50 MG/1
25 TABLET ORAL THREE TIMES A DAY
Qty: 0 | Refills: 0 | Status: DISCONTINUED | OUTPATIENT
Start: 2018-01-01 | End: 2018-01-01

## 2018-01-01 RX ORDER — CHOLESTYRAMINE 4 G/9G
4 POWDER, FOR SUSPENSION ORAL
Qty: 21 | Refills: 0 | Status: COMPLETED | COMMUNITY
Start: 2018-02-13

## 2018-01-01 RX ORDER — VANCOMYCIN HYDROCHLORIDE 250 MG/1
250 CAPSULE ORAL
Qty: 40 | Refills: 0 | Status: COMPLETED | COMMUNITY
Start: 2018-02-14

## 2018-01-01 RX ORDER — INFLUENZA VIRUS VACCINE 15; 15; 15; 15 UG/.5ML; UG/.5ML; UG/.5ML; UG/.5ML
0.5 SUSPENSION INTRAMUSCULAR ONCE
Qty: 0 | Refills: 0 | Status: COMPLETED | OUTPATIENT
Start: 2018-01-01 | End: 2018-01-01

## 2018-01-01 RX ORDER — LEVOTHYROXINE SODIUM 0.17 MG/1
TABLET ORAL
Refills: 0 | Status: DISCONTINUED | COMMUNITY
End: 2018-01-01

## 2018-01-01 RX ORDER — MORPHINE SULFATE 50 MG/1
2 CAPSULE, EXTENDED RELEASE ORAL EVERY 6 HOURS
Qty: 0 | Refills: 0 | Status: DISCONTINUED | OUTPATIENT
Start: 2018-01-01 | End: 2018-01-01

## 2018-01-01 RX ADMIN — Medication 25 MICROGRAM(S): at 05:45

## 2018-01-01 RX ADMIN — Medication 975 MILLIGRAM(S): at 05:01

## 2018-01-01 RX ADMIN — Medication 1 TABLET(S): at 12:15

## 2018-01-01 RX ADMIN — ONDANSETRON 4 MILLIGRAM(S): 8 TABLET, FILM COATED ORAL at 16:27

## 2018-01-01 RX ADMIN — ASPIRIN AND DIPYRIDAMOLE 1 CAPSULE(S): 25; 200 CAPSULE, EXTENDED RELEASE ORAL at 18:30

## 2018-01-01 RX ADMIN — TIOTROPIUM BROMIDE 1 CAPSULE(S): 18 CAPSULE ORAL; RESPIRATORY (INHALATION) at 10:45

## 2018-01-01 RX ADMIN — MEMANTINE HYDROCHLORIDE 21 MILLIGRAM(S): 10 TABLET ORAL at 11:32

## 2018-01-01 RX ADMIN — Medication 1 TABLET(S): at 11:53

## 2018-01-01 RX ADMIN — SODIUM CHLORIDE 1000 MILLILITER(S): 9 INJECTION INTRAMUSCULAR; INTRAVENOUS; SUBCUTANEOUS at 17:29

## 2018-01-01 RX ADMIN — HEPARIN SODIUM 5000 UNIT(S): 5000 INJECTION INTRAVENOUS; SUBCUTANEOUS at 14:50

## 2018-01-01 RX ADMIN — Medication 1 TABLET(S): at 12:10

## 2018-01-01 RX ADMIN — Medication 975 MILLIGRAM(S): at 21:29

## 2018-01-01 RX ADMIN — Medication 200 MILLIGRAM(S): at 18:30

## 2018-01-01 RX ADMIN — Medication 975 MILLIGRAM(S): at 01:07

## 2018-01-01 RX ADMIN — ASPIRIN AND DIPYRIDAMOLE 1 CAPSULE(S): 25; 200 CAPSULE, EXTENDED RELEASE ORAL at 06:00

## 2018-01-01 RX ADMIN — SIMVASTATIN 20 MILLIGRAM(S): 20 TABLET, FILM COATED ORAL at 21:08

## 2018-01-01 RX ADMIN — HEPARIN SODIUM 5000 UNIT(S): 5000 INJECTION INTRAVENOUS; SUBCUTANEOUS at 13:14

## 2018-01-01 RX ADMIN — MEMANTINE HYDROCHLORIDE 21 MILLIGRAM(S): 10 TABLET ORAL at 18:05

## 2018-01-01 RX ADMIN — Medication 975 MILLIGRAM(S): at 21:28

## 2018-01-01 RX ADMIN — Medication 975 MILLIGRAM(S): at 15:52

## 2018-01-01 RX ADMIN — TAMSULOSIN HYDROCHLORIDE 0.4 MILLIGRAM(S): 0.4 CAPSULE ORAL at 21:28

## 2018-01-01 RX ADMIN — Medication 1 TABLET(S): at 12:14

## 2018-01-01 RX ADMIN — Medication 100 MILLIGRAM(S): at 05:13

## 2018-01-01 RX ADMIN — MEMANTINE HYDROCHLORIDE 21 MILLIGRAM(S): 10 TABLET ORAL at 11:55

## 2018-01-01 RX ADMIN — HEPARIN SODIUM 5000 UNIT(S): 5000 INJECTION INTRAVENOUS; SUBCUTANEOUS at 05:11

## 2018-01-01 RX ADMIN — Medication 5 MILLIGRAM(S): at 05:12

## 2018-01-01 RX ADMIN — Medication 1 TABLET(S): at 11:54

## 2018-01-01 RX ADMIN — TRAMADOL HYDROCHLORIDE 25 MILLIGRAM(S): 50 TABLET ORAL at 11:49

## 2018-01-01 RX ADMIN — Medication 975 MILLIGRAM(S): at 22:23

## 2018-01-01 RX ADMIN — TAMSULOSIN HYDROCHLORIDE 0.4 MILLIGRAM(S): 0.4 CAPSULE ORAL at 21:08

## 2018-01-01 RX ADMIN — TIOTROPIUM BROMIDE 1 CAPSULE(S): 18 CAPSULE ORAL; RESPIRATORY (INHALATION) at 07:52

## 2018-01-01 RX ADMIN — Medication 500 MILLIGRAM(S): at 00:07

## 2018-01-01 RX ADMIN — HEPARIN SODIUM 5000 UNIT(S): 5000 INJECTION INTRAVENOUS; SUBCUTANEOUS at 21:31

## 2018-01-01 RX ADMIN — ONDANSETRON 4 MILLIGRAM(S): 8 TABLET, FILM COATED ORAL at 18:02

## 2018-01-01 RX ADMIN — TRAMADOL HYDROCHLORIDE 25 MILLIGRAM(S): 50 TABLET ORAL at 15:57

## 2018-01-01 RX ADMIN — Medication 25 MICROGRAM(S): at 06:03

## 2018-01-01 RX ADMIN — Medication 200 MILLIGRAM(S): at 05:12

## 2018-01-01 RX ADMIN — Medication 200 MILLIGRAM(S): at 18:05

## 2018-01-01 RX ADMIN — TRAMADOL HYDROCHLORIDE 25 MILLIGRAM(S): 50 TABLET ORAL at 13:40

## 2018-01-01 RX ADMIN — Medication 975 MILLIGRAM(S): at 05:35

## 2018-01-01 RX ADMIN — Medication 1 TABLET(S): at 11:32

## 2018-01-01 RX ADMIN — TAMSULOSIN HYDROCHLORIDE 0.4 MILLIGRAM(S): 0.4 CAPSULE ORAL at 21:29

## 2018-01-01 RX ADMIN — ASPIRIN AND DIPYRIDAMOLE 1 CAPSULE(S): 25; 200 CAPSULE, EXTENDED RELEASE ORAL at 05:13

## 2018-01-01 RX ADMIN — HEPARIN SODIUM 5000 UNIT(S): 5000 INJECTION INTRAVENOUS; SUBCUTANEOUS at 13:06

## 2018-01-01 RX ADMIN — HEPARIN SODIUM 5000 UNIT(S): 5000 INJECTION INTRAVENOUS; SUBCUTANEOUS at 05:44

## 2018-01-01 RX ADMIN — Medication 500 MILLIGRAM(S): at 05:12

## 2018-01-01 RX ADMIN — MEMANTINE HYDROCHLORIDE 21 MILLIGRAM(S): 10 TABLET ORAL at 12:10

## 2018-01-01 RX ADMIN — Medication 25 MICROGRAM(S): at 05:01

## 2018-01-01 RX ADMIN — PANTOPRAZOLE SODIUM 40 MILLIGRAM(S): 20 TABLET, DELAYED RELEASE ORAL at 05:02

## 2018-01-01 RX ADMIN — MORPHINE SULFATE 4 MILLIGRAM(S): 50 CAPSULE, EXTENDED RELEASE ORAL at 22:41

## 2018-01-01 RX ADMIN — SIMVASTATIN 20 MILLIGRAM(S): 20 TABLET, FILM COATED ORAL at 21:28

## 2018-01-01 RX ADMIN — Medication 975 MILLIGRAM(S): at 13:15

## 2018-01-01 RX ADMIN — Medication 200 MILLIGRAM(S): at 06:00

## 2018-01-01 RX ADMIN — ASPIRIN AND DIPYRIDAMOLE 1 CAPSULE(S): 25; 200 CAPSULE, EXTENDED RELEASE ORAL at 05:01

## 2018-01-01 RX ADMIN — ASPIRIN AND DIPYRIDAMOLE 1 CAPSULE(S): 25; 200 CAPSULE, EXTENDED RELEASE ORAL at 18:17

## 2018-01-01 RX ADMIN — ONDANSETRON 4 MILLIGRAM(S): 8 TABLET, FILM COATED ORAL at 21:56

## 2018-01-01 RX ADMIN — Medication 975 MILLIGRAM(S): at 05:12

## 2018-01-01 RX ADMIN — INFLUENZA VIRUS VACCINE 0.5 MILLILITER(S): 15; 15; 15; 15 SUSPENSION INTRAMUSCULAR at 15:59

## 2018-01-01 RX ADMIN — Medication 5 MILLIGRAM(S): at 11:51

## 2018-01-01 RX ADMIN — Medication 500 MILLIGRAM(S): at 18:53

## 2018-01-01 RX ADMIN — PANTOPRAZOLE SODIUM 40 MILLIGRAM(S): 20 TABLET, DELAYED RELEASE ORAL at 06:07

## 2018-01-01 RX ADMIN — Medication 200 MILLIGRAM(S): at 05:45

## 2018-01-01 RX ADMIN — HEPARIN SODIUM 5000 UNIT(S): 5000 INJECTION INTRAVENOUS; SUBCUTANEOUS at 21:08

## 2018-01-01 RX ADMIN — Medication 25 MICROGRAM(S): at 05:12

## 2018-01-01 RX ADMIN — Medication 975 MILLIGRAM(S): at 14:50

## 2018-01-01 RX ADMIN — Medication 200 MILLIGRAM(S): at 05:02

## 2018-01-01 RX ADMIN — HEPARIN SODIUM 5000 UNIT(S): 5000 INJECTION INTRAVENOUS; SUBCUTANEOUS at 21:28

## 2018-01-01 RX ADMIN — PANTOPRAZOLE SODIUM 40 MILLIGRAM(S): 20 TABLET, DELAYED RELEASE ORAL at 05:44

## 2018-01-01 RX ADMIN — MORPHINE SULFATE 4 MILLIGRAM(S): 50 CAPSULE, EXTENDED RELEASE ORAL at 16:29

## 2018-01-01 RX ADMIN — HEPARIN SODIUM 5000 UNIT(S): 5000 INJECTION INTRAVENOUS; SUBCUTANEOUS at 13:15

## 2018-01-01 RX ADMIN — Medication 0.5 MILLIGRAM(S): at 00:19

## 2018-01-01 RX ADMIN — ASPIRIN AND DIPYRIDAMOLE 1 CAPSULE(S): 25; 200 CAPSULE, EXTENDED RELEASE ORAL at 18:05

## 2018-01-01 RX ADMIN — MORPHINE SULFATE 4 MILLIGRAM(S): 50 CAPSULE, EXTENDED RELEASE ORAL at 21:56

## 2018-01-01 RX ADMIN — Medication 975 MILLIGRAM(S): at 21:40

## 2018-01-01 RX ADMIN — Medication 975 MILLIGRAM(S): at 13:05

## 2018-01-01 RX ADMIN — Medication 975 MILLIGRAM(S): at 13:14

## 2018-01-01 RX ADMIN — Medication 1 TABLET(S): at 11:33

## 2018-01-01 RX ADMIN — Medication 5 MILLIGRAM(S): at 05:45

## 2018-01-01 RX ADMIN — SIMVASTATIN 20 MILLIGRAM(S): 20 TABLET, FILM COATED ORAL at 21:31

## 2018-01-01 RX ADMIN — PANTOPRAZOLE SODIUM 40 MILLIGRAM(S): 20 TABLET, DELAYED RELEASE ORAL at 05:12

## 2018-01-01 RX ADMIN — HEPARIN SODIUM 5000 UNIT(S): 5000 INJECTION INTRAVENOUS; SUBCUTANEOUS at 06:00

## 2018-01-01 RX ADMIN — Medication 975 MILLIGRAM(S): at 21:08

## 2018-01-01 RX ADMIN — Medication 975 MILLIGRAM(S): at 05:46

## 2018-01-01 RX ADMIN — SODIUM CHLORIDE 1000 MILLILITER(S): 9 INJECTION INTRAMUSCULAR; INTRAVENOUS; SUBCUTANEOUS at 16:29

## 2018-01-01 RX ADMIN — Medication 200 MILLIGRAM(S): at 18:17

## 2018-01-01 RX ADMIN — Medication 975 MILLIGRAM(S): at 13:35

## 2018-01-01 RX ADMIN — ASPIRIN AND DIPYRIDAMOLE 1 CAPSULE(S): 25; 200 CAPSULE, EXTENDED RELEASE ORAL at 05:45

## 2018-01-01 RX ADMIN — TIOTROPIUM BROMIDE 1 CAPSULE(S): 18 CAPSULE ORAL; RESPIRATORY (INHALATION) at 09:04

## 2018-01-01 RX ADMIN — HEPARIN SODIUM 5000 UNIT(S): 5000 INJECTION INTRAVENOUS; SUBCUTANEOUS at 05:02

## 2018-01-20 ENCOUNTER — FORM ENCOUNTER (OUTPATIENT)
Age: 83
End: 2018-01-20

## 2018-01-21 ENCOUNTER — OUTPATIENT (OUTPATIENT)
Dept: OUTPATIENT SERVICES | Facility: HOSPITAL | Age: 83
LOS: 1 days | End: 2018-01-21
Payer: MEDICARE

## 2018-01-21 ENCOUNTER — APPOINTMENT (OUTPATIENT)
Dept: NUCLEAR MEDICINE | Facility: CLINIC | Age: 83
End: 2018-01-21
Payer: MEDICARE

## 2018-01-21 DIAGNOSIS — C50.919 MALIGNANT NEOPLASM OF UNSPECIFIED SITE OF UNSPECIFIED FEMALE BREAST: ICD-10-CM

## 2018-01-21 DIAGNOSIS — Z98.890 OTHER SPECIFIED POSTPROCEDURAL STATES: Chronic | ICD-10-CM

## 2018-01-21 PROCEDURE — 78815 PET IMAGE W/CT SKULL-THIGH: CPT

## 2018-01-21 PROCEDURE — A9552: CPT

## 2018-01-21 PROCEDURE — 78815 PET IMAGE W/CT SKULL-THIGH: CPT | Mod: 26,PI

## 2018-01-31 ENCOUNTER — OTHER (OUTPATIENT)
Age: 83
End: 2018-01-31

## 2018-01-31 DIAGNOSIS — Z87.442 PERSONAL HISTORY OF URINARY CALCULI: ICD-10-CM

## 2018-01-31 DIAGNOSIS — G89.29 PAIN IN LEFT ANKLE AND JOINTS OF LEFT FOOT: ICD-10-CM

## 2018-01-31 DIAGNOSIS — Z86.69 PERSONAL HISTORY OF OTHER DISEASES OF THE NERVOUS SYSTEM AND SENSE ORGANS: ICD-10-CM

## 2018-01-31 DIAGNOSIS — Z86.59 PERSONAL HISTORY OF OTHER MENTAL AND BEHAVIORAL DISORDERS: ICD-10-CM

## 2018-01-31 DIAGNOSIS — M81.0 AGE-RELATED OSTEOPOROSIS W/OUT CURRENT PATHOLOGICAL FRACTURE: ICD-10-CM

## 2018-01-31 DIAGNOSIS — M25.572 PAIN IN LEFT ANKLE AND JOINTS OF LEFT FOOT: ICD-10-CM

## 2018-01-31 DIAGNOSIS — Z87.898 PERSONAL HISTORY OF OTHER SPECIFIED CONDITIONS: ICD-10-CM

## 2018-01-31 DIAGNOSIS — Z86.19 PERSONAL HISTORY OF OTHER INFECTIOUS AND PARASITIC DISEASES: ICD-10-CM

## 2018-01-31 DIAGNOSIS — J44.9 CHRONIC OBSTRUCTIVE PULMONARY DISEASE, UNSPECIFIED: ICD-10-CM

## 2018-01-31 DIAGNOSIS — S72.001A FRACTURE OF UNSPECIFIED PART OF NECK OF RIGHT FEMUR, INITIAL ENCOUNTER FOR CLOSED FRACTURE: ICD-10-CM

## 2018-01-31 RX ORDER — MEMANTINE HYDROCHLORIDE 21 MG/1
21 CAPSULE, EXTENDED RELEASE ORAL
Qty: 90 | Refills: 1 | Status: DISCONTINUED | OUTPATIENT
Start: 2017-02-06 | End: 2018-01-31

## 2018-01-31 RX ORDER — MEMANTINE HYDROCHLORIDE 21 MG/1
21 CAPSULE, EXTENDED RELEASE ORAL
Qty: 90 | Refills: 1 | Status: DISCONTINUED | COMMUNITY
Start: 2017-02-06 | End: 2018-01-31

## 2018-01-31 RX ORDER — DOXYCYCLINE 100 MG/1
100 CAPSULE ORAL
Refills: 0 | Status: DISCONTINUED | COMMUNITY
End: 2018-01-31

## 2018-01-31 RX ORDER — MULTIVITAMIN
CAPSULE ORAL
Refills: 0 | Status: ACTIVE | COMMUNITY

## 2018-01-31 RX ORDER — DEXTROMETHORPHAN HYDROBROMIDE AND DOXYLAMINE SUCCINATE 30; 12.5 MG/10ML; MG/10ML
12.5-3 LIQUID ORAL
Refills: 0 | Status: DISCONTINUED | COMMUNITY
End: 2018-01-31

## 2018-01-31 RX ORDER — AMOXICILLIN AND CLAVULANATE POTASSIUM 875; 125 MG/1; 1/1
875-125 TABLET, FILM COATED ORAL
Refills: 0 | Status: DISCONTINUED | COMMUNITY
End: 2018-01-31

## 2018-02-02 ENCOUNTER — LABORATORY RESULT (OUTPATIENT)
Age: 83
End: 2018-02-02

## 2018-02-02 ENCOUNTER — APPOINTMENT (OUTPATIENT)
Dept: HEMATOLOGY ONCOLOGY | Facility: CLINIC | Age: 83
End: 2018-02-02
Payer: MEDICARE

## 2018-02-02 VITALS
HEART RATE: 83 BPM | BODY MASS INDEX: 24.29 KG/M2 | WEIGHT: 132 LBS | SYSTOLIC BLOOD PRESSURE: 144 MMHG | DIASTOLIC BLOOD PRESSURE: 78 MMHG | TEMPERATURE: 98 F | HEIGHT: 62 IN

## 2018-02-02 LAB
HCT VFR BLD CALC: 40.4 %
HGB BLD-MCNC: 12.6 G/DL
MCHC RBC-ENTMCNC: 27 PG
MCHC RBC-ENTMCNC: 31.2 GM/DL
MCV RBC AUTO: 86.5 FL
PLATELET # BLD AUTO: 223 K/UL
RBC # BLD: 4.67 M/UL
RBC # FLD: 15.6 %
WBC # FLD AUTO: 10.5 K/UL

## 2018-02-02 PROCEDURE — 99205 OFFICE O/P NEW HI 60 MIN: CPT | Mod: 25

## 2018-02-02 PROCEDURE — 85025 COMPLETE CBC W/AUTO DIFF WBC: CPT

## 2018-02-02 PROCEDURE — 36415 COLL VENOUS BLD VENIPUNCTURE: CPT

## 2018-02-05 LAB
ALBUMIN SERPL ELPH-MCNC: 3.6 G/DL
ALP BLD-CCNC: 68 U/L
ALT SERPL-CCNC: 18 U/L
ANION GAP SERPL CALC-SCNC: 18 MMOL/L
AST SERPL-CCNC: 23 U/L
BILIRUB SERPL-MCNC: 0.3 MG/DL
BUN SERPL-MCNC: 17 MG/DL
CALCIUM SERPL-MCNC: 10 MG/DL
CANCER AG27-29 SERPL-ACNC: 43 U/ML
CHLORIDE SERPL-SCNC: 106 MMOL/L
CO2 SERPL-SCNC: 20 MMOL/L
CREAT SERPL-MCNC: 0.78 MG/DL
FERRITIN SERPL-MCNC: 85 NG/ML
FOLATE SERPL-MCNC: >20 NG/ML
GLUCOSE SERPL-MCNC: 101 MG/DL
IRON SATN MFR SERPL: 10 %
IRON SERPL-MCNC: 25 UG/DL
POTASSIUM SERPL-SCNC: 4.3 MMOL/L
PROT SERPL-MCNC: 6.3 G/DL
SODIUM SERPL-SCNC: 144 MMOL/L
TIBC SERPL-MCNC: 247 UG/DL
UIBC SERPL-MCNC: 222 UG/DL
VIT B12 SERPL-MCNC: 900 PG/ML

## 2018-02-09 ENCOUNTER — APPOINTMENT (OUTPATIENT)
Dept: NEUROLOGY | Facility: CLINIC | Age: 83
End: 2018-02-09

## 2018-02-13 ENCOUNTER — MEDICATION RENEWAL (OUTPATIENT)
Age: 83
End: 2018-02-13

## 2018-02-26 ENCOUNTER — APPOINTMENT (OUTPATIENT)
Dept: HEMATOLOGY ONCOLOGY | Facility: CLINIC | Age: 83
End: 2018-02-26
Payer: MEDICARE

## 2018-02-26 VITALS
HEIGHT: 62 IN | SYSTOLIC BLOOD PRESSURE: 159 MMHG | HEART RATE: 87 BPM | DIASTOLIC BLOOD PRESSURE: 76 MMHG | BODY MASS INDEX: 25.49 KG/M2 | TEMPERATURE: 97.7 F | WEIGHT: 138.5 LBS

## 2018-02-26 DIAGNOSIS — Z63.4 DISAPPEARANCE AND DEATH OF FAMILY MEMBER: ICD-10-CM

## 2018-02-26 PROCEDURE — 99215 OFFICE O/P EST HI 40 MIN: CPT

## 2018-02-26 RX ORDER — VANCOMYCIN HYDROCHLORIDE 125 MG/1
125 CAPSULE ORAL
Qty: 12 | Refills: 0 | Status: DISCONTINUED | COMMUNITY
Start: 2017-09-05

## 2018-02-26 RX ORDER — DOXYCYCLINE 100 MG/1
100 TABLET, FILM COATED ORAL
Qty: 20 | Refills: 0 | Status: DISCONTINUED | COMMUNITY
Start: 2017-12-19

## 2018-02-26 RX ORDER — CHLORHEXIDINE GLUCONATE 4 %
5 LIQUID (ML) TOPICAL
Refills: 0 | Status: DISCONTINUED | COMMUNITY
End: 2018-02-26

## 2018-02-26 RX ORDER — ALBUTEROL SULFATE 2.5 MG/3ML
(2.5 MG/3ML) SOLUTION RESPIRATORY (INHALATION)
Refills: 0 | Status: DISCONTINUED | COMMUNITY
End: 2018-02-26

## 2018-02-26 RX ORDER — LEVOFLOXACIN 500 MG/1
500 TABLET, FILM COATED ORAL
Qty: 10 | Refills: 0 | Status: DISCONTINUED | COMMUNITY
Start: 2017-08-07

## 2018-02-26 RX ORDER — METHYLPREDNISOLONE 4 MG/1
4 TABLET ORAL
Qty: 21 | Refills: 0 | Status: DISCONTINUED | COMMUNITY
Start: 2017-08-22

## 2018-02-26 RX ORDER — IPRATROPIUM BROMIDE AND ALBUTEROL 20; 100 UG/1; UG/1
20-100 SPRAY, METERED RESPIRATORY (INHALATION)
Qty: 4 | Refills: 0 | Status: DISCONTINUED | COMMUNITY
Start: 2017-08-22

## 2018-02-26 RX ORDER — L.ACIDOPH/L.BULG/B.BIF/S.THERM 1B-250 MG
TABLET ORAL
Refills: 0 | Status: DISCONTINUED | COMMUNITY
End: 2018-02-26

## 2018-02-26 SDOH — SOCIAL STABILITY - SOCIAL INSECURITY: DISSAPEARANCE AND DEATH OF FAMILY MEMBER: Z63.4

## 2018-02-27 PROBLEM — Z63.4 WIDOWED: Status: ACTIVE | Noted: 2018-02-27

## 2018-02-27 RX ORDER — HYDROXYCHLOROQUINE SULFATE 200 MG/1
200 TABLET, FILM COATED ORAL
Refills: 0 | Status: ACTIVE | COMMUNITY

## 2018-02-28 NOTE — DIETITIAN INITIAL EVALUATION ADULT. - PATIENT REFUSES SNACKS AND/OR SUPPLEMENTS
"Spoke with patient's wife. She stated that she received a call to schedule for Neuropsych testing but they said "do you want to be tested in [place] or [place]?"; which they said [place]. She stated that since then, they have received no call to schedule the test. She stated that she was told by Dr. Castrejon about "someone by the name of Dr. Odonnell that did testing". I do not see anything in the chart regarding this. Please advise.   " Statement Selected

## 2018-03-03 RX ORDER — CEFDINIR 250 MG/5ML
1 POWDER, FOR SUSPENSION ORAL
Qty: 0 | Refills: 0 | COMMUNITY
Start: 2018-03-03 | End: 2018-03-10

## 2018-03-12 ENCOUNTER — APPOINTMENT (OUTPATIENT)
Dept: INFUSION THERAPY | Facility: CLINIC | Age: 83
End: 2018-03-12
Payer: MEDICARE

## 2018-03-12 ENCOUNTER — LABORATORY RESULT (OUTPATIENT)
Age: 83
End: 2018-03-12

## 2018-03-12 VITALS
WEIGHT: 137 LBS | TEMPERATURE: 98 F | DIASTOLIC BLOOD PRESSURE: 67 MMHG | HEART RATE: 90 BPM | BODY MASS INDEX: 25.21 KG/M2 | SYSTOLIC BLOOD PRESSURE: 138 MMHG | HEIGHT: 62 IN

## 2018-03-12 LAB
HCT VFR BLD CALC: 35.8 %
HGB BLD-MCNC: 11.6 G/DL
MCHC RBC-ENTMCNC: 28.1 PG
MCHC RBC-ENTMCNC: 32.4 GM/DL
MCV RBC AUTO: 86.8 FL
PLATELET # BLD AUTO: 240 K/UL
RBC # BLD: 4.13 M/UL
RBC # FLD: 15.5 %
WBC # FLD AUTO: 13.8 K/UL

## 2018-03-12 PROCEDURE — 36415 COLL VENOUS BLD VENIPUNCTURE: CPT

## 2018-03-12 PROCEDURE — 85025 COMPLETE CBC W/AUTO DIFF WBC: CPT

## 2018-03-12 PROCEDURE — 96413 CHEMO IV INFUSION 1 HR: CPT

## 2018-03-12 PROCEDURE — 96375 TX/PRO/DX INJ NEW DRUG ADDON: CPT

## 2018-03-12 PROCEDURE — 96372 THER/PROPH/DIAG INJ SC/IM: CPT | Mod: 59

## 2018-03-12 PROCEDURE — 96411 CHEMO IV PUSH ADDL DRUG: CPT

## 2018-03-12 PROCEDURE — 96367 TX/PROPH/DG ADDL SEQ IV INF: CPT

## 2018-03-21 DIAGNOSIS — K21.9 GASTRO-ESOPHAGEAL REFLUX DISEASE W/OUT ESOPHAGITIS: ICD-10-CM

## 2018-03-21 DIAGNOSIS — Z86.73 PERSONAL HISTORY OF TRANSIENT ISCHEMIC ATTACK (TIA), AND CEREBRAL INFARCTION W/OUT RESIDUAL DEFICITS: ICD-10-CM

## 2018-03-21 DIAGNOSIS — Z87.09 PERSONAL HISTORY OF OTHER DISEASES OF THE RESPIRATORY SYSTEM: ICD-10-CM

## 2018-03-21 RX ORDER — VANCOMYCIN HCL 1 G
1 VIAL (EA) INTRAVENOUS
Qty: 56 | Refills: 0 | OUTPATIENT
Start: 2018-03-21

## 2018-03-25 ENCOUNTER — INPATIENT (INPATIENT)
Facility: HOSPITAL | Age: 83
LOS: 8 days | Discharge: HOME | End: 2018-04-03
Attending: INTERNAL MEDICINE | Admitting: INTERNAL MEDICINE
Payer: COMMERCIAL

## 2018-03-25 VITALS
WEIGHT: 160.06 LBS | TEMPERATURE: 98 F | OXYGEN SATURATION: 100 % | DIASTOLIC BLOOD PRESSURE: 40 MMHG | SYSTOLIC BLOOD PRESSURE: 110 MMHG | HEIGHT: 62 IN | RESPIRATION RATE: 16 BRPM | HEART RATE: 80 BPM

## 2018-03-25 DIAGNOSIS — A04.72 ENTEROCOLITIS DUE TO CLOSTRIDIUM DIFFICILE, NOT SPECIFIED AS RECURRENT: ICD-10-CM

## 2018-03-25 DIAGNOSIS — R41.82 ALTERED MENTAL STATUS, UNSPECIFIED: ICD-10-CM

## 2018-03-25 DIAGNOSIS — I10 ESSENTIAL (PRIMARY) HYPERTENSION: ICD-10-CM

## 2018-03-25 DIAGNOSIS — Z98.890 OTHER SPECIFIED POSTPROCEDURAL STATES: Chronic | ICD-10-CM

## 2018-03-25 DIAGNOSIS — J44.9 CHRONIC OBSTRUCTIVE PULMONARY DISEASE, UNSPECIFIED: ICD-10-CM

## 2018-03-25 DIAGNOSIS — G45.9 TRANSIENT CEREBRAL ISCHEMIC ATTACK, UNSPECIFIED: ICD-10-CM

## 2018-03-25 DIAGNOSIS — M35.3 POLYMYALGIA RHEUMATICA: ICD-10-CM

## 2018-03-25 LAB
ALBUMIN SERPL ELPH-MCNC: 3.3 G/DL — SIGNIFICANT CHANGE UP (ref 3.3–5)
ALP SERPL-CCNC: 106 U/L — SIGNIFICANT CHANGE UP (ref 40–120)
ALT FLD-CCNC: 24 U/L — SIGNIFICANT CHANGE UP (ref 12–78)
ANION GAP SERPL CALC-SCNC: 12 MMOL/L — SIGNIFICANT CHANGE UP (ref 5–17)
ANISOCYTOSIS BLD QL: SLIGHT — SIGNIFICANT CHANGE UP
APPEARANCE UR: CLEAR — SIGNIFICANT CHANGE UP
APTT BLD: 19.7 SEC — LOW (ref 27.5–37.4)
AST SERPL-CCNC: 25 U/L — SIGNIFICANT CHANGE UP (ref 15–37)
BASOPHILS # BLD AUTO: 0 K/UL — SIGNIFICANT CHANGE UP (ref 0–0.2)
BASOPHILS NFR BLD AUTO: 0 % — SIGNIFICANT CHANGE UP (ref 0–2)
BILIRUB SERPL-MCNC: 0.2 MG/DL — SIGNIFICANT CHANGE UP (ref 0.2–1.2)
BILIRUB UR-MCNC: NEGATIVE — SIGNIFICANT CHANGE UP
BUN SERPL-MCNC: 14 MG/DL — SIGNIFICANT CHANGE UP (ref 7–23)
CALCIUM SERPL-MCNC: 9 MG/DL — SIGNIFICANT CHANGE UP (ref 8.5–10.1)
CHLORIDE SERPL-SCNC: 108 MMOL/L — SIGNIFICANT CHANGE UP (ref 96–108)
CO2 SERPL-SCNC: 24 MMOL/L — SIGNIFICANT CHANGE UP (ref 22–31)
COLOR SPEC: YELLOW — SIGNIFICANT CHANGE UP
CREAT SERPL-MCNC: 0.79 MG/DL — SIGNIFICANT CHANGE UP (ref 0.5–1.3)
DIFF PNL FLD: NEGATIVE — SIGNIFICANT CHANGE UP
EOSINOPHIL # BLD AUTO: 0 K/UL — SIGNIFICANT CHANGE UP (ref 0–0.5)
EOSINOPHIL NFR BLD AUTO: 0 % — SIGNIFICANT CHANGE UP (ref 0–6)
GLUCOSE SERPL-MCNC: 87 MG/DL — SIGNIFICANT CHANGE UP (ref 70–99)
GLUCOSE UR QL: NEGATIVE MG/DL — SIGNIFICANT CHANGE UP
HCT VFR BLD CALC: 35.8 % — SIGNIFICANT CHANGE UP (ref 34.5–45)
HGB BLD-MCNC: 11.3 G/DL — LOW (ref 11.5–15.5)
INR BLD: 0.97 RATIO — SIGNIFICANT CHANGE UP (ref 0.88–1.16)
KETONES UR-MCNC: NEGATIVE — SIGNIFICANT CHANGE UP
LACTATE SERPL-SCNC: 1.4 MMOL/L — SIGNIFICANT CHANGE UP (ref 0.7–2)
LEUKOCYTE ESTERASE UR-ACNC: NEGATIVE — SIGNIFICANT CHANGE UP
LYMPHOCYTES # BLD AUTO: 16 % — SIGNIFICANT CHANGE UP (ref 13–44)
LYMPHOCYTES # BLD AUTO: 2.18 K/UL — SIGNIFICANT CHANGE UP (ref 1–3.3)
LYMPHOCYTES # SPEC AUTO: 1 % — HIGH (ref 0–0)
MANUAL SMEAR VERIFICATION: SIGNIFICANT CHANGE UP
MCHC RBC-ENTMCNC: 27.1 PG — SIGNIFICANT CHANGE UP (ref 27–34)
MCHC RBC-ENTMCNC: 31.6 GM/DL — LOW (ref 32–36)
MCV RBC AUTO: 85.9 FL — SIGNIFICANT CHANGE UP (ref 80–100)
METAMYELOCYTES # FLD: 1 % — HIGH (ref 0–0)
MONOCYTES # BLD AUTO: 0.82 K/UL — SIGNIFICANT CHANGE UP (ref 0–0.9)
MONOCYTES NFR BLD AUTO: 6 % — SIGNIFICANT CHANGE UP (ref 2–14)
MYELOCYTES NFR BLD: 1 % — HIGH (ref 0–0)
NEUTROPHILS # BLD AUTO: 10.22 K/UL — HIGH (ref 1.8–7.4)
NEUTROPHILS NFR BLD AUTO: 70 % — SIGNIFICANT CHANGE UP (ref 43–77)
NEUTS BAND # BLD: 5 % — SIGNIFICANT CHANGE UP (ref 0–8)
NITRITE UR-MCNC: NEGATIVE — SIGNIFICANT CHANGE UP
NRBC # BLD: 1 /100 — HIGH (ref 0–0)
NRBC # BLD: SIGNIFICANT CHANGE UP /100 WBCS (ref 0–0)
OVALOCYTES BLD QL SMEAR: SLIGHT — SIGNIFICANT CHANGE UP
PH UR: 6.5 — SIGNIFICANT CHANGE UP (ref 5–8)
PLAT MORPH BLD: NORMAL — SIGNIFICANT CHANGE UP
PLATELET # BLD AUTO: 131 K/UL — LOW (ref 150–400)
PLATELET COUNT - ESTIMATE: (no result)
POIKILOCYTOSIS BLD QL AUTO: SLIGHT — SIGNIFICANT CHANGE UP
POTASSIUM SERPL-MCNC: 3.7 MMOL/L — SIGNIFICANT CHANGE UP (ref 3.5–5.3)
POTASSIUM SERPL-SCNC: 3.7 MMOL/L — SIGNIFICANT CHANGE UP (ref 3.5–5.3)
PROT SERPL-MCNC: 6 GM/DL — SIGNIFICANT CHANGE UP (ref 6–8.3)
PROT UR-MCNC: 15 MG/DL
PROTHROM AB SERPL-ACNC: 10.5 SEC — SIGNIFICANT CHANGE UP (ref 9.8–12.7)
RBC # BLD: 4.17 M/UL — SIGNIFICANT CHANGE UP (ref 3.8–5.2)
RBC # FLD: 16.5 % — HIGH (ref 10.3–14.5)
RBC BLD AUTO: (no result)
RBC CASTS # UR COMP ASSIST: SIGNIFICANT CHANGE UP /HPF (ref 0–4)
SODIUM SERPL-SCNC: 144 MMOL/L — SIGNIFICANT CHANGE UP (ref 135–145)
SP GR SPEC: 1.01 — SIGNIFICANT CHANGE UP (ref 1.01–1.02)
TROPONIN I SERPL-MCNC: <0.015 NG/ML — SIGNIFICANT CHANGE UP (ref 0.01–0.04)
UROBILINOGEN FLD QL: NEGATIVE MG/DL — SIGNIFICANT CHANGE UP
WBC # BLD: 13.63 K/UL — HIGH (ref 3.8–10.5)
WBC # FLD AUTO: 13.63 K/UL — HIGH (ref 3.8–10.5)
WBC UR QL: SIGNIFICANT CHANGE UP

## 2018-03-25 PROCEDURE — 72125 CT NECK SPINE W/O DYE: CPT | Mod: 26

## 2018-03-25 PROCEDURE — 74176 CT ABD & PELVIS W/O CONTRAST: CPT | Mod: 26

## 2018-03-25 PROCEDURE — 71250 CT THORAX DX C-: CPT | Mod: 26

## 2018-03-25 PROCEDURE — 93010 ELECTROCARDIOGRAM REPORT: CPT

## 2018-03-25 PROCEDURE — 71045 X-RAY EXAM CHEST 1 VIEW: CPT | Mod: 26

## 2018-03-25 PROCEDURE — 99285 EMERGENCY DEPT VISIT HI MDM: CPT

## 2018-03-25 PROCEDURE — 70450 CT HEAD/BRAIN W/O DYE: CPT | Mod: 26

## 2018-03-25 RX ORDER — PIPERACILLIN AND TAZOBACTAM 4; .5 G/20ML; G/20ML
3.38 INJECTION, POWDER, LYOPHILIZED, FOR SOLUTION INTRAVENOUS ONCE
Qty: 0 | Refills: 0 | Status: COMPLETED | OUTPATIENT
Start: 2018-03-25 | End: 2018-03-25

## 2018-03-25 RX ORDER — ACETAMINOPHEN 500 MG
1 TABLET ORAL
Qty: 0 | Refills: 0 | COMMUNITY

## 2018-03-25 RX ORDER — MEMANTINE HYDROCHLORIDE 10 MG/1
21 TABLET ORAL
Qty: 0 | Refills: 0 | Status: DISCONTINUED | OUTPATIENT
Start: 2018-03-25 | End: 2018-04-03

## 2018-03-25 RX ORDER — HEPARIN SODIUM 5000 [USP'U]/ML
5000 INJECTION INTRAVENOUS; SUBCUTANEOUS EVERY 12 HOURS
Qty: 0 | Refills: 0 | Status: DISCONTINUED | OUTPATIENT
Start: 2018-03-25 | End: 2018-04-03

## 2018-03-25 RX ORDER — VANCOMYCIN HCL 1 G
1000 VIAL (EA) INTRAVENOUS ONCE
Qty: 0 | Refills: 0 | Status: COMPLETED | OUTPATIENT
Start: 2018-03-25 | End: 2018-03-25

## 2018-03-25 RX ORDER — SODIUM CHLORIDE 9 MG/ML
1000 INJECTION INTRAMUSCULAR; INTRAVENOUS; SUBCUTANEOUS ONCE
Qty: 0 | Refills: 0 | Status: COMPLETED | OUTPATIENT
Start: 2018-03-25 | End: 2018-03-25

## 2018-03-25 RX ORDER — LEVOTHYROXINE SODIUM 125 MCG
25 TABLET ORAL DAILY
Qty: 0 | Refills: 0 | Status: DISCONTINUED | OUTPATIENT
Start: 2018-03-25 | End: 2018-04-03

## 2018-03-25 RX ORDER — ALBUTEROL 90 UG/1
2.5 AEROSOL, METERED ORAL EVERY 6 HOURS
Qty: 0 | Refills: 0 | Status: DISCONTINUED | OUTPATIENT
Start: 2018-03-25 | End: 2018-03-26

## 2018-03-25 RX ORDER — LANOLIN ALCOHOL/MO/W.PET/CERES
5 CREAM (GRAM) TOPICAL AT BEDTIME
Qty: 0 | Refills: 0 | Status: DISCONTINUED | OUTPATIENT
Start: 2018-03-25 | End: 2018-04-03

## 2018-03-25 RX ORDER — VANCOMYCIN HCL 1 G
125 VIAL (EA) INTRAVENOUS EVERY 6 HOURS
Qty: 0 | Refills: 0 | Status: DISCONTINUED | OUTPATIENT
Start: 2018-03-25 | End: 2018-04-03

## 2018-03-25 RX ORDER — TIOTROPIUM BROMIDE 18 UG/1
1 CAPSULE ORAL; RESPIRATORY (INHALATION) DAILY
Qty: 0 | Refills: 0 | Status: DISCONTINUED | OUTPATIENT
Start: 2018-03-25 | End: 2018-04-03

## 2018-03-25 RX ORDER — SIMVASTATIN 20 MG/1
20 TABLET, FILM COATED ORAL AT BEDTIME
Qty: 0 | Refills: 0 | Status: DISCONTINUED | OUTPATIENT
Start: 2018-03-25 | End: 2018-04-03

## 2018-03-25 RX ORDER — ASPIRIN AND DIPYRIDAMOLE 25; 200 MG/1; MG/1
1 CAPSULE, EXTENDED RELEASE ORAL
Qty: 0 | Refills: 0 | Status: DISCONTINUED | OUTPATIENT
Start: 2018-03-25 | End: 2018-04-03

## 2018-03-25 RX ORDER — ESOMEPRAZOLE MAGNESIUM 40 MG/1
1 CAPSULE, DELAYED RELEASE ORAL
Qty: 0 | Refills: 0 | COMMUNITY

## 2018-03-25 RX ORDER — HYDROXYCHLOROQUINE SULFATE 200 MG
200 TABLET ORAL EVERY 12 HOURS
Qty: 0 | Refills: 0 | Status: DISCONTINUED | OUTPATIENT
Start: 2018-03-25 | End: 2018-04-03

## 2018-03-25 RX ADMIN — Medication 250 MILLIGRAM(S): at 13:06

## 2018-03-25 RX ADMIN — ASPIRIN AND DIPYRIDAMOLE 1 CAPSULE(S): 25; 200 CAPSULE, EXTENDED RELEASE ORAL at 17:42

## 2018-03-25 RX ADMIN — SODIUM CHLORIDE 1000 MILLILITER(S): 9 INJECTION INTRAMUSCULAR; INTRAVENOUS; SUBCUTANEOUS at 08:00

## 2018-03-25 RX ADMIN — HEPARIN SODIUM 5000 UNIT(S): 5000 INJECTION INTRAVENOUS; SUBCUTANEOUS at 17:42

## 2018-03-25 RX ADMIN — Medication 125 MILLIGRAM(S): at 23:15

## 2018-03-25 RX ADMIN — Medication 5 MILLIGRAM(S): at 23:15

## 2018-03-25 RX ADMIN — Medication 200 MILLIGRAM(S): at 20:36

## 2018-03-25 RX ADMIN — Medication 125 MILLIGRAM(S): at 20:35

## 2018-03-25 RX ADMIN — ALBUTEROL 2.5 MILLIGRAM(S): 90 AEROSOL, METERED ORAL at 20:47

## 2018-03-25 RX ADMIN — PIPERACILLIN AND TAZOBACTAM 200 GRAM(S): 4; .5 INJECTION, POWDER, LYOPHILIZED, FOR SOLUTION INTRAVENOUS at 11:48

## 2018-03-25 RX ADMIN — SODIUM CHLORIDE 1000 MILLILITER(S): 9 INJECTION INTRAMUSCULAR; INTRAVENOUS; SUBCUTANEOUS at 11:48

## 2018-03-25 RX ADMIN — Medication 1 TABLET(S): at 14:58

## 2018-03-25 RX ADMIN — SIMVASTATIN 20 MILLIGRAM(S): 20 TABLET, FILM COATED ORAL at 20:37

## 2018-03-25 RX ADMIN — Medication 25 MICROGRAM(S): at 14:58

## 2018-03-25 RX ADMIN — MEMANTINE HYDROCHLORIDE 21 MILLIGRAM(S): 10 TABLET ORAL at 17:42

## 2018-03-25 NOTE — ED PROVIDER NOTE - OBJECTIVE STATEMENT
87 y/o F hx dementia, HTN, TIA/?CVA, HLD, COPD, breast CA on chemo (started treatment approx 2 wks ago) p/w AMS. Per daughter at [facility], pt was last normal last night approx 12 hrs pta when she spoke to her on phone. She went to bathroom overnight but unknown if there was fall, pt has large skin tear to R arm now and is not speaking normally. Pt denies any pain. Daughter reports she has had similar sx with past UTI however today pt has had intermittent rhythmic movements of whole body today that she self reports is from feeling "jittery". No hx seizures, no alteration in level of consciousness during these episodes.

## 2018-03-25 NOTE — H&P ADULT - ASSESSMENT
87 y/o female with  hx dementia, HTN, TIA/CVA, HLD, GERD, Osteoporosis, Polymyalgia, Glaucoma, COPD, recent diagnose of breast CA, started on chemo by Dr. Galindo on 3/12/18, she developed diarrhea and had positive stool test for C. Diff and was started on po Vanco.  who was sent to the ER from [facility] for AMS.The patient was at her baseline functioning yesterday according to her daughter. She had similar symptoms in the past when she had a UTI in the past.

## 2018-03-25 NOTE — ED ADULT NURSE REASSESSMENT NOTE - NS ED NURSE REASSESS COMMENT FT1
pt aaox2, pt resting comfortably on stretcher, vss, afebrile, no s/sx of distress noted, daughter at bedside.  awaiting for ct results.  will con't to monitor received report from T al, pt aaox2, pt resting comfortably on stretcher, vss, afebrile, no s/sx of distress noted, daughter at bedside.  awaiting for ct results.  will con't to monitor

## 2018-03-25 NOTE — ED ADULT NURSE REASSESSMENT NOTE - NS ED NURSE REASSESS COMMENT FT1
Patient remains as previously assessed. VSS, resting comfortably in bed. Denies pain/discomfort, neuro checks remain unremarkable. No more BM on my time. Hygiene care performed, pt repositioned. Safety & comfort measures in place, contact precautions maintained. Hand-off report to ROBB Pedraza, transport bedside to transfer pt to . Hourly rounding on my time.

## 2018-03-25 NOTE — H&P ADULT - NSHPREVIEWOFSYSTEMS_GEN_ALL_CORE
Review of Systems: as in HPI     Eyes: no changes in vision    ENT/Mouth: no changes    Cardiovascular: no chest pain    Respiratory: no SOB    Gastrointestinal: no diarrhea, no nausea, no vomiting    Genitourinary: no dysuria    Breast: no pain    Musculoskeletal: no pain    Integumentary: no itching    Neurological: No Headache, has memory impairment, no focal signs,     Endocrine: no excessive thirst,     Hematologic/Lymphatic: no swollen glands    Allergic/Immunologic: no itching

## 2018-03-25 NOTE — ED ADULT NURSE REASSESSMENT NOTE - NS ED NURSE REASSESS COMMENT FT1
patient resting comfortably. patient turned and positioned. pillow supports in place. patient and family updated on plan of care. will continue to monitor.

## 2018-03-25 NOTE — ED ADULT NURSE NOTE - OBJECTIVE STATEMENT
patient presents in ED with altered mental status. as per daughter at bedside patient is alert and oriented x4 at baseline. last known well at midnight last night

## 2018-03-25 NOTE — H&P ADULT - NSHPPHYSICALEXAM_GEN_ALL_CORE
Physical Exam: Vital Signs Last 24 Hrs  T(C): 36.4 (25 Mar 2018 13:02), Max: 36.6 (25 Mar 2018 06:53)  T(F): 97.5 (25 Mar 2018 13:02), Max: 97.9 (25 Mar 2018 06:53)  HR: 87 (25 Mar 2018 13:02) (80 - 87)  BP: 120/76 (25 Mar 2018 13:02) (110/40 - 124/72)  BP(mean): --  RR: 20 (25 Mar 2018 13:02) (16 - 20)  SpO2: 99% (25 Mar 2018 13:02) (99% - 100%)        HEENT: PRRL EOMI    MOUTH/TEETH/GUMS: Clear    NECK: no JVD    LUNGS: Clear    HEART: S1,S2 RR    ABDOMEN: soft nontender    EXTREMITIES: leg edema:     MUSCULOSKELETAL: no join swelling    NEURO: no tremor, no focal signs.    SKIN: no rash, skin rear R arm,     : CVA negative,

## 2018-03-25 NOTE — ED ADULT NURSE NOTE - CHIEF COMPLAINT QUOTE
EMS called for AMS. As per daughter patient seems slightly off. Unknown last normal. Daughter states patient gets like this when she has UTIs. Hx dementia. First chemo treatment for breast CA was 3/12. BGM 87 at triage. MD Dorantes made aware, no code stroke as per MD Dorantes.

## 2018-03-25 NOTE — ED ADULT NURSE REASSESSMENT NOTE - NS ED NURSE REASSESS COMMENT FT1
Patient care received from ROBB DAS. Patient A&Ox2, baseline per daughter. Resting comfortably in bed, denies pain/discomfort. Generalized weakness with some mild confusion, neuro checks otherwise unremarkable. VSS, safety & comfort measures in place. Dressing to RUE CDI, skin tear via EMS during pt transport to hospital per pt's daughter. Contact precautions maintained, daughter bedside. Will continue to monitor.

## 2018-03-25 NOTE — ED ADULT TRIAGE NOTE - CHIEF COMPLAINT QUOTE
EMS called for AMS. As per daughter patient seems slightly off. Unknown last normal. Daughter states patient gets like this when she has UTIs. Hx dementia. First chemo treatment for breast CA was 3/12. BGM 87 at triage. EMS called for AMS. As per daughter patient seems slightly off. Unknown last normal. Daughter states patient gets like this when she has UTIs. Hx dementia. First chemo treatment for breast CA was 3/12. BGM 87 at triage. MD Dorantes made aware, no code stroke as per MD Dorantes.

## 2018-03-25 NOTE — H&P ADULT - NSHPLABSRESULTS_GEN_ALL_CORE
11.3   13.63 )-----------( 131      ( 25 Mar 2018 07:17 )             35.8       03-25    144  |  108  |  14  ----------------------------<  87  3.7   |  24  |  0.79    Ca    9.0      25 Mar 2018 07:17    TPro  6.0  /  Alb  3.3  /  TBili  0.2  /  DBili  x   /  AST  25  /  ALT  24  /  AlkPhos  106  03-25    UA neg, Troponin 0.015,   CT chest, abdomen and pelvis:  No evidence of solid visceral injury in the chest, abdomen or pelvis, right breast mass with right axillary adenopathy corresponding to known   malignancy, chronic appearing left-sided rib fractures.Additional chronic changes unchanged.  CT brain microvascular ischemia.   CXRay Bibasilar linear and/or subsegmental atelectasis

## 2018-03-26 DIAGNOSIS — R41.82 ALTERED MENTAL STATUS, UNSPECIFIED: ICD-10-CM

## 2018-03-26 LAB
ANION GAP SERPL CALC-SCNC: 10 MMOL/L — SIGNIFICANT CHANGE UP (ref 5–17)
ANISOCYTOSIS BLD QL: SLIGHT — SIGNIFICANT CHANGE UP
BASOPHILS # BLD AUTO: 0.03 K/UL — SIGNIFICANT CHANGE UP (ref 0–0.2)
BASOPHILS NFR BLD AUTO: 0.2 % — SIGNIFICANT CHANGE UP (ref 0–2)
BUN SERPL-MCNC: 9 MG/DL — SIGNIFICANT CHANGE UP (ref 7–23)
CALCIUM SERPL-MCNC: 8.7 MG/DL — SIGNIFICANT CHANGE UP (ref 8.5–10.1)
CHLORIDE SERPL-SCNC: 107 MMOL/L — SIGNIFICANT CHANGE UP (ref 96–108)
CO2 SERPL-SCNC: 26 MMOL/L — SIGNIFICANT CHANGE UP (ref 22–31)
CREAT SERPL-MCNC: 0.69 MG/DL — SIGNIFICANT CHANGE UP (ref 0.5–1.3)
CULTURE RESULTS: NO GROWTH — SIGNIFICANT CHANGE UP
DACRYOCYTES BLD QL SMEAR: SLIGHT — SIGNIFICANT CHANGE UP
EOSINOPHIL # BLD AUTO: 0.03 K/UL — SIGNIFICANT CHANGE UP (ref 0–0.5)
EOSINOPHIL NFR BLD AUTO: 0.2 % — SIGNIFICANT CHANGE UP (ref 0–6)
FOLATE SERPL-MCNC: >20 NG/ML — SIGNIFICANT CHANGE UP (ref 4.8–24.2)
GLUCOSE SERPL-MCNC: 89 MG/DL — SIGNIFICANT CHANGE UP (ref 70–99)
HCT VFR BLD CALC: 33.4 % — LOW (ref 34.5–45)
HGB BLD-MCNC: 10.5 G/DL — LOW (ref 11.5–15.5)
IMM GRANULOCYTES NFR BLD AUTO: 6.8 % — HIGH (ref 0–1.5)
LYMPHOCYTES # BLD AUTO: 1.02 K/UL — SIGNIFICANT CHANGE UP (ref 1–3.3)
LYMPHOCYTES # BLD AUTO: 7.5 % — LOW (ref 13–44)
MACROCYTES BLD QL: SLIGHT — SIGNIFICANT CHANGE UP
MCHC RBC-ENTMCNC: 27.3 PG — SIGNIFICANT CHANGE UP (ref 27–34)
MCHC RBC-ENTMCNC: 31.4 GM/DL — LOW (ref 32–36)
MCV RBC AUTO: 86.8 FL — SIGNIFICANT CHANGE UP (ref 80–100)
MICROCYTES BLD QL: SLIGHT — SIGNIFICANT CHANGE UP
MONOCYTES # BLD AUTO: 1.17 K/UL — HIGH (ref 0–0.9)
MONOCYTES NFR BLD AUTO: 8.6 % — SIGNIFICANT CHANGE UP (ref 2–14)
NEUTROPHILS # BLD AUTO: 10.37 K/UL — HIGH (ref 1.8–7.4)
NEUTROPHILS NFR BLD AUTO: 76.7 % — SIGNIFICANT CHANGE UP (ref 43–77)
NRBC # BLD: 0 /100 WBCS — SIGNIFICANT CHANGE UP (ref 0–0)
OVALOCYTES BLD QL SMEAR: SLIGHT — SIGNIFICANT CHANGE UP
PLAT MORPH BLD: NORMAL — SIGNIFICANT CHANGE UP
PLATELET # BLD AUTO: 151 K/UL — SIGNIFICANT CHANGE UP (ref 150–400)
POIKILOCYTOSIS BLD QL AUTO: SLIGHT — SIGNIFICANT CHANGE UP
POTASSIUM SERPL-MCNC: 3.7 MMOL/L — SIGNIFICANT CHANGE UP (ref 3.5–5.3)
POTASSIUM SERPL-SCNC: 3.7 MMOL/L — SIGNIFICANT CHANGE UP (ref 3.5–5.3)
RBC # BLD: 3.85 M/UL — SIGNIFICANT CHANGE UP (ref 3.8–5.2)
RBC # FLD: 17.2 % — HIGH (ref 10.3–14.5)
RBC BLD AUTO: (no result)
SODIUM SERPL-SCNC: 143 MMOL/L — SIGNIFICANT CHANGE UP (ref 135–145)
SPECIMEN SOURCE: SIGNIFICANT CHANGE UP
TSH SERPL-MCNC: 5.06 UU/ML — HIGH (ref 0.36–3.74)
VIT B12 SERPL-MCNC: >2000 PG/ML — HIGH (ref 232–1245)
WBC # BLD: 13.54 K/UL — HIGH (ref 3.8–10.5)
WBC # FLD AUTO: 13.54 K/UL — HIGH (ref 3.8–10.5)

## 2018-03-26 PROCEDURE — 95819 EEG AWAKE AND ASLEEP: CPT | Mod: 26

## 2018-03-26 PROCEDURE — 99223 1ST HOSP IP/OBS HIGH 75: CPT

## 2018-03-26 RX ORDER — ALBUTEROL 90 UG/1
2.5 AEROSOL, METERED ORAL EVERY 6 HOURS
Qty: 0 | Refills: 0 | Status: DISCONTINUED | OUTPATIENT
Start: 2018-03-26 | End: 2018-04-03

## 2018-03-26 RX ADMIN — Medication 125 MILLIGRAM(S): at 13:57

## 2018-03-26 RX ADMIN — Medication 25 MICROGRAM(S): at 05:15

## 2018-03-26 RX ADMIN — SIMVASTATIN 20 MILLIGRAM(S): 20 TABLET, FILM COATED ORAL at 21:27

## 2018-03-26 RX ADMIN — ASPIRIN AND DIPYRIDAMOLE 1 CAPSULE(S): 25; 200 CAPSULE, EXTENDED RELEASE ORAL at 05:15

## 2018-03-26 RX ADMIN — Medication 125 MILLIGRAM(S): at 17:49

## 2018-03-26 RX ADMIN — HEPARIN SODIUM 5000 UNIT(S): 5000 INJECTION INTRAVENOUS; SUBCUTANEOUS at 05:15

## 2018-03-26 RX ADMIN — Medication 5 MILLIGRAM(S): at 16:59

## 2018-03-26 RX ADMIN — ASPIRIN AND DIPYRIDAMOLE 1 CAPSULE(S): 25; 200 CAPSULE, EXTENDED RELEASE ORAL at 17:48

## 2018-03-26 RX ADMIN — Medication 5 MILLIGRAM(S): at 21:27

## 2018-03-26 RX ADMIN — TIOTROPIUM BROMIDE 1 CAPSULE(S): 18 CAPSULE ORAL; RESPIRATORY (INHALATION) at 14:48

## 2018-03-26 RX ADMIN — ALBUTEROL 2.5 MILLIGRAM(S): 90 AEROSOL, METERED ORAL at 14:54

## 2018-03-26 RX ADMIN — MEMANTINE HYDROCHLORIDE 21 MILLIGRAM(S): 10 TABLET ORAL at 17:47

## 2018-03-26 RX ADMIN — Medication 200 MILLIGRAM(S): at 17:48

## 2018-03-26 RX ADMIN — Medication 1 TABLET(S): at 13:57

## 2018-03-26 RX ADMIN — HEPARIN SODIUM 5000 UNIT(S): 5000 INJECTION INTRAVENOUS; SUBCUTANEOUS at 17:48

## 2018-03-26 RX ADMIN — Medication 125 MILLIGRAM(S): at 05:15

## 2018-03-26 RX ADMIN — Medication 200 MILLIGRAM(S): at 05:16

## 2018-03-26 RX ADMIN — ALBUTEROL 2.5 MILLIGRAM(S): 90 AEROSOL, METERED ORAL at 19:52

## 2018-03-26 NOTE — CONSULT NOTE ADULT - SUBJECTIVE AND OBJECTIVE BOX
Patient is a 88y old  Female who presents with a chief complaint of Altered mental status. (25 Mar 2018 15:42)    HPI:  89 y/o female with h/o dementia, HTN, TIA/CVA, HLD, GERD, Osteoporosis, Polymyalgia, Glaucoma, COPD, recent diagnosed with breast CA, started on chemo by Dr. Galindo on 3/12/18, then diarrhea and CDAD on po Vanco was admitted on 3/25 for increased confusion.      PMH: as above  PSH: as above  Meds: per reconciliation sheet, noted below  MEDICATIONS  (STANDING):  ALBUTerol   0.5% 2.5 milliGRAM(s) Nebulizer every 6 hours  calcium carbonate  625 mG + Vitamin D (OsCal 250 + D) 1 Tablet(s) Oral daily  dipyridamole 200 mG/aspirin 25 mG 1 Capsule(s) Oral two times a day  heparin  Injectable 5000 Unit(s) SubCutaneous every 12 hours  hydroxychloroquine 200 milliGRAM(s) Oral every 12 hours  levothyroxine 25 MICROGram(s) Oral daily  melatonin 5 milliGRAM(s) Oral at bedtime  memantine ER 21 milliGRAM(s) Oral <User Schedule>  multivitamin 1 Tablet(s) Oral daily  simvastatin 20 milliGRAM(s) Oral at bedtime  tiotropium 18 MICROgram(s) Capsule 1 Capsule(s) Inhalation daily  vancomycin    Solution 125 milliGRAM(s) Oral every 6 hours    MEDICATIONS  (PRN):    Allergies    Aciphex (Unknown)  Macrobid (Unknown)  Percocet 10/325 (Rash)    Intolerances      Social: no smoking, no alcohol, no illegal drugs; no recent travel, no exposure to TB  FAMILY HISTORY:  No pertinent family history in first degree relatives    ROS: the patient denies fever, no chills, no HA, no dizziness, no sore throat, no blurry vision, no CP, no palpitations, no SOB, no cough, no abdominal pain, has diarrhea, no N/V, no dysuria, no leg pain, no claudication, no rash, no joint aches, no rectal pain or bleeding, no night sweats    Vital Signs Last 24 Hrs  T(C): 37.3 (26 Mar 2018 05:43), Max: 37.3 (26 Mar 2018 05:43)  T(F): 99.1 (26 Mar 2018 05:43), Max: 99.1 (26 Mar 2018 05:43)  HR: 94 (26 Mar 2018 05:43) (84 - 107)  BP: 133/82 (26 Mar 2018 05:43) (114/67 - 141/89)  BP(mean): --  RR: 18 (26 Mar 2018 05:43) (16 - 20)  SpO2: 95% (26 Mar 2018 05:43) (93% - 100%)  Daily     Daily     PE:    Constitutional: frail looking  HEENT: NC/AT, EOMI, PERRLA  Neck: supple  Back: no tenderness  Respiratory: clear  Cardiovascular: S1S2 regular, no murmurs  Abdomen: soft, not tender, not distended, positive BS  Genitourinary: no LN palpable  Rectal: deferred  Musculoskeletal: no muscle tenderness, no joint swelling or tenderness  Extremities: no pedal edema  Neurological: AxOx3, moving all extremities  Skin: no rashes    Labs:                        10.5   13.54 )-----------( 151      ( 26 Mar 2018 05:51 )             33.4     03-    143  |  107  |  9   ----------------------------<  89  3.7   |  26  |  0.69    Ca    8.7      26 Mar 2018 05:51    TPro  6.0  /  Alb  3.3  /  TBili  0.2  /  DBili  x   /  AST  25  /  ALT  24  /  AlkPhos  106  03-25     LIVER FUNCTIONS - ( 25 Mar 2018 07:17 )  Alb: 3.3 g/dL / Pro: 6.0 gm/dL / ALK PHOS: 106 U/L / ALT: 24 U/L / AST: 25 U/L / GGT: x           Urinalysis Basic - ( 25 Mar 2018 08:40 )    Color: Yellow / Appearance: Clear / S.010 / pH: x  Gluc: x / Ketone: Negative  / Bili: Negative / Urobili: Negative mg/dL   Blood: x / Protein: 15 mg/dL / Nitrite: Negative   Leuk Esterase: Negative / RBC: 0-2 /HPF / WBC 0-2   Sq Epi: x / Non Sq Epi: x / Bacteria: x          Radiology:    < from: CT Abdomen and Pelvis No Cont (18 @ 10:54) >  No evidence of solid visceral injury in the chest, abdomen or pelvis on   this noncontrast study.    Right breast mass with right axillary adenopathy corresponding to known   malignancy.    Chronic appearing left-sided rib fractures.    < end of copied text >      Advanced directives addressed: full resuscitation Patient is a 88y old  Female who presents with a chief complaint of Altered mental status. (25 Mar 2018 15:42)    HPI:  89 y/o female with h/o dementia, HTN, TIA/CVA, HLD, GERD, Osteoporosis, Polymyalgia, Glaucoma, COPD, recent diagnosed with breast CA, started on chemo by Dr. Galindo on 3/12/18, then diarrhea and CDAD on po Vanco was admitted on 3/25 for increased confusion. She has loose stools. Denies pain.    This AM, she is alert and verbal; confusion is at baseline.    PMH: as above  PSH: as above  Meds: per reconciliation sheet, noted below  MEDICATIONS  (STANDING):  ALBUTerol   0.5% 2.5 milliGRAM(s) Nebulizer every 6 hours  calcium carbonate  625 mG + Vitamin D (OsCal 250 + D) 1 Tablet(s) Oral daily  dipyridamole 200 mG/aspirin 25 mG 1 Capsule(s) Oral two times a day  heparin  Injectable 5000 Unit(s) SubCutaneous every 12 hours  hydroxychloroquine 200 milliGRAM(s) Oral every 12 hours  levothyroxine 25 MICROGram(s) Oral daily  melatonin 5 milliGRAM(s) Oral at bedtime  memantine ER 21 milliGRAM(s) Oral <User Schedule>  multivitamin 1 Tablet(s) Oral daily  simvastatin 20 milliGRAM(s) Oral at bedtime  tiotropium 18 MICROgram(s) Capsule 1 Capsule(s) Inhalation daily  vancomycin    Solution 125 milliGRAM(s) Oral every 6 hours    MEDICATIONS  (PRN):    Allergies    Aciphex (Unknown)  Macrobid (Unknown)  Percocet 10/325 (Rash)    Intolerances      Social: no smoking, no alcohol, no illegal drugs; no recent travel, no exposure to TB  FAMILY HISTORY:  No pertinent family history in first degree relatives    ROS: the patient denies fever, no chills, no HA, no dizziness, no sore throat, no blurry vision, no CP, no palpitations, no SOB, no cough, no abdominal pain, has diarrhea, no N/V, no dysuria, no leg pain, no rash, no joint aches, no rectal pain or bleeding, no night sweats    Vital Signs Last 24 Hrs  T(C): 37.3 (26 Mar 2018 05:43), Max: 37.3 (26 Mar 2018 05:43)  T(F): 99.1 (26 Mar 2018 05:43), Max: 99.1 (26 Mar 2018 05:43)  HR: 94 (26 Mar 2018 05:43) (84 - 107)  BP: 133/82 (26 Mar 2018 05:43) (114/67 - 141/89)  BP(mean): --  RR: 18 (26 Mar 2018 05:43) (16 - 20)  SpO2: 95% (26 Mar 2018 05:43) (93% - 100%)  Daily     Daily     PE:    Constitutional: frail looking  HEENT: NC/AT, EOMI, PERRLA  Neck: supple  Back: no tenderness  Respiratory: clear  Cardiovascular: S1S2 regular, no murmurs  Abdomen: soft, not tender, mild distended, positive BS  Genitourinary: no LN palpable  Rectal: deferred  Musculoskeletal: no muscle tenderness, no joint swelling or tenderness  Extremities: no pedal edema  Neurological: AxOx2, moving all extremities  Skin: no rashes    Labs:                        10.5   13.54 )-----------( 151      ( 26 Mar 2018 05:51 )             33.4     03-26    143  |  107  |  9   ----------------------------<  89  3.7   |  26  |  0.69    Ca    8.7      26 Mar 2018 05:51    TPro  6.0  /  Alb  3.3  /  TBili  0.2  /  DBili  x   /  AST  25  /  ALT  24  /  AlkPhos  106  03-25     LIVER FUNCTIONS - ( 25 Mar 2018 07:17 )  Alb: 3.3 g/dL / Pro: 6.0 gm/dL / ALK PHOS: 106 U/L / ALT: 24 U/L / AST: 25 U/L / GGT: x           Urinalysis Basic - ( 25 Mar 2018 08:40 )    Color: Yellow / Appearance: Clear / S.010 / pH: x  Gluc: x / Ketone: Negative  / Bili: Negative / Urobili: Negative mg/dL   Blood: x / Protein: 15 mg/dL / Nitrite: Negative   Leuk Esterase: Negative / RBC: 0-2 /HPF / WBC 0-2   Sq Epi: x / Non Sq Epi: x / Bacteria: x          Radiology:    < from: CT Abdomen and Pelvis No Cont (03.25.18 @ 10:54) >  No evidence of solid visceral injury in the chest, abdomen or pelvis on   this noncontrast study.    Right breast mass with right axillary adenopathy corresponding to known   malignancy.    Chronic appearing left-sided rib fractures.    < end of copied text >      Advanced directives addressed: full resuscitation

## 2018-03-26 NOTE — CONSULT NOTE ADULT - SUBJECTIVE AND OBJECTIVE BOX
Neurology Consult requested by:   Patient is a 88y old  Female who presents with a chief complaint of Altered mental status. (25 Mar 2018 15:42)     HPI:  87 y/o female with  hx dementia, HTN, TIA/CVA, HLD, GERD, Osteoporosis, Polymyalgia, Glaucoma, COPD, recent diagnose of breast CA, started on chemo by Dr. Galindo on 3/12/18, she developed diarrhea and had positive stool test for C. Diff and was started on po Vanco.  who was sent to the ER from [facility] for AMS.The patient was at her baseline functioning yesterday according to her daughter. She had similar symptoms in the past when she had a UTI in the past. Denies any headache, slurred speech, no unilateral weakness, numbness.      PAST MEDICAL & SURGICAL HISTORY:  Compression fracture of spine: T7  Alzheimers disease  HTN (hypertension)  Glaucoma  TIA (transient ischemic attack): 8/07  Cerebral vascular accident: 2005  Polymyalgia rheumatica  Osteoporosis  Chronic pain: mid back  Urinary retention  GERD (gastroesophageal reflux disease)  Cholelithiases  Hyperlipemia  Carotid artery stenosis  Lung nodule: biopsied 2003, benign  COPD (chronic obstructive pulmonary disease)  Asthma  History of hip surgery  Gall stones  History of hysterectomy: for bleeding  Hx of cholecystectomy    FAMILY HISTORY:  No pertinent family history in first degree relatives    Social Hx:  Nonsmoker, no drug or alcohol use  Medications and Allergies Reviewed MEDICATIONS  (STANDING):  ALBUTerol   0.5% 2.5 milliGRAM(s) Nebulizer every 6 hours  calcium carbonate  625 mG + Vitamin D (OsCal 250 + D) 1 Tablet(s) Oral daily  dipyridamole 200 mG/aspirin 25 mG 1 Capsule(s) Oral two times a day  heparin  Injectable 5000 Unit(s) SubCutaneous every 12 hours  hydroxychloroquine 200 milliGRAM(s) Oral every 12 hours  levothyroxine 25 MICROGram(s) Oral daily  melatonin 5 milliGRAM(s) Oral at bedtime  memantine ER 21 milliGRAM(s) Oral <User Schedule>  multivitamin 1 Tablet(s) Oral daily  simvastatin 20 milliGRAM(s) Oral at bedtime  tiotropium 18 MICROgram(s) Capsule 1 Capsule(s) Inhalation daily  vancomycin    Solution 125 milliGRAM(s) Oral every 6 hours     ROS: Pertinent positives in HPI, all other ROS were reviewed and are negative.      Examination:   Vital Signs Last 24 Hrs  T(C): 37.3 (26 Mar 2018 05:43), Max: 37.3 (26 Mar 2018 05:43)  T(F): 99.1 (26 Mar 2018 05:43), Max: 99.1 (26 Mar 2018 05:43)  HR: 94 (26 Mar 2018 05:43) (84 - 107)  BP: 133/82 (26 Mar 2018 05:43) (114/67 - 141/89)  BP(mean): --  RR: 18 (26 Mar 2018 05:43) (16 - 20)  SpO2: 95% (26 Mar 2018 05:43) (93% - 100%)  General: Cooperative, NAD   NECK: supple, no masses  ENT: Normal hearing   Vascular : no carotid bruits,   Lungs: CTAB  Chest: RRR, no murmurs  Extremities: nontender, no edema  Musculoskeletal: no adventitious movements, no joint stiffness  Skin: no rash    Neurological Examination:  NIHSS:  MS: AOx2.interactive, pleasant, cooperative, no L/R confusion, follows 3 step commands. Speech fluent w/o paraphasic error, repetition, naming,  intact   CN: VFFTC, PERLL, EOMI, V1-3 sensation intact, face symmetric, hearing intact, palate elevates symmetrically, tongue midline, SCM equal bilaterally  Motor: normal bulk and tone, no tremor, rigidity or bradykinesia.  4+/5 all over   Sens: Intact to light touch.    Reflexes: 1/4 all over, downgoing toes b/l  Coord:  No dysmetria, GHADA intact   Gait: not tested    Labs:   Complete Blood Count + Automated Diff (03.26.18 @ 05:51)    WBC Count: 13.54 K/uL    RBC Count: 3.85 M/uL    Hemoglobin: 10.5 g/dL    Hematocrit: 33.4 %    Mean Cell Volume: 86.8 fl    Mean Cell Hemoglobin: 27.3 pg    Mean Cell Hemoglobin Conc: 31.4 gm/dL    Red Cell Distrib Width: 17.2 %    Platelet Count - Automated: 151 K/uL    Basic Metabolic Panel (03.26.18 @ 05:51)    Sodium, Serum: 143 mmol/L    Potassium, Serum: 3.7 mmol/L    Chloride, Serum: 107 mmol/L    Carbon Dioxide, Serum: 26 mmol/L    Anion Gap, Serum: 10 mmol/L    Blood Urea Nitrogen, Serum: 9 mg/dL    Creatinine, Serum: 0.69 mg/dL    Glucose, Serum: 89 mg/dL    Calcium, Total Serum: 8.7 mg/dL    eGFR if Non : 78: Interpretative comment    < from: CT Head No Cont (03.25.18 @ 08:02) >  Findings:    There is no intracranial hemorrhage, mass effect or midline shift. No   extra-axial collection is identified. There is no large acute territorial   infarct.    There is moderate prominence of the ventricles and subarachnoid spaces,   compatible with age related involutional changes. Prominent   periventricular lucency is present, compatible with microvascular   ischemic change.    The visualized skull and mastoid are unremarkable.    The paranasal sinuses and orbits are within normal limits.    Impression:    Age related involutional and microvascular ischemic changes, without   evidence of intracranial hemorrhage, mass effect or midline shift.

## 2018-03-26 NOTE — PROGRESS NOTE ADULT - SUBJECTIVE AND OBJECTIVE BOX
89 y/o female with h/o dementia, HTN, TIA/CVA, HLD, GERD, Osteoporosis, Polymyalgia, Glaucoma, COPD, recent diagnosed with breast CA, started on chemo by Dr. Galindo on 3/12/18, then diarrhea and CDAD on po Vanco was admitted on 3/25 for increased confusion. She has loose stools. Denies pain.    This AM, she is alert and verbal; confusion is at baseline. Knows she is on daily Prednisone doesn't  know for what; AAOx3      Vital Signs Last 24 Hrs  T(C): 37.3 (26 Mar 2018 05:43), Max: 37.3 (26 Mar 2018 05:43)  T(F): 99.1 (26 Mar 2018 05:43), Max: 99.1 (26 Mar 2018 05:43)  HR: 94 (26 Mar 2018 05:43) (84 - 107)  BP: 133/82 (26 Mar 2018 05:43) (114/67 - 141/89)  BP(mean): --  RR: 18 (26 Mar 2018 05:43) (16 - 20)  SpO2: 95% (26 Mar 2018 05:43) (93% - 100%)      Constitutional: frail looking  HEENT: NC/AT, EOMI, PERRLA  Neck: supple  Back: no tenderness  Respiratory: clear  Cardiovascular: S1S2 regular, no murmurs  Abdomen: soft, not tender, mild distended, positive BS  Genitourinary: no LN palpable  Rectal: deferred  Musculoskeletal: no muscle tenderness, no joint swelling or tenderness  Extremities: no pedal edema  Neurological: AxOx2, moving all extremities  Skin: no rashes    Labs:                        10.5   13.54 )-----------( 151      ( 26 Mar 2018 05:51 )             33.4     03-26    143  |  107  |  9   ----------------------------<  89  3.7   |  26  |  0.69    Ca    8.7      26 Mar 2018 05:51    TPro  6.0  /  Alb  3.3  /  TBili  0.2  /  DBili  x   /  AST  25  /  ALT  24  /  AlkPhos  106  03-25         CDAD  CONFUSION  CHRONIC STEROIDS FOR RA  BREAST CA S/P RECENT CHEMO  ABNORMAL TSH  MILD LEUKOCYTOSIS    PLAN:    - METAB ENCEPH SEC TO DIARRHEA- PROBAB DEHYDRATED; RESOLVED  - tsh f/up as an outpt  - repeat cbc in am: steroids may contribute to it  - c/w po vanco  - OK eval dc home in am

## 2018-03-26 NOTE — CONSULT NOTE ADULT - SUBJECTIVE AND OBJECTIVE BOX
HPI:    88 y.o.f  hx dementia, HTN, TIA/CVA, HLD, GERD, Osteoporosis, Polymyalgia, Glaucoma, COPD, recent diagnose of breast CA, started on chemo by Dr. Galindo on 3/12/18, dx diarrhea and positive stool test for C. Diff and on po Vanco. pat was sent from [facility] living with AMS. The patient was Dx with TIA & admitted. ask to see for pulmonary with h/o COPD, no cough & sob, sittinig in chair.      PAST MEDICAL & SURGICAL HISTORY:  Compression fracture of spine: T7  Alzheimers disease  HTN (hypertension)  Glaucoma  TIA (transient ischemic attack):   Cerebral vascular accident:   Polymyalgia rheumatica  Osteoporosis  Chronic pain: mid back  Urinary retention  GERD (gastroesophageal reflux disease)  Cholelithiases  Hyperlipemia  Carotid artery stenosis  Lung nodule: biopsied , benign  COPD (chronic obstructive pulmonary disease)  Asthma  History of hip surgery  Gall stones  History of hysterectomy: for bleeding  Hx of cholecystectomy      Home Medications:  acetaminophen 500 mg oral tablet: 2 tab(s) orally 2 times a day (25 Mar 2018 14:20)  Aggrenox 25 mg-200 mg oral capsule, extended release: 1 cap(s) orally 2 times a day (25 Mar 2018 14:20)  albuterol 2.5 mg/3 mL (0.083%) inhalation solution: 3 milliliter(s) inhaled 4 times a day, As Needed (25 Mar 2018 14:20)  Calcium 600+D oral tablet: 1 tab(s) orally once a day (25 Mar 2018 14:20)  cefdinir 300 mg oral capsule: 1 cap(s) orally every 12 hours ** course completed ** (25 Mar 2018 14:20)  hydroxychloroquine 200 mg oral tablet: 1 tab(s) orally every 12 hours (25 Mar 2018 14:20)  Lidoderm 5% topical film: Apply topically to affected area once a day (25 Mar 2018 14:20)  Multiple Vitamins oral tablet: 1 tab(s) orally once a day (25 Mar 2018 14:20)  Namenda XR 21 mg oral capsule, extended release: 1 cap(s) orally once a day (at bedtime) ** PATIENT OWN MED ** (25 Mar 2018 14:20)  NexIUM 40 mg oral delayed release capsule: 1 cap(s) orally once a day (25 Mar 2018 14:20)  predniSONE 5 mg oral tablet: 1 tab(s) orally once a day ** chronic treatment of RA** (25 Mar 2018 14:20)  simvastatin 20 mg oral tablet: 1 tab(s) orally once a day (at bedtime) (25 Mar 2018 14:20)  Spiriva 18 mcg inhalation capsule: 1 cap(s) inhaled once a day (25 Mar 2018 14:20)  Synthroid 25 mcg (0.025 mg) oral tablet: 1 tab(s) orally once a day (25 Mar 2018 14:20)      MEDICATIONS  (STANDING):  ALBUTerol   0.5% 2.5 milliGRAM(s) Nebulizer every 6 hours  calcium carbonate  625 mG + Vitamin D (OsCal 250 + D) 1 Tablet(s) Oral daily  dipyridamole 200 mG/aspirin 25 mG 1 Capsule(s) Oral two times a day  heparin  Injectable 5000 Unit(s) SubCutaneous every 12 hours  hydroxychloroquine 200 milliGRAM(s) Oral every 12 hours  levothyroxine 25 MICROGram(s) Oral daily  melatonin 5 milliGRAM(s) Oral at bedtime  memantine ER 21 milliGRAM(s) Oral <User Schedule>  multivitamin 1 Tablet(s) Oral daily  simvastatin 20 milliGRAM(s) Oral at bedtime  tiotropium 18 MICROgram(s) Capsule 1 Capsule(s) Inhalation daily  vancomycin    Solution 125 milliGRAM(s) Oral every 6 hours    MEDICATIONS  (PRN):      Allergies    Aciphex (Unknown)  Macrobid (Unknown)  Percocet 10/325 (Rash)    Intolerances        SOCIAL HISTORY: Denies tobacco, etoh abuse or illicit drug use    FAMILY HISTORY:  No pertinent family history in first degree relatives      Vital Signs Last 24 Hrs  T(C): 37.3 (26 Mar 2018 05:43), Max: 37.3 (26 Mar 2018 05:43)  T(F): 99.1 (26 Mar 2018 05:43), Max: 99.1 (26 Mar 2018 05:43)  HR: 94 (26 Mar 2018 05:43) (82 - 107)  BP: 133/82 (26 Mar 2018 05:43) (114/67 - 141/89)  BP(mean): --  RR: 18 (26 Mar 2018 05:43) (16 - 20)  SpO2: 95% (26 Mar 2018 05:43) (93% - 100%)        REVIEW OF SYSTEMS:    CONSTITUTIONAL:  As per HPI.  HEENT:  Eyes:  No diplopia or blurred vision. ENT:  No earache, sore throat or runny nose.  CARDIOVASCULAR:  No pressure, squeezing, tightness, heaviness or aching about the chest, neck, axilla or epigastrium.  RESPIRATORY:  No cough, shortness of breath, PND or orthopnea.  GASTROINTESTINAL:  No nausea, vomiting or diarrhea.  GENITOURINARY:  No dysuria, frequency or urgency.  MUSCULOSKELETAL:  As per HPI.  SKIN:  No change in skin, hair or nails.  NEUROLOGIC:  No paresthesias, fasciculations, seizures or weakness.  PSYCHIATRIC:  No disorder of thought or mood.  ENDOCRINE:  No heat or cold intolerance, polyuria or polydipsia.  HEMATOLOGICAL:  No easy bruising or bleedings:  .     PHYSICAL EXAMINATION:    GENERAL APPEARANCE:  Pt. is not currently dyspneic, in no distress. Pt. is alert, oriented, and pleasant.  HEENT:  Pupils are normal and react normally. No icterus. Mucous membranes well colored.  NECK:  Supple. No lymphadenopathy. Jugular venous pressure not elevated. Carotids equal.   HEART:   The cardiac impulse has a normal quality. Regular. Normal S1 and S2. There are no murmurs, rubs or gallops noted  CHEST:  Chest is clear to auscultation. Normal respiratory effort.  ABDOMEN:  Soft and nontender.   EXTREMITIES:  There is no cyanosis, clubbing or edema.   SKIN:  No rash or significant lesions are noted.    LABS:                        10.5   13.54 )-----------( 151      ( 26 Mar 2018 05:51 )             33.4     03-    143  |  107  |  9   ----------------------------<  89  3.7   |  26  |  0.69    Ca    8.7      26 Mar 2018 05:51    TPro  6.0  /  Alb  3.3  /  TBili  0.2  /  DBili  x   /  AST  25  /  ALT  24  /  AlkPhos  106  03-25    LIVER FUNCTIONS - ( 25 Mar 2018 07:17 )  Alb: 3.3 g/dL / Pro: 6.0 gm/dL / ALK PHOS: 106 U/L / ALT: 24 U/L / AST: 25 U/L / GGT: x           PT/INR - ( 25 Mar 2018 07:17 )   PT: 10.5 sec;   INR: 0.97 ratio         PTT - ( 25 Mar 2018 07:17 )  PTT:19.7 sec  CARDIAC MARKERS ( 25 Mar 2018 07:17 )  <0.015 ng/mL / x     / x     / x     / x          Urinalysis Basic - ( 25 Mar 2018 08:40 )    Color: Yellow / Appearance: Clear / S.010 / pH: x  Gluc: x / Ketone: Negative  / Bili: Negative / Urobili: Negative mg/dL   Blood: x / Protein: 15 mg/dL / Nitrite: Negative   Leuk Esterase: Negative / RBC: 0-2 /HPF / WBC 0-2   Sq Epi: x / Non Sq Epi: x / Bacteria: x    RADIOLOGY & ADDITIONAL STUDIES:     CT Chest No Cont (18 @ 08:33) >  IMPRESSION:      No evidence of solid visceral injury in the chest, abdomen or pelvis on   this noncontrast study.    Right breast mass with right axillary adenopathy corresponding to known   malignancy.    Chronic appearing left-sided rib fractures.    Additional chronic changes unchanged.

## 2018-03-27 LAB
ANION GAP SERPL CALC-SCNC: 9 MMOL/L — SIGNIFICANT CHANGE UP (ref 5–17)
BUN SERPL-MCNC: 10 MG/DL — SIGNIFICANT CHANGE UP (ref 7–23)
CALCIUM SERPL-MCNC: 9.1 MG/DL — SIGNIFICANT CHANGE UP (ref 8.5–10.1)
CHLORIDE SERPL-SCNC: 107 MMOL/L — SIGNIFICANT CHANGE UP (ref 96–108)
CO2 SERPL-SCNC: 28 MMOL/L — SIGNIFICANT CHANGE UP (ref 22–31)
CREAT SERPL-MCNC: 0.68 MG/DL — SIGNIFICANT CHANGE UP (ref 0.5–1.3)
GLUCOSE BLDC GLUCOMTR-MCNC: 92 MG/DL — SIGNIFICANT CHANGE UP (ref 70–99)
GLUCOSE SERPL-MCNC: 96 MG/DL — SIGNIFICANT CHANGE UP (ref 70–99)
HCT VFR BLD CALC: 33.1 % — LOW (ref 34.5–45)
HGB BLD-MCNC: 10.5 G/DL — LOW (ref 11.5–15.5)
MCHC RBC-ENTMCNC: 27.2 PG — SIGNIFICANT CHANGE UP (ref 27–34)
MCHC RBC-ENTMCNC: 31.7 GM/DL — LOW (ref 32–36)
MCV RBC AUTO: 85.8 FL — SIGNIFICANT CHANGE UP (ref 80–100)
NRBC # BLD: 0 /100 WBCS — SIGNIFICANT CHANGE UP (ref 0–0)
PLATELET # BLD AUTO: 172 K/UL — SIGNIFICANT CHANGE UP (ref 150–400)
POTASSIUM SERPL-MCNC: 3.9 MMOL/L — SIGNIFICANT CHANGE UP (ref 3.5–5.3)
POTASSIUM SERPL-SCNC: 3.9 MMOL/L — SIGNIFICANT CHANGE UP (ref 3.5–5.3)
RBC # BLD: 3.86 M/UL — SIGNIFICANT CHANGE UP (ref 3.8–5.2)
RBC # FLD: 17.7 % — HIGH (ref 10.3–14.5)
SODIUM SERPL-SCNC: 144 MMOL/L — SIGNIFICANT CHANGE UP (ref 135–145)
T PALLIDUM AB TITR SER: NEGATIVE — SIGNIFICANT CHANGE UP
WBC # BLD: 10.9 K/UL — HIGH (ref 3.8–10.5)
WBC # FLD AUTO: 10.9 K/UL — HIGH (ref 3.8–10.5)

## 2018-03-27 PROCEDURE — 93010 ELECTROCARDIOGRAM REPORT: CPT

## 2018-03-27 PROCEDURE — 99233 SBSQ HOSP IP/OBS HIGH 50: CPT

## 2018-03-27 PROCEDURE — 70551 MRI BRAIN STEM W/O DYE: CPT | Mod: 26

## 2018-03-27 RX ADMIN — Medication 200 MILLIGRAM(S): at 06:25

## 2018-03-27 RX ADMIN — MEMANTINE HYDROCHLORIDE 21 MILLIGRAM(S): 10 TABLET ORAL at 18:16

## 2018-03-27 RX ADMIN — ASPIRIN AND DIPYRIDAMOLE 1 CAPSULE(S): 25; 200 CAPSULE, EXTENDED RELEASE ORAL at 18:15

## 2018-03-27 RX ADMIN — ALBUTEROL 2.5 MILLIGRAM(S): 90 AEROSOL, METERED ORAL at 13:40

## 2018-03-27 RX ADMIN — Medication 5 MILLIGRAM(S): at 22:39

## 2018-03-27 RX ADMIN — Medication 125 MILLIGRAM(S): at 00:20

## 2018-03-27 RX ADMIN — Medication 125 MILLIGRAM(S): at 12:48

## 2018-03-27 RX ADMIN — HEPARIN SODIUM 5000 UNIT(S): 5000 INJECTION INTRAVENOUS; SUBCUTANEOUS at 18:15

## 2018-03-27 RX ADMIN — Medication 125 MILLIGRAM(S): at 18:14

## 2018-03-27 RX ADMIN — Medication 25 MICROGRAM(S): at 06:25

## 2018-03-27 RX ADMIN — Medication 200 MILLIGRAM(S): at 18:15

## 2018-03-27 RX ADMIN — HEPARIN SODIUM 5000 UNIT(S): 5000 INJECTION INTRAVENOUS; SUBCUTANEOUS at 06:24

## 2018-03-27 RX ADMIN — Medication 125 MILLIGRAM(S): at 06:26

## 2018-03-27 RX ADMIN — Medication 1 TABLET(S): at 12:49

## 2018-03-27 RX ADMIN — Medication 5 MILLIGRAM(S): at 06:43

## 2018-03-27 RX ADMIN — SIMVASTATIN 20 MILLIGRAM(S): 20 TABLET, FILM COATED ORAL at 22:38

## 2018-03-27 RX ADMIN — TIOTROPIUM BROMIDE 1 CAPSULE(S): 18 CAPSULE ORAL; RESPIRATORY (INHALATION) at 08:11

## 2018-03-27 RX ADMIN — ALBUTEROL 2.5 MILLIGRAM(S): 90 AEROSOL, METERED ORAL at 08:11

## 2018-03-27 RX ADMIN — ASPIRIN AND DIPYRIDAMOLE 1 CAPSULE(S): 25; 200 CAPSULE, EXTENDED RELEASE ORAL at 06:24

## 2018-03-27 NOTE — SWALLOW BEDSIDE ASSESSMENT ADULT - SWALLOW EVAL: RECOMMENDED DIET
No oropharyngeal swallowing contraindications were evident for being on a regular texture diet with thin liquids as she tolerates same from an oropharyngeal swallowing stance on clinical exam.

## 2018-03-27 NOTE — SWALLOW BEDSIDE ASSESSMENT ADULT - SWALLOW EVAL: DIAGNOSIS
1) Patient demonstrates Oropharyngeal Swallowing abilities which subjectively appears to be stable and within functional parameters for age when in an alert state. No behavioral aspiration signs were exhibited on exam. Oropharyngeal swallow status appears stable and is likely at usual state.   2) Patient demonstrates baseline cognitive deficits associated with dementia(i.e decreased STM, reduced insight). With that being stated, pt was able to verbalize her intents without an overt primary motor speech or primary linguistic pathology and her communicative integrity is at or close to reported baseline,

## 2018-03-27 NOTE — PROGRESS NOTE ADULT - SUBJECTIVE AND OBJECTIVE BOX
Patient is a 88y old  Female who presents with a chief complaint of Altered mental status. (25 Mar 2018 15:42)    HPI:  87 y/o female with h/o dementia, HTN, TIA/CVA, HLD, GERD, Osteoporosis, Polymyalgia, Glaucoma, COPD, recent diagnosed with breast CA, started on chemo by Dr. Galindo on 3/12/18, then diarrhea and CDAD on po Vanco was admitted on 3/25 for increased confusion. She has loose stools. Denies pain.    Date of service: 18 @ 08:36    The patient was reported with episode of weakness, unable to speak due to voice weakness  At present time the patient is confused and verbal  Had loose stools    ROS: no fever or chills; denies dizziness, no HA, no SOB or cough, no abdominal pain, no diarrhea or constipation; no dysuria, no legs pain, no rashes    MEDICATIONS  (STANDING):  ALBUTerol    0.083% 2.5 milliGRAM(s) Nebulizer every 6 hours  calcium carbonate  625 mG + Vitamin D (OsCal 250 + D) 1 Tablet(s) Oral daily  dipyridamole 200 mG/aspirin 25 mG 1 Capsule(s) Oral two times a day  heparin  Injectable 5000 Unit(s) SubCutaneous every 12 hours  hydroxychloroquine 200 milliGRAM(s) Oral every 12 hours  levothyroxine 25 MICROGram(s) Oral daily  melatonin 5 milliGRAM(s) Oral at bedtime  memantine ER 21 milliGRAM(s) Oral <User Schedule>  multivitamin 1 Tablet(s) Oral daily  predniSONE   Tablet 5 milliGRAM(s) Oral daily  simvastatin 20 milliGRAM(s) Oral at bedtime  tiotropium 18 MICROgram(s) Capsule 1 Capsule(s) Inhalation daily  vancomycin    Solution 125 milliGRAM(s) Oral every 6 hours    MEDICATIONS  (PRN):      Vital Signs Last 24 Hrs  T(C): 36.4 (27 Mar 2018 04:14), Max: 36.9 (26 Mar 2018 12:58)  T(F): 97.5 (27 Mar 2018 04:14), Max: 98.5 (26 Mar 2018 12:58)  HR: 90 (27 Mar 2018 04:14) (90 - 110)  BP: 151/59 (27 Mar 2018 04:14) (138/49 - 152/90)  BP(mean): --  RR: 18 (27 Mar 2018 04:14) (18 - 18)  SpO2: 95% (27 Mar 2018 04:14) (94% - 95%)    Physical Exam:      Constitutional: frail looking  HEENT: NC/AT, EOMI, PERRLA  Neck: supple  Back: no tenderness  Respiratory: clear  Cardiovascular: S1S2 regular, no murmurs  Abdomen: soft, not tender, mild distended, positive BS  Genitourinary: no LN palpable  Rectal: deferred  Musculoskeletal: no muscle tenderness, no joint swelling or tenderness  Extremities: no pedal edema  Neurological: AxOx2, moving all extremities  Skin: no rashes    Labs:                        10.5   10.90 )-----------( 172      ( 27 Mar 2018 08:06 )             33.1         144  |  107  |  10  ----------------------------<  96  3.9   |  28  |  0.68    Ca    9.1      27 Mar 2018     TPro  6.0  /  Alb  3.3  /  TBili  0.2  /  DBili  x   /  AST  25  /  ALT  24  /  AlkPhos  106  03-     LIVER FUNCTIONS - ( 25 Mar 2018 07:17 )  Alb: 3.3 g/dL / Pro: 6.0 gm/dL / ALK PHOS: 106 U/L / ALT: 24 U/L / AST: 25 U/L / GGT: x           Urinalysis Basic - ( 25 Mar 2018 08:40 )    Color: Yellow / Appearance: Clear / S.010 / pH: x  Gluc: x / Ketone: Negative  / Bili: Negative / Urobili: Negative mg/dL   Blood: x / Protein: 15 mg/dL / Nitrite: Negative   Leuk Esterase: Negative / RBC: 0-2 /HPF / WBC 0-2   Sq Epi: x / Non Sq Epi: x / Bacteria: x          Radiology:    < from: CT Abdomen and Pelvis No Cont (18 @ 10:54) >  No evidence of solid visceral injury in the chest, abdomen or pelvis on   this noncontrast study.    Right breast mass with right axillary adenopathy corresponding to known   malignancy.    Chronic appearing left-sided rib fractures.    < end of copied text >      Advanced directives addressed: full resuscitation

## 2018-03-27 NOTE — SWALLOW BEDSIDE ASSESSMENT ADULT - COMMENTS
Pt admitted to  from [place] due to altered mentation without a neuro focality. This profile is superimposed upon a past medical history which is remarkable for recent C-Diff, recent diagnosis of breast cancer s/p chemo, dementia, HTN, HLD, OP, RA, COPD, presence of pulmonary nodule, Polymyalgia Rheumatica, urinary retention, gallstones, past compression fracture of T7, previous hip fracture/sx, past hysterectomy and prior CVA/TIA. See below for additional prior medical information. The pt was admitted to  from [place] due to altered mentation without a neuro focality. This profile is superimposed upon a past medical history which is remarkable for recent C-Diff, recent diagnosis of breast cancer s/p chemo, dementia, HTN, HLD, OP, RA, COPD, presence of pulmonary nodule, Polymyalgia Rheumatica, urinary retention, gallstones, past compression fracture of T7, previous hip fracture/sx, past hysterectomy and prior CVA/TIA. See below for additional prior medical information.

## 2018-03-27 NOTE — PROGRESS NOTE ADULT - SUBJECTIVE AND OBJECTIVE BOX
89 y/o female with h/o dementia, HTN, TIA/CVA, HLD, GERD, Osteoporosis, Polymyalgia, Glaucoma, COPD, recent diagnosed with breast CA, started on chemo by Dr. Galindo on 3/12/18, then diarrhea and CDAD on po Vanco was admitted on 3/25 for increased confusion. She has loose stools. Denies pain.    3/25: she is alert and verbal; confusion is at baseline. Knows she is on daily Prednisone doesn't  know for what; AAOx3  3/26: RR called for change in MS: pt weak, unable to speak due to voice weakness, articulates words with her lips; unable to cough initially, after a while was able to produce one cough and with great struggle said No: it was a very nasal type of" no" clearly not normal; daughter present; same thing happened Sun; Mom is a v active lady, and here she is been walking with no signs of weakness. Transfer to monitor bed for pulse o2 monitor; doubt GBS or MG; MRI will be ordered      Vital Signs Last 24 Hrs  T(C): 36.4 (27 Mar 2018 04:14), Max: 36.9 (26 Mar 2018 12:58)  T(F): 97.5 (27 Mar 2018 04:14), Max: 98.5 (26 Mar 2018 12:58)  HR: 90 (27 Mar 2018 04:14) (90 - 110)  BP: 151/59 (27 Mar 2018 04:14) (138/49 - 152/90)  BP(mean): --  RR: 18 (27 Mar 2018 04:14) (18 - 18)  SpO2: 95% (27 Mar 2018 04:14) (94% - 95%)    Constitutional: frail looking  HEENT: NC/AT, EOMI, PERRLA  Neck: supple  Back: no tenderness  Respiratory: clear; can't cough  Cardiovascular: S1S2 regular, no murmurs  Abdomen: soft, not tender, mild distended, positive BS  Genitourinary: no LN palpable  Rectal: deferred  Musculoskeletal: no muscle tenderness, no joint swelling or tenderness  Extremities: no pedal edema  Neurological: see above; mot str LE 4/5 upper 3/5; no pronator drift, marked tremor when arms at prone test  Skin: no rashes    Labs:             pending    WEAKNESS  CDAD  CONFUSION  CHRONIC STEROIDS FOR RA  BREAST CA S/P RECENT CHEMO  ABNORMAL TSH  MILD LEUKOCYTOSIS    PLAN:      - MRI, transfer to monitored unit for neuro and resp status; reconsult neuro; too abrupt and not ascending  to be GBS but it shares some findings  - METAB ENCEPH SEC TO DIARRHEA- PROBAB DEHYDRATED; RESOLVED  - tsh f/up as an outpt  - pending labs  - c/w po vanco

## 2018-03-27 NOTE — PROGRESS NOTE ADULT - SUBJECTIVE AND OBJECTIVE BOX
HPI:  87 y/o female with  hx dementia, HTN, TIA/CVA, HLD, GERD, Osteoporosis, Polymyalgia, Glaucoma, COPD, recent diagnose of breast CA, started on chemo by Dr. Galindo on 3/12/18, she developed diarrhea and had positive stool test for C. Diff and was started on po Vanco.  who was sent to the ER from [facility] for AMS. Today similar event, not responding, noted tremors of extremities amnestic for events. Presently back to baseline.    MEDICATIONS  (STANDING):  ALBUTerol    0.083% 2.5 milliGRAM(s) Nebulizer every 6 hours  calcium carbonate  625 mG + Vitamin D (OsCal 250 + D) 1 Tablet(s) Oral daily  dipyridamole 200 mG/aspirin 25 mG 1 Capsule(s) Oral two times a day  heparin  Injectable 5000 Unit(s) SubCutaneous every 12 hours  hydroxychloroquine 200 milliGRAM(s) Oral every 12 hours  levothyroxine 25 MICROGram(s) Oral daily  melatonin 5 milliGRAM(s) Oral at bedtime  memantine ER 21 milliGRAM(s) Oral <User Schedule>  multivitamin 1 Tablet(s) Oral daily  predniSONE   Tablet 5 milliGRAM(s) Oral daily  simvastatin 20 milliGRAM(s) Oral at bedtime  tiotropium 18 MICROgram(s) Capsule 1 Capsule(s) Inhalation daily  vancomycin    Solution 125 milliGRAM(s) Oral every 6 hours    MEDICATIONS  (PRN):      Neurological Exam:  HF: Patient is alert and oriented x 3. There is no aphasia or dysarthria. Follows complex commands.  CN: Vision is intact to confrontation. Pupils are equal and reactive. Extra ocular muscles are intact. There is no facial droop or asymmetry. Tongue is midline. Sensation is intact in the face. Other CN II-XII are intact.   Motor: motor examination all muscles are 4/5 and there is no pronator drift.   Sensory: intact to pinprick  DTR: 1/4 all 4 extremities. Babinski is negative bilateral.  Co-ord:  Finger to finger to nose, postural tremor bilaterally.    Radiology report:  CT Head:  < from: CT Head No Cont (03.25.18 @ 08:02) >  Findings:    There is no intracranial hemorrhage, mass effect or midline shift. No   extra-axial collection is identified. There is no large acute territorial   infarct.    There is moderate prominence of the ventricles and subarachnoid spaces,   compatible with age related involutional changes. Prominent   periventricular lucency is present, compatible with microvascular   ischemic change.    The visualized skull and mastoid are unremarkable.    The paranasal sinuses and orbits are within normal limits.    Impression:    Age related involutional and microvascular ischemic changes, without   evidence of intracranial hemorrhage, mass effect or midline shift.    EEG:  < from: EEG (03.26.18 @ 14:30) >  INTERPRETATION:  18-channel EEG recording for this 88-year-old woman with   transient confusion actionable tremors rule out seizures. Presently on   Namenda, simvastatin.    Thebackground activity consist of bilateral, symmetrical occipital   predominant, low amplitude, 9-10 Hz activity which attenuates with eye   opening. Bilateral diffuse 15-20, low amplitude activity.    Drowsiness characterized by symmetrical attenuation of the background   activity. Intermittent 4-5 Hz low amplitude activity best in the frontal   central head regions, occasional V waves. Bilateral, symmetrical,   centrally predominant, spindles, 12-15 Hz low amplitude activity.    No focal slowing or epileptiform activity noted.    Impression: Normal EEG with the patient awake and asleep.    < end of copied text >

## 2018-03-27 NOTE — SWALLOW BEDSIDE ASSESSMENT ADULT - SWALLOW EVAL: CRITERIA FOR SKILLED INTERVENTION MET
no problems identified which require skilled intervention/Acute speech path intervention is not clinically warranted nor do I feel it would . Thus, WILL NOT ACTIVELY FOLLOW. RECONSULT PRN.

## 2018-03-27 NOTE — SWALLOW BEDSIDE ASSESSMENT ADULT - ORAL PHASE
Bolus formation/transfer were mechanically functional for age and she cleared oral debris on own after age acceptable piecemeal deglutition.

## 2018-03-27 NOTE — PHYSICAL THERAPY INITIAL EVALUATION ADULT - ACTIVE RANGE OF MOTION EXAMINATION, REHAB EVAL
except B shld flex 100/bilateral upper extremity Active ROM was WFL (within functional limits)/bilateral  lower extremity Active ROM was WFL (within functional limits)

## 2018-03-27 NOTE — PHYSICAL THERAPY INITIAL EVALUATION ADULT - CRITERIA FOR SKILLED THERAPEUTIC INTERVENTIONS
rehab potential/predicted duration of therapy intervention/anticipated equipment needs at discharge/risk reduction/prevention/anticipated discharge recommendation/impairments found/therapy frequency/functional limitations in following categories

## 2018-03-27 NOTE — PHYSICAL THERAPY INITIAL EVALUATION ADULT - PERTINENT HX OF CURRENT PROBLEM, REHAB EVAL
sent to the ER from [facility] for inc confusion. CTH (-), CT CS (-) ac findings. RR called 3/26due to change in MS, pt weak, voice weak-MRI ordered, pt be transferred to monitored floor. Pt w/ recent CDAD. sent to the ER from [facility] for inc confusion. CTH (-), CT CS (-) ac findings. RR called this a.m due to change in MS, unable to speak, extremities shaking-MRI ordered, pt to be transferred to monitored floor. EEG normal, 24 hr video ordered. Pt w/ recent CDAD.

## 2018-03-27 NOTE — SWALLOW BEDSIDE ASSESSMENT ADULT - SWALLOW EVAL: FEEDING ASSISTANCE
Pt was able to feed self on exam but should be assisted with meals as needed given baseline confusion from dementia.

## 2018-03-27 NOTE — SWALLOW BEDSIDE ASSESSMENT ADULT - SWALLOW EVAL: SECRETION MANAGEMENT
Adequate. Note that pt's volitional cough was somewhat moist but produced with sufficient strength. Adequate. No drooling was noted. Note that pt's volitional cough was somewhat moist but produced with sufficient strength.

## 2018-03-28 RX ORDER — ONDANSETRON 8 MG/1
4 TABLET, FILM COATED ORAL ONCE
Qty: 0 | Refills: 0 | Status: COMPLETED | OUTPATIENT
Start: 2018-03-28 | End: 2018-03-29

## 2018-03-28 RX ADMIN — Medication 5 MILLIGRAM(S): at 06:07

## 2018-03-28 RX ADMIN — ASPIRIN AND DIPYRIDAMOLE 1 CAPSULE(S): 25; 200 CAPSULE, EXTENDED RELEASE ORAL at 06:07

## 2018-03-28 RX ADMIN — ALBUTEROL 2.5 MILLIGRAM(S): 90 AEROSOL, METERED ORAL at 13:50

## 2018-03-28 RX ADMIN — Medication 5 MILLIGRAM(S): at 22:56

## 2018-03-28 RX ADMIN — Medication 125 MILLIGRAM(S): at 00:06

## 2018-03-28 RX ADMIN — Medication 200 MILLIGRAM(S): at 06:07

## 2018-03-28 RX ADMIN — ASPIRIN AND DIPYRIDAMOLE 1 CAPSULE(S): 25; 200 CAPSULE, EXTENDED RELEASE ORAL at 17:26

## 2018-03-28 RX ADMIN — MEMANTINE HYDROCHLORIDE 21 MILLIGRAM(S): 10 TABLET ORAL at 17:27

## 2018-03-28 RX ADMIN — Medication 25 MICROGRAM(S): at 06:07

## 2018-03-28 RX ADMIN — SIMVASTATIN 20 MILLIGRAM(S): 20 TABLET, FILM COATED ORAL at 22:56

## 2018-03-28 RX ADMIN — HEPARIN SODIUM 5000 UNIT(S): 5000 INJECTION INTRAVENOUS; SUBCUTANEOUS at 06:07

## 2018-03-28 RX ADMIN — Medication 125 MILLIGRAM(S): at 12:19

## 2018-03-28 RX ADMIN — Medication 125 MILLIGRAM(S): at 17:26

## 2018-03-28 RX ADMIN — HEPARIN SODIUM 5000 UNIT(S): 5000 INJECTION INTRAVENOUS; SUBCUTANEOUS at 17:26

## 2018-03-28 RX ADMIN — Medication 125 MILLIGRAM(S): at 06:07

## 2018-03-28 RX ADMIN — ALBUTEROL 2.5 MILLIGRAM(S): 90 AEROSOL, METERED ORAL at 08:49

## 2018-03-28 RX ADMIN — ALBUTEROL 2.5 MILLIGRAM(S): 90 AEROSOL, METERED ORAL at 19:56

## 2018-03-28 RX ADMIN — Medication 1 TABLET(S): at 12:19

## 2018-03-28 RX ADMIN — TIOTROPIUM BROMIDE 1 CAPSULE(S): 18 CAPSULE ORAL; RESPIRATORY (INHALATION) at 08:49

## 2018-03-28 RX ADMIN — Medication 200 MILLIGRAM(S): at 19:38

## 2018-03-28 RX ADMIN — Medication 125 MILLIGRAM(S): at 22:57

## 2018-03-28 NOTE — PROGRESS NOTE ADULT - SUBJECTIVE AND OBJECTIVE BOX
Patient is a 88y old  Female who presents with a chief complaint of Altered mental status. (25 Mar 2018 15:42)    Date of service: 18 @ 10:37    Lying in bed in NAD  Noted with jercky movements  Diarrhea is improving    ROS: no fever or chills; denies dizziness, no HA, no SOB or cough, no abdominal pain, no dysuria, no legs pain, no rashes    MEDICATIONS  (STANDING):  ALBUTerol    0.083% 2.5 milliGRAM(s) Nebulizer every 6 hours  calcium carbonate  625 mG + Vitamin D (OsCal 250 + D) 1 Tablet(s) Oral daily  dipyridamole 200 mG/aspirin 25 mG 1 Capsule(s) Oral two times a day  heparin  Injectable 5000 Unit(s) SubCutaneous every 12 hours  hydroxychloroquine 200 milliGRAM(s) Oral every 12 hours  levothyroxine 25 MICROGram(s) Oral daily  melatonin 5 milliGRAM(s) Oral at bedtime  memantine ER 21 milliGRAM(s) Oral <User Schedule>  multivitamin 1 Tablet(s) Oral daily  ondansetron Injectable 4 milliGRAM(s) IV Push once  predniSONE   Tablet 5 milliGRAM(s) Oral daily  simvastatin 20 milliGRAM(s) Oral at bedtime  tiotropium 18 MICROgram(s) Capsule 1 Capsule(s) Inhalation daily  vancomycin    Solution 125 milliGRAM(s) Oral every 6 hours    MEDICATIONS  (PRN):      Vital Signs Last 24 Hrs  T(C): 36.6 (28 Mar 2018 05:50), Max: 37.2 (27 Mar 2018 13:08)  T(F): 97.9 (28 Mar 2018 05:50), Max: 99 (27 Mar 2018 13:08)  HR: 90 (28 Mar 2018 05:50) (90 - 94)  BP: 147/84 (28 Mar 2018 05:50) (121/56 - 147/84)  BP(mean): --  RR: 17 (28 Mar 2018 05:50) (17 - 17)  SpO2: 100% (28 Mar 2018 05:50) (94% - 100%)    Physical Exam:      Constitutional: frail looking  HEENT: NC/AT, EOMI, PERRLA  Neck: supple  Back: no tenderness  Respiratory: clear  Cardiovascular: S1S2 regular, no murmurs  Abdomen: soft, not tender, mild distended, positive BS  Genitourinary: no LN palpable  Rectal: deferred  Musculoskeletal: no muscle tenderness, no joint swelling or tenderness  Extremities: no pedal edema  Neurological: AxOx2, moving all extremities  Skin: no rashes    Labs:                        10.5   10.90 )-----------( 172      ( 27 Mar 2018 08:06 )             33.1         144  |  107  |  10  ----------------------------<  96  3.9   |  28  |  0.68    Ca    9.1      27 Mar 2018     TPro  6.0  /  Alb  3.3  /  TBili  0.2  /  DBili  x   /  AST  25  /  ALT  24  /  AlkPhos  106       LIVER FUNCTIONS - ( 25 Mar 2018 07:17 )  Alb: 3.3 g/dL / Pro: 6.0 gm/dL / ALK PHOS: 106 U/L / ALT: 24 U/L / AST: 25 U/L / GGT: x           Urinalysis Basic - ( 25 Mar 2018 08:40 )    Color: Yellow / Appearance: Clear / S.010 / pH: x  Gluc: x / Ketone: Negative  / Bili: Negative / Urobili: Negative mg/dL   Blood: x / Protein: 15 mg/dL / Nitrite: Negative   Leuk Esterase: Negative / RBC: 0-2 /HPF / WBC 0-2   Sq Epi: x / Non Sq Epi: x / Bacteria: x          Radiology:    < from: CT Abdomen and Pelvis No Cont (18 @ 10:54) >  No evidence of solid visceral injury in the chest, abdomen or pelvis on   this noncontrast study.    Right breast mass with right axillary adenopathy corresponding to known   malignancy.    Chronic appearing left-sided rib fractures.    < end of copied text >      Advanced directives addressed: full resuscitation

## 2018-03-29 PROBLEM — K21.9 GASTROESOPHAGEAL REFLUX DISEASE, ESOPHAGITIS PRESENCE NOT SPECIFIED: Status: ACTIVE | Noted: 2017-12-26

## 2018-03-29 PROBLEM — Z86.73 HISTORY OF TRANSIENT CEREBRAL ISCHEMIA: Status: RESOLVED | Noted: 2018-03-29 | Resolved: 2018-03-29

## 2018-03-29 PROBLEM — Z87.09 HISTORY OF ASTHMA: Status: RESOLVED | Noted: 2018-03-29 | Resolved: 2018-03-29

## 2018-03-29 PROCEDURE — 95951: CPT | Mod: 26

## 2018-03-29 PROCEDURE — 99232 SBSQ HOSP IP/OBS MODERATE 35: CPT

## 2018-03-29 RX ADMIN — HEPARIN SODIUM 5000 UNIT(S): 5000 INJECTION INTRAVENOUS; SUBCUTANEOUS at 19:04

## 2018-03-29 RX ADMIN — Medication 125 MILLIGRAM(S): at 12:43

## 2018-03-29 RX ADMIN — ONDANSETRON 4 MILLIGRAM(S): 8 TABLET, FILM COATED ORAL at 09:25

## 2018-03-29 RX ADMIN — Medication 1 TABLET(S): at 12:43

## 2018-03-29 RX ADMIN — MEMANTINE HYDROCHLORIDE 21 MILLIGRAM(S): 10 TABLET ORAL at 19:04

## 2018-03-29 RX ADMIN — Medication 25 MICROGRAM(S): at 05:06

## 2018-03-29 RX ADMIN — Medication 200 MILLIGRAM(S): at 19:05

## 2018-03-29 RX ADMIN — HEPARIN SODIUM 5000 UNIT(S): 5000 INJECTION INTRAVENOUS; SUBCUTANEOUS at 05:06

## 2018-03-29 RX ADMIN — Medication 125 MILLIGRAM(S): at 05:06

## 2018-03-29 RX ADMIN — ASPIRIN AND DIPYRIDAMOLE 1 CAPSULE(S): 25; 200 CAPSULE, EXTENDED RELEASE ORAL at 19:04

## 2018-03-29 RX ADMIN — Medication 5 MILLIGRAM(S): at 05:06

## 2018-03-29 RX ADMIN — Medication 5 MILLIGRAM(S): at 22:58

## 2018-03-29 RX ADMIN — Medication 125 MILLIGRAM(S): at 19:05

## 2018-03-29 RX ADMIN — ALBUTEROL 2.5 MILLIGRAM(S): 90 AEROSOL, METERED ORAL at 13:52

## 2018-03-29 RX ADMIN — Medication 200 MILLIGRAM(S): at 05:06

## 2018-03-29 RX ADMIN — ASPIRIN AND DIPYRIDAMOLE 1 CAPSULE(S): 25; 200 CAPSULE, EXTENDED RELEASE ORAL at 05:06

## 2018-03-29 RX ADMIN — SIMVASTATIN 20 MILLIGRAM(S): 20 TABLET, FILM COATED ORAL at 22:58

## 2018-03-29 RX ADMIN — Medication 125 MILLIGRAM(S): at 22:58

## 2018-03-29 NOTE — PROGRESS NOTE ADULT - SUBJECTIVE AND OBJECTIVE BOX
87 y/o female with h/o dementia, HTN, TIA/CVA, HLD, GERD, Osteoporosis, Polymyalgia, Glaucoma, COPD, recent diagnosed with breast CA, started on chemo by Dr. Galindo on 3/12/18, then diarrhea and CDAD on po Vanco was admitted on 3/25 for increased confusion. She has loose stools. Denies pain.    3/25: she is alert and verbal; confusion is at baseline. Knows she is on daily Prednisone doesn't  know for what; AAOx3  3/26: RR called for change in MS: pt weak, unable to speak due to voice weakness, articulates words with her lips; unable to cough initially, after a while was able to produce one cough and with great struggle said No: it was a very nasal type of" no" clearly not normal; daughter present; same thing happened Sun; Mom is a v active lady, and here she is been walking with no signs of weakness. Transfer to monitor bed for pulse o2 monitor; doubt GBS or MG; MRI will be ordered  3/27: MRI neg pt at baseline; tele normal    Vital Signs Last 24 Hrs  T(C): 36.8 (28 Mar 2018 11:00), Max: 36.9 (27 Mar 2018 20:15)  T(F): 98.3 (28 Mar 2018 11:00), Max: 98.5 (27 Mar 2018 20:15)  HR: 91 (28 Mar 2018 11:00) (90 - 94)  BP: 165/82 (28 Mar 2018 11:00) (131/81 - 165/82)  BP(mean): --  RR: 22 (28 Mar 2018 11:00) (17 - 22)  SpO2: 95% (28 Mar 2018 11:00) (94% - 100%)    Constitutional: frail looking  HEENT: NC/AT, EOMI, PERRLA  Neck: supple  Back: no tenderness  Respiratory: clear; can't cough  Cardiovascular: S1S2 regular, no murmurs  Abdomen: soft, not tender, mild distended, positive BS  Genitourinary: no LN palpable  Rectal: deferred  Musculoskeletal: no muscle tenderness, no joint swelling or tenderness  Extremities: no pedal edema  Neurological: see above; mot str 4/5 all extr              WEAKNESS  CDAD  CONFUSION  CHRONIC STEROIDS FOR RA  BREAST CA S/P RECENT CHEMO  ABNORMAL TSH      PLAN:      - MRI neg;tele normal  - 24 hr eeg neg  - METAB ENCEPH SEC TO DIARRHEA- PROBAB DEHYDRATED; RESOLVED  - tsh f/up as an outpt  - c/w po vanco    dc to rehab left message for daughter Danya

## 2018-03-29 NOTE — PROGRESS NOTE ADULT - SUBJECTIVE AND OBJECTIVE BOX
HPI:  89 y/o female with  hx dementia, HTN, TIA/CVA, HLD, GERD, Osteoporosis, Polymyalgia, Glaucoma, COPD, recent diagnose of breast CA, started on chemo by Dr. Galindo on 3/12/18, she developed diarrhea and had positive stool test for C. Diff and was started on po Vanco.  who was sent to the ER from [facility] for AMS. On antibiotics, improving. Had episode of poor responsiveness. video EEG done to r/o seizures.    MEDICATIONS  (STANDING):  ALBUTerol    0.083% 2.5 milliGRAM(s) Nebulizer every 6 hours  calcium carbonate  625 mG + Vitamin D (OsCal 250 + D) 1 Tablet(s) Oral daily  dipyridamole 200 mG/aspirin 25 mG 1 Capsule(s) Oral two times a day  heparin  Injectable 5000 Unit(s) SubCutaneous every 12 hours  hydroxychloroquine 200 milliGRAM(s) Oral every 12 hours  levothyroxine 25 MICROGram(s) Oral daily  melatonin 5 milliGRAM(s) Oral at bedtime  memantine ER 21 milliGRAM(s) Oral <User Schedule>  multivitamin 1 Tablet(s) Oral daily  predniSONE   Tablet 5 milliGRAM(s) Oral daily  simvastatin 20 milliGRAM(s) Oral at bedtime  tiotropium 18 MICROgram(s) Capsule 1 Capsule(s) Inhalation daily  vancomycin    Solution 125 milliGRAM(s) Oral every 6 hours    MEDICATIONS  (PRN):      Neurological Exam:  HF: Patient is alert and oriented x 3. There is no aphasia or dysarthria. Follows complex commands.   CN: Vision is intact to confrontation. Pupils are equal and reactive. Extra ocular muscles are intact. There is no facial droop or asymmetry. Tongue is midline. Sensation is intact in the face. Other CN II-XII are intact.   Motor: motor examination all muscles are 4/5 and there is no pronator drift.   Sensory: intact to touch.   DTR: 1-2/4 all 4 extremities. Babinski is negative bilateral.  Co-ord:  Finger to finger to nose is intact.    Radiology report:  CT Head:  < from: CT Head No Cont (03.25.18 @ 08:02) >  Findings:    There is no intracranial hemorrhage, mass effect or midline shift. No   extra-axial collection is identified. There is no large acute territorial   infarct.    There is moderate prominence of the ventricles and subarachnoid spaces,   compatible with age related involutional changes. Prominent   periventricular lucency is present, compatible with microvascular   ischemic change.    The visualized skull and mastoid are unremarkable.    The paranasal sinuses and orbits are within normal limits.    Impression:    Age related involutional and microvascular ischemic changes, without   evidence of intracranial hemorrhage, mass effect or midline shift.    < from: MR Head No Cont (03.27.18 @ 20:01) >  PROCEDURE DATE:  03/27/2018          INTERPRETATION:  MRI brain without contrast  Comparison CT performed 2 days prior  History CVA, speech disturbance    There is no mass effect, cortical edema or hydrocephalus. There is mild   to moderate frontoparietal cortical volume loss and chronic microvascular   ischemic change in the periventricular white matter. There is no evidence   of acute infarct. There is a tiny focus of hemosiderin in the subcortical   right high parietal lobe consistent with chronic microhemorrhage. The   orbital and sellar contents and cerebellar tonsils are within normal   limits.    IMPRESSION:    No acute findings    < end of copied text >    EEG:  < from: EEG Monitoring Each 24 hours (03.29.18 @ 08:00) >  INTERPRETATION:  This is an 18-channel video 24 hour EEG recording on   this 88-year-old woman with transient episodes of unresponsiveness, rule   out seizures. Recording started March 28, 2018 at 9:53 AM, and terminated   March 29, 2018 at 7:50 AM.    The background activities consist of bilateral symmetrical occipital   predominant, low amplitude, 8-9 Hz activity which attenuates with eye   opening bilateral, diffuse 15-20, low amplitude activities.  All stages of sleep were recorded. Transition to a wakeful state was   without any abnormalities.    No focal slowing or epileptiform activities noted.    Reviewing the automatic spike and seizure detection software results, all   events recorded are deemed to be electrical, movement or muscle artifacts.  There were no patient derived push push events recorded.    Impression: Normal 24 hour video EEG recording.    < end of copied text >

## 2018-03-29 NOTE — PROGRESS NOTE ADULT - SUBJECTIVE AND OBJECTIVE BOX
Patient is a 88y old  Female who presents with a chief complaint of Altered mental status. (25 Mar 2018 15:42)    Date of service: 18 @ 10:19    Lying in bed in NAD  Sleepy  Has no complaints  No BM today yet    ROS: no fever or chills; denies dizziness, no HA, no SOB or cough, no abdominal pain, no dysuria, no legs pain    MEDICATIONS  (STANDING):  ALBUTerol    0.083% 2.5 milliGRAM(s) Nebulizer every 6 hours  calcium carbonate  625 mG + Vitamin D (OsCal 250 + D) 1 Tablet(s) Oral daily  dipyridamole 200 mG/aspirin 25 mG 1 Capsule(s) Oral two times a day  heparin  Injectable 5000 Unit(s) SubCutaneous every 12 hours  hydroxychloroquine 200 milliGRAM(s) Oral every 12 hours  levothyroxine 25 MICROGram(s) Oral daily  melatonin 5 milliGRAM(s) Oral at bedtime  memantine ER 21 milliGRAM(s) Oral <User Schedule>  multivitamin 1 Tablet(s) Oral daily  predniSONE   Tablet 5 milliGRAM(s) Oral daily  simvastatin 20 milliGRAM(s) Oral at bedtime  tiotropium 18 MICROgram(s) Capsule 1 Capsule(s) Inhalation daily  vancomycin    Solution 125 milliGRAM(s) Oral every 6 hours    MEDICATIONS  (PRN):      Vital Signs Last 24 Hrs  T(C): 36.8 (28 Mar 2018 11:00), Max: 36.8 (28 Mar 2018 11:00)  T(F): 98.3 (28 Mar 2018 11:00), Max: 98.3 (28 Mar 2018 11:00)  HR: 102 (28 Mar 2018 20:10) (91 - 109)  BP: 165/82 (28 Mar 2018 11:00) (165/82 - 165/82)  BP(mean): --  RR: 22 (28 Mar 2018 11:00) (22 - 22)  SpO2: 97% (28 Mar 2018 20:10) (95% - 97%)    Physical Exam:      Constitutional: frail looking  HEENT: NC/AT, EOMI, PERRLA  Neck: supple  Back: no tenderness  Respiratory: clear  Cardiovascular: S1S2 regular, no murmurs  Abdomen: soft, not tender, mild distended, positive BS  Genitourinary: no LN palpable  Rectal: deferred  Musculoskeletal: no muscle tenderness, no joint swelling or tenderness  Extremities: no pedal edema  Neurological: AxOx2, moving all extremities  Skin: no rashes    Labs:                        10.5   10.90 )-----------( 172      ( 27 Mar 2018 08:06 )             33.1         144  |  107  |  10  ----------------------------<  96  3.9   |  28  |  0.68    Ca    9.1      27 Mar 2018     TPro  6.0  /  Alb  3.3  /  TBili  0.2  /  DBili  x   /  AST  25  /  ALT  24  /  AlkPhos  106       LIVER FUNCTIONS - ( 25 Mar 2018 07:17 )  Alb: 3.3 g/dL / Pro: 6.0 gm/dL / ALK PHOS: 106 U/L / ALT: 24 U/L / AST: 25 U/L / GGT: x           Urinalysis Basic - ( 25 Mar 2018 08:40 )    Color: Yellow / Appearance: Clear / S.010 / pH: x  Gluc: x / Ketone: Negative  / Bili: Negative / Urobili: Negative mg/dL   Blood: x / Protein: 15 mg/dL / Nitrite: Negative   Leuk Esterase: Negative / RBC: 0-2 /HPF / WBC 0-2   Sq Epi: x / Non Sq Epi: x / Bacteria: x          Radiology:    < from: CT Abdomen and Pelvis No Cont (18 @ 10:54) >  No evidence of solid visceral injury in the chest, abdomen or pelvis on   this noncontrast study.    Right breast mass with right axillary adenopathy corresponding to known   malignancy.    Chronic appearing left-sided rib fractures.    < end of copied text >      Advanced directives addressed: full resuscitation

## 2018-03-30 LAB
ANION GAP SERPL CALC-SCNC: 9 MMOL/L — SIGNIFICANT CHANGE UP (ref 5–17)
APPEARANCE UR: CLEAR — SIGNIFICANT CHANGE UP
BACTERIA # UR AUTO: (no result)
BASE EXCESS BLDA CALC-SCNC: -0.8 MMOL/L — SIGNIFICANT CHANGE UP (ref -2–2)
BILIRUB UR-MCNC: NEGATIVE — SIGNIFICANT CHANGE UP
BLOOD GAS COMMENTS ARTERIAL: SIGNIFICANT CHANGE UP
BUN SERPL-MCNC: 17 MG/DL — SIGNIFICANT CHANGE UP (ref 7–23)
CALCIUM SERPL-MCNC: 8.8 MG/DL — SIGNIFICANT CHANGE UP (ref 8.5–10.1)
CHLORIDE SERPL-SCNC: 103 MMOL/L — SIGNIFICANT CHANGE UP (ref 96–108)
CO2 SERPL-SCNC: 24 MMOL/L — SIGNIFICANT CHANGE UP (ref 22–31)
COLOR SPEC: YELLOW — SIGNIFICANT CHANGE UP
COMMENT - URINE: SIGNIFICANT CHANGE UP
CREAT SERPL-MCNC: 1.14 MG/DL — SIGNIFICANT CHANGE UP (ref 0.5–1.3)
CULTURE RESULTS: SIGNIFICANT CHANGE UP
CULTURE RESULTS: SIGNIFICANT CHANGE UP
DIFF PNL FLD: NEGATIVE — SIGNIFICANT CHANGE UP
EPI CELLS # UR: SIGNIFICANT CHANGE UP
GAS PNL BLDA: SIGNIFICANT CHANGE UP
GLUCOSE SERPL-MCNC: 100 MG/DL — HIGH (ref 70–99)
GLUCOSE UR QL: NEGATIVE MG/DL — SIGNIFICANT CHANGE UP
HCO3 BLDA-SCNC: 22 MMOL/L — SIGNIFICANT CHANGE UP (ref 21–29)
HCT VFR BLD CALC: 34.4 % — LOW (ref 34.5–45)
HGB BLD-MCNC: 11.1 G/DL — LOW (ref 11.5–15.5)
KETONES UR-MCNC: NEGATIVE — SIGNIFICANT CHANGE UP
LEUKOCYTE ESTERASE UR-ACNC: (no result)
MCHC RBC-ENTMCNC: 28 PG — SIGNIFICANT CHANGE UP (ref 27–34)
MCHC RBC-ENTMCNC: 32.3 GM/DL — SIGNIFICANT CHANGE UP (ref 32–36)
MCV RBC AUTO: 86.9 FL — SIGNIFICANT CHANGE UP (ref 80–100)
NITRITE UR-MCNC: NEGATIVE — SIGNIFICANT CHANGE UP
NRBC # BLD: 0 /100 WBCS — SIGNIFICANT CHANGE UP (ref 0–0)
NT-PROBNP SERPL-SCNC: 5104 PG/ML — HIGH (ref 0–450)
PCO2 BLDA: 32 MMHG — SIGNIFICANT CHANGE UP (ref 32–46)
PH BLDA: 7.45 — SIGNIFICANT CHANGE UP (ref 7.35–7.45)
PH UR: 6 — SIGNIFICANT CHANGE UP (ref 5–8)
PLATELET # BLD AUTO: 289 K/UL — SIGNIFICANT CHANGE UP (ref 150–400)
PO2 BLDA: 88 MMHG — SIGNIFICANT CHANGE UP (ref 74–108)
POTASSIUM SERPL-MCNC: 4.3 MMOL/L — SIGNIFICANT CHANGE UP (ref 3.5–5.3)
POTASSIUM SERPL-SCNC: 4.3 MMOL/L — SIGNIFICANT CHANGE UP (ref 3.5–5.3)
PROT UR-MCNC: 15 MG/DL
RBC # BLD: 3.96 M/UL — SIGNIFICANT CHANGE UP (ref 3.8–5.2)
RBC # FLD: 18.6 % — HIGH (ref 10.3–14.5)
RBC CASTS # UR COMP ASSIST: SIGNIFICANT CHANGE UP /HPF (ref 0–4)
SAO2 % BLDA: 96 % — SIGNIFICANT CHANGE UP (ref 92–96)
SODIUM SERPL-SCNC: 136 MMOL/L — SIGNIFICANT CHANGE UP (ref 135–145)
SP GR SPEC: 1.01 — SIGNIFICANT CHANGE UP (ref 1.01–1.02)
SPECIMEN SOURCE: SIGNIFICANT CHANGE UP
SPECIMEN SOURCE: SIGNIFICANT CHANGE UP
UROBILINOGEN FLD QL: NEGATIVE MG/DL — SIGNIFICANT CHANGE UP
WBC # BLD: 22.41 K/UL — HIGH (ref 3.8–10.5)
WBC # FLD AUTO: 22.41 K/UL — HIGH (ref 3.8–10.5)
WBC UR QL: (no result)

## 2018-03-30 PROCEDURE — 71045 X-RAY EXAM CHEST 1 VIEW: CPT | Mod: 26

## 2018-03-30 RX ORDER — PANTOPRAZOLE SODIUM 20 MG/1
40 TABLET, DELAYED RELEASE ORAL
Qty: 0 | Refills: 0 | Status: DISCONTINUED | OUTPATIENT
Start: 2018-03-30 | End: 2018-04-03

## 2018-03-30 RX ORDER — SODIUM CHLORIDE 9 MG/ML
1000 INJECTION INTRAMUSCULAR; INTRAVENOUS; SUBCUTANEOUS
Qty: 0 | Refills: 0 | Status: DISCONTINUED | OUTPATIENT
Start: 2018-03-30 | End: 2018-03-30

## 2018-03-30 RX ADMIN — Medication 125 MILLIGRAM(S): at 19:15

## 2018-03-30 RX ADMIN — ALBUTEROL 2.5 MILLIGRAM(S): 90 AEROSOL, METERED ORAL at 13:40

## 2018-03-30 RX ADMIN — Medication 5 MILLIGRAM(S): at 05:44

## 2018-03-30 RX ADMIN — Medication 200 MILLIGRAM(S): at 19:14

## 2018-03-30 RX ADMIN — Medication 25 MICROGRAM(S): at 05:44

## 2018-03-30 RX ADMIN — Medication 5 MILLIGRAM(S): at 23:18

## 2018-03-30 RX ADMIN — HEPARIN SODIUM 5000 UNIT(S): 5000 INJECTION INTRAVENOUS; SUBCUTANEOUS at 05:44

## 2018-03-30 RX ADMIN — PANTOPRAZOLE SODIUM 40 MILLIGRAM(S): 20 TABLET, DELAYED RELEASE ORAL at 09:04

## 2018-03-30 RX ADMIN — Medication 125 MILLIGRAM(S): at 23:18

## 2018-03-30 RX ADMIN — MEMANTINE HYDROCHLORIDE 21 MILLIGRAM(S): 10 TABLET ORAL at 19:14

## 2018-03-30 RX ADMIN — Medication 1 TABLET(S): at 11:52

## 2018-03-30 RX ADMIN — SIMVASTATIN 20 MILLIGRAM(S): 20 TABLET, FILM COATED ORAL at 23:18

## 2018-03-30 RX ADMIN — Medication 200 MILLIGRAM(S): at 05:44

## 2018-03-30 RX ADMIN — ASPIRIN AND DIPYRIDAMOLE 1 CAPSULE(S): 25; 200 CAPSULE, EXTENDED RELEASE ORAL at 05:44

## 2018-03-30 RX ADMIN — Medication 125 MILLIGRAM(S): at 05:45

## 2018-03-30 RX ADMIN — HEPARIN SODIUM 5000 UNIT(S): 5000 INJECTION INTRAVENOUS; SUBCUTANEOUS at 19:15

## 2018-03-30 RX ADMIN — ASPIRIN AND DIPYRIDAMOLE 1 CAPSULE(S): 25; 200 CAPSULE, EXTENDED RELEASE ORAL at 19:14

## 2018-03-30 RX ADMIN — SODIUM CHLORIDE 80 MILLILITER(S): 9 INJECTION INTRAMUSCULAR; INTRAVENOUS; SUBCUTANEOUS at 11:02

## 2018-03-30 RX ADMIN — Medication 125 MILLIGRAM(S): at 11:52

## 2018-03-30 NOTE — PROGRESS NOTE ADULT - SUBJECTIVE AND OBJECTIVE BOX
89 y/o female with h/o dementia, HTN, TIA/CVA, HLD, GERD, Osteoporosis, Polymyalgia, Glaucoma, COPD, recent diagnosed with breast CA, started on chemo by Dr. Galindo on 3/12/18, then diarrhea and CDAD on po Vanco was admitted on 3/25 for increased confusion. She has loose stools. Denies pain.    3/25: she is alert and verbal; confusion is at baseline. Knows she is on daily Prednisone doesn't  know for what; AAOx3  3/26: RR called for change in MS: pt weak, unable to speak due to voice weakness, articulates words with her lips; unable to cough initially, after a while was able to produce one cough and with great struggle said No: it was a very nasal type of" no" clearly not normal; daughter present; same thing happened Sun; Mom is a v active lady, and here she is been walking with no signs of weakness. Transfer to monitor bed for pulse o2 monitor; doubt GBS or MG; MRI will be ordered  3/27: MRI neg pt at baseline; tele normal  3/30: somnolent, no c/o ; O    Vital Signs Last 24 Hrs  T(C): 36.8 (28 Mar 2018 11:00), Max: 36.9 (27 Mar 2018 20:15)  T(F): 98.3 (28 Mar 2018 11:00), Max: 98.5 (27 Mar 2018 20:15)  HR: 91 (28 Mar 2018 11:00) (90 - 94)  BP: 165/82 (28 Mar 2018 11:00) (131/81 - 165/82)  BP(mean): --  RR: 22 (28 Mar 2018 11:00) (17 - 22)  SpO2: 95% (28 Mar 2018 11:00) (94% - 100%)    Constitutional: frail looking  HEENT: NC/AT, EOMI, PERRLA  Neck: supple  Back: no tenderness  Respiratory: clear  Cardiovascular: S1S2 regular, no murmurs  Abdomen: soft, not tender, mild distended, positive BS  Genitourinary: no LN palpable  Musculoskeletal: no muscle tenderness, no joint swelling or tenderness  Extremities: no pedal edema  Neurological: see above; weak, somnolent mot str 2/5 all extr              WEAKNESS  CDAD  CONFUSION  CHRONIC STEROIDS FOR RA  BREAST CA S/P RECENT CHEMO  ABNORMAL TSH      PLAN:      - remains somnolent and weak; check abg, blood wk  - 24 hr eeg neg  - tsh f/up as an outpt  - c/w po vanco  - elevated wbc check bl cx r/o silent bacteremia  - check bladder scan r/o retention

## 2018-03-31 LAB
ANION GAP SERPL CALC-SCNC: 8 MMOL/L — SIGNIFICANT CHANGE UP (ref 5–17)
BUN SERPL-MCNC: 20 MG/DL — SIGNIFICANT CHANGE UP (ref 7–23)
CALCIUM SERPL-MCNC: 8.8 MG/DL — SIGNIFICANT CHANGE UP (ref 8.5–10.1)
CHLORIDE SERPL-SCNC: 104 MMOL/L — SIGNIFICANT CHANGE UP (ref 96–108)
CO2 SERPL-SCNC: 26 MMOL/L — SIGNIFICANT CHANGE UP (ref 22–31)
CREAT SERPL-MCNC: 1.19 MG/DL — SIGNIFICANT CHANGE UP (ref 0.5–1.3)
GLUCOSE BLDC GLUCOMTR-MCNC: 84 MG/DL — SIGNIFICANT CHANGE UP (ref 70–99)
GLUCOSE SERPL-MCNC: 81 MG/DL — SIGNIFICANT CHANGE UP (ref 70–99)
HCT VFR BLD CALC: 32.5 % — LOW (ref 34.5–45)
HCT VFR BLD CALC: 32.8 % — LOW (ref 34.5–45)
HGB BLD-MCNC: 10.5 G/DL — LOW (ref 11.5–15.5)
HGB BLD-MCNC: 10.5 G/DL — LOW (ref 11.5–15.5)
MCHC RBC-ENTMCNC: 28.2 PG — SIGNIFICANT CHANGE UP (ref 27–34)
MCHC RBC-ENTMCNC: 28.2 PG — SIGNIFICANT CHANGE UP (ref 27–34)
MCHC RBC-ENTMCNC: 32 GM/DL — SIGNIFICANT CHANGE UP (ref 32–36)
MCHC RBC-ENTMCNC: 32.3 GM/DL — SIGNIFICANT CHANGE UP (ref 32–36)
MCV RBC AUTO: 87.1 FL — SIGNIFICANT CHANGE UP (ref 80–100)
MCV RBC AUTO: 88.2 FL — SIGNIFICANT CHANGE UP (ref 80–100)
NRBC # BLD: 0 /100 WBCS — SIGNIFICANT CHANGE UP (ref 0–0)
NRBC # BLD: 0 /100 WBCS — SIGNIFICANT CHANGE UP (ref 0–0)
PLATELET # BLD AUTO: 240 K/UL — SIGNIFICANT CHANGE UP (ref 150–400)
PLATELET # BLD AUTO: 246 K/UL — SIGNIFICANT CHANGE UP (ref 150–400)
POTASSIUM SERPL-MCNC: 4.2 MMOL/L — SIGNIFICANT CHANGE UP (ref 3.5–5.3)
POTASSIUM SERPL-SCNC: 4.2 MMOL/L — SIGNIFICANT CHANGE UP (ref 3.5–5.3)
RBC # BLD: 3.72 M/UL — LOW (ref 3.8–5.2)
RBC # BLD: 3.73 M/UL — LOW (ref 3.8–5.2)
RBC # FLD: 19.1 % — HIGH (ref 10.3–14.5)
RBC # FLD: 19.1 % — HIGH (ref 10.3–14.5)
SODIUM SERPL-SCNC: 138 MMOL/L — SIGNIFICANT CHANGE UP (ref 135–145)
WBC # BLD: 16.77 K/UL — HIGH (ref 3.8–10.5)
WBC # BLD: 17.87 K/UL — HIGH (ref 3.8–10.5)
WBC # FLD AUTO: 16.77 K/UL — HIGH (ref 3.8–10.5)
WBC # FLD AUTO: 17.87 K/UL — HIGH (ref 3.8–10.5)

## 2018-03-31 PROCEDURE — 99223 1ST HOSP IP/OBS HIGH 75: CPT

## 2018-03-31 RX ADMIN — Medication 125 MILLIGRAM(S): at 23:22

## 2018-03-31 RX ADMIN — HEPARIN SODIUM 5000 UNIT(S): 5000 INJECTION INTRAVENOUS; SUBCUTANEOUS at 08:23

## 2018-03-31 RX ADMIN — Medication 1 TABLET(S): at 14:05

## 2018-03-31 RX ADMIN — Medication 200 MILLIGRAM(S): at 19:12

## 2018-03-31 RX ADMIN — ALBUTEROL 2.5 MILLIGRAM(S): 90 AEROSOL, METERED ORAL at 20:38

## 2018-03-31 RX ADMIN — TIOTROPIUM BROMIDE 1 CAPSULE(S): 18 CAPSULE ORAL; RESPIRATORY (INHALATION) at 09:11

## 2018-03-31 RX ADMIN — MEMANTINE HYDROCHLORIDE 21 MILLIGRAM(S): 10 TABLET ORAL at 19:12

## 2018-03-31 RX ADMIN — HEPARIN SODIUM 5000 UNIT(S): 5000 INJECTION INTRAVENOUS; SUBCUTANEOUS at 19:14

## 2018-03-31 RX ADMIN — SIMVASTATIN 20 MILLIGRAM(S): 20 TABLET, FILM COATED ORAL at 23:22

## 2018-03-31 RX ADMIN — ALBUTEROL 2.5 MILLIGRAM(S): 90 AEROSOL, METERED ORAL at 09:10

## 2018-03-31 RX ADMIN — ASPIRIN AND DIPYRIDAMOLE 1 CAPSULE(S): 25; 200 CAPSULE, EXTENDED RELEASE ORAL at 19:13

## 2018-03-31 RX ADMIN — Medication 125 MILLIGRAM(S): at 14:04

## 2018-03-31 RX ADMIN — Medication 5 MILLIGRAM(S): at 23:22

## 2018-03-31 RX ADMIN — ALBUTEROL 2.5 MILLIGRAM(S): 90 AEROSOL, METERED ORAL at 15:18

## 2018-03-31 RX ADMIN — Medication 125 MILLIGRAM(S): at 19:14

## 2018-03-31 NOTE — CHART NOTE - NSCHARTNOTEFT_GEN_A_CORE
Rapid Response called at 7:20am for change in mental status.  As per Rn pt was not responding to verbal stimuli.  Pt was seen at bedside was in NAD, resting comfortably at bedside responsve to both verbal and physical stimuli. Pt is moving all extremities.     Vitals: Pulse 94, BP: 135/57, SPo02 96, afebrile and Glucose 84    Physical Exam:   General NAD, resting comfortably  Lungs: Normal CTA b/l no wheezing, ronchi or rales  CVS:  Normal S1 and S2 no murmur, gallops or rales  Abdomen: Soft NT +BS, no guarding or ridgity.   Extremities: moving all extremities Muscle Strength 5/5   Neuro: Non focal deficits, sensory intact       HPI:  89 y/o female with  hx dementia, HTN, admitted for diarrhea and found to have C. Diff   no need for head ct at this time. Episode likely due to dementia.   no neurological deficits on examination   pt recently seen by neurology with negative work up that consisted of MRI and EEG  neuro checks q4,   recheck finger stick in an hour  hospitalist service contacted  continue monitor vitals  ICU attending was present Dr Paul Fernandez and senior resident

## 2018-03-31 NOTE — PROGRESS NOTE ADULT - SUBJECTIVE AND OBJECTIVE BOX
HPI:  89 y/o female with  hx dementia, HTN, TIA/CVA, HLD, GERD, Osteoporosis, Polymyalgia, Glaucoma, COPD, recent diagnose of breast CA, started on chemo by Dr. Galindo on 3/12/18, she developed diarrhea and had positive stool test for C. Diff and was started on po Vanco.  [facility].The patient was at her baseline functioning admitted on  day before admission  according to her daughter. She had similar symptoms in the past when she had a UTI in the past. Denies any headache, slurred speech, no unilateral weakness, numbness.  Pt was seen and evaluated by Dr. Redd including CT scan, MRI brain and EEG, 24 EEG monitoring reported negative for acute pathology. AMS improved .Today rapid response called again as pt became unresponsive with bilat Shaking . lasted for 2-3 mts reolved. Seen by Intensivist and Pt Sx resolved.     Pt known to me in the past with h/o TIAs and later with progressive memory loss with Dementia and with recent Dx of Breast ca triple neg Makers seen bt Dr. Galindo and Rx with CMF and for possible lumpectomy. Pt sitting in chair at this time, alert, follows simple commands, anxious and shaking at times when asked simple questions which stops after reassuring her or holding her hands. Says she is cold but not unresponsive. Follows commands. Moves all extremities. forgetful and mildly confused.     PAST MEDICAL & SURGICAL HISTORY:  Compression fracture of spine: T7  Alzheimers disease  HTN (hypertension)  Glaucoma  TIA (transient ischemic attack): 8/07  Cerebral vascular accident: 2005  Polymyalgia rheumatica  Osteoporosis  Chronic pain: mid back  Urinary retention  GERD (gastroesophageal reflux disease)  Cholelithiases  Hyperlipemia  Carotid artery stenosis  Lung nodule: biopsied 2003, benign  COPD (chronic obstructive pulmonary disease)  Asthma  History of hip surgery  Gall stones  History of hysterectomy: for bleeding  Hx of cholecystectomy  MEDICATIONS  (STANDING):  ALBUTerol    0.083% 2.5 milliGRAM(s) Nebulizer every 6 hours  calcium carbonate  625 mG + Vitamin D (OsCal 250 + D) 1 Tablet(s) Oral daily  dipyridamole 200 mG/aspirin 25 mG 1 Capsule(s) Oral two times a day  heparin  Injectable 5000 Unit(s) SubCutaneous every 12 hours  hydroxychloroquine 200 milliGRAM(s) Oral every 12 hours  levothyroxine 25 MICROGram(s) Oral daily  melatonin 5 milliGRAM(s) Oral at bedtime  memantine ER 21 milliGRAM(s) Oral <User Schedule>  multivitamin 1 Tablet(s) Oral daily  pantoprazole    Tablet 40 milliGRAM(s) Oral before breakfast  predniSONE   Tablet 5 milliGRAM(s) Oral daily  simvastatin 20 milliGRAM(s) Oral at bedtime  tiotropium 18 MICROgram(s) Capsule 1 Capsule(s) Inhalation daily  vancomycin    Solution 125 milliGRAM(s) Oral every 6 hours      Vital Signs Last 24 Hrs  T(C): 36.4 (31 Mar 2018 10:19), Max: 36.8 (30 Mar 2018 23:15)  T(F): 97.6 (31 Mar 2018 10:19), Max: 98.2 (30 Mar 2018 23:15)  HR: 96 (31 Mar 2018 10:19) (87 - 108)  BP: 95/50 (31 Mar 2018 10:19) (95/50 - 128/65)  BP(mean): --  RR: 18 (31 Mar 2018 10:19) (17 - 18)  SpO2: 91% (31 Mar 2018 10:19) (91% - 100%)    MS: AOx2.interactive, pleasant,  mild confusion with events, frail looking. No aphasia.    CN: VFFTC, PERLL, EOMI, face symmetric. gag not tested.  Motor: normal bulk and tone, + ve  tremors bUE , 4+/5 all over   Sens: Intact to light touch.    Reflexes: 1/4 all over, downgoing toes b/l  Coord:  Not tested.  Gait: not tested                        10.5   16.77 )-----------( 240      ( 31 Mar 2018 08:56 )             32.8     03-31    138  |  104  |  20  ----------------------------<  81  4.2   |  26  |  1.19    Ca    8.8      31 Mar 2018 06:18            Radiology report:  - CT brain 3/25/18  Impression:    Age related involutional and microvascular ischemic changes, without   evidence of intracranial hemorrhage, mass effect or midline shift.  - MRI brain 3/27/18  < from: MR Head No Cont (03.27.18 @ 20:01) >  IMPRESSION:    No acute findings    - EEG 3/29/18  < from: EEG Monitoring Each 24 hours (03.29.18 @ 08:00) >  Impression: Normal 24 hour video EEG recording.

## 2018-03-31 NOTE — PROVIDER CONTACT NOTE (OTHER) - SITUATION
OFFICE AWARE OF CONSULT
OFFICE AWARE OF CONSULT
Spoke with Ha
Spoke with Tone
spoke with Paola from MD office will make MD aware

## 2018-04-01 LAB
HCT VFR BLD CALC: 30.9 % — LOW (ref 34.5–45)
HGB BLD-MCNC: 9.9 G/DL — LOW (ref 11.5–15.5)
MCHC RBC-ENTMCNC: 28.2 PG — SIGNIFICANT CHANGE UP (ref 27–34)
MCHC RBC-ENTMCNC: 32 GM/DL — SIGNIFICANT CHANGE UP (ref 32–36)
MCV RBC AUTO: 88 FL — SIGNIFICANT CHANGE UP (ref 80–100)
NRBC # BLD: 0 /100 WBCS — SIGNIFICANT CHANGE UP (ref 0–0)
PLATELET # BLD AUTO: 254 K/UL — SIGNIFICANT CHANGE UP (ref 150–400)
RBC # BLD: 3.51 M/UL — LOW (ref 3.8–5.2)
RBC # FLD: 19.3 % — HIGH (ref 10.3–14.5)
WBC # BLD: 22.51 K/UL — HIGH (ref 3.8–10.5)
WBC # FLD AUTO: 22.51 K/UL — HIGH (ref 3.8–10.5)

## 2018-04-01 PROCEDURE — 99233 SBSQ HOSP IP/OBS HIGH 50: CPT

## 2018-04-01 PROCEDURE — 74176 CT ABD & PELVIS W/O CONTRAST: CPT | Mod: 26

## 2018-04-01 RX ORDER — SODIUM CHLORIDE 9 MG/ML
1000 INJECTION INTRAMUSCULAR; INTRAVENOUS; SUBCUTANEOUS
Qty: 0 | Refills: 0 | Status: DISCONTINUED | OUTPATIENT
Start: 2018-04-01 | End: 2018-04-03

## 2018-04-01 RX ADMIN — Medication 200 MILLIGRAM(S): at 17:51

## 2018-04-01 RX ADMIN — Medication 5 MILLIGRAM(S): at 22:38

## 2018-04-01 RX ADMIN — TIOTROPIUM BROMIDE 1 CAPSULE(S): 18 CAPSULE ORAL; RESPIRATORY (INHALATION) at 07:37

## 2018-04-01 RX ADMIN — ALBUTEROL 2.5 MILLIGRAM(S): 90 AEROSOL, METERED ORAL at 02:11

## 2018-04-01 RX ADMIN — ASPIRIN AND DIPYRIDAMOLE 1 CAPSULE(S): 25; 200 CAPSULE, EXTENDED RELEASE ORAL at 17:50

## 2018-04-01 RX ADMIN — Medication 125 MILLIGRAM(S): at 22:37

## 2018-04-01 RX ADMIN — Medication 125 MILLIGRAM(S): at 06:44

## 2018-04-01 RX ADMIN — ASPIRIN AND DIPYRIDAMOLE 1 CAPSULE(S): 25; 200 CAPSULE, EXTENDED RELEASE ORAL at 06:43

## 2018-04-01 RX ADMIN — Medication 125 MILLIGRAM(S): at 17:51

## 2018-04-01 RX ADMIN — SODIUM CHLORIDE 75 MILLILITER(S): 9 INJECTION INTRAMUSCULAR; INTRAVENOUS; SUBCUTANEOUS at 20:08

## 2018-04-01 RX ADMIN — MEMANTINE HYDROCHLORIDE 21 MILLIGRAM(S): 10 TABLET ORAL at 17:51

## 2018-04-01 RX ADMIN — Medication 125 MILLIGRAM(S): at 13:15

## 2018-04-01 RX ADMIN — ALBUTEROL 2.5 MILLIGRAM(S): 90 AEROSOL, METERED ORAL at 07:39

## 2018-04-01 RX ADMIN — Medication 1 TABLET(S): at 13:15

## 2018-04-01 RX ADMIN — Medication 30 MILLILITER(S): at 09:34

## 2018-04-01 RX ADMIN — ALBUTEROL 2.5 MILLIGRAM(S): 90 AEROSOL, METERED ORAL at 13:48

## 2018-04-01 RX ADMIN — Medication 200 MILLIGRAM(S): at 06:43

## 2018-04-01 RX ADMIN — Medication 5 MILLIGRAM(S): at 06:43

## 2018-04-01 RX ADMIN — PANTOPRAZOLE SODIUM 40 MILLIGRAM(S): 20 TABLET, DELAYED RELEASE ORAL at 06:43

## 2018-04-01 RX ADMIN — Medication 30 MILLILITER(S): at 17:51

## 2018-04-01 RX ADMIN — ALBUTEROL 2.5 MILLIGRAM(S): 90 AEROSOL, METERED ORAL at 20:27

## 2018-04-01 RX ADMIN — HEPARIN SODIUM 5000 UNIT(S): 5000 INJECTION INTRAVENOUS; SUBCUTANEOUS at 06:44

## 2018-04-01 RX ADMIN — Medication 25 MICROGRAM(S): at 06:44

## 2018-04-01 RX ADMIN — HEPARIN SODIUM 5000 UNIT(S): 5000 INJECTION INTRAVENOUS; SUBCUTANEOUS at 17:50

## 2018-04-01 RX ADMIN — SIMVASTATIN 20 MILLIGRAM(S): 20 TABLET, FILM COATED ORAL at 22:37

## 2018-04-01 NOTE — PROGRESS NOTE ADULT - SUBJECTIVE AND OBJECTIVE BOX
89 y/o female with h/o dementia, HTN, TIA/CVA, HLD, GERD, Osteoporosis, Polymyalgia, Glaucoma, COPD, recent diagnosed with breast CA, started on chemo by Dr. Galindo on 3/12/18, then diarrhea and CDAD on po Vanco was admitted on 3/25 for increased confusion, intermittent.    3/25: she is alert and verbal; confusion is at baseline. Knows she is on daily Prednisone doesn't  know for what; AAOx3  3/26: RR called for change in MS: pt weak, unable to speak due to voice weakness, articulates words with her lips; unable to cough initially, after a while was able to produce one cough and with great struggle said No: it was a very nasal type of" no" clearly not normal; daughter present; same thing happened Sun; Mom is a v active lady, and here she is been walking with no signs of weakness. Transfer to monitor bed for pulse o2 monitor; doubt GBS or MG; MRI will be ordered  3/27: MRI neg pt at baseline; tele normal  3/30: somnolent, no c/o ;   3/31: RR called for same recurrent problem: unresponsive, weak; improved as the time went bye;    4/1: c/w intermittent periods of weakness and shakes- she is able to answer questions during the shakes; c/o heartburn    Vital Signs Last 24 Hrs  T(C): 36.6 (01 Apr 2018 05:49), Max: 36.6 (01 Apr 2018 05:49)  T(F): 97.8 (01 Apr 2018 05:49), Max: 97.8 (01 Apr 2018 05:49)  HR: 75 (01 Apr 2018 07:40) (75 - 105)  BP: 119/59 (01 Apr 2018 05:49) (95/50 - 119/59)  BP(mean): --  RR: 17 (31 Mar 2018 21:50) (17 - 18)  SpO2: 93% (01 Apr 2018 05:49) (90% - 93%)  RR: 18 (31 Mar 2018 10:19) (17 - 18)  SpO2: 91% (31 Mar 2018 10:19) (91% - 100%)  Constitutional: frail looking  HEENT: NC/AT, EOMI, PERRLA, dry oral muc  Neck: supple  Back: no tenderness  Respiratory: clear  Cardiovascular: S1S2 regular, no murmurs  Abdomen: soft, not tender, mild distended, positive BS  Genitourinary: no LN palpable  Musculoskeletal: no muscle tenderness, no joint swelling or tenderness  Extremities: no pedal edema  Neurological: see above; weak, somnolent mot str 2/5 all extr              WEAKNESS  CDAD  CONFUSION  CHRONIC STEROIDS FOR RA  BREAST CA S/P RECENT CHEMO  ABNORMAL TSH  INTERMITTENT HYPOXIA      PLAN:      - remains somnolent and weak; intermittent tremors: diff dx- brain mets from breast ca vs chemo related encephalopathy vs infection  - all w/up neg so far; can't do MRI with contrast borderline renal function  - was seen by 2 neurologists so far in the hospital; not neurological per them; infection was r/out; Vanco was continued for c diff ( dx at [facility])  - CT abd today  - has intermittent hypoxia mostly when lethargic she also has gel nails  - 24 hr eeg neg  - tsh f/up as an outpt  - c/w po vanco  - check bladder scan pt had retention 500ml last night  - can't do MRI AISSATOU b/o renal funtion  - surprisingly when daughter is here she is able to ambulate with walker all the way down to the elevators 87 y/o female with h/o dementia, HTN, TIA/CVA, HLD, GERD, Osteoporosis, Polymyalgia, Glaucoma, COPD, recent diagnosed with breast CA, started on chemo by Dr. Galindo on 3/12/18, then diarrhea and CDAD on po Vanco was admitted on 3/25 for increased confusion, intermittent.    3/25: she is alert and verbal; confusion is at baseline. Knows she is on daily Prednisone doesn't  know for what; AAOx3  3/26: RR called for change in MS: pt weak, unable to speak due to voice weakness, articulates words with her lips; unable to cough initially, after a while was able to produce one cough and with great struggle said No: it was a very nasal type of" no" clearly not normal; daughter present; same thing happened Sun; Mom is a v active lady, and here she is been walking with no signs of weakness. Transfer to monitor bed for pulse o2 monitor; doubt GBS or MG; MRI will be ordered  3/27: MRI neg pt at baseline; tele normal  3/30: somnolent, no c/o ;   3/31: RR called for same recurrent problem: unresponsive, weak; improved as the time went bye;    4/1: c/w intermittent periods of weakness and shakes- she is able to answer questions during the shakes; c/o heartburn    Vital Signs Last 24 Hrs  T(C): 36.6 (01 Apr 2018 05:49), Max: 36.6 (01 Apr 2018 05:49)  T(F): 97.8 (01 Apr 2018 05:49), Max: 97.8 (01 Apr 2018 05:49)  HR: 75 (01 Apr 2018 07:40) (75 - 105)  BP: 119/59 (01 Apr 2018 05:49) (95/50 - 119/59)  BP(mean): --  RR: 17 (31 Mar 2018 21:50) (17 - 18)  SpO2: 93% (01 Apr 2018 05:49) (90% - 93%)  RR: 18 (31 Mar 2018 10:19) (17 - 18)  SpO2: 91% (31 Mar 2018 10:19) (91% - 100%)  Constitutional: frail looking  HEENT: NC/AT, EOMI, PERRLA, dry oral muc  Neck: supple  Back: no tenderness  Respiratory: clear  Right breast mass  Cardiovascular: S1S2 regular, no murmurs  Abdomen: soft, not tender, mild distended, positive BS  Genitourinary: no LN palpable  Musculoskeletal: no muscle tenderness, no joint swelling or tenderness  Extremities: no pedal edema  Neurological: see above; weak, somnolent mot str 2/5 all extr              WEAKNESS  CDAD  CONFUSION  CHRONIC STEROIDS FOR RA  BREAST CA S/P RECENT CHEMO  ABNORMAL TSH  INTERMITTENT HYPOXIA      PLAN:      - remains somnolent and weak; intermittent tremors: diff dx- brain mets from breast ca vs chemo related encephalopathy vs infection  - all w/up neg so far; can't do MRI with contrast borderline renal function; intermittent leukocytosis  - was seen by 2 neurologists so far in the hospital; not neurological per them; infection was r/out; Vanco was continued for c diff ( dx at [facility])  - CT abd today  - has intermittent hypoxia mostly when lethargic she also has gel nails  - 24 hr eeg neg  - tsh f/up as an outpt  - c/w po vanco  - check bladder scan pt had retention 500ml last night  - can't do MRI AISSATOU b/o renal funtion  - surprisingly when daughter is here she is able to ambulate with walker all the way down to the elevators

## 2018-04-01 NOTE — CONSULT NOTE ADULT - SUBJECTIVE AND OBJECTIVE BOX
HPI:  89 y/o female with  hx dementia, HTN, TIA/CVA, HLD, GERD, Osteoporosis, Polymyalgia, Glaucoma, COPD, recent diagnose of breast CA, started on chemo by Dr. Glaindo on 3/12/18, she developed diarrhea and had positive stool test for C. Diff and was started on po Vanco.  who was sent to the ER from [facility] for [facility]MS.The patient was at her baseline functioning yesterday according to her daughter. She had similar symptoms in the past when she had a UTI in the past. (25 Mar 2018 15:42)    Patient admitted, treated for C diff , diarrhea resolved,.  Has unexplained episodes of unresponsiveness / tremors / confusion.   Neurologic w/up including MRI ( non contrast), EEG  negative.    Patient known to me. Recent diagnosis of locally advanced right sided breast cancer with metastatic adenopathy, triple negative. PET - no distant mets.  Option of chemotherapy to decrease tumor size and ultimately allow surgery was discussed with patient and her daughter.  Patient has mild dementia but she was still able to make her medical decisions and she decided to try chemotherapy. Received first  cycle of CMF ( cytoxan, methotrexate and 5Fu) three weeks ago. 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  Since last week mental status declined and unexplained episodes of ?  unresponsiveness/ tremor.      PAST MEDICAL & SURGICAL HISTORY:  Compression fracture of spine: T7  Alzheimers disease  HTN (hypertension)  Glaucoma  TIA (transient ischemic attack): 8/07  Cerebral vascular accident: 2005  Polymyalgia rheumatica  Osteoporosis  Chronic pain: mid back  Urinary retention  GERD (gastroesophageal reflux disease)  Cholelithiases  Hyperlipemia  Carotid artery stenosis  Lung nodule: biopsied 2003, benign  COPD (chronic obstructive pulmonary disease)  Asthma  History of hip surgery  Gall stones  History of hysterectomy: for bleeding  Hx of cholecystectomy      Social Hx : lives in assisted living  ROS: c/o heartburn, no diarrhea, aware of right  breast  mass, no pain.    Vital Signs Last 24 Hrs  T(C): 36.6 (01 Apr 2018 12:02), Max: 36.6 (01 Apr 2018 05:49)  T(F): 97.8 (01 Apr 2018 12:02), Max: 97.8 (01 Apr 2018 05:49)  HR: 89 (01 Apr 2018 12:02) (75 - 105)  BP: 143/56 (01 Apr 2018 12:02) (95/51 - 143/56)  BP(mean): --  RR: 17 (01 Apr 2018 12:02) (17 - 17)  SpO2: 96% (01 Apr 2018 12:02) (90% - 96%)    Elderly female in bed, not in acute distress.   Awake, alert , oriented to person and place.  Aware of breast mass but does not remember receiving chemotherapy.  Has O2 NC.  No neck adenopathy. Chest clear. heart regular. Large right breast mass, movable, not fixed to chest wall, overlying skin intact. Large right axillary adenopathy.  Abdomen soft and non tender. No leg edema. Moving all extremities. No overt tremor now.                             9.9    22.51 )-----------( 254      ( 01 Apr 2018 10:21 )             30.9     03-31    138  |  104  |  20  ----------------------------<  81  4.2   |  26  |  1.19    Ca    8.8      31 Mar 2018 06:18     Blood cultures negative    < from: MR Head No Cont (03.27.18 @ 20:01) >    EXAM:  MR BRAIN                            PROCEDURE DATE:  03/27/2018          INTERPRETATION:  MRI brain without contrast  Comparison CT performed 2 days prior  History CVA, speech disturbance    There is no mass effect, cortical edema or hydrocephalus. There is mild   to moderate frontoparietal cortical volume loss and chronic microvascular   ischemic change in the periventricular white matter. There is no evidence   of acute infarct. There is a tiny focus of hemosiderin in the subcortical   right high parietal lobe consistent with chronic microhemorrhage. The   orbital and sellar contents and cerebellar tonsils are within normal   limits.    IMPRESSION:    No acute findings      ASHLEY SCHAFFER   This document has been electronically signed. Mar 27 2018  8:04PM          MEDICATIONS  (STANDING):  ALBUTerol    0.083% 2.5 milliGRAM(s) Nebulizer every 6 hours  calcium carbonate  625 mG + Vitamin D (OsCal 250 + D) 1 Tablet(s) Oral daily  dipyridamole 200 mG/aspirin 25 mG 1 Capsule(s) Oral two times a day  heparin  Injectable 5000 Unit(s) SubCutaneous every 12 hours  hydroxychloroquine 200 milliGRAM(s) Oral every 12 hours  levothyroxine 25 MICROGram(s) Oral daily  melatonin 5 milliGRAM(s) Oral at bedtime  memantine ER 21 milliGRAM(s) Oral <User Schedule>  multivitamin 1 Tablet(s) Oral daily  pantoprazole    Tablet 40 milliGRAM(s) Oral before breakfast  predniSONE   Tablet 5 milliGRAM(s) Oral daily  simvastatin 20 milliGRAM(s) Oral at bedtime  tiotropium 18 MICROgram(s) Capsule 1 Capsule(s) Inhalation daily  vancomycin    Solution 125 milliGRAM(s) Oral every 6 hours    MEDICATIONS  (PRN):  aluminum hydroxide/magnesium hydroxide/simethicone Suspension 30 milliLiter(s) Oral every 4 hours PRN Dyspepsia

## 2018-04-02 ENCOUNTER — APPOINTMENT (OUTPATIENT)
Dept: INFUSION THERAPY | Facility: CLINIC | Age: 83
End: 2018-04-02

## 2018-04-02 ENCOUNTER — APPOINTMENT (OUTPATIENT)
Dept: HEMATOLOGY ONCOLOGY | Facility: CLINIC | Age: 83
End: 2018-04-02

## 2018-04-02 LAB
HCT VFR BLD CALC: 29.4 % — LOW (ref 34.5–45)
HCT VFR BLD CALC: 34.1 % — LOW (ref 34.5–45)
HGB BLD-MCNC: 10.9 G/DL — LOW (ref 11.5–15.5)
HGB BLD-MCNC: 9.7 G/DL — LOW (ref 11.5–15.5)
MCHC RBC-ENTMCNC: 28.4 PG — SIGNIFICANT CHANGE UP (ref 27–34)
MCHC RBC-ENTMCNC: 28.8 PG — SIGNIFICANT CHANGE UP (ref 27–34)
MCHC RBC-ENTMCNC: 32 GM/DL — SIGNIFICANT CHANGE UP (ref 32–36)
MCHC RBC-ENTMCNC: 33 GM/DL — SIGNIFICANT CHANGE UP (ref 32–36)
MCV RBC AUTO: 87.2 FL — SIGNIFICANT CHANGE UP (ref 80–100)
MCV RBC AUTO: 88.8 FL — SIGNIFICANT CHANGE UP (ref 80–100)
NRBC # BLD: 0 /100 WBCS — SIGNIFICANT CHANGE UP (ref 0–0)
NRBC # BLD: 0 /100 WBCS — SIGNIFICANT CHANGE UP (ref 0–0)
PLATELET # BLD AUTO: 234 K/UL — SIGNIFICANT CHANGE UP (ref 150–400)
PLATELET # BLD AUTO: 286 K/UL — SIGNIFICANT CHANGE UP (ref 150–400)
RBC # BLD: 3.37 M/UL — LOW (ref 3.8–5.2)
RBC # BLD: 3.84 M/UL — SIGNIFICANT CHANGE UP (ref 3.8–5.2)
RBC # FLD: 19.8 % — HIGH (ref 10.3–14.5)
RBC # FLD: 19.9 % — HIGH (ref 10.3–14.5)
WBC # BLD: 17.33 K/UL — HIGH (ref 3.8–10.5)
WBC # BLD: 21.88 K/UL — HIGH (ref 3.8–10.5)
WBC # FLD AUTO: 17.33 K/UL — HIGH (ref 3.8–10.5)
WBC # FLD AUTO: 21.88 K/UL — HIGH (ref 3.8–10.5)

## 2018-04-02 RX ADMIN — Medication 1 TABLET(S): at 12:01

## 2018-04-02 RX ADMIN — MEMANTINE HYDROCHLORIDE 21 MILLIGRAM(S): 10 TABLET ORAL at 17:47

## 2018-04-02 RX ADMIN — Medication 125 MILLIGRAM(S): at 12:00

## 2018-04-02 RX ADMIN — Medication 200 MILLIGRAM(S): at 05:26

## 2018-04-02 RX ADMIN — Medication 200 MILLIGRAM(S): at 17:47

## 2018-04-02 RX ADMIN — Medication 125 MILLIGRAM(S): at 05:27

## 2018-04-02 RX ADMIN — TIOTROPIUM BROMIDE 1 CAPSULE(S): 18 CAPSULE ORAL; RESPIRATORY (INHALATION) at 07:53

## 2018-04-02 RX ADMIN — ALBUTEROL 2.5 MILLIGRAM(S): 90 AEROSOL, METERED ORAL at 03:00

## 2018-04-02 RX ADMIN — ALBUTEROL 2.5 MILLIGRAM(S): 90 AEROSOL, METERED ORAL at 07:53

## 2018-04-02 RX ADMIN — ASPIRIN AND DIPYRIDAMOLE 1 CAPSULE(S): 25; 200 CAPSULE, EXTENDED RELEASE ORAL at 05:26

## 2018-04-02 RX ADMIN — Medication 1 TABLET(S): at 12:00

## 2018-04-02 RX ADMIN — PANTOPRAZOLE SODIUM 40 MILLIGRAM(S): 20 TABLET, DELAYED RELEASE ORAL at 05:26

## 2018-04-02 RX ADMIN — Medication 5 MILLIGRAM(S): at 05:26

## 2018-04-02 RX ADMIN — ALBUTEROL 2.5 MILLIGRAM(S): 90 AEROSOL, METERED ORAL at 20:40

## 2018-04-02 RX ADMIN — SIMVASTATIN 20 MILLIGRAM(S): 20 TABLET, FILM COATED ORAL at 21:18

## 2018-04-02 RX ADMIN — ALBUTEROL 2.5 MILLIGRAM(S): 90 AEROSOL, METERED ORAL at 14:05

## 2018-04-02 RX ADMIN — Medication 25 MICROGRAM(S): at 05:26

## 2018-04-02 RX ADMIN — Medication 30 MILLILITER(S): at 18:57

## 2018-04-02 RX ADMIN — HEPARIN SODIUM 5000 UNIT(S): 5000 INJECTION INTRAVENOUS; SUBCUTANEOUS at 17:47

## 2018-04-02 RX ADMIN — Medication 125 MILLIGRAM(S): at 17:47

## 2018-04-02 RX ADMIN — Medication 5 MILLIGRAM(S): at 21:18

## 2018-04-02 RX ADMIN — ASPIRIN AND DIPYRIDAMOLE 1 CAPSULE(S): 25; 200 CAPSULE, EXTENDED RELEASE ORAL at 17:47

## 2018-04-02 RX ADMIN — HEPARIN SODIUM 5000 UNIT(S): 5000 INJECTION INTRAVENOUS; SUBCUTANEOUS at 05:26

## 2018-04-02 NOTE — PROGRESS NOTE ADULT - SUBJECTIVE AND OBJECTIVE BOX
89 y/o female with h/o dementia, HTN, TIA/CVA, HLD, GERD, Osteoporosis, Polymyalgia, Glaucoma, COPD, recent diagnosed with breast CA, started on chemo by Dr. Galindo on 3/12/18, then diarrhea and CDAD on po Vanco was admitted on 3/25 for increased confusion, intermittent.    3/25: she is alert and verbal; confusion is at baseline. Knows she is on daily Prednisone doesn't  know for what; AAOx3  3/26: RR called for change in MS: pt weak, unable to speak due to voice weakness, articulates words with her lips; unable to cough initially, after a while was able to produce one cough and with great struggle said No: it was a very nasal type of" no" clearly not normal; daughter present; same thing happened Sun; Mom is a v active lady, and here she is been walking with no signs of weakness. Transfer to monitor bed for pulse o2 monitor; doubt GBS or MG; MRI will be ordered  3/27: MRI neg pt at baseline; tele normal  3/30: somnolent, no c/o ;   3/31: RR called for same recurrent problem: unresponsive, weak; improved as the time went bye;    4/1: c/w intermittent periods of weakness and shakes- she is able to answer questions during the shakes; c/o heartburn  4/2: back to baseline but her AMS is intermittent worse in am    Vital Signs Last 24 Hrs  T(C): 36.3 (02 Apr 2018 13:56), Max: 37 (02 Apr 2018 05:11)  T(F): 97.4 (02 Apr 2018 13:56), Max: 98.6 (02 Apr 2018 05:11)  HR: 85 (02 Apr 2018 13:56) (85 - 96)  BP: 135/45 (02 Apr 2018 13:56) (95/57 - 135/47)  BP(mean): --  RR: 18 (02 Apr 2018 13:56) (17 - 18)  SpO2: 100% (02 Apr 2018 13:56) (93% - 100%)  Constitutional: frail looking  HEENT: NC/AT, EOMI, PERRLA, dry oral muc  Neck: supple  Back: no tenderness  Respiratory: clear  Right breast mass  Cardiovascular: S1S2 regular, no murmurs  Abdomen: soft, not tender, mild distended, positive BS  Genitourinary: no LN palpable  Musculoskeletal: no muscle tenderness, no joint swelling or tenderness  Extremities: no pedal edema  Neurological: see above; weak, somnolent mot str 2/5 all extr              WEAKNESS  CDAD  CONFUSION  CHRONIC STEROIDS FOR RA  BREAST CA S/P RECENT CHEMO  ABNORMAL TSH  INTERMITTENT HYPOXIA      PLAN:      - intermittently  somnolent and weak; intermittent tremors: diff dx- brain mets from breast ca vs chemo related encephalopathy vs infection  - all w/up neg so far; can't do MRI with contrast borderline renal function; intermittent leukocytosis  - was seen by 2 neurologists so far in the hospital; not neurological per them; infection was r/out; Vanco was continued for c diff ( dx [facility])  - CT abd negative  - has intermittent hypoxia mostly when lethargic she also has gel nails  - 24 hr eeg neg  - tsh f/up as an outpt  - c/w po vanco unti 4/4 per ID  - can't do MRI AISSATOU b/o renal funtion  - surprisingly when daughter is here she is able to ambulate with walker all the way down to the elevators   - pall f/up re: GOC. I suspect dementia getting worse, accelerate course and Dr. Vo knows her for many years thinks this could be the reason

## 2018-04-02 NOTE — CONSULT NOTE ADULT - ASSESSMENT
87 y/o woman with multiple medical problems, recent diagnosis of locally advanced right breast cancer, ER/[place] and HER 2 negative, started CMF chemotherapy three weeks ago.   Overall decline in her condition, unexplained episodes of unresponsiveness/ tremors/ mental status worse, leukocytosis with C Diff. Evaluated by ID and neurology. C difficile treated.   Neurologic w/up negative. No overt metastatic disease to brain. CMF chemotherapy is usually well tolerated and encephalopathy  due to chemotherapy will be unlikely/ unusual.  ? rapid progression of her underlying dementia     At this point she is not a candidate for further chemotherapy. Consider palliative care evaluation to discuss goals of care.  Will discuss with patient's daughter.
87 y/o female with  hx dementia, HTN, TIA /CVA, HLD, GERD, Osteoporosis, Polymyalgia, Glaucoma, COPD, recent diagnose of breast CA, started on chemo by Dr. Galindo on 3/12/18, she developed diarrhea and had positive stool test for C. Diff and was started on po Vanco. sent to the ER from [facility] for AMS. The patient was at her baseline functioning yesterday according to her daughter.   4/2/18 Seen and examined at bedside with no complaints of pain or dyspnea. Seated in chair, appears comfortable.      Assessment and Plan:    1) Altered Mental status  -Baseline dementia  -? TIA  -CT Head =neg  -Neuro nelson noted   -Improved  -?infectious process  -RRT called x 2 for unresponsiveness  2) Breast Cancer  -Recent chemo started 3 weeks ago  -Dr Mateo damon noted  -No further treatment as per Dr Galindo  3) Dementia  -Mild as per hx  -Supportive care  4) Diarrhea  -Improving  -CDiff in past  -Breast Ca on chemo.   -leukocytosis  -encephalopathy improving  -ID eval noted  -cont with vancomycin 125 mg PO q6h as per ID  -supportive care  5) Advanced Directives  -Pt without capacity  -Daughter Pat named HCP  -Will schedule GOC conversation with Pat
88 year old woman with reported confusion, improved, non focal exa, Ct of head showed no acute changes.? underlying infection.
PROBLEMS:    Altered mental status-TIA  Clostridium difficile colitis  COPD  Polymyalgia rheumatica  HTN    PLAN:    NEURO EVAL  PULMONARY STABLE  PAT KNOWN TO JADIEL WILL FU HIM AS OUTPAT  PAT ON PREDNISONE FOR FIBROMYLAGIA  NO NEED FOR FURTHER FU  DVT PROPHYLASIX
87 y/o female with h/o dementia, HTN, TIA/CVA, HLD, GERD, Osteoporosis, Polymyalgia, Glaucoma, COPD, recent diagnosed with breast CA, started on chemo by Dr. Galindo on 3/12/18, then diarrhea and CDAD on po Vanco was admitted on 3/25 for increased confusion.    1. Diarrheal syndrome. CDAD. Breast Ca on chemo.   -leukocytosis  -encephalopathy improving  -no clinical signs of toxic megacolon  -agree with vancomycin 125 mg PO q6h  -reason for abx use and side effects reviewed with patient; monitor BMP   -monitor temps  -f/u CBC  -supportive care  2. Other issues:   -care per medicine

## 2018-04-02 NOTE — CONSULT NOTE ADULT - SUBJECTIVE AND OBJECTIVE BOX
87 y/o female with  hx dementia, HTN, TIA /CVA, HLD, GERD, Osteoporosis, Polymyalgia, Glaucoma, COPD, recent diagnose of breast CA, started on chemo by Dr. Galindo on 3/12/18, she developed diarrhea and had positive stool test for C. Diff and was started on po Vanco.  who was sent to the ER from [facility] for AMS. The patient was at her baseline functioning yesterday according to her daughter.   4/2/18 Seen and examined at bedside with no complaints of pain or dyspnea. Seated in chair, appears comfortable.      PAIN: ( )Yes   ( X)No    DYSPNEA: ( ) Yes  (X ) No  Level:    PAST MEDICAL & SURGICAL HISTORY:  Compression fracture of spine: T7  Alzheimer's disease  HTN (hypertension)  Glaucoma  TIA (transient ischemic attack): 8/07  Cerebral vascular accident: 2005  Polymyalgia rheumatica  Osteoporosis  Chronic pain: mid back  Urinary retention  GERD (gastroesophageal reflux disease)  Cholelithiases  Hyperlipemia  Carotid artery stenosis  Lung nodule: biopsied 2003, benign  COPD (chronic obstructive pulmonary disease)  Asthma  History of hip surgery  Gall stones  History of hysterectomy: for bleeding  Hx of cholecystectomy      SOCIAL HX:    Lives in AV  Hx opiate tolerance ( )YES  (X )NO    Baseline ADLs  (Prior to Admission)  ( ) Independent   (X )Dependent    FAMILY HISTORY:  No pertinent family history in first degree relatives      Review of Systems:    Anxiety-denies  Depression-  Physical Discomfort-  Dyspnea-denies  Constipation-denies  Diarrhea-mild/improved  Nausea-  Vomiting-  Anorexia-  Weight Loss-   Cough-denies  Secretions-  Fatigue-denies  Weakness-mod  Delirium-    All other systems reviewed and negative  Unable to obtain/Limited due to:      PHYSICAL EXAM:    Vital Signs Last 24 Hrs  T(C): 37 (02 Apr 2018 05:11), Max: 37 (02 Apr 2018 05:11)  T(F): 98.6 (02 Apr 2018 05:11), Max: 98.6 (02 Apr 2018 05:11)  HR: 95 (02 Apr 2018 09:21) (75 - 96)  BP: 135/47 (02 Apr 2018 09:21) (95/57 - 135/47)  BP(mean): --  RR: 18 (02 Apr 2018 09:21) (17 - 18)  SpO2: 96% (02 Apr 2018 09:21) (93% - 99%)  Daily     Daily     PPSV2:  40-50 %  FAST:    General:  Mental Status:  HEENT:  Lungs:  Cardiac:  GI:  :  Ext:  Neuro:      LABS:                        10.9   21.88 )-----------( 286      ( 02 Apr 2018 08:42 )             34.1             Albumin:     Allergies    Aciphex (Unknown)  Macrobid (Unknown)  Percocet 10/325 (Rash)    Intolerances      MEDICATIONS  (STANDING):  ALBUTerol    0.083% 2.5 milliGRAM(s) Nebulizer every 6 hours  calcium carbonate  625 mG + Vitamin D (OsCal 250 + D) 1 Tablet(s) Oral daily  dipyridamole 200 mG/aspirin 25 mG 1 Capsule(s) Oral two times a day  heparin  Injectable 5000 Unit(s) SubCutaneous every 12 hours  hydroxychloroquine 200 milliGRAM(s) Oral every 12 hours  levothyroxine 25 MICROGram(s) Oral daily  melatonin 5 milliGRAM(s) Oral at bedtime  memantine ER 21 milliGRAM(s) Oral <User Schedule>  multivitamin 1 Tablet(s) Oral daily  pantoprazole    Tablet 40 milliGRAM(s) Oral before breakfast  predniSONE   Tablet 5 milliGRAM(s) Oral daily  simvastatin 20 milliGRAM(s) Oral at bedtime  sodium chloride 0.9%. 1000 milliLiter(s) (75 mL/Hr) IV Continuous <Continuous>  tiotropium 18 MICROgram(s) Capsule 1 Capsule(s) Inhalation daily  vancomycin    Solution 125 milliGRAM(s) Oral every 6 hours    MEDICATIONS  (PRN):  aluminum hydroxide/magnesium hydroxide/simethicone Suspension 30 milliLiter(s) Oral every 4 hours PRN Dyspepsia      RADIOLOGY/ADDITIONAL STUDIES:    < from: CT Abdomen and Pelvis w/ Oral Cont (04.01.18 @ 14:40) >    EXAM:  CT ABDOMEN AND PELVIS OC                            PROCEDURE DATE:  04/01/2018          INTERPRETATION:  CLINICAL INFORMATION: Abdominal pain    COMPARISON: CT abdomen pelvis 8/31/2017    PROCEDURE:   CT of the Abdomen and Pelvis was performed without intravenous contrast.   Intravenous contrast: None.  Oral contrast: positive contrast was administered.  Sagittal and coronal reformats were performed.    FINDINGS:    LOWER CHEST: Interval increase in the size of a right breast mass which  measures 3.8 x 3.4 cm (previously 2.5 x 2.2 cm). Additional 2 x 1.6 cm   soft tissue nodule in the inferior right breast has increased in size as   well.  Trace right pleural effusion. Bibasilar dependent may represent   atelectasis or scarring.     LIVER: Within normal limits.  BILE DUCTS: Normal caliber.  GALLBLADDER: Cholecystectomy clips.  SPLEEN: Within normal limits.  PANCREAS: Within normal limits.  ADRENALS: Within normal limits.  KIDNEYS/URETERS: Partial bilateral renal duplication..    BLADDER: Within normal limits.  REPRODUCTIVE ORGANS: Status post hysterectomy. No pelvic mass.     BOWEL: No bowel obstruction. Mild sigmoid diverticulosis.  PERITONEUM: No ascites.  VESSELS:  Atherosclerotic vascular calcifications..  RETROPERITONEUM: No lymphadenopathy.    ABDOMINAL WALL: Within normal limits.  BONES: Right femoral liz and dynamic hip screw. Degenerative changes of   the spine are present. Old left healed rib fractures.    IMPRESSION: Interval increase in the size of partiallyimaged right   breast masses. Physical exam correlation recommended.    No bowel obstruction.      < from: Xray Chest 1 View- PORTABLE-Urgent (03.30.18 @ 13:24) >  EXAM:  XR CHEST PORTABLE URGENT 1V                            PROCEDURE DATE:  03/30/2018          INTERPRETATION:  XR CHEST PORTABLE URGENT 1V    Single AP view    HISTORY:  hypoxia    Comparison:  Chest x-ray 3/25/2018    Normal heart size. Developing small right pleural effusion.    IMPRESSION: Small right pleural effusion has developed.

## 2018-04-03 ENCOUNTER — TRANSCRIPTION ENCOUNTER (OUTPATIENT)
Age: 83
End: 2018-04-03

## 2018-04-03 VITALS
TEMPERATURE: 98 F | OXYGEN SATURATION: 98 % | HEART RATE: 88 BPM | DIASTOLIC BLOOD PRESSURE: 39 MMHG | RESPIRATION RATE: 18 BRPM | SYSTOLIC BLOOD PRESSURE: 114 MMHG

## 2018-04-03 LAB
HCT VFR BLD CALC: 29.7 % — LOW (ref 34.5–45)
HGB BLD-MCNC: 9.3 G/DL — LOW (ref 11.5–15.5)
MCHC RBC-ENTMCNC: 28 PG — SIGNIFICANT CHANGE UP (ref 27–34)
MCHC RBC-ENTMCNC: 31.3 GM/DL — LOW (ref 32–36)
MCV RBC AUTO: 89.5 FL — SIGNIFICANT CHANGE UP (ref 80–100)
NRBC # BLD: 0 /100 WBCS — SIGNIFICANT CHANGE UP (ref 0–0)
PLATELET # BLD AUTO: 230 K/UL — SIGNIFICANT CHANGE UP (ref 150–400)
RBC # BLD: 3.32 M/UL — LOW (ref 3.8–5.2)
RBC # FLD: 19.9 % — HIGH (ref 10.3–14.5)
WBC # BLD: 12.6 K/UL — HIGH (ref 3.8–10.5)
WBC # FLD AUTO: 12.6 K/UL — HIGH (ref 3.8–10.5)

## 2018-04-03 RX ADMIN — ASPIRIN AND DIPYRIDAMOLE 1 CAPSULE(S): 25; 200 CAPSULE, EXTENDED RELEASE ORAL at 06:15

## 2018-04-03 RX ADMIN — Medication 1 TABLET(S): at 12:00

## 2018-04-03 RX ADMIN — Medication 200 MILLIGRAM(S): at 06:15

## 2018-04-03 RX ADMIN — HEPARIN SODIUM 5000 UNIT(S): 5000 INJECTION INTRAVENOUS; SUBCUTANEOUS at 06:15

## 2018-04-03 RX ADMIN — TIOTROPIUM BROMIDE 1 CAPSULE(S): 18 CAPSULE ORAL; RESPIRATORY (INHALATION) at 09:29

## 2018-04-03 RX ADMIN — ALBUTEROL 2.5 MILLIGRAM(S): 90 AEROSOL, METERED ORAL at 01:51

## 2018-04-03 RX ADMIN — Medication 125 MILLIGRAM(S): at 12:00

## 2018-04-03 RX ADMIN — ALBUTEROL 2.5 MILLIGRAM(S): 90 AEROSOL, METERED ORAL at 09:29

## 2018-04-03 RX ADMIN — Medication 125 MILLIGRAM(S): at 06:16

## 2018-04-03 RX ADMIN — ALBUTEROL 2.5 MILLIGRAM(S): 90 AEROSOL, METERED ORAL at 13:38

## 2018-04-03 RX ADMIN — Medication 125 MILLIGRAM(S): at 02:53

## 2018-04-03 RX ADMIN — Medication 5 MILLIGRAM(S): at 06:15

## 2018-04-03 RX ADMIN — PANTOPRAZOLE SODIUM 40 MILLIGRAM(S): 20 TABLET, DELAYED RELEASE ORAL at 06:15

## 2018-04-03 RX ADMIN — Medication 25 MICROGRAM(S): at 06:15

## 2018-04-03 RX ADMIN — Medication 30 MILLILITER(S): at 08:06

## 2018-04-03 NOTE — PROGRESS NOTE ADULT - ASSESSMENT
88 year old woman with change in mental status, improved. On antibiotics for C. difficile. No evidence of CVA, seizures.
Assessment and Recommendation:   · Assessment		  88 year old woman with recurrent TIAs with Dementia reported confusion, improved, non focal exa, Ct of head , MRI, EEG did not reveal acute pathology  R/o Metabolic causes.    Doubt Primary CNS pathology.  Could be multifactorial.  Discussed with Dr. Galindo and pt's daughter .  If recurrent sx and no etiology consider comfort care.  Palliative care consult.  Correct metabolic causes.  Will f/u.
87 y/o female with h/o dementia, HTN, TIA/CVA, HLD, GERD, Osteoporosis, Polymyalgia, Glaucoma, COPD, recent diagnosed with breast CA, started on chemo by Dr. Galindo on 3/12/18, then diarrhea and CDAD on po Vanco was admitted on 3/25 for increased confusion.    1. Diarrheal syndrome improving. CDAD. Breast Ca on chemo.   -episode of encephalopathy ?TIA  -possible seizure episode - for EEG  -leukocytosis improving  -no clinical signs of toxic megacolon  -on vancomycin 125 mg PO q6h # 3  -tolerating abx well so far; no side effects noted  -continue abx coverage  -monitor temps  -f/u CBC  -supportive care  2. Other issues:   -care per medicine
87 y/o female with h/o dementia, HTN, TIA/CVA, HLD, GERD, Osteoporosis, Polymyalgia, Glaucoma, COPD, recent diagnosed with breast CA, started on chemo by Dr. Galindo on 3/12/18, then diarrhea and CDAD on po Vanco was admitted on 3/25 for increased confusion.    1. Diarrheal syndrome. CDAD. Breast Ca on chemo.   -episode of encephalopathy ?TIA  -leukocytosis improving  -encephalopathy improving  -no clinical signs of toxic megacolon  -on vancomycin 125 mg PO q6h # 2  -tolerating abx well so far; no side effects noted  -continue abx coverage  -monitor temps  -f/u CBC  -supportive care  2. Other issues:   -care per medicine
88 year old woman with 2 episodes of unresponsiveness, "shaking" extremities, amnestic for event. regular EEG was normal, ? seizures.
89 y/o female with  hx dementia, HTN, TIA /CVA, HLD, GERD, Osteoporosis, Polymyalgia, Glaucoma, COPD, recent diagnose of breast CA, started on chemo by Dr. Galindo on 3/12/18, she developed diarrhea and had positive stool test for C. Diff and was started on po Vanco. sent to the ER from [facility] for AMS. The patient was at her baseline functioning yesterday according to her daughter.   4/2/18 Seen and examined at bedside with no complaints of pain or dyspnea. Seated in chair, appears comfortable.      Assessment and Plan:    1) Altered Mental status  -Baseline dementia  -? TIA  -CT Head =neg  -Neuro eval noted   -Improved  -?infectious process  -RRT called x 2 for unresponsiveness  -Poss side effects of chemo?   2) Breast Cancer  -Recent chemo started 3 weeks ago  -Dr Mateo damon noted  -No further treatment as per Dr Galindo  3) Dementia  -Mild as per hx  -Supportive care  -Functional at AV  4) Diarrhea  -Improving  -CDiff in past  -Breast Ca on chemo.   -leukocytosis  -encephalopathy improved  -ID eval noted  -cont with vancomycin 125 mg PO q6h as per ID  -supportive care  5) Advanced Directives  -Pt without capacity  -DNR/DNI  -Daughter Pat named HCP  -GOC conversation with Pat today 4/3   -Requests home Pall eval [facility]
89 y/o female with h/o dementia, HTN, TIA/CVA, HLD, GERD, Osteoporosis, Polymyalgia, Glaucoma, COPD, recent diagnosed with breast CA, started on chemo by Dr. Galindo on 3/12/18, then diarrhea and CDAD on po Vanco was admitted on 3/25 for increased confusion.    1. Diarrheal syndrome improving. CDAD. Breast Ca on chemo.   -episode of encephalopathy ?TIA  -leukocytosis improving  -on vancomycin 125 mg PO q6h # 4  -tolerating abx well so far; no side effects noted  -continue abx coverage for 10 more days  -monitor temps  -f/u CBC  -supportive care  2. Other issues:   -care per medicine
medicine

## 2018-04-03 NOTE — DIETITIAN INITIAL EVALUATION ADULT. - OTHER INFO
Pt is  89 y/o female with h/o dementia, HTN, TIA/CVA, HLD, GERD, Osteoporosis, Polymyalgia, Glaucoma, COPD, recent diagnosed with breast CA, started on chemo,  diarrhea and CDAD(cdiff diarrhea) on po,  Admitted for increased confusion, intermittent.  AMS worsening with increasing stay at .   Assessment as per EMR: AMS, Breast CA, dementia, diarrhea, improving.   GOC meeting to be scheduled with pt's daughter. Pt is  89 y/o female with h/o dementia, HTN, TIA/CVA, HLD, GERD, Osteoporosis, Polymyalgia, Glaucoma, COPD, recent diagnosed with breast CA, started on chemo,  diarrhea and CDAD(cdiff diarrhea) on po,  Admitted for increased confusion, intermittent.  AMS worsening with increasing stay at .   Assessment as per EMR: AMS, Breast CA, dementia, diarrhea, improving.   GOC meeting to be scheduled with pt's daughter.  No edema noted, skin intact, marta 18.  Pt reports good PO intake, nursing concurs. Pt does not know her previous weight.  Pt on DASH diet, suggest maintain this diet.  Suggest add Ensure enlive 8 oz BID.   Will monitor labs, weight, PO intake.

## 2018-04-03 NOTE — DIETITIAN INITIAL EVALUATION ADULT. - ENERGY NEEDS
Ht.    62  "        Wt.    56    kg               BMI   29               IBW 50    kg               Pt is at 145   %  IBW

## 2018-04-03 NOTE — PROGRESS NOTE ADULT - PROVIDER SPECIALTY LIST ADULT
Hospitalist
Infectious Disease
Neurology
Palliative Care
Neurology

## 2018-04-03 NOTE — DISCHARGE NOTE ADULT - CARE PROVIDER_API CALL
Desire Landry), Internal Medicine; Medical Oncology  [address] [place] [address]  Phone: [phone]  Fax: [phone]

## 2018-04-03 NOTE — DISCHARGE NOTE ADULT - HOSPITAL COURSE
see progress note; trying to confirm is dr.L Otero is the PCP see progress note; trying to confirm is dr.L Jean is the PCP- Dr. Jean aware- in [place] but left message

## 2018-04-03 NOTE — GOALS OF CARE CONVERSATION - PERSONAL ADVANCE DIRECTIVE - CONVERSATION DETAILS
The role of Palliative medicine was reviewed. The patients daughter discussed her mother's recent diagnosis of breast cancer and treatment options. They opted for chemo which was initiated within 1 month ago., She since has developed episodes of unresponsiveness intermittently which are transient with full resolution. As per pt's daughter the medical team feel it may be a result of chemo and she will no longer be a candidate for that treatment. Pt and daughter are in agreement with this plan. They would like to return to [facility] with home Pall services with [facility]. DNR/DNI on chart. We discussed all 3 levels of home care including hospice care. 60 minutes spent on advanced care planning conversation.

## 2018-04-03 NOTE — DISCHARGE NOTE ADULT - MEDICATION SUMMARY - MEDICATIONS TO STOP TAKING
I will STOP taking the medications listed below when I get home from the hospital:    vancomycin 125 mg oral capsule  -- 1 cap(s) by mouth every 6 hours  -- Finish all this medication unless otherwise directed by prescriber.

## 2018-04-03 NOTE — DISCHARGE NOTE ADULT - PATIENT PORTAL LINK FT
You can access the [facility] Portal, offered by [facility], by registering with the following website: [email or web address]

## 2018-04-03 NOTE — DISCHARGE NOTE ADULT - CARE PLAN
Principal Discharge DX:	Altered mental status  Goal:	resolving  Assessment and plan of treatment:	same as before

## 2018-04-03 NOTE — STUDENT SIGN OFF DOCUMENT - DOCUMENTS STUDENTS ARE SIGNED OFF ON
Assessment and Intervention/Plan of Care
Assessment and Intervention/Plan of Care
Plan of Care/Assessment and Intervention

## 2018-04-03 NOTE — PROGRESS NOTE ADULT - SUBJECTIVE AND OBJECTIVE BOX
87 y/o female with  hx dementia, HTN, TIA /CVA, HLD, GERD, Osteoporosis, Polymyalgia, Glaucoma, COPD, recent diagnose of breast CA, started on chemo by Dr. Galindo on 3/12/18, she developed diarrhea and had positive stool test for C. Diff and was started on po Vanco.  who was sent to the ER from [facility] for AMS. The patient was at her baseline functioning yesterday according to her daughter.   4/2/18 Seen and examined at bedside with no complaints of pain or dyspnea. Seated in chair, appears comfortable.    4/3/18 Seen and examined at bedside with daughter Pat present. Pt OOB/chair with no C/O pain or dyspnea.   PAIN: ( )Yes   ( X)No    DYSPNEA: ( ) Yes  (X ) No  Level:    PAST MEDICAL & SURGICAL HISTORY:  Compression fracture of spine: T7  Alzheimer's disease  HTN (hypertension)  Glaucoma  TIA (transient ischemic attack): 8/07  Cerebral vascular accident: 2005  Polymyalgia rheumatica  Osteoporosis  Chronic pain: mid back  Urinary retention  GERD (gastroesophageal reflux disease)  Cholelithiases  Hyperlipemia  Carotid artery stenosis  Lung nodule: biopsied 2003, benign  COPD (chronic obstructive pulmonary disease)  Asthma  History of hip surgery  Gall stones  History of hysterectomy: for bleeding  Hx of cholecystectomy      SOCIAL HX:    Lives in AV  Hx opiate tolerance ( )YES  (X )NO    Baseline ADLs  (Prior to Admission)  ( ) Independent   (X )Dependent    FAMILY HISTORY:  No pertinent family history in first degree relatives      Review of Systems:    Anxiety-denies  Depression-  Physical Discomfort-  Dyspnea-denies  Constipation-denies  Diarrhea-mild/improved  Nausea-  Vomiting-  Anorexia-mod/improving  Weight Loss-   Cough-denies  Secretions-  Fatigue-denies  Weakness-mod  Delirium-    All other systems reviewed and negative  Unable to obtain/Limited due to:      PHYSICAL EXAM:  ICU Vital Signs Last 24 Hrs  T(C): 36.3 (03 Apr 2018 05:51), Max: 36.3 (02 Apr 2018 13:56)  T(F): 97.4 (03 Apr 2018 05:51), Max: 97.4 (02 Apr 2018 13:56)  HR: 90 (03 Apr 2018 09:30) (81 - 94)  BP: 103/49 (03 Apr 2018 05:51) (103/49 - 137/38)  BP(mean): --  ABP: --  ABP(mean): --  RR: 18 (03 Apr 2018 05:51) (18 - 18)  SpO2: 95% (03 Apr 2018 05:51) (94% - 100%)    PPSV2:  40-50 %  FAST:    General: Alert elderly female in NAD   Mental Status: Alert and oriented X3  HEENT: Oral mucosa dry  Lungs: clear to auscultation  Cardiac: S1S2+  GI: abd soft NT ND + BS  : Voids  Ext: no edema  Neuro: no focal def      LABS:                        10.9   21.88 )-----------( 286      ( 02 Apr 2018 08:42 )             34.1             Albumin:     Allergies    Aciphex (Unknown)  Macrobid (Unknown)  Percocet 10/325 (Rash)    RADIOLOGY/ADDITIONAL STUDIES:    < from: CT Abdomen and Pelvis w/ Oral Cont (04.01.18 @ 14:40) >    EXAM:  CT ABDOMEN AND PELVIS OC                            PROCEDURE DATE:  04/01/2018          INTERPRETATION:  CLINICAL INFORMATION: Abdominal pain    COMPARISON: CT abdomen pelvis 8/31/2017    PROCEDURE:   CT of the Abdomen and Pelvis was performed without intravenous contrast.   Intravenous contrast: None.  Oral contrast: positive contrast was administered.  Sagittal and coronal reformats were performed.    FINDINGS:    LOWER CHEST: Interval increase in the size of a right breast mass which  measures 3.8 x 3.4 cm (previously 2.5 x 2.2 cm). Additional 2 x 1.6 cm   soft tissue nodule in the inferior right breast has increased in size as   well.  Trace right pleural effusion. Bibasilar dependent may represent   atelectasis or scarring.     LIVER: Within normal limits.  BILE DUCTS: Normal caliber.  GALLBLADDER: Cholecystectomy clips.  SPLEEN: Within normal limits.  PANCREAS: Within normal limits.  ADRENALS: Within normal limits.  KIDNEYS/URETERS: Partial bilateral renal duplication..    BLADDER: Within normal limits.  REPRODUCTIVE ORGANS: Status post hysterectomy. No pelvic mass.     BOWEL: No bowel obstruction. Mild sigmoid diverticulosis.  PERITONEUM: No ascites.  VESSELS:  Atherosclerotic vascular calcifications..  RETROPERITONEUM: No lymphadenopathy.    ABDOMINAL WALL: Within normal limits.  BONES: Right femoral liz and dynamic hip screw. Degenerative changes of   the spine are present. Old left healed rib fractures.    IMPRESSION: Interval increase in the size of partiallyimaged right   breast masses. Physical exam correlation recommended.    No bowel obstruction.      < from: Xray Chest 1 View- PORTABLE-Urgent (03.30.18 @ 13:24) >  EXAM:  XR CHEST PORTABLE URGENT 1V                            PROCEDURE DATE:  03/30/2018          INTERPRETATION:  XR CHEST PORTABLE URGENT 1V    Single AP view    HISTORY:  hypoxia    Comparison:  Chest x-ray 3/25/2018    Normal heart size. Developing small right pleural effusion.    IMPRESSION: Small right pleural effusion has developed.

## 2018-04-03 NOTE — PROGRESS NOTE ADULT - SUBJECTIVE AND OBJECTIVE BOX
89 y/o female with h/o dementia, HTN, TIA/CVA, HLD, GERD, Osteoporosis, Polymyalgia, Glaucoma, COPD, recent diagnosed with breast CA, started on chemo by Dr. Galindo on 3/12/18, then diarrhea and CDAD on po Vanco was admitted on 3/25 for increased confusion, intermittent.    3/25: she is alert and verbal; confusion is at baseline. Knows she is on daily Prednisone doesn't  know for what; AAOx3  3/26: RR called for change in MS: pt weak, unable to speak due to voice weakness, articulates words with her lips; unable to cough initially, after a while was able to produce one cough and with great struggle said No: it was a very nasal type of" no" clearly not normal; daughter present; same thing happened Sun; Mom is a v active lady, and here she is been walking with no signs of weakness. Transfer to monitor bed for pulse o2 monitor; doubt GBS or MG; MRI will be ordered  3/27: MRI neg pt at baseline; tele normal  3/30: somnolent, no c/o ;   3/31: RR called for same recurrent problem: unresponsive, weak; improved as the time went bye;    4/1: c/w intermittent periods of weakness and shakes- she is able to answer questions during the shakes; c/o heartburn  4/2: back to baseline but her AMS is intermittent worse in am  4/3: back to normal dc today      Constitutional: frail looking  HEENT: NC/AT, EOMI, PERRLA, dry oral muc  Neck: supple  Back: no tenderness  Respiratory: clear  Right breast mass  Cardiovascular: S1S2 regular, no murmurs  Abdomen: soft, not tender, mild distended, positive BS  Genitourinary: no LN palpable  Musculoskeletal: no muscle tenderness, no joint swelling or tenderness  Extremities: no pedal edema  Neurological: see above; weak, somnolent mot str 2/5 all extr              WEAKNESS  CDAD  CONFUSION  CHRONIC STEROIDS FOR RA  BREAST CA S/P RECENT CHEMO  ABNORMAL TSH  INTERMITTENT HYPOXIA      PLAN:      - intermittently  somnolent and weak; intermittent tremors: diff dx- brain mets from breast ca vs chemo related encephalopathy vs infection  - all w/up neg so far; can't do MRI with contrast borderline renal function; intermittent leukocytosis  - was seen by 2 neurologists so far in the hospital; not neurological per them; infection was r/out; Vanco was continued for c diff ( dx at [facility])  - CT abd negative  - 24 hr eeg neg  - tsh f/up as an outpt  - done with vanco  - can't do MRI AISSATOU b/o renal funtion  - pall consulted    She will not get anymore chemo; wbs down to 12; will d/w Dr LISA Otero about the case waiting to confirm

## 2018-04-03 NOTE — DISCHARGE NOTE ADULT - MEDICATION SUMMARY - MEDICATIONS TO TAKE
I will START or STAY ON the medications listed below when I get home from the hospital:    predniSONE 5 mg oral tablet  -- 1 tab(s) by mouth once a day ** chronic treatment of RA**  -- Indication: For .    acetaminophen 500 mg oral tablet  -- 2 tab(s) by mouth 2 times a day  -- Indication: For .    aluminum hydroxide-magnesium hydroxide 200 mg-200 mg/5 mL oral suspension  -- 30 milliliter(s) by mouth 3 times a day (before meals), As Needed -Dyspepsia   -- Indication: For .    traZODone 50 mg oral tablet  -- 1 tab(s) by mouth once a day (at bedtime), As Needed  -- Indication: For .    simvastatin 20 mg oral tablet  -- 1 tab(s) by mouth once a day (at bedtime)  -- Indication: For .    hydroxychloroquine 200 mg oral tablet  -- 1 tab(s) by mouth every 12 hours  -- Indication: For .    Aggrenox 25 mg-200 mg oral capsule, extended release  -- 1 cap(s) by mouth 2 times a day  -- Indication: For .    Spiriva 18 mcg inhalation capsule  -- 1 cap(s) inhaled once a day  -- Indication: For ..    albuterol 2.5 mg/3 mL (0.083%) inhalation solution  -- 3 milliliter(s) inhaled 4 times a day, As Needed  -- Indication: For .    cefdinir 300 mg oral capsule  -- 1 cap(s) by mouth every 12 hours ** course completed **  -- Indication: For .    Lidoderm 5% topical film  -- Apply on skin to affected area once a day  -- Indication: For .    Namenda XR 21 mg oral capsule, extended release  -- 1 cap(s) by mouth once a day (at bedtime) ** PATIENT OWN MED **  -- Indication: For ..    NexIUM 40 mg oral delayed release capsule  -- 1 cap(s) by mouth once a day  -- Indication: For .    Synthroid 25 mcg (0.025 mg) oral tablet  -- 1 tab(s) by mouth once a day  -- Indication: For .    Multiple Vitamins oral tablet  -- 1 tab(s) by mouth once a day  -- Indication: For .    Calcium 600+D oral tablet  -- 1 tab(s) by mouth once a day  -- Indication: For ..

## 2018-04-04 LAB
CULTURE RESULTS: SIGNIFICANT CHANGE UP
SPECIMEN SOURCE: SIGNIFICANT CHANGE UP

## 2018-04-04 NOTE — PROGRESS NOTE ADULT - PROVIDER SPECIALTY LIST ADULT
HISTORY OF PRESENT ILLNESS  Demar Nance is a 64 y.o. female. Other   The history is provided by the patient. This is a new problem. Episode onset: Presents with cc of lower L sided rib pain which occurs sporadically. Aggravated by reacing or bending over. Pain lasts  30-40 seconds. The problem occurs constantly. The problem has not changed since onset. Pertinent negatives include no shortness of breath. Nothing aggravates the symptoms. Nothing relieves the symptoms. Medication Refill   Pertinent negatives include no shortness of breath. Review of Systems   Constitutional: Negative. Respiratory: Negative for shortness of breath. Cardiovascular:        Restarted on BP meds and doing well. No med s/e. Physical Exam   Constitutional: She appears well-developed and well-nourished. HENT:   Head: Normocephalic and atraumatic. Cardiovascular: Normal rate, regular rhythm and normal heart sounds. Pulmonary/Chest: Effort normal and breath sounds normal.   Musculoskeletal: Normal range of motion. She exhibits no edema, tenderness or deformity. ASSESSMENT and PLAN  Rib pain: Likely muscular. Can use OTC NSAIDs. HTN:  Well controlled. Continue current care. Orthopedics

## 2018-04-10 PROBLEM — I65.29 CAROTID ARTERY STENOSIS: Status: ACTIVE | Noted: 2018-03-29

## 2018-06-08 PROBLEM — R55 SYNCOPE AND COLLAPSE: Status: ACTIVE | Noted: 2018-01-01

## 2018-06-08 PROBLEM — S22.000A THORACIC COMPRESSION FRACTURE: Status: ACTIVE | Noted: 2018-03-29

## 2018-07-25 PROBLEM — Z86.73 HISTORY OF STROKE: Status: RESOLVED | Noted: 2018-03-29 | Resolved: 2018-01-01

## 2018-08-10 PROBLEM — G30.9 ALZHEIMER'S DISEASE, UNSPECIFIED: Chronic | Status: ACTIVE | Noted: 2017-02-15

## 2018-08-10 PROBLEM — I10 ESSENTIAL (PRIMARY) HYPERTENSION: Chronic | Status: ACTIVE | Noted: 2017-02-15

## 2018-08-10 PROBLEM — M48.50XA COLLAPSED VERTEBRA, NOT ELSEWHERE CLASSIFIED, SITE UNSPECIFIED, INITIAL ENCOUNTER FOR FRACTURE: Chronic | Status: ACTIVE | Noted: 2017-02-15

## 2018-09-30 NOTE — ED PROVIDER NOTE - OBJECTIVE STATEMENT
89 y/o female with a PMHx of GERD, COPD, CVA, HTN, HLD, TIA, Cdiff, breast CA stage 3, chemo stopped on march, osteoporosis presents to the ED c/o right hip pain and inability to bear weight s/p slipping out of bed 5 days ago. Pt had xray performed today PTA with no findings of a fracture. Daughter also mentioned that pt recently had productive cough with green sputum, no put on abx. Peggy MORAN,

## 2018-09-30 NOTE — ED ADULT NURSE NOTE - NSIMPLEMENTINTERV_GEN_ALL_ED
Implemented All Fall Risk Interventions:  [place] to call system. Call bell, personal items and telephone within reach. Instruct patient to call for assistance. Room bathroom lighting operational. Non-slip footwear when patient is off stretcher. Physically safe environment: no spills, clutter or unnecessary equipment. Stretcher in lowest position, wheels locked, appropriate side rails in place. Provide visual cue, wrist band, yellow gown, etc. Monitor gait and stability. Monitor for mental status changes and reorient to person, place, and time. Review medications for side effects contributing to fall risk. Reinforce activity limits and safety measures with patient and family.

## 2018-09-30 NOTE — ED ADULT TRIAGE NOTE - CHIEF COMPLAINT QUOTE
Pt family reports that she had fall at facility on wed. Pt reports worsening pain to right side since wed. Pt family also reports hx of CDIff with diarrhea x 5 days.

## 2018-09-30 NOTE — ED PROVIDER NOTE - MEDICAL DECISION MAKING DETAILS
88 y/o female s/p mechanical fall 5 days ago persistent/progressive pain and weakness as well as watery diarrhea, concern for occult fracture and C-diff, plan for labs, imaging and likely admission .

## 2018-09-30 NOTE — ED PROVIDER NOTE - PROGRESS NOTE DETAILS
Bill Gusman DO (Attending): all results discussed with patient and family.  Concern for recurrence of C diff and patient with weakness and persistent pain limiting ability to ambulate.  Will admit, discussed w/ hospitalist

## 2018-09-30 NOTE — ED PROVIDER NOTE - NS_ ATTENDINGSCRIBEDETAILS _ED_A_ED_FT
Bill Gusman DO (Attending): The history, relevant review of systems, past medical and surgical history, medical decision making, and physical examination was documented by the scribe in my presence and I attest to the accuracy of the documentation.

## 2018-10-01 NOTE — PHYSICAL THERAPY INITIAL EVALUATION ADULT - ADDITIONAL COMMENTS
Pt daughter provided PLOF stating that pt mainly in wheelchair at [facility] due to inability to walk long distances of [place], but when family comes to visit they take her on walks with RW. Pt able to use RW within room to get from bed to bathroom.

## 2018-10-01 NOTE — PROVIDER CONTACT NOTE (CHANGE IN STATUS NOTIFICATION) - BACKGROUND
patient by 2 nurses and different catheters, meeting resistance. Patient able to voided 275 ml yellow urine which took some time. MD made aware. urology consult as per MD. Will continue to monitor out

## 2018-10-01 NOTE — H&P ADULT - HISTORY OF PRESENT ILLNESS
88 y/o F with PMH of dementia, HTN, TIA / CVA, dyslipidemia, GERD, osteoporosis, polymyalgia, glaucoma, COPD, breast cancer (s/p chemo), p/w R hip pain. History is provided by patient and daughter at bedside. Patient slipped out of bed trying to put her pants on, denies hitting head. Since then patient has been having R sided hip pain. At baseline, patient is able to ambulate. However, since this has happened, patient is unable to bear any weight on RLE.     Patient has also been having non-bloody watery diarrhea for 1 week, multiple episodes per day. ~4 times yesterday. Since patient has had a h/o c.diff. Daughter is concerned that patient may have c.diff again.    Daughter states patient has also had a cough w/ green sputum. She followed up with Dr. Guardado, but at the time did not start antibiotics given patient's h/o c.diff.    At this time patient states she is comfortable. Denies fever, chills, nausea, vomiting, abdominal pain, CP, SOB      PSH: cholecystectomy, RLE surgery,     Social Hx: Tobacco - quit >20 years ago, etoh - denies, drugs - denies, lives at [facility] living home and is independent with ADLs as per daughter     Family Hx: Denies

## 2018-10-01 NOTE — PHYSICAL THERAPY INITIAL EVALUATION ADULT - LEVEL OF INDEPENDENCE: SIT/SUPINE, REHAB EVAL
OOB eval not performed at this time secondary to pending XRay results on R hip. OOB eval not performed at this time secondary to pending XRays

## 2018-10-01 NOTE — PROVIDER CONTACT NOTE (CHANGE IN STATUS NOTIFICATION) - SITUATION
Patient with no void today and as per daughter has not voided since yesterday. Bladder scanned for about 700ml. Attempted toileting several times today. Dr Tapia aware. Unable to straight cath

## 2018-10-01 NOTE — H&P ADULT - ASSESSMENT
89 y/o F with PMH of dementia, HTN, TIA / CVA, dyslipidemia, GERD, osteoporosis, polymyalgia, glaucoma, COPD, breast cancer (s/p chemo), p/w R hip pain    *R hip pain s/p fall  -R Hip / femur x-rays still pending -> f/u and will need to consult PRN if any fractures are present   -PT eval  -Pain control     *Diarrhea - r/o c.diff  -Isolation until c.diff is ruled out  -WBC is currently WNL, and vitals are normal  -Will need to start PO vanco if c.diff is confirmed     *Productive cough - possible URI vs PNA  -Daughter at bedside wants to be cautious with use of antibiotics for now given patient's h/o c.diff. Agrees with getting chest CT for further evaluation of PNA before antibiotics are considered for patient  -CXR has questionable RLL infiltrate  -RVP  -Given h/o COPD - will given nebs. Patient is currently not in any respiratory distress    *H/o breast cancer  -Patient's daughter at bedside states that patient received 1 chemo treatment previously, and got very sick from it. Therefore, patient decided she valued quality of life over treatment, and decided against any further treatment  -Can f/u outpatient     *Anemia  -Trend H/H during hospitalization -> if stable can f/u outpatient for further work up     *H/o HTN / TIA / CVA / dyslipidemia / GERD / osteoporosis / polymyalgia / glaucoma  -C/w home meds and if conditions remain stable during hospitalization -> can f/u outpatient for further management     *DVT ppx  -Heparin SubQ 88 y/o F with PMH of dementia, HTN, TIA / CVA, dyslipidemia, GERD, osteoporosis, polymyalgia, glaucoma, COPD, breast cancer (s/p chemo), p/w R hip pain    *R hip pain s/p fall  -R Hip / femur x-rays still pending -> f/u and will need to consult PRN if any fractures are present   -PT eval  -Pain control     *Diarrhea - r/o c.diff  -Isolation until c.diff is ruled out  -WBC is currently WNL, and vitals are normal  -Will need to start PO vanco if c.diff is confirmed     *Productive cough - possible URI vs PNA  -Daughter at bedside wants to be cautious with use of antibiotics for now given patient's h/o c.diff. Agrees with getting chest CT for further evaluation of PNA before antibiotics are considered for patient  -CXR has questionable RLL infiltrate  -RVP  -Given h/o COPD - will c/w nebs. Patient is currently not in any respiratory distress    *H/o breast cancer  -Patient's daughter at bedside states that patient received 1 chemo treatment previously, and got very sick from it. Therefore, patient decided she valued quality of life over treatment, and decided against any further treatment  -Can f/u outpatient     *Anemia  -Trend H/H during hospitalization -> if stable can f/u outpatient for further work up     *H/o HTN / TIA / CVA / dyslipidemia / GERD / osteoporosis / polymyalgia / glaucoma  -C/w home meds and if conditions remain stable during hospitalization -> can f/u outpatient for further management     *DVT ppx  -Heparin SubQ

## 2018-10-01 NOTE — PHYSICAL THERAPY INITIAL EVALUATION ADULT - ACTIVE RANGE OF MOTION EXAMINATION, REHAB EVAL
B/L shoulder flexion limited to ~60 degress and lacks knee extension by about ~20 degrees on R side./no Active ROM deficits were identified

## 2018-10-01 NOTE — PHYSICAL THERAPY INITIAL EVALUATION ADULT - PERTINENT HX OF CURRENT PROBLEM, REHAB EVAL
Pt is a 89F admitted secondary to R hip pain and inability to bear weight through RLE s/p fall; X-rays pending. Pt daughter also states pt having diarrhea x 1 week and has h/o of c.diff and productive cough. CT Abd/pelvis shows no acute abnormality.

## 2018-10-02 NOTE — PROGRESS NOTE ADULT - SUBJECTIVE AND OBJECTIVE BOX
HOSPITAL COURSE AND ASSESSMENT  88 y/o F with PMH of dementia, HTN, TIA / CVA, dyslipidemia, GERD, osteoporosis, polymyalgia, glaucoma, COPD, breast cancer (s/p chemo), p/w R hip pain. History is provided by patient and daughter at bedside. Patient slipped out of bed trying to put her pants on, denies hitting head. Since then patient has been having R sided hip pain. At baseline, patient is able to ambulate. However, since this has happened, patient is unable to bear any weight on RLE.     Patient has also been having non-bloody watery diarrhea for 1 week, multiple episodes per day. ~4 times yesterday. Since patient has had a h/o c.diff. Daughter is concerned that patient may have c.diff again. Daughter states patient has also had a cough w/ green sputum. She followed up with Dr. Guardado, but at the time did not start antibiotics given patient's h/o c.diff.    Pt was admitted to medicine and evaluated for cough, diarrhea, and fall. She was also evaluated with PT. Course complicated by urinary retention requiring urology consult. Decision was made for scheduled toileting and outpatient follow up for cystocele.      *R hip pain s/p fall  -R Hip / femur x-rays without fracture  -PT eval  -Pain control     *Diarrhea PTA now with "constipation" per daughter - c.diff ruled out  -Isolation until c.diff is ruled out  -WBC is currently WNL, and vitals are normal    *Productive cough - possible URI vs PNA  -CXR has questionable RLL infiltrate  -RVP negative  -CT chest: Small right pleural effusion with anterior right lower lobe consolidative changes may represent atelectasis.   -Patient is currently not in any respiratory distress  -procalcitonin    *breast cancer  -1 chemo treatment previously, and got very sick from it. Therefore, patient decided she valued quality of life over treatment, and decided against any further treatment  -recent PET scan  -Can f/u outpatient with [facility]    *RA on chronic immunosuppression  -prednisone 5mg as PTA    *Anemia  -Trend H/H during hospitalization -> if stable can f/u outpatient for further work up     *H/o HTN / TIA / CVA / dyslipidemia / GERD / osteoporosis / polymyalgia / glaucoma  -C/w home meds and if conditions remain stable during hospitalization -> can f/u outpatient for further management     *DVT ppx  -Heparin SubQ    *Advanced Care Planning  -pt has capacity  -daughter is HCP  -DNR/DNI        ----------------------------------------------------------------------------------------------  CHIEF COMPLAINT:  hip pain    SUBJECTIVE:     tolerating PO. OOB. still with lymph pain    REVIEW OF SYSTEMS:  All other review of systems is negative unless indicated above    Vital Signs Last 24 Hrs  T(C): 36.8 (02 Oct 2018 12:19), Max: 37.1 (02 Oct 2018 05:01)  T(F): 98.3 (02 Oct 2018 12:19), Max: 98.7 (02 Oct 2018 05:01)  HR: 87 (02 Oct 2018 12:19) (87 - 92)  BP: 132/56 (02 Oct 2018 12:19) (105/62 - 132/56)  BP(mean): --  RR: 19 (02 Oct 2018 12:19) (18 - 19)  SpO2: 95% (02 Oct 2018 12:19) (95% - 98%)  CAPILLARY BLOOD GLUCOSE          PHYSICAL EXAM:  Constitutional: NAD, NCAT  HEENT: EOMI, Normal Hearing, MMM, fair dentition  Neck: trachea midline, No JVD  Respiratory: Breath sounds are clear, unlabored respiration  Cardiovascular: S1 and S2, regular rate   Gastrointestinal: Bowel Sounds present, soft, nontender, nondistended  Extremities: No peripheral edema  Vascular: 2+ radial pulse  Neurological: awake, alert, follows commands, sensation grossly intact  Psych: appropriate affect,  insight  Musculoskeletal: moves all extremities  Skin: No rashes    ALLERGIES  Aciphex (Unknown)  Macrobid (Unknown)  Percocet 10/325 (Rash)      MEDICATIONS  (STANDING):  acetaminophen   Tablet .. 975 milliGRAM(s) Oral three times a day  calcium carbonate 1250 mG  + Vitamin D (OsCal 500 + D) 1 Tablet(s) Oral daily  dipyridamole 200 mG/aspirin 25 mG 1 Capsule(s) Oral two times a day  heparin  Injectable 5000 Unit(s) SubCutaneous every 8 hours  hydroxychloroquine 200 milliGRAM(s) Oral every 12 hours  influenza   Vaccine 0.5 milliLiter(s) IntraMuscular once  lactobacillus acidophilus 1 Tablet(s) Oral daily  levothyroxine 25 MICROGram(s) Oral daily  memantine ER 21 milliGRAM(s) Oral daily  multivitamin 1 Tablet(s) Oral daily  pantoprazole    Tablet 40 milliGRAM(s) Oral before breakfast  predniSONE   Tablet 5 milliGRAM(s) Oral daily  simvastatin 20 milliGRAM(s) Oral at bedtime  tamsulosin 0.4 milliGRAM(s) Oral at bedtime  tiotropium 18 MICROgram(s) Capsule 1 Capsule(s) Inhalation daily      LABS: All Labs Reviewed:                        10.2   8.24  )-----------( 155      ( 02 Oct 2018 07:17 )             31.8     10-02    137  |  104  |  16  ----------------------------<  100<H>  3.7   |  25  |  0.75    Ca    8.9      02 Oct 2018 07:17  Phos  3.4     10-01  Mg     2.1     10-01    TPro  6.1  /  Alb  3.1<L>  /  TBili  0.4  /  DBili  x   /  AST  18  /  ALT  20  /  AlkPhos  48  10-01    PT/INR - ( 01 Oct 2018 07:43 )   PT: 12.2 sec;   INR: 1.13 ratio               Blood Culture: HOSPITAL COURSE AND ASSESSMENT  89 y/o F with PMH of dementia, HTN, TIA / CVA, dyslipidemia, GERD, osteoporosis, polymyalgia, glaucoma, COPD, breast cancer (s/p chemo), p/w R hip pain. History is provided by patient and daughter at bedside. Patient slipped out of bed trying to put her pants on, denies hitting head. Since then patient has been having R sided hip pain. At baseline, patient is able to ambulate. However, since this has happened, patient is unable to bear any weight on RLE.     Patient has also been having non-bloody watery diarrhea for 1 week, multiple episodes per day. ~4 times yesterday. Since patient has had a h/o c.diff. Daughter is concerned that patient may have c.diff again. Daughter states patient has also had a cough w/ green sputum. She followed up with Dr. Guardado, but at the time did not start antibiotics given patient's h/o c.diff.    Pt was admitted to medicine and evaluated for cough, diarrhea, and fall. She was also evaluated with PT. Course complicated by urinary retention requiring urology consult. Decision was made for scheduled toileting and outpatient follow up for cystocele.      *R hip pain s/p fall  -R Hip / femur x-rays without fracture  -PT eval  -Pain control     *Diarrhea PTA now with "constipation" per daughter - c.diff ruled out  -Isolation until c.diff is ruled out  -WBC is currently WNL, and vitals are normal    *Productive cough - possible URI vs PNA  -CXR has questionable RLL infiltrate  -RVP negative  -CT chest: Small right pleural effusion with anterior right lower lobe consolidative changes may represent atelectasis.   -Patient is currently not in any respiratory distress  -procalcitonin    *breast cancer  -1 chemo treatment previously, and got very sick from it. Therefore, patient decided she valued quality of life over treatment, and decided against any further treatment  -recent PET scan  -Can f/u outpatient with [facility]    *RA on chronic immunosuppression  -prednisone 5mg as PTA  -plaquenil    *Urinary retention with cystocele  -scheduled toileting  -outpatient urology    *Anemia  -Trend H/H during hospitalization -> if stable can f/u outpatient for further work up     *H/o HTN / TIA / CVA / dyslipidemia / GERD / osteoporosis / polymyalgia / glaucoma  -C/w home meds and if conditions remain stable during hospitalization -> can f/u outpatient for further management     *DVT ppx  -Heparin SubQ    *Advanced Care Planning  -pt has capacity  -daughter is HCP  -DNR/DNI        ----------------------------------------------------------------------------------------------  CHIEF COMPLAINT:  hip pain    SUBJECTIVE:     tolerating PO. OOB. still with lymph pain    REVIEW OF SYSTEMS:  All other review of systems is negative unless indicated above    Vital Signs Last 24 Hrs  T(C): 36.8 (02 Oct 2018 12:19), Max: 37.1 (02 Oct 2018 05:01)  T(F): 98.3 (02 Oct 2018 12:19), Max: 98.7 (02 Oct 2018 05:01)  HR: 87 (02 Oct 2018 12:19) (87 - 92)  BP: 132/56 (02 Oct 2018 12:19) (105/62 - 132/56)  BP(mean): --  RR: 19 (02 Oct 2018 12:19) (18 - 19)  SpO2: 95% (02 Oct 2018 12:19) (95% - 98%)  CAPILLARY BLOOD GLUCOSE          PHYSICAL EXAM:  Constitutional: NAD, NCAT  HEENT: EOMI, Normal Hearing, MMM, fair dentition  Neck: trachea midline, No JVD  Respiratory: Breath sounds are clear, unlabored respiration  Cardiovascular: S1 and S2, regular rate   Gastrointestinal: Bowel Sounds present, soft, nontender, nondistended  Extremities: No peripheral edema  Vascular: 2+ radial pulse  Neurological: awake, alert, follows commands, sensation grossly intact  Psych: appropriate affect,  insight  Musculoskeletal: moves all extremities  Skin: No rashes    ALLERGIES  Aciphex (Unknown)  Macrobid (Unknown)  Percocet 10/325 (Rash)      MEDICATIONS  (STANDING):  acetaminophen   Tablet .. 975 milliGRAM(s) Oral three times a day  calcium carbonate 1250 mG  + Vitamin D (OsCal 500 + D) 1 Tablet(s) Oral daily  dipyridamole 200 mG/aspirin 25 mG 1 Capsule(s) Oral two times a day  heparin  Injectable 5000 Unit(s) SubCutaneous every 8 hours  hydroxychloroquine 200 milliGRAM(s) Oral every 12 hours  influenza   Vaccine 0.5 milliLiter(s) IntraMuscular once  lactobacillus acidophilus 1 Tablet(s) Oral daily  levothyroxine 25 MICROGram(s) Oral daily  memantine ER 21 milliGRAM(s) Oral daily  multivitamin 1 Tablet(s) Oral daily  pantoprazole    Tablet 40 milliGRAM(s) Oral before breakfast  predniSONE   Tablet 5 milliGRAM(s) Oral daily  simvastatin 20 milliGRAM(s) Oral at bedtime  tamsulosin 0.4 milliGRAM(s) Oral at bedtime  tiotropium 18 MICROgram(s) Capsule 1 Capsule(s) Inhalation daily      LABS: All Labs Reviewed:                        10.2   8.24  )-----------( 155      ( 02 Oct 2018 07:17 )             31.8     10-02    137  |  104  |  16  ----------------------------<  100<H>  3.7   |  25  |  0.75    Ca    8.9      02 Oct 2018 07:17  Phos  3.4     10-01  Mg     2.1     10-01    TPro  6.1  /  Alb  3.1<L>  /  TBili  0.4  /  DBili  x   /  AST  18  /  ALT  20  /  AlkPhos  48  10-01    PT/INR - ( 01 Oct 2018 07:43 )   PT: 12.2 sec;   INR: 1.13 ratio               Blood Culture:

## 2018-10-03 NOTE — PROGRESS NOTE ADULT - SUBJECTIVE AND OBJECTIVE BOX
INTERVAL HPI/OVERNIGHT EVENTS:  88 y/o F with PMH of dementia, HTN, TIA / CVA, dyslipidemia, GERD, osteoporosis, polymyalgia, glaucoma, COPD, breast cancer (s/p chemo), p/w R hip pain. History is provided by patient and daughter at bedside. Patient slipped out of bed trying to put her pants on, denies hitting head. Since then patient has been having R sided hip pain. At baseline, patient is able to ambulate. However, since this has happened, patient is unable to bear any weight on RLE.     Patient has also been having non-bloody watery diarrhea for 1 week, multiple episodes per day. ~4 times yesterday. Since patient has had a h/o c.diff. Daughter is concerned that patient may have c.diff again. Daughter states patient has also had a cough w/ green sputum. She followed up with Dr. Guardado, but at the time did not start antibiotics given patient's h/o c.diff.    Pt was admitted to medicine and evaluated for cough, diarrhea, and fall. She was also evaluated with PT. Course complicated by urinary retention requiring urology consult. Decision was made for scheduled toileting and outpatient follow up for cystocele.    10/3/18- Patient seen and examined at bedside. Patient with dementia, appears comfortable, denies any pain currently. Patient having multiple episodes of diarrhea today- C. diff pending    MEDICATIONS  (STANDING):  acetaminophen   Tablet .. 975 milliGRAM(s) Oral three times a day  calcium carbonate 1250 mG  + Vitamin D (OsCal 500 + D) 1 Tablet(s) Oral daily  dipyridamole 200 mG/aspirin 25 mG 1 Capsule(s) Oral two times a day  docusate sodium 100 milliGRAM(s) Oral two times a day  heparin  Injectable 5000 Unit(s) SubCutaneous every 8 hours  hydroxychloroquine 200 milliGRAM(s) Oral every 12 hours  influenza   Vaccine 0.5 milliLiter(s) IntraMuscular once  lactobacillus acidophilus 1 Tablet(s) Oral daily  levothyroxine 25 MICROGram(s) Oral daily  memantine ER 21 milliGRAM(s) Oral daily  multivitamin 1 Tablet(s) Oral daily  pantoprazole    Tablet 40 milliGRAM(s) Oral before breakfast  predniSONE   Tablet 5 milliGRAM(s) Oral daily  simvastatin 20 milliGRAM(s) Oral at bedtime  tamsulosin 0.4 milliGRAM(s) Oral at bedtime  tiotropium 18 MICROgram(s) Capsule 1 Capsule(s) Inhalation daily  vancomycin    Solution 125 milliGRAM(s) Oral every 6 hours    MEDICATIONS  (PRN):  ALBUTerol   0.5% 2.5 milliGRAM(s) Nebulizer every 4 hours PRN Bronchospasm  ALPRAZolam 0.25 milliGRAM(s) Oral at bedtime PRN -  ondansetron Injectable 4 milliGRAM(s) IV Push every 6 hours PRN Nausea and/or Vomiting  traMADol 25 milliGRAM(s) Oral three times a day PRN Severe Pain (7 - 10)  traZODone 50 milliGRAM(s) Oral at bedtime PRN insomnia      Allergies    Aciphex (Unknown)  Macrobid (Unknown)  Percocet 10/325 (Rash)    Intolerances      Unable to obtain ROS 2/2 dementia- + diarrhea    Vital Signs Last 24 Hrs  T(C): 36.7 (03 Oct 2018 17:27), Max: 36.7 (03 Oct 2018 17:27)  T(F): 98.1 (03 Oct 2018 17:27), Max: 98.1 (03 Oct 2018 17:27)  HR: 83 (03 Oct 2018 17:27) (80 - 101)  BP: 124/67 (03 Oct 2018 17:27) (121/88 - 152/69)  BP(mean): --  RR: 18 (03 Oct 2018 17:27) (18 - 18)  SpO2: 93% (03 Oct 2018 17:27) (93% - 98%)    10-02 @ 07:01  -  10-03 @ 07:00  --------------------------------------------------------  IN: 360 mL / OUT: 1300 mL / NET: -940 mL    10-03 @ 07:01  -  10-03 @ 17:38  --------------------------------------------------------  IN: 480 mL / OUT: 300 mL / NET: 180 mL      Physical Exam:  General: WN/WD NAD  Neurology: A&Ox1, nonfocal, JUÁREZ x 4  Respiratory: CTA B/L  CV: RRR, S1S2, no murmurs, rubs or gallops  Abdominal: Soft, NT, ND +BS  Extremities: No edema, + peripheral pulses      LABS:                        11.5   11.32 )-----------( 174      ( 03 Oct 2018 11:13 )             34.8     10-02    137  |  104  |  16  ----------------------------<  100<H>  3.7   |  25  |  0.75    Ca    8.9      02 Oct 2018 07:17            RADIOLOGY & ADDITIONAL TESTS:

## 2018-10-03 NOTE — PROGRESS NOTE ADULT - ASSESSMENT
*R hip pain s/p fall  -R Hip / femur x-rays without fracture  -PT eval  -Pain control     *Diarrhea PTA- continues to have multiple episodes- 6 episodes today  -Isolation until c.diff is ruled out  -WBC increasing  -Start PO vanco    *Productive cough - possible URI vs PNA  -CXR has questionable RLL infiltrate  -RVP negative  -CT chest: Small right pleural effusion with anterior right lower lobe consolidative changes may represent atelectasis.   -Patient is currently not in any respiratory distress  -procalcitonin- negative    *breast cancer  -1 chemo treatment previously, and got very sick from it. Therefore, patient decided she valued quality of life over treatment, and decided against any further treatment  -recent PET scan  -Can f/u outpatient with [facility]    *RA on chronic immunosuppression  -prednisone 5mg as PTA  -plaquenil    *Urinary retention with cystocele  -scheduled toileting  -outpatient urology    *Anemia  -Trend H/H during hospitalization -> if stable can f/u outpatient for further work up     *H/o HTN / TIA / CVA / dyslipidemia / GERD / osteoporosis / polymyalgia / glaucoma  -C/w home meds and if conditions remain stable during hospitalization -> can f/u outpatient for further management     *DVT ppx  -Heparin SubQ    *Advanced Care Planning  -pt has capacity  -daughter is HCP  -DNR/DNI

## 2018-10-04 NOTE — CONSULT NOTE ADULT - ASSESSMENT
88 y/o Female with h/o mild dementia, HTN, TIA / CVA, dyslipidemia, GERD, osteoporosis, polymyalgia, glaucoma, COPD, breast cancer (s/p chemo), prior CDAD was admitted on 9/30 for right hip pain. Patient slipped out of bed trying to put her pants on, denies hitting head. Since then patient has been having R sided hip pain. At baseline, patient is able to ambulate. However, since this has happened, patient is unable to bear any weight on RLE. Patient has also been having non-bloody watery diarrhea for 1 week, multiple episodes per day.    1. Diarrheal syndrome. h/o prior CDAD. 89 y/o Female with h/o mild dementia, HTN, TIA / CVA, dyslipidemia, GERD, osteoporosis, polymyalgia, glaucoma, COPD, breast cancer (s/p chemo), prior CDAD was admitted on 9/30 for right hip pain. Patient slipped out of bed trying to put her pants on, denies hitting head. Since then patient has been having R sided hip pain. At baseline, patient is able to ambulate. However, since this has happened, patient is unable to bear any weight on RLE. Patient has also been having non-bloody watery diarrhea for 1 week, multiple episodes per day.    1. Diarrheal syndrome. Acute enterocolitis ?viral etiology. h/o prior CDAD.  -abdominal symptoms are much improved  -no loose stools reported overnight and today  -stool for CDT toxin is negative  -no further need for vancomycin PO  -old chart reviewed to assess prior cultures  -monitor temps  -f/u CBC  -supportive care  2. Other issues:   -care per medicine

## 2018-10-04 NOTE — CONSULT NOTE ADULT - SUBJECTIVE AND OBJECTIVE BOX
CHIEF COMPLAINT:  Incomplete bladder emptying    HISTORY OF PRESENT ILLNESS:  89 yo F admitted to medicine service after p/w R hip pain. Found to have Incomplete bladder emptying. Nursing tried to do straight caths x 2 yesterday- unsuccessful.   Since then Pt urinating on her own. Previous PVR was minimal.   Per daughter by the bedside- Pt is infrequent voider.     PAST MEDICAL & SURGICAL HISTORY:  Compression fracture of spine: T7  Alzheimers disease  HTN (hypertension)  Glaucoma  TIA (transient ischemic attack):   Cerebral vascular accident:   Polymyalgia rheumatica  Osteoporosis  Chronic pain: mid back  Urinary retention  GERD (gastroesophageal reflux disease)  Cholelithiases  Hyperlipemia  Carotid artery stenosis  Lung nodule: biopsied , benign  COPD (chronic obstructive pulmonary disease)  Asthma  History of hip surgery  Gall stones  History of hysterectomy: for bleeding  Hx of cholecystectomy      REVIEW OF SYSTEMS:  All other review of systems negative, except as noted in HPI    MEDICATIONS  (STANDING):  acetaminophen   Tablet .. 975 milliGRAM(s) Oral three times a day  calcium carbonate 1250 mG  + Vitamin D (OsCal 500 + D) 1 Tablet(s) Oral daily  dipyridamole 200 mG/aspirin 25 mG 1 Capsule(s) Oral two times a day  heparin  Injectable 5000 Unit(s) SubCutaneous every 8 hours  hydroxychloroquine 200 milliGRAM(s) Oral every 12 hours  influenza   Vaccine 0.5 milliLiter(s) IntraMuscular once  lactobacillus acidophilus 1 Tablet(s) Oral daily  levothyroxine 25 MICROGram(s) Oral daily  memantine ER 21 milliGRAM(s) Oral daily  multivitamin 1 Tablet(s) Oral daily  pantoprazole    Tablet 40 milliGRAM(s) Oral before breakfast  predniSONE   Tablet 5 milliGRAM(s) Oral daily  simvastatin 20 milliGRAM(s) Oral at bedtime  tamsulosin 0.4 milliGRAM(s) Oral at bedtime  tiotropium 18 MICROgram(s) Capsule 1 Capsule(s) Inhalation daily    MEDICATIONS  (PRN):  ALBUTerol   0.5% 2.5 milliGRAM(s) Nebulizer every 4 hours PRN Bronchospasm  ALPRAZolam 0.25 milliGRAM(s) Oral at bedtime PRN -  ondansetron Injectable 4 milliGRAM(s) IV Push every 6 hours PRN Nausea and/or Vomiting  traMADol 25 milliGRAM(s) Oral three times a day PRN Severe Pain (7 - 10)  traZODone 50 milliGRAM(s) Oral at bedtime PRN insomnia      Allergies    Aciphex (Unknown)  Macrobid (Unknown)  Percocet 10/325 (Rash)    Intolerances        SOCIAL HISTORY:    FAMILY HISTORY:  No pertinent family history in first degree relatives      Vital Signs Last 24 Hrs  T(C): 37.1 (02 Oct 2018 05:01), Max: 37.1 (02 Oct 2018 05:01)  T(F): 98.7 (02 Oct 2018 05:01), Max: 98.7 (02 Oct 2018 05:01)  HR: 92 (02 Oct 2018 05:01) (91 - 92)  BP: 116/58 (02 Oct 2018 05:01) (105/62 - 116/58)  BP(mean): --  RR: 19 (02 Oct 2018 05:01) (18 - 19)  SpO2: 98% (02 Oct 2018 05:01) (95% - 98%)    PHYSICAL EXAM:    Constitutional: No acute distress  HEENT: EOMI, Normal Hearing  Neck: Supple  Back: No costovertebral angle tenderness  Respiratory: Normal respiratory effort    Cardiovascular: Normal peripheral circulation   Abd: Soft, non distended, non tender  Extremities: No peripheral edema  Neurological: No focal deficits  Psychiatric: Normal mood, normal affect  Musculoskeletal: Moving all 4 extremities  Skin: No rashes    LABS:                        10.2   8.24  )-----------( 155      ( 02 Oct 2018 07:17 )             31.8     10-02    137  |  104  |  16  ----------------------------<  100<H>  3.7   |  25  |  0.75    Ca    8.9      02 Oct 2018 07:17  Phos  3.4     10-  Mg     2.1     10-    TPro  6.1  /  Alb  3.1<L>  /  TBili  0.4  /  DBili  x   /  AST  18  /  ALT  20  /  AlkPhos  48  10-    PT/INR - ( 01 Oct 2018 07:43 )   PT: 12.2 sec;   INR: 1.13 ratio         PTT - ( 30 Sep 2018 15:34 )  PTT:29.2 sec  Urinalysis Basic - ( 30 Sep 2018 20:12 )    Color: Yellow / Appearance: Clear / S.020 / pH: x  Gluc: x / Ketone: Trace  / Bili: Negative / Urobili: Negative mg/dL   Blood: x / Protein: 15 mg/dL / Nitrite: Negative   Leuk Esterase: Negative / RBC: 0-2 /HPF / WBC 0-2   Sq Epi: x / Non Sq Epi: Occasional / Bacteria: Negative    < from: CT Abdomen and Pelvis w/ IV Cont (18 @ 17:41) >  CT ABDOMEN AND PELVIS IC                            PROCEDURE DATE:  2018          INTERPRETATION:      CT Abdomen and Pelvis with IV contrast        CLINICAL INFORMATION:       Abdominal pain and tenderness.    TECHNIQUE: Contiguous axial 3 mm images were obtained from a single   helical acquisition through the abdomen and pelvis during the intravenous   administration of 95 cc of Omnipaque 350/ 5 cc discarded.  Imaging post   processing software was employed to generate reformatted images in 3 mm   sagittal and coronal  planes.  No Oral contrast was administered.  This   scan was performed using automatic exposure control (radiation dose   reduction software) to obtain a diagnostic image quality scan with   patient dose as low as reasonably achievable.         FINDINGS:   No previous examinations are available for review.    The lung bases are significant for subsegmental atelectasis in the lung   bases. 2 cm bullae seen in RIGHT lower lobe. Moderate hiatal hernia   containing debris.  5 cm necrotic RIGHT breast mass worrisome for   neoplasm with associated skin thickening. Clinical follow-up is needed.    The liver demonstrates homogeneous attenuation without focal lesion or   abnormal enhancement.  Hepatic size and contours are maintained. Hepatic   and portal veins are patent and not displaced. Mild intra and   extrahepatic biliary dilatation consistent with cholecystectomy status..    The gallbladder is significant for cholecystectomy.  The pancreas is   intact without ductal dilatation or focal lesion.  The spleen is normal   in size.    The adrenal glands are intact.  The kidneys demonstrate symmetric   nephrograms.   No suspicious renal mass is found.  No renal calculus,   hydronephrosis or perinephric infiltration is found.  The ureters are not   dilated.   The bladder appears unremarkable. Cystocele is noted.        No enlarged lymph nodes are found.   No ascites is present.   The osseous   structures show degenerative changes of spine.         The bowel appears unremarkable.  No obstruction, perforation or abscess   is recognized.           IMPRESSION:  Subsegmental atelectasis in the lung bases. 2 cm bullae seen   in RIGHT lower lobe. Moderate hiatal hernia containing debris..  5 cm   necrotic RIGHT breast mass worrisome for neoplasm with associated skin   thickening. Clinical follow-up is needed. Cholecystectomy. No acute   abnormality is seen.    < end of copied text >
Patient is a 89y old  Female who presents with a chief complaint of R hip pain (03 Oct 2018 17:37)    HPI:  89 y/o Female with h/o mild dementia, HTN, TIA / CVA, dyslipidemia, GERD, osteoporosis, polymyalgia, glaucoma, COPD, breast cancer (s/p chemo), prior CDAD was admitted on  for right hip pain. Patient slipped out of bed trying to put her pants on, denies hitting head. Since then patient has been having R sided hip pain. At baseline, patient is able to ambulate. However, since this has happened, patient is unable to bear any weight on RLE. Patient has also been having non-bloody watery diarrhea for 1 week, multiple episodes per day.      PMH: as above  PSH: as above; cholecystectomy, RLE surgery,   Meds: per reconciliation sheet, noted below  MEDICATIONS  (STANDING):  acetaminophen   Tablet .. 975 milliGRAM(s) Oral three times a day  calcium carbonate 1250 mG  + Vitamin D (OsCal 500 + D) 1 Tablet(s) Oral daily  dipyridamole 200 mG/aspirin 25 mG 1 Capsule(s) Oral two times a day  docusate sodium 100 milliGRAM(s) Oral two times a day  heparin  Injectable 5000 Unit(s) SubCutaneous every 8 hours  hydroxychloroquine 200 milliGRAM(s) Oral every 12 hours  influenza   Vaccine 0.5 milliLiter(s) IntraMuscular once  lactobacillus acidophilus 1 Tablet(s) Oral daily  levothyroxine 25 MICROGram(s) Oral daily  memantine ER 21 milliGRAM(s) Oral daily  multivitamin 1 Tablet(s) Oral daily  pantoprazole    Tablet 40 milliGRAM(s) Oral before breakfast  predniSONE   Tablet 5 milliGRAM(s) Oral daily  simvastatin 20 milliGRAM(s) Oral at bedtime  tamsulosin 0.4 milliGRAM(s) Oral at bedtime  tiotropium 18 MICROgram(s) Capsule 1 Capsule(s) Inhalation daily    MEDICATIONS  (PRN):  ALBUTerol   0.5% 2.5 milliGRAM(s) Nebulizer every 4 hours PRN Bronchospasm  ALPRAZolam 0.25 milliGRAM(s) Oral at bedtime PRN -  ondansetron Injectable 4 milliGRAM(s) IV Push every 6 hours PRN Nausea and/or Vomiting  traMADol 25 milliGRAM(s) Oral three times a day PRN Severe Pain (7 - 10)  traZODone 50 milliGRAM(s) Oral at bedtime PRN insomnia    Allergies    Aciphex (Unknown)  Macrobid (Unknown)  Percocet 10/325 (Rash)    Intolerances      Social: prior smoking, no alcohol, no illegal drugs; no recent travel, no exposure to TB  FAMILY HISTORY:  No pertinent family history in first degree relatives    no history of premature cardiovascular disease in first degree relatives    ROS limited due to confusion: the patient denies fever, no chills, no HA, no seizures, no dizziness, no sore throat, no nasal congestion, no blurry vision, no CP, no palpitations, no SOB, no cough, no abdominal pain, had diarrhea, no N/V, no dysuria, no leg pain, no rash, no joint aches, no night sweats  All other systems reviewed and are negative    Vital Signs Last 24 Hrs  T(C): 36.4 (04 Oct 2018 04:49), Max: 36.7 (03 Oct 2018 17:27)  T(F): 97.5 (04 Oct 2018 04:49), Max: 98.1 (03 Oct 2018 17:27)  HR: 80 (04 Oct 2018 07:55) (77 - 101)  BP: 119/80 (04 Oct 2018 04:49) (116/61 - 124/67)  BP(mean): --  RR: 18 (04 Oct 2018 04:49) (18 - 18)  SpO2: 97% (04 Oct 2018 04:49) (93% - 98%)  Daily     Daily     PE:    Constitutional: frail looking  HEENT: NC/AT, EOMI, PERRLA, conjunctivae clear; ears and nose atraumatic; pharynx benign  Neck: supple; thyroid not palpable  Back: no tenderness  Respiratory: respiratory effort normal; clear to auscultation  Cardiovascular: S1S2 regular, no murmurs  Abdomen: soft, not tender, not distended, positive BS; no liver or spleen organomegaly  Genitourinary: no suprapubic tenderness  Lymphatic: no LN palpable  Musculoskeletal: no muscle tenderness, no joint swelling or tenderness  Extremities: no pedal edema  Neurological/ Psychiatric: AxOx2, judgement and insight impaired; moving all extremities  Skin: no rashes; no palpable lesions    Labs: all available labs reviewed                        11.9   9.66  )-----------( 198      ( 04 Oct 2018 07:52 )             36.9     10    140  |  106  |  17  ----------------------------<  100<H>  3.5   |  26  |  0.89    Ca    8.8      04 Oct 2018 07:52    Clostridium difficile Toxin by PCR (10.03.18 @ 10:30)    C Diff by PCR Result: [facility]      Radiology: all available radiological tests reviewed    < from: CT Chest No Cont (10.01.18 @ 08:48) >  Small right pleural effusion with anterior right lower lobe consolidative   changes may represent atelectasis and or pneumonia new from prior exam.   Patchy opacities in the anterior left lower lobe may represent   atelectasis and/or pneumonia. Recommend clinical correlation and   short-term follow-up to resolution    < end of copied text >      Advanced directives addressed: full resuscitation

## 2018-10-04 NOTE — DISCHARGE NOTE ADULT - PLAN OF CARE
resolution of pain -Xrays negative for fracture  -Physical therapy as needed -Follow up as outpatient with Urology within 2 weeks   -Scheduled toilet time

## 2018-10-04 NOTE — DISCHARGE NOTE ADULT - PATIENT PORTAL LINK FT
You can access the [facility] Patient Portal, offered by [facility], by registering with the following website: [email or web address]

## 2018-10-04 NOTE — DISCHARGE NOTE ADULT - MEDICATION SUMMARY - MEDICATIONS TO TAKE
I will START or STAY ON the medications listed below when I get home from the hospital:    predniSONE 5 mg oral tablet  -- 1 tab(s) by mouth once a day  -- Indication: For rheumatoid arthritis    acetaminophen 500 mg oral tablet  -- 2 tab(s) by mouth 2 times a day  -- Indication: For pain    aluminum hydroxide-magnesium hydroxide 200 mg-200 mg/5 mL oral suspension  -- 30 milliliter(s) by mouth 3 times a day (before meals), As Needed -Dyspepsia   -- Indication: For Dyspepsia    tamsulosin 0.4 mg oral capsule  -- 1 cap(s) by mouth once a day (at bedtime)  -- Indication: For Incomplete bladder emptying    traZODone 50 mg oral tablet  -- 1 tab(s) by mouth once a day (at bedtime), As Needed  -- Indication: For Insomnia    simvastatin 20 mg oral tablet  -- 1 tab(s) by mouth once a day (at bedtime)  -- Indication: For Hld    hydroxychloroquine 200 mg oral tablet  -- 1 tab(s) by mouth every 12 hours  -- Indication: For RA    Aggrenox 25 mg-200 mg oral capsule, extended release  -- 1 cap(s) by mouth 2 times a day  -- Indication: For Home med    ALPRAZolam 0.25 mg oral tablet  -- orally once a day (at bedtime), As Needed  -- Indication: For Home med    albuterol 2.5 mg/3 mL (0.083%) inhalation solution  -- 3 milliliter(s) inhaled 4 times a day, As Needed  -- Indication: For Home med    Spiriva 18 mcg inhalation capsule  -- 1 cap(s) inhaled once a day  -- Indication: For Home med    Lidoderm 5% topical film  -- Apply on skin to affected area once a day  -- Indication: For Home med    Salonpas 0.025%-1.25% topical film  -- Apply on skin to affected area 2 times a day  -- Indication: For Home med    docusate sodium 100 mg oral capsule  -- 1 cap(s) by mouth 2 times a day  -- Indication: For Home med    Namenda XR 21 mg oral capsule, extended release  -- 1 cap(s) by mouth once a day (at bedtime) ** PATIENT OWN MED **  -- Indication: For Home med    Acidophilus Probiotic Blend oral capsule  -- 2 cap(s) by mouth once a day  -- Indication: For Home med    NexIUM 40 mg oral delayed release capsule  -- 1 cap(s) by mouth once a day  -- Indication: For Home med    Synthroid 25 mcg (0.025 mg) oral tablet  -- 1 tab(s) by mouth once a day  -- Indication: For Home med    Multiple Vitamins oral tablet  -- 1 tab(s) by mouth once a day  -- Indication: For Home med    Calcium 600+D oral tablet  -- 1 tab(s) by mouth once a day  -- Indication: For Home med

## 2018-10-04 NOTE — DISCHARGE NOTE ADULT - HOSPITAL COURSE
88 y/o F with PMH of dementia, HTN, TIA / CVA, dyslipidemia, GERD, osteoporosis, polymyalgia, glaucoma, COPD, breast cancer (s/p chemo), p/w R hip pain. History is provided by patient and daughter at bedside. Patient slipped out of bed trying to put her pants on, denies hitting head. Since then patient has been having R sided hip pain. At baseline, patient is able to ambulate. However, since this has happened, patient is unable to bear any weight on RLE.     Patient has also been having non-bloody watery diarrhea for 1 week, multiple episodes per day. ~4 times yesterday. Since patient has had a h/o c.diff. Daughter is concerned that patient may have c.diff again. Daughter states patient has also had a cough w/ green sputum. She followed up with Dr. Guardado, but at the time did not start antibiotics given patient's h/o c.diff.    Pt was admitted to medicine and evaluated for cough, diarrhea, and fall. She was also evaluated with PT. Course complicated by urinary retention requiring urology consult. Decision was made for scheduled toileting and outpatient follow up for cystocele.    10/3/18- Patient seen and examined at bedside. Patient with dementia, appears comfortable, denies any pain currently. Patient having multiple episodes of diarrhea today- C. diff pending  10/4/18- Patient seen and examined at bedside. Patient more alert today, denies any complaints. States her BM have become more firm. C. diff negative. Patient seen by Urology and ID while in hospital. Urology recommended scheduled toilet time. ID stated no need for further intervention at this time as C. diff negative and diarrhea improving. Patient medically stable for discharge at this time with close follow up with PCP, Urology.    Physical Exam on day of discharge:  General: WN/WD NAD  Neurology: A&Ox2, nonfocal, JUÁREZ x 4  Respiratory: CTA B/L  CV: RRR, S1S2, no murmurs, rubs or gallops  Abdominal: Soft, NT, ND +BS  Extremities: No edema, + peripheral pulses    *R hip pain s/p fall  -R Hip / femur x-rays without fracture  -PT eval  -Pain control     *Diarrhea PTA- continues to have multiple episodes- 6 episodes today  -C. diff negative  -No further intervention as per ID    *Productive cough - possible URI vs PNA  -CXR has questionable RLL infiltrate  -RVP negative  -CT chest: Small right pleural effusion with anterior right lower lobe consolidative changes may represent atelectasis.   -Patient is currently not in any respiratory distress  -procalcitonin- negative    *breast cancer  -1 chemo treatment previously, and got very sick from it. Therefore, patient decided she valued quality of life over treatment, and decided against any further treatment  -recent PET scan  -Can f/u outpatient with Mateo    *RA on chronic immunosuppression  -prednisone 5mg as PTA  -plaquenil    *Urinary retention with cystocele  -scheduled toileting  -outpatient urology    *Anemia  -Trend H/H during hospitalization -> if stable can f/u outpatient for further work up     *H/o HTN / TIA / CVA / dyslipidemia / GERD / osteoporosis / polymyalgia / glaucoma  -C/w home meds and if conditions remain stable during hospitalization -> can f/u outpatient for further management     *Advanced Care Planning  -pt has capacity  -daughter is HCP  -DNR/DNI      Total time spent on discharge: 37 minutes

## 2018-10-04 NOTE — DISCHARGE NOTE ADULT - CARE PLAN
Principal Discharge DX:	Hip pain  Goal:	resolution of pain  Assessment and plan of treatment:	-Xrays negative for fracture  -Physical therapy as needed  Secondary Diagnosis:	Female cystocele  Assessment and plan of treatment:	-Follow up as outpatient with Urology within 2 weeks   -Scheduled toilet time

## 2018-10-04 NOTE — DISCHARGE NOTE ADULT - NSTOBACCOHOTLINE_GEN_A_CS
[facility] Smokers Quitline ([phone]) [facility] Smokers Quitline ([phone])

## 2018-10-04 NOTE — DISCHARGE NOTE ADULT - CARE PROVIDER_API CALL
Flavio Otero), Family Medicine  [address] [place] [address]  Phone: [phone]  Fax: [phone]    Farhan Levine), Urology  [address] [place] [address]  Phone: [phone]  Fax: [phone]

## 2019-01-01 ENCOUNTER — APPOINTMENT (OUTPATIENT)
Dept: HEMATOLOGY ONCOLOGY | Facility: CLINIC | Age: 84
End: 2019-01-01
Payer: MEDICARE

## 2019-01-01 ENCOUNTER — INPATIENT (INPATIENT)
Facility: HOSPITAL | Age: 84
LOS: 4 days | Discharge: HOME HEALTH | End: 2019-01-19
Attending: INTERNAL MEDICINE | Admitting: INTERNAL MEDICINE
Payer: COMMERCIAL

## 2019-01-01 ENCOUNTER — APPOINTMENT (OUTPATIENT)
Dept: NEUROLOGY | Facility: CLINIC | Age: 84
End: 2019-01-01

## 2019-01-01 ENCOUNTER — TRANSCRIPTION ENCOUNTER (OUTPATIENT)
Age: 84
End: 2019-01-01

## 2019-01-01 ENCOUNTER — APPOINTMENT (OUTPATIENT)
Dept: NUCLEAR MEDICINE | Facility: CLINIC | Age: 84
End: 2019-01-01

## 2019-01-01 ENCOUNTER — APPOINTMENT (OUTPATIENT)
Dept: BREAST CENTER | Facility: CLINIC | Age: 84
End: 2019-01-01
Payer: MEDICARE

## 2019-01-01 ENCOUNTER — INPATIENT (INPATIENT)
Facility: HOSPITAL | Age: 84
LOS: 8 days | Discharge: HOSPICE | End: 2019-03-14
Attending: INTERNAL MEDICINE | Admitting: INTERNAL MEDICINE
Payer: COMMERCIAL

## 2019-01-01 ENCOUNTER — INPATIENT (INPATIENT)
Facility: HOSPITAL | Age: 84
LOS: 4 days | Discharge: NURSING, REHAB OR LONG-TERM CARE | End: 2019-01-29
Attending: SURGERY | Admitting: SURGERY
Payer: COMMERCIAL

## 2019-01-01 ENCOUNTER — OTHER (OUTPATIENT)
Age: 84
End: 2019-01-01

## 2019-01-01 ENCOUNTER — EMERGENCY (EMERGENCY)
Facility: HOSPITAL | Age: 84
LOS: 0 days | Discharge: HOME | End: 2019-02-10
Attending: EMERGENCY MEDICINE | Admitting: EMERGENCY MEDICINE
Payer: COMMERCIAL

## 2019-01-01 VITALS
SYSTOLIC BLOOD PRESSURE: 104 MMHG | HEART RATE: 100 BPM | TEMPERATURE: 97 F | OXYGEN SATURATION: 95 % | RESPIRATION RATE: 16 BRPM | DIASTOLIC BLOOD PRESSURE: 48 MMHG

## 2019-01-01 VITALS
BODY MASS INDEX: 23 KG/M2 | WEIGHT: 125 LBS | SYSTOLIC BLOOD PRESSURE: 121 MMHG | HEART RATE: 89 BPM | HEIGHT: 62 IN | DIASTOLIC BLOOD PRESSURE: 65 MMHG

## 2019-01-01 VITALS
OXYGEN SATURATION: 95 % | RESPIRATION RATE: 18 BRPM | DIASTOLIC BLOOD PRESSURE: 65 MMHG | HEART RATE: 78 BPM | TEMPERATURE: 98 F | SYSTOLIC BLOOD PRESSURE: 122 MMHG

## 2019-01-01 VITALS — TEMPERATURE: 98 F | DIASTOLIC BLOOD PRESSURE: 52 MMHG | SYSTOLIC BLOOD PRESSURE: 81 MMHG | HEART RATE: 54 BPM

## 2019-01-01 VITALS
TEMPERATURE: 98 F | OXYGEN SATURATION: 93 % | SYSTOLIC BLOOD PRESSURE: 99 MMHG | HEART RATE: 90 BPM | WEIGHT: 125 LBS | DIASTOLIC BLOOD PRESSURE: 72 MMHG | RESPIRATION RATE: 16 BRPM | HEIGHT: 62 IN

## 2019-01-01 VITALS — TEMPERATURE: 98 F | SYSTOLIC BLOOD PRESSURE: 110 MMHG | HEART RATE: 96 BPM | DIASTOLIC BLOOD PRESSURE: 50 MMHG

## 2019-01-01 VITALS
SYSTOLIC BLOOD PRESSURE: 134 MMHG | TEMPERATURE: 98 F | DIASTOLIC BLOOD PRESSURE: 56 MMHG | RESPIRATION RATE: 18 BRPM | OXYGEN SATURATION: 94 % | HEART RATE: 104 BPM

## 2019-01-01 VITALS
DIASTOLIC BLOOD PRESSURE: 87 MMHG | SYSTOLIC BLOOD PRESSURE: 124 MMHG | HEIGHT: 60 IN | TEMPERATURE: 98 F | RESPIRATION RATE: 20 BRPM | WEIGHT: 149.91 LBS | OXYGEN SATURATION: 93 % | HEART RATE: 90 BPM

## 2019-01-01 VITALS
HEIGHT: 65 IN | RESPIRATION RATE: 18 BRPM | SYSTOLIC BLOOD PRESSURE: 151 MMHG | WEIGHT: 136.91 LBS | DIASTOLIC BLOOD PRESSURE: 71 MMHG

## 2019-01-01 DIAGNOSIS — S22.41XA MULTIPLE FRACTURES OF RIBS, RIGHT SIDE, INITIAL ENCOUNTER FOR CLOSED FRACTURE: ICD-10-CM

## 2019-01-01 DIAGNOSIS — I65.22 OCCLUSION AND STENOSIS OF LEFT CAROTID ARTERY: ICD-10-CM

## 2019-01-01 DIAGNOSIS — M48.50XA COLLAPSED VERTEBRA, NOT ELSEWHERE CLASSIFIED, SITE UNSPECIFIED, INITIAL ENCOUNTER FOR FRACTURE: ICD-10-CM

## 2019-01-01 DIAGNOSIS — I10 ESSENTIAL (PRIMARY) HYPERTENSION: ICD-10-CM

## 2019-01-01 DIAGNOSIS — H40.9 UNSPECIFIED GLAUCOMA: ICD-10-CM

## 2019-01-01 DIAGNOSIS — J44.9 CHRONIC OBSTRUCTIVE PULMONARY DISEASE, UNSPECIFIED: ICD-10-CM

## 2019-01-01 DIAGNOSIS — G89.29 OTHER CHRONIC PAIN: ICD-10-CM

## 2019-01-01 DIAGNOSIS — F02.80 DEMENTIA IN OTHER DISEASES CLASSIFIED ELSEWHERE, UNSPECIFIED SEVERITY, WITHOUT BEHAVIORAL DISTURBANCE, PSYCHOTIC DISTURBANCE, MOOD DISTURBANCE, AND ANXIETY: ICD-10-CM

## 2019-01-01 DIAGNOSIS — G30.9 ALZHEIMER'S DISEASE, UNSPECIFIED: ICD-10-CM

## 2019-01-01 DIAGNOSIS — H40.9 UNSPECIFIED GLAUCOMA: Chronic | ICD-10-CM

## 2019-01-01 DIAGNOSIS — G93.41 METABOLIC ENCEPHALOPATHY: ICD-10-CM

## 2019-01-01 DIAGNOSIS — J44.0 CHRONIC OBSTRUCTIVE PULMONARY DISEASE WITH (ACUTE) LOWER RESPIRATORY INFECTION: ICD-10-CM

## 2019-01-01 DIAGNOSIS — M54.9 DORSALGIA, UNSPECIFIED: ICD-10-CM

## 2019-01-01 DIAGNOSIS — Z86.73 PERSONAL HISTORY OF TRANSIENT ISCHEMIC ATTACK (TIA), AND CEREBRAL INFARCTION WITHOUT RESIDUAL DEFICITS: ICD-10-CM

## 2019-01-01 DIAGNOSIS — E03.9 HYPOTHYROIDISM, UNSPECIFIED: ICD-10-CM

## 2019-01-01 DIAGNOSIS — M06.9 RHEUMATOID ARTHRITIS, UNSPECIFIED: ICD-10-CM

## 2019-01-01 DIAGNOSIS — R91.8 OTHER NONSPECIFIC ABNORMAL FINDING OF LUNG FIELD: ICD-10-CM

## 2019-01-01 DIAGNOSIS — J45.909 UNSPECIFIED ASTHMA, UNCOMPLICATED: ICD-10-CM

## 2019-01-01 DIAGNOSIS — M81.0 AGE-RELATED OSTEOPOROSIS WITHOUT CURRENT PATHOLOGICAL FRACTURE: ICD-10-CM

## 2019-01-01 DIAGNOSIS — A41.9 SEPSIS, UNSPECIFIED ORGANISM: ICD-10-CM

## 2019-01-01 DIAGNOSIS — E44.0 MODERATE PROTEIN-CALORIE MALNUTRITION: ICD-10-CM

## 2019-01-01 DIAGNOSIS — Z90.710 ACQUIRED ABSENCE OF BOTH CERVIX AND UTERUS: ICD-10-CM

## 2019-01-01 DIAGNOSIS — M48.56XA COLLAPSED VERTEBRA, NOT ELSEWHERE CLASSIFIED, LUMBAR REGION, INITIAL ENCOUNTER FOR FRACTURE: ICD-10-CM

## 2019-01-01 DIAGNOSIS — E78.5 HYPERLIPIDEMIA, UNSPECIFIED: ICD-10-CM

## 2019-01-01 DIAGNOSIS — M35.3 POLYMYALGIA RHEUMATICA: ICD-10-CM

## 2019-01-01 DIAGNOSIS — R91.1 SOLITARY PULMONARY NODULE: ICD-10-CM

## 2019-01-01 DIAGNOSIS — S09.90XA UNSPECIFIED INJURY OF HEAD, INITIAL ENCOUNTER: ICD-10-CM

## 2019-01-01 DIAGNOSIS — Z98.890 OTHER SPECIFIED POSTPROCEDURAL STATES: Chronic | ICD-10-CM

## 2019-01-01 DIAGNOSIS — Z85.3 PERSONAL HISTORY OF MALIGNANT NEOPLASM OF BREAST: ICD-10-CM

## 2019-01-01 DIAGNOSIS — I95.9 HYPOTENSION, UNSPECIFIED: ICD-10-CM
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
## 2019-01-01 DIAGNOSIS — M84.48XA PATHOLOGICAL FRACTURE, OTHER SITE, INITIAL ENCOUNTER FOR FRACTURE: ICD-10-CM

## 2019-01-01 DIAGNOSIS — Z79.899 OTHER LONG TERM (CURRENT) DRUG THERAPY: ICD-10-CM

## 2019-01-01 DIAGNOSIS — C50.919 MALIGNANT NEOPLASM OF UNSPECIFIED SITE OF UNSPECIFIED FEMALE BREAST: ICD-10-CM

## 2019-01-01 DIAGNOSIS — Z88.3 ALLERGY STATUS TO OTHER ANTI-INFECTIVE AGENTS: ICD-10-CM

## 2019-01-01 DIAGNOSIS — Z90.49 ACQUIRED ABSENCE OF OTHER SPECIFIED PARTS OF DIGESTIVE TRACT: ICD-10-CM

## 2019-01-01 DIAGNOSIS — Z29.9 ENCOUNTER FOR PROPHYLACTIC MEASURES, UNSPECIFIED: ICD-10-CM

## 2019-01-01 DIAGNOSIS — K21.9 GASTRO-ESOPHAGEAL REFLUX DISEASE WITHOUT ESOPHAGITIS: ICD-10-CM
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
## 2019-01-01 DIAGNOSIS — Z88.8 ALLERGY STATUS TO OTHER DRUGS, MEDICAMENTS AND BIOLOGICAL SUBSTANCES STATUS: ICD-10-CM

## 2019-01-01 DIAGNOSIS — Z51.5 ENCOUNTER FOR PALLIATIVE CARE: ICD-10-CM

## 2019-01-01 DIAGNOSIS — R41.82 ALTERED MENTAL STATUS, UNSPECIFIED: ICD-10-CM

## 2019-01-01 DIAGNOSIS — Z71.89 OTHER SPECIFIED COUNSELING: ICD-10-CM

## 2019-01-01 DIAGNOSIS — S32.10XA UNSPECIFIED FRACTURE OF SACRUM, INITIAL ENCOUNTER FOR CLOSED FRACTURE: ICD-10-CM

## 2019-01-01 DIAGNOSIS — Z79.01 LONG TERM (CURRENT) USE OF ANTICOAGULANTS: ICD-10-CM

## 2019-01-01 DIAGNOSIS — I65.29 OCCLUSION AND STENOSIS OF UNSPECIFIED CAROTID ARTERY: ICD-10-CM

## 2019-01-01 DIAGNOSIS — Z99.81 DEPENDENCE ON SUPPLEMENTAL OXYGEN: ICD-10-CM

## 2019-01-01 DIAGNOSIS — C50.911 MALIGNANT NEOPLASM OF UNSPECIFIED SITE OF RIGHT FEMALE BREAST: ICD-10-CM

## 2019-01-01 DIAGNOSIS — Z88.8 ALLERGY STATUS TO OTHER DRUGS, MEDICAMENTS AND BIOLOGICAL SUBSTANCES: ICD-10-CM

## 2019-01-01 DIAGNOSIS — R65.20 SEVERE SEPSIS WITHOUT SEPTIC SHOCK: ICD-10-CM

## 2019-01-01 DIAGNOSIS — J96.10 CHRONIC RESPIRATORY FAILURE, UNSPECIFIED WHETHER WITH HYPOXIA OR HYPERCAPNIA: ICD-10-CM
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
## 2019-01-01 DIAGNOSIS — J18.9 PNEUMONIA, UNSPECIFIED ORGANISM: ICD-10-CM

## 2019-01-01 DIAGNOSIS — J69.0 PNEUMONITIS DUE TO INHALATION OF FOOD AND VOMIT: ICD-10-CM

## 2019-01-01 DIAGNOSIS — Z88.6 ALLERGY STATUS TO ANALGESIC AGENT: ICD-10-CM

## 2019-01-01 DIAGNOSIS — M80.08XA AGE-RELATED OSTEOPOROSIS WITH CURRENT PATHOLOGICAL FRACTURE, VERTEBRA(E), INITIAL ENCOUNTER FOR FRACTURE: ICD-10-CM

## 2019-01-01 DIAGNOSIS — R29.6 REPEATED FALLS: ICD-10-CM

## 2019-01-01 LAB
ALBUMIN SERPL ELPH-MCNC: 2.4 G/DL — LOW (ref 3.3–5)
ALBUMIN SERPL ELPH-MCNC: 2.9 G/DL — LOW (ref 3.3–5)
ALBUMIN SERPL ELPH-MCNC: 3 G/DL — LOW (ref 3.3–5)
ALBUMIN SERPL ELPH-MCNC: 3.3 G/DL — SIGNIFICANT CHANGE UP (ref 3.3–5)
ALBUMIN SERPL ELPH-MCNC: 3.3 G/DL — SIGNIFICANT CHANGE UP (ref 3.3–5)
ALP SERPL-CCNC: 58 U/L — SIGNIFICANT CHANGE UP (ref 40–120)
ALP SERPL-CCNC: 61 U/L — SIGNIFICANT CHANGE UP (ref 40–120)
ALP SERPL-CCNC: 62 U/L — SIGNIFICANT CHANGE UP (ref 40–120)
ALP SERPL-CCNC: 63 U/L — SIGNIFICANT CHANGE UP (ref 40–120)
ALP SERPL-CCNC: 72 U/L — SIGNIFICANT CHANGE UP (ref 40–120)
ALT FLD-CCNC: 24 U/L — SIGNIFICANT CHANGE UP (ref 12–78)
ALT FLD-CCNC: 30 U/L — SIGNIFICANT CHANGE UP (ref 12–78)
ALT FLD-CCNC: 32 U/L — SIGNIFICANT CHANGE UP (ref 12–78)
ALT FLD-CCNC: 32 U/L — SIGNIFICANT CHANGE UP (ref 12–78)
ALT FLD-CCNC: 45 U/L — SIGNIFICANT CHANGE UP (ref 12–78)
ANION GAP SERPL CALC-SCNC: 10 MMOL/L — SIGNIFICANT CHANGE UP (ref 5–17)
ANION GAP SERPL CALC-SCNC: 10 MMOL/L — SIGNIFICANT CHANGE UP (ref 5–17)
ANION GAP SERPL CALC-SCNC: 6 MMOL/L — SIGNIFICANT CHANGE UP (ref 5–17)
ANION GAP SERPL CALC-SCNC: 7 MMOL/L — SIGNIFICANT CHANGE UP (ref 5–17)
ANION GAP SERPL CALC-SCNC: 7 MMOL/L — SIGNIFICANT CHANGE UP (ref 5–17)
ANION GAP SERPL CALC-SCNC: 8 MMOL/L — SIGNIFICANT CHANGE UP (ref 5–17)
ANION GAP SERPL CALC-SCNC: 9 MMOL/L — SIGNIFICANT CHANGE UP (ref 5–17)
ANISOCYTOSIS BLD QL: SLIGHT — SIGNIFICANT CHANGE UP
APPEARANCE UR: CLEAR — SIGNIFICANT CHANGE UP
APPEARANCE UR: CLEAR — SIGNIFICANT CHANGE UP
APTT BLD: 22.6 SEC — LOW (ref 27.5–36.3)
APTT BLD: 28.5 SEC — SIGNIFICANT CHANGE UP (ref 27.5–36.3)
AST SERPL-CCNC: 14 U/L — LOW (ref 15–37)
AST SERPL-CCNC: 21 U/L — SIGNIFICANT CHANGE UP (ref 15–37)
AST SERPL-CCNC: 21 U/L — SIGNIFICANT CHANGE UP (ref 15–37)
AST SERPL-CCNC: 22 U/L — SIGNIFICANT CHANGE UP (ref 15–37)
AST SERPL-CCNC: 22 U/L — SIGNIFICANT CHANGE UP (ref 15–37)
BACTERIA # UR AUTO: ABNORMAL
BASOPHILS # BLD AUTO: 0 K/UL — SIGNIFICANT CHANGE UP (ref 0–0.2)
BASOPHILS # BLD AUTO: 0 K/UL — SIGNIFICANT CHANGE UP (ref 0–0.2)
BASOPHILS # BLD AUTO: 0.01 K/UL — SIGNIFICANT CHANGE UP (ref 0–0.2)
BASOPHILS # BLD AUTO: 0.02 K/UL — SIGNIFICANT CHANGE UP (ref 0–0.2)
BASOPHILS # BLD AUTO: 0.04 K/UL — SIGNIFICANT CHANGE UP (ref 0–0.2)
BASOPHILS NFR BLD AUTO: 0 % — SIGNIFICANT CHANGE UP (ref 0–2)
BASOPHILS NFR BLD AUTO: 0 % — SIGNIFICANT CHANGE UP (ref 0–2)
BASOPHILS NFR BLD AUTO: 0.1 % — SIGNIFICANT CHANGE UP (ref 0–2)
BASOPHILS NFR BLD AUTO: 0.1 % — SIGNIFICANT CHANGE UP (ref 0–2)
BASOPHILS NFR BLD AUTO: 0.2 % — SIGNIFICANT CHANGE UP (ref 0–2)
BILIRUB SERPL-MCNC: 0.4 MG/DL — SIGNIFICANT CHANGE UP (ref 0.2–1.2)
BILIRUB SERPL-MCNC: 0.5 MG/DL — SIGNIFICANT CHANGE UP (ref 0.2–1.2)
BILIRUB SERPL-MCNC: 0.6 MG/DL — SIGNIFICANT CHANGE UP (ref 0.2–1.2)
BILIRUB UR-MCNC: NEGATIVE — SIGNIFICANT CHANGE UP
BILIRUB UR-MCNC: NEGATIVE — SIGNIFICANT CHANGE UP
BLD GP AB SCN SERPL QL: SIGNIFICANT CHANGE UP
BUN SERPL-MCNC: 20 MG/DL — SIGNIFICANT CHANGE UP (ref 7–23)
BUN SERPL-MCNC: 21 MG/DL — SIGNIFICANT CHANGE UP (ref 7–23)
BUN SERPL-MCNC: 23 MG/DL — SIGNIFICANT CHANGE UP (ref 7–23)
BUN SERPL-MCNC: 26 MG/DL — HIGH (ref 7–23)
BUN SERPL-MCNC: 26 MG/DL — HIGH (ref 7–23)
BUN SERPL-MCNC: 27 MG/DL — HIGH (ref 7–23)
BUN SERPL-MCNC: 28 MG/DL — HIGH (ref 7–23)
CALCIUM SERPL-MCNC: 8.2 MG/DL — LOW (ref 8.5–10.1)
CALCIUM SERPL-MCNC: 8.5 MG/DL — SIGNIFICANT CHANGE UP (ref 8.5–10.1)
CALCIUM SERPL-MCNC: 8.6 MG/DL — SIGNIFICANT CHANGE UP (ref 8.5–10.1)
CALCIUM SERPL-MCNC: 8.6 MG/DL — SIGNIFICANT CHANGE UP (ref 8.5–10.1)
CALCIUM SERPL-MCNC: 8.7 MG/DL — SIGNIFICANT CHANGE UP (ref 8.5–10.1)
CALCIUM SERPL-MCNC: 9 MG/DL — SIGNIFICANT CHANGE UP (ref 8.5–10.1)
CALCIUM SERPL-MCNC: 9.2 MG/DL — SIGNIFICANT CHANGE UP (ref 8.5–10.1)
CHLORIDE SERPL-SCNC: 107 MMOL/L — SIGNIFICANT CHANGE UP (ref 96–108)
CHLORIDE SERPL-SCNC: 109 MMOL/L — HIGH (ref 96–108)
CHLORIDE SERPL-SCNC: 110 MMOL/L — HIGH (ref 96–108)
CHLORIDE SERPL-SCNC: 110 MMOL/L — HIGH (ref 96–108)
CHLORIDE SERPL-SCNC: 111 MMOL/L — HIGH (ref 96–108)
CHLORIDE SERPL-SCNC: 113 MMOL/L — HIGH (ref 96–108)
CHLORIDE SERPL-SCNC: 115 MMOL/L — HIGH (ref 96–108)
CO2 SERPL-SCNC: 22 MMOL/L — SIGNIFICANT CHANGE UP (ref 22–31)
CO2 SERPL-SCNC: 23 MMOL/L — SIGNIFICANT CHANGE UP (ref 22–31)
CO2 SERPL-SCNC: 24 MMOL/L — SIGNIFICANT CHANGE UP (ref 22–31)
CO2 SERPL-SCNC: 24 MMOL/L — SIGNIFICANT CHANGE UP (ref 22–31)
CO2 SERPL-SCNC: 25 MMOL/L — SIGNIFICANT CHANGE UP (ref 22–31)
CO2 SERPL-SCNC: 26 MMOL/L — SIGNIFICANT CHANGE UP (ref 22–31)
CO2 SERPL-SCNC: 27 MMOL/L — SIGNIFICANT CHANGE UP (ref 22–31)
COLOR SPEC: YELLOW — SIGNIFICANT CHANGE UP
COLOR SPEC: YELLOW — SIGNIFICANT CHANGE UP
CREAT SERPL-MCNC: 0.55 MG/DL — SIGNIFICANT CHANGE UP (ref 0.5–1.3)
CREAT SERPL-MCNC: 0.67 MG/DL — SIGNIFICANT CHANGE UP (ref 0.5–1.3)
CREAT SERPL-MCNC: 0.73 MG/DL — SIGNIFICANT CHANGE UP (ref 0.5–1.3)
CREAT SERPL-MCNC: 0.76 MG/DL — SIGNIFICANT CHANGE UP (ref 0.5–1.3)
CREAT SERPL-MCNC: 0.81 MG/DL — SIGNIFICANT CHANGE UP (ref 0.5–1.3)
CREAT SERPL-MCNC: 0.81 MG/DL — SIGNIFICANT CHANGE UP (ref 0.5–1.3)
CREAT SERPL-MCNC: 1.14 MG/DL — SIGNIFICANT CHANGE UP (ref 0.5–1.3)
CULTURE RESULTS: NO GROWTH — SIGNIFICANT CHANGE UP
CULTURE RESULTS: SIGNIFICANT CHANGE UP
CULTURE RESULTS: SIGNIFICANT CHANGE UP
DIFF PNL FLD: NEGATIVE — SIGNIFICANT CHANGE UP
DIFF PNL FLD: NEGATIVE — SIGNIFICANT CHANGE UP
ELLIPTOCYTES BLD QL SMEAR: SLIGHT — SIGNIFICANT CHANGE UP
EOSINOPHIL # BLD AUTO: 0 K/UL — SIGNIFICANT CHANGE UP (ref 0–0.5)
EOSINOPHIL # BLD AUTO: 0.01 K/UL — SIGNIFICANT CHANGE UP (ref 0–0.5)
EOSINOPHIL # BLD AUTO: 0.07 K/UL — SIGNIFICANT CHANGE UP (ref 0–0.5)
EOSINOPHIL NFR BLD AUTO: 0 % — SIGNIFICANT CHANGE UP (ref 0–6)
EOSINOPHIL NFR BLD AUTO: 0.1 % — SIGNIFICANT CHANGE UP (ref 0–6)
EOSINOPHIL NFR BLD AUTO: 0.7 % — SIGNIFICANT CHANGE UP (ref 0–6)
EPI CELLS # UR: NEGATIVE — SIGNIFICANT CHANGE UP
GLUCOSE SERPL-MCNC: 146 MG/DL — HIGH (ref 70–99)
GLUCOSE SERPL-MCNC: 152 MG/DL — HIGH (ref 70–99)
GLUCOSE SERPL-MCNC: 84 MG/DL — SIGNIFICANT CHANGE UP (ref 70–99)
GLUCOSE SERPL-MCNC: 85 MG/DL — SIGNIFICANT CHANGE UP (ref 70–99)
GLUCOSE SERPL-MCNC: 98 MG/DL — SIGNIFICANT CHANGE UP (ref 70–99)
GLUCOSE SERPL-MCNC: 99 MG/DL — SIGNIFICANT CHANGE UP (ref 70–99)
GLUCOSE SERPL-MCNC: 99 MG/DL — SIGNIFICANT CHANGE UP (ref 70–99)
GLUCOSE UR QL: NEGATIVE MG/DL — SIGNIFICANT CHANGE UP
GLUCOSE UR QL: NEGATIVE MG/DL — SIGNIFICANT CHANGE UP
HCT VFR BLD CALC: 31.6 % — LOW (ref 34.5–45)
HCT VFR BLD CALC: 31.7 % — LOW (ref 34.5–45)
HCT VFR BLD CALC: 32.3 % — LOW (ref 34.5–45)
HCT VFR BLD CALC: 32.8 % — LOW (ref 34.5–45)
HCT VFR BLD CALC: 35.3 % — SIGNIFICANT CHANGE UP (ref 34.5–45)
HCT VFR BLD CALC: 35.6 % — SIGNIFICANT CHANGE UP (ref 34.5–45)
HCT VFR BLD CALC: 36 % — SIGNIFICANT CHANGE UP (ref 34.5–45)
HCT VFR BLD CALC: 36.7 % — SIGNIFICANT CHANGE UP (ref 34.5–45)
HCT VFR BLD CALC: 36.9 % — SIGNIFICANT CHANGE UP (ref 34.5–45)
HCT VFR BLD CALC: 37.7 % — SIGNIFICANT CHANGE UP (ref 34.5–45)
HCT VFR BLD CALC: 39.6 % — SIGNIFICANT CHANGE UP (ref 34.5–45)
HGB BLD-MCNC: 10 G/DL — LOW (ref 11.5–15.5)
HGB BLD-MCNC: 10.5 G/DL — LOW (ref 11.5–15.5)
HGB BLD-MCNC: 11.1 G/DL — LOW (ref 11.5–15.5)
HGB BLD-MCNC: 11.3 G/DL — LOW (ref 11.5–15.5)
HGB BLD-MCNC: 11.4 G/DL — LOW (ref 11.5–15.5)
HGB BLD-MCNC: 11.5 G/DL — SIGNIFICANT CHANGE UP (ref 11.5–15.5)
HGB BLD-MCNC: 11.6 G/DL — SIGNIFICANT CHANGE UP (ref 11.5–15.5)
HGB BLD-MCNC: 11.8 G/DL — SIGNIFICANT CHANGE UP (ref 11.5–15.5)
HGB BLD-MCNC: 12.2 G/DL — SIGNIFICANT CHANGE UP (ref 11.5–15.5)
HGB BLD-MCNC: 9.8 G/DL — LOW (ref 11.5–15.5)
HGB BLD-MCNC: 9.8 G/DL — LOW (ref 11.5–15.5)
IMM GRANULOCYTES NFR BLD AUTO: 0.3 % — SIGNIFICANT CHANGE UP (ref 0–1.5)
IMM GRANULOCYTES NFR BLD AUTO: 0.5 % — SIGNIFICANT CHANGE UP (ref 0–1.5)
IMM GRANULOCYTES NFR BLD AUTO: 1 % — SIGNIFICANT CHANGE UP (ref 0–1.5)
IMM GRANULOCYTES NFR BLD AUTO: 1.5 % — SIGNIFICANT CHANGE UP (ref 0–1.5)
INR BLD: 1.08 RATIO — SIGNIFICANT CHANGE UP (ref 0.88–1.16)
INR BLD: 1.25 RATIO — HIGH (ref 0.88–1.16)
KETONES UR-MCNC: NEGATIVE — SIGNIFICANT CHANGE UP
KETONES UR-MCNC: NEGATIVE — SIGNIFICANT CHANGE UP
LACTATE SERPL-SCNC: 1.1 MMOL/L — SIGNIFICANT CHANGE UP (ref 0.7–2)
LACTATE SERPL-SCNC: 1.2 MMOL/L — SIGNIFICANT CHANGE UP (ref 0.7–2)
LACTATE SERPL-SCNC: 2.3 MMOL/L — HIGH (ref 0.7–2)
LEUKOCYTE ESTERASE UR-ACNC: NEGATIVE — SIGNIFICANT CHANGE UP
LEUKOCYTE ESTERASE UR-ACNC: NEGATIVE — SIGNIFICANT CHANGE UP
LIDOCAIN IGE QN: 137 U/L — SIGNIFICANT CHANGE UP (ref 73–393)
LYMPHOCYTES # BLD AUTO: 0.36 K/UL — LOW (ref 1–3.3)
LYMPHOCYTES # BLD AUTO: 0.77 K/UL — LOW (ref 1–3.3)
LYMPHOCYTES # BLD AUTO: 0.83 K/UL — LOW (ref 1–3.3)
LYMPHOCYTES # BLD AUTO: 0.94 K/UL — LOW (ref 1–3.3)
LYMPHOCYTES # BLD AUTO: 1.26 K/UL — SIGNIFICANT CHANGE UP (ref 1–3.3)
LYMPHOCYTES # BLD AUTO: 3 % — LOW (ref 13–44)
LYMPHOCYTES # BLD AUTO: 5.1 % — LOW (ref 13–44)
LYMPHOCYTES # BLD AUTO: 8.2 % — LOW (ref 13–44)
LYMPHOCYTES # BLD AUTO: 8.4 % — LOW (ref 13–44)
LYMPHOCYTES # BLD AUTO: 9.3 % — LOW (ref 13–44)
MAGNESIUM SERPL-MCNC: 1.8 MG/DL — SIGNIFICANT CHANGE UP (ref 1.6–2.6)
MAGNESIUM SERPL-MCNC: 1.8 MG/DL — SIGNIFICANT CHANGE UP (ref 1.6–2.6)
MAGNESIUM SERPL-MCNC: 2 MG/DL — SIGNIFICANT CHANGE UP (ref 1.6–2.6)
MAGNESIUM SERPL-MCNC: 2.2 MG/DL — SIGNIFICANT CHANGE UP (ref 1.6–2.6)
MANUAL SMEAR VERIFICATION: SIGNIFICANT CHANGE UP
MCHC RBC-ENTMCNC: 27.1 PG — SIGNIFICANT CHANGE UP (ref 27–34)
MCHC RBC-ENTMCNC: 28.4 PG — SIGNIFICANT CHANGE UP (ref 27–34)
MCHC RBC-ENTMCNC: 28.5 PG — SIGNIFICANT CHANGE UP (ref 27–34)
MCHC RBC-ENTMCNC: 28.5 PG — SIGNIFICANT CHANGE UP (ref 27–34)
MCHC RBC-ENTMCNC: 28.8 PG — SIGNIFICANT CHANGE UP (ref 27–34)
MCHC RBC-ENTMCNC: 28.9 PG — SIGNIFICANT CHANGE UP (ref 27–34)
MCHC RBC-ENTMCNC: 28.9 PG — SIGNIFICANT CHANGE UP (ref 27–34)
MCHC RBC-ENTMCNC: 30.8 GM/DL — LOW (ref 32–36)
MCHC RBC-ENTMCNC: 30.8 GM/DL — LOW (ref 32–36)
MCHC RBC-ENTMCNC: 30.9 GM/DL — LOW (ref 32–36)
MCHC RBC-ENTMCNC: 31 GM/DL — LOW (ref 32–36)
MCHC RBC-ENTMCNC: 31 GM/DL — LOW (ref 32–36)
MCHC RBC-ENTMCNC: 31.3 GM/DL — LOW (ref 32–36)
MCHC RBC-ENTMCNC: 31.4 GM/DL — LOW (ref 32–36)
MCHC RBC-ENTMCNC: 32 GM/DL — SIGNIFICANT CHANGE UP (ref 32–36)
MCHC RBC-ENTMCNC: 32 GM/DL — SIGNIFICANT CHANGE UP (ref 32–36)
MCV RBC AUTO: 88 FL — SIGNIFICANT CHANGE UP (ref 80–100)
MCV RBC AUTO: 90.1 FL — SIGNIFICANT CHANGE UP (ref 80–100)
MCV RBC AUTO: 90.1 FL — SIGNIFICANT CHANGE UP (ref 80–100)
MCV RBC AUTO: 90.5 FL — SIGNIFICANT CHANGE UP (ref 80–100)
MCV RBC AUTO: 91.1 FL — SIGNIFICANT CHANGE UP (ref 80–100)
MCV RBC AUTO: 91.8 FL — SIGNIFICANT CHANGE UP (ref 80–100)
MCV RBC AUTO: 93.2 FL — SIGNIFICANT CHANGE UP (ref 80–100)
MCV RBC AUTO: 93.2 FL — SIGNIFICANT CHANGE UP (ref 80–100)
MCV RBC AUTO: 93.5 FL — SIGNIFICANT CHANGE UP (ref 80–100)
MONOCYTES # BLD AUTO: 0.12 K/UL — SIGNIFICANT CHANGE UP (ref 0–0.9)
MONOCYTES # BLD AUTO: 0.14 K/UL — SIGNIFICANT CHANGE UP (ref 0–0.9)
MONOCYTES # BLD AUTO: 0.73 K/UL — SIGNIFICANT CHANGE UP (ref 0–0.9)
MONOCYTES # BLD AUTO: 0.92 K/UL — HIGH (ref 0–0.9)
MONOCYTES # BLD AUTO: 1.12 K/UL — HIGH (ref 0–0.9)
MONOCYTES NFR BLD AUTO: 1 % — LOW (ref 2–14)
MONOCYTES NFR BLD AUTO: 1.5 % — LOW (ref 2–14)
MONOCYTES NFR BLD AUTO: 6.1 % — SIGNIFICANT CHANGE UP (ref 2–14)
MONOCYTES NFR BLD AUTO: 6.8 % — SIGNIFICANT CHANGE UP (ref 2–14)
MONOCYTES NFR BLD AUTO: 7.4 % — SIGNIFICANT CHANGE UP (ref 2–14)
NEUTROPHILS # BLD AUTO: 11.2 K/UL — HIGH (ref 1.8–7.4)
NEUTROPHILS # BLD AUTO: 11.49 K/UL — HIGH (ref 1.8–7.4)
NEUTROPHILS # BLD AUTO: 16.33 K/UL — HIGH (ref 1.8–7.4)
NEUTROPHILS # BLD AUTO: 8.12 K/UL — HIGH (ref 1.8–7.4)
NEUTROPHILS # BLD AUTO: 8.35 K/UL — HIGH (ref 1.8–7.4)
NEUTROPHILS NFR BLD AUTO: 82 % — HIGH (ref 43–77)
NEUTROPHILS NFR BLD AUTO: 83.2 % — HIGH (ref 43–77)
NEUTROPHILS NFR BLD AUTO: 88.3 % — HIGH (ref 43–77)
NEUTROPHILS NFR BLD AUTO: 89.2 % — HIGH (ref 43–77)
NEUTROPHILS NFR BLD AUTO: 95 % — HIGH (ref 43–77)
NITRITE UR-MCNC: NEGATIVE — SIGNIFICANT CHANGE UP
NITRITE UR-MCNC: NEGATIVE — SIGNIFICANT CHANGE UP
NRBC # BLD: 0 /100 WBCS — SIGNIFICANT CHANGE UP (ref 0–0)
NRBC # BLD: 0 /100 — SIGNIFICANT CHANGE UP (ref 0–0)
NRBC # BLD: SIGNIFICANT CHANGE UP /100 WBCS (ref 0–0)
OVALOCYTES BLD QL SMEAR: SLIGHT — SIGNIFICANT CHANGE UP
PH UR: 6 — SIGNIFICANT CHANGE UP (ref 5–8)
PH UR: 6 — SIGNIFICANT CHANGE UP (ref 5–8)
PHOSPHATE SERPL-MCNC: 2.9 MG/DL — SIGNIFICANT CHANGE UP (ref 2.5–4.5)
PLAT MORPH BLD: NORMAL — SIGNIFICANT CHANGE UP
PLATELET # BLD AUTO: 138 K/UL — LOW (ref 150–400)
PLATELET # BLD AUTO: 140 K/UL — LOW (ref 150–400)
PLATELET # BLD AUTO: 147 K/UL — LOW (ref 150–400)
PLATELET # BLD AUTO: 156 K/UL — SIGNIFICANT CHANGE UP (ref 150–400)
PLATELET # BLD AUTO: 159 K/UL — SIGNIFICANT CHANGE UP (ref 150–400)
PLATELET # BLD AUTO: 160 K/UL — SIGNIFICANT CHANGE UP (ref 150–400)
PLATELET # BLD AUTO: 164 K/UL — SIGNIFICANT CHANGE UP (ref 150–400)
PLATELET # BLD AUTO: 229 K/UL — SIGNIFICANT CHANGE UP (ref 150–400)
PLATELET # BLD AUTO: 252 K/UL — SIGNIFICANT CHANGE UP (ref 150–400)
POIKILOCYTOSIS BLD QL AUTO: SLIGHT — SIGNIFICANT CHANGE UP
POTASSIUM SERPL-MCNC: 3.8 MMOL/L — SIGNIFICANT CHANGE UP (ref 3.5–5.3)
POTASSIUM SERPL-MCNC: 4 MMOL/L — SIGNIFICANT CHANGE UP (ref 3.5–5.3)
POTASSIUM SERPL-MCNC: 4.3 MMOL/L — SIGNIFICANT CHANGE UP (ref 3.5–5.3)
POTASSIUM SERPL-MCNC: 4.4 MMOL/L — SIGNIFICANT CHANGE UP (ref 3.5–5.3)
POTASSIUM SERPL-MCNC: 4.6 MMOL/L — SIGNIFICANT CHANGE UP (ref 3.5–5.3)
POTASSIUM SERPL-SCNC: 3.8 MMOL/L — SIGNIFICANT CHANGE UP (ref 3.5–5.3)
POTASSIUM SERPL-SCNC: 4 MMOL/L — SIGNIFICANT CHANGE UP (ref 3.5–5.3)
POTASSIUM SERPL-SCNC: 4.3 MMOL/L — SIGNIFICANT CHANGE UP (ref 3.5–5.3)
POTASSIUM SERPL-SCNC: 4.4 MMOL/L — SIGNIFICANT CHANGE UP (ref 3.5–5.3)
POTASSIUM SERPL-SCNC: 4.6 MMOL/L — SIGNIFICANT CHANGE UP (ref 3.5–5.3)
PROT SERPL-MCNC: 5.2 GM/DL — LOW (ref 6–8.3)
PROT SERPL-MCNC: 5.6 GM/DL — LOW (ref 6–8.3)
PROT SERPL-MCNC: 5.9 GM/DL — LOW (ref 6–8.3)
PROT SERPL-MCNC: 6.4 GM/DL — SIGNIFICANT CHANGE UP (ref 6–8.3)
PROT SERPL-MCNC: 6.8 GM/DL — SIGNIFICANT CHANGE UP (ref 6–8.3)
PROT UR-MCNC: 15 MG/DL
PROT UR-MCNC: NEGATIVE MG/DL — SIGNIFICANT CHANGE UP
PROTHROM AB SERPL-ACNC: 12 SEC — SIGNIFICANT CHANGE UP (ref 10–12.9)
PROTHROM AB SERPL-ACNC: 14 SEC — HIGH (ref 10–12.9)
RAPID RVP RESULT: SIGNIFICANT CHANGE UP
RBC # BLD: 3.39 M/UL — LOW (ref 3.8–5.2)
RBC # BLD: 3.4 M/UL — LOW (ref 3.8–5.2)
RBC # BLD: 3.52 M/UL — LOW (ref 3.8–5.2)
RBC # BLD: 3.64 M/UL — LOW (ref 3.8–5.2)
RBC # BLD: 3.9 M/UL — SIGNIFICANT CHANGE UP (ref 3.8–5.2)
RBC # BLD: 3.95 M/UL — SIGNIFICANT CHANGE UP (ref 3.8–5.2)
RBC # BLD: 4.03 M/UL — SIGNIFICANT CHANGE UP (ref 3.8–5.2)
RBC # BLD: 4.03 M/UL — SIGNIFICANT CHANGE UP (ref 3.8–5.2)
RBC # BLD: 4.5 M/UL — SIGNIFICANT CHANGE UP (ref 3.8–5.2)
RBC # FLD: 15.2 % — HIGH (ref 10.3–14.5)
RBC # FLD: 15.4 % — HIGH (ref 10.3–14.5)
RBC # FLD: 16.2 % — HIGH (ref 10.3–14.5)
RBC # FLD: 16.5 % — HIGH (ref 10.3–14.5)
RBC # FLD: 17.3 % — HIGH (ref 10.3–14.5)
RBC # FLD: 17.6 % — HIGH (ref 10.3–14.5)
RBC # FLD: 17.8 % — HIGH (ref 10.3–14.5)
RBC # FLD: 18.2 % — HIGH (ref 10.3–14.5)
RBC # FLD: 18.4 % — HIGH (ref 10.3–14.5)
RBC BLD AUTO: ABNORMAL
RBC CASTS # UR COMP ASSIST: NEGATIVE /HPF — SIGNIFICANT CHANGE UP (ref 0–4)
SODIUM SERPL-SCNC: 140 MMOL/L — SIGNIFICANT CHANGE UP (ref 135–145)
SODIUM SERPL-SCNC: 142 MMOL/L — SIGNIFICANT CHANGE UP (ref 135–145)
SODIUM SERPL-SCNC: 143 MMOL/L — SIGNIFICANT CHANGE UP (ref 135–145)
SODIUM SERPL-SCNC: 144 MMOL/L — SIGNIFICANT CHANGE UP (ref 135–145)
SODIUM SERPL-SCNC: 144 MMOL/L — SIGNIFICANT CHANGE UP (ref 135–145)
SODIUM SERPL-SCNC: 145 MMOL/L — SIGNIFICANT CHANGE UP (ref 135–145)
SODIUM SERPL-SCNC: 145 MMOL/L — SIGNIFICANT CHANGE UP (ref 135–145)
SP GR SPEC: 1.01 — SIGNIFICANT CHANGE UP (ref 1.01–1.02)
SP GR SPEC: 1.01 — SIGNIFICANT CHANGE UP (ref 1.01–1.02)
SPECIMEN SOURCE: SIGNIFICANT CHANGE UP
TSH SERPL-MCNC: 1.91 UU/ML — SIGNIFICANT CHANGE UP (ref 0.34–4.82)
TYPE + AB SCN PNL BLD: SIGNIFICANT CHANGE UP
UROBILINOGEN FLD QL: NEGATIVE MG/DL — SIGNIFICANT CHANGE UP
UROBILINOGEN FLD QL: NEGATIVE MG/DL — SIGNIFICANT CHANGE UP
VARIANT LYMPHS # BLD: 1 % — SIGNIFICANT CHANGE UP (ref 0–6)
WBC # BLD: 11.57 K/UL — HIGH (ref 3.8–10.5)
WBC # BLD: 12.09 K/UL — HIGH (ref 3.8–10.5)
WBC # BLD: 13.48 K/UL — HIGH (ref 3.8–10.5)
WBC # BLD: 18.49 K/UL — HIGH (ref 3.8–10.5)
WBC # BLD: 6.26 K/UL — SIGNIFICANT CHANGE UP (ref 3.8–10.5)
WBC # BLD: 8.68 K/UL — SIGNIFICANT CHANGE UP (ref 3.8–10.5)
WBC # BLD: 8.93 K/UL — SIGNIFICANT CHANGE UP (ref 3.8–10.5)
WBC # BLD: 9.36 K/UL — SIGNIFICANT CHANGE UP (ref 3.8–10.5)
WBC # BLD: 9.9 K/UL — SIGNIFICANT CHANGE UP (ref 3.8–10.5)
WBC # FLD AUTO: 11.57 K/UL — HIGH (ref 3.8–10.5)
WBC # FLD AUTO: 12.09 K/UL — HIGH (ref 3.8–10.5)
WBC # FLD AUTO: 13.48 K/UL — HIGH (ref 3.8–10.5)
WBC # FLD AUTO: 18.49 K/UL — HIGH (ref 3.8–10.5)
WBC # FLD AUTO: 6.26 K/UL — SIGNIFICANT CHANGE UP (ref 3.8–10.5)
WBC # FLD AUTO: 8.68 K/UL — SIGNIFICANT CHANGE UP (ref 3.8–10.5)
WBC # FLD AUTO: 8.93 K/UL — SIGNIFICANT CHANGE UP (ref 3.8–10.5)
WBC # FLD AUTO: 9.36 K/UL — SIGNIFICANT CHANGE UP (ref 3.8–10.5)
WBC # FLD AUTO: 9.9 K/UL — SIGNIFICANT CHANGE UP (ref 3.8–10.5)
WBC UR QL: SIGNIFICANT CHANGE UP

## 2019-01-01 PROCEDURE — 93010 ELECTROCARDIOGRAM REPORT: CPT

## 2019-01-01 PROCEDURE — 72125 CT NECK SPINE W/O DYE: CPT | Mod: 26

## 2019-01-01 PROCEDURE — 99232 SBSQ HOSP IP/OBS MODERATE 35: CPT

## 2019-01-01 PROCEDURE — 70450 CT HEAD/BRAIN W/O DYE: CPT | Mod: 26

## 2019-01-01 PROCEDURE — 99285 EMERGENCY DEPT VISIT HI MDM: CPT

## 2019-01-01 PROCEDURE — 71250 CT THORAX DX C-: CPT | Mod: 26

## 2019-01-01 PROCEDURE — 99214 OFFICE O/P EST MOD 30 MIN: CPT

## 2019-01-01 PROCEDURE — 99215 OFFICE O/P EST HI 40 MIN: CPT

## 2019-01-01 PROCEDURE — 99233 SBSQ HOSP IP/OBS HIGH 50: CPT

## 2019-01-01 PROCEDURE — 99238 HOSP IP/OBS DSCHRG MGMT 30/<: CPT

## 2019-01-01 PROCEDURE — 73552 X-RAY EXAM OF FEMUR 2/>: CPT | Mod: 26,RT

## 2019-01-01 PROCEDURE — 74176 CT ABD & PELVIS W/O CONTRAST: CPT | Mod: 26

## 2019-01-01 PROCEDURE — 99285 EMERGENCY DEPT VISIT HI MDM: CPT | Mod: 25

## 2019-01-01 PROCEDURE — 73523 X-RAY EXAM HIPS BI 5/> VIEWS: CPT | Mod: 26

## 2019-01-01 PROCEDURE — 71045 X-RAY EXAM CHEST 1 VIEW: CPT | Mod: 26

## 2019-01-01 PROCEDURE — 99223 1ST HOSP IP/OBS HIGH 75: CPT

## 2019-01-01 PROCEDURE — 73030 X-RAY EXAM OF SHOULDER: CPT | Mod: 26,RT

## 2019-01-01 PROCEDURE — 99284 EMERGENCY DEPT VISIT MOD MDM: CPT | Mod: 25

## 2019-01-01 RX ORDER — LIDOCAINE 4 G/100G
1 CREAM TOPICAL
Qty: 0 | Refills: 0 | COMMUNITY

## 2019-01-01 RX ORDER — SODIUM CHLORIDE 9 MG/ML
250 INJECTION INTRAMUSCULAR; INTRAVENOUS; SUBCUTANEOUS ONCE
Qty: 0 | Refills: 0 | Status: COMPLETED | OUTPATIENT
Start: 2019-01-01 | End: 2019-01-01

## 2019-01-01 RX ORDER — LEVOTHYROXINE SODIUM 125 MCG
25 TABLET ORAL DAILY
Qty: 0 | Refills: 0 | Status: DISCONTINUED | OUTPATIENT
Start: 2019-01-01 | End: 2019-01-01

## 2019-01-01 RX ORDER — MEMANTINE HYDROCHLORIDE 10 MG/1
10 TABLET ORAL
Qty: 0 | Refills: 0 | Status: DISCONTINUED | OUTPATIENT
Start: 2019-01-01 | End: 2019-01-01

## 2019-01-01 RX ORDER — LEVOTHYROXINE SODIUM 25 UG/1
25 TABLET ORAL
Refills: 0 | Status: ACTIVE | COMMUNITY

## 2019-01-01 RX ORDER — PIPERACILLIN AND TAZOBACTAM 4; .5 G/20ML; G/20ML
3.38 INJECTION, POWDER, LYOPHILIZED, FOR SOLUTION INTRAVENOUS EVERY 8 HOURS
Qty: 0 | Refills: 0 | Status: DISCONTINUED | OUTPATIENT
Start: 2019-01-01 | End: 2019-01-01

## 2019-01-01 RX ORDER — MEMANTINE HYDROCHLORIDE 10 MG/1
1 TABLET ORAL
Qty: 0 | Refills: 0 | COMMUNITY

## 2019-01-01 RX ORDER — HEPARIN SODIUM 5000 [USP'U]/ML
5000 INJECTION INTRAVENOUS; SUBCUTANEOUS EVERY 12 HOURS
Qty: 0 | Refills: 0 | Status: DISCONTINUED | OUTPATIENT
Start: 2019-01-01 | End: 2019-01-01

## 2019-01-01 RX ORDER — LIDOCAINE 4 G/100G
1 CREAM TOPICAL DAILY
Qty: 0 | Refills: 0 | Status: DISCONTINUED | OUTPATIENT
Start: 2019-01-01 | End: 2019-01-01

## 2019-01-01 RX ORDER — KETOROLAC TROMETHAMINE 30 MG/ML
30 SYRINGE (ML) INJECTION EVERY 8 HOURS
Qty: 0 | Refills: 0 | Status: DISCONTINUED | OUTPATIENT
Start: 2019-01-01 | End: 2019-01-01

## 2019-01-01 RX ORDER — FAMOTIDINE 10 MG/ML
1 INJECTION INTRAVENOUS
Qty: 0 | Refills: 0 | COMMUNITY
Start: 2019-01-01

## 2019-01-01 RX ORDER — ACETAMINOPHEN 500 MG
650 TABLET ORAL EVERY 6 HOURS
Qty: 0 | Refills: 0 | Status: DISCONTINUED | OUTPATIENT
Start: 2019-01-01 | End: 2019-01-01

## 2019-01-01 RX ORDER — MEMANTINE HYDROCHLORIDE 10 MG/1
21 TABLET ORAL AT BEDTIME
Qty: 0 | Refills: 0 | Status: DISCONTINUED | OUTPATIENT
Start: 2019-01-01 | End: 2019-01-01

## 2019-01-01 RX ORDER — VANCOMYCIN HCL 1 G
750 VIAL (EA) INTRAVENOUS ONCE
Qty: 0 | Refills: 0 | Status: COMPLETED | OUTPATIENT
Start: 2019-01-01 | End: 2019-01-01

## 2019-01-01 RX ORDER — FAMOTIDINE 10 MG/ML
20 INJECTION INTRAVENOUS
Qty: 0 | Refills: 0 | Status: DISCONTINUED | OUTPATIENT
Start: 2019-01-01 | End: 2019-01-01

## 2019-01-01 RX ORDER — PANTOPRAZOLE SODIUM 20 MG/1
40 TABLET, DELAYED RELEASE ORAL
Qty: 0 | Refills: 0 | Status: DISCONTINUED | OUTPATIENT
Start: 2019-01-01 | End: 2019-01-01

## 2019-01-01 RX ORDER — HYDROMORPHONE HYDROCHLORIDE 2 MG/ML
1 INJECTION INTRAMUSCULAR; INTRAVENOUS; SUBCUTANEOUS EVERY 6 HOURS
Qty: 0 | Refills: 0 | Status: DISCONTINUED | OUTPATIENT
Start: 2019-01-01 | End: 2019-01-01

## 2019-01-01 RX ORDER — TIOTROPIUM BROMIDE 18 UG/1
1 CAPSULE ORAL; RESPIRATORY (INHALATION) DAILY
Qty: 0 | Refills: 0 | Status: DISCONTINUED | OUTPATIENT
Start: 2019-01-01 | End: 2019-01-01

## 2019-01-01 RX ORDER — MEMANTINE HYDROCHLORIDE 10 MG/1
21 TABLET ORAL DAILY
Qty: 0 | Refills: 0 | Status: DISCONTINUED | OUTPATIENT
Start: 2019-01-01 | End: 2019-01-01

## 2019-01-01 RX ORDER — HEPARIN SODIUM 5000 [USP'U]/ML
5000 INJECTION INTRAVENOUS; SUBCUTANEOUS
Qty: 0 | Refills: 0 | COMMUNITY
Start: 2019-01-01

## 2019-01-01 RX ORDER — SODIUM CHLORIDE 9 MG/ML
250 INJECTION INTRAMUSCULAR; INTRAVENOUS; SUBCUTANEOUS
Qty: 0 | Refills: 0 | Status: DISCONTINUED | OUTPATIENT
Start: 2019-01-01 | End: 2019-01-01

## 2019-01-01 RX ORDER — DOCUSATE SODIUM 100 MG
100 CAPSULE ORAL THREE TIMES A DAY
Qty: 0 | Refills: 0 | Status: DISCONTINUED | OUTPATIENT
Start: 2019-01-01 | End: 2019-01-01

## 2019-01-01 RX ORDER — LACTOBACILLUS ACIDOPHILUS 100MM CELL
2 CAPSULE ORAL
Qty: 0 | Refills: 0 | COMMUNITY

## 2019-01-01 RX ORDER — HEPARIN SODIUM 5000 [USP'U]/ML
5000 INJECTION INTRAVENOUS; SUBCUTANEOUS EVERY 8 HOURS
Qty: 0 | Refills: 0 | Status: DISCONTINUED | OUTPATIENT
Start: 2019-01-01 | End: 2019-01-01

## 2019-01-01 RX ORDER — IPRATROPIUM/ALBUTEROL SULFATE 18-103MCG
3 AEROSOL WITH ADAPTER (GRAM) INHALATION EVERY 6 HOURS
Qty: 0 | Refills: 0 | Status: DISCONTINUED | OUTPATIENT
Start: 2019-01-01 | End: 2019-01-01

## 2019-01-01 RX ORDER — TAMSULOSIN HYDROCHLORIDE 0.4 MG/1
0.4 CAPSULE ORAL AT BEDTIME
Qty: 0 | Refills: 0 | Status: DISCONTINUED | OUTPATIENT
Start: 2019-01-01 | End: 2019-01-01

## 2019-01-01 RX ORDER — OXYCODONE HYDROCHLORIDE 5 MG/1
5 TABLET ORAL EVERY 6 HOURS
Qty: 0 | Refills: 0 | Status: DISCONTINUED | OUTPATIENT
Start: 2019-01-01 | End: 2019-01-01

## 2019-01-01 RX ORDER — ALPRAZOLAM 0.25 MG
0 TABLET ORAL
Qty: 0 | Refills: 0 | COMMUNITY

## 2019-01-01 RX ORDER — ACETAMINOPHEN 500 MG
1 TABLET ORAL
Qty: 0 | Refills: 0 | COMMUNITY

## 2019-01-01 RX ORDER — ACETAMINOPHEN 500 MG
2 TABLET ORAL
Qty: 0 | Refills: 0 | COMMUNITY

## 2019-01-01 RX ORDER — LACTOBACILLUS ACIDOPHILUS 100MM CELL
1 CAPSULE ORAL
Qty: 0 | Refills: 0 | Status: DISCONTINUED | OUTPATIENT
Start: 2019-01-01 | End: 2019-01-01

## 2019-01-01 RX ORDER — ONDANSETRON 8 MG/1
4 TABLET, FILM COATED ORAL EVERY 6 HOURS
Qty: 0 | Refills: 0 | Status: DISCONTINUED | OUTPATIENT
Start: 2019-01-01 | End: 2019-01-01

## 2019-01-01 RX ORDER — MORPHINE SULFATE 50 MG/1
2.5 CAPSULE, EXTENDED RELEASE ORAL EVERY 6 HOURS
Qty: 0 | Refills: 0 | Status: DISCONTINUED | OUTPATIENT
Start: 2019-01-01 | End: 2019-01-01

## 2019-01-01 RX ORDER — PIPERACILLIN AND TAZOBACTAM 4; .5 G/20ML; G/20ML
3.38 INJECTION, POWDER, LYOPHILIZED, FOR SOLUTION INTRAVENOUS ONCE
Qty: 0 | Refills: 0 | Status: COMPLETED | OUTPATIENT
Start: 2019-01-01 | End: 2019-01-01

## 2019-01-01 RX ORDER — DOCUSATE SODIUM 100 MG/1
100 CAPSULE ORAL TWICE DAILY
Refills: 0 | Status: ACTIVE | COMMUNITY
Start: 2019-01-01

## 2019-01-01 RX ORDER — LIDOCAINE 4 G/100G
1 CREAM TOPICAL
Qty: 0 | Refills: 0 | COMMUNITY
Start: 2019-01-01

## 2019-01-01 RX ORDER — SODIUM CHLORIDE 9 MG/ML
500 INJECTION INTRAMUSCULAR; INTRAVENOUS; SUBCUTANEOUS ONCE
Qty: 0 | Refills: 0 | Status: COMPLETED | OUTPATIENT
Start: 2019-01-01 | End: 2019-01-01

## 2019-01-01 RX ORDER — MORPHINE SULFATE 50 MG/1
2 CAPSULE, EXTENDED RELEASE ORAL ONCE
Qty: 0 | Refills: 0 | Status: COMPLETED | OUTPATIENT
Start: 2019-01-01 | End: 2019-01-01

## 2019-01-01 RX ORDER — MORPHINE SULFATE 50 MG/1
0.13 CAPSULE, EXTENDED RELEASE ORAL
Qty: 30 | Refills: 0 | OUTPATIENT
Start: 2019-01-01 | End: 2019-04-12

## 2019-01-01 RX ORDER — TRAZODONE HCL 50 MG
50 TABLET ORAL AT BEDTIME
Qty: 0 | Refills: 0 | Status: DISCONTINUED | OUTPATIENT
Start: 2019-01-01 | End: 2019-01-01

## 2019-01-01 RX ORDER — MENTHOL AND CAPSAICIN .0375; 5 G/100G; G/100G
0 PATCH TOPICAL
Qty: 0 | Refills: 0 | COMMUNITY

## 2019-01-01 RX ORDER — ALBUTEROL 90 UG/1
3 AEROSOL, METERED ORAL
Qty: 0 | Refills: 0 | COMMUNITY

## 2019-01-01 RX ORDER — HYDROCORTISONE 20 MG
50 TABLET ORAL EVERY 6 HOURS
Qty: 0 | Refills: 0 | Status: DISCONTINUED | OUTPATIENT
Start: 2019-01-01 | End: 2019-01-01

## 2019-01-01 RX ORDER — LIDOCAINE 4 G/100G
3 CREAM TOPICAL DAILY
Qty: 0 | Refills: 0 | Status: DISCONTINUED | OUTPATIENT
Start: 2019-01-01 | End: 2019-01-01

## 2019-01-01 RX ORDER — SODIUM CHLORIDE 9 MG/ML
2000 INJECTION INTRAMUSCULAR; INTRAVENOUS; SUBCUTANEOUS ONCE
Qty: 0 | Refills: 0 | Status: COMPLETED | OUTPATIENT
Start: 2019-01-01 | End: 2019-01-01

## 2019-01-01 RX ORDER — ALBUTEROL 90 UG/1
2.5 AEROSOL, METERED ORAL EVERY 6 HOURS
Qty: 0 | Refills: 0 | Status: DISCONTINUED | OUTPATIENT
Start: 2019-01-01 | End: 2019-01-01

## 2019-01-01 RX ORDER — HYDROXYCHLOROQUINE SULFATE 200 MG
200 TABLET ORAL EVERY 12 HOURS
Qty: 0 | Refills: 0 | Status: DISCONTINUED | OUTPATIENT
Start: 2019-01-01 | End: 2019-01-01

## 2019-01-01 RX ORDER — LEVOTHYROXINE SODIUM 0.1 MG/1
100 TABLET ORAL DAILY
Refills: 0 | Status: DISCONTINUED | COMMUNITY
Start: 2018-01-01 | End: 2019-01-01

## 2019-01-01 RX ORDER — ONDANSETRON 8 MG/1
1 TABLET, FILM COATED ORAL
Qty: 60 | Refills: 0 | OUTPATIENT
Start: 2019-01-01 | End: 2019-01-01

## 2019-01-01 RX ORDER — ACETYLCYSTEINE 200 MG/ML
4 VIAL (ML) MISCELLANEOUS THREE TIMES A DAY
Qty: 0 | Refills: 0 | Status: COMPLETED | OUTPATIENT
Start: 2019-01-01 | End: 2019-01-01

## 2019-01-01 RX ORDER — VANCOMYCIN HCL 1 G
1000 VIAL (EA) INTRAVENOUS ONCE
Qty: 0 | Refills: 0 | Status: COMPLETED | OUTPATIENT
Start: 2019-01-01 | End: 2019-01-01

## 2019-01-01 RX ORDER — ACETAMINOPHEN 500 MG
2 TABLET ORAL
Qty: 0 | Refills: 0 | COMMUNITY
Start: 2019-01-01

## 2019-01-01 RX ORDER — ALBUTEROL 90 UG/1
2.5 AEROSOL, METERED ORAL
Qty: 0 | Refills: 0 | Status: DISCONTINUED | OUTPATIENT
Start: 2019-01-01 | End: 2019-01-01

## 2019-01-01 RX ORDER — ESOMEPRAZOLE MAGNESIUM 40 MG/1
1 CAPSULE, DELAYED RELEASE ORAL
Qty: 0 | Refills: 0 | COMMUNITY

## 2019-01-01 RX ORDER — ASPIRIN AND DIPYRIDAMOLE 25; 200 MG/1; MG/1
1 CAPSULE, EXTENDED RELEASE ORAL
Qty: 0 | Refills: 0 | COMMUNITY

## 2019-01-01 RX ORDER — SIMVASTATIN 20 MG/1
20 TABLET, FILM COATED ORAL AT BEDTIME
Qty: 0 | Refills: 0 | Status: DISCONTINUED | OUTPATIENT
Start: 2019-01-01 | End: 2019-01-01

## 2019-01-01 RX ORDER — MORPHINE SULFATE 50 MG/1
1 CAPSULE, EXTENDED RELEASE ORAL EVERY 4 HOURS
Qty: 0 | Refills: 0 | Status: DISCONTINUED | OUTPATIENT
Start: 2019-01-01 | End: 2019-01-01

## 2019-01-01 RX ORDER — ASPIRIN AND DIPYRIDAMOLE 25; 200 MG/1; MG/1
1 CAPSULE, EXTENDED RELEASE ORAL
Qty: 0 | Refills: 0 | Status: DISCONTINUED | OUTPATIENT
Start: 2019-01-01 | End: 2019-01-01

## 2019-01-01 RX ORDER — LEVOTHYROXINE SODIUM 125 MCG
1 TABLET ORAL
Qty: 0 | Refills: 0 | COMMUNITY

## 2019-01-01 RX ORDER — LACTOBACILLUS ACIDOPHILUS 100MM CELL
1 CAPSULE ORAL DAILY
Qty: 0 | Refills: 0 | Status: DISCONTINUED | OUTPATIENT
Start: 2019-01-01 | End: 2019-01-01

## 2019-01-01 RX ORDER — DOCUSATE SODIUM 100 MG
100 CAPSULE ORAL
Qty: 0 | Refills: 0 | Status: DISCONTINUED | OUTPATIENT
Start: 2019-01-01 | End: 2019-01-01

## 2019-01-01 RX ORDER — CEFUROXIME AXETIL 250 MG
250 TABLET ORAL EVERY 12 HOURS
Qty: 0 | Refills: 0 | Status: DISCONTINUED | OUTPATIENT
Start: 2019-01-01 | End: 2019-01-01

## 2019-01-01 RX ORDER — LANOLIN ALCOHOL/MO/W.PET/CERES
3 CREAM (GRAM) TOPICAL AT BEDTIME
Qty: 0 | Refills: 0 | Status: DISCONTINUED | OUTPATIENT
Start: 2019-01-01 | End: 2019-01-01

## 2019-01-01 RX ORDER — TIOTROPIUM BROMIDE 18 UG/1
1 CAPSULE ORAL; RESPIRATORY (INHALATION)
Qty: 0 | Refills: 0 | COMMUNITY

## 2019-01-01 RX ORDER — ACETAMINOPHEN 500 MG
650 TABLET ORAL ONCE
Qty: 0 | Refills: 0 | Status: COMPLETED | OUTPATIENT
Start: 2019-01-01 | End: 2019-01-01

## 2019-01-01 RX ORDER — SENNA PLUS 8.6 MG/1
2 TABLET ORAL AT BEDTIME
Qty: 0 | Refills: 0 | Status: DISCONTINUED | OUTPATIENT
Start: 2019-01-01 | End: 2019-01-01

## 2019-01-01 RX ORDER — ACETYLCYSTEINE 200 MG/ML
10 VIAL (ML) MISCELLANEOUS THREE TIMES A DAY
Qty: 0 | Refills: 0 | Status: DISCONTINUED | OUTPATIENT
Start: 2019-01-01 | End: 2019-01-01

## 2019-01-01 RX ORDER — SODIUM CHLORIDE 9 MG/ML
1000 INJECTION INTRAMUSCULAR; INTRAVENOUS; SUBCUTANEOUS ONCE
Qty: 0 | Refills: 0 | Status: COMPLETED | OUTPATIENT
Start: 2019-01-01 | End: 2019-01-01

## 2019-01-01 RX ORDER — LANOLIN ALCOHOL/MO/W.PET/CERES
1 CREAM (GRAM) TOPICAL
Qty: 0 | Refills: 0 | COMMUNITY

## 2019-01-01 RX ADMIN — SODIUM CHLORIDE 500 MILLILITER(S): 9 INJECTION INTRAMUSCULAR; INTRAVENOUS; SUBCUTANEOUS at 18:33

## 2019-01-01 RX ADMIN — LIDOCAINE 1 PATCH: 4 CREAM TOPICAL at 10:54

## 2019-01-01 RX ADMIN — Medication 200 MILLIGRAM(S): at 20:48

## 2019-01-01 RX ADMIN — Medication 100 MILLIGRAM(S): at 18:01

## 2019-01-01 RX ADMIN — Medication 100 MILLIGRAM(S): at 06:47

## 2019-01-01 RX ADMIN — Medication 10 MILLIGRAM(S): at 05:40

## 2019-01-01 RX ADMIN — SIMVASTATIN 20 MILLIGRAM(S): 20 TABLET, FILM COATED ORAL at 00:16

## 2019-01-01 RX ADMIN — PIPERACILLIN AND TAZOBACTAM 25 GRAM(S): 4; .5 INJECTION, POWDER, LYOPHILIZED, FOR SOLUTION INTRAVENOUS at 13:31

## 2019-01-01 RX ADMIN — Medication 250 MILLIGRAM(S): at 06:48

## 2019-01-01 RX ADMIN — Medication 650 MILLIGRAM(S): at 15:15

## 2019-01-01 RX ADMIN — HEPARIN SODIUM 5000 UNIT(S): 5000 INJECTION INTRAVENOUS; SUBCUTANEOUS at 14:06

## 2019-01-01 RX ADMIN — ASPIRIN AND DIPYRIDAMOLE 1 CAPSULE(S): 25; 200 CAPSULE, EXTENDED RELEASE ORAL at 05:14

## 2019-01-01 RX ADMIN — MORPHINE SULFATE 1 MILLIGRAM(S): 50 CAPSULE, EXTENDED RELEASE ORAL at 15:55

## 2019-01-01 RX ADMIN — PIPERACILLIN AND TAZOBACTAM 25 GRAM(S): 4; .5 INJECTION, POWDER, LYOPHILIZED, FOR SOLUTION INTRAVENOUS at 15:09

## 2019-01-01 RX ADMIN — Medication 3 MILLILITER(S): at 19:48

## 2019-01-01 RX ADMIN — Medication 1 TABLET(S): at 11:36

## 2019-01-01 RX ADMIN — FAMOTIDINE 20 MILLIGRAM(S): 10 INJECTION INTRAVENOUS at 16:30

## 2019-01-01 RX ADMIN — FAMOTIDINE 20 MILLIGRAM(S): 10 INJECTION INTRAVENOUS at 09:20

## 2019-01-01 RX ADMIN — HEPARIN SODIUM 5000 UNIT(S): 5000 INJECTION INTRAVENOUS; SUBCUTANEOUS at 05:36

## 2019-01-01 RX ADMIN — Medication 650 MILLIGRAM(S): at 07:35

## 2019-01-01 RX ADMIN — Medication 200 MILLIGRAM(S): at 22:50

## 2019-01-01 RX ADMIN — TAMSULOSIN HYDROCHLORIDE 0.4 MILLIGRAM(S): 0.4 CAPSULE ORAL at 00:16

## 2019-01-01 RX ADMIN — Medication 25 MICROGRAM(S): at 11:25

## 2019-01-01 RX ADMIN — LIDOCAINE 1 PATCH: 4 CREAM TOPICAL at 07:30

## 2019-01-01 RX ADMIN — HEPARIN SODIUM 5000 UNIT(S): 5000 INJECTION INTRAVENOUS; SUBCUTANEOUS at 13:15

## 2019-01-01 RX ADMIN — Medication 40 MILLIGRAM(S): at 17:42

## 2019-01-01 RX ADMIN — Medication 650 MILLIGRAM(S): at 13:00

## 2019-01-01 RX ADMIN — PANTOPRAZOLE SODIUM 40 MILLIGRAM(S): 20 TABLET, DELAYED RELEASE ORAL at 06:23

## 2019-01-01 RX ADMIN — Medication 25 MICROGRAM(S): at 06:23

## 2019-01-01 RX ADMIN — LIDOCAINE 1 PATCH: 4 CREAM TOPICAL at 11:33

## 2019-01-01 RX ADMIN — Medication 1 TABLET(S): at 05:50

## 2019-01-01 RX ADMIN — Medication 250 MILLIGRAM(S): at 17:46

## 2019-01-01 RX ADMIN — MORPHINE SULFATE 1 MILLIGRAM(S): 50 CAPSULE, EXTENDED RELEASE ORAL at 14:26

## 2019-01-01 RX ADMIN — PANTOPRAZOLE SODIUM 40 MILLIGRAM(S): 20 TABLET, DELAYED RELEASE ORAL at 16:09

## 2019-01-01 RX ADMIN — Medication 3 MILLILITER(S): at 07:45

## 2019-01-01 RX ADMIN — LIDOCAINE 3 PATCH: 4 CREAM TOPICAL at 11:36

## 2019-01-01 RX ADMIN — Medication 100 MILLIGRAM(S): at 11:23

## 2019-01-01 RX ADMIN — HEPARIN SODIUM 5000 UNIT(S): 5000 INJECTION INTRAVENOUS; SUBCUTANEOUS at 14:26

## 2019-01-01 RX ADMIN — Medication 3 MILLILITER(S): at 21:15

## 2019-01-01 RX ADMIN — ALBUTEROL 2.5 MILLIGRAM(S): 90 AEROSOL, METERED ORAL at 14:11

## 2019-01-01 RX ADMIN — Medication 650 MILLIGRAM(S): at 05:54

## 2019-01-01 RX ADMIN — Medication 100 MILLIGRAM(S): at 16:29

## 2019-01-01 RX ADMIN — TAMSULOSIN HYDROCHLORIDE 0.4 MILLIGRAM(S): 0.4 CAPSULE ORAL at 21:48

## 2019-01-01 RX ADMIN — MEMANTINE HYDROCHLORIDE 21 MILLIGRAM(S): 10 TABLET ORAL at 13:30

## 2019-01-01 RX ADMIN — SODIUM CHLORIDE 50 MILLILITER(S): 9 INJECTION INTRAMUSCULAR; INTRAVENOUS; SUBCUTANEOUS at 13:00

## 2019-01-01 RX ADMIN — PANTOPRAZOLE SODIUM 40 MILLIGRAM(S): 20 TABLET, DELAYED RELEASE ORAL at 06:14

## 2019-01-01 RX ADMIN — Medication 20 MILLIGRAM(S): at 18:32

## 2019-01-01 RX ADMIN — Medication 200 MILLIGRAM(S): at 21:43

## 2019-01-01 RX ADMIN — Medication 1 TABLET(S): at 18:33

## 2019-01-01 RX ADMIN — Medication 100 MILLIGRAM(S): at 19:10

## 2019-01-01 RX ADMIN — ASPIRIN AND DIPYRIDAMOLE 1 CAPSULE(S): 25; 200 CAPSULE, EXTENDED RELEASE ORAL at 09:20

## 2019-01-01 RX ADMIN — TIOTROPIUM BROMIDE 1 CAPSULE(S): 18 CAPSULE ORAL; RESPIRATORY (INHALATION) at 08:31

## 2019-01-01 RX ADMIN — Medication 1 TABLET(S): at 17:38

## 2019-01-01 RX ADMIN — Medication 10 MILLIGRAM(S): at 17:19

## 2019-01-01 RX ADMIN — HEPARIN SODIUM 5000 UNIT(S): 5000 INJECTION INTRAVENOUS; SUBCUTANEOUS at 20:34

## 2019-01-01 RX ADMIN — HEPARIN SODIUM 5000 UNIT(S): 5000 INJECTION INTRAVENOUS; SUBCUTANEOUS at 21:36

## 2019-01-01 RX ADMIN — HEPARIN SODIUM 5000 UNIT(S): 5000 INJECTION INTRAVENOUS; SUBCUTANEOUS at 05:39

## 2019-01-01 RX ADMIN — TIOTROPIUM BROMIDE 1 CAPSULE(S): 18 CAPSULE ORAL; RESPIRATORY (INHALATION) at 08:52

## 2019-01-01 RX ADMIN — Medication 100 MILLIGRAM(S): at 05:40

## 2019-01-01 RX ADMIN — Medication 25 MICROGRAM(S): at 05:27

## 2019-01-01 RX ADMIN — ASPIRIN AND DIPYRIDAMOLE 1 CAPSULE(S): 25; 200 CAPSULE, EXTENDED RELEASE ORAL at 05:27

## 2019-01-01 RX ADMIN — HEPARIN SODIUM 5000 UNIT(S): 5000 INJECTION INTRAVENOUS; SUBCUTANEOUS at 22:01

## 2019-01-01 RX ADMIN — Medication 1 TABLET(S): at 13:14

## 2019-01-01 RX ADMIN — LIDOCAINE 1 PATCH: 4 CREAM TOPICAL at 06:01

## 2019-01-01 RX ADMIN — HEPARIN SODIUM 5000 UNIT(S): 5000 INJECTION INTRAVENOUS; SUBCUTANEOUS at 06:47

## 2019-01-01 RX ADMIN — LIDOCAINE 1 PATCH: 4 CREAM TOPICAL at 12:39

## 2019-01-01 RX ADMIN — Medication 3 MILLILITER(S): at 13:27

## 2019-01-01 RX ADMIN — ASPIRIN AND DIPYRIDAMOLE 1 CAPSULE(S): 25; 200 CAPSULE, EXTENDED RELEASE ORAL at 17:50

## 2019-01-01 RX ADMIN — Medication 3 MILLILITER(S): at 01:53

## 2019-01-01 RX ADMIN — PANTOPRAZOLE SODIUM 40 MILLIGRAM(S): 20 TABLET, DELAYED RELEASE ORAL at 05:26

## 2019-01-01 RX ADMIN — Medication 3 MILLILITER(S): at 20:40

## 2019-01-01 RX ADMIN — Medication 200 MILLIGRAM(S): at 11:52

## 2019-01-01 RX ADMIN — Medication 1 TABLET(S): at 05:27

## 2019-01-01 RX ADMIN — Medication 200 MILLIGRAM(S): at 05:13

## 2019-01-01 RX ADMIN — Medication 1 TABLET(S): at 11:17

## 2019-01-01 RX ADMIN — Medication 1 TABLET(S): at 05:13

## 2019-01-01 RX ADMIN — ASPIRIN AND DIPYRIDAMOLE 1 CAPSULE(S): 25; 200 CAPSULE, EXTENDED RELEASE ORAL at 20:29

## 2019-01-01 RX ADMIN — TIOTROPIUM BROMIDE 1 CAPSULE(S): 18 CAPSULE ORAL; RESPIRATORY (INHALATION) at 07:28

## 2019-01-01 RX ADMIN — Medication 3 MILLILITER(S): at 14:08

## 2019-01-01 RX ADMIN — Medication 3 MILLILITER(S): at 13:49

## 2019-01-01 RX ADMIN — TAMSULOSIN HYDROCHLORIDE 0.4 MILLIGRAM(S): 0.4 CAPSULE ORAL at 22:17

## 2019-01-01 RX ADMIN — Medication 650 MILLIGRAM(S): at 18:02

## 2019-01-01 RX ADMIN — Medication 100 MILLIGRAM(S): at 05:11

## 2019-01-01 RX ADMIN — ALBUTEROL 2.5 MILLIGRAM(S): 90 AEROSOL, METERED ORAL at 02:28

## 2019-01-01 RX ADMIN — FAMOTIDINE 20 MILLIGRAM(S): 10 INJECTION INTRAVENOUS at 20:29

## 2019-01-01 RX ADMIN — Medication 250 MILLIGRAM(S): at 17:48

## 2019-01-01 RX ADMIN — HEPARIN SODIUM 5000 UNIT(S): 5000 INJECTION INTRAVENOUS; SUBCUTANEOUS at 05:45

## 2019-01-01 RX ADMIN — HEPARIN SODIUM 5000 UNIT(S): 5000 INJECTION INTRAVENOUS; SUBCUTANEOUS at 06:14

## 2019-01-01 RX ADMIN — SODIUM CHLORIDE 1000 MILLILITER(S): 9 INJECTION INTRAMUSCULAR; INTRAVENOUS; SUBCUTANEOUS at 14:28

## 2019-01-01 RX ADMIN — LIDOCAINE 1 PATCH: 4 CREAM TOPICAL at 19:48

## 2019-01-01 RX ADMIN — Medication 40 MILLIGRAM(S): at 16:05

## 2019-01-01 RX ADMIN — TIOTROPIUM BROMIDE 1 CAPSULE(S): 18 CAPSULE ORAL; RESPIRATORY (INHALATION) at 08:08

## 2019-01-01 RX ADMIN — Medication 10 MILLIGRAM(S): at 05:54

## 2019-01-01 RX ADMIN — Medication 200 MILLIGRAM(S): at 05:27

## 2019-01-01 RX ADMIN — TAMSULOSIN HYDROCHLORIDE 0.4 MILLIGRAM(S): 0.4 CAPSULE ORAL at 20:33

## 2019-01-01 RX ADMIN — TAMSULOSIN HYDROCHLORIDE 0.4 MILLIGRAM(S): 0.4 CAPSULE ORAL at 21:43

## 2019-01-01 RX ADMIN — Medication 10 MILLIGRAM(S): at 05:14

## 2019-01-01 RX ADMIN — Medication 40 MILLIGRAM(S): at 18:04

## 2019-01-01 RX ADMIN — ASPIRIN AND DIPYRIDAMOLE 1 CAPSULE(S): 25; 200 CAPSULE, EXTENDED RELEASE ORAL at 05:54

## 2019-01-01 RX ADMIN — Medication 100 MILLIGRAM(S): at 16:18

## 2019-01-01 RX ADMIN — TIOTROPIUM BROMIDE 1 CAPSULE(S): 18 CAPSULE ORAL; RESPIRATORY (INHALATION) at 08:13

## 2019-01-01 RX ADMIN — HEPARIN SODIUM 5000 UNIT(S): 5000 INJECTION INTRAVENOUS; SUBCUTANEOUS at 19:10

## 2019-01-01 RX ADMIN — Medication 650 MILLIGRAM(S): at 15:58

## 2019-01-01 RX ADMIN — Medication 1 TABLET(S): at 18:39

## 2019-01-01 RX ADMIN — PANTOPRAZOLE SODIUM 40 MILLIGRAM(S): 20 TABLET, DELAYED RELEASE ORAL at 06:47

## 2019-01-01 RX ADMIN — OXYCODONE HYDROCHLORIDE 5 MILLIGRAM(S): 5 TABLET ORAL at 22:01

## 2019-01-01 RX ADMIN — Medication 650 MILLIGRAM(S): at 12:39

## 2019-01-01 RX ADMIN — Medication 100 MILLIGRAM(S): at 17:46

## 2019-01-01 RX ADMIN — PIPERACILLIN AND TAZOBACTAM 25 GRAM(S): 4; .5 INJECTION, POWDER, LYOPHILIZED, FOR SOLUTION INTRAVENOUS at 05:44

## 2019-01-01 RX ADMIN — ASPIRIN AND DIPYRIDAMOLE 1 CAPSULE(S): 25; 200 CAPSULE, EXTENDED RELEASE ORAL at 17:38

## 2019-01-01 RX ADMIN — TAMSULOSIN HYDROCHLORIDE 0.4 MILLIGRAM(S): 0.4 CAPSULE ORAL at 20:28

## 2019-01-01 RX ADMIN — ALBUTEROL 2.5 MILLIGRAM(S): 90 AEROSOL, METERED ORAL at 08:51

## 2019-01-01 RX ADMIN — Medication 650 MILLIGRAM(S): at 11:25

## 2019-01-01 RX ADMIN — Medication 150 MILLIGRAM(S): at 23:27

## 2019-01-01 RX ADMIN — Medication 3 MILLILITER(S): at 08:10

## 2019-01-01 RX ADMIN — MEMANTINE HYDROCHLORIDE 21 MILLIGRAM(S): 10 TABLET ORAL at 11:34

## 2019-01-01 RX ADMIN — Medication 30 MILLIGRAM(S): at 05:58

## 2019-01-01 RX ADMIN — Medication 200 MILLIGRAM(S): at 17:43

## 2019-01-01 RX ADMIN — PANTOPRAZOLE SODIUM 40 MILLIGRAM(S): 20 TABLET, DELAYED RELEASE ORAL at 05:01

## 2019-01-01 RX ADMIN — Medication 3 MILLILITER(S): at 13:19

## 2019-01-01 RX ADMIN — Medication 1 TABLET(S): at 12:17

## 2019-01-01 RX ADMIN — MEMANTINE HYDROCHLORIDE 21 MILLIGRAM(S): 10 TABLET ORAL at 12:01

## 2019-01-01 RX ADMIN — HEPARIN SODIUM 5000 UNIT(S): 5000 INJECTION INTRAVENOUS; SUBCUTANEOUS at 13:38

## 2019-01-01 RX ADMIN — Medication 25 MICROGRAM(S): at 05:01

## 2019-01-01 RX ADMIN — PIPERACILLIN AND TAZOBACTAM 25 GRAM(S): 4; .5 INJECTION, POWDER, LYOPHILIZED, FOR SOLUTION INTRAVENOUS at 05:00

## 2019-01-01 RX ADMIN — Medication 10 MILLIGRAM(S): at 05:36

## 2019-01-01 RX ADMIN — SODIUM CHLORIDE 250 MILLILITER(S): 9 INJECTION INTRAMUSCULAR; INTRAVENOUS; SUBCUTANEOUS at 12:01

## 2019-01-01 RX ADMIN — FAMOTIDINE 20 MILLIGRAM(S): 10 INJECTION INTRAVENOUS at 05:27

## 2019-01-01 RX ADMIN — Medication 25 MICROGRAM(S): at 06:47

## 2019-01-01 RX ADMIN — SIMVASTATIN 20 MILLIGRAM(S): 20 TABLET, FILM COATED ORAL at 21:25

## 2019-01-01 RX ADMIN — FAMOTIDINE 20 MILLIGRAM(S): 10 INJECTION INTRAVENOUS at 05:14

## 2019-01-01 RX ADMIN — Medication 25 MICROGRAM(S): at 06:14

## 2019-01-01 RX ADMIN — LIDOCAINE 1 PATCH: 4 CREAM TOPICAL at 23:00

## 2019-01-01 RX ADMIN — Medication 3 MILLILITER(S): at 02:55

## 2019-01-01 RX ADMIN — Medication 200 MILLIGRAM(S): at 20:29

## 2019-01-01 RX ADMIN — PANTOPRAZOLE SODIUM 40 MILLIGRAM(S): 20 TABLET, DELAYED RELEASE ORAL at 05:40

## 2019-01-01 RX ADMIN — SIMVASTATIN 20 MILLIGRAM(S): 20 TABLET, FILM COATED ORAL at 22:58

## 2019-01-01 RX ADMIN — ASPIRIN AND DIPYRIDAMOLE 1 CAPSULE(S): 25; 200 CAPSULE, EXTENDED RELEASE ORAL at 17:57

## 2019-01-01 RX ADMIN — Medication 10 MILLIGRAM(S): at 05:10

## 2019-01-01 RX ADMIN — Medication 40 MILLIGRAM(S): at 22:58

## 2019-01-01 RX ADMIN — ALBUTEROL 2.5 MILLIGRAM(S): 90 AEROSOL, METERED ORAL at 20:22

## 2019-01-01 RX ADMIN — TIOTROPIUM BROMIDE 1 CAPSULE(S): 18 CAPSULE ORAL; RESPIRATORY (INHALATION) at 08:22

## 2019-01-01 RX ADMIN — Medication 650 MILLIGRAM(S): at 05:35

## 2019-01-01 RX ADMIN — Medication 200 MILLIGRAM(S): at 21:42

## 2019-01-01 RX ADMIN — OXYCODONE HYDROCHLORIDE 5 MILLIGRAM(S): 5 TABLET ORAL at 13:01

## 2019-01-01 RX ADMIN — TAMSULOSIN HYDROCHLORIDE 0.4 MILLIGRAM(S): 0.4 CAPSULE ORAL at 21:25

## 2019-01-01 RX ADMIN — HEPARIN SODIUM 5000 UNIT(S): 5000 INJECTION INTRAVENOUS; SUBCUTANEOUS at 05:09

## 2019-01-01 RX ADMIN — SIMVASTATIN 20 MILLIGRAM(S): 20 TABLET, FILM COATED ORAL at 21:43

## 2019-01-01 RX ADMIN — HEPARIN SODIUM 5000 UNIT(S): 5000 INJECTION INTRAVENOUS; SUBCUTANEOUS at 05:27

## 2019-01-01 RX ADMIN — PANTOPRAZOLE SODIUM 40 MILLIGRAM(S): 20 TABLET, DELAYED RELEASE ORAL at 11:25

## 2019-01-01 RX ADMIN — MEMANTINE HYDROCHLORIDE 21 MILLIGRAM(S): 10 TABLET ORAL at 11:04

## 2019-01-01 RX ADMIN — SODIUM CHLORIDE 4000 MILLILITER(S): 9 INJECTION INTRAMUSCULAR; INTRAVENOUS; SUBCUTANEOUS at 17:45

## 2019-01-01 RX ADMIN — MEMANTINE HYDROCHLORIDE 21 MILLIGRAM(S): 10 TABLET ORAL at 11:17

## 2019-01-01 RX ADMIN — LIDOCAINE 1 PATCH: 4 CREAM TOPICAL at 22:38

## 2019-01-01 RX ADMIN — ASPIRIN AND DIPYRIDAMOLE 1 CAPSULE(S): 25; 200 CAPSULE, EXTENDED RELEASE ORAL at 21:37

## 2019-01-01 RX ADMIN — ASPIRIN AND DIPYRIDAMOLE 1 CAPSULE(S): 25; 200 CAPSULE, EXTENDED RELEASE ORAL at 19:10

## 2019-01-01 RX ADMIN — ALBUTEROL 2.5 MILLIGRAM(S): 90 AEROSOL, METERED ORAL at 01:38

## 2019-01-01 RX ADMIN — Medication 1 TABLET(S): at 17:57

## 2019-01-01 RX ADMIN — Medication 3 MILLILITER(S): at 10:41

## 2019-01-01 RX ADMIN — HEPARIN SODIUM 5000 UNIT(S): 5000 INJECTION INTRAVENOUS; SUBCUTANEOUS at 21:25

## 2019-01-01 RX ADMIN — HEPARIN SODIUM 5000 UNIT(S): 5000 INJECTION INTRAVENOUS; SUBCUTANEOUS at 13:01

## 2019-01-01 RX ADMIN — Medication 250 MILLIGRAM(S): at 13:14

## 2019-01-01 RX ADMIN — Medication 10 MILLIGRAM(S): at 06:14

## 2019-01-01 RX ADMIN — Medication 200 MILLIGRAM(S): at 12:38

## 2019-01-01 RX ADMIN — Medication 3 MILLILITER(S): at 08:35

## 2019-01-01 RX ADMIN — TIOTROPIUM BROMIDE 1 CAPSULE(S): 18 CAPSULE ORAL; RESPIRATORY (INHALATION) at 17:04

## 2019-01-01 RX ADMIN — Medication 3 MILLILITER(S): at 01:59

## 2019-01-01 RX ADMIN — Medication 650 MILLIGRAM(S): at 13:31

## 2019-01-01 RX ADMIN — Medication 1 TABLET(S): at 17:03

## 2019-01-01 RX ADMIN — Medication 3 MILLILITER(S): at 08:30

## 2019-01-01 RX ADMIN — ASPIRIN AND DIPYRIDAMOLE 1 CAPSULE(S): 25; 200 CAPSULE, EXTENDED RELEASE ORAL at 17:48

## 2019-01-01 RX ADMIN — Medication 100 MILLIGRAM(S): at 17:19

## 2019-01-01 RX ADMIN — SIMVASTATIN 20 MILLIGRAM(S): 20 TABLET, FILM COATED ORAL at 02:20

## 2019-01-01 RX ADMIN — HEPARIN SODIUM 5000 UNIT(S): 5000 INJECTION INTRAVENOUS; SUBCUTANEOUS at 21:42

## 2019-01-01 RX ADMIN — Medication 650 MILLIGRAM(S): at 05:14

## 2019-01-01 RX ADMIN — TAMSULOSIN HYDROCHLORIDE 0.4 MILLIGRAM(S): 0.4 CAPSULE ORAL at 21:36

## 2019-01-01 RX ADMIN — MORPHINE SULFATE 1 MILLIGRAM(S): 50 CAPSULE, EXTENDED RELEASE ORAL at 15:41

## 2019-01-01 RX ADMIN — Medication 3 MILLILITER(S): at 07:53

## 2019-01-01 RX ADMIN — MEMANTINE HYDROCHLORIDE 21 MILLIGRAM(S): 10 TABLET ORAL at 11:06

## 2019-01-01 RX ADMIN — HEPARIN SODIUM 5000 UNIT(S): 5000 INJECTION INTRAVENOUS; SUBCUTANEOUS at 05:12

## 2019-01-01 RX ADMIN — SODIUM CHLORIDE 2000 MILLILITER(S): 9 INJECTION INTRAMUSCULAR; INTRAVENOUS; SUBCUTANEOUS at 13:28

## 2019-01-01 RX ADMIN — ALBUTEROL 2.5 MILLIGRAM(S): 90 AEROSOL, METERED ORAL at 19:49

## 2019-01-01 RX ADMIN — HEPARIN SODIUM 5000 UNIT(S): 5000 INJECTION INTRAVENOUS; SUBCUTANEOUS at 16:20

## 2019-01-01 RX ADMIN — HEPARIN SODIUM 5000 UNIT(S): 5000 INJECTION INTRAVENOUS; SUBCUTANEOUS at 18:39

## 2019-01-01 RX ADMIN — ASPIRIN AND DIPYRIDAMOLE 1 CAPSULE(S): 25; 200 CAPSULE, EXTENDED RELEASE ORAL at 05:50

## 2019-01-01 RX ADMIN — Medication 1 TABLET(S): at 11:54

## 2019-01-01 RX ADMIN — Medication 1 TABLET(S): at 16:30

## 2019-01-01 RX ADMIN — ASPIRIN AND DIPYRIDAMOLE 1 CAPSULE(S): 25; 200 CAPSULE, EXTENDED RELEASE ORAL at 17:19

## 2019-01-01 RX ADMIN — Medication 250 MILLIGRAM(S): at 21:37

## 2019-01-01 RX ADMIN — Medication 650 MILLIGRAM(S): at 15:57

## 2019-01-01 RX ADMIN — Medication 1 TABLET(S): at 14:17

## 2019-01-01 RX ADMIN — Medication 250 MILLIGRAM(S): at 05:10

## 2019-01-01 RX ADMIN — TIOTROPIUM BROMIDE 1 CAPSULE(S): 18 CAPSULE ORAL; RESPIRATORY (INHALATION) at 08:16

## 2019-01-01 RX ADMIN — Medication 25 MICROGRAM(S): at 05:35

## 2019-01-01 RX ADMIN — ASPIRIN AND DIPYRIDAMOLE 1 CAPSULE(S): 25; 200 CAPSULE, EXTENDED RELEASE ORAL at 05:01

## 2019-01-01 RX ADMIN — TAMSULOSIN HYDROCHLORIDE 0.4 MILLIGRAM(S): 0.4 CAPSULE ORAL at 21:58

## 2019-01-01 RX ADMIN — Medication 25 MICROGRAM(S): at 05:13

## 2019-01-01 RX ADMIN — ASPIRIN AND DIPYRIDAMOLE 1 CAPSULE(S): 25; 200 CAPSULE, EXTENDED RELEASE ORAL at 17:46

## 2019-01-01 RX ADMIN — Medication 1 TABLET(S): at 19:10

## 2019-01-01 RX ADMIN — HEPARIN SODIUM 5000 UNIT(S): 5000 INJECTION INTRAVENOUS; SUBCUTANEOUS at 15:10

## 2019-01-01 RX ADMIN — FAMOTIDINE 20 MILLIGRAM(S): 10 INJECTION INTRAVENOUS at 11:24

## 2019-01-01 RX ADMIN — Medication 200 MILLIGRAM(S): at 20:33

## 2019-01-01 RX ADMIN — Medication 650 MILLIGRAM(S): at 08:00

## 2019-01-01 RX ADMIN — MORPHINE SULFATE 1 MILLIGRAM(S): 50 CAPSULE, EXTENDED RELEASE ORAL at 16:00

## 2019-01-01 RX ADMIN — FAMOTIDINE 20 MILLIGRAM(S): 10 INJECTION INTRAVENOUS at 19:10

## 2019-01-01 RX ADMIN — Medication 650 MILLIGRAM(S): at 10:53

## 2019-01-01 RX ADMIN — Medication 200 MILLIGRAM(S): at 17:38

## 2019-01-01 RX ADMIN — Medication 20 MILLIGRAM(S): at 17:41

## 2019-01-01 RX ADMIN — ASPIRIN AND DIPYRIDAMOLE 1 CAPSULE(S): 25; 200 CAPSULE, EXTENDED RELEASE ORAL at 06:14

## 2019-01-01 RX ADMIN — LIDOCAINE 1 PATCH: 4 CREAM TOPICAL at 06:59

## 2019-01-01 RX ADMIN — LIDOCAINE 1 PATCH: 4 CREAM TOPICAL at 00:30

## 2019-01-01 RX ADMIN — ASPIRIN AND DIPYRIDAMOLE 1 CAPSULE(S): 25; 200 CAPSULE, EXTENDED RELEASE ORAL at 18:40

## 2019-01-01 RX ADMIN — TAMSULOSIN HYDROCHLORIDE 0.4 MILLIGRAM(S): 0.4 CAPSULE ORAL at 21:28

## 2019-01-01 RX ADMIN — Medication 200 MILLIGRAM(S): at 09:57

## 2019-01-01 RX ADMIN — Medication 4 MILLILITER(S): at 20:34

## 2019-01-01 RX ADMIN — Medication 1 TABLET(S): at 11:20

## 2019-01-01 RX ADMIN — Medication 10 MILLIGRAM(S): at 16:11

## 2019-01-01 RX ADMIN — Medication 50 MILLIGRAM(S): at 21:41

## 2019-01-01 RX ADMIN — MORPHINE SULFATE 1 MILLIGRAM(S): 50 CAPSULE, EXTENDED RELEASE ORAL at 14:41

## 2019-01-01 RX ADMIN — Medication 20 MILLIGRAM(S): at 05:50

## 2019-01-01 RX ADMIN — HEPARIN SODIUM 5000 UNIT(S): 5000 INJECTION INTRAVENOUS; SUBCUTANEOUS at 21:21

## 2019-01-01 RX ADMIN — PIPERACILLIN AND TAZOBACTAM 25 GRAM(S): 4; .5 INJECTION, POWDER, LYOPHILIZED, FOR SOLUTION INTRAVENOUS at 06:12

## 2019-01-01 RX ADMIN — Medication 10 MILLIGRAM(S): at 06:42

## 2019-01-01 RX ADMIN — Medication 25 MICROGRAM(S): at 05:58

## 2019-01-01 RX ADMIN — LIDOCAINE 1 PATCH: 4 CREAM TOPICAL at 11:00

## 2019-01-01 RX ADMIN — LIDOCAINE 3 PATCH: 4 CREAM TOPICAL at 18:41

## 2019-01-01 RX ADMIN — Medication 200 MILLIGRAM(S): at 17:03

## 2019-01-01 RX ADMIN — Medication 650 MILLIGRAM(S): at 17:57

## 2019-01-01 RX ADMIN — Medication 3 MILLILITER(S): at 14:46

## 2019-01-01 RX ADMIN — FAMOTIDINE 20 MILLIGRAM(S): 10 INJECTION INTRAVENOUS at 18:40

## 2019-01-01 RX ADMIN — HEPARIN SODIUM 5000 UNIT(S): 5000 INJECTION INTRAVENOUS; SUBCUTANEOUS at 18:32

## 2019-01-01 RX ADMIN — ASPIRIN AND DIPYRIDAMOLE 1 CAPSULE(S): 25; 200 CAPSULE, EXTENDED RELEASE ORAL at 06:22

## 2019-01-01 RX ADMIN — Medication 200 MILLIGRAM(S): at 11:24

## 2019-01-01 RX ADMIN — ASPIRIN AND DIPYRIDAMOLE 1 CAPSULE(S): 25; 200 CAPSULE, EXTENDED RELEASE ORAL at 05:44

## 2019-01-01 RX ADMIN — Medication 200 MILLIGRAM(S): at 21:41

## 2019-01-01 RX ADMIN — Medication 25 MICROGRAM(S): at 05:14

## 2019-01-01 RX ADMIN — Medication 1 TABLET(S): at 08:19

## 2019-01-01 RX ADMIN — Medication 200 MILLIGRAM(S): at 11:17

## 2019-01-01 RX ADMIN — Medication 1 TABLET(S): at 11:52

## 2019-01-01 RX ADMIN — HEPARIN SODIUM 5000 UNIT(S): 5000 INJECTION INTRAVENOUS; SUBCUTANEOUS at 05:00

## 2019-01-01 RX ADMIN — ALBUTEROL 2.5 MILLIGRAM(S): 90 AEROSOL, METERED ORAL at 09:02

## 2019-01-01 RX ADMIN — HEPARIN SODIUM 5000 UNIT(S): 5000 INJECTION INTRAVENOUS; SUBCUTANEOUS at 06:23

## 2019-01-01 RX ADMIN — ASPIRIN AND DIPYRIDAMOLE 1 CAPSULE(S): 25; 200 CAPSULE, EXTENDED RELEASE ORAL at 06:48

## 2019-01-01 RX ADMIN — Medication 650 MILLIGRAM(S): at 14:25

## 2019-01-01 RX ADMIN — Medication 250 MILLIGRAM(S): at 16:35

## 2019-01-01 RX ADMIN — PIPERACILLIN AND TAZOBACTAM 200 GRAM(S): 4; .5 INJECTION, POWDER, LYOPHILIZED, FOR SOLUTION INTRAVENOUS at 18:58

## 2019-01-01 RX ADMIN — HEPARIN SODIUM 5000 UNIT(S): 5000 INJECTION INTRAVENOUS; SUBCUTANEOUS at 05:54

## 2019-01-01 RX ADMIN — Medication 25 MICROGRAM(S): at 05:50

## 2019-01-01 RX ADMIN — Medication 200 MILLIGRAM(S): at 09:20

## 2019-01-01 RX ADMIN — Medication 200 MILLIGRAM(S): at 21:28

## 2019-01-01 RX ADMIN — Medication 3 MILLILITER(S): at 20:34

## 2019-01-01 RX ADMIN — TAMSULOSIN HYDROCHLORIDE 0.4 MILLIGRAM(S): 0.4 CAPSULE ORAL at 21:21

## 2019-01-01 RX ADMIN — FAMOTIDINE 20 MILLIGRAM(S): 10 INJECTION INTRAVENOUS at 05:54

## 2019-01-01 RX ADMIN — PIPERACILLIN AND TAZOBACTAM 3.38 GRAM(S): 4; .5 INJECTION, POWDER, LYOPHILIZED, FOR SOLUTION INTRAVENOUS at 16:35

## 2019-01-01 RX ADMIN — Medication 3 MILLILITER(S): at 08:59

## 2019-01-01 RX ADMIN — HEPARIN SODIUM 5000 UNIT(S): 5000 INJECTION INTRAVENOUS; SUBCUTANEOUS at 05:58

## 2019-01-01 RX ADMIN — ASPIRIN AND DIPYRIDAMOLE 1 CAPSULE(S): 25; 200 CAPSULE, EXTENDED RELEASE ORAL at 17:03

## 2019-01-01 RX ADMIN — Medication 100 MILLIGRAM(S): at 05:54

## 2019-01-01 RX ADMIN — ASPIRIN AND DIPYRIDAMOLE 1 CAPSULE(S): 25; 200 CAPSULE, EXTENDED RELEASE ORAL at 17:43

## 2019-01-01 RX ADMIN — TAMSULOSIN HYDROCHLORIDE 0.4 MILLIGRAM(S): 0.4 CAPSULE ORAL at 22:03

## 2019-01-01 RX ADMIN — Medication 200 MILLIGRAM(S): at 05:45

## 2019-01-01 RX ADMIN — Medication 200 MILLIGRAM(S): at 11:35

## 2019-01-01 RX ADMIN — Medication 1 TABLET(S): at 12:41

## 2019-01-01 RX ADMIN — PANTOPRAZOLE SODIUM 40 MILLIGRAM(S): 20 TABLET, DELAYED RELEASE ORAL at 06:13

## 2019-01-01 RX ADMIN — ALBUTEROL 2.5 MILLIGRAM(S): 90 AEROSOL, METERED ORAL at 20:12

## 2019-01-01 RX ADMIN — Medication 1 TABLET(S): at 12:38

## 2019-01-01 RX ADMIN — Medication 3 MILLILITER(S): at 21:03

## 2019-01-01 RX ADMIN — Medication 200 MILLIGRAM(S): at 21:48

## 2019-01-01 RX ADMIN — HEPARIN SODIUM 5000 UNIT(S): 5000 INJECTION INTRAVENOUS; SUBCUTANEOUS at 14:17

## 2019-01-01 RX ADMIN — Medication 10 MILLIGRAM(S): at 09:20

## 2019-01-01 RX ADMIN — ALBUTEROL 2.5 MILLIGRAM(S): 90 AEROSOL, METERED ORAL at 14:01

## 2019-01-01 RX ADMIN — ALBUTEROL 2.5 MILLIGRAM(S): 90 AEROSOL, METERED ORAL at 20:29

## 2019-01-01 RX ADMIN — Medication 250 MILLIGRAM(S): at 17:19

## 2019-01-01 RX ADMIN — HEPARIN SODIUM 5000 UNIT(S): 5000 INJECTION INTRAVENOUS; SUBCUTANEOUS at 05:14

## 2019-01-01 RX ADMIN — HEPARIN SODIUM 5000 UNIT(S): 5000 INJECTION INTRAVENOUS; SUBCUTANEOUS at 13:34

## 2019-01-01 RX ADMIN — Medication 200 MILLIGRAM(S): at 09:09

## 2019-01-01 RX ADMIN — Medication 100 MILLIGRAM(S): at 06:14

## 2019-01-01 RX ADMIN — Medication 1 TABLET(S): at 11:53

## 2019-01-01 RX ADMIN — MEMANTINE HYDROCHLORIDE 21 MILLIGRAM(S): 10 TABLET ORAL at 11:44

## 2019-01-01 RX ADMIN — SIMVASTATIN 20 MILLIGRAM(S): 20 TABLET, FILM COATED ORAL at 22:03

## 2019-01-01 RX ADMIN — HEPARIN SODIUM 5000 UNIT(S): 5000 INJECTION INTRAVENOUS; SUBCUTANEOUS at 22:16

## 2019-01-01 RX ADMIN — Medication 650 MILLIGRAM(S): at 12:19

## 2019-01-01 RX ADMIN — MEMANTINE HYDROCHLORIDE 21 MILLIGRAM(S): 10 TABLET ORAL at 12:17

## 2019-01-01 RX ADMIN — LIDOCAINE 1 PATCH: 4 CREAM TOPICAL at 21:20

## 2019-01-01 RX ADMIN — TAMSULOSIN HYDROCHLORIDE 0.4 MILLIGRAM(S): 0.4 CAPSULE ORAL at 20:48

## 2019-01-01 RX ADMIN — Medication 200 MILLIGRAM(S): at 13:15

## 2019-01-01 RX ADMIN — Medication 40 MILLIGRAM(S): at 17:03

## 2019-01-01 RX ADMIN — ASPIRIN AND DIPYRIDAMOLE 1 CAPSULE(S): 25; 200 CAPSULE, EXTENDED RELEASE ORAL at 18:33

## 2019-01-01 RX ADMIN — LIDOCAINE 3 PATCH: 4 CREAM TOPICAL at 12:39

## 2019-01-01 RX ADMIN — LIDOCAINE 1 PATCH: 4 CREAM TOPICAL at 17:56

## 2019-01-01 RX ADMIN — ASPIRIN AND DIPYRIDAMOLE 1 CAPSULE(S): 25; 200 CAPSULE, EXTENDED RELEASE ORAL at 22:16

## 2019-01-01 RX ADMIN — FAMOTIDINE 20 MILLIGRAM(S): 10 INJECTION INTRAVENOUS at 18:32

## 2019-01-01 RX ADMIN — Medication 200 MILLIGRAM(S): at 22:17

## 2019-01-01 RX ADMIN — ALBUTEROL 2.5 MILLIGRAM(S): 90 AEROSOL, METERED ORAL at 13:44

## 2019-01-01 RX ADMIN — Medication 40 MILLIGRAM(S): at 05:12

## 2019-01-01 RX ADMIN — Medication 250 MILLIGRAM(S): at 06:14

## 2019-01-01 RX ADMIN — SODIUM CHLORIDE 250 MILLILITER(S): 9 INJECTION INTRAMUSCULAR; INTRAVENOUS; SUBCUTANEOUS at 14:31

## 2019-01-01 RX ADMIN — HEPARIN SODIUM 5000 UNIT(S): 5000 INJECTION INTRAVENOUS; SUBCUTANEOUS at 22:03

## 2019-01-01 RX ADMIN — TAMSULOSIN HYDROCHLORIDE 0.4 MILLIGRAM(S): 0.4 CAPSULE ORAL at 22:50

## 2019-01-01 RX ADMIN — HEPARIN SODIUM 5000 UNIT(S): 5000 INJECTION INTRAVENOUS; SUBCUTANEOUS at 22:58

## 2019-01-01 RX ADMIN — Medication 650 MILLIGRAM(S): at 16:30

## 2019-01-01 RX ADMIN — Medication 4 MILLILITER(S): at 14:32

## 2019-01-01 RX ADMIN — ALBUTEROL 2.5 MILLIGRAM(S): 90 AEROSOL, METERED ORAL at 07:55

## 2019-01-01 RX ADMIN — LIDOCAINE 1 PATCH: 4 CREAM TOPICAL at 21:25

## 2019-01-01 RX ADMIN — MEMANTINE HYDROCHLORIDE 21 MILLIGRAM(S): 10 TABLET ORAL at 16:10

## 2019-01-01 RX ADMIN — Medication 40 MILLIGRAM(S): at 05:44

## 2019-01-01 RX ADMIN — Medication 3 MILLILITER(S): at 01:55

## 2019-01-01 RX ADMIN — Medication 3 MILLILITER(S): at 13:31

## 2019-01-01 RX ADMIN — Medication 3 MILLILITER(S): at 21:24

## 2019-01-01 RX ADMIN — ASPIRIN AND DIPYRIDAMOLE 1 CAPSULE(S): 25; 200 CAPSULE, EXTENDED RELEASE ORAL at 05:36

## 2019-01-01 RX ADMIN — HEPARIN SODIUM 5000 UNIT(S): 5000 INJECTION INTRAVENOUS; SUBCUTANEOUS at 13:31

## 2019-01-01 RX ADMIN — TAMSULOSIN HYDROCHLORIDE 0.4 MILLIGRAM(S): 0.4 CAPSULE ORAL at 02:19

## 2019-01-01 RX ADMIN — PIPERACILLIN AND TAZOBACTAM 25 GRAM(S): 4; .5 INJECTION, POWDER, LYOPHILIZED, FOR SOLUTION INTRAVENOUS at 22:03

## 2019-01-01 RX ADMIN — HEPARIN SODIUM 5000 UNIT(S): 5000 INJECTION INTRAVENOUS; SUBCUTANEOUS at 21:28

## 2019-01-01 RX ADMIN — LIDOCAINE 1 PATCH: 4 CREAM TOPICAL at 11:06

## 2019-01-01 RX ADMIN — Medication 100 MILLIGRAM(S): at 18:38

## 2019-01-01 RX ADMIN — Medication 3 MILLIGRAM(S): at 23:44

## 2019-01-01 RX ADMIN — HEPARIN SODIUM 5000 UNIT(S): 5000 INJECTION INTRAVENOUS; SUBCUTANEOUS at 21:48

## 2019-01-01 RX ADMIN — Medication 25 MICROGRAM(S): at 05:45

## 2019-01-01 RX ADMIN — SIMVASTATIN 20 MILLIGRAM(S): 20 TABLET, FILM COATED ORAL at 20:29

## 2019-01-01 RX ADMIN — ASPIRIN AND DIPYRIDAMOLE 1 CAPSULE(S): 25; 200 CAPSULE, EXTENDED RELEASE ORAL at 16:17

## 2019-01-01 RX ADMIN — ASPIRIN AND DIPYRIDAMOLE 1 CAPSULE(S): 25; 200 CAPSULE, EXTENDED RELEASE ORAL at 18:38

## 2019-01-01 RX ADMIN — Medication 1 TABLET(S): at 10:55

## 2019-01-01 RX ADMIN — ALBUTEROL 2.5 MILLIGRAM(S): 90 AEROSOL, METERED ORAL at 08:20

## 2019-01-01 RX ADMIN — Medication 30 MILLIGRAM(S): at 06:33

## 2019-01-01 RX ADMIN — HEPARIN SODIUM 5000 UNIT(S): 5000 INJECTION INTRAVENOUS; SUBCUTANEOUS at 05:28

## 2019-01-01 RX ADMIN — ASPIRIN AND DIPYRIDAMOLE 1 CAPSULE(S): 25; 200 CAPSULE, EXTENDED RELEASE ORAL at 06:13

## 2019-01-01 RX ADMIN — Medication 200 MILLIGRAM(S): at 05:50

## 2019-01-01 RX ADMIN — SIMVASTATIN 20 MILLIGRAM(S): 20 TABLET, FILM COATED ORAL at 21:21

## 2019-01-01 RX ADMIN — Medication 250 MILLIGRAM(S): at 05:01

## 2019-01-01 RX ADMIN — SIMVASTATIN 20 MILLIGRAM(S): 20 TABLET, FILM COATED ORAL at 22:50

## 2019-01-01 RX ADMIN — Medication 200 MILLIGRAM(S): at 21:58

## 2019-01-01 RX ADMIN — Medication 3 MILLILITER(S): at 14:32

## 2019-01-01 RX ADMIN — TAMSULOSIN HYDROCHLORIDE 0.4 MILLIGRAM(S): 0.4 CAPSULE ORAL at 21:42

## 2019-01-01 RX ADMIN — Medication 250 MILLIGRAM(S): at 16:11

## 2019-01-01 RX ADMIN — SODIUM CHLORIDE 1000 MILLILITER(S): 9 INJECTION INTRAMUSCULAR; INTRAVENOUS; SUBCUTANEOUS at 15:21

## 2019-01-01 RX ADMIN — HEPARIN SODIUM 5000 UNIT(S): 5000 INJECTION INTRAVENOUS; SUBCUTANEOUS at 17:58

## 2019-01-01 RX ADMIN — ALBUTEROL 2.5 MILLIGRAM(S): 90 AEROSOL, METERED ORAL at 01:28

## 2019-01-01 RX ADMIN — PANTOPRAZOLE SODIUM 40 MILLIGRAM(S): 20 TABLET, DELAYED RELEASE ORAL at 05:45

## 2019-01-01 RX ADMIN — Medication 250 MILLIGRAM(S): at 17:37

## 2019-01-01 RX ADMIN — MEMANTINE HYDROCHLORIDE 21 MILLIGRAM(S): 10 TABLET ORAL at 14:17

## 2019-01-01 RX ADMIN — Medication 100 MILLIGRAM(S): at 05:14

## 2019-01-01 RX ADMIN — OXYCODONE HYDROCHLORIDE 5 MILLIGRAM(S): 5 TABLET ORAL at 23:31

## 2019-01-01 RX ADMIN — Medication 250 MILLIGRAM(S): at 18:38

## 2019-01-01 RX ADMIN — PIPERACILLIN AND TAZOBACTAM 25 GRAM(S): 4; .5 INJECTION, POWDER, LYOPHILIZED, FOR SOLUTION INTRAVENOUS at 22:58

## 2019-01-01 RX ADMIN — FAMOTIDINE 20 MILLIGRAM(S): 10 INJECTION INTRAVENOUS at 05:36

## 2019-01-01 RX ADMIN — Medication 100 MILLIGRAM(S): at 17:48

## 2019-01-01 RX ADMIN — Medication 3 MILLILITER(S): at 20:54

## 2019-01-01 RX ADMIN — HEPARIN SODIUM 5000 UNIT(S): 5000 INJECTION INTRAVENOUS; SUBCUTANEOUS at 14:32

## 2019-01-01 RX ADMIN — Medication 650 MILLIGRAM(S): at 00:16

## 2019-01-01 RX ADMIN — Medication 650 MILLIGRAM(S): at 06:05

## 2019-01-01 RX ADMIN — PANTOPRAZOLE SODIUM 40 MILLIGRAM(S): 20 TABLET, DELAYED RELEASE ORAL at 05:13

## 2019-01-01 RX ADMIN — PIPERACILLIN AND TAZOBACTAM 200 GRAM(S): 4; .5 INJECTION, POWDER, LYOPHILIZED, FOR SOLUTION INTRAVENOUS at 16:05

## 2019-01-01 RX ADMIN — Medication 50 MILLIGRAM(S): at 05:00

## 2019-01-01 RX ADMIN — LIDOCAINE 1 PATCH: 4 CREAM TOPICAL at 11:52

## 2019-01-01 RX ADMIN — ASPIRIN AND DIPYRIDAMOLE 1 CAPSULE(S): 25; 200 CAPSULE, EXTENDED RELEASE ORAL at 11:20

## 2019-01-01 RX ADMIN — Medication 25 MICROGRAM(S): at 05:10

## 2019-01-01 RX ADMIN — ASPIRIN AND DIPYRIDAMOLE 1 CAPSULE(S): 25; 200 CAPSULE, EXTENDED RELEASE ORAL at 05:13

## 2019-01-01 RX ADMIN — Medication 200 MILLIGRAM(S): at 21:36

## 2019-01-01 RX ADMIN — Medication 250 MILLIGRAM(S): at 05:39

## 2019-01-01 RX ADMIN — ALBUTEROL 2.5 MILLIGRAM(S): 90 AEROSOL, METERED ORAL at 21:21

## 2019-01-01 RX ADMIN — SIMVASTATIN 20 MILLIGRAM(S): 20 TABLET, FILM COATED ORAL at 20:48

## 2019-01-01 RX ADMIN — Medication 25 MICROGRAM(S): at 05:54

## 2019-01-01 RX ADMIN — FAMOTIDINE 20 MILLIGRAM(S): 10 INJECTION INTRAVENOUS at 17:57

## 2019-01-01 RX ADMIN — ALBUTEROL 2.5 MILLIGRAM(S): 90 AEROSOL, METERED ORAL at 08:14

## 2019-01-01 RX ADMIN — Medication 650 MILLIGRAM(S): at 18:41

## 2019-01-01 RX ADMIN — PANTOPRAZOLE SODIUM 40 MILLIGRAM(S): 20 TABLET, DELAYED RELEASE ORAL at 05:51

## 2019-01-01 RX ADMIN — Medication 3 MILLILITER(S): at 20:57

## 2019-01-01 RX ADMIN — Medication 200 MILLIGRAM(S): at 08:36

## 2019-01-01 RX ADMIN — Medication 200 MILLIGRAM(S): at 06:13

## 2019-01-01 RX ADMIN — ASPIRIN AND DIPYRIDAMOLE 1 CAPSULE(S): 25; 200 CAPSULE, EXTENDED RELEASE ORAL at 16:31

## 2019-01-01 RX ADMIN — Medication 100 MILLIGRAM(S): at 05:01

## 2019-01-01 RX ADMIN — Medication 650 MILLIGRAM(S): at 11:36

## 2019-01-01 RX ADMIN — Medication 10 MILLIGRAM(S): at 06:22

## 2019-01-01 RX ADMIN — Medication 1 TABLET(S): at 12:01

## 2019-01-01 RX ADMIN — Medication 25 MICROGRAM(S): at 06:13

## 2019-01-01 RX ADMIN — Medication 3 MILLILITER(S): at 14:28

## 2019-01-01 RX ADMIN — HEPARIN SODIUM 5000 UNIT(S): 5000 INJECTION INTRAVENOUS; SUBCUTANEOUS at 14:09

## 2019-01-01 RX ADMIN — ALBUTEROL 2.5 MILLIGRAM(S): 90 AEROSOL, METERED ORAL at 07:28

## 2019-01-01 RX ADMIN — ASPIRIN AND DIPYRIDAMOLE 1 CAPSULE(S): 25; 200 CAPSULE, EXTENDED RELEASE ORAL at 05:10

## 2019-01-01 RX ADMIN — Medication 50 MILLIGRAM(S): at 11:53

## 2019-01-01 RX ADMIN — ALBUTEROL 2.5 MILLIGRAM(S): 90 AEROSOL, METERED ORAL at 13:38

## 2019-01-01 RX ADMIN — Medication 100 MILLIGRAM(S): at 18:33

## 2019-01-01 RX ADMIN — Medication 200 MILLIGRAM(S): at 10:53

## 2019-01-01 RX ADMIN — Medication 200 MILLIGRAM(S): at 18:59

## 2019-01-01 RX ADMIN — Medication 3 MILLILITER(S): at 20:31

## 2019-01-01 RX ADMIN — LIDOCAINE 1 PATCH: 4 CREAM TOPICAL at 21:40

## 2019-01-01 RX ADMIN — Medication 250 MILLIGRAM(S): at 21:48

## 2019-01-01 RX ADMIN — Medication 3 MILLILITER(S): at 09:39

## 2019-01-01 RX ADMIN — Medication 650 MILLIGRAM(S): at 21:26

## 2019-01-01 RX ADMIN — Medication 650 MILLIGRAM(S): at 18:39

## 2019-01-01 RX ADMIN — Medication 1 TABLET(S): at 09:20

## 2019-01-01 RX ADMIN — Medication 1 TABLET(S): at 12:40

## 2019-01-01 RX ADMIN — LIDOCAINE 1 PATCH: 4 CREAM TOPICAL at 09:27

## 2019-01-01 RX ADMIN — Medication 100 MILLIGRAM(S): at 17:37

## 2019-01-01 RX ADMIN — Medication 200 MILLIGRAM(S): at 00:31

## 2019-01-01 RX ADMIN — Medication 100 MILLIGRAM(S): at 06:22

## 2019-01-01 RX ADMIN — Medication 100 MILLIGRAM(S): at 05:28

## 2019-01-01 RX ADMIN — Medication 25 MICROGRAM(S): at 05:39

## 2019-01-01 RX ADMIN — HEPARIN SODIUM 5000 UNIT(S): 5000 INJECTION INTRAVENOUS; SUBCUTANEOUS at 05:50

## 2019-01-01 RX ADMIN — MEMANTINE HYDROCHLORIDE 21 MILLIGRAM(S): 10 TABLET ORAL at 20:29

## 2019-01-01 RX ADMIN — LIDOCAINE 1 PATCH: 4 CREAM TOPICAL at 20:36

## 2019-01-01 RX ADMIN — HEPARIN SODIUM 5000 UNIT(S): 5000 INJECTION INTRAVENOUS; SUBCUTANEOUS at 05:01

## 2019-01-01 RX ADMIN — Medication 100 MILLIGRAM(S): at 05:36

## 2019-01-01 RX ADMIN — TAMSULOSIN HYDROCHLORIDE 0.4 MILLIGRAM(S): 0.4 CAPSULE ORAL at 22:58

## 2019-01-01 RX ADMIN — ASPIRIN AND DIPYRIDAMOLE 1 CAPSULE(S): 25; 200 CAPSULE, EXTENDED RELEASE ORAL at 05:40

## 2019-01-01 RX ADMIN — Medication 40 MILLIGRAM(S): at 06:14

## 2019-01-08 PROBLEM — Z85.3 HISTORY OF TRIPLE NEGATIVE MALIGNANT NEOPLASM OF BREAST: Status: RESOLVED | Noted: 2018-01-11 | Resolved: 2019-01-01

## 2019-01-08 NOTE — PAST MEDICAL HISTORY
[Postmenopausal] : The patient is postmenopausal [Total Preg ___] : G[unfilled] [Live Births ___] : P[unfilled]  [Age At Live Birth ___] : Age at live birth: [unfilled]

## 2019-01-08 NOTE — PHYSICAL EXAM
[Sclera nonicteric] : sclera nonicteric [Conjunctiva pink] : conjunctiva pink [Supple] : supple [No Supraclavicular Adenopathy] : no supraclavicular adenopathy [No Cervical Adenopathy] : no cervical adenopathy [No Thyromegaly] : no thyromegaly [Normal Sinus Rhythm] : normal sinus rhythm [Examined in the supine and seated position] : examined in the supine and seated position [Asymmetrical] : asymmetrical [No dominant masses] : no dominant masses left breast [No Nipple Retraction] : no left nipple retraction [No Nipple Discharge] : no left nipple discharge [No Axillary Lymphadenopathy] : no left axillary lymphadenopathy [Normal Bowel Sounds] : normal bowel sounds  [No Hepato-Splenomegaly] : no hepato-splenomegaly [No Edema] : no edema [No Rashes] : no rashes [No Ulceration] : no ulceration [Clear to Auscultation Bilat] : clear to auscultation bilaterally [No Gallops] : no gallops [No Rubs] : no pericardial rub [de-identified] : Large, fixed palpable mass at 10 N4 . Overlying skin is very thin and erythematous.     [de-identified] : Matted, fixed nodes.

## 2019-01-08 NOTE — HISTORY OF PRESENT ILLNESS
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  The patient had 1 cycle of CMF chemotherapy with Dr. Galindo and had to be hospitalized for C difficile colitis and altered mental status.  \par The daughter recently noticed that the right breast looked  "red and brought her mother in for an evaluation.

## 2019-01-08 NOTE — DATA REVIEWED
[FreeTextEntry1] : Breast ultrasound:  11/20/17   Findings:  3.3 x 2.8 x 2.9 cm mass  with irregular margins of the right breast at 10:00.  Left breast - negative.

## 2019-01-08 NOTE — CONSULT LETTER
[Dear  ___] : Dear ~BHUPINDER, [Courtesy Letter:] : I had the pleasure of seeing your patient, [unfilled], in my office today. [Please see my note below.] : Please see my note below. [Sincerely,] : Sincerely, [FreeTextEntry2] : Su Otero MD [FreeTextEntry3] : Migdalia Umana MD FACS\par  [DrSanthosh  ___] : Dr. OROZCO [DrSanthosh ___] : Dr. OROZCO

## 2019-01-14 NOTE — ED ADULT NURSE REASSESSMENT NOTE - NS ED NURSE REASSESS COMMENT FT1
Patient resting comfortably, no distress noted. Daughter and patient updated on plan of care. VS stable as charted. Report given to 5E. Awaiting transport.

## 2019-01-14 NOTE — H&P ADULT - NSHPSOCIALHISTORY_GEN_ALL_CORE
Former smoker; quit >20 years ago  No hx of EtOH or illicit drug use/abuse  Lives at [facility]

## 2019-01-14 NOTE — H&P ADULT - HISTORY OF PRESENT ILLNESS
88 y/o F resident of [facility] with PMHx significant for COPD not on home O2, CVA (2005), Rheumatoid arthritis (chronically on hydroxychloroquine and prednisone), chronic back pain, PMR (Polymyalgia rheumatica), right breast Ca, glaucoma OU s/p ocular surgery, hypertension, hyperlipidemia, and Alzheimer's dementia presents to the ED for further evaluation of a 2 week hx of a progressive productive cough, decreased PO intake, and increased lethargy/malaise. The patient was treated initially with an outpatient regimen with Ceftin PO, followed by Doxycycline PO. The patient also reportedly c/o lower back pain which the patient's daughter states is likely chronic. The patient's daughter noted that this morning (day of admission) the patient was increasingly lethargic prompting her visit to  today (1/14). At baseline the patient is responsive to verbal stimuli. Labs => WBC 13.48, LA 2.3 -> 1.2, RVP (-), CT Chest => 1) Mild patchy airspace disease in the lingula and right middle lobe compatible with pneumonia in the proper clinical setting. 2) Posterior left lower lobe opacity most likely atelectasis. 3) Large right breast mass compatible with history of breast cancer. 4) Right axillary shanon mass, increased in size. CT Abdomen/Pelvis => Severe L2 compression deformity, age indeterminate but new since September 30, 2018. In the ED the patient was given Piperacillin/tazobactam 3.375g IVPB x 1, Vancomycin 1g IVPB x 1, Methylprednisolone 40mg IVP x 1, and NS x 1L.

## 2019-01-14 NOTE — H&P ADULT - NSHPOUTPATIENTPROVIDERS_GEN_ALL_CORE
PCP; Dr. Flavio Otero  Pulmonology; Dr. Guardado  Cardiology; Dr. FALGUNI Vo  Neurology; Dr. Vo  Rheumatology; Dr. Gale

## 2019-01-14 NOTE — H&P ADULT - PSH
Gall stones    History of hip surgery    History of hysterectomy  for bleeding  Hx of cholecystectomy Gall stones    Glaucoma of both eyes  s/p repair  History of hip surgery    History of hysterectomy  for bleeding  Hx of cholecystectomy

## 2019-01-14 NOTE — ED PROVIDER NOTE - SKIN, MLM
Skin normal color for race, warm, dry and intact. 3-4cm right lateral breast mass with surround skin thickening, no broken skin or discharge,

## 2019-01-14 NOTE — H&P ADULT - ASSESSMENT
88 y/o F resident of [facility] with PMHx significant for COPD not on home O2, CVA (2005), Rheumatoid arthritis (chronically on hydroxychloroquine and prednisone), chronic back pain, PMR (Polymyalgia rheumatica), right breast Ca, glaucoma OU s/p ocular surgery, hypertension, hyperlipidemia, and Alzheimer's dementia presents to the ED for further evaluation of a 2 week hx of a progressive productive cough, decreased PO intake, and increased lethargy/malaise. The patient was treated initially with an outpatient regimen with Ceftin PO, followed by Doxycycline PO. The patient also reportedly c/o lower back pain which the patient's daughter states is likely chronic. The patient's daughter noted that this morning (day of admission) the patient was increasingly lethargic prompting her visit to  today (1/14). At baseline the patient is responsive to verbal stimuli. Labs => WBC 13.48, LA 2.3 -> 1.2, RVP (-), CT Chest => 1) Mild patchy airspace disease in the lingula and right middle lobe compatible with pneumonia in the proper clinical setting. 2) Posterior left lower lobe opacity most likely atelectasis. 3) Large right breast mass compatible with history of breast cancer. 4) Right axillary shanon mass, increased in size. CT Abdomen/Pelvis => Severe L2 compression deformity, age indeterminate but new since September 30, 2018. In the ED the patient was given Piperacillin/tazobactam 3.375g IVPB x 1, Vancomycin 1g IVPB x 1, Methylprednisolone 40mg IVP x 1, and NS x 1L.

## 2019-01-14 NOTE — H&P ADULT - NSHPPHYSICALEXAM_GEN_ALL_CORE
Vital Signs Last 24 Hrs  T(C): 36.5 (14 Jan 2019 16:11), Max: 37 (14 Jan 2019 12:15)  T(F): 97.7 (14 Jan 2019 16:11), Max: 98.6 (14 Jan 2019 12:15)  HR: 90 (14 Jan 2019 16:11) (90 - 97)  BP: 115/66 (14 Jan 2019 16:11) (115/66 - 151/71)  RR: 18 (14 Jan 2019 16:11) (18 - 18)  SpO2: 100% (14 Jan 2019 16:11) (100% - 100%)

## 2019-01-14 NOTE — ED ADULT NURSE NOTE - CHIEF COMPLAINT QUOTE
pt BIBEMS from [facility] for worsening lethargy and decreased PO intake since this morning as per staff. pt responsive to verbal stimuli, c/o back pain/breast pain. pt DNR, MOLST form in chart.

## 2019-01-14 NOTE — ED PROVIDER NOTE - MEDICAL DECISION MAKING DETAILS
transient AMS in the setting of recent cough/URI treated w/ antibiotics, now with low back pain that could be related to UTI or retention. Afebrile and hemodynamically stable, no overt evidence of systemic symptoms but given recent antibiotic use and infectious symptosm will check cxr, ua, rvp and cultures. Also consider possible seizure, will get CTH to eval for new metastases.

## 2019-01-14 NOTE — ED PROVIDER NOTE - OBJECTIVE STATEMENT
89yof w/ HTN, RA on chronic steroids, COPD, right breast mass not treated, has been coughing for the past 2 weeks with yellow sputum, treated initially with ceftin, followed by doxycycline. Yesterday started to complain of mid lower back pain which her daughter states is the common presentation for UTIs. This morning pt was more lethargic than her baseline and had a transient episode of decreased mental status and staring off that last about 20-30 minutes, resolved on EMS arrival after sternal rub. Back to baseline now. Pt denies any complaints. Pt w/ early dementia, independent in most ADLs, makes needs known, history obtained from daughter at bedside.

## 2019-01-14 NOTE — ED PROVIDER NOTE - PROGRESS NOTE DETAILS
Dragan PERLA for ED attending, Dr. Brenner: 88 y/o pt presents to ED with daughter for evaluation of cough. Treated with Ceftin, changed to Doxycycline. Episode of less responsiveness today. On exam, pt warm to touch. Dry mucous membranes. Mild TTP of right lower chest, CVA area. Agree with PGY3 assessment and plan. Ashia: CT w/ right middle lobe and left lingula infiltrates. Will broaden to HCAP coverage, admit to hospital.

## 2019-01-14 NOTE — H&P ADULT - NSHPSOURCEINFOTX_GEN_ALL_CORE
Diana Presley (Daughter/[facility]) (h)[phone] (c)[phone]

## 2019-01-14 NOTE — ED ADULT NURSE NOTE - NSIMPLEMENTINTERV_GEN_ALL_ED
Implemented All Fall with Harm Risk Interventions:  [place] to call system. Call bell, personal items and telephone within reach. Instruct patient to call for assistance. Room bathroom lighting operational. Non-slip footwear when patient is off stretcher. Physically safe environment: no spills, clutter or unnecessary equipment. Stretcher in lowest position, wheels locked, appropriate side rails in place. Provide visual cue, wrist band, yellow gown, etc. Monitor gait and stability. Monitor for mental status changes and reorient to person, place, and time. Review medications for side effects contributing to fall risk. Reinforce activity limits and safety measures with patient and family. Provide visual clues: red socks.

## 2019-01-14 NOTE — H&P ADULT - NSHPSOURCEINFORD_GEN_ALL_CORE
Chart(s)/Patient Physician/Provider/Other Healthcare Facility/Chart(s) Physician/Provider/Other Healthcare Facility/Chart(s)/Child

## 2019-01-14 NOTE — H&P ADULT - PMH
Alzheimers disease    Asthma    Carotid artery stenosis    Cerebral vascular accident  2005  Cholelithiases    Chronic pain  mid back  Compression fracture of spine  T7  COPD (chronic obstructive pulmonary disease)    GERD (gastroesophageal reflux disease)    Glaucoma    HTN (hypertension)    Hyperlipemia    Lung nodule  biopsied 2003, benign  Osteoporosis    Polymyalgia rheumatica    TIA (transient ischemic attack)  8/07  Urinary retention Alzheimers disease    Asthma    Breast cancer    Carotid artery stenosis    Cerebral vascular accident  2005  Cholelithiases    Chronic pain  mid back  Compression fracture of spine  T7  COPD (chronic obstructive pulmonary disease)    GERD (gastroesophageal reflux disease)    Glaucoma    HTN (hypertension)    Hyperlipemia    Lung nodule  biopsied 2003, benign  Osteoporosis    Polymyalgia rheumatica    TIA (transient ischemic attack)  8/07  Urinary retention Alzheimers disease    Asthma    Breast cancer  Right  Carotid artery stenosis    Cerebral vascular accident  2005  Cholelithiases    Chronic pain  mid back  Compression fracture of spine  T7  COPD (chronic obstructive pulmonary disease)    GERD (gastroesophageal reflux disease)    Glaucoma    HTN (hypertension)    Hyperlipemia    Hypothyroidism    Lung nodule  biopsied 2003, benign  Osteoporosis    Polymyalgia rheumatica    TIA (transient ischemic attack)  8/07  Urinary retention

## 2019-01-14 NOTE — ED PROVIDER NOTE - ATTENDING CONTRIBUTION TO CARE
I, Lyle Brenner MD, personally saw the patient with the resident, and completed the key components of the history and physical exam. I then discussed the management plan with the resident.

## 2019-01-14 NOTE — H&P ADULT - FAMILY HISTORY
No pertinent family history in first degree relatives Mother  Still living? Unknown  Family history of COPD (chronic obstructive pulmonary disease), Age at diagnosis: Age Unknown     Child  Still living? Unknown  Family history of diabetes mellitus (DM), Age at diagnosis: Age Unknown

## 2019-01-14 NOTE — ED ADULT TRIAGE NOTE - CHIEF COMPLAINT QUOTE
pt BIBEMS from [facility] for worsening lethargy and decreased PO intake since this morning as per staff. pt responsive to verbal stimuli, c/o back pain/breast pain. pt DNR, MOLST form in chart.

## 2019-01-14 NOTE — H&P ADULT - PROBLEM SELECTOR PLAN 7
~I discussed the patient's known/documented DNR/DNI status per MOLST form from Izabela medeiros in the presence of the patient and patient's daughter => Diana Presley who is the HCP. No change to the patient's current order at this time. MOLST form updated and placed in the chart.  Total time; 16 minutes

## 2019-01-14 NOTE — H&P ADULT - PROBLEM SELECTOR PLAN 1
~admit to Medicine  ~f/u PAN C+S  ~RVP (-)  ~cont. IV abx  ~f/u w/ ID consultation in the am  ~f/u w/ Pulmonology  ~cont. nebs prn  ~cont. supplemental O2 prn ~admit to Medicine  ~Pneumonia in the Immunocompromised Host => will cont. IV steroids as pt. on chronic PO Prednisone  ~f/u PAN C+S  ~RVP (-)  ~cont. IV abx  ~f/u w/ ID consultation in the am  ~f/u w/ Pulmonology  ~cont. nebs prn  ~cont. supplemental O2 prn

## 2019-01-15 PROBLEM — C50.919 MALIGNANT NEOPLASM OF UNSPECIFIED SITE OF UNSPECIFIED FEMALE BREAST: Chronic | Status: ACTIVE | Noted: 2019-01-01

## 2019-01-15 NOTE — CONSULT NOTE ADULT - ASSESSMENT
Patient is seen and full consult dictated     - mild patchy air space disease with difficulty to exclude superimposed infection with underlying chronic scarring and patient is on chronic steroids therapy for the R.A   - new compression fracture with the back pain   - increasing breast mass with axillary shanon mass   - copd with symptoms of exacerbation     PLAN     - decrease the solumedrol to twice daily and continue with the iv antibiotics today and transition to oral at discharge   - continue with the spiriva and albuterol   - check the sat with the ambulation   - work up for the breast mass and compression fracture as per the medicine   - dvt prophylaxis with the underlying cancer   - patient mental status seems to improved as per the daughter

## 2019-01-15 NOTE — CONSULT NOTE ADULT - ASSESSMENT
89 y/o F resident of [facility] with PMHx significant for COPD not on home O2, CVA (2005), Rheumatoid arthritis (chronically on hydroxychloroquine and prednisone), chronic back pain, PMR (Polymyalgia rheumatica), right breast Ca, glaucoma OU s/p ocular surgery, hypertension, hyperlipidemia, and Alzheimer's dementia presents to the ED for further evaluation of a 2 week hx of a progressive productive cough, decreased PO intake, and increased lethargy/malaise.  Labs => WBC 13.48, LA 2.3 -> 1.2, RVP (-), imaging showed probable PNA, LLL atelectasis,  Large right breast mass compatible wit 4) Right axillary shanon mass increased in size, Severe L2 compression deformity She was given IV abx/steroids in ER.     1. R multifocal pna/COPD/R breast ca/immuncompromised host  - agree with zosyn [id number] hold further vancomycin   - f/u cultures  - pulm eval noted  - monitor temps  - tolerating abx well so far; no side effects noted  - reason for abx use and side effects reviewed with patient  - imaging reviewed  - supportive care  - fu cbc    2. other issues - hx R breast ca - R axillary LN - oncology f/u?, L2 compression fx - care per medicine

## 2019-01-15 NOTE — CONSULT NOTE ADULT - ASSESSMENT
89 y/o F resident of [facility] with PMHx significant for COPD not on home O2, CVA (2005), Rheumatoid arthritis (chronically on hydroxychloroquine and prednisone), chronic back pain, PMR (Polymyalgia rheumatica), right breast Ca, glaucoma OU s/p ocular surgery, hypertension, hyperlipidemia, and Alzheimer's dementia presents to the ED for further evaluation of a 2 week hx of a progressive productive cough, decreased PO intake, and increased lethargy/malaise. The patient was treated initially with an outpatient regimen with Ceftin PO, followed by Doxycycline PO. The patient also reportedly c/o lower back pain which the patient's daughter states is likely chronic. The patient's daughter noted that this morning (day of admission) the patient was increasingly lethargic prompting her visit to  today (1/14). At baseline the patient is responsive to verbal stimuli. Labs => WBC 13.48, LA 2.3 -> 1.2, RVP (-), CT Chest => 1) Mild patchy airspace disease in the lingula and right middle lobe compatible with pneumonia in the proper clinical setting. 2) Posterior left lower lobe opacity most likely atelectasis. 3) Large right breast mass compatible with history of breast cancer. 4) Right axillary shanon mass, increased in size. CT Abdomen/Pelvis => Severe L2 compression deformity, age indeterminate but new since September 30, 2018. In the ED the patient was given Piperacillin/tazobactam 3.375g IVPB x 1, Vancomycin 1g IVPB x 1, Methylprednisolone 40mg IVP x 1, and NS x 1L. (14 Jan 2019 17:25)    Called in consult for complaints of low back pain. No hx trauma. Steroid & O2 dependent due to COPD. Noted to have severe compression deformity L2. Patient has hx previous compression fracture T4 requiring kyphoplasty as well as thoracic compression frx T6,7 and 9 treated nonoperatively.    IMP;  New compression deformity L2  Chronic VCF thoracic spine  ? R 12th rib fracture or lesion?  O2/steroid dependent COPD  Improved mental status    PLAN  Admitted to Medicine for treatment  Analgesia prn for pain  Add Lidoderm patch  Will review study with Radiology to see if further imaging is required.  I 88 y/o F resident of [facility] with PMHx significant for COPD not on home O2, CVA (2005), Rheumatoid arthritis (chronically on hydroxychloroquine and prednisone), chronic back pain, PMR (Polymyalgia rheumatica), right breast Ca, glaucoma OU s/p ocular surgery, hypertension, hyperlipidemia, and Alzheimer's dementia presents to the ED for further evaluation of a 2 week hx of a progressive productive cough, decreased PO intake, and increased lethargy/malaise. The patient was treated initially with an outpatient regimen with Ceftin PO, followed by Doxycycline PO. The patient also reportedly c/o lower back pain which the patient's daughter states is likely chronic. The patient's daughter noted that this morning (day of admission) the patient was increasingly lethargic prompting her visit to  today (1/14). At baseline the patient is responsive to verbal stimuli. Labs => WBC 13.48, LA 2.3 -> 1.2, RVP (-), CT Chest => 1) Mild patchy airspace disease in the lingula and right middle lobe compatible with pneumonia in the proper clinical setting. 2) Posterior left lower lobe opacity most likely atelectasis. 3) Large right breast mass compatible with history of breast cancer. 4) Right axillary shanon mass, increased in size. CT Abdomen/Pelvis => Severe L2 compression deformity, age indeterminate but new since September 30, 2018. In the ED the patient was given Piperacillin/tazobactam 3.375g IVPB x 1, Vancomycin 1g IVPB x 1, Methylprednisolone 40mg IVP x 1, and NS x 1L. (14 Jan 2019 17:25)    Called in consult for complaints of low back pain. No hx trauma. Steroid & O2 dependent due to COPD. Noted to have severe compression deformity L2. Patient has hx previous compression fracture T4 requiring kyphoplasty as well as thoracic compression frx T6,7 and 9 treated nonoperatively.    IMP;  New compression deformity L2  Chronic VCF thoracic spine  ? R 12th rib fracture or lesion?  O2/steroid dependent COPD  R breast mass/Cancer  Improved mental status    PLAN  Admitted to Medicine for treatment-will discuss with Hospitalist further care.  Analgesia prn for pain  Add Lidoderm patch  Will review study with Radiology to see if further imaging is required.  I

## 2019-01-15 NOTE — CONSULT NOTE ADULT - SUBJECTIVE AND OBJECTIVE BOX
HPI:  89 y/o F resident of [facility] with PMHx significant for COPD not on home O2, CVA (2005), Rheumatoid arthritis (chronically on hydroxychloroquine and prednisone), chronic back pain, PMR (Polymyalgia rheumatica), right breast Ca, glaucoma OU s/p ocular surgery, hypertension, hyperlipidemia, and Alzheimer's dementia presents to the ED for further evaluation of a 2 week hx of a progressive productive cough, decreased PO intake, and increased lethargy/malaise. The patient was treated initially with an outpatient regimen with Ceftin PO, followed by Doxycycline PO. The patient also reportedly c/o lower back pain which the patient's daughter states is likely chronic. The patient's daughter noted that this morning (day of admission) the patient was increasingly lethargic prompting her visit to  today (1/14). At baseline the patient is responsive to verbal stimuli. Labs => WBC 13.48, LA 2.3 -> 1.2, RVP (-), CT Chest => 1) Mild patchy airspace disease in the lingula and right middle lobe compatible with pneumonia in the proper clinical setting. 2) Posterior left lower lobe opacity most likely atelectasis. 3) Large right breast mass compatible with history of breast cancer. 4) Right axillary shanon mass, increased in size. CT Abdomen/Pelvis => Severe L2 compression deformity, age indeterminate but new since September 30, 2018. In the ED the patient was given Piperacillin/tazobactam 3.375g IVPB x 1, Vancomycin 1g IVPB x 1, Methylprednisolone 40mg IVP x 1, and NS x 1L. (14 Jan 2019 17:25)    Called in consult for complaints of low back pain. No hx trauma. Steroid & O2 dependent due to COPD. Noted to have severe compression deformity L2. Patient has hx previous compression fracture T4 requiring kyphoplasty as well as thoracic compression frx T6,7 and 9 treated nonoperatively.    PAST MEDICAL & SURGICAL HISTORY:  Hypothyroidism  Breast cancer: Right  Compression fracture of spine: T7  Alzheimers disease  HTN (hypertension)  Glaucoma  TIA (transient ischemic attack): 8/07  Cerebral vascular accident: 2005  Polymyalgia rheumatica  Osteoporosis  Chronic pain: mid back  Urinary retention  GERD (gastroesophageal reflux disease)  Cholelithiases  Hyperlipemia  Carotid artery stenosis  Lung nodule: biopsied 2003, benign  COPD (chronic obstructive pulmonary disease)  Asthma  Glaucoma of both eyes: s/p repair  History of hip surgery  Gall stones  History of hysterectomy: for bleeding  Hx of cholecystectomy    Allergies    Aciphex (Unknown)  Macrobid (Unknown)  Percocet 10/325 (Rash)    Intolerances    SH: retired, lives in assisted living facility, no EtOH or tobacco  FH: not pertinent in primary relatives  ROS c/w with multiple medical problems and back pain    MT:    Patient sitting comfortably in bedside chair, C/O R T-L spine and chest wall pain    Vital Signs Last 24 Hrs  T(C): 37 (15 Ubaldo 2019 10:43), Max: 37.2 (15 Ubaldo 2019 04:53)  T(F): 98.6 (15 Ubaldo 2019 10:43), Max: 98.9 (15 Ubaldo 2019 04:53)  HR: 102 (15 Ubaldo 2019 10:43) (74 - 102)  BP: 102/80 (15 Ubaldo 2019 10:43) (102/80 - 157/78)  BP(mean): --  RR: 19 (15 Ubaldo 2019 10:43) (17 - 19)  SpO2: 95% (15 Ubaldo 2019 10:43) (91% - 100%)    Heent unremarkable  neck supple without complaints of pain, no tenderness, no bruits, thyroid nonpalpable  heart-RRR  Lungs with coarse BS  Abdomen soft, non tender, benign  extremities with multiple areas of ecchymosis on LES, minimal edema, no joint tenderness.  T-L spine-increased pain with change in position in chair, maximal tender over R costal margin with minimal midline tenderness T-L junction, negative SLR bilaterally, no focal motor deficits LEs.      LABS                       12.2   9.36  )-----------( 252      ( 15 Ubaldo 2019 08:25 )             39.6     01-15    140  |  107  |  21  ----------------------------<  152<H>  4.3   |  23  |  1.14    Ca    9.2      15 Ubaldo 2019 08:25  Mg     2.0     01-15    TPro  6.8  /  Alb  3.3  /  TBili  0.6  /  DBili  x   /  AST  21  /  ALT  32  /  AlkPhos  62  01-15    STUDIES:  CT C/A/P with new compression deformity L2 with approx 70% collapse, chronic compression fractures T4/6/7/9, ?R 12 rib fracture/lesion?, spinal stenosis L2/3-L4/5 with mild stenosis L5/S1 with calcified central disc. HPI:  88 y/o F resident of [facility] with PMHx significant for COPD not on home O2, CVA (2005), Rheumatoid arthritis (chronically on hydroxychloroquine and prednisone), chronic back pain, PMR (Polymyalgia rheumatica), right breast Ca, glaucoma OU s/p ocular surgery, hypertension, hyperlipidemia, and Alzheimer's dementia presents to the ED for further evaluation of a 2 week hx of a progressive productive cough, decreased PO intake, and increased lethargy/malaise. The patient was treated initially with an outpatient regimen with Ceftin PO, followed by Doxycycline PO. The patient also reportedly c/o lower back pain which the patient's daughter states is likely chronic. The patient's daughter noted that this morning (day of admission) the patient was increasingly lethargic prompting her visit to  today (1/14). At baseline the patient is responsive to verbal stimuli. Labs => WBC 13.48, LA 2.3 -> 1.2, RVP (-), CT Chest => 1) Mild patchy airspace disease in the lingula and right middle lobe compatible with pneumonia in the proper clinical setting. 2) Posterior left lower lobe opacity most likely atelectasis. 3) Large right breast mass compatible with history of breast cancer. 4) Right axillary shanon mass, increased in size. CT Abdomen/Pelvis => Severe L2 compression deformity, age indeterminate but new since September 30, 2018. In the ED the patient was given Piperacillin/tazobactam 3.375g IVPB x 1, Vancomycin 1g IVPB x 1, Methylprednisolone 40mg IVP x 1, and NS x 1L. (14 Jan 2019 17:25)    Called in consult for complaints of low back pain. No hx trauma. Steroid & O2 dependent due to COPD. Noted to have severe compression deformity L2. Patient has hx previous compression fracture T4 requiring kyphoplasty as well as thoracic compression frx T6,7 and 9 treated nonoperatively.    PAST MEDICAL & SURGICAL HISTORY:  Hypothyroidism  Breast cancer: Right  Compression fracture of spine: T7  Alzheimers disease  HTN (hypertension)  Glaucoma  TIA (transient ischemic attack): 8/07  Cerebral vascular accident: 2005  Polymyalgia rheumatica  Osteoporosis  Chronic pain: mid back  Urinary retention  GERD (gastroesophageal reflux disease)  Cholelithiases  Hyperlipemia  Carotid artery stenosis  Lung nodule: biopsied 2003, benign  COPD (chronic obstructive pulmonary disease)  Asthma  Glaucoma of both eyes: s/p repair  History of hip surgery  Gall stones  History of hysterectomy: for bleeding  Hx of cholecystectomy    Allergies    Aciphex (Unknown)  Macrobid (Unknown)  Percocet 10/325 (Rash)    Intolerances    SH: retired, lives in assisted living facility, no EtOH or tobacco  FH: not pertinent in primary relatives  ROS c/w with multiple medical problems and back pain    MT:    Patient sitting comfortably in bedside chair, C/O R T-L spine and chest wall pain    Vital Signs Last 24 Hrs  T(C): 37 (15 Ubaldo 2019 10:43), Max: 37.2 (15 Ubaldo 2019 04:53)  T(F): 98.6 (15 Ubaldo 2019 10:43), Max: 98.9 (15 Ubaldo 2019 04:53)  HR: 102 (15 Ubaldo 2019 10:43) (74 - 102)  BP: 102/80 (15 Ubaldo 2019 10:43) (102/80 - 157/78)  BP(mean): --  RR: 19 (15 Ubaldo 2019 10:43) (17 - 19)  SpO2: 95% (15 Ubaldo 2019 10:43) (91% - 100%)    Heent unremarkable  neck supple without complaints of pain, no tenderness, no bruits, thyroid nonpalpable  heart-RRR  Lungs with coarse BS  Abdomen soft, non tender, benign  extremities with multiple areas of ecchymosis on LES, minimal edema, no joint tenderness.  T-L spine-increased pain with change in position in chair, maximal tender over R costal margin with minimal midline tenderness T-L junction, negative SLR bilaterally, no focal motor deficits LEs.      LABS                       12.2   9.36  )-----------( 252      ( 15 Ubaldo 2019 08:25 )             39.6     01-15    140  |  107  |  21  ----------------------------<  152<H>  4.3   |  23  |  1.14    Ca    9.2      15 Ubaldo 2019 08:25  Mg     2.0     01-15    TPro  6.8  /  Alb  3.3  /  TBili  0.6  /  DBili  x   /  AST  21  /  ALT  32  /  AlkPhos  62  01-15    STUDIES:  CT C/A/P with new compression deformity L2 with approx 70% collapse, chronic compression fractures T4/6/7/9, ?R 12 rib fracture/lesion?, spinal stenosis L2/3-L4/5 with mild stenosis L5/S1 with calcified central disc. Additionally patient has large R breast mass. No evidence of spinal metastases

## 2019-01-15 NOTE — CONSULT NOTE ADULT - SUBJECTIVE AND OBJECTIVE BOX
Patient is a 89y old  Female who presents with a chief complaint of AMS with cough (15 Ubaldo 2019 08:41)    HPI:  88 y/o F resident of [facility] with PMHx significant for COPD not on home O2, CVA (), Rheumatoid arthritis (chronically on hydroxychloroquine and prednisone), chronic back pain, PMR (Polymyalgia rheumatica), right breast Ca, glaucoma OU s/p ocular surgery, hypertension, hyperlipidemia, and Alzheimer's dementia presents to the ED for further evaluation of a 2 week hx of a progressive productive cough, decreased PO intake, and increased lethargy/malaise. The patient was treated initially Ceftin PO, followed by Doxycycline PO. The patient also reportedly c/o lower back pain which the patient's daughter states is likely chronic. The patient's daughter noted that this morning (day of admission) the patient was increasingly lethargic prompting her visit to  (). At baseline the patient is responsive to verbal stimuli. Labs => WBC 13.48, LA 2.3 -> 1.2, RVP (-), CT Chest => 1) Mild patchy airspace disease in the lingula and right middle lobe compatible with pneumonia in the proper clinical setting. 2) Posterior left lower lobe opacity most likely atelectasis. 3) Large right breast mass compatible with history of breast cancer. 4) Right axillary shanon mass, increased in size. CT Abdomen/Pelvis => Severe L2 compression deformity, age indeterminate but new since 2018. In the ED the patient was given Piperacillin/tazobactam 3.375g IVPB x 1, Vancomycin 1g IVPB x 1, Methylprednisolone 40mg IVP x 1, and NS x 1L.       PMH: as above  PSH: as above  Meds: per reconciliation sheet, noted below  MEDICATIONS  (STANDING):  ALBUTerol    0.083% 2.5 milliGRAM(s) Nebulizer every 6 hours  dipyridamole 200 mG/aspirin 25 mG 1 Capsule(s) Oral two times a day  heparin  Injectable 5000 Unit(s) SubCutaneous every 8 hours  hydroxychloroquine 200 milliGRAM(s) Oral every 12 hours  levothyroxine 25 MICROGram(s) Oral daily  memantine ER 21 milliGRAM(s) Oral daily  methylPREDNISolone sodium succinate Injectable 40 milliGRAM(s) IV Push every 12 hours  pantoprazole    Tablet 40 milliGRAM(s) Oral before breakfast  piperacillin/tazobactam IVPB. 3.375 Gram(s) IV Intermittent every 8 hours  simvastatin 20 milliGRAM(s) Oral at bedtime  tamsulosin 0.4 milliGRAM(s) Oral at bedtime  tiotropium 18 MICROgram(s) Capsule 1 Capsule(s) Inhalation daily      Allergies    Aciphex (Unknown)  Macrobid (Unknown)  Percocet 10/325 (Rash)    Intolerances      Social: no smoking, no alcohol, no illegal drugs; no recent travel, no exposure to TB  FAMILY HISTORY:  Family history of diabetes mellitus (DM) (Child)  Family history of COPD (chronic obstructive pulmonary disease) (Mother)     no history of premature cardiovascular disease in first degree relatives    ROS: the patient denies fever, no chills, no HA, no dizziness, no sore throat, no blurry vision, no CP, no palpitations,  no abdominal pain, no diarrhea, no N/V, no dysuria, no leg pain, no claudication, no rash, no joint aches, no rectal pain or bleeding, no night sweats  All other systems reviewed and are negative    Vital Signs Last 24 Hrs  T(C): 37 (15 Ubaldo 2019 10:43), Max: 37.2 (15 Ubaldo 2019 04:53)  T(F): 98.6 (15 Ubaldo 2019 10:43), Max: 98.9 (15 Ubaldo 2019 04:53)  HR: 102 (15 Ubaldo 2019 10:43) (74 - 102)  BP: 102/80 (15 Ubaldo 2019 10:43) (102/80 - 157/78)  BP(mean): --  RR: 19 (15 Ubaldo 2019 10:43) (17 - 19)  SpO2: 95% (15 Ubaldo 2019 10:43) (91% - 100%)  Daily     Daily     PE:  Constitutional: frail looking  HEENT: NC/AT, EOMI, PERRLA, conjunctivae clear; ears and nose atraumatic; pharynx benign  Neck: supple; thyroid not palpable  Back: no tenderness  Respiratory: decreased breath sounds  Cardiovascular: S1S2 regular, no murmurs  Abdomen: soft, not tender, not distended, positive BS; liver and spleen WNL  Genitourinary: no suprapubic tenderness  Lymphatic: no LN palpable  Musculoskeletal: no muscle tenderness, no joint swelling or tenderness  Extremities: no pedal edema  Neurological/ Psychiatric: moving all extremities  Skin: no rashes; no palpable lesions    Labs: all available labs reviewed                        12.2   9.36  )-----------( 252      ( 15 Ubaldo 2019 08:25 )             39.6     01-15    140  |  107  |  21  ----------------------------<  152<H>  4.3   |  23  |  1.14    Ca    9.2      15 Ubaldo 2019 08:25  Mg     2.0     01-15    TPro  6.8  /  Alb  3.3  /  TBili  0.6  /  DBili  x   /  AST  21  /  ALT  32  /  AlkPhos  62  01-15     LIVER FUNCTIONS - ( 15 Ubaldo 2019 08:25 )  Alb: 3.3 g/dL / Pro: 6.8 gm/dL / ALK PHOS: 62 U/L / ALT: 32 U/L / AST: 21 U/L / GGT: x           Urinalysis Basic - ( 2019 12:36 )    Color: Yellow / Appearance: Clear / S.015 / pH: x  Gluc: x / Ketone: Negative  / Bili: Negative / Urobili: Negative mg/dL   Blood: x / Protein: Negative mg/dL / Nitrite: Negative   Leuk Esterase: Negative / RBC: x / WBC x   Sq Epi: x / Non Sq Epi: x / Bacteria: x          Radiology: all available radiological tests reviewed      EXAM:  CT ABDOMEN AND PELVIS                          EXAM:  CT CHEST                            PROCEDURE DATE:  2019          INTERPRETATION:  Clinical information: Cough. Rule out infiltrate. Lumbar   and right flank pain. History of breast cancer.    COMPARISON: CT chest 2018. CT abdomen/pelvis 2018.    PROCEDURE:   CT of the Chest, Abdomen and Pelvis was performed without intravenous   contrast.   Intravenous contrast: None  Oral contrast: None.  Sagittal and coronal reformats were performed.    FINDINGS:    CHEST:     LOWER NECK: Within normal limits.  AXILLA, MEDIASTINUM AND DOREEN: No lymphadenopathy.  VESSELS: Normal caliber aorta. Severe diffuse atherosclerotic arterial   calcification.  HEART: The heart is normal in size.No pericardial effusion.  PLEURA: Trace bilateral pleural effusions.  LUNGS AND LARGE AIRWAYS: Patent central airways. No pulmonary or mass.   Mild patchy airspace disease in the lingula, and to a lesser degree, the   right middle lobe. Posterior left lower lobe airspace disease could be on   the basis of atelectasis or pneumonia.  CHEST WALL:  Soft tissue mass in the right breast measures 5.2 cm,   incompletely visualized on the prior study. Right axillary shanon mass   measures 3.6 cm, increased in size from prior study (2.9 cm).    ABDOMEN AND PELVIS:    LIVER: Subtle contour nodularity of the liver suggestive of cirrhosis. No   focal hepatic lesion.  SPLEEN: Within normal limits.  PANCREAS: Within normal limits.  GALLBLADDER: Cholecystectomy.  BILE DUCTS: Normal caliber.  ADRENALS: Within normal limits.  KIDNEYS/URETERS: No mass, stone or hydronephrosis.    RETROPERITONEUM: No upper abdominal, retroperitoneal or pelvic   lymphadenopathy.    VESSELS:  Severe diffuse atherosclerotic arterial calcification. Normal   caliber aorta.    BOWEL: No bowel obstruction, wall thickening or inflammatory change.   Moderate hiatal hernia. Appendix not identified. Extensive colonic   diverticulosis without diverticulitis.  PERITONEUM: No ascites or pneumoperitoneum.    REPRODUCTIVE ORGANS: Hysterectomy. No adnexal mass.  BLADDER: Within normal limits    ABDOMINAL WALL: Within normal limits.  BONES: Severe T4 and T7 and moderate T9 compression deformities are   unchanged.Severe L2 compression deformity is age indeterminate but new   since 2018. Old bilateral healed rib fractures.    IMPRESSION:     Chest: Mild patchy airspace disease in the lingula and right middle lobe   compatible with pneumonia in the proper clinical setting.  Posterior left lower lobe opacity most likely atelectasis.  Large right breast mass compatible with history of breast cancer.  Right axillary shanon mass, increased in size.    Abdomen/pelvis: Severe L2 compression deformity, age indeterminate but   new since 2018.      Advanced directives addressed: full resuscitation

## 2019-01-16 NOTE — CONSULT NOTE ADULT - ASSESSMENT
88 yo F with locally advanced right breast cancer eroding through skin. Not a candidate for chemotherapy or surgery. She can be palliated with radiotherapy to the right breast and axilla. The rationale, logistics and potential toxicity of radiotherapy was discussed with the pt and her daughter Diana.  They are both interested in proceeding after discharge.   With regard to the new L2 compression fracture, unclear if represents mets which can likely only be determined by biopsy as PET will show uptake due to acute inflammation.  Pt not a chemotherapy candidate so would be unlikely to . Will proceed with 3 weeks of hypofractionated radiotherapy after discharge. Her daughter will contact me to schedule simulation. Case dw Dr. Umana.

## 2019-01-16 NOTE — PROGRESS NOTE ADULT - ASSESSMENT
89 y/o F resident of [facility] with PMHx significant for COPD not on home O2, CVA (2005), Rheumatoid arthritis (chronically on hydroxychloroquine and prednisone), chronic back pain, PMR (Polymyalgia rheumatica), right breast Ca, glaucoma OU s/p ocular surgery, hypertension, hyperlipidemia, and Alzheimer's dementia presents to the ED for further evaluation of a 2 week hx of a progressive productive cough, decreased PO intake, and increased lethargy/malaise.  Labs => WBC 13.48, LA 2.3 -> 1.2, RVP (-), imaging showed probable PNA, LLL atelectasis,  Large right breast mass compatible wit 4) Right axillary shanon mass increased in size, Severe L2 compression deformity She was given IV abx/steroids in ER.     1. R multifocal pna/COPD/R breast ca/immuncompromised host  - on zosyn 3.375q8h #2  - cx no growth   - continue with abx coverage  - pulm/rad-oncology eval noted - to undergo XRT on dc  - spine surgery following for ? compression deformity L2  - monitor temps  - tolerating abx well so far; no side effects noted  - reason for abx use and side effects reviewed with patient  - imaging reviewed  - supportive care  - fu cbc

## 2019-01-16 NOTE — CONSULT NOTE ADULT - SUBJECTIVE AND OBJECTIVE BOX
Patient is a 89y old  Female who presents with a chief complaint of AMS with cough (16 Jan 2019 08:40)      HPI:  89 y/o F resident of [facility] with PMHx significant for COPD not on home O2, CVA (2005), Rheumatoid arthritis (chronically on hydroxychloroquine and prednisone), chronic back pain, PMR (Polymyalgia rheumatica), right breast Ca, glaucoma OU s/p ocular surgery, hypertension, hyperlipidemia, and Alzheimer's dementia presents to the ED for further evaluation of a 2 week hx of a progressive productive cough, decreased PO intake, and increased lethargy/malaise. The patient was treated initially with an outpatient regimen with Ceftin PO, followed by Doxycycline PO. The patient also reportedly c/o lower back pain which the patient's daughter states is likely chronic. The patient's daughter noted that this morning (day of admission) the patient was increasingly lethargic prompting her visit to  today (1/14). At baseline the patient is responsive to verbal stimuli. Labs => WBC 13.48, LA 2.3 -> 1.2, RVP (-), CT Chest => 1) Mild patchy airspace disease in the lingula and right middle lobe compatible with pneumonia in the proper clinical setting. 2) Posterior left lower lobe opacity most likely atelectasis. 3) Large right breast mass compatible with history of breast cancer. 4) Right axillary shanon mass, increased in size. CT Abdomen/Pelvis => Severe L2 compression deformity, age indeterminate but new since September 30, 2018. In the ED the patient was given Piperacillin/tazobactam 3.375g IVPB x 1, Vancomycin 1g IVPB x 1, Methylprednisolone 40mg IVP x 1, and NS x 1L. (14 Jan 2019 17:25)  Patient and daughter report right breast cancer dx approx 1 year ago, IDC triple negative. Received one dose of chemotherapy very poorly tolerated and was discontinued.  No therapy since that time. Pt lives in assisted living with good KPS.  Pt saw Dr. Umana recently in office due to local progression of disease beginning to erode through skin of lateral right breast. Found to also have fixed right axillary mass, no amenable to resection.  CT c/a/p on admission shows no evidence of systemic disease but new L2 compression fraction (osteoporotic vs met?).  Pt reports intermittent back pain and has history of compression fractures.        PAST MEDICAL & SURGICAL HISTORY:  Hypothyroidism  Breast cancer: Right  Compression fracture of spine: T7  Alzheimers disease  HTN (hypertension)  Glaucoma  TIA (transient ischemic attack): 8/07  Cerebral vascular accident: 2005  Polymyalgia rheumatica  Osteoporosis  Chronic pain: mid back  Urinary retention  GERD (gastroesophageal reflux disease)  Cholelithiases  Hyperlipemia  Carotid artery stenosis  Lung nodule: biopsied 2003, benign  COPD (chronic obstructive pulmonary disease)  Asthma  Glaucoma of both eyes: s/p repair  History of hip surgery  Gall stones  History of hysterectomy: for bleeding  Hx of cholecystectomy      SOCIAL HISTORY:  Lives in assisted living [facility]. Daughter pushpa lives in [place].     FAMILY HISTORY:  Family history of diabetes mellitus (DM) (Child)  Family history of COPD (chronic obstructive pulmonary disease) (Mother)      MEDICATIONS  (STANDING):  ALBUTerol    0.083% 2.5 milliGRAM(s) Nebulizer every 6 hours  dipyridamole 200 mG/aspirin 25 mG 1 Capsule(s) Oral two times a day  heparin  Injectable 5000 Unit(s) SubCutaneous every 8 hours  hydroxychloroquine 200 milliGRAM(s) Oral every 12 hours  levothyroxine 25 MICROGram(s) Oral daily  lidocaine   Patch 1 Patch Transdermal daily  memantine ER 21 milliGRAM(s) Oral daily  methylPREDNISolone sodium succinate Injectable 40 milliGRAM(s) IV Push every 12 hours  pantoprazole    Tablet 40 milliGRAM(s) Oral before breakfast  piperacillin/tazobactam IVPB. 3.375 Gram(s) IV Intermittent every 8 hours  simvastatin 20 milliGRAM(s) Oral at bedtime  tamsulosin 0.4 milliGRAM(s) Oral at bedtime  tiotropium 18 MICROgram(s) Capsule 1 Capsule(s) Inhalation daily    MEDICATIONS  (PRN):  acetaminophen   Tablet .. 650 milliGRAM(s) Oral every 6 hours PRN Temp greater or equal to 38C (100.4F), Mild Pain (1 - 3)  docusate sodium 100 milliGRAM(s) Oral three times a day PRN Constipation  guaiFENesin   Syrup  (Sugar-Free) 200 milliGRAM(s) Oral every 6 hours PRN Cough  ondansetron Injectable 4 milliGRAM(s) IV Push every 6 hours PRN Nausea  senna 2 Tablet(s) Oral at bedtime PRN Constipation      PHYSICAL EXAM:  Vital Signs Last 24 Hrs  T(C): 37.2 (16 Jan 2019 05:04), Max: 37.2 (16 Jan 2019 05:04)  T(F): 98.9 (16 Jan 2019 05:04), Max: 98.9 (16 Jan 2019 05:04)  HR: 99 (16 Jan 2019 05:04) (80 - 102)  BP: 100/56 (16 Jan 2019 05:04) (100/56 - 128/50)  BP(mean): --  RR: 18 (16 Jan 2019 05:04) (17 - 19)  SpO2: 94% (16 Jan 2019 05:04) (94% - 95%)  Head: no alopecia or scars  Neck: no palpable lymphadenopathy  Breast: larger mass UOQ right breast eroding through skin. Fixed right axillary mass > 5 cm  Lungs: Clear to ascultation bilaterally and no wheezes  Cardiac: normal S1, S2, no murmurs  Abdomen: soft, nontender with no ascites  Extremities: no peripheral edema, good pulses bilaterally  Neuro: A & O, CNs II-XII intact. Motor strength 5/5 throughout and strength 5/5 throughout. Ambulatory    LABS:  CBC Full  -  ( 15 Ubaldo 2019 08:25 )  WBC Count : 9.36 K/uL  Hemoglobin : 12.2 g/dL  Hematocrit : 39.6 %  Platelet Count - Automated : 252 K/uL  Mean Cell Volume : 88.0 fl  Mean Cell Hemoglobin : 27.1 pg  Mean Cell Hemoglobin Concentration : 30.8 gm/dL  Auto Neutrophil # : 8.35 K/uL  Auto Lymphocyte # : 0.77 K/uL  Auto Monocyte # : 0.14 K/uL  Auto Eosinophil # : 0.00 K/uL  Auto Basophil # : 0.01 K/uL  Auto Neutrophil % : 89.2 %  Auto Lymphocyte % : 8.2 %  Auto Monocyte % : 1.5 %  Auto Eosinophil % : 0.0 %  Auto Basophil % : 0.1 %    01-15    140  |  107  |  21  ----------------------------<  152<H>  4.3   |  23  |  1.14    Ca    9.2      15 Ubaldo 2019 08:25  Mg     2.0     01-15    TPro  6.8  /  Alb  3.3  /  TBili  0.6  /  DBili  x   /  AST  21  /  ALT  32  /  AlkPhos  62  01-15      RADIOLOGY:  < from: CT Abdomen and Pelvis No Cont (01.14.19 @ 14:12) >  EXAM:  CT ABDOMEN AND PELVIS                          EXAM:  CT CHEST                            PROCEDURE DATE:  01/14/2019          INTERPRETATION:  Clinical information: Cough. Rule out infiltrate. Lumbar   and right flank pain. History of breast cancer.    COMPARISON: CT chest October 01, 2018. CT abdomen/pelvis September 30, 2018.    PROCEDURE:   CT of the Chest, Abdomen and Pelvis was performed without intravenous   contrast.   Intravenous contrast: None  Oral contrast: None.  Sagittal and coronal reformats were performed.    FINDINGS:    CHEST:     LOWER NECK: Within normal limits.  AXILLA, MEDIASTINUM AND DOREEN: No lymphadenopathy.  VESSELS: Normal caliber aorta. Severe diffuse atherosclerotic arterial   calcification.  HEART: The heart is normal in size.No pericardial effusion.  PLEURA: Trace bilateral pleural effusions.  LUNGS AND LARGE AIRWAYS: Patent central airways. No pulmonary or mass.   Mild patchy airspace disease in the lingula, and to a lesser degree, the   right middle lobe. Posterior left lower lobe airspace disease could be on   the basis of atelectasis or pneumonia.  CHEST WALL:  Soft tissue mass in the right breast measures 5.2 cm,   incompletely visualized on the prior study. Right axillary shanon mass   measures 3.6 cm, increased in size from prior study (2.9 cm).    ABDOMEN AND PELVIS:    LIVER: Subtle contour nodularity of the liver suggestive of cirrhosis. No   focal hepatic lesion.  SPLEEN: Within normal limits.  PANCREAS: Within normal limits.  GALLBLADDER: Cholecystectomy.  BILE DUCTS: Normal caliber.  ADRENALS: Within normal limits.  KIDNEYS/URETERS: No mass, stone or hydronephrosis.    RETROPERITONEUM: No upper abdominal, retroperitoneal or pelvic   lymphadenopathy.    VESSELS:  Severe diffuse atherosclerotic arterial calcification. Normal   caliber aorta.    BOWEL: No bowel obstruction, wall thickening or inflammatory change.   Moderate hiatal hernia. Appendix not identified. Extensive colonic   diverticulosis without diverticulitis.  PERITONEUM: No ascites or pneumoperitoneum.    REPRODUCTIVE ORGANS: Hysterectomy. No adnexal mass.  BLADDER: Within normal limits    ABDOMINAL WALL: Within normal limits.  BONES: Severe T4 and T7 and moderate T9 compression deformities are   unchanged.Severe L2 compression deformity is age indeterminate but new   since September 30, 2018. Old bilateral healed rib fractures.    IMPRESSION:     Chest: Mild patchy airspace disease in the lingula and right middle lobe   compatible with pneumonia in the proper clinical setting.  Posterior left lower lobe opacity most likely atelectasis.  Large right breast mass compatible with history of breast cancer.  Right axillary shanon mass, increased in size.    Abdomen/pelvis: Severe L2 compression deformity, age indeterminate but   new since September 30, 2018.        ELY DUPREE   This document has been electronically signed. Jan 14 2019  3:06PM

## 2019-01-16 NOTE — PHYSICAL THERAPY INITIAL EVALUATION ADULT - CRITERIA FOR SKILLED THERAPEUTIC INTERVENTIONS
risk reduction/prevention/anticipated equipment needs at discharge/functional limitations in following categories/anticipated discharge recommendation/therapy frequency/rehab potential/predicted duration of therapy intervention/impairments found

## 2019-01-16 NOTE — PROGRESS NOTE ADULT - ASSESSMENT
88 y/o F resident of [facility] with PMHx significant for COPD not on home O2, CVA (2005), Rheumatoid arthritis (chronically on hydroxychloroquine and prednisone), chronic back pain, PMR (Polymyalgia rheumatica), right breast Ca, glaucoma OU s/p ocular surgery, hypertension, hyperlipidemia, and Alzheimer's dementia presents to the ED for further evaluation of a 2 week hx of a progressive productive cough, decreased PO intake, and increased lethargy/malaise. The patient was treated initially with an outpatient regimen with Ceftin PO, followed by Doxycycline PO. The patient also reportedly c/o lower back pain which the patient's daughter states is likely chronic. The patient's daughter noted that this morning (day of admission) the patient was increasingly lethargic prompting her visit to  today (1/14). At baseline the patient is responsive to verbal stimuli. Labs => WBC 13.48, LA 2.3 -> 1.2, RVP (-), CT Chest => 1) Mild patchy airspace disease in the lingula and right middle lobe compatible with pneumonia in the proper clinical setting. 2) Posterior left lower lobe opacity most likely atelectasis. 3) Large right breast mass compatible with history of breast cancer. 4) Right axillary shanon mass, increased in size. CT Abdomen/Pelvis => Severe L2 compression deformity, age indeterminate but new since September 30, 2018. In the ED the patient was given Piperacillin/tazobactam 3.375g IVPB x 1, Vancomycin 1g IVPB x 1, Methylprednisolone 40mg IVP x 1, and NS x 1L. (14 Jan 2019 17:25)    Called in consult for complaints of low back pain. No hx trauma. Steroid & O2 dependent due to COPD. Noted to have severe compression deformity L2. Patient has hx previous compression fracture T4 requiring kyphoplasty as well as thoracic compression frx T6,7 and 9 treated nonoperatively.    IMP;  New compression deformity L2  Chronic VCF thoracic spine  ? R 12th rib fracture or lesion?  O2/steroid dependent COPD  R breast mass/Cancer  Improved mental status    PLAN  Admitted to Medicine for treatment-will discuss with Hospitalist further care.  Analgesia prn for pain  Continue Lidoderm patch  AS per Hem/Onc/Dr Coley-daughter states patient candidate for PET scan

## 2019-01-16 NOTE — PHYSICAL THERAPY INITIAL EVALUATION ADULT - PERTINENT HX OF CURRENT PROBLEM, REHAB EVAL
Pt presents to the ED for further evaluation of a 2 week hx of a progressive productive cough, decreased PO intake, and increased lethargy/malaise. c/o lower back pain , Patient has hx previous compression fracture T4 requiring kyphoplasty as well as thoracic compression frx T6,7 and 9 treated nonoperatively., R breast mass/Cancer, L2 compression fx

## 2019-01-16 NOTE — PROGRESS NOTE ADULT - ASSESSMENT
- mild patchy air space disease with difficulty to exclude superimposed infection with underlying chronic scarring and patient is on chronic steroids therapy for the R.A   - new compression fracture with the back pain   - increasing breast mass with axillary shanon mass   - copd with symptoms of exacerbation     PLAN     - decrease the solumedrol to once daily tomorrow  - Continue iv antibiotics today and transition to oral at discharge   - continue with the spiriva and albuterol   - check ambulatory o2 sat  - work up for the breast mass and compression fracture as per medicine   - dvt prophylaxis with the underlying cancer   - incentive spirometry

## 2019-01-17 NOTE — PROGRESS NOTE ADULT - ASSESSMENT
88 y/o F resident of [facility] with PMHx significant for COPD not on home O2, CVA (2005), Rheumatoid arthritis (chronically on hydroxychloroquine and prednisone), chronic back pain, PMR (Polymyalgia rheumatica), right breast Ca, glaucoma OU s/p ocular surgery, hypertension, hyperlipidemia, and Alzheimer's dementia presents to the ED for further evaluation of a 2 week hx of a progressive productive cough, decreased PO intake, and increased lethargy/malaise. The patient was treated initially with an outpatient regimen with Ceftin PO, followed by Doxycycline PO. The patient also reportedly c/o lower back pain which the patient's daughter states is likely chronic. The patient's daughter noted that this morning (day of admission) the patient was increasingly lethargic prompting her visit to  today (1/14). At baseline the patient is responsive to verbal stimuli. Labs => WBC 13.48, LA 2.3 -> 1.2, RVP (-), CT Chest => 1) Mild patchy airspace disease in the lingula and right middle lobe compatible with pneumonia in the proper clinical setting. 2) Posterior left lower lobe opacity most likely atelectasis. 3) Large right breast mass compatible with history of breast cancer. 4) Right axillary shanon mass, increased in size. CT Abdomen/Pelvis => Severe L2 compression deformity, age indeterminate but new since September 30, 2018. In the ED the patient was given Piperacillin/tazobactam 3.375g IVPB x 1, Vancomycin 1g IVPB x 1, Methylprednisolone 40mg IVP x 1, and NS x 1L. (14 Jan 2019 17:25)    Called in consult for complaints of low back pain. No hx trauma. Steroid & O2 dependent due to COPD. Noted to have severe compression deformity L2. Patient has hx previous compression fracture T4 requiring kyphoplasty as well as thoracic compression frx T6,7 and 9 treated nonoperatively.    IMP;  New compression deformity L2  Chronic VCF thoracic spine  ? R 12th rib fracture or lesion?  O2/steroid dependent COPD  R breast mass/Cancer  Improved mental status    PLAN  Admitted to Medicine for treatment-will discuss with Hospitalist further care.  Analgesia prn for pain-will order TLSO  Continue Lidoderm patch  AS per Hem/Onc/Dr Umana  RT noted appreciated.

## 2019-01-17 NOTE — PROGRESS NOTE ADULT - ASSESSMENT
89 y/o F resident of [facility] with PMHx significant for COPD not on home O2, CVA (2005), Rheumatoid arthritis (chronically on hydroxychloroquine and prednisone), chronic back pain, PMR (Polymyalgia rheumatica), right breast Ca, glaucoma OU s/p ocular surgery, hypertension, hyperlipidemia, and Alzheimer's dementia presents to the ED for further evaluation of a 2 week hx of a progressive productive cough, decreased PO intake, and increased lethargy/malaise.  Labs => WBC 13.48, LA 2.3 -> 1.2, RVP (-), imaging showed probable PNA, LLL atelectasis,  Large right breast mass compatible wit 4) Right axillary shanon mass increased in size, Severe L2 compression deformity She was given IV abx/steroids in ER.     1. R multifocal pna/COPD/R breast ca/immuncompromised host  - slowly improving  - s/p zosyn, on augmentin plan for 5 days to complete 7 days  - cx no growth   - continue with abx coverage  - pulm/rad-oncology eval noted - to undergo XRT on dc  - spine surgery following for ? compression deformity L2  - monitor temps  - tolerating abx well so far; no side effects noted  - reason for abx use and side effects reviewed with patient  - imaging reviewed  - supportive care  - fu cbc

## 2019-01-17 NOTE — PROGRESS NOTE ADULT - ASSESSMENT
- mild patchy air space disease with difficulty to exclude superimposed infection with underlying chronic scarring and patient is on chronic steroids therapy for the R.A   - new compression fracture with the back pain   - increasing breast mass with axillary shanon mass   - copd with symptoms of exacerbation     PLAN     - decrease the solumedrol to once daily  - Continue PO antibiotics  - continue with the spiriva and albuterol   - check ambulatory o2 sat  - work up for the breast mass and compression fracture as per medicine   - dvt prophylaxis with the underlying cancer   - incentive spirometry

## 2019-01-18 NOTE — SWALLOW BEDSIDE ASSESSMENT ADULT - SWALLOW EVAL: SECRETION MANAGEMENT
Adequate. No drooling was noted. Note that pt's volitional cough was somewhat moist but produced with sufficient strength.

## 2019-01-18 NOTE — DISCHARGE NOTE ADULT - CARE PROVIDER_API CALL
Su Otero), Family Medicine  [address] [place] [address]  Phone: [phone]  Fax: [phone]    Saul Guardado), Internal Medicine; Pulmonary Disease; Sleep Medicine  [address] [place] [address]  Phone: [phone]  Fax: [phone]    Sapna Jean), Radiation Oncology  [address] [place] [address]  Phone: [phone]  Fax: [phone]

## 2019-01-18 NOTE — PROGRESS NOTE ADULT - ASSESSMENT
- mild patchy air space disease with difficulty to exclude superimposed infection with underlying chronic scarring and patient is on chronic steroids therapy for the R.A   - new compression fracture with the back pain   - increasing breast mass with axillary shanon mass   - copd with symptoms of exacerbation on tapering steroids      PLAN     - discontinue iv solumedrol to po prednisone 40 mg po daily dose and taper down to her baseline dose for the  R.A   - total of 7 days of antibiotics to complete the course with the history of CDT colitis   - continue with the spiriva and albuterol   - check ambulatory o2 sat for the evaluation of the home therapy before discharge   - work up for the breast mass and compression fracture as per medicine .  - discharge planning as per the medicine and out patient follow up with  Dr reyes.

## 2019-01-18 NOTE — DISCHARGE NOTE ADULT - MEDICATION SUMMARY - MEDICATIONS TO TAKE
I will START or STAY ON the medications listed below when I get home from the hospital:    predniSONE 20 mg oral tablet  -- 1 tab(s) by mouth 2 times a day for 3 days, then 1 tab daily for 3 days and then half tab daily  -- It is very important that you take or use this exactly as directed.  Do not skip doses or discontinue unless directed by your doctor.  Obtain medical advice before taking any non-prescription drugs as some may affect the action of this medication.  Take with food or milk.    -- Indication: For COPD (chronic obstructive pulmonary disease)    tamsulosin 0.4 mg oral capsule  -- 1 cap(s) by mouth once a day (at bedtime)  -- Indication: For Home med    traZODone 50 mg oral tablet  -- 1 tab(s) by mouth once a day (at bedtime), As Needed  -- Indication: For Home med    simvastatin 20 mg oral tablet  -- 1 tab(s) by mouth once a day (at bedtime)  -- Indication: For HLD    hydroxychloroquine 200 mg oral tablet  -- 1 tab(s) by mouth every 12 hours  -- Indication: For RA    Aggrenox 25 mg-200 mg oral capsule, extended release  -- 1 cap(s) by mouth 2 times a day  -- Indication: For Home med    Spiriva 18 mcg inhalation capsule  -- 1 cap(s) inhaled once a day  -- Indication: For COPD (chronic obstructive pulmonary disease)    albuterol 2.5 mg/3 mL (0.083%) inhalation solution  -- 3 milliliter(s) inhaled 4 times a day, As Needed  -- Indication: For COPD (chronic obstructive pulmonary disease)    lidocaine 5% topical film  -- 1 patch on skin once a day  -- Indication: For Back pain    docusate sodium 100 mg oral capsule  -- 1 cap(s) by mouth 2 times a day  -- Indication: For Constipation    Namenda XR 21 mg oral capsule, extended release  -- 1 cap(s) by mouth once a day (at bedtime) ** PATIENT OWN MED **  -- Indication: For Alzheimers disease    amoxicillin-clavulanate 875 mg-125 mg oral tablet  -- 1 tab(s) by mouth 2 times a day  -- Indication: For Multilobar lung infiltrate    Acidophilus Probiotic Blend oral capsule  -- 2 cap(s) by mouth once a day  -- Indication: For supplement    NexIUM 40 mg oral delayed release capsule  -- 1 cap(s) by mouth once a day  -- Indication: For Home med    Synthroid 25 mcg (0.025 mg) oral tablet  -- 1 tab(s) by mouth once a day  -- Indication: For Hypothyroidism    Multiple Vitamins oral tablet  -- 1 tab(s) by mouth once a day  -- Indication: For supplement    Calcium 600+D oral tablet  -- 1 tab(s) by mouth once a day  -- Indication: For supplement

## 2019-01-18 NOTE — PROGRESS NOTE ADULT - ASSESSMENT
89 y/o F resident of [facility] with PMHx significant for COPD not on home O2, CVA (2005), Rheumatoid arthritis (chronically on hydroxychloroquine and prednisone), chronic back pain, PMR (Polymyalgia rheumatica), right breast Ca, glaucoma OU s/p ocular surgery, hypertension, hyperlipidemia, and Alzheimer's dementia presents to the ED for further evaluation of a 2 week hx of a progressive productive cough, decreased PO intake, and increased lethargy/malaise. The patient was treated initially with an outpatient regimen with Ceftin PO, followed by Doxycycline PO. The patient also reportedly c/o lower back pain which the patient's daughter states is likely chronic. The patient's daughter noted that this morning (day of admission) the patient was increasingly lethargic prompting her visit to  today (1/14). At baseline the patient is responsive to verbal stimuli. Labs => WBC 13.48, LA 2.3 -> 1.2, RVP (-), CT Chest => 1) Mild patchy airspace disease in the lingula and right middle lobe compatible with pneumonia in the proper clinical setting. 2) Posterior left lower lobe opacity most likely atelectasis. 3) Large right breast mass compatible with history of breast cancer. 4) Right axillary shanon mass, increased in size. CT Abdomen/Pelvis => Severe L2 compression deformity, age indeterminate but new since September 30, 2018. In the ED the patient was given Piperacillin/tazobactam 3.375g IVPB x 1, Vancomycin 1g IVPB x 1, Methylprednisolone 40mg IVP x 1, and NS x 1L. (14 Jan 2019 17:25)    Called in consult for complaints of low back pain. No hx trauma. Steroid & O2 dependent due to COPD. Noted to have severe compression deformity L2. Patient has hx previous compression fracture T4 requiring kyphoplasty as well as thoracic compression frx T6,7 and 9 treated nonoperatively.    IMP;  New compression deformity L2  Chronic VCF thoracic spine  ? R 12th rib fracture or lesion?  O2/steroid dependent COPD  R breast mass/Cancer  Improved mental status    PLAN  Admitted to Medicine for treatment-will discuss with Hospitalist further care.  Analgesia prn for pain-TLSO delivered. If brace irritates R breast mass, will change to LSO  Continue Lidoderm patch  AS per Hem/Onc/Dr Umana  RT noted appreciated.

## 2019-01-18 NOTE — DISCHARGE NOTE ADULT - HOSPITAL COURSE
Hospital course:       89 y/o F resident of [facility] with PMHx significant for COPD not on home O2, CVA (2005), Rheumatoid arthritis (chronically on hydroxychloroquine and prednisone), chronic back pain, PMR (Polymyalgia rheumatica), right breast Ca, glaucoma OU s/p ocular surgery, hypertension, hyperlipidemia, and Alzheimer's dementia presents to the ED for further evaluation of a 2 week hx of a progressive productive cough, decreased PO intake, and increased lethargy/malaise. The patient was treated initially with an outpatient regimen with Ceftin PO, followed by Doxycycline PO. The patient also reportedly c/o lower back pain which the patient's daughter states is likely chronic. The patient's daughter noted that this morning (day of admission) the patient was increasingly lethargic prompting her visit to  today (1/14). At baseline the patient is responsive to verbal stimuli. Labs => WBC 13.48, LA 2.3 -> 1.2, RVP (-), CT Chest => 1) Mild patchy airspace disease in the lingula and right middle lobe compatible with pneumonia in the proper clinical setting. 2) Posterior left lower lobe opacity most likely atelectasis. 3) Large right breast mass compatible with history of breast cancer. 4) Right axillary shanon mass, increased in size. CT Abdomen/Pelvis => Severe L2 compression deformity, age indeterminate but new since September 30, 2018. In the ED the patient was given Piperacillin/tazobactam 3.375g IVPB x 1, Vancomycin 1g IVPB x 1, Methylprednisolone 40mg IVP x 1, and NS x 1L.      Vital Signs Last 24 Hrs  T(C): 36.1 (18 Jan 2019 12:04), Max: 36.5 (17 Jan 2019 16:38)  T(F): 97 (18 Jan 2019 12:04), Max: 97.7 (17 Jan 2019 16:38)  HR: 93 (18 Jan 2019 12:04) (71 - 93)  BP: 128/73 (18 Jan 2019 12:04) (126/52 - 147/72)  BP(mean): --  RR: 18 (18 Jan 2019 12:04) (18 - 18)  SpO2: 92% (18 Jan 2019 12:04) (92% - 96%)      1/15 - feeling better today. still with cough and some SOB.  1/16: Continues to improve, has back pain but tolerable.  No fever, chills, n, v.   01/17/19: Patient seen and examined. No new complaints. Discussed with daughter on phone in  length regarding management and d/c plan.  01/18/19: Patient seen and examined. Sitting in a chair, no sob.      Physical exam:         GEN: lying in bed, NAD  HEENT:   NC/AT, pupils equal and reactive, EOMI  CV:  +S1, +S2, RRR  RESP:   few scattered exp wheeze b/l --> improving  BREAST:  not examined  GI:  abdomen soft, non-tender, non-distended, normoactive BS  MSK:   normal muscle tone  EXT:  no edema  NEURO:  AAOX3, no focal neurological deficits  SKIN:  no rashes        Assessment and Plan:       89 y/o F resident of [facility] with PMHx significant for COPD not on home O2, CVA (2005), Rheumatoid arthritis (chronically on hydroxychloroquine and prednisone), chronic back pain, PMR (Polymyalgia rheumatica), right breast Ca, glaucoma OU s/p ocular surgery, hypertension, hyperlipidemia, and Alzheimer's dementia presents to the ED and found to have severe sepsis 2/2 multifocal pna    # severe sepsis 2/2 mutlifocal pna/HCAP - initial lactate on admission 2.3 and improved with fluids. severe sepsis resolved  - Pneumonia in the Immunocompromised Host. Changed to po augmentin   - Urine and blood cultures negative  - incentive neeta    # Compression fracture of spine/ hx of breast CA  - pain meds prn and further imaging as per ortho  - pt eval appreciated  - outpatient palliative radiation per Dr. Jean  -brace as per spine    # COPD (chronic obstructive pulmonary disease).    - cont. nebs prn  - cont. Spiriva  - pulm eval appreciated     # Rheumatoid arthritis.    -  cont. Hydroxychloroquine 200mg po q12h  - cont. Prednisone upon dc.     # Alzheimers disease  - cont. Namenda XR 21mg po daily.     # Hypothyroidism.  - Cont. Levothyroxine     D/C home today  Spent more than 30 min to prepare the discharge.  PMD was notified. Hospital course:       89 y/o F resident of [facility] with PMHx significant for COPD not on home O2, CVA (2005), Rheumatoid arthritis (chronically on hydroxychloroquine and prednisone), chronic back pain, PMR (Polymyalgia rheumatica), right breast Ca, glaucoma OU s/p ocular surgery, hypertension, hyperlipidemia, and Alzheimer's dementia presents to the ED for further evaluation of a 2 week hx of a progressive productive cough, decreased PO intake, and increased lethargy/malaise. The patient was treated initially with an outpatient regimen with Ceftin PO, followed by Doxycycline PO. The patient also reportedly c/o lower back pain which the patient's daughter states is likely chronic. The patient's daughter noted that this morning (day of admission) the patient was increasingly lethargic prompting her visit to  today (1/14). At baseline the patient is responsive to verbal stimuli. Labs => WBC 13.48, LA 2.3 -> 1.2, RVP (-), CT Chest => 1) Mild patchy airspace disease in the lingula and right middle lobe compatible with pneumonia in the proper clinical setting. 2) Posterior left lower lobe opacity most likely atelectasis. 3) Large right breast mass compatible with history of breast cancer. 4) Right axillary shanon mass, increased in size. CT Abdomen/Pelvis => Severe L2 compression deformity, age indeterminate but new since September 30, 2018. In the ED the patient was given Piperacillin/tazobactam 3.375g IVPB x 1, Vancomycin 1g IVPB x 1, Methylprednisolone 40mg IVP x 1, and NS x 1L.      Vital Signs Last 24 Hrs  T(C): 36.8 (19 Jan 2019 11:23), Max: 36.8 (18 Jan 2019 17:50)  T(F): 98.3 (19 Jan 2019 11:23), Max: 98.3 (18 Jan 2019 17:50)  HR: 104 (19 Jan 2019 11:23) (88 - 104)  BP: 134/56 (19 Jan 2019 11:23) (94/58 - 146/72)  BP(mean): --  RR: 18 (19 Jan 2019 11:23) (17 - 18)  SpO2: 94% (19 Jan 2019 11:23) (92% - 96%)      1/15 - feeling better today. still with cough and some SOB.  1/16: Continues to improve, has back pain but tolerable.  No fever, chills, n, v.   01/17/19: Patient seen and examined. No new complaints. Discussed with daughter on phone in  length regarding management and d/c plan.  01/18/19: Patient seen and examined. Sitting in a chair, no sob.    01/19/19: Patient seen and examined. No more tremors.    Physical exam:         GEN: lying in bed, NAD  HEENT:   NC/AT, pupils equal and reactive, EOMI  CV:  +S1, +S2, RRR  RESP:   few scattered exp wheeze b/l --> improving  BREAST:  not examined  GI:  abdomen soft, non-tender, non-distended, normoactive BS  MSK:   normal muscle tone  EXT:  no edema  NEURO:  AAOX3, no focal neurological deficits  SKIN:  no rashes        Assessment and Plan:       89 y/o F resident of [facility] with PMHx significant for COPD not on home O2, CVA (2005), Rheumatoid arthritis (chronically on hydroxychloroquine and prednisone), chronic back pain, PMR (Polymyalgia rheumatica), right breast Ca, glaucoma OU s/p ocular surgery, hypertension, hyperlipidemia, and Alzheimer's dementia presents to the ED and found to have severe sepsis 2/2 multifocal pna    # severe sepsis 2/2 mutlifocal pna/HCAP - initial lactate on admission 2.3 and improved with fluids. severe sepsis resolved  - Pneumonia in the Immunocompromised Host. Changed to po augmentin   - Urine and blood cultures negative  - incentive neeta    # Compression fracture of spine/ hx of breast CA  - pain meds prn and further imaging as per ortho  - pt eval appreciated  - outpatient palliative radiation per Dr. Jean  -brace as per spine    # COPD (chronic obstructive pulmonary disease).    - cont. nebs prn  - cont. Spiriva  - pulm eval appreciated     # Rheumatoid arthritis.    -  cont. Hydroxychloroquine 200mg po q12h  - cont. Prednisone upon dc.     # Alzheimers disease  - cont. Namenda XR 21mg po daily.     # Hypothyroidism.  - Cont. Levothyroxine     D/C home today  Spent more than 30 min to prepare the discharge.  PMD was notified.

## 2019-01-18 NOTE — DISCHARGE NOTE ADULT - PATIENT PORTAL LINK FT
You can access the [facility], offered by [facility], by registering with the following website: [email or web address]

## 2019-01-18 NOTE — DISCHARGE NOTE ADULT - CARE PROVIDERS DIRECT ADDRESSES
,DirectAddress_Unknown,[email or web address],DirectAddress_Unknown

## 2019-01-18 NOTE — SWALLOW BEDSIDE ASSESSMENT ADULT - DIET PRIOR TO ADMI
Reportedly on a regular texture diet with thin liquids at [facility] and when last seen by this service during a prior hospitalization at this facility in 3/18. Patient was reportedly on a regular texture diet with thin liquids at [facility] and when last seen by this service during a prior hospitalization at this facility in 3/18.

## 2019-01-18 NOTE — SWALLOW BEDSIDE ASSESSMENT ADULT - COMMENTS
The pt was admitted to  from [place]. Hospital course is notable for COPD exacerbation, back pain with finding of compression fractures on imaging and finding of increased size of a pre-existing breast mass on imaging. This profile is superimposed upon a past medical history which is remarkable for recent C-Diff, recent diagnosis of breast cancer s/p chemo, dementia, HTN, HLD, OP, RA, COPD, presence of pulmonary nodule, Polymyalgia Rheumatica, urinary retention, gallstones, past compression fracture of T7, previous hip fracture/sx, past hysterectomy and prior CVA/TIA. See below for additional prior medical information.

## 2019-01-18 NOTE — DISCHARGE NOTE ADULT - CARE PLAN
Principal Discharge DX:	Multilobar lung infiltrate  Goal:	prevent further admission  Assessment and plan of treatment:	Follow up with PMD

## 2019-01-18 NOTE — DISCHARGE NOTE ADULT - CONDITIONS AT DISCHARGE
Patient prior assessment remain the same,  Patient ambulate with phisical therapy and a walker. Daughter at bedside. All discharge instruction given to  daughter and daughter given feedback.

## 2019-01-19 NOTE — PROGRESS NOTE ADULT - ASSESSMENT
88 y/o F resident of [facility] with PMHx significant for COPD not on home O2, CVA (2005), Rheumatoid arthritis (chronically on hydroxychloroquine and prednisone), chronic back pain, PMR (Polymyalgia rheumatica), right breast Ca, glaucoma OU s/p ocular surgery, hypertension, hyperlipidemia, and Alzheimer's dementia presents to the ED for further evaluation of a 2 week hx of a progressive productive cough, decreased PO intake, and increased lethargy/malaise. The patient was treated initially with an outpatient regimen with Ceftin PO, followed by Doxycycline PO. The patient also reportedly c/o lower back pain which the patient's daughter states is likely chronic. The patient's daughter noted that this morning (day of admission) the patient was increasingly lethargic prompting her visit to  today (1/14). At baseline the patient is responsive to verbal stimuli. Labs => WBC 13.48, LA 2.3 -> 1.2, RVP (-), CT Chest => 1) Mild patchy airspace disease in the lingula and right middle lobe compatible with pneumonia in the proper clinical setting. 2) Posterior left lower lobe opacity most likely atelectasis. 3) Large right breast mass compatible with history of breast cancer. 4) Right axillary shanon mass, increased in size. CT Abdomen/Pelvis => Severe L2 compression deformity, age indeterminate but new since September 30, 2018. In the ED the patient was given Piperacillin/tazobactam 3.375g IVPB x 1, Vancomycin 1g IVPB x 1, Methylprednisolone 40mg IVP x 1, and NS x 1L. (14 Jan 2019 17:25)    Called in consult for complaints of low back pain. No hx trauma. Steroid & O2 dependent due to COPD. Noted to have severe compression deformity L2. Patient has hx previous compression fracture T4 requiring kyphoplasty as well as thoracic compression frx T6,7 and 9 treated nonoperatively.    IMP;  New compression deformity L2  Chronic VCF thoracic spine  ? R 12th rib fracture or lesion?  O2/steroid dependent COPD  R breast mass/Cancer  Improved mental status    PLAN  Admitted to Medicine for treatment-will discuss with Hospitalist further care.  Analgesia prn for pain-TLSO delivered. If brace irritates R breast mass, will change to LSO  Continue Lidoderm patch  AS per Hem/Onc/Dr Umana  RT noted appreciated.    Patient stable for discharge from Spine  Follow up Dr. Isaac in 1 week

## 2019-01-19 NOTE — PROGRESS NOTE ADULT - ASSESSMENT
- mild patchy air space disease with difficulty to exclude superimposed infection with underlying chronic scarring and patient is on chronic steroids therapy for the R.A  she completed antibiotic iv and on po augmentin   - new compression fracture with the back pain seen by the ortho   - increasing breast mass with axillary shanon mass   - copd with symptoms of exacerbation on tapering steroids      PLAN     -  continue with the tapering of oral prednisone to her baseline dose of 5 mg in 5 to 7 days   - continue with the spiriva and albuterol for the copd   - work up for the breast mass and compression fracture as per medicine .  - discharge planning as per the medicine and out patient follow up with  Dr reyes.  - patient condition was discussed with the daughter at bed side

## 2019-01-19 NOTE — PROGRESS NOTE ADULT - PROVIDER SPECIALTY LIST ADULT
Hospitalist
Infectious Disease
Infectious Disease
Orthopedics
Pulmonology
Hospitalist

## 2019-01-19 NOTE — PROGRESS NOTE ADULT - REASON FOR ADMISSION
AMS with cough

## 2019-01-19 NOTE — PROGRESS NOTE ADULT - SUBJECTIVE AND OBJECTIVE BOX
Diana Presley (Daughter/HCP) (h)[phone] (c)[phone]    PCP; Dr. Flavio Otero  Pulmonology; Dr. Guardado  Cardiology; Dr. FALGUNI Vo  Neurology; Dr. Vo  Rheumatology; Dr. Gale     History of Present Illness:  Reason for Admission: AMS with cough    History of Present Illness:   88 y/o F resident of [facility] with PMHx significant for COPD not on home O2, CVA (2005), Rheumatoid arthritis (chronically on hydroxychloroquine and prednisone), chronic back pain, PMR (Polymyalgia rheumatica), right breast Ca, glaucoma OU s/p ocular surgery, hypertension, hyperlipidemia, and Alzheimer's dementia presents to the ED for further evaluation of a 2 week hx of a progressive productive cough, decreased PO intake, and increased lethargy/malaise. The patient was treated initially with an outpatient regimen with Ceftin PO, followed by Doxycycline PO. The patient also reportedly c/o lower back pain which the patient's daughter states is likely chronic. The patient's daughter noted that this morning (day of admission) the patient was increasingly lethargic prompting her visit to  today (1/14). At baseline the patient is responsive to verbal stimuli. Labs => WBC 13.48, LA 2.3 -> 1.2, RVP (-), CT Chest => 1) Mild patchy airspace disease in the lingula and right middle lobe compatible with pneumonia in the proper clinical setting. 2) Posterior left lower lobe opacity most likely atelectasis. 3) Large right breast mass compatible with history of breast cancer. 4) Right axillary shanon mass, increased in size. CT Abdomen/Pelvis => Severe L2 compression deformity, age indeterminate but new since September 30, 2018. In the ED the patient was given Piperacillin/tazobactam 3.375g IVPB x 1, Vancomycin 1g IVPB x 1, Methylprednisolone 40mg IVP x 1, and NS x 1L.    1/15 - feeling better today. still with cough and some SOB.    ROS:   All 10 systems reviewed and found to be negative with the exception of what has been described above.    Vital Signs Last 24 Hrs  T(C): 37 (15 Ubaldo 2019 10:43), Max: 37.2 (15 Ubaldo 2019 04:53)  T(F): 98.6 (15 Ubaldo 2019 10:43), Max: 98.9 (15 Ubaldo 2019 04:53)  HR: 102 (15 Ubaldo 2019 10:43) (74 - 102)  BP: 102/80 (15 Ubaldo 2019 10:43) (102/80 - 157/78)  BP(mean): --  RR: 19 (15 Ubaldo 2019 10:43) (17 - 19)  SpO2: 95% (15 Ubaldo 2019 10:43) (91% - 96%)    GEN: lying in bed, NAD  HEENT:   NC/AT, pupils equal and reactive, EOMI  CV:  +S1, +S2, RRR  RESP:   few scattered exp wheeze b/l   BREAST:  not examined  GI:  abdomen soft, non-tender, non-distended, normoactive BS  MSK:   normal muscle tone  EXT:  no edema  NEURO:  AAOX3, no focal neurological deficits  SKIN:  no rashes    Radiology:   CT:    14-Jan-2019 12:49, CT Head No Cont  CT Head No Cont: EXAM:  CT BRAIN                        	  	  	PROCEDURE DATE:  01/14/2019    	  	  	  	INTERPRETATION:    	  	CT head without IV contrast      	  	CLINICAL INFORMATION:  Altered mental status        	  	TECHNIQUE: Contiguous axial 5 mm sections were obtained through the head.   	Sagittal and coronal 2-D reformatted images were also obtained.   This   	scan was performed using automatic exposure control (radiation dose   	reduction software) to obtain a diagnostic image quality scan with   	patient dose as low as reasonably achievable.   	  	FINDINGS:   CT dated 3/25/2018 available for review.  	  	The brain demonstrates moderate periventricular white matter ischemia.     	No acute cerebral cortical infarct is seen.  No intracranial hemorrhage   	is found.  No mass effect is found in the brain.    	  	The ventricles, sulci and basal cisterns appear unremarkable.       	  	The orbits are unremarkable.  The paranasal sinuses are clear.  The nasal   	cavity appears intact.  The nasopharynx is symmetric.  The central skull   	base, petrous temporal bones and calvarium remain intact.  	  	  	IMPRESSION:   Moderate periventricular white matter ischemia is noted.      	    	GENNA SCHWARZ M.D., ATTENDING RADIOLOGIST  	This document has been electronically signed.Jan 14 2019 12:51PM  X-Ray, Fluoroscopy:    14-Jan-2019 12:44, Xray Chest 1 View-PORTABLE IMMEDIATE  PACS Image: Image(s) Available  Xray Chest 1 View-PORTABLE IMMEDIATE: EXAM:  XR CHEST PORTABLE IMMED 1V                        	  	  	PROCEDURE DATE:  01/14/2019    	  	  	  	INTERPRETATION:  XR CHEST PORTABLE IMMED 1V  	  	Single AP view  	  	HISTORY:  Sepsis  	  	Comparison:  Chest x-ray 9/30/2018  	  	Normal heart size. Right basilar atelectasis. Left base opacification.  	  	IMPRESSION: Left base opacification representing atelectasis, effusion,   	and/or pneumonia.      	  	CHAYITO ARREDONDO   	This document has been electronically signed. Jan 14 2019  1:34PM    14-Jan-2019 12:49, CT Head No Cont  PACS Image: Image(s) Available    14-Jan-2019 14:12, CT Chest No Cont  PACS Image: Image(s) Available        Assessment and Plan:    Assessment:  · Assessment	  88 y/o F resident of [facility] with PMHx significant for COPD not on home O2, CVA (2005), Rheumatoid arthritis (chronically on hydroxychloroquine and prednisone), chronic back pain, PMR (Polymyalgia rheumatica), right breast Ca, glaucoma OU s/p ocular surgery, hypertension, hyperlipidemia, and Alzheimer's dementia presents to the ED and found to have severe sepsis 2/2 multifocal pna    # severe sepsis 2/2 mutlifocal pna/HCAP - initial lactate on admission 2.3 and improved with fluids. severe sepsis resolved  - ID eval noted and agree qith zosyn. hodling of vanco for now  - Pneumonia in the Immunocompromised Host => will cont. IV steroids as pt. on chronic PO Prednisone  - f/u PAN C+S  - RVP (-)  - pulm eval noted - slow steroid taper  - cont. nebs prn  - cont. supplemental O2 prn.    # Compression fracture of spine/ hx of breast CA  - ortho eval noted.   - pain meds prn and further imaging as per ortho  - pt eval    # COPD (chronic obstructive pulmonary disease).    - cont. nebs prn  - cont. Spiriva  - pulm eval appreciated     # Rheumatoid arthritis.    -  cont. Hydroxychloroquine 200mg po q12h  - cont. Prednisone upon dc. continue iv steroids for now.     # Alzheimers disease  - cont. Namenda XR 21mg po daily.     # Hypothyroidism.  - ont. Levothyroxine 25mcg po qam  - check tsh     # Advanced care planning/counseling discussion.  Plan: ~I discussed the patient's known/documented DNR/DNI status per MOLST form from Izabela medeiros in the presence of the patient and patient's daughter => Diana Presley who is the HCP. No change to the patient's current order at this time. MOLST form updated and placed in the chart. (from H&P)    dispo - await PT eval
Date of service: 19 @ 11:25    pt seen and examined  resp status better  afebrile   laying in bed    ROS: no fever or chills; denies dizziness, no HA, no SOB or cough, no abdominal pain, no diarrhea or constipation; no dysuria, no urinary frequency, no legs pain, no rashes    MEDICATIONS  (STANDING):  ALBUTerol    0.083% 2.5 milliGRAM(s) Nebulizer every 6 hours  amoxicillin  875 milliGRAM(s)/clavulanate 1 Tablet(s) Oral two times a day  dipyridamole 200 mG/aspirin 25 mG 1 Capsule(s) Oral two times a day  heparin  Injectable 5000 Unit(s) SubCutaneous every 8 hours  hydroxychloroquine 200 milliGRAM(s) Oral every 12 hours  levothyroxine 25 MICROGram(s) Oral daily  lidocaine   Patch 1 Patch Transdermal daily  memantine ER 21 milliGRAM(s) Oral daily  methylPREDNISolone sodium succinate Injectable 40 milliGRAM(s) IV Push every 12 hours  pantoprazole    Tablet 40 milliGRAM(s) Oral before breakfast  simvastatin 20 milliGRAM(s) Oral at bedtime  tamsulosin 0.4 milliGRAM(s) Oral at bedtime  tiotropium 18 MICROgram(s) Capsule 1 Capsule(s) Inhalation daily      Vital Signs Last 24 Hrs  T(C): 36.4 (2019 04:10), Max: 37 (2019 16:31)  T(F): 97.5 (2019 04:10), Max: 98.6 (2019 16:31)  HR: 84 (2019 08:20) (80 - 86)  BP: 147/66 (2019 04:10) (133/51 - 147/66)  BP(mean): --  RR: 17 (2019 04:10) (17 - 17)  SpO2: 95% (2019 04:10) (95% - 96%)        PE:  Constitutional: frail looking  HEENT: NC/AT, EOMI, PERRLA, conjunctivae clear; ears and nose atraumatic; pharynx benign  Neck: supple; thyroid not palpable  Back: no tenderness  Respiratory: decreased breath sounds  Cardiovascular: S1S2 regular, no murmurs  Abdomen: soft, not tender, not distended, positive BS; liver and spleen WNL  Genitourinary: no suprapubic tenderness  Lymphatic: no LN palpable  Musculoskeletal: no muscle tenderness, no joint swelling or tenderness  Extremities: no pedal edema  Neurological/ Psychiatric: moving all extremities  Skin: no rashes; no palpable lesions    Labs: all available labs reviewed              Urinalysis Basic - ( 2019 12:36 )    Color: Yellow / Appearance: Clear / S.015 / pH: x  Gluc: x / Ketone: Negative  / Bili: Negative / Urobili: Negative mg/dL   Blood: x / Protein: Negative mg/dL / Nitrite: Negative   Leuk Esterase: Negative / RBC: x / WBC x   Sq Epi: x / Non Sq Epi: x / Bacteria: x      Culture - Urine (19 @ 12:36)    Specimen Source: .Urine None    Culture Results:   No growth    Culture - Blood (19 @ 12:36)    Specimen Source: .Blood None    Culture Results:   No growth to date.        Radiology: all available radiological tests reviewed      EXAM:  CT ABDOMEN AND PELVIS                          EXAM:  CT CHEST                              PROCEDURE DATE:  2019          INTERPRETATION:  Clinical information: Cough. Rule out infiltrate. Lumbar   and right flank pain. History of breast cancer.    COMPARISON: CT chest 2018. CT abdomen/pelvis 2018.    PROCEDURE:   CT of the Chest, Abdomen and Pelvis was performed without intravenous   contrast.   Intravenous contrast: None  Oral contrast: None.  Sagittal and coronal reformats were performed.    FINDINGS:    CHEST:     LOWER NECK: Within normal limits.  AXILLA, MEDIASTINUM AND DOREEN: No lymphadenopathy.  VESSELS: Normal caliber aorta. Severe diffuse atherosclerotic arterial   calcification.  HEART: The heart is normal in size.No pericardial effusion.  PLEURA: Trace bilateral pleural effusions.  LUNGS AND LARGE AIRWAYS: Patent central airways. No pulmonary or mass.   Mild patchy airspace disease in the lingula, and to a lesser degree, the   right middle lobe. Posterior left lower lobe airspace disease could be on   the basis of atelectasis or pneumonia.  CHEST WALL:  Soft tissue mass in the right breast measures 5.2 cm,   incompletely visualized on the prior study. Right axillary shanon mass   measures 3.6 cm, increased in size from prior study (2.9 cm).    ABDOMEN AND PELVIS:    LIVER: Subtle contour nodularity of the liver suggestive of cirrhosis. No   focal hepatic lesion.  SPLEEN: Within normal limits.  PANCREAS: Within normal limits.  GALLBLADDER: Cholecystectomy.  BILE DUCTS: Normal caliber.  ADRENALS: Within normal limits.  KIDNEYS/URETERS: No mass, stone or hydronephrosis.    RETROPERITONEUM: No upper abdominal, retroperitoneal or pelvic   lymphadenopathy.    VESSELS:  Severe diffuse atherosclerotic arterial calcification. Normal   caliber aorta.    BOWEL: No bowel obstruction, wall thickening or inflammatory change.   Moderate hiatal hernia. Appendix not identified. Extensive colonic   diverticulosis without diverticulitis.  PERITONEUM: No ascites or pneumoperitoneum.    REPRODUCTIVE ORGANS: Hysterectomy. No adnexal mass.  BLADDER: Within normal limits    ABDOMINAL WALL: Within normal limits.  BONES: Severe T4 and T7 and moderate T9 compression deformities are   unchanged.Severe L2 compression deformity is age indeterminate but new   since 2018. Old bilateral healed rib fractures.    IMPRESSION:     Chest: Mild patchy airspace disease in the lingula and right middle lobe   compatible with pneumonia in the proper clinical setting.  Posterior left lower lobe opacity most likely atelectasis.  Large right breast mass compatible with history of breast cancer.  Right axillary shanon mass, increased in size.    Abdomen/pelvis: Severe L2 compression deformity, age indeterminate but   new since 2018.      Advanced directives addressed: full resuscitation
Date of service: 19 @ 11:44    pt seen and examined  no resp distress  afebrile     ROS: no fever or chills; denies dizziness, no HA, no SOB or cough, no abdominal pain, no diarrhea or constipation; no dysuria, no urinary frequency, no legs pain, no rashes    MEDICATIONS  (STANDING):  ALBUTerol    0.083% 2.5 milliGRAM(s) Nebulizer every 6 hours  dipyridamole 200 mG/aspirin 25 mG 1 Capsule(s) Oral two times a day  heparin  Injectable 5000 Unit(s) SubCutaneous every 8 hours  hydroxychloroquine 200 milliGRAM(s) Oral every 12 hours  levothyroxine 25 MICROGram(s) Oral daily  lidocaine   Patch 1 Patch Transdermal daily  memantine ER 21 milliGRAM(s) Oral daily  methylPREDNISolone sodium succinate Injectable 40 milliGRAM(s) IV Push every 12 hours  pantoprazole    Tablet 40 milliGRAM(s) Oral before breakfast  piperacillin/tazobactam IVPB. 3.375 Gram(s) IV Intermittent every 8 hours  simvastatin 20 milliGRAM(s) Oral at bedtime  tamsulosin 0.4 milliGRAM(s) Oral at bedtime  tiotropium 18 MICROgram(s) Capsule 1 Capsule(s) Inhalation daily      Vital Signs Last 24 Hrs  T(C): 37.2 (2019 10:46), Max: 37.2 (2019 05:04)  T(F): 99 (2019 10:46), Max: 99 (2019 10:46)  HR: 91 (2019 10:46) (80 - 99)  BP: 97/59 (2019 10:46) (97/59 - 128/50)  BP(mean): --  RR: 17 (2019 10:46) (17 - 18)  SpO2: 98% (2019 10:46) (94% - 98%)      PE:  Constitutional: frail looking  HEENT: NC/AT, EOMI, PERRLA, conjunctivae clear; ears and nose atraumatic; pharynx benign  Neck: supple; thyroid not palpable  Back: no tenderness  Respiratory: decreased breath sounds  Cardiovascular: S1S2 regular, no murmurs  Abdomen: soft, not tender, not distended, positive BS; liver and spleen WNL  Genitourinary: no suprapubic tenderness  Lymphatic: no LN palpable  Musculoskeletal: no muscle tenderness, no joint swelling or tenderness  Extremities: no pedal edema  Neurological/ Psychiatric: moving all extremities  Skin: no rashes; no palpable lesions    Labs: all available labs reviewed                        .  )-----------( 252      ( 15 Ubaldo 2019 08:25 )             39.6     01-15    140  |  107  |  21  ----------------------------<  152<H>  4.3   |  23  |  1.14    Ca    9.2      15 Ubaldo 2019 08:25  Mg     2.0     01-15    TPro  6.8  /  Alb  3.3  /  TBili  0.6  /  DBili  x   /  AST  21  /  ALT  32  /  AlkPhos  62  01-15           Urinalysis Basic - ( 2019 12:36 )    Color: Yellow / Appearance: Clear / S.015 / pH: x  Gluc: x / Ketone: Negative  / Bili: Negative / Urobili: Negative mg/dL   Blood: x / Protein: Negative mg/dL / Nitrite: Negative   Leuk Esterase: Negative / RBC: x / WBC x   Sq Epi: x / Non Sq Epi: x / Bacteria: x      Culture - Urine (19 @ 12:36)    Specimen Source: .Urine None    Culture Results:   No growth    Culture - Blood (19 @ 12:36)    Specimen Source: .Blood None    Culture Results:   No growth to date.        Radiology: all available radiological tests reviewed      EXAM:  CT ABDOMEN AND PELVIS                          EXAM:  CT CHEST                              PROCEDURE DATE:  2019          INTERPRETATION:  Clinical information: Cough. Rule out infiltrate. Lumbar   and right flank pain. History of breast cancer.    COMPARISON: CT chest 2018. CT abdomen/pelvis 2018.    PROCEDURE:   CT of the Chest, Abdomen and Pelvis was performed without intravenous   contrast.   Intravenous contrast: None  Oral contrast: None.  Sagittal and coronal reformats were performed.    FINDINGS:    CHEST:     LOWER NECK: Within normal limits.  AXILLA, MEDIASTINUM AND DOREEN: No lymphadenopathy.  VESSELS: Normal caliber aorta. Severe diffuse atherosclerotic arterial   calcification.  HEART: The heart is normal in size.No pericardial effusion.  PLEURA: Trace bilateral pleural effusions.  LUNGS AND LARGE AIRWAYS: Patent central airways. No pulmonary or mass.   Mild patchy airspace disease in the lingula, and to a lesser degree, the   right middle lobe. Posterior left lower lobe airspace disease could be on   the basis of atelectasis or pneumonia.  CHEST WALL:  Soft tissue mass in the right breast measures 5.2 cm,   incompletely visualized on the prior study. Right axillary shanon mass   measures 3.6 cm, increased in size from prior study (2.9 cm).    ABDOMEN AND PELVIS:    LIVER: Subtle contour nodularity of the liver suggestive of cirrhosis. No   focal hepatic lesion.  SPLEEN: Within normal limits.  PANCREAS: Within normal limits.  GALLBLADDER: Cholecystectomy.  BILE DUCTS: Normal caliber.  ADRENALS: Within normal limits.  KIDNEYS/URETERS: No mass, stone or hydronephrosis.    RETROPERITONEUM: No upper abdominal, retroperitoneal or pelvic   lymphadenopathy.    VESSELS:  Severe diffuse atherosclerotic arterial calcification. Normal   caliber aorta.    BOWEL: No bowel obstruction, wall thickening or inflammatory change.   Moderate hiatal hernia. Appendix not identified. Extensive colonic   diverticulosis without diverticulitis.  PERITONEUM: No ascites or pneumoperitoneum.    REPRODUCTIVE ORGANS: Hysterectomy. No adnexal mass.  BLADDER: Within normal limits    ABDOMINAL WALL: Within normal limits.  BONES: Severe T4 and T7 and moderate T9 compression deformities are   unchanged.Severe L2 compression deformity is age indeterminate but new   since 2018. Old bilateral healed rib fractures.    IMPRESSION:     Chest: Mild patchy airspace disease in the lingula and right middle lobe   compatible with pneumonia in the proper clinical setting.  Posterior left lower lobe opacity most likely atelectasis.  Large right breast mass compatible with history of breast cancer.  Right axillary shanon mass, increased in size.    Abdomen/pelvis: Severe L2 compression deformity, age indeterminate but   new since 2018.      Advanced directives addressed: full resuscitation
HPI:  88 y/o F resident of [facility] with PMHx significant for COPD not on home O2, CVA (2005), Rheumatoid arthritis (chronically on hydroxychloroquine and prednisone), chronic back pain, PMR (Polymyalgia rheumatica), right breast Ca, glaucoma OU s/p ocular surgery, hypertension, hyperlipidemia, and Alzheimer's dementia presents to the ED for further evaluation of a 2 week hx of a progressive productive cough, decreased PO intake, and increased lethargy/malaise. The patient was treated initially with an outpatient regimen with Ceftin PO, followed by Doxycycline PO. The patient also reportedly c/o lower back pain which the patient's daughter states is likely chronic. The patient's daughter noted that this morning (day of admission) the patient was increasingly lethargic prompting her visit to  today (1/14). At baseline the patient is responsive to verbal stimuli. Labs => WBC 13.48, LA 2.3 -> 1.2, RVP (-), CT Chest => 1) Mild patchy airspace disease in the lingula and right middle lobe compatible with pneumonia in the proper clinical setting. 2) Posterior left lower lobe opacity most likely atelectasis. 3) Large right breast mass compatible with history of breast cancer. 4) Right axillary shanon mass, increased in size. CT Abdomen/Pelvis => Severe L2 compression deformity, age indeterminate but new since September 30, 2018. In the ED the patient was given Piperacillin/tazobactam 3.375g IVPB x 1, Vancomycin 1g IVPB x 1, Methylprednisolone 40mg IVP x 1, and NS x 1L. (14 Jan 2019 17:25)    Called in consult for complaints of low back pain. No hx trauma. Steroid & O2 dependent due to COPD. Noted to have severe compression deformity L2. Patient has hx previous compression fracture T4 requiring kyphoplasty as well as thoracic compression frx T6,7 and 9 treated nonoperatively.    1/16/19 Patient in company of daughter-comfortable except with change in position. Daughter states Dr. Umana waiting to perform mastectomy and RT to see patient  1/17/19 Patient presently complaining of back pain despite still laying in bed  1/18/19 Patient comfortable sitting in bedside chair, brace delivered but not placed    PAST MEDICAL & SURGICAL HISTORY:  Hypothyroidism  Breast cancer: Right  Compression fracture of spine: T7  Alzheimers disease  HTN (hypertension)  Glaucoma  TIA (transient ischemic attack): 8/07  Cerebral vascular accident: 2005  Polymyalgia rheumatica  Osteoporosis  Chronic pain: mid back  Urinary retention  GERD (gastroesophageal reflux disease)  Cholelithiases  Hyperlipemia  Carotid artery stenosis  Lung nodule: biopsied 2003, benign  COPD (chronic obstructive pulmonary disease)  Asthma  Glaucoma of both eyes: s/p repair  History of hip surgery  Gall stones  History of hysterectomy: for bleeding  Hx of cholecystectomy    Allergies    Aciphex (Unknown)  Macrobid (Unknown)  Percocet 10/325 (Rash)    Intolerances    SH: retired, lives in assisted living facility, no EtOH or tobacco  FH: not pertinent in primary relatives  ROS c/w with multiple medical problems and back pain    WA:    Patient comfortable this am and, C/O R T-L spine and posterior chest wall pain    Vital Signs Last 24 Hrs  T(C): 36.5 (18 Jan 2019 04:36), Max: 36.7 (17 Jan 2019 12:08)  T(F): 97.7 (18 Jan 2019 04:36), Max: 98.1 (17 Jan 2019 12:08)  HR: 92 (18 Jan 2019 07:28) (71 - 93)  BP: 147/72 (18 Jan 2019 04:36) (116/59 - 147/72)  BP(mean): --  RR: 18 (18 Jan 2019 04:36) (16 - 18)  SpO2: 96% (18 Jan 2019 05:24) (93% - 96%)    Large R breast mass  extremities with multiple areas of ecchymosis on LES, minimal edema, no joint tenderness.  T-L spine-persistent pain with change in position in chair, persistent tenderness over R costal margin extending to midline T-L junction, negative SLR bilaterally, no focal motor deficits LEs.      LABS                       12.2   9.36  )-----------( 252      ( 15 Ubaldo 2019 08:25 )             39.6     01-15    140  |  107  |  21  ----------------------------<  152<H>  4.3   |  23  |  1.14    Ca    9.2      15 Ubaldo 2019 08:25  Mg     2.0     01-15    TPro  6.8  /  Alb  3.3  /  TBili  0.6  /  DBili  x   /  AST  21  /  ALT  32  /  AlkPhos  62  01-15    STUDIES:  CT C/A/P with new compression deformity L2 with approx 70% collapse, chronic compression fractures T4/6/7/9, ?R 12 rib fracture/lesion?, spinal stenosis L2/3-L4/5 with mild stenosis L5/S1 with calcified central disc. Additionally patient has large R breast mass. No evidence of spinal metastases-reviewed with Dr. Fairchild who did not feel there was a lesion
HPI:  88 y/o F resident of [facility] with PMHx significant for COPD not on home O2, CVA (2005), Rheumatoid arthritis (chronically on hydroxychloroquine and prednisone), chronic back pain, PMR (Polymyalgia rheumatica), right breast Ca, glaucoma OU s/p ocular surgery, hypertension, hyperlipidemia, and Alzheimer's dementia presents to the ED for further evaluation of a 2 week hx of a progressive productive cough, decreased PO intake, and increased lethargy/malaise. The patient was treated initially with an outpatient regimen with Ceftin PO, followed by Doxycycline PO. The patient also reportedly c/o lower back pain which the patient's daughter states is likely chronic. The patient's daughter noted that this morning (day of admission) the patient was increasingly lethargic prompting her visit to  today (1/14). At baseline the patient is responsive to verbal stimuli. Labs => WBC 13.48, LA 2.3 -> 1.2, RVP (-), CT Chest => 1) Mild patchy airspace disease in the lingula and right middle lobe compatible with pneumonia in the proper clinical setting. 2) Posterior left lower lobe opacity most likely atelectasis. 3) Large right breast mass compatible with history of breast cancer. 4) Right axillary shanon mass, increased in size. CT Abdomen/Pelvis => Severe L2 compression deformity, age indeterminate but new since September 30, 2018. In the ED the patient was given Piperacillin/tazobactam 3.375g IVPB x 1, Vancomycin 1g IVPB x 1, Methylprednisolone 40mg IVP x 1, and NS x 1L. (14 Jan 2019 17:25)    Called in consult for complaints of low back pain. No hx trauma. Steroid & O2 dependent due to COPD. Noted to have severe compression deformity L2. Patient has hx previous compression fracture T4 requiring kyphoplasty as well as thoracic compression frx T6,7 and 9 treated nonoperatively.    1/16/19 Patient in company of daughter-comfortable except with change in position. Daughter states Dr. Coley waiting to perform mastectomy and RT to see patient  1/17/19 Patient presently complaining of back pain despite still laying in bed    PAST MEDICAL & SURGICAL HISTORY:  Hypothyroidism  Breast cancer: Right  Compression fracture of spine: T7  Alzheimers disease  HTN (hypertension)  Glaucoma  TIA (transient ischemic attack): 8/07  Cerebral vascular accident: 2005  Polymyalgia rheumatica  Osteoporosis  Chronic pain: mid back  Urinary retention  GERD (gastroesophageal reflux disease)  Cholelithiases  Hyperlipemia  Carotid artery stenosis  Lung nodule: biopsied 2003, benign  COPD (chronic obstructive pulmonary disease)  Asthma  Glaucoma of both eyes: s/p repair  History of hip surgery  Gall stones  History of hysterectomy: for bleeding  Hx of cholecystectomy    Allergies    Aciphex (Unknown)  Macrobid (Unknown)  Percocet 10/325 (Rash)    Intolerances    SH: retired, lives in assisted living facility, no EtOH or tobacco  FH: not pertinent in primary relatives  ROS c/w with multiple medical problems and back pain    GA:    Patient uncomfortable this am and still laying in bed, C/O R T-L spine and chest wall pain    Vital Signs Last 24 Hrs  T(C): 36.4 (17 Jan 2019 04:10), Max: 37.2 (16 Jan 2019 10:46)  T(F): 97.5 (17 Jan 2019 04:10), Max: 99 (16 Jan 2019 10:46)  HR: 84 (17 Jan 2019 08:20) (80 - 91)  BP: 147/66 (17 Jan 2019 04:10) (97/59 - 147/66)  BP(mean): --  RR: 17 (17 Jan 2019 04:10) (17 - 17)  SpO2: 95% (17 Jan 2019 04:10) (95% - 98%)    Large R breast mass  extremities with multiple areas of ecchymosis on LES, minimal edema, no joint tenderness.  T-L spine-persistent pain with change in position in chair, diffuse tender over R costal margin extending to midline T-L junction, negative SLR bilaterally, no focal motor deficits LEs.      LABS                       12.2   9.36  )-----------( 252      ( 15 Ubaldo 2019 08:25 )             39.6     01-15    140  |  107  |  21  ----------------------------<  152<H>  4.3   |  23  |  1.14    Ca    9.2      15 Ubaldo 2019 08:25  Mg     2.0     01-15    TPro  6.8  /  Alb  3.3  /  TBili  0.6  /  DBili  x   /  AST  21  /  ALT  32  /  AlkPhos  62  01-15    STUDIES:  CT C/A/P with new compression deformity L2 with approx 70% collapse, chronic compression fractures T4/6/7/9, ?R 12 rib fracture/lesion?, spinal stenosis L2/3-L4/5 with mild stenosis L5/S1 with calcified central disc. Additionally patient has large R breast mass. No evidence of spinal metastases-reviewed with Dr. Fairchild who did not feel there was a lesion
HPI:  88 y/o F resident of [facility] with PMHx significant for COPD not on home O2, CVA (2005), Rheumatoid arthritis (chronically on hydroxychloroquine and prednisone), chronic back pain, PMR (Polymyalgia rheumatica), right breast Ca, glaucoma OU s/p ocular surgery, hypertension, hyperlipidemia, and Alzheimer's dementia presents to the ED for further evaluation of a 2 week hx of a progressive productive cough, decreased PO intake, and increased lethargy/malaise. The patient was treated initially with an outpatient regimen with Ceftin PO, followed by Doxycycline PO. The patient also reportedly c/o lower back pain which the patient's daughter states is likely chronic. The patient's daughter noted that this morning (day of admission) the patient was increasingly lethargic prompting her visit to  today (1/14). At baseline the patient is responsive to verbal stimuli. Labs => WBC 13.48, LA 2.3 -> 1.2, RVP (-), CT Chest => 1) Mild patchy airspace disease in the lingula and right middle lobe compatible with pneumonia in the proper clinical setting. 2) Posterior left lower lobe opacity most likely atelectasis. 3) Large right breast mass compatible with history of breast cancer. 4) Right axillary shanon mass, increased in size. CT Abdomen/Pelvis => Severe L2 compression deformity, age indeterminate but new since September 30, 2018. In the ED the patient was given Piperacillin/tazobactam 3.375g IVPB x 1, Vancomycin 1g IVPB x 1, Methylprednisolone 40mg IVP x 1, and NS x 1L. (14 Jan 2019 17:25)    Called in consult for complaints of low back pain. No hx trauma. Steroid & O2 dependent due to COPD. Noted to have severe compression deformity L2. Patient has hx previous compression fracture T4 requiring kyphoplasty as well as thoracic compression frx T6,7 and 9 treated nonoperatively.    1/16/19 Patient in company of daughter-comfortable except with change in position. Daughter states Dr. Coley waiting to perform mastectomy and RT to see patient    PAST MEDICAL & SURGICAL HISTORY:  Hypothyroidism  Breast cancer: Right  Compression fracture of spine: T7  Alzheimers disease  HTN (hypertension)  Glaucoma  TIA (transient ischemic attack): 8/07  Cerebral vascular accident: 2005  Polymyalgia rheumatica  Osteoporosis  Chronic pain: mid back  Urinary retention  GERD (gastroesophageal reflux disease)  Cholelithiases  Hyperlipemia  Carotid artery stenosis  Lung nodule: biopsied 2003, benign  COPD (chronic obstructive pulmonary disease)  Asthma  Glaucoma of both eyes: s/p repair  History of hip surgery  Gall stones  History of hysterectomy: for bleeding  Hx of cholecystectomy    Allergies    Aciphex (Unknown)  Macrobid (Unknown)  Percocet 10/325 (Rash)    Intolerances    SH: retired, lives in assisted living facility, no EtOH or tobacco  FH: not pertinent in primary relatives  ROS c/w with multiple medical problems and back pain    TN:    Patient sitting comfortably in bedside chair, C/O R T-L spine and chest wall pain    Vital Signs Last 24 Hrs  T(C): 37.2 (16 Jan 2019 05:04), Max: 37.2 (16 Jan 2019 05:04)  T(F): 98.9 (16 Jan 2019 05:04), Max: 98.9 (16 Jan 2019 05:04)  HR: 99 (16 Jan 2019 05:04) (80 - 102)  BP: 100/56 (16 Jan 2019 05:04) (100/56 - 128/50)  BP(mean): --  RR: 18 (16 Jan 2019 05:04) (17 - 19)  SpO2: 94% (16 Jan 2019 05:04) (94% - 95%)    Heent unremarkable  neck supple without complaints of pain, no tenderness, no bruits, thyroid nonpalpable  Large R breast mass  extremities with multiple areas of ecchymosis on LES, minimal edema, no joint tenderness.  T-L spine-persistent pain with change in position in chair, lidoderm patch in place, maximal tender over R costal margin with mild midline tenderness T-L junction, negative SLR bilaterally, no focal motor deficits LEs.      LABS                       12.2   9.36  )-----------( 252      ( 15 Ubaldo 2019 08:25 )             39.6     01-15    140  |  107  |  21  ----------------------------<  152<H>  4.3   |  23  |  1.14    Ca    9.2      15 Ubaldo 2019 08:25  Mg     2.0     01-15    TPro  6.8  /  Alb  3.3  /  TBili  0.6  /  DBili  x   /  AST  21  /  ALT  32  /  AlkPhos  62  01-15    STUDIES:  CT C/A/P with new compression deformity L2 with approx 70% collapse, chronic compression fractures T4/6/7/9, ?R 12 rib fracture/lesion?, spinal stenosis L2/3-L4/5 with mild stenosis L5/S1 with calcified central disc. Additionally patient has large R breast mass. No evidence of spinal metastases-reviewed with Dr. Fairchild who did not feel there was a lesion
HPI:  89 y/o F resident of [facility] with PMHx significant for COPD not on home O2, CVA (2005), Rheumatoid arthritis (chronically on hydroxychloroquine and prednisone), chronic back pain, PMR (Polymyalgia rheumatica), right breast Ca, glaucoma OU s/p ocular surgery, hypertension, hyperlipidemia, and Alzheimer's dementia presents to the ED for further evaluation of a 2 week hx of a progressive productive cough, decreased PO intake, and increased lethargy/malaise. The patient was treated initially with an outpatient regimen with Ceftin PO, followed by Doxycycline PO. The patient also reportedly c/o lower back pain which the patient's daughter states is likely chronic. The patient's daughter noted that this morning (day of admission) the patient was increasingly lethargic prompting her visit to  today (1/14). At baseline the patient is responsive to verbal stimuli. Labs => WBC 13.48, LA 2.3 -> 1.2, RVP (-), CT Chest => 1) Mild patchy airspace disease in the lingula and right middle lobe compatible with pneumonia in the proper clinical setting. 2) Posterior left lower lobe opacity most likely atelectasis. 3) Large right breast mass compatible with history of breast cancer. 4) Right axillary shanon mass, increased in size. CT Abdomen/Pelvis => Severe L2 compression deformity, age indeterminate but new since September 30, 2018. In the ED the patient was given Piperacillin/tazobactam 3.375g IVPB x 1, Vancomycin 1g IVPB x 1, Methylprednisolone 40mg IVP x 1, and NS x 1L. (14 Jan 2019 17:25)    Called in consult for complaints of low back pain. No hx trauma. Steroid & O2 dependent due to COPD. Noted to have severe compression deformity L2. Patient has hx previous compression fracture T4 requiring kyphoplasty as well as thoracic compression frx T6,7 and 9 treated nonoperatively.    1/16/19 Patient in company of daughter-comfortable except with change in position. Daughter states Dr. Umana waiting to perform mastectomy and RT to see patient  1/17/19 Patient presently complaining of back pain despite still laying in bed  1/18/19 Patient comfortable sitting in bedside chair, brace delivered but not placed  1/19/19 Patient comfortable sitting in bedside chair, appears comfortable, planning for D/C today    PAST MEDICAL & SURGICAL HISTORY:  Hypothyroidism  Breast cancer: Right  Compression fracture of spine: T7  Alzheimers disease  HTN (hypertension)  Glaucoma  TIA (transient ischemic attack): 8/07  Cerebral vascular accident: 2005  Polymyalgia rheumatica  Osteoporosis  Chronic pain: mid back  Urinary retention  GERD (gastroesophageal reflux disease)  Cholelithiases  Hyperlipemia  Carotid artery stenosis  Lung nodule: biopsied 2003, benign  COPD (chronic obstructive pulmonary disease)  Asthma  Glaucoma of both eyes: s/p repair  History of hip surgery  Gall stones  History of hysterectomy: for bleeding  Hx of cholecystectomy    Allergies    Aciphex (Unknown)  Macrobid (Unknown)  Percocet 10/325 (Rash)    Intolerances    SH: retired, lives in assisted living facility, no EtOH or tobacco  FH: not pertinent in primary relatives  ROS c/w with multiple medical problems and back pain    PE:    Vital Signs Last 24 Hrs  T(C): 36.8 (19 Jan 2019 04:26), Max: 36.8 (18 Jan 2019 17:50)  T(F): 98.3 (19 Jan 2019 04:26), Max: 98.3 (18 Jan 2019 17:50)  HR: 89 (19 Jan 2019 04:26) (89 - 98)  BP: 146/72 (19 Jan 2019 04:26) (94/58 - 146/72)  BP(mean): --  RR: 18 (19 Jan 2019 04:26) (17 - 18)  SpO2: 95% (19 Jan 2019 04:26) (92% - 96%)    Patient comfortable this am sitting in chair having hair done, C/O R T-L spine and posterior chest wall pain, brace never applied due to anxiety  Large R breast mass  extremities with multiple areas of ecchymosis on LES, minimal edema, no joint tenderness.  T-L spine-persistent pain with change in position in chair, moderate tenderness over R costal margin extending to midline T-L junction, negative SLR bilaterally, no focal motor deficits LEs.      LABS       STUDIES:  CT C/A/P with new compression deformity L2 with approx 70% collapse, chronic compression fractures T4/6/7/9, ?R 12 rib fracture/lesion?, spinal stenosis L2/3-L4/5 with mild stenosis L5/S1 with calcified central disc. Additionally patient has large R breast mass. No evidence of spinal metastases-reviewed with Dr. Fairchild who did not feel there was a lesion
SUBJECTIVE     Patient offers no new respiratory symptoms of cough and wheezing   breathing comfortably seen sitting in chair with out 02   on po prednisone now     PAST MEDICAL & SURGICAL HISTORY:  Hypothyroidism  Breast cancer: Right  Compression fracture of spine: T7  Alzheimers disease  HTN (hypertension)  Glaucoma  TIA (transient ischemic attack): 8/07  Cerebral vascular accident: 2005  Polymyalgia rheumatica  Osteoporosis  Chronic pain: mid back  Urinary retention  GERD (gastroesophageal reflux disease)  Cholelithiases  Hyperlipemia  Carotid artery stenosis  Lung nodule: biopsied 2003, benign  COPD (chronic obstructive pulmonary disease)  Asthma  Glaucoma of both eyes: s/p repair  History of hip surgery  Gall stones  History of hysterectomy: for bleeding  Hx of cholecystectomy    OBJECTIVE   Vital Signs Last 24 Hrs  T(C): 36.8 (19 Jan 2019 11:23), Max: 36.8 (18 Jan 2019 17:50)  T(F): 98.3 (19 Jan 2019 11:23), Max: 98.3 (18 Jan 2019 17:50)  HR: 104 (19 Jan 2019 11:23) (88 - 104)  BP: 134/56 (19 Jan 2019 11:23) (94/58 - 146/72)  BP(mean): --  RR: 18 (19 Jan 2019 11:23) (17 - 18)  SpO2: 94% (19 Jan 2019 11:23) (94% - 96%)    PHYSICAL EXAM:  Constitutional: , awake and alert, not in distress.  HEENT: Normo cephalic atraumatic  Neck: Soft and supple, No J.V.D   Respiratory: vesicular breathing has rales in the bases with no any acute wheeze   Cardiovascular: S1 and S2, regular rate .   Gastrointestinal:  soft, nontender,   Extremities: No  edema or calf tenderness .  Neurological: No new  focal deficits.    MEDICATIONS  (STANDING):  ALBUTerol    0.083% 2.5 milliGRAM(s) Nebulizer every 6 hours  amoxicillin  875 milliGRAM(s)/clavulanate 1 Tablet(s) Oral two times a day  dipyridamole 200 mG/aspirin 25 mG 1 Capsule(s) Oral two times a day  heparin  Injectable 5000 Unit(s) SubCutaneous every 8 hours  hydroxychloroquine 200 milliGRAM(s) Oral every 12 hours  levothyroxine 25 MICROGram(s) Oral daily  lidocaine   Patch 1 Patch Transdermal daily  memantine ER 21 milliGRAM(s) Oral daily  pantoprazole    Tablet 40 milliGRAM(s) Oral before breakfast  predniSONE   Tablet 20 milliGRAM(s) Oral two times a day  simvastatin 20 milliGRAM(s) Oral at bedtime  tamsulosin 0.4 milliGRAM(s) Oral at bedtime  tiotropium 18 MICROgram(s) Capsule 1 Capsule(s) Inhalation daily
SUBJECTIVE     patient seen sitting in chair with out any new complaints of sob or cough or wheeze and not wearing the 02   if antibiotics changed to po .  no fever spikes     PAST MEDICAL & SURGICAL HISTORY:  Hypothyroidism  Breast cancer: Right  Compression fracture of spine: T7  Alzheimers disease  HTN (hypertension)  Glaucoma  TIA (transient ischemic attack): 8/07  Cerebral vascular accident: 2005  Polymyalgia rheumatica  Osteoporosis  Chronic pain: mid back  Urinary retention  GERD (gastroesophageal reflux disease)  Cholelithiases  Hyperlipemia  Carotid artery stenosis  Lung nodule: biopsied 2003, benign  COPD (chronic obstructive pulmonary disease)  Asthma  Glaucoma of both eyes: s/p repair  History of hip surgery  Gall stones  History of hysterectomy: for bleeding  Hx of cholecystectomy    OBJECTIVE   Vital Signs Last 24 Hrs  T(C): 36.5 (18 Jan 2019 04:36), Max: 36.7 (17 Jan 2019 12:08)  T(F): 97.7 (18 Jan 2019 04:36), Max: 98.1 (17 Jan 2019 12:08)  HR: 82 (18 Jan 2019 04:36) (71 - 85)  BP: 147/72 (18 Jan 2019 04:36) (116/59 - 147/72)  BP(mean): --  RR: 18 (18 Jan 2019 04:36) (16 - 18)  SpO2: 96% (18 Jan 2019 05:24) (93% - 96%)    PHYSICAL EXAM:  Constitutional: , awake and alert, not in distress seen sitting in  chair   HEENT: Normo cephalic atraumatic  Neck: Soft and supple, No J.V.D   Respiratory: vesicular breathing , No wheezing and has few rales over the right base .  Cardiovascular: S1 and S2, regular rate .   Gastrointestinal:  soft, nontender,   Extremities: No  edema or calf tenderness .  Neurological: No new  focal deficits and has baseline dementia     MEDICATIONS  (STANDING):  ALBUTerol    0.083% 2.5 milliGRAM(s) Nebulizer every 6 hours  amoxicillin  875 milliGRAM(s)/clavulanate 1 Tablet(s) Oral two times a day  dipyridamole 200 mG/aspirin 25 mG 1 Capsule(s) Oral two times a day  heparin  Injectable 5000 Unit(s) SubCutaneous every 8 hours  hydroxychloroquine 200 milliGRAM(s) Oral every 12 hours  levothyroxine 25 MICROGram(s) Oral daily  lidocaine   Patch 1 Patch Transdermal daily  memantine ER 21 milliGRAM(s) Oral daily  methylPREDNISolone sodium succinate Injectable 40 milliGRAM(s) IV Push every 12 hours  pantoprazole    Tablet 40 milliGRAM(s) Oral before breakfast  simvastatin 20 milliGRAM(s) Oral at bedtime  tamsulosin 0.4 milliGRAM(s) Oral at bedtime  tiotropium 18 MICROgram(s) Capsule 1 Capsule(s) Inhalation daily
Subjective:  Feels like her breathing has improved  Continues to have back pain  No fevers    Review of Systems:  All 10 systems reviewed in detailed and found to be negative with the exception of what has already been described above    Allergies:  Aciphex (Unknown)  Macrobid (Unknown)  Percocet 10/325 (Rash)    Meds  MEDICATIONS  (STANDING):  ALBUTerol    0.083% 2.5 milliGRAM(s) Nebulizer every 6 hours  dipyridamole 200 mG/aspirin 25 mG 1 Capsule(s) Oral two times a day  heparin  Injectable 5000 Unit(s) SubCutaneous every 8 hours  hydroxychloroquine 200 milliGRAM(s) Oral every 12 hours  levothyroxine 25 MICROGram(s) Oral daily  lidocaine   Patch 1 Patch Transdermal daily  memantine ER 21 milliGRAM(s) Oral daily  methylPREDNISolone sodium succinate Injectable 40 milliGRAM(s) IV Push every 12 hours  pantoprazole    Tablet 40 milliGRAM(s) Oral before breakfast  piperacillin/tazobactam IVPB. 3.375 Gram(s) IV Intermittent every 8 hours  simvastatin 20 milliGRAM(s) Oral at bedtime  tamsulosin 0.4 milliGRAM(s) Oral at bedtime  tiotropium 18 MICROgram(s) Capsule 1 Capsule(s) Inhalation daily    MEDICATIONS  (PRN):  acetaminophen   Tablet .. 650 milliGRAM(s) Oral every 6 hours PRN Temp greater or equal to 38C (100.4F), Mild Pain (1 - 3)  docusate sodium 100 milliGRAM(s) Oral three times a day PRN Constipation  guaiFENesin   Syrup  (Sugar-Free) 200 milliGRAM(s) Oral every 6 hours PRN Cough  ondansetron Injectable 4 milliGRAM(s) IV Push every 6 hours PRN Nausea  senna 2 Tablet(s) Oral at bedtime PRN Constipation    Physical Exam  T(C): 37 (01-16-19 @ 16:31), Max: 37.2 (01-16-19 @ 05:04)  HR: 85 (01-16-19 @ 16:31) (80 - 99)  BP: 133/51 (01-16-19 @ 16:31) (97/59 - 133/51)  RR: 17 (01-16-19 @ 16:31) (17 - 18)  SpO2: 96% (01-16-19 @ 16:31) (94% - 98%)  Gen: Alert, oriented, no distress  HEENT: Anicteric sclera, moist mucous membranes, no JVD, no lymphadenopathy, good dentition  Cardio: Regular rhythm and rate, normal S1S2, no murmurs  Resp: Crackles bilaterally, slight wheezing  GI: Nontender, nondistended, normoactive bowel sounds  Ext: No cyanosis, clubbing or edema  Neuro: Nonfocal    Labs:                        12.2   9.36  )-----------( 252      ( 15 Ubaldo 2019 08:25 )             39.6     01-15    140  |  107  |  21  ----------------------------<  152<H>  4.3   |  23  |  1.14    Ca    9.2      15 Ubaldo 2019 08:25  Mg     2.0     01-15    TPro  6.8  /  Alb  3.3  /  TBili  0.6  /  DBili  x   /  AST  21  /  ALT  32  /  AlkPhos  62  01-15    < from: CT Chest No Cont (01.14.19 @ 14:12) > Personally reviewed  PROCEDURE:   CT of the Chest, Abdomen and Pelvis was performed without intravenous   contrast.   Intravenous contrast: None  Oral contrast: None.  Sagittal and coronal reformats were performed.    FINDINGS:    CHEST:     LOWER NECK: Within normal limits.  AXILLA, MEDIASTINUM AND DOREEN: No lymphadenopathy.  VESSELS: Normal caliber aorta. Severe diffuse atherosclerotic arterial   calcification.  HEART: The heart is normal in size.No pericardial effusion.  PLEURA: Trace bilateral pleural effusions.  LUNGS AND LARGE AIRWAYS: Patent central airways. No pulmonary or mass.   Mild patchy airspace disease in the lingula, and to a lesser degree, the   right middle lobe. Posterior left lower lobe airspace disease could be on   the basis of atelectasis or pneumonia.  CHEST WALL:  Soft tissue mass in the right breast measures 5.2 cm,   incompletely visualized on the prior study. Right axillary shanon mass   measures 3.6 cm, increased in size from prior study (2.9 cm).    ABDOMEN AND PELVIS:    LIVER: Subtle contour nodularity of the liver suggestive of cirrhosis. No   focal hepatic lesion.  SPLEEN: Within normal limits.  PANCREAS: Within normal limits.  GALLBLADDER: Cholecystectomy.  BILE DUCTS: Normal caliber.  ADRENALS: Within normal limits.  KIDNEYS/URETERS: No mass, stone or hydronephrosis.    RETROPERITONEUM: No upper abdominal, retroperitoneal or pelvic   lymphadenopathy.    VESSELS:  Severe diffuse atherosclerotic arterial calcification. Normal   caliber aorta.    BOWEL: No bowel obstruction, wall thickening or inflammatory change.   Moderate hiatal hernia. Appendix not identified. Extensive colonic   diverticulosis without diverticulitis.  PERITONEUM: No ascites or pneumoperitoneum.    REPRODUCTIVE ORGANS: Hysterectomy. No adnexal mass.  BLADDER: Within normal limits    ABDOMINAL WALL: Within normal limits.  BONES: Severe T4 and T7 and moderate T9 compression deformities are   unchanged.Severe L2 compression deformity is age indeterminate but new   since September 30, 2018. Old bilateral healed rib fractures.    IMPRESSION:     Chest: Mild patchy airspace disease in the lingula and right middle lobe   compatible with pneumonia in the proper clinical setting.  Posterior left lower lobe opacity most likely atelectasis.  Large right breast mass compatible with history of breast cancer.  Right axillary shanon mass, increased in size.    Abdomen/pelvis: Severe L2 compression deformity, age indeterminate but   new since September 30, 2018.    < end of copied text >
Subjective:  feels fatigued today  Denies SOB  Denies chest pain  Continues to have back pain    Review of Systems:  All 10 systems reviewed in detailed and found to be negative with the exception of what has already been described above    Allergies:  Aciphex (Unknown)  Macrobid (Unknown)  Percocet 10/325 (Rash)    Meds  MEDICATIONS  (STANDING):  ALBUTerol    0.083% 2.5 milliGRAM(s) Nebulizer every 6 hours  amoxicillin  875 milliGRAM(s)/clavulanate 1 Tablet(s) Oral two times a day  dipyridamole 200 mG/aspirin 25 mG 1 Capsule(s) Oral two times a day  heparin  Injectable 5000 Unit(s) SubCutaneous every 8 hours  hydroxychloroquine 200 milliGRAM(s) Oral every 12 hours  levothyroxine 25 MICROGram(s) Oral daily  lidocaine   Patch 1 Patch Transdermal daily  memantine ER 21 milliGRAM(s) Oral daily  methylPREDNISolone sodium succinate Injectable 40 milliGRAM(s) IV Push every 12 hours  pantoprazole    Tablet 40 milliGRAM(s) Oral before breakfast  simvastatin 20 milliGRAM(s) Oral at bedtime  tamsulosin 0.4 milliGRAM(s) Oral at bedtime  tiotropium 18 MICROgram(s) Capsule 1 Capsule(s) Inhalation daily    MEDICATIONS  (PRN):  acetaminophen   Tablet .. 650 milliGRAM(s) Oral every 6 hours PRN Temp greater or equal to 38C (100.4F), Mild Pain (1 - 3)  docusate sodium 100 milliGRAM(s) Oral three times a day PRN Constipation  guaiFENesin   Syrup  (Sugar-Free) 200 milliGRAM(s) Oral every 6 hours PRN Cough  ondansetron Injectable 4 milliGRAM(s) IV Push every 6 hours PRN Nausea  senna 2 Tablet(s) Oral at bedtime PRN Constipation    Physical Exam  T(C): 36.7 (01-17-19 @ 12:08), Max: 37 (01-16-19 @ 16:31)  HR: 84 (01-17-19 @ 12:08) (80 - 86)  BP: 116/59 (01-17-19 @ 12:08) (116/59 - 147/66)  RR: 16 (01-17-19 @ 12:08) (16 - 17)  SpO2: 93% (01-17-19 @ 12:08) (93% - 96%)  Gen: Alert, oriented, no distress  HEENT: Anicteric sclera, moist mucous membranes, no JVD, no lymphadenopathy, good dentition  Cardio: Regular rhythm and rate, normal S1S2, no murmurs  Resp: Clear to auscultation bilaterally, no wheezing or rhonchi  GI: Nontender, nondistended, normoactive bowel sounds  Ext: No cyanosis, clubbing or edema  Neuro: Nonfocal    Labs:    < from: CT Chest No Cont (01.14.19 @ 14:12) > Personally reviewed  PROCEDURE:   CT of the Chest, Abdomen and Pelvis was performed without intravenous   contrast.   Intravenous contrast: None  Oral contrast: None.  Sagittal and coronal reformats were performed.    FINDINGS:    CHEST:     LOWER NECK: Within normal limits.  AXILLA, MEDIASTINUM AND DOREEN: No lymphadenopathy.  VESSELS: Normal caliber aorta. Severe diffuse atherosclerotic arterial   calcification.  HEART: The heart is normal in size.No pericardial effusion.  PLEURA: Trace bilateral pleural effusions.  LUNGS AND LARGE AIRWAYS: Patent central airways. No pulmonary or mass.   Mild patchy airspace disease in the lingula, and to a lesser degree, the   right middle lobe. Posterior left lower lobe airspace disease could be on   the basis of atelectasis or pneumonia.  CHEST WALL:  Soft tissue mass in the right breast measures 5.2 cm,   incompletely visualized on the prior study. Right axillary shanon mass   measures 3.6 cm, increased in size from prior study (2.9 cm).    ABDOMEN AND PELVIS:    LIVER: Subtle contour nodularity of the liver suggestive of cirrhosis. No   focal hepatic lesion.  SPLEEN: Within normal limits.  PANCREAS: Within normal limits.  GALLBLADDER: Cholecystectomy.  BILE DUCTS: Normal caliber.  ADRENALS: Within normal limits.  KIDNEYS/URETERS: No mass, stone or hydronephrosis.    RETROPERITONEUM: No upper abdominal, retroperitoneal or pelvic   lymphadenopathy.    VESSELS:  Severe diffuse atherosclerotic arterial calcification. Normal   caliber aorta.    BOWEL: No bowel obstruction, wall thickening or inflammatory change.   Moderate hiatal hernia. Appendix not identified. Extensive colonic   diverticulosis without diverticulitis.  PERITONEUM: No ascites or pneumoperitoneum.    REPRODUCTIVE ORGANS: Hysterectomy. No adnexal mass.  BLADDER: Within normal limits    ABDOMINAL WALL: Within normal limits.  BONES: Severe T4 and T7 and moderate T9 compression deformities are   unchanged.Severe L2 compression deformity is age indeterminate but new   since September 30, 2018. Old bilateral healed rib fractures.    IMPRESSION:     Chest: Mild patchy airspace disease in the lingula and right middle lobe   compatible with pneumonia in the proper clinical setting.  Posterior left lower lobe opacity most likely atelectasis.  Large right breast mass compatible with history of breast cancer.  Right axillary shanon mass, increased in size.    Abdomen/pelvis: Severe L2 compression deformity, age indeterminate but   new since September 30, 2018.    < end of copied text >
cc:	AMS with cough	    History of Present Illness:   88 y/o F resident of [facility] with PMHx significant for COPD not on home O2, CVA (2005), Rheumatoid arthritis (chronically on hydroxychloroquine and prednisone), chronic back pain, PMR (Polymyalgia rheumatica), right breast Ca, glaucoma OU s/p ocular surgery, hypertension, hyperlipidemia, and Alzheimer's dementia presents to the ED for further evaluation of a 2 week hx of a progressive productive cough, decreased PO intake, and increased lethargy/malaise. The patient was treated initially with an outpatient regimen with Ceftin PO, followed by Doxycycline PO. The patient also reportedly c/o lower back pain which the patient's daughter states is likely chronic. The patient's daughter noted that this morning (day of admission) the patient was increasingly lethargic prompting her visit to  today (1/14). At baseline the patient is responsive to verbal stimuli. Labs => WBC 13.48, LA 2.3 -> 1.2, RVP (-), CT Chest => 1) Mild patchy airspace disease in the lingula and right middle lobe compatible with pneumonia in the proper clinical setting. 2) Posterior left lower lobe opacity most likely atelectasis. 3) Large right breast mass compatible with history of breast cancer. 4) Right axillary shanon mass, increased in size. CT Abdomen/Pelvis => Severe L2 compression deformity, age indeterminate but new since September 30, 2018. In the ED the patient was given Piperacillin/tazobactam 3.375g IVPB x 1, Vancomycin 1g IVPB x 1, Methylprednisolone 40mg IVP x 1, and NS x 1L.    1/15 - feeling better today. still with cough and some SOB.  1/16: Continues to improve, has back pain but tolerable.  No fever, chills, n, v.   01/17/19: Patient seen and examined. No new complaints. Discussed with daughter on phone in  length regarding management and d/c plan.   0118/19: Patient seen and examined. She was sitting in a chair in am, denies any sob or cough. Patient now complaining of hand tremors, H/O tremors as per patient.     ROS:   All 10 systems reviewed and found to be negative with the exception of what has been described above.      Vital Signs Last 24 Hrs  T(C): 36.1 (18 Jan 2019 12:04), Max: 36.5 (17 Jan 2019 16:38)  T(F): 97 (18 Jan 2019 12:04), Max: 97.7 (17 Jan 2019 16:38)  HR: 93 (18 Jan 2019 12:04) (71 - 93)  BP: 128/73 (18 Jan 2019 12:04) (126/52 - 147/72)  BP(mean): --  RR: 18 (18 Jan 2019 12:04) (18 - 18)  SpO2: 92% (18 Jan 2019 12:04) (92% - 96%)        Physical exam:         GEN: lying in bed, NAD  HEENT:   NC/AT, pupils equal and reactive, EOMI  CV:  +S1, +S2, RRR  RESP:   few scattered exp wheeze b/l --> improving  BREAST:  not examined  GI:  abdomen soft, non-tender, non-distended, normoactive BS  MSK:   normal muscle tone  EXT:  no edema  NEURO:  AAOX3, no focal neurological deficits  SKIN:  no rashes        MEDICATIONS:      MEDICATIONS  (STANDING):  ALBUTerol    0.083% 2.5 milliGRAM(s) Nebulizer every 6 hours  amoxicillin  875 milliGRAM(s)/clavulanate 1 Tablet(s) Oral two times a day  dipyridamole 200 mG/aspirin 25 mG 1 Capsule(s) Oral two times a day  heparin  Injectable 5000 Unit(s) SubCutaneous every 8 hours  hydroxychloroquine 200 milliGRAM(s) Oral every 12 hours  levothyroxine 25 MICROGram(s) Oral daily  lidocaine   Patch 1 Patch Transdermal daily  memantine ER 21 milliGRAM(s) Oral daily  morphine  - Injectable 2 milliGRAM(s) IV Push once  pantoprazole    Tablet 40 milliGRAM(s) Oral before breakfast  predniSONE   Tablet 20 milliGRAM(s) Oral two times a day  simvastatin 20 milliGRAM(s) Oral at bedtime  tamsulosin 0.4 milliGRAM(s) Oral at bedtime  tiotropium 18 MICROgram(s) Capsule 1 Capsule(s) Inhalation daily    MEDICATIONS  (PRN):  acetaminophen   Tablet .. 650 milliGRAM(s) Oral every 6 hours PRN Temp greater or equal to 38C (100.4F), Mild Pain (1 - 3)  docusate sodium 100 milliGRAM(s) Oral three times a day PRN Constipation  guaiFENesin   Syrup  (Sugar-Free) 200 milliGRAM(s) Oral every 6 hours PRN Cough  ondansetron Injectable 4 milliGRAM(s) IV Push every 6 hours PRN Nausea  senna 2 Tablet(s) Oral at bedtime PRN Constipation            Labs:                    No labs today          Assessment and Plan:       88 y/o F resident of [facility] with PMHx significant for COPD not on home O2, CVA (2005), Rheumatoid arthritis (chronically on hydroxychloroquine and prednisone), chronic back pain, PMR (Polymyalgia rheumatica), right breast Ca, glaucoma OU s/p ocular surgery, hypertension, hyperlipidemia, and Alzheimer's dementia presents to the ED and found to have severe sepsis 2/2 multifocal pna    # severe sepsis 2/2 mutlifocal pna/HCAP - initial lactate on admission 2.3 and improved with fluids. severe sepsis resolved  - ID follow up appreciated  - Pneumonia in the Immunocompromised Host   - Urine and blood cultures negative  - RVP (-)  - pulm eval noted - slow steroid taper  - cont. nebs prn  - incentive neeta    # Compression fracture of spine/ hx of breast CA  - ortho eval noted.   - pain meds prn and further imaging as per ortho  - pt eval appreciated  - outpatient palliative radiation per Dr. Jean  -Waiting for brace adjustment    # COPD (chronic obstructive pulmonary disease).    - cont. nebs prn  - cont. Spiriva  - pulm eval appreciated     # Rheumatoid arthritis.    -  cont. Hydroxychloroquine 200mg po q12h  - cont. Prednisone upon dc. continue iv steroids for now.     # Alzheimers disease  - cont. Namenda XR 21mg po daily.     # Hypothyroidism.  - ont. Levothyroxine 25mcg po qam
cc:	AMS with cough	    History of Present Illness:   88 y/o F resident of [facility] with PMHx significant for COPD not on home O2, CVA (2005), Rheumatoid arthritis (chronically on hydroxychloroquine and prednisone), chronic back pain, PMR (Polymyalgia rheumatica), right breast Ca, glaucoma OU s/p ocular surgery, hypertension, hyperlipidemia, and Alzheimer's dementia presents to the ED for further evaluation of a 2 week hx of a progressive productive cough, decreased PO intake, and increased lethargy/malaise. The patient was treated initially with an outpatient regimen with Ceftin PO, followed by Doxycycline PO. The patient also reportedly c/o lower back pain which the patient's daughter states is likely chronic. The patient's daughter noted that this morning (day of admission) the patient was increasingly lethargic prompting her visit to  today (1/14). At baseline the patient is responsive to verbal stimuli. Labs => WBC 13.48, LA 2.3 -> 1.2, RVP (-), CT Chest => 1) Mild patchy airspace disease in the lingula and right middle lobe compatible with pneumonia in the proper clinical setting. 2) Posterior left lower lobe opacity most likely atelectasis. 3) Large right breast mass compatible with history of breast cancer. 4) Right axillary shanon mass, increased in size. CT Abdomen/Pelvis => Severe L2 compression deformity, age indeterminate but new since September 30, 2018. In the ED the patient was given Piperacillin/tazobactam 3.375g IVPB x 1, Vancomycin 1g IVPB x 1, Methylprednisolone 40mg IVP x 1, and NS x 1L.    1/15 - feeling better today. still with cough and some SOB.  1/16: Continues to improve, has back pain but tolerable.  No fever, chills, n, v.   01/17/19: Patient seen and examined. No new complaints. Discussed with daughter on phone in  length regarding management and d/c plan.     ROS:   All 10 systems reviewed and found to be negative with the exception of what has been described above.      Vital Signs Last 24 Hrs  T(C): 36.7 (17 Jan 2019 12:08), Max: 37 (16 Jan 2019 16:31)  T(F): 98.1 (17 Jan 2019 12:08), Max: 98.6 (16 Jan 2019 16:31)  HR: 85 (17 Jan 2019 13:38) (80 - 86)  BP: 116/59 (17 Jan 2019 12:08) (116/59 - 147/66)  BP(mean): --  RR: 16 (17 Jan 2019 12:08) (16 - 17)  SpO2: 93% (17 Jan 2019 12:08) (93% - 96%)        Physical exam:         GEN: lying in bed, NAD  HEENT:   NC/AT, pupils equal and reactive, EOMI  CV:  +S1, +S2, RRR  RESP:   few scattered exp wheeze b/l --> improving  BREAST:  not examined  GI:  abdomen soft, non-tender, non-distended, normoactive BS  MSK:   normal muscle tone  EXT:  no edema  NEURO:  AAOX3, no focal neurological deficits  SKIN:  no rashes        MEDICATIONS:    ALBUTerol    0.083% 2.5 milliGRAM(s) Nebulizer every 6 hours  dipyridamole 200 mG/aspirin 25 mG 1 Capsule(s) Oral two times a day  heparin  Injectable 5000 Unit(s) SubCutaneous every 8 hours  hydroxychloroquine 200 milliGRAM(s) Oral every 12 hours  levothyroxine 25 MICROGram(s) Oral daily  lidocaine   Patch 1 Patch Transdermal daily  memantine ER 21 milliGRAM(s) Oral daily  methylPREDNISolone sodium succinate Injectable 40 milliGRAM(s) IV Push every 12 hours  pantoprazole    Tablet 40 milliGRAM(s) Oral before breakfast  piperacillin/tazobactam IVPB. 3.375 Gram(s) IV Intermittent every 8 hours  simvastatin 20 milliGRAM(s) Oral at bedtime  tamsulosin 0.4 milliGRAM(s) Oral at bedtime  tiotropium 18 MICROgram(s) Capsule 1 Capsule(s) Inhalation daily    MEDICATIONS  (PRN):  acetaminophen   Tablet .. 650 milliGRAM(s) Oral every 6 hours PRN Temp greater or equal to 38C (100.4F), Mild Pain (1 - 3)  docusate sodium 100 milliGRAM(s) Oral three times a day PRN Constipation  guaiFENesin   Syrup  (Sugar-Free) 200 milliGRAM(s) Oral every 6 hours PRN Cough  ondansetron Injectable 4 milliGRAM(s) IV Push every 6 hours PRN Nausea  senna 2 Tablet(s) Oral at bedtime PRN Constipation      Labs:                    No labs today          Assessment and Plan:       88 y/o F resident of [facility] with PMHx significant for COPD not on home O2, CVA (2005), Rheumatoid arthritis (chronically on hydroxychloroquine and prednisone), chronic back pain, PMR (Polymyalgia rheumatica), right breast Ca, glaucoma OU s/p ocular surgery, hypertension, hyperlipidemia, and Alzheimer's dementia presents to the ED and found to have severe sepsis 2/2 multifocal pna    # severe sepsis 2/2 mutlifocal pna/HCAP - initial lactate on admission 2.3 and improved with fluids. severe sepsis resolved  - ID follow up appreciated  - Pneumonia in the Immunocompromised Host => will cont. IV steroids as pt. on chronic PO Prednisone, change to po on discharge  - Urine and blood cultures negative  - RVP (-)  - pulm eval noted - slow steroid taper  - cont. nebs prn  - incentive neeta  - cont. supplemental O2 prn.    # Compression fracture of spine/ hx of breast CA  - ortho eval noted.   - pain meds prn and further imaging as per ortho  - pt eval appreciated  - outpatient palliative radiation per Dr. Jean  -Waiting for brace     # COPD (chronic obstructive pulmonary disease).    - cont. nebs prn  - cont. Spiriva  - pulm eval appreciated     # Rheumatoid arthritis.    -  cont. Hydroxychloroquine 200mg po q12h  - cont. Prednisone upon dc. continue iv steroids for now.     # Alzheimers disease  - cont. Namenda XR 21mg po daily.     # Hypothyroidism.  - ont. Levothyroxine 25mcg po qam  - check tsh     Possible d/c tomorrow.
cc:	AMS with cough	    History of Present Illness:   89 y/o F resident of [facility] with PMHx significant for COPD not on home O2, CVA (2005), Rheumatoid arthritis (chronically on hydroxychloroquine and prednisone), chronic back pain, PMR (Polymyalgia rheumatica), right breast Ca, glaucoma OU s/p ocular surgery, hypertension, hyperlipidemia, and Alzheimer's dementia presents to the ED for further evaluation of a 2 week hx of a progressive productive cough, decreased PO intake, and increased lethargy/malaise. The patient was treated initially with an outpatient regimen with Ceftin PO, followed by Doxycycline PO. The patient also reportedly c/o lower back pain which the patient's daughter states is likely chronic. The patient's daughter noted that this morning (day of admission) the patient was increasingly lethargic prompting her visit to  today (1/14). At baseline the patient is responsive to verbal stimuli. Labs => WBC 13.48, LA 2.3 -> 1.2, RVP (-), CT Chest => 1) Mild patchy airspace disease in the lingula and right middle lobe compatible with pneumonia in the proper clinical setting. 2) Posterior left lower lobe opacity most likely atelectasis. 3) Large right breast mass compatible with history of breast cancer. 4) Right axillary shanon mass, increased in size. CT Abdomen/Pelvis => Severe L2 compression deformity, age indeterminate but new since September 30, 2018. In the ED the patient was given Piperacillin/tazobactam 3.375g IVPB x 1, Vancomycin 1g IVPB x 1, Methylprednisolone 40mg IVP x 1, and NS x 1L.    1/15 - feeling better today. still with cough and some SOB.  1/16: Continues to improve, has back pain but tolerable.  No fever, chills, n, v.     ROS:   All 10 systems reviewed and found to be negative with the exception of what has been described above.    Vital Signs Last 24 Hrs  T(C): 37 (16 Jan 2019 16:31), Max: 37.2 (16 Jan 2019 05:04)  T(F): 98.6 (16 Jan 2019 16:31), Max: 99 (16 Jan 2019 10:46)  HR: 85 (16 Jan 2019 16:31) (80 - 99)  BP: 133/51 (16 Jan 2019 16:31) (97/59 - 133/51)  BP(mean): --  RR: 17 (16 Jan 2019 16:31) (17 - 18)  SpO2: 96% (16 Jan 2019 16:31) (94% - 98%)    GEN: lying in bed, NAD  HEENT:   NC/AT, pupils equal and reactive, EOMI  CV:  +S1, +S2, RRR  RESP:   few scattered exp wheeze b/l --> improving  BREAST:  not examined  GI:  abdomen soft, non-tender, non-distended, normoactive BS  MSK:   normal muscle tone  EXT:  no edema  NEURO:  AAOX3, no focal neurological deficits  SKIN:  no rashes    MEDICATIONS  (STANDING):  ALBUTerol    0.083% 2.5 milliGRAM(s) Nebulizer every 6 hours  dipyridamole 200 mG/aspirin 25 mG 1 Capsule(s) Oral two times a day  heparin  Injectable 5000 Unit(s) SubCutaneous every 8 hours  hydroxychloroquine 200 milliGRAM(s) Oral every 12 hours  levothyroxine 25 MICROGram(s) Oral daily  lidocaine   Patch 1 Patch Transdermal daily  memantine ER 21 milliGRAM(s) Oral daily  methylPREDNISolone sodium succinate Injectable 40 milliGRAM(s) IV Push every 12 hours  pantoprazole    Tablet 40 milliGRAM(s) Oral before breakfast  piperacillin/tazobactam IVPB. 3.375 Gram(s) IV Intermittent every 8 hours  simvastatin 20 milliGRAM(s) Oral at bedtime  tamsulosin 0.4 milliGRAM(s) Oral at bedtime  tiotropium 18 MICROgram(s) Capsule 1 Capsule(s) Inhalation daily    MEDICATIONS  (PRN):  acetaminophen   Tablet .. 650 milliGRAM(s) Oral every 6 hours PRN Temp greater or equal to 38C (100.4F), Mild Pain (1 - 3)  docusate sodium 100 milliGRAM(s) Oral three times a day PRN Constipation  guaiFENesin   Syrup  (Sugar-Free) 200 milliGRAM(s) Oral every 6 hours PRN Cough  ondansetron Injectable 4 milliGRAM(s) IV Push every 6 hours PRN Nausea  senna 2 Tablet(s) Oral at bedtime PRN Constipation                          12.2   9.36  )-----------( 252      ( 15 Ubaldo 2019 08:25 )             39.6     01-15    140  |  107  |  21  ----------------------------<  152<H>  4.3   |  23  |  1.14    Ca    9.2      15 Ubaldo 2019 08:25  Mg     2.0     01-15    TPro  6.8  /  Alb  3.3  /  TBili  0.6  /  DBili  x   /  AST  21  /  ALT  32  /  AlkPhos  62  01-15    CAPILLARY BLOOD GLUCOSE        LIVER FUNCTIONS - ( 15 Ubaldo 2019 08:25 )  Alb: 3.3 g/dL / Pro: 6.8 gm/dL / ALK PHOS: 62 U/L / ALT: 32 U/L / AST: 21 U/L / GGT: x               Assessment and Plan:   89 y/o F resident of [facility] with PMHx significant for COPD not on home O2, CVA (2005), Rheumatoid arthritis (chronically on hydroxychloroquine and prednisone), chronic back pain, PMR (Polymyalgia rheumatica), right breast Ca, glaucoma OU s/p ocular surgery, hypertension, hyperlipidemia, and Alzheimer's dementia presents to the ED and found to have severe sepsis 2/2 multifocal pna    # severe sepsis 2/2 mutlifocal pna/HCAP - initial lactate on admission 2.3 and improved with fluids. severe sepsis resolved  - ID eval noted and agree qith zosyn. gurvinder of vanco for now  - Pneumonia in the Immunocompromised Host => will cont. IV steroids as pt. on chronic PO Prednisone  - Urine and blood cultures negative  - RVP (-)  - pulm eval noted - slow steroid taper  - cont. nebs prn  - incentive neeta  - cont. supplemental O2 prn.    # Compression fracture of spine/ hx of breast CA  - ortho eval noted.   - pain meds prn and further imaging as per ortho  - pt eval appreciated  - outpatient palliative radiation per Dr. Jean     # COPD (chronic obstructive pulmonary disease).    - cont. nebs prn  - cont. Spiriva  - pulm eval appreciated     # Rheumatoid arthritis.    -  cont. Hydroxychloroquine 200mg po q12h  - cont. Prednisone upon dc. continue iv steroids for now.     # Alzheimers disease  - cont. Namenda XR 21mg po daily.     # Hypothyroidism.  - ont. Levothyroxine 25mcg po qam  - check tsh     # Advanced care planning/counseling discussion.  Plan: ~I discussed the patient's known/documented DNR/DNI status per MOLST form from Izabela medeiros in the presence of the patient and patient's daughter => Diana Presley who is the HCP. No change to the patient's current order at this time. MOLST form updated and placed in the chart. (from H&P)    dispo   -PT rec SUE.  pt from [facility].  pending final ID ABx recs.

## 2019-01-24 PROBLEM — E03.9 HYPOTHYROIDISM, UNSPECIFIED: Chronic | Status: ACTIVE | Noted: 2019-01-01

## 2019-01-24 NOTE — ED ADULT NURSE NOTE - NSIMPLEMENTINTERV_GEN_ALL_ED
Implemented All Fall with Harm Risk Interventions:  [place] to call system. Call bell, personal items and telephone within reach. Instruct patient to call for assistance. Room bathroom lighting operational. Non-slip footwear when patient is off stretcher. Physically safe environment: no spills, clutter or unnecessary equipment. Stretcher in lowest position, wheels locked, appropriate side rails in place. Provide visual cue, wrist band, yellow gown, etc. Monitor gait and stability. Monitor for mental status changes and reorient to person, place, and time. Review medications for side effects contributing to fall risk. Reinforce activity limits and safety measures with patient and family. Provide visual clues: red socks.

## 2019-01-24 NOTE — ED ADULT NURSE NOTE - ED STAT RN HANDOFF DETAILS
report given to sicu stepdown RN. vss. denies pain. no s/s of acute distress noted. awaiting transport

## 2019-01-24 NOTE — H&P ADULT - ASSESSMENT
88 y/o with multiple rib fractures/ sacral fracture with hematoma s/p mechanical fall     Plan:   admit to Dr Pak   pain control   nausea control   Diet: DASH/TLC   log roll , bedrest   encourage IS use   q2 turn mattress   consult hospitalist   consult spine   consult Pulmonology     Plan discussed with attending, Dr Pak 89 y/o with multiple right acute on subacute rib fractures/ sacral fracture with hematoma s/p mechanical fall     Plan:   admit to Dr Pak, trauma service  pain control   nausea control   Diet: DASH/TLC   log roll , bedrest   encourage IS use   q2 turn mattress   consult hospitalist   consult spine   consult Pulmonology   Medical comorobidities of COPD, GERD, Glaucoma, HTN, HLD, Hypothyroidism, Lung nodule, metastatic breast cancer, Osteoporosis, Polymyalgia rheumatica, TIA  Plan discussed with attending, Dr Pak

## 2019-01-24 NOTE — H&P ADULT - NSHPLABSRESULTS_GEN_ALL_CORE
11.5   9.90  )-----------( 160      ( 24 Jan 2019 09:55 )             36.7   01-24    143  |  110<H>  |  26<H>  ----------------------------<  84  4.0   |  27  |  0.81    Ca    8.6      24 Jan 2019 11:23    TPro  5.6<L>  /  Alb  3.0<L>  /  TBili  0.5  /  DBili  x   /  AST  22  /  ALT  45  /  AlkPhos  61  01-24  PT/INR - ( 24 Jan 2019 09:55 )   PT: 14.0 sec;   INR: 1.25 ratio         PTT - ( 24 Jan 2019 09:55 )  PTT:22.6 sec    < from: CT Abdomen and Pelvis No Cont (01.24.19 @ 10:33) >        < end of copied text >    < from: CT Abdomen and Pelvis No Cont (01.24.19 @ 10:33) >      EXAM:  CT ABDOMEN AND PELVIS                          EXAM:  CT CHEST                            PROCEDURE DATE:  01/24/2019          INTERPRETATION:  Exam Date: 1/24/2019 10:33 AM  Clinical Information: Fall on blood thinners  Technique:       CT of the chest , abdomen and pelvis was performed with   axial images obtained from the thoracic inlet to the pubic symphysis       without IV contrast.  Oral contrast was not administered.  Comparison:  1/14/2019    IMPRESSION:      Interval development of a presacral hematoma with associated sacral   fracture. If concern for more extensive pelvic fractures, MRI can be   obtained for further evaluation. Multiple acute/subacute and chronic   right-sided rib fractures some of which are new from prior exam. Multiple   subacute left-sided rib fractures.    No other evidence of solid visceral injury in the chest, abdomen or   pelvis.    Persistent right breast mass with right axillary mass as seen on prior   exam. Vital Signs Last 24 Hrs  T(C): 36.9 (24 Jan 2019 13:36), Max: 36.9 (24 Jan 2019 13:36)  T(F): 98.4 (24 Jan 2019 13:36), Max: 98.4 (24 Jan 2019 13:36)  HR: 84 (24 Jan 2019 17:15) (78 - 85)  BP: 108/58 (24 Jan 2019 13:36) (108/58 - 122/65)  BP(mean): --  RR: 18 (24 Jan 2019 09:47) (18 - 18)  SpO2: 92% (24 Jan 2019 17:15) (92% - 95%)    Labs:               11.8   x     )-----------( x        ( 24 Jan 2019 18:11 )             36.9   CBC Full  -  ( 24 Jan 2019 18:11 )  WBC Count : x  Hemoglobin : 11.8 g/dL  Hematocrit : 36.9 %  Platelet Count - Automated : x  Mean Cell Volume : x  Mean Cell Hemoglobin : x  Mean Cell Hemoglobin Concentration : x  Auto Neutrophil # : x  Auto Lymphocyte # : x  Auto Monocyte # : x  Auto Eosinophil # : x  Auto Basophil # : x  Auto Neutrophil % : x  Auto Lymphocyte % : x  Auto Monocyte % : x  Auto Eosinophil % : x  Auto Basophil % : x  143  |  110<H>  |  26<H>  ----------------------------<  84  4.0   |  27  |  0.81  Ca    8.6      24 Jan 2019 11:23  TPro  5.6<L>  /  Alb  3.0<L>  /  TBili  0.5  /  DBili  x   /  AST  22  /  ALT  45  /  AlkPhos  61  01-24  LIVER FUNCTIONS - ( 24 Jan 2019 11:23 )  Alb: 3.0 g/dL / Pro: 5.6 gm/dL / ALK PHOS: 61 U/L / ALT: 45 U/L / AST: 22 U/L / GGT: x         PT/INR - ( 24 Jan 2019 09:55 )   PT: 14.0 sec;   INR: 1.25 ratio    PTT - ( 24 Jan 2019 09:55 )  PTT:22.6 sec    Radiology:    EXAM:  CT ABDOMEN AND PELVIS                        EXAM:  CT CHEST                        PROCEDURE DATE:  01/24/2019    IMPRESSION:    Interval development of a presacral hematoma with associated sacral   fracture. If concern for more extensive pelvic fractures, MRI can be   obtained for further evaluation. Multiple acute/subacute and chronic   right-sided rib fractures some of which are new from prior exam. Multiple   subacute left-sided rib fractures.  No other evidence of solid visceral injury in the chest, abdomen or   pelvis.  Persistent right breast mass with right axillary mass as seen on prior   exam.    EXAM:  CT CERVICAL SPINE                        PROCEDURE DATE:  01/24/2019    INTERPRETATION:    IMPRESSION:   No vertebral fracture is recognized.  Mild disc   degeneration and spondylosis at C5-6 and C6-7 with narrowing of the   BILATERAL C3-4, C4-5 and C6-7 neural foramina due to uncovertebral   spurring and facet osteophytic hypertrophy.           EXAM:  CT BRAIN                        PROCEDURE DATE:  01/24/2019    INTERPRETATION:    IMPRESSION:   Moderate periventricular white matter ischemia.    Mild   atrophy.        EXAM:  XR FEMUR 2 VIEWS RT                        EXAM:  XR HIPS BI WITH PELV MIN 5V                        PROCEDURE DATE:  01/24/2019    IMPRESSION:    1. BILATERAL Hip and RIGHT femur:  No fracture. Internal hardware is   intact.  2.  Pelvis:  Unremarkable radiographs of the pelvis.

## 2019-01-24 NOTE — H&P ADULT - FAMILY HISTORY
Mother  Still living? Unknown  Family history of COPD (chronic obstructive pulmonary disease), Age at diagnosis: Age Unknown     Child  Still living? Unknown  Family history of diabetes mellitus (DM), Age at diagnosis: Age Unknown

## 2019-01-24 NOTE — ED PROVIDER NOTE - MUSCULOSKELETAL, MLM
Spine appears normal, range of motion is not limited, no muscle or joint tenderness.  Pelvis non-tender and stable.  No midline spinal tenderness.  Decreased SLR b/l

## 2019-01-24 NOTE — ED PROVIDER NOTE - CARE PLAN
Principal Discharge DX:	Closed fracture of sacrum, unspecified portion of sacrum, initial encounter  Secondary Diagnosis:	Closed fracture of multiple ribs of right side, initial encounter  Secondary Diagnosis:	Injury of head, initial encounter

## 2019-01-24 NOTE — CONSULT NOTE ADULT - SUBJECTIVE AND OBJECTIVE BOX
PCP- DR Flavio Otero    CC- s/p fall    HPI:  88yo/F with multiple medical problems, recent admission to  for PNA, PMH- COPD/chr resp failure on home O2 and maint prednisone, Metastatic breast CA, Multiple falls in the past and recent L2 compression fracture, presented s/p mechanical fall.    PMH- as above  PSH- RT hip IMN,  SOc hx-  Fam hx-    1/24/19- seen in ED    Review of system- All 10 systems reviewed and is as per HPI otherwise negative.     T(C): 36.9 (01-24-19 @ 13:36), Max: 36.9 (01-24-19 @ 13:36)  HR: 85 (01-24-19 @ 13:36) (78 - 85)  BP: 108/58 (01-24-19 @ 13:36) (108/58 - 122/65)  RR: 18 (01-24-19 @ 09:47) (18 - 18)  SpO2: 94% (01-24-19 @ 13:36) (94% - 95%)  Wt(kg): --    LABS:                        11.5   9.90  )-----------( 160      ( 24 Jan 2019 09:55 )             36.7     143  |  110<H>  |  26<H>  ----------------------------<  84  4.0   |  27  |  0.81    Ca    8.6      24 Jan 2019 11:23    TPro  5.6<L>  /  Alb  3.0<L>  /  TBili  0.5  /  DBili  x   /  AST  22  /  ALT  45  /  AlkPhos  61  01-24    PT/INR - ( 24 Jan 2019 09:55 )   PT: 14.0 sec;   INR: 1.25 ratio       PTT - ( 24 Jan 2019 09:55 )  PTT:22.6 sec    RADIOLOGY & ADDITIONAL TESTS:  EXAM:  CT ABDOMEN AND PELVIS                        EXAM:  CT CHEST                        PROCEDURE DATE:  01/24/2019    INTERPRETATION:  Exam Date: 1/24/2019 10:33 AM  Clinical Information: Fall on blood thinners  Technique:       CT of the chest , abdomen and pelvis was performed with   axial images obtained from the thoracic inlet to the pubic symphysis       without IV contrast.  Oral contrast was not administered.  Comparison:  1/14/2019    FINDINGS:    Chest:    Lungs, Airways and Pleura: Central tracheobronchial tree is grossly   patent. No endobronchial lesions. Moderate centrilobular emphysema. No   pneumothorax. Subsegmental atelectasis in the lingula. Minimal bibasilar   atelectasis.    Heart and Great Vessels: Atherosclerotic changes of the aorta and   coronary vasculature. Heart is mildly enlarged. No pericardial effusion.    Mediastinum and Samra: Hiatal hernia with an intrathoracic component of   the proximal stomach.    Neck and Chest Wall: Large right breastmass extending to the skin   surface measuring 5.2 x 4.5 cm unchanged. Right axillary mass measuring   4.4 cm is unchanged.    Abdomen and pelvis:    Liver: within normal limits  Gallbladder: Prior cholecystectomy..  Bile ducts: No intrahepatic or extrahepatic biliary dilatation  Pancreas: within normal limits    Spleen: within normal limits  Adrenal: within normal limits  Kidneys, Ureters and Bladder: Mild bilateral renal atrophy. No   hydronephrosis. No intrarenal calculus. Left parapelvic cyst. The ureters   and bladder are within normal limits.        Pelvis: Interval development of hyperdense presacral fluid suggesting   hematoma. No pelvic mass or pelvic adenopathy. Prior hysterectomy.    Bowel: The bowel is of normal course and caliberwithout evidence of   obstruction or gross bowel wall thickening. Normal appendix visualized.   Clot diverticulosis without diverticulitis.  Peritoneum: No intra-abdominal free air, ascites or organized fluid   collections.    Retroperitoneum: withinnormal limits       Vessels: Atherosclerotic changes.        Abdominal wall and Bones: Degenerative changes. No lytic or blastic   lesions. Tree single hematoma. Hyperdense enlargement of the right   greater than left piriformis muscle suggesting intramuscular hematoma.   Fracture through the S for vertebral body. Questionable left sacral   fracture. Prior right hip pinning. Mild superior endplate deformities of   L4 and L5. Moderate to severe compression deformity of L2. Mild to   moderate compression deformities of T7 and T9. Moderate compression   deformity of T4. These are unchanged. Multiple acute/subacute and chronic   right-sided rib fractures some of which are new from prior exam. Multiple   subacute left-sided rib fractures.    IMPRESSION:      Interval development of a presacral hematoma with associated sacral   fracture. If concern for more extensive pelvic fractures, MRI can be   obtained for further evaluation. Multiple acute/subacute and chronic   right-sided rib fractures some of which are new from prior exam. Multiple   subacute left-sided rib fractures.    No other evidence of solid visceral injury in the chest, abdomen or   pelvis.    Persistent right breast mass with right axillary mass as seen on prior   exam.    EXAM:  CT CERVICAL SPINE                        PROCEDURE DATE:  01/24/2019    INTERPRETATION:      CT cervical spine without IV contrast        CLINICAL INFORMATION: Fracture, trauma, neck pain.  Neck pain, spinal   stenosis, spondylosis. [place] 16Trauma Alert: fall  head injury on Aggrenox    TECHNIQUE:  Contiguous axial 2.0 mm sections were obtained through the   cervical spine using a single helical acquisition.  Additional 2 mm   sagittal and coronal reformatted reconstructions of the spine were   obtained.  These additional reformatted images were used to evaluate the   spine for alignment, vertebral fractures and the integrity of the the   posterior elements.   This scan was performed using automatic exposure   control (radiation dose reduction software) to obtain a diagnostic image   quality scan with patient dose as low as reasonably achievable.        FINDINGS:   No prior similar studies are available for review    Cervical vertebral body heights are maintained. No vertebral fracture is   seen. No destructive bone lesion is found.  Alignment is preserved.    Facet joints appear intact and aligned.    Cervical intervertebral disc spaces show mild disc degeneration and   spondylosis at C5-6 and C6-7 with loss of discheight and associated   degenerative endplate changes. There is narrowing of the BILATERAL C3-4,   C4-5 and C6-7 neural foramina due to uncovertebral spurring and facet   osteophytic hypertrophy. MR would be required to evaluate the   intervertebral discs at higher sensitivity for disc pathology.    The skull base appears intact.  No neck mass is recognized.  Paraspinal   soft tissues appear intact. Visualized lymph nodes appear to be within   physiologic size limits.      Mild emphysema is noted.      IMPRESSION:   No vertebral fracture is recognized.  Mild disc   degeneration and spondylosis at C5-6 and C6-7 with narrowing of the   BILATERAL C3-4, C4-5 and C6-7 neural foramina due to uncovertebral   spurring and facet osteophytic hypertrophy.           EXAM:  CT BRAIN                        PROCEDURE DATE:  01/24/2019    INTERPRETATION:    CT head without IV contrast        CLINICAL INFORMATION:  Fall   Intracranial hemorrhage.    TECHNIQUE: Contiguous axial 5 mm sections were obtained through the head.   Sagittal and coronal 2-D reformatted images were also obtained.   This   scan was performed using automatic exposure control (radiation dose   reduction software) to obtain a diagnostic image quality scan with   patient dose as low as reasonably achievable.     FINDINGS:   CT dated 1/14/2019 is available for review.    The brain demonstrates moderate periventricular white matter ischemia.     No acute cerebral cortical infarct is seen.  No intracranial hemorrhage   is found.No mass effect is found in the brain.      The ventricles, sulci and basal cisterns show mild atrophy.         The orbits are unremarkable.  The paranasal sinuses are clear.  The nasal   cavity appears intact.  The nasopharynx is symmetric.  The central skull   base, petrous temporal bones and calvarium remain intact.    IMPRESSION:   Moderate periventricular white matter ischemia.    Mild   atrophy.        PHYSICAL EXAM:  GENERAL: NAD, well-groomed, well-developed  HEAD:  Atraumatic, Normocephalic  EYES: EOMI, PERRLA, conjunctiva and sclera clear  HEENT: Moist mucous membranes  NECK: Supple, No JVD  NERVOUS SYSTEM:  Alert & Oriented X3, Motor Strength 5/5 B/L upper and lower extremities; DTRs 2+ intact and symmetric  CHEST/LUNG: Clear to auscultation bilaterally; No rales, rhonchi, wheezing, or rubs  HEART: Regular rate and rhythm; No murmurs, rubs, or gallops  ABDOMEN: Soft, Nontender, Nondistended; Bowel sounds present  GENITOURINARY- Voiding, no palpable bladder  EXTREMITIES:  2+ Peripheral Pulses, No clubbing, cyanosis, or edema  MUSCULOSKELTAL- No muscle tenderness, Muscle tone normal, No joint tenderness, no Joint swelling, Joint range of motion-normal  SKIN-no rash, no lesion  CNS- alert, oriented X3, non focal       famotidine    Tablet 20 milliGRAM(s) Oral two times a day  heparin  Injectable 5000 Unit(s) SubCutaneous every 12 hours  lidocaine   Patch 1 Patch Transdermal daily  multivitamin 1 Tablet(s) Oral daily  ondansetron Injectable 4 milliGRAM(s) IV Push every 6 hours PRN  sodium chloride 0.9% Bolus 500 milliLiter(s) IV Bolus once    Assessment/Plan  #S/p mechanical fall with multiple rib fractures/LT sacral fracture with associated hematoma  #Multiple prior compression deformities of T4, T7, T9, L4/L5, recent L2 compression fracture  Trauma eval appreciated  PT eval- OOB  Incentive spirometry  Pain control    #COPD/chr resp failure  Pulm eval   Maintain on chronic Prednisone    #Breast CA metastatic  Outpatient oncology f/u    #Dispo- thank you for consult, will follow with you PCP- DR Flavio Otero    CC- s/p mechanical fall    HPI:  89yo/F with multiple medical problems, recent admission to  for PNA, PMH- COPD/chr resp failure on home O2 and maint prednisone, Metastatic breast CA due for XRT with DR Jean on Tuesday 1/29/19, RA on Plaquenil, PMR, HTN, hyperlipidemia, Multiple falls in the past and recent L2 compression fracture, presented s/p mechanical fall.    PMH- as above  PSH- RT hip IMN, cholecystectomy, glaucoma  SOc hx- ex-smoker quit, denies alcohol. Lives at [facility], walks with walker  Fam hx- m had COPD    1/24/19- seen in ED, c/o low back pain    Review of system- All 10 systems reviewed and is as per HPI otherwise negative.     T(C): 36.9 (01-24-19 @ 13:36), Max: 36.9 (01-24-19 @ 13:36)  HR: 85 (01-24-19 @ 13:36) (78 - 85)  BP: 108/58 (01-24-19 @ 13:36) (108/58 - 122/65)  RR: 18 (01-24-19 @ 09:47) (18 - 18)  SpO2: 94% (01-24-19 @ 13:36) (94% - 95%)  Wt(kg): --    LABS:                        11.5   9.90  )-----------( 160      ( 24 Jan 2019 09:55 )             36.7     143  |  110<H>  |  26<H>  ----------------------------<  84  4.0   |  27  |  0.81    Ca    8.6      24 Jan 2019 11:23    TPro  5.6<L>  /  Alb  3.0<L>  /  TBili  0.5  /  DBili  x   /  AST  22  /  ALT  45  /  AlkPhos  61  01-24    PT/INR - ( 24 Jan 2019 09:55 )   PT: 14.0 sec;   INR: 1.25 ratio       PTT - ( 24 Jan 2019 09:55 )  PTT:22.6 sec    RADIOLOGY & ADDITIONAL TESTS:  EXAM:  CT ABDOMEN AND PELVIS                        EXAM:  CT CHEST                        PROCEDURE DATE:  01/24/2019    INTERPRETATION:  Exam Date: 1/24/2019 10:33 AM  Clinical Information: Fall on blood thinners  Technique:       CT of the chest , abdomen and pelvis was performed with   axial images obtained from the thoracic inlet to the pubic symphysis       without IV contrast.  Oral contrast was not administered.  Comparison:  1/14/2019    FINDINGS:    Chest:    Lungs, Airways and Pleura: Central tracheobronchial tree is grossly   patent. No endobronchial lesions. Moderate centrilobular emphysema. No   pneumothorax. Subsegmental atelectasis in the lingula. Minimal bibasilar   atelectasis.    Heart and Great Vessels: Atherosclerotic changes of the aorta and   coronary vasculature. Heart is mildly enlarged. No pericardial effusion.    Mediastinum and Samra: Hiatal hernia with an intrathoracic component of   the proximal stomach.    Neck and Chest Wall: Large right breastmass extending to the skin   surface measuring 5.2 x 4.5 cm unchanged. Right axillary mass measuring   4.4 cm is unchanged.    Abdomen and pelvis:    Liver: within normal limits  Gallbladder: Prior cholecystectomy..  Bile ducts: No intrahepatic or extrahepatic biliary dilatation  Pancreas: within normal limits    Spleen: within normal limits  Adrenal: within normal limits  Kidneys, Ureters and Bladder: Mild bilateral renal atrophy. No   hydronephrosis. No intrarenal calculus. Left parapelvic cyst. The ureters   and bladder are within normal limits.        Pelvis: Interval development of hyperdense presacral fluid suggesting   hematoma. No pelvic mass or pelvic adenopathy. Prior hysterectomy.    Bowel: The bowel is of normal course and caliberwithout evidence of   obstruction or gross bowel wall thickening. Normal appendix visualized.   Clot diverticulosis without diverticulitis.  Peritoneum: No intra-abdominal free air, ascites or organized fluid   collections.    Retroperitoneum: withinnormal limits       Vessels: Atherosclerotic changes.        Abdominal wall and Bones: Degenerative changes. No lytic or blastic   lesions. Tree single hematoma. Hyperdense enlargement of the right   greater than left piriformis muscle suggesting intramuscular hematoma.   Fracture through the S for vertebral body. Questionable left sacral   fracture. Prior right hip pinning. Mild superior endplate deformities of   L4 and L5. Moderate to severe compression deformity of L2. Mild to   moderate compression deformities of T7 and T9. Moderate compression   deformity of T4. These are unchanged. Multiple acute/subacute and chronic   right-sided rib fractures some of which are new from prior exam. Multiple   subacute left-sided rib fractures.    IMPRESSION:      Interval development of a presacral hematoma with associated sacral   fracture. If concern for more extensive pelvic fractures, MRI can be   obtained for further evaluation. Multiple acute/subacute and chronic   right-sided rib fractures some of which are new from prior exam. Multiple   subacute left-sided rib fractures.    No other evidence of solid visceral injury in the chest, abdomen or   pelvis.    Persistent right breast mass with right axillary mass as seen on prior   exam.    EXAM:  CT CERVICAL SPINE                        PROCEDURE DATE:  01/24/2019    INTERPRETATION:      CT cervical spine without IV contrast        CLINICAL INFORMATION: Fracture, trauma, neck pain.  Neck pain, spinal   stenosis, spondylosis. [place] 16Trauma Alert: fall  head injury on Aggrenox    TECHNIQUE:  Contiguous axial 2.0 mm sections were obtained through the   cervical spine using a single helical acquisition.  Additional 2 mm   sagittal and coronal reformatted reconstructions of the spine were   obtained.  These additional reformatted images were used to evaluate the   spine for alignment, vertebral fractures and the integrity of the the   posterior elements.   This scan was performed using automatic exposure   control (radiation dose reduction software) to obtain a diagnostic image   quality scan with patient dose as low as reasonably achievable.        FINDINGS:   No prior similar studies are available for review    Cervical vertebral body heights are maintained. No vertebral fracture is   seen. No destructive bone lesion is found.  Alignment is preserved.    Facet joints appear intact and aligned.    Cervical intervertebral disc spaces show mild disc degeneration and   spondylosis at C5-6 and C6-7 with loss of discheight and associated   degenerative endplate changes. There is narrowing of the BILATERAL C3-4,   C4-5 and C6-7 neural foramina due to uncovertebral spurring and facet   osteophytic hypertrophy. MR would be required to evaluate the   intervertebral discs at higher sensitivity for disc pathology.    The skull base appears intact.  No neck mass is recognized.  Paraspinal   soft tissues appear intact. Visualized lymph nodes appear to be within   physiologic size limits.      Mild emphysema is noted.      IMPRESSION:   No vertebral fracture is recognized.  Mild disc   degeneration and spondylosis at C5-6 and C6-7 with narrowing of the   BILATERAL C3-4, C4-5 and C6-7 neural foramina due to uncovertebral   spurring and facet osteophytic hypertrophy.           EXAM:  CT BRAIN                        PROCEDURE DATE:  01/24/2019    INTERPRETATION:    CT head without IV contrast        CLINICAL INFORMATION:  Fall   Intracranial hemorrhage.    TECHNIQUE: Contiguous axial 5 mm sections were obtained through the head.   Sagittal and coronal 2-D reformatted images were also obtained.   This   scan was performed using automatic exposure control (radiation dose   reduction software) to obtain a diagnostic image quality scan with   patient dose as low as reasonably achievable.     FINDINGS:   CT dated 1/14/2019 is available for review.    The brain demonstrates moderate periventricular white matter ischemia.     No acute cerebral cortical infarct is seen.  No intracranial hemorrhage   is found.No mass effect is found in the brain.      The ventricles, sulci and basal cisterns show mild atrophy.         The orbits are unremarkable.  The paranasal sinuses are clear.  The nasal   cavity appears intact.  The nasopharynx is symmetric.  The central skull   base, petrous temporal bones and calvarium remain intact.    IMPRESSION:   Moderate periventricular white matter ischemia.    Mild   atrophy.        PHYSICAL EXAM:  GENERAL: NAD, well-groomed, well-developed  HEAD:  Atraumatic, Normocephalic  EYES: EOMI, PERRLA, conjunctiva and sclera clear  HEENT: Moist mucous membranes  NECK: Supple, No JVD  NERVOUS SYSTEM:  Alert & Oriented X3, Motor Strength 5/5 B/L upper and lower extremities; DTRs 2+ intact and symmetric  CHEST/LUNG: Clear to auscultation bilaterally; No rales, rhonchi, wheezing, or rubs  HEART: Regular rate and rhythm; No murmurs, rubs, or gallops  ABDOMEN: Soft, Nontender, Nondistended; Bowel sounds present  GENITOURINARY- Voiding, no palpable bladder  EXTREMITIES:  2+ Peripheral Pulses, No clubbing, cyanosis, or edema  MUSCULOSKELTAL- lower back tenderness  SKIN-no rash, no lesion  CNS- alert, oriented X3, non focal     MEDICATIONS  (STANDING):  ALBUTerol    0.083% 2.5 milliGRAM(s) Nebulizer every 6 hours  dipyridamole 200 mG/aspirin 25 mG 1 Capsule(s) Oral two times a day  docusate sodium 100 milliGRAM(s) Oral two times a day  famotidine    Tablet 20 milliGRAM(s) Oral two times a day  heparin  Injectable 5000 Unit(s) SubCutaneous every 12 hours  hydroxychloroquine 200 milliGRAM(s) Oral every 12 hours  lactobacillus acidophilus 1 Tablet(s) Oral two times a day with meals  levothyroxine 25 MICROGram(s) Oral daily  lidocaine   Patch 1 Patch Transdermal daily  multivitamin 1 Tablet(s) Oral daily  predniSONE   Tablet 20 milliGRAM(s) Oral once  simvastatin 20 milliGRAM(s) Oral at bedtime  sodium chloride 0.9% Bolus 500 milliLiter(s) IV Bolus once  tamsulosin 0.4 milliGRAM(s) Oral at bedtime  tiotropium 18 MICROgram(s) Capsule 1 Capsule(s) Inhalation daily    MEDICATIONS  (PRN):  ALBUTerol    0.083% 2.5 milliGRAM(s) Nebulizer every 3 hours PRN Shortness of Breath and/or Wheezing  ondansetron Injectable 4 milliGRAM(s) IV Push every 6 hours PRN Nausea    Assessment/Plan  #S/p mechanical fall with multiple rib fractures/LT sacral fracture with associated hematoma  #Multiple prior compression deformities of T4, T7, T9, L4/L5, recent L2 compression fracture  Trauma eval appreciated  PT eval- OOB  Incentive spirometry  Pain control  Spine eval DR Isaac  Should have TLSO from home  Monitor HH    #COPD/chr resp failure  Pulm eval DR Ojeda  Maintain on chronic Prednisone  Nebs, spiriva    #Breast CA metastatic  Due for XRT with DR Jean on 1/29/19    #RA/PMR  Cont Plaquenil, steroids PO    #HTN/hyperlipidemia/hypothyroidism  Stable    #Dispo- thank you for consult, will follow with you. D/w pt and son at bedside, DR Pak

## 2019-01-24 NOTE — ED PROVIDER NOTE - MEDICAL DECISION MAKING DETAILS
PMHx of Alzheimers, TIA, Stage 4 Breast CA (no tx, plan for radiation), HTN, carotid artery stenosis, COPD, HLD, hypothyroidism, benign lung nodule, osteoporosis,  chronic back pain with copression of T7 (seen by back surgeon, no surgery yet), GERD,  Asthma, s/p cholecystectomy, MARCELO, and hip surgery recently discharged from  w/ PNA (1/4-1/19) w/ AMS  BIBA from facility s/p mechanical fall.  Lost balance, tripped over walker, fell to right side, no LOC.  +hx of vertebral frax.  Decreaseds SLR b/l.  Plan:L XR (shoulder, back hips), Non-con CT head/neck/abd/pelvis, EKG, labs, cautious IVF, monitor and reassess.

## 2019-01-24 NOTE — CONSULT NOTE ADULT - SUBJECTIVE AND OBJECTIVE BOX
HPI:  CC: Mechanical Fall     HPI: 89y/o F w/ PMHx of Alzheimers, TIA, Stage 4 Breast CA, HTN, carotid artery stenosis, COPD, HLD, hypothyroidism, benign lung nodule, OA,  chronic back pain with compression of T7, GERD,  Asthma, s/p cholecystectomy, MARCELO, and hip surgery present s to  s/p mechanical fall.  Patient was recently discharged from  with pneumonia on  to  [facility].  Daughter reports patient was standing and tripped over her walker and fell to the floor. Reports hitting head and shoulder. Patient does not recall the event or LOC but does reports pain in the middle of her back on exam. Patient normally ambulated with a walker and wheel chair. unable to complete ROS due to patient mental state.    Information obtained by daughter Christine Muñoz, [phone]. (2019 13:50)      Patient is a 89y old  Female who presents with a chief complaint of s/p mechanical fall (2019 13:50)      Consulted by Dr. Pak  for VTE prophylaxis, risk stratification, and anticoagulation management.    PAST MEDICAL & SURGICAL HISTORY:  Hypothyroidism  Breast cancer: Right  Compression fracture of spine: T7  Alzheimers disease  HTN (hypertension)  Glaucoma  TIA (transient ischemic attack):   Cerebral vascular accident: , presently on Aggrenox  Polymyalgia rheumatica  Osteoporosis  Chronic pain: mid back  Urinary retention  GERD (gastroesophageal reflux disease)  Cholelithiases  Hyperlipemia  Carotid artery stenosis  Lung nodule: biopsied , benign  COPD (chronic obstructive pulmonary disease)  Asthma  Glaucoma of both eyes: s/p repair  History of hip surgery  Gall stones  History of hysterectomy: for bleeding  Hx of cholecystectomy          CrCl:    Caprini VTE Risk Score:CAPRINI SCORE [CLOT]    AGE RELATED RISK FACTORS                                                       MOBILITY RELATED FACTORS  [ ] Age 41-60 years                                            (1 Point)                  [ ] Bed rest                                                        (1 Point)  [ ] Age: 61-74 years                                           (2 Points)                 [ ] Plaster cast                                                   (2 Points)  [ x] Age= 75 years                                              (3 Points)                 [ ] Bed bound for more than 72 hours                 (2 Points)    DISEASE RELATED RISK FACTORS                                               GENDER SPECIFIC FACTORS  [ ] Edema in the lower extremities                       (1 Point)                  [ ] Pregnancy                                                     (1 Point)  [ ] Varicose veins                                               (1 Point)                  [ ] Post-partum < 6 weeks                                   (1 Point)             [ x] BMI > 25 Kg/m2                                            (1 Point)                  [ ] Hormonal therapy  or oral contraception          (1 Point)                 [ ] Sepsis (in the previous month)                        (1 Point)                  [ ] History of pregnancy complications                 (1 point)  [ ] Pneumonia or serious lung disease                                               [ ] Unexplained or recurrent                     (1 Point)           (in the previous month)                               (1 Point)  [ x] Abnormal pulmonary function test                     (1 Point)                 SURGERY RELATED RISK FACTORS  [ ] Acute myocardial infarction                              (1 Point)                 [ ]  Section                                             (1 Point)  [ ] Congestive heart failure (in the previous month)  (1 Point)               [ ] Minor surgery                                                  (1 Point)   [ ] Inflammatory bowel disease                             (1 Point)                 [ ] Arthroscopic surgery                                        (2 Points)  [ ] Central venous access                                      (2 Points)                [ ] General surgery lasting more than 45 minutes   (2 Points)       [ ] Stroke (in the previous month)                          (5 Points)               [ ] Elective arthroplasty                                         (5 Points)            [x ] H/O malignancy ( present or previous)            ( 2 points)                                                                                                                                   HEMATOLOGY RELATED FACTORS                                                 TRAUMA RELATED RISK FACTORS  [ ] Prior episodes of VTE                                     (3 Points)                 [ ] Fracture of the hip, pelvis, or leg                       (5 Points)  [ ] Positive family history for VTE                         (3 Points)                 [ ] Acute spinal cord injury (in the previous month)  (5 Points)  [ ] Prothrombin 88194 A                                     (3 Points)                 [ ] Paralysis  (less than 1 month)                             (5 Points)  [ ] Factor V Leiden                                             (3 Points)                  [ ] Multiple Trauma within 1 month                        (5 Points)  [ ] Lupus anticoagulants                                     (3 Points)                                                           [ ] Anticardiolipin antibodies                               (3 Points)                                                       [ ] High homocysteine in the blood                      (3 Points)                                             [ ] Other congenital or acquired thrombophilia      (3 Points)                                                [ ] Heparin induced thrombocytopenia                  (3 Points)                                          Total Score [       7   ]      IMPROVE Bleeding Risk Score    Falls Risk:   High (  x)  Mod (  )  Low (  )      FAMILY HISTORY:  Family history of diabetes mellitus (DM) (Child)  Family history of COPD (chronic obstructive pulmonary disease) (Mother)    Denies any personal or familial history of clotting or bleeding disorders.    Allergies    Aciphex (Unknown)  Macrobid (Unknown)  Percocet 10/325 (Rash)    Intolerances        REVIEW OF SYSTEMS    (  )Fever	     (  )Constipation	(  )SOB				(  )Headache	(  )Dysuria  (  )Chills	     (  )Melena	(  )Dyspnea present on exertion	                    (  )Dizziness                    (  )Polyuria  (  )Nausea	     (  )Hematochezia	(  )Cough			                    (  )Syncope   	(  )Hematuria  (  )Vomiting    (  )Chest Pain	(  )Wheezing			( x )Weakness  (  )Diarrhea     (  )Palpitations	(  )Anorexia			( x )Myalgia    All  other review of systems negative: Yes          PHYSICAL EXAM:    Constitutional: Appears Well    Neurological: A& O x 3    Skin: Warm    Respiratory and Thorax: normal effort; Breath sounds: normal; No rales/wheezing/rhonchi  	  Cardiovascular: S1, S2, regular, NMBR	    Gastrointestinal: BS + x 4Q, nontender	    Genitourinary:  Bladder nondistended, nontender    Musculoskeletal:   General Right:   no muscle/joint tenderness,   normal tone, no joint swelling,   ROM: limited/full	    General Left:   no muscle/joint tenderness,   normal tone, no joint swelling,   ROM: limited/full                 Lower extrems:   Right: no calf tenderness              negative francisca's sign               + pedal pulses    Left:   no calf tenderness              negative francisca's sign               + pedal pulses                          11.5   9.90  )-----------( 160      ( 2019 09:55 )             36.7           143  |  110<H>  |  26<H>  ----------------------------<  84  4.0   |  27  |  0.81    Ca    8.6      2019 11:23    TPro  5.6<L>  /  Alb  3.0<L>  /  TBili  0.5  /  DBili  x   /  AST  22  /  ALT  45  /  AlkPhos  61  24      PT/INR - ( 2019 09:55 )   PT: 14.0 sec;   INR: 1.25 ratio         PTT - ( 2019 09:55 )  PTT:22.6 sec			            FINDINGS:    Chest:    Lungs, Airways and Pleura: Central tracheobronchial tree is grossly   patent. No endobronchial lesions. Moderate centrilobular emphysema. No   pneumothorax. Subsegmental atelectasis in the lingula. Minimal bibasilar   atelectasis.    Heart and Great Vessels: Atherosclerotic changes of the aorta and   coronary vasculature. Heart is mildly enlarged. No pericardial effusion.    Mediastinum and Samra: Hiatal hernia with an intrathoracic component of   the proximal stomach.    Neck and Chest Wall: Large right breast mass extending to the skin   surface measuring 5.2 x 4.5 cm unchanged. Right axillary mass measuring   4.4 cm is unchanged.    Abdomen and pelvis:    Liver: within normal limits  Gallbladder: Prior cholecystectomy..  Bile ducts: No intrahepatic or extrahepatic biliary dilatation  Pancreas: within normal limits    Spleen: within normal limits  Adrenal: within normal limits  Kidneys, Ureters and Bladder: Mild bilateral renal atrophy. No   hydronephrosis. No intrarenal calculus. Left parapelvic cyst. The ureters   and bladder are within normal limits.        Pelvis: Interval development of hyperdense presacral fluid suggesting   hematoma. No pelvic mass or pelvic adenopathy. Prior hysterectomy.    Bowel: The bowel is of normal course and caliber without evidence of   obstruction or gross bowel wall thickening. Normal appendix visualized.   Clot diverticulosis without diverticulitis.  Peritoneum: No intra-abdominal free air, ascites or organized fluid   collections.    Retroperitoneum: within normal limits       Vessels: Atherosclerotic changes.        Abdominal wall and Bones: Degenerative changes. No lytic or blastic   lesions. Tree single hematoma. Hyperdense enlargement of the right   greater than left piriformis muscle suggesting intramuscular hematoma.   Fracture through the S for vertebral body. Questionable left sacral   fracture. Prior right hip pinning. Mild superior endplate deformities of   L4 and L5. Moderate to severe compression deformity of L2. Mild to   moderate compression deformities of T7 and T9. Moderate compression   deformity of T4. These are unchanged. Multiple acute/subacute and chronic   right-sided rib fractures some of which are new from prior exam. Multiple   subacute left-sided rib fractures.    IMPRESSION:      Interval development of a presacral hematoma with associated sacral   fracture. If concern for more extensive pelvic fractures, MRI can be   obtained for further evaluation. Multiple acute/subacute and chronic   right-sided rib fractures some of which are new from prior exam. Multiple   subacute left-sided rib fractures.    No other evidence of solid visceral injury in the chest, abdomen or   pelvis.    Persistent right breast mass with right axillary mass as seen on prior   exam.      CT head:  FINDINGS:   CT dated 2019 is available for review.    The brain demonstrates moderate periventricular white matter ischemia.     No acute cerebral cortical infarct is seen.  No intracranial hemorrhage   is found.  No mass effect is found in the brain.      The ventricles, sulci and basal cisterns show mild atrophy.         The orbits are unremarkable.  The paranasal sinuses are clear.  The nasal   cavity appears intact.  The nasopharynx is symmetric.  The central skull   base, petrous temporal bones and calvarium remain intact.      IMPRESSION:   Moderate periventricular white matter ischemia.    Mild   atrophy.                        	    MEDICATIONS  (STANDING):  ALBUTerol    0.083% 2.5 milliGRAM(s) Nebulizer every 6 hours  dipyridamole 200 mG/aspirin 25 mG 1 Capsule(s) Oral two times a day  docusate sodium 100 milliGRAM(s) Oral two times a day  famotidine    Tablet 20 milliGRAM(s) Oral two times a day  heparin  Injectable 5000 Unit(s) SubCutaneous every 12 hours  hydroxychloroquine 200 milliGRAM(s) Oral every 12 hours  lactobacillus acidophilus 1 Tablet(s) Oral two times a day with meals  levothyroxine 25 MICROGram(s) Oral daily  lidocaine   Patch 1 Patch Transdermal daily  multivitamin 1 Tablet(s) Oral daily  predniSONE   Tablet 20 milliGRAM(s) Oral once  simvastatin 20 milliGRAM(s) Oral at bedtime  sodium chloride 0.9% Bolus 500 milliLiter(s) IV Bolus once  tamsulosin Oral Tab/Cap - Peds 0.4 milliGRAM(s) Oral at bedtime  tiotropium 18 MICROgram(s) Capsule 1 Capsule(s) Inhalation daily    Vital Signs Last 24 Hrs  T(C): 36.9 (19 @ 13:36), Max: 36.9 (19 @ 13:36)  T(F): 98.4 (19 @ 13:36), Max: 98.4 (19 @ 13:36)  HR: 85 (19 @ 13:36) (78 - 85)  BP: 108/58 (19 @ 13:36) (108/58 - 122/65)  BP(mean): --  RR: 18 (19 @ 09:47) (18 - 18)  SpO2: 94% (19 @ 13:36) (94% - 95%)      DVT Prophylaxis:  LMWH                   (  )  Heparin SQ           (  )  Coumadin             (  )  Xarelto                  (  )  Eliquis                   (  )  Venodynes           (  )  Ambulation          (  )  UFH                       (  )  Contraindicated  (  ) HPI:  CC: Mechanical Fall     HPI: 88y/o F w/ PMHx of Alzheimers, TIA, Stage 4 Breast CA, HTN, carotid artery stenosis, COPD, HLD, hypothyroidism, benign lung nodule, OA,  chronic back pain with compression of T7, GERD,  Asthma, s/p cholecystectomy, MARCELO, and hip surgery present s to  s/p mechanical fall.  Patient was recently discharged from  with pneumonia on  to  [facility].  Daughter reports patient was standing and tripped over her walker and fell to the floor. Reports hitting head and shoulder. Patient does not recall the event or LOC but does reports pain in the middle of her back on exam. Patient normally ambulated with a walker and wheel chair. unable to complete ROS due to patient mental state.    Information obtained by daughter Christine Muñoz, [phone]. (2019 13:50)      Patient is a 89y old  Female who presents with a chief complaint of s/p mechanical fall (2019 13:50)      Consulted by Dr. Pak  for VTE prophylaxis, risk stratification, and anticoagulation management.    PAST MEDICAL & SURGICAL HISTORY:  Hypothyroidism  Breast cancer: Right  Compression fracture of spine: T7  Alzheimers disease  HTN (hypertension)  Glaucoma  TIA (transient ischemic attack):   Cerebral vascular accident: , presently on Aggrenox  Polymyalgia rheumatica  Osteoporosis  Chronic pain: mid back  Urinary retention  GERD (gastroesophageal reflux disease)  Cholelithiases  Hyperlipemia  Carotid artery stenosis  Lung nodule: biopsied , benign  COPD (chronic obstructive pulmonary disease)  Asthma  Glaucoma of both eyes: s/p repair  History of hip surgery  Gall stones  History of hysterectomy: for bleeding  Hx of cholecystectomy          CrCl:    Caprini VTE Risk Score:CAPRINI SCORE [CLOT]    AGE RELATED RISK FACTORS                                                       MOBILITY RELATED FACTORS  [ ] Age 41-60 years                                            (1 Point)                  [ ] Bed rest                                                        (1 Point)  [ ] Age: 61-74 years                                           (2 Points)                 [ ] Plaster cast                                                   (2 Points)  [ x] Age= 75 years                                              (3 Points)                 [ ] Bed bound for more than 72 hours                 (2 Points)    DISEASE RELATED RISK FACTORS                                               GENDER SPECIFIC FACTORS  [ ] Edema in the lower extremities                       (1 Point)                  [ ] Pregnancy                                                     (1 Point)  [ ] Varicose veins                                               (1 Point)                  [ ] Post-partum < 6 weeks                                   (1 Point)             [ x] BMI > 25 Kg/m2                                            (1 Point)                  [ ] Hormonal therapy  or oral contraception          (1 Point)                 [ ] Sepsis (in the previous month)                        (1 Point)                  [ ] History of pregnancy complications                 (1 point)  [ ] Pneumonia or serious lung disease                                               [ ] Unexplained or recurrent                     (1 Point)           (in the previous month)                               (1 Point)  [ x] Abnormal pulmonary function test                     (1 Point)                 SURGERY RELATED RISK FACTORS  [ ] Acute myocardial infarction                              (1 Point)                 [ ]  Section                                             (1 Point)  [ ] Congestive heart failure (in the previous month)  (1 Point)               [ ] Minor surgery                                                  (1 Point)   [ ] Inflammatory bowel disease                             (1 Point)                 [ ] Arthroscopic surgery                                        (2 Points)  [ ] Central venous access                                      (2 Points)                [ ] General surgery lasting more than 45 minutes   (2 Points)       [ ] Stroke (in the previous month)                          (5 Points)               [ ] Elective arthroplasty                                         (5 Points)            [x ] H/O malignancy ( present or previous)            ( 2 points)                                                                                                                                   HEMATOLOGY RELATED FACTORS                                                 TRAUMA RELATED RISK FACTORS  [ ] Prior episodes of VTE                                     (3 Points)                 [ ] Fracture of the hip, pelvis, or leg                       (5 Points)  [ ] Positive family history for VTE                         (3 Points)                 [ ] Acute spinal cord injury (in the previous month)  (5 Points)  [ ] Prothrombin 44859 A                                     (3 Points)                 [ ] Paralysis  (less than 1 month)                             (5 Points)  [ ] Factor V Leiden                                             (3 Points)                  [ ] Multiple Trauma within 1 month                        (5 Points)  [ ] Lupus anticoagulants                                     (3 Points)                                                           [ ] Anticardiolipin antibodies                               (3 Points)                                                       [ ] High homocysteine in the blood                      (3 Points)                                             [ ] Other congenital or acquired thrombophilia      (3 Points)                                                [ ] Heparin induced thrombocytopenia                  (3 Points)                                          Total Score [       7   ]      IMPROVE Bleeding Risk Score    Falls Risk:   High (  x)  Mod (  )  Low (  )      FAMILY HISTORY:  Family history of diabetes mellitus (DM) (Child)  Family history of COPD (chronic obstructive pulmonary disease) (Mother)    Denies any personal or familial history of clotting or bleeding disorders.    Allergies    Aciphex (Unknown)  Macrobid (Unknown)  Percocet 10/325 (Rash)    Intolerances        REVIEW OF SYSTEMS    (  )Fever	     (  )Constipation	(  )SOB				(  )Headache	(  )Dysuria  (  )Chills	     (  )Melena	(  )Dyspnea present on exertion	                    (  )Dizziness                    (  )Polyuria  (  )Nausea	     (  )Hematochezia	(  )Cough			                    (  )Syncope   	(  )Hematuria  (  )Vomiting    (  )Chest Pain	(  )Wheezing			( x )Weakness  (  )Diarrhea     (  )Palpitations	(  )Anorexia			( x )Myalgia    All  other review of systems negative: Yes          PHYSICAL EXAM:    Constitutional: Appears Well    Neurological: A& O x 3    Skin: Warm    Respiratory and Thorax: normal effort; Breath sounds: normal; No rales/wheezing/rhonchi  	  Cardiovascular: S1, S2, regular, NMBR	    Gastrointestinal: BS + x 4Q, nontender	    Genitourinary:  Bladder nondistended, nontender    Musculoskeletal:   General Right:   no muscle/joint tenderness,   normal tone, no joint swelling,   ROM: limited/full	    General Left:   no muscle/joint tenderness,   normal tone, no joint swelling,   ROM: limited/full                 Lower extrems:   Right: no calf tenderness              negative francisca's sign               + pedal pulses    Left:   no calf tenderness              negative francisca's sign               + pedal pulses                          11.5   9.90  )-----------( 160      ( 2019 09:55 )             36.7           143  |  110<H>  |  26<H>  ----------------------------<  84  4.0   |  27  |  0.81    Ca    8.6      2019 11:23    TPro  5.6<L>  /  Alb  3.0<L>  /  TBili  0.5  /  DBili  x   /  AST  22  /  ALT  45  /  AlkPhos  61  24      PT/INR - ( 2019 09:55 )   PT: 14.0 sec;   INR: 1.25 ratio         PTT - ( 2019 09:55 )  PTT:22.6 sec			            FINDINGS:    Chest:    Lungs, Airways and Pleura: Central tracheobronchial tree is grossly   patent. No endobronchial lesions. Moderate centrilobular emphysema. No   pneumothorax. Subsegmental atelectasis in the lingula. Minimal bibasilar   atelectasis.    Heart and Great Vessels: Atherosclerotic changes of the aorta and   coronary vasculature. Heart is mildly enlarged. No pericardial effusion.    Mediastinum and Samra: Hiatal hernia with an intrathoracic component of   the proximal stomach.    Neck and Chest Wall: Large right breast mass extending to the skin   surface measuring 5.2 x 4.5 cm unchanged. Right axillary mass measuring   4.4 cm is unchanged.    Abdomen and pelvis:    Liver: within normal limits  Gallbladder: Prior cholecystectomy..  Bile ducts: No intrahepatic or extrahepatic biliary dilatation  Pancreas: within normal limits    Spleen: within normal limits  Adrenal: within normal limits  Kidneys, Ureters and Bladder: Mild bilateral renal atrophy. No   hydronephrosis. No intrarenal calculus. Left parapelvic cyst. The ureters   and bladder are within normal limits.        Pelvis: Interval development of hyperdense presacral fluid suggesting   hematoma. No pelvic mass or pelvic adenopathy. Prior hysterectomy.    Bowel: The bowel is of normal course and caliber without evidence of   obstruction or gross bowel wall thickening. Normal appendix visualized.   Clot diverticulosis without diverticulitis.  Peritoneum: No intra-abdominal free air, ascites or organized fluid   collections.    Retroperitoneum: within normal limits       Vessels: Atherosclerotic changes.        Abdominal wall and Bones: Degenerative changes. No lytic or blastic   lesions. Tree single hematoma. Hyperdense enlargement of the right   greater than left piriformis muscle suggesting intramuscular hematoma.   Fracture through the S for vertebral body. Questionable left sacral   fracture. Prior right hip pinning. Mild superior endplate deformities of   L4 and L5. Moderate to severe compression deformity of L2. Mild to   moderate compression deformities of T7 and T9. Moderate compression   deformity of T4. These are unchanged. Multiple acute/subacute and chronic   right-sided rib fractures some of which are new from prior exam. Multiple   subacute left-sided rib fractures.    IMPRESSION:      Interval development of a presacral hematoma with associated sacral   fracture. If concern for more extensive pelvic fractures, MRI can be   obtained for further evaluation. Multiple acute/subacute and chronic   right-sided rib fractures some of which are new from prior exam. Multiple   subacute left-sided rib fractures.    No other evidence of solid visceral injury in the chest, abdomen or   pelvis.    Persistent right breast mass with right axillary mass as seen on prior   exam.      CT head:  FINDINGS:   CT dated 2019 is available for review.    The brain demonstrates moderate periventricular white matter ischemia.     No acute cerebral cortical infarct is seen.  No intracranial hemorrhage   is found.  No mass effect is found in the brain.      The ventricles, sulci and basal cisterns show mild atrophy.         The orbits are unremarkable.  The paranasal sinuses are clear.  The nasal   cavity appears intact.  The nasopharynx is symmetric.  The central skull   base, petrous temporal bones and calvarium remain intact.      IMPRESSION:   Moderate periventricular white matter ischemia.    Mild   atrophy.                        	    MEDICATIONS  (STANDING):  ALBUTerol    0.083% 2.5 milliGRAM(s) Nebulizer every 6 hours  dipyridamole 200 mG/aspirin 25 mG 1 Capsule(s) Oral two times a day  docusate sodium 100 milliGRAM(s) Oral two times a day  famotidine    Tablet 20 milliGRAM(s) Oral two times a day  heparin  Injectable 5000 Unit(s) SubCutaneous every 12 hours  hydroxychloroquine 200 milliGRAM(s) Oral every 12 hours  lactobacillus acidophilus 1 Tablet(s) Oral two times a day with meals  levothyroxine 25 MICROGram(s) Oral daily  lidocaine   Patch 1 Patch Transdermal daily  multivitamin 1 Tablet(s) Oral daily  predniSONE   Tablet 20 milliGRAM(s) Oral once  simvastatin 20 milliGRAM(s) Oral at bedtime  sodium chloride 0.9% Bolus 500 milliLiter(s) IV Bolus once  tamsulosin Oral Tab/Cap - Peds 0.4 milliGRAM(s) Oral at bedtime  tiotropium 18 MICROgram(s) Capsule 1 Capsule(s) Inhalation daily    Vital Signs Last 24 Hrs  T(C): 36.9 (19 @ 13:36), Max: 36.9 (19 @ 13:36)  T(F): 98.4 (19 @ 13:36), Max: 98.4 (19 @ 13:36)  HR: 85 (19 @ 13:36) (78 - 85)  BP: 108/58 (19 @ 13:36) (108/58 - 122/65)  BP(mean): --  RR: 18 (19 @ 09:47) (18 - 18)  SpO2: 94% (19 @ 13:36) (94% - 95%)      DVT Prophylaxis:  LMWH                   (  )  Heparin SQ           ( x )  Coumadin             (  )  Xarelto                  (  )  Eliquis                   (  )  Venodynes           ( x )  Ambulation          (x  )  UFH                       (  )  Contraindicated  (  )

## 2019-01-24 NOTE — ED PROVIDER NOTE - PMH
Alzheimers disease    Asthma    Breast cancer  Right  Carotid artery stenosis    Cerebral vascular accident  2005  Cholelithiases    Chronic pain  mid back  Compression fracture of spine  T7  COPD (chronic obstructive pulmonary disease)    GERD (gastroesophageal reflux disease)    Glaucoma    HTN (hypertension)    Hyperlipemia    Hypothyroidism    Lung nodule  biopsied 2003, benign  Osteoporosis    Polymyalgia rheumatica    TIA (transient ischemic attack)  8/07  Urinary retention

## 2019-01-24 NOTE — ED PROVIDER NOTE - PROGRESS NOTE DETAILS
Dragan LYN for ED attending, Dr. Jacqueline knutson CT, new right rib frx, w/ right sacral fracture w/ adjacent hematoma extending into the piriformis muscle.  Paging trauma. Dragan LYN for ED attending, Dr. Phillips.  Discussed XR and CT results w/ Dr. Moraes who is aware of trauma alert. Daughter is aware of CT findings. Dragan LYN for ED attending, Dr. Phillips. Spoke to Trauma attending.  Pt accepted by Dr. Moraes to monitored bed.  Requesting consult from Pulmonology.

## 2019-01-24 NOTE — ED ADULT TRIAGE NOTE - CHIEF COMPLAINT QUOTE
mechanical fall tripped on walker. on aggrenox. complaining of head injury. trauma alert called in triage.

## 2019-01-24 NOTE — H&P ADULT - HISTORY OF PRESENT ILLNESS
CC: Mechanical Fall     HPI: 88y/o F w/ PMHx of Alzheimers, TIA, Stage 4 Breast CA, HTN, carotid artery stenosis, COPD, HLD, hypothyroidism, benign lung nodule, OA,  chronic back pain with compression of T7, GERD,  Asthma, s/p cholecystectomy, MARCELO, and hip surgery present s to  s/p mechanical fall.  Patient was recently discharged from  with pneumonia on 1/19 to  [facility].  Daughter reports patient was standing and tripped over her walker and fell to the floor. Reports hitting head and shoulder. Patient does not recall the event or LOC but does reports pain in the middle of her back on exam. Patient normally ambulated with a walker and wheel chair. unable to complete ROS due to patient mental state.    Information obtained by daughter Christine Muñoz, [phone].

## 2019-01-24 NOTE — ED PROVIDER NOTE - ENMT, MLM
Airway patent, Nasal mucosa clear. Mouth with mildly dry mucosa, +Dentures. Throat has no vesicles, no oropharyngeal exudates and uvula is midline.

## 2019-01-24 NOTE — ED ADULT NURSE NOTE - CHIEF COMPLAINT QUOTE
Patient lives in [facility]. She was unsing her walker and fell on carpet onto right side inside walker. Hit head, right shoulder.

## 2019-01-24 NOTE — CONSULT NOTE ADULT - ASSESSMENT
A:  89 yo female S/P fall now with B/L ribs fx, S2 Fx with associated hematoma,  High VTE risk due to age, fxs and immobility      P:  D/W Son/DTR needs for anticoagulation and agreed to heparin SQ  Heparin 5000 units sq q 12h  monitor CBC, BMP  Venodynes to BLE  INC mobility as bobby      Thank you for the consult, will follow

## 2019-01-24 NOTE — H&P ADULT - NSHPPHYSICALEXAM_GEN_ALL_CORE
GCS of 14  Airway is patent  Breathing is symmetric and unlabored  Neuro: CNII-XII grossly intact, AAOx2, dementia baseline  Psych: normal affect  HEENT: Normocephalic, atraumatic, SARA, EOM wnl, no otorrhea or hemotympanum b/l, no epistaxis or d/c b/l nares, no craniofacial bony pathology or tenderness b/l  Neck: Soft and supple, nontender to exam of passive/active ROM. No crepitus, no ecchymosis, no hematoma, to exam, no JVD, no tracheal deviation  Cspine/thoracolumbrosacral spine: (+) tenderness to exam of mid to low back  Cardiovascular: S1S2 Present  Chest: no gross left rib pathology or tenderness to exam. (+) tenderness to right ribs from multiple acute/subacute rib fractures. No sternal pathology or tenderness to exam. No crepitus, no ecchymosis, no hematoma. No penetrating thorcoabdominal trauma. (+) right breast ca mass with right axillary mass/lymphadenopathy  Respiratory: Rate is 18; Respiratory Effort normal; no wheezes, rales or rhonchi to exam  ABD: bowel sounds (+), soft, nontender, non distended, no rebound, no guarding, no rigidity, no skin changes to exam. No pelvic instability to exam, no skin changes  Genitourinary: No gross perineal/perirectal hematoma/ecchymosis/tenderness to exam  External genitalia: normal, no blood at urethral meatus  Musculoskeletal: Pt has palpable b/l radial, femoral, dorsalis pedis pulses. All digits are warm and well perfused. No gross long bone pathology or tenderness to exam. Pt demonstrates grossly intact sensoromotor function. Pt has good capillary refill to digits, no calf edema or tenderness to exam.  Skin: no lesions or rashes to exam

## 2019-01-24 NOTE — ED PROVIDER NOTE - PSH
Gall stones    Glaucoma of both eyes  s/p repair  History of hip surgery    History of hysterectomy  for bleeding  Hx of cholecystectomy

## 2019-01-25 NOTE — PHYSICAL THERAPY INITIAL EVALUATION ADULT - GENERAL OBSERVATIONS, REHAB EVAL
flowtrons; HM; BP cuff; pt rec'd in bed supine; HR 86; BP 90/53; RR 27; pt denied pain; son Jesus flores

## 2019-01-25 NOTE — CONSULT NOTE ADULT - SUBJECTIVE AND OBJECTIVE BOX
HPI: SHEELA GUPTA is a 89y old Female with PMH Alzheimers, TIA, Stage 4 Breast CA, HTN, carotid artery stenosis, COPD, HLD, hypothyroidism, benign lung nodule, OA,  chronic back pain with compression of T7, GERD,  Asthma, s/p cholecystectomy, MARCELO, and hip surgery present s to  s/p mechanical fall.  Patient was recently discharged from  with pneumonia on 1/19 to [facility] living.  Per daughter, the patient was standing and tripped over her walker and fell to the floor. Reports hitting head and shoulder. Patient does not recall the event or LOC but does reports pain in the middle of her back on exam. When seen, patient denied shortness of breath, chest pain. Has not been using incentive spirometry    Past Medical History:   Hypothyroidism  Breast cancer  Compression fracture of spine  Alzheimers disease  HTN (hypertension)  Glaucoma  TIA (transient ischemic attack)  Cerebral vascular accident  Polymyalgia rheumatica  Osteoporosis  Chronic pain  Urinary retention  GERD (gastroesophageal reflux disease)  Cholelithiases  Hyperlipemia  Carotid artery stenosis  Lung nodule  COPD (chronic obstructive pulmonary disease)  Asthma    Past Surgical History:   Glaucoma of both eyes  History of hip surgery  Gall stones  History of hysterectomy  Hx of cholecystectomy    Family History:    Family history of diabetes mellitus (DM) (Child)  Family history of COPD (chronic obstructive pulmonary disease) (Mother)  No pertinent family history in first degree relatives     Social History: Lives at nursing home    Review of Systems:  All 10 systems reviewed in detailed and found to be negative with the exception of what has already been described above    Allergies:  Aciphex (Unknown)  Macrobid (Unknown)  Percocet 10/325 (Rash)    Meds  MEDICATIONS  (STANDING):  ALBUTerol    0.083% 2.5 milliGRAM(s) Nebulizer every 6 hours  dipyridamole 200 mG/aspirin 25 mG 1 Capsule(s) Oral two times a day  docusate sodium 100 milliGRAM(s) Oral two times a day  famotidine    Tablet 20 milliGRAM(s) Oral two times a day  heparin  Injectable 5000 Unit(s) SubCutaneous every 12 hours  hydroxychloroquine 200 milliGRAM(s) Oral every 12 hours  lactobacillus acidophilus 1 Tablet(s) Oral two times a day with meals  levothyroxine 25 MICROGram(s) Oral daily  lidocaine   Patch 1 Patch Transdermal daily  multivitamin 1 Tablet(s) Oral daily  predniSONE   Tablet 10 milliGRAM(s) Oral daily  simvastatin 20 milliGRAM(s) Oral at bedtime  tamsulosin 0.4 milliGRAM(s) Oral at bedtime  tiotropium 18 MICROgram(s) Capsule 1 Capsule(s) Inhalation daily    MEDICATIONS  (PRN):  acetaminophen   Tablet .. 650 milliGRAM(s) Oral every 6 hours PRN Temp greater or equal to 38C (100.4F), Mild Pain (1 - 3)  ALBUTerol    0.083% 2.5 milliGRAM(s) Nebulizer every 3 hours PRN Shortness of Breath and/or Wheezing  ondansetron Injectable 4 milliGRAM(s) IV Push every 6 hours PRN Nausea    Physical Exam  T(C): 36.1 (01-25-19 @ 04:13), Max: 38.1 (01-24-19 @ 22:13)  HR: 81 (01-25-19 @ 14:11) (80 - 102)  BP: 103/62 (01-25-19 @ 06:00) (98/57 - 118/100)  RR: 17 (01-25-19 @ 06:00) (17 - 30)  SpO2: 98% (01-25-19 @ 04:13) (92% - 100%)  Gen: Alert, oriented, no distress  HEENT: Anicteric sclera, moist mucous membranes, no JVD, no lymphadenopathy, good dentition  Cardio: Regular rhythm and rate, normal S1S2, no murmurs  Resp: Clear to auscultation bilaterally, no wheezing or rhonchi  GI: Nontender, nondistended, normoactive bowel sounds  Ext: No cyanosis, clubbing or edema  Neuro: Nonfocal  Back: Tender on palpation    Labs:                        11.4   11.57 )-----------( 159      ( 25 Jan 2019 05:41 )             35.6     01-25    142  |  111<H>  |  28<H>  ----------------------------<  99  4.4   |  24  |  0.76    Ca    8.6      25 Jan 2019 05:41  Phos  2.9     01-25  Mg     2.2     01-25    TPro  5.6<L>  /  Alb  3.0<L>  /  TBili  0.5  /  DBili  x   /  AST  22  /  ALT  45  /  AlkPhos  61  01-24    PT/INR - ( 24 Jan 2019 09:55 )   PT: 14.0 sec;   INR: 1.25 ratio      < from: CT Chest No Cont (01.24.19 @ 10:33) >  FINDINGS:    Chest:    Lungs, Airways and Pleura: Central tracheobronchial tree is grossly   patent. No endobronchial lesions. Moderate centrilobular emphysema. No   pneumothorax. Subsegmental atelectasis in the lingula. Minimal bibasilar   atelectasis.    Heart and Great Vessels: Atherosclerotic changes of the aorta and   coronary vasculature. Heart is mildly enlarged. No pericardial effusion.    Mediastinum and Samra: Hiatal hernia with an intrathoracic component of   the proximal stomach.    Neck and Chest Wall: Large right breastmass extending to the skin   surface measuring 5.2 x 4.5 cm unchanged. Right axillary mass measuring   4.4 cm is unchanged.    Abdomen and pelvis:    Liver: within normal limits  Gallbladder: Prior cholecystectomy..  Bile ducts: No intrahepatic or extrahepatic biliary dilatation  Pancreas: within normal limits    Spleen: within normal limits  Adrenal: within normal limits  Kidneys, Ureters and Bladder: Mild bilateral renal atrophy. No   hydronephrosis. No intrarenal calculus. Left parapelvic cyst. The ureters   and bladder are within normal limits.        Pelvis: Interval development of hyperdense presacral fluid suggesting   hematoma. No pelvic mass or pelvic adenopathy. Prior hysterectomy.    Bowel: The bowel is of normal course and caliberwithout evidence of   obstruction or gross bowel wall thickening. Normal appendix visualized.   Clot diverticulosis without diverticulitis.  Peritoneum: No intra-abdominal free air, ascites or organized fluid   collections.    Retroperitoneum: withinnormal limits       Vessels: Atherosclerotic changes.        Abdominal wall and Bones: Degenerative changes. No lytic or blastic   lesions. Tree single hematoma. Hyperdense enlargement of the right   greater than left piriformis muscle suggesting intramuscular hematoma.   Fracture through the S for vertebral body. Questionable left sacral   fracture. Prior right hip pinning. Mild superior endplate deformities of   L4 and L5. Moderate to severe compression deformity of L2. Mild to   moderate compression deformities of T7 and T9. Moderate compression   deformity of T4. These are unchanged. Multiple acute/subacute and chronic   right-sided rib fractures some of which are new from prior exam. Multiple   subacute left-sided rib fractures.    IMPRESSION:      Interval development of a presacral hematoma with associated sacral   fracture. If concern for more extensive pelvic fractures, MRI can be   obtained for further evaluation. Multiple acute/subacute and chronic   right-sided rib fractures some of which are new from prior exam. Multiple   subacute left-sided rib fractures.    No other evidence of solid visceral injury in the chest, abdomen or   pelvis.    Persistent right breast mass with right axillary mass as seen on prior   exam.      < end of copied text >  < from: CT Head No Cont (01.24.19 @ 10:24) >  TECHNIQUE: Contiguous axial 5 mm sections were obtained through the head.   Sagittal and coronal 2-D reformatted images were also obtained.   This   scan was performed using automatic exposure control (radiation dose   reduction software) to obtain a diagnostic image quality scan with   patient dose as low as reasonably achievable.     FINDINGS:   CT dated 1/14/2019 is available for review.    The brain demonstrates moderate periventricular white matter ischemia.     No acute cerebral cortical infarct is seen.  No intracranial hemorrhage   is found.No mass effect is found in the brain.      The ventricles, sulci and basal cisterns show mild atrophy.         The orbits are unremarkable.  The paranasal sinuses are clear.  The nasal   cavity appears intact.  The nasopharynx is symmetric.  The central skull   base, petrous temporal bones and calvarium remain intact.      IMPRESSION:   Moderate periventricular white matter ischemia.    Mild   atrophy.          < end of copied text >

## 2019-01-25 NOTE — CONSULT NOTE ADULT - ASSESSMENT
89y old Female with PMH Alzheimers, TIA, Stage 4 Breast CA, HTN, carotid artery stenosis, COPD, HLD, hypothyroidism, benign lung nodule, OA,  chronic back pain with compression of T7, GERD,  Asthma, s/p cholecystectomy, MARCELO, and hip surgery s/p mechanical fall found to have multiple rib fractures.   #Rib fractures:   -Pain control  -Encouraged incentive spirometry to avoid complications such as atelectasis and pneumonia from splinting  #COPD: Well controlled, no wheezing  -Suggest changing albuterol and spiriva to duonebs, so she does not have to exert herself while taking the inhaler  -Continue prednisone 10 mg daily  #Fall: PT Eval

## 2019-01-25 NOTE — PHYSICAL THERAPY INITIAL EVALUATION ADULT - MODALITIES TREATMENT COMMENTS
pt left seated in chair post Eval; chair alarm donned; all above lines/monitors in place; son Jesus present; callbell in reach; pt instructed not to get up alone; call nursing for assist; bobby well; denied pain; RN Iman made aware pt OOB

## 2019-01-26 NOTE — CONSULT NOTE ADULT - ASSESSMENT
Pt is a 89y old Female with hx of breast cancer s/p chemo, COPD, CVA, dementia, PMR, RA, recurrent falls admitted from home ([facility]) on 1/24 s/p fall from tripping over her walker  On admission  noted to have multiple rib fxs, sacral fx, thoracic and lumbar fxs.  Pt recently discharged after episode of pneumonia.  Family requesting palliative consultation to discuss goals of care. Pt is a 89y old Female with hx of breast cancer s/p chemo, COPD, CVA, dementia, PMR, RA, recurrent falls admitted from home ([facility]) on 1/24 s/p fall from tripping over her walker  On admission  noted to have multiple rib fxs, sacral fx, thoracic and lumbar fxs.  Pt recently discharged after episode of pneumonia.  Family requesting palliative consultation to discuss goals of care.    1)Pain  Musculoskeletal pain s/p fall  -Pt reports adequate relief with PRN Tylenol  -Please note in past pt with sensitivity to Tramadol and Percocet  -cont Lidoderm patch    2)Recurrent Falls  -Trauma/surgery input reviewed  -Imaging reviewed  -Supportive care including pain management and PT    4)Rib, Pelvic, Vertebral Fx  -Trauma/surgery input reviewed  -Imaging reviewed  -Supportive care including pain management and PT    5)COPD  -Supplemental oxygen PRN  -cont nebs    6)Metastatic Breast Ca  -Trial of chemotx 2018 which pt did not tolerate  -Due to growth of mass now abutting chest wall plan for RT per daughter  -3 Weeks RT planned as outpt post discharge    7)Advance Directives, Goals of Care  -though pt with dx of dementia has ability to participate in decision making process  -Pt has HCP on file naming her daughter Diana as her agent  -Pt has DNR and DNI order in place  -Family meeting held today with pt, and then separately with her daughter  Please see goals of care note for details.  In summary plan for return to [facility] with palliative home care and private hire aide support to receive RT then transition to home hospice at [place]

## 2019-01-26 NOTE — GOALS OF CARE CONVERSATION - PERSONAL ADVANCE DIRECTIVE - CONVERSATION DETAILS
Met initially with pt and daughter at bedside, but then with daughter alone as pt was having care provided.  Pat explained that she is concerned about her mom.  She recalls meeting with our team nearly a year ago when pt was dx with breast ca.  She shared pt did try chemotx and did not tolerate it well and decided to forgo systemic therapy because she felt quality of life was not acceptable with the side effects of chemotx.  she has intermittently been followed with PET scans.  She shares most recently imaging and clinical exam revealed the right breast mass is now abutting chest well and the LAD is enmeshed in nerves which precludes any kind of palliative surgery.  She has been offered RT with Dr. Jean and pt has decided to pursue 3 week course of palliative RT.     Pat had question about dispo options specifically about hospice care.  she mentioned rehab was put out as an option  but advised her in most cases if pts go to , the RT is put on hold until SUE discharge.  We reviewed that PT assessment also suggest home (AV) with assist/PT.  Pat felt this was a reasonable plan and understood pt needs more assistance and will be looking into private hire aides as well.  we reviewed hospice criteria and services in detail.  Explained that as pt was planned for 3 week course of RT, made most sense to complete the course and then enroll in hospice, as generally hospice cannot provide coverage for long course RT.  Pat expressed understanding.  we reviewed option of palliative home care with PT as a bridge while pt was getting RT and then transition in the community to home level hospice.  we also reviewed in hospice if pt ever developed symptoms that could not be managed at [place].    Christine was agreeable to this plan and was interested in a list of private hire agencies. I advised SW could provide her with this list.    I also re-affirmed preference for DNR and DNI order.    emotional support provided to Pat.    Spent 40 minutes with pt and daughter discussing advance directives, therapeutic alternatives, and advance care planning

## 2019-01-26 NOTE — CONSULT NOTE ADULT - SUBJECTIVE AND OBJECTIVE BOX
HPI: Pt is a 89y old Female with hx of breast cancer s/p chemo, COPD, CVA, dementia, PMR, RA, recurrent falls admitted from home ([facility]) on 1/24 s/p fall from tripping over her walker  On admission  noted to have multiple rib fxs, sacral fx, thoracic and lumbar fxs.  Pt recently discharged after episode of pneumonia.  Family requesting palliative consultation to discuss goals of care.    Pt seen and examined by me with her daughter Christine at bedside for evaluation of goals of care.  Pt is awake and states she feels ok.  She denies pain presently but notes she had some back pain before.  In the past 24 hrs she received 1 dose of Tylenol with good effect.  Pt reports it adequately controlled her pain.  Daughter Christine shared in the past pt had received Tramadol but it make her fel "loopy".  She denies SOB.  She denies nausea, vomiting.  states she moving her bowel and urinating with out difficulty      PAIN: ( )Yes   (x )No    DYSPNEA: ( ) Yes  (x ) No      PAST MEDICAL & SURGICAL HISTORY:  Hypothyroidism  Breast cancer: Right  Compression fracture of spine: T7  Alzheimers disease  HTN (hypertension)  Glaucoma  TIA (transient ischemic attack): 8/07  Cerebral vascular accident: 2005  Polymyalgia rheumatica  Osteoporosis  Chronic pain: mid back  Urinary retention  GERD (gastroesophageal reflux disease)  Cholelithiases  Hyperlipemia  Carotid artery stenosis  Lung nodule: biopsied 2003, benign  COPD (chronic obstructive pulmonary disease)  Asthma  Glaucoma of both eyes: s/p repair  History of hip surgery  Gall stones  History of hysterectomy: for bleeding  Hx of cholecystectomy      SOCIAL HX: Lives independently at [facility]    Hx opiate tolerance ( )YES  ( x)NO    Baseline ADLs  (Prior to Admission)  ( ) Independent   ( )Dependent  some assist    FAMILY HISTORY:  Family history of diabetes mellitus (DM) (Child)  Family history of COPD (chronic obstructive pulmonary disease) (Mother)      Review of Systems:    Anxiety- denies  Depression- denies  Physical Discomfort- mild, intermittent  Dyspnea- denies  Constipation- denies  Diarrhea-denies  Nausea-denies  Vomiting-denies  Anorexia-denies  Weight Loss- denies  Cough-denies  Secretions-denies  Fatigue- mild  Weakness- mild  Delirium- none    All other systems reviewed and negative    PHYSICAL EXAM:    Vital Signs Last 24 Hrs  T(C): 36.6 (26 Jan 2019 10:25), Max: 36.8 (25 Jan 2019 17:56)  T(F): 97.9 (26 Jan 2019 10:25), Max: 98.3 (25 Jan 2019 17:56)  HR: 90 (26 Jan 2019 08:00) (81 - 101)  BP: 116/65 (26 Jan 2019 08:00) (83/69 - 139/82)  BP(mean): 78 (26 Jan 2019 08:00) (59 - 97)  RR: 24 (26 Jan 2019 08:00) (19 - 32)  SpO2: 99% (25 Jan 2019 20:33) (99% - 99%)    PPSV2: 40-50  %    FAST: 5/6A    General: pleasant elderly  female, comfortable  Mental Status: AOx2  HEENT: EOMI, moist oral mucosa  Lungs: decreased breath sounds b/l  Cardiac: S1S2+  GI: soft, + bowel sounds  : voiding, continent  Ext: moves all 4 exts spontaneously  Neuro: no focal deficits, + generalized weakness      LABS:                        11.4   11.57 )-----------( 159      ( 25 Jan 2019 05:41 )             35.6     01-25    142  |  111<H>  |  28<H>  ----------------------------<  99  4.4   |  24  |  0.76    Ca    8.6      25 Jan 2019 05:41  Phos  2.9     01-25  Mg     2.2     01-25        Albumin: Albumin, Serum: 3.0 g/dL (01-24 @ 11:23)      Allergies  Aciphex (Unknown)  Macrobid (Unknown)  Percocet 10/325 (Rash)    MEDICATIONS  (STANDING):  ALBUTerol/ipratropium for Nebulization 3 milliLiter(s) Nebulizer every 6 hours  dipyridamole 200 mG/aspirin 25 mG 1 Capsule(s) Oral two times a day  docusate sodium 100 milliGRAM(s) Oral two times a day  famotidine    Tablet 20 milliGRAM(s) Oral two times a day  heparin  Injectable 5000 Unit(s) SubCutaneous every 12 hours  hydroxychloroquine 200 milliGRAM(s) Oral every 12 hours  lactobacillus acidophilus 1 Tablet(s) Oral two times a day with meals  levothyroxine 25 MICROGram(s) Oral daily  lidocaine   Patch 1 Patch Transdermal daily  multivitamin 1 Tablet(s) Oral daily  predniSONE   Tablet 10 milliGRAM(s) Oral daily  simvastatin 20 milliGRAM(s) Oral at bedtime  tamsulosin 0.4 milliGRAM(s) Oral at bedtime    MEDICATIONS  (PRN):  acetaminophen   Tablet .. 650 milliGRAM(s) Oral every 6 hours PRN Temp greater or equal to 38C (100.4F), Mild Pain (1 - 3)  ondansetron Injectable 4 milliGRAM(s) IV Push every 6 hours PRN Nausea      RADIOLOGY/ADDITIONAL STUDIES: HPI: Pt is a 89y old Female with hx of breast cancer s/p chemo, COPD, CVA, dementia, PMR, RA, recurrent falls admitted from home ([facility]) on 1/24 s/p fall from tripping over her walker  On admission  noted to have multiple rib fxs, sacral fx, thoracic and lumbar fxs.  Pt recently discharged after episode of pneumonia.  Family requesting palliative consultation to discuss goals of care.    Pt seen and examined by me with her daughter Christine at bedside for evaluation of goals of care.  Pt is awake and states she feels ok.  She denies pain presently but notes she had some back pain before.  In the past 24 hrs she received 1 dose of Tylenol with good effect.  Pt reports it adequately controlled her pain.  Daughter Christine shared in the past pt had received Tramadol but it make her fel "loopy".  She denies SOB.  She denies nausea, vomiting.  states she moving her bowel and urinating with out difficulty      PAIN: ( )Yes   (x )No    DYSPNEA: ( ) Yes  (x ) No      PAST MEDICAL & SURGICAL HISTORY:  Hypothyroidism  Breast cancer: Right  Compression fracture of spine: T7  Alzheimers disease  HTN (hypertension)  Glaucoma  TIA (transient ischemic attack): 8/07  Cerebral vascular accident: 2005  Polymyalgia rheumatica  Osteoporosis  Chronic pain: mid back  Urinary retention  GERD (gastroesophageal reflux disease)  Cholelithiases  Hyperlipemia  Carotid artery stenosis  Lung nodule: biopsied 2003, benign  COPD (chronic obstructive pulmonary disease)  Asthma  Glaucoma of both eyes: s/p repair  History of hip surgery  Gall stones  History of hysterectomy: for bleeding  Hx of cholecystectomy      SOCIAL HX: Lives independently at [facility]    Hx opiate tolerance ( )YES  ( x)NO    Baseline ADLs  (Prior to Admission)  ( ) Independent   ( )Dependent  some assist    FAMILY HISTORY:  Family history of diabetes mellitus (DM) (Child)  Family history of COPD (chronic obstructive pulmonary disease) (Mother)      Review of Systems:    Anxiety- denies  Depression- denies  Physical Discomfort- mild, intermittent  Dyspnea- denies  Constipation- denies  Diarrhea-denies  Nausea-denies  Vomiting-denies  Anorexia-denies  Weight Loss- denies  Cough-denies  Secretions-denies  Fatigue- mild  Weakness- mild  Delirium- none    All other systems reviewed and negative    PHYSICAL EXAM:    Vital Signs Last 24 Hrs  T(C): 36.6 (26 Jan 2019 10:25), Max: 36.8 (25 Jan 2019 17:56)  T(F): 97.9 (26 Jan 2019 10:25), Max: 98.3 (25 Jan 2019 17:56)  HR: 90 (26 Jan 2019 08:00) (81 - 101)  BP: 116/65 (26 Jan 2019 08:00) (83/69 - 139/82)  BP(mean): 78 (26 Jan 2019 08:00) (59 - 97)  RR: 24 (26 Jan 2019 08:00) (19 - 32)  SpO2: 99% (25 Jan 2019 20:33) (99% - 99%)    PPSV2: 40-50  %    FAST: 5/6A    General: pleasant elderly  female, comfortable  Mental Status: AOx2  HEENT: EOMI, moist oral mucosa  Lungs: decreased breath sounds b/l  Cardiac: S1S2+  GI: soft, + bowel sounds  : voiding, continent  Ext: moves all 4 exts spontaneously  Neuro: no focal deficits, + generalized weakness      LABS:                        11.4   11.57 )-----------( 159      ( 25 Jan 2019 05:41 )             35.6     01-25    142  |  111<H>  |  28<H>  ----------------------------<  99  4.4   |  24  |  0.76    Ca    8.6      25 Jan 2019 05:41  Phos  2.9     01-25  Mg     2.2     01-25        Albumin: Albumin, Serum: 3.0 g/dL (01-24 @ 11:23)      Allergies  Aciphex (Unknown)  Macrobid (Unknown)  Percocet 10/325 (Rash)    MEDICATIONS  (STANDING):  ALBUTerol/ipratropium for Nebulization 3 milliLiter(s) Nebulizer every 6 hours  dipyridamole 200 mG/aspirin 25 mG 1 Capsule(s) Oral two times a day  docusate sodium 100 milliGRAM(s) Oral two times a day  famotidine    Tablet 20 milliGRAM(s) Oral two times a day  heparin  Injectable 5000 Unit(s) SubCutaneous every 12 hours  hydroxychloroquine 200 milliGRAM(s) Oral every 12 hours  lactobacillus acidophilus 1 Tablet(s) Oral two times a day with meals  levothyroxine 25 MICROGram(s) Oral daily  lidocaine   Patch 1 Patch Transdermal daily  multivitamin 1 Tablet(s) Oral daily  predniSONE   Tablet 10 milliGRAM(s) Oral daily  simvastatin 20 milliGRAM(s) Oral at bedtime  tamsulosin 0.4 milliGRAM(s) Oral at bedtime    MEDICATIONS  (PRN):  acetaminophen   Tablet .. 650 milliGRAM(s) Oral every 6 hours PRN Temp greater or equal to 38C (100.4F), Mild Pain (1 - 3)  ondansetron Injectable 4 milliGRAM(s) IV Push every 6 hours PRN Nausea      RADIOLOGY/ADDITIONAL STUDIES:    EXAM:  XR CHEST PORTABLE ROUTINE 1V                        PROCEDURE DATE:  01/25/2019    Comparison: 1/14/2019    AP radiograph of the chest demonstrates subsegmental atelectasis at the   lung bases. The cardiac silhouette is normal in size. Osseous structures   are intact.    Impression:Subsegmental atelectasis at the lung bases.      EXAM:  XR FEMUR 2 VIEWS RT                        EXAM:  XR HIPS BI WITH PELV MIN 5V                        PROCEDURE DATE:  01/24/2019    FINDINGS:  No prior exams are available for comparison.    No fracture is seen.  The hip remains located. Hardware in the RIGHT hip   is intact. No loose body is identified.  The joint spaces show narrowing,   RIGHT greater than LEFT.  No significant productive changes or other   cortical or trabecular abnormalities are recognized.  Vascular   calcification is seen. The distal RIGHT femur is intact. The hardware is   intact.    No soft tissue abnormality is recognized.  No radiopaque foreign body is   appreciated.    Pelvic bones appear intact.  No fracture is recognized.  The hips are   located.  The sacroiliac joints and pubic symphysis remain intact.  No   pathologic calcifications are seen.     IMPRESSION:      1. BILATERAL Hip and RIGHT femur:  No fracture. Internal hardware is   intact.    2.  Pelvis:  Unremarkable radiographs of the pelvis.            EXAM:  XR SHOULDER COMP MIN 2V RT                        PROCEDURE DATE:  01/24/2019    FINDINGS:   7/9/2017 available for review.    The osseous structures of the shoulder are intact.   No fracture is seen.    No destructive bone lesion is identified.    Theglenohumeral joint is located and intact.  The acromioclavicular   joint appears aligned and intact.    No pathologic calcifications or other soft tissue abnormalities are seen.    The visualized chest wall and ribs are intact.  No radiopaque foreign  body is identified.     IMPRESSION:   Unremarkable radiographs of the shoulder.      EXAM:  XR FEMUR 2 VIEWS RT                        EXAM:  XR HIPS BI WITH PELV MIN 5V                        PROCEDURE DATE:  01/24/2019    FINDINGS:  No prior exams are available for comparison.    No fracture is seen.  The hip remains located. Hardware in the RIGHT hip   is intact. No loose body is identified.  The joint spaces show narrowing,   RIGHT greater than LEFT.  No significant productive changes or other   cortical or trabecular abnormalities are recognized.  Vascular   calcification is seen. The distal RIGHT femur is intact. The hardware is   intact.    No soft tissue abnormality is recognized.  No radiopaque foreign body is   appreciated.    Pelvic bones appear intact.  No fracture is recognized.  The hips are   located.  The sacroiliac joints and pubic symphysis remain intact.  No   pathologic calcifications are seen.     IMPRESSION:      1. BILATERAL Hip and RIGHT femur:  No fracture. Internal hardware is   intact.  2.  Pelvis:  Unremarkable radiographs of the pelvis.          EXAM:  CT ABDOMEN AND PELVIS                        EXAM:  CT CHEST                        PROCEDURE DATE:  01/24/2019    Comparison:  1/14/2019    FINDINGS:    Chest:  Lungs, Airways and Pleura: Central tracheobronchial tree is grossly   patent. No endobronchial lesions. Moderate centrilobular emphysema. No   pneumothorax. Subsegmental atelectasis in the lingula. Minimal bibasilar   atelectasis.  Heart and Great Vessels: Atherosclerotic changes of the aorta and   coronary vasculature. Heart is mildly enlarged. No pericardial effusion.  Mediastinum and Samra: Hiatal hernia with an intrathoracic component of   the proximal stomach.  Neck and Chest Wall: Large right breastmass extending to the skin   surface measuring 5.2 x 4.5 cm unchanged. Right axillary mass measuring   4.4 cm is unchanged.  Abdomen and pelvis:  Liver: within normal limits  Gallbladder: Prior cholecystectomy..  Bile ducts: No intrahepatic or extrahepatic biliary dilatation  Pancreas: within normal limits  Spleen: within normal limits  Adrenal: within normal limits  Kidneys, Ureters and Bladder: Mild bilateral renal atrophy. No   hydronephrosis. No intrarenal calculus. Left parapelvic cyst. The ureters   and bladder are within normal limits.      Pelvis: Interval development of hyperdense presacral fluid suggesting   hematoma. No pelvic mass or pelvic adenopathy. Prior hysterectomy.  Bowel: The bowel is of normal course and caliberwithout evidence of   obstruction or gross bowel wall thickening. Normal appendix visualized.   Clot diverticulosis without diverticulitis.  Peritoneum: No intra-abdominal free air, ascites or organized fluid   collections.  Retroperitoneum: withinnormal limits       Vessels: Atherosclerotic changes.      Abdominal wall and Bones: Degenerative changes. No lytic or blastic   lesions. Tree single hematoma. Hyperdense enlargement of the right   greater than left piriformis muscle suggesting intramuscular hematoma.   Fracture through the S for vertebral body. Questionable left sacral   fracture. Prior right hip pinning. Mild superior endplate deformities of   L4 and L5. Moderate to severe compression deformity of L2. Mild to   moderate compression deformities of T7 and T9. Moderate compression   deformity of T4. These are unchanged. Multiple acute/subacute and chronic   right-sided rib fractures some of which are new from prior exam. Multiple   subacute left-sided rib fractures.    IMPRESSION:      Interval development of a presacral hematoma with associated sacral   fracture. If concern for more extensive pelvic fractures, MRI can be   obtained for further evaluation. Multiple acute/subacute and chronic   right-sided rib fractures some of which are new from prior exam. Multiple   subacute left-sided rib fractures.  No other evidence of solid visceral injury in the chest, abdomen or   pelvis.  Persistent right breast mass with right axillary mass as seen on prior   exam.      EXAM:  CT BRAIN                        PROCEDURE DATE:  01/24/2019    FINDINGS:   CT dated 1/14/2019 is available for review.    The brain demonstrates moderate periventricular white matter ischemia.     No acute cerebral cortical infarct is seen.  No intracranial hemorrhage   is found.No mass effect is found in the brain.    The ventricles, sulci and basal cisterns show mild atrophy.       The orbits are unremarkable.  The paranasal sinuses are clear.  The nasal   cavity appears intact.  The nasopharynx is symmetric.  The central skull   base, petrous temporal bones and calvarium remain intact.      IMPRESSION:   Moderate periventricular white matter ischemia.    Mild   atrophy.

## 2019-01-28 NOTE — CONSULT NOTE ADULT - ASSESSMENT
88y/o F w/ PMHx of Alzheimers, TIA, Stage 4 Breast CA, HTN, carotid artery stenosis, COPD, HLD, hypothyroidism, benign lung nodule, OA,  chronic back pain with acute compression of L2, GERD,  Asthma, s/p cholecystectomy, MARCELO, and hip surgery presents to ER s/p mechanical fall.  Patient was recently discharged from  with pneumonia on 1/19 to  [facility] living.  Daughter reports patient was standing and tripped over her walker and fell to the floor. Reports hitting head and shoulder. Patient does not recall the event or LOC but does reports pain in the middle of her back on exam. Patient normally ambulated with a walker and wheel chair. Since her fall patient has complained of thoracolumbar back pain. TLSO ordered last visit but with R breast mass was supposed to be changed ot LSO and never arrived at [facility].    IMP:  Mechanical fall-(+) hematoma but no acute vertebral frx  -possible sacral frx  -multiple rib frx    PLAN:  Will reorder LSO  Continue Lidoderm but increase to max 3/day  PT  Pain management  As per Medicaine/Onc/Pulm

## 2019-01-28 NOTE — CONSULT NOTE ADULT - SUBJECTIVE AND OBJECTIVE BOX
HPI:  CC: Mechanical Fall     HPI: 89y/o F w/ PMHx of Alzheimers, TIA, Stage 4 Breast CA, HTN, carotid artery stenosis, COPD, HLD, hypothyroidism, benign lung nodule, OA,  chronic back pain with acute compression of L2, GERD,  Asthma, s/p cholecystectomy, MARCELO, and hip surgery presents to ER s/p mechanical fall.  Patient was recently discharged from  with pneumonia on 1/19 to  [place] assisted living.  Daughter reports patient was standing and tripped over her walker and fell to the floor. Reports hitting head and shoulder. Patient does not recall the event or LOC but does reports pain in the middle of her back on exam. Patient normally ambulated with a walker and wheel chair. Since her fall patient has complained of thoracolumbar back pain. TLSO ordered last visit but with R breast mass was supposed to be changed ot LSO and never arrived at [facility].    Information obtained by daughter Christine Muñoz, [phone].     PAST MEDICAL & SURGICAL HISTORY:  Hypothyroidism  Breast cancer: Right  Compression fracture of spine: T7  Alzheimers disease  HTN (hypertension)  Glaucoma  TIA (transient ischemic attack): 8/07  Cerebral vascular accident: 2005  Polymyalgia rheumatica  Osteoporosis  Chronic pain: mid back  Urinary retention  GERD (gastroesophageal reflux disease)  Cholelithiases  Hyperlipemia  Carotid artery stenosis  Lung nodule: biopsied 2003, benign  COPD (chronic obstructive pulmonary disease)  Asthma  Glaucoma of both eyes: s/p repair  History of hip surgery  Gall stones  History of hysterectomy: for bleeding  Hx of cholecystectomy    MEDICATIONS  (STANDING):  acetaminophen   Tablet .. 650 milliGRAM(s) Oral every 6 hours  ALBUTerol/ipratropium for Nebulization 3 milliLiter(s) Nebulizer every 6 hours  dipyridamole 200 mG/aspirin 25 mG 1 Capsule(s) Oral two times a day  docusate sodium 100 milliGRAM(s) Oral two times a day  famotidine    Tablet 20 milliGRAM(s) Oral two times a day  heparin  Injectable 5000 Unit(s) SubCutaneous every 12 hours  hydroxychloroquine 200 milliGRAM(s) Oral every 12 hours  lactobacillus acidophilus 1 Tablet(s) Oral two times a day with meals  levothyroxine 25 MICROGram(s) Oral daily  lidocaine   Patch 1 Patch Transdermal daily  multivitamin 1 Tablet(s) Oral daily  predniSONE   Tablet 10 milliGRAM(s) Oral daily  simvastatin 20 milliGRAM(s) Oral at bedtime  tamsulosin 0.4 milliGRAM(s) Oral at bedtime    MEDICATIONS  (PRN):  HYDROmorphone   Tablet 1 milliGRAM(s) Oral every 6 hours PRN mod- severe pain  ondansetron Injectable 4 milliGRAM(s) IV Push every 6 hours PRN Nausea    Allergies    Aciphex (Unknown)  Macrobid (Unknown)  Percocet 10/325 (Rash)    Intolerances    SH: , retired, lives in [facility], no tob or EtOH  FH: not contributory to present illness  ROS c/w HPI and PMH  -back ad rib pain    PE:    Vital Signs Last 24 Hrs  T(C): 36.7 (28 Jan 2019 05:24), Max: 36.7 (27 Jan 2019 11:54)  T(F): 98.1 (28 Jan 2019 05:24), Max: 98.1 (28 Jan 2019 05:24)  HR: 80 (28 Jan 2019 07:59) (78 - 93)  BP: 111/66 (28 Jan 2019 05:24) (80/50 - 111/66)  BP(mean): --  RR: --  SpO2: 95% (28 Jan 2019 05:24) (92% - 98%)    Patient sitting on bedside commode with c/o thoracolumbar back pain  HEENT unremarkable  Neck supple without complaints of pain with ROM or tenderness, thyroid nonpalpable, no adenopathy  Large R breast mass  Heart-RRR  Lungs-decreased BS with coarse BS  Abd: soft, nontender, benign  Extr: no areas of focal tenderness, no edema  Vasc: intact  T-L spine with diffuse spinal tenderness, tender over posterior ribs bilaterally, negative SLR, no motor deficits    CT C/A/P with acute L2 VCF but no other acute changes, chronic VCF T4, 7, 9, L4, 5, (+) hematoma in soft tissue, possible sacral fracture, marked spinal stenosis  CT neck with DDD, no acute changes

## 2019-01-28 NOTE — CHART NOTE - NSCHARTNOTEFT_GEN_A_CORE
Went to see pt who was sleeping at th time of my visit.  Family friend at bedside stated she had spent some time in the chair and appeared fairly comfortable.  I did not wake pt as appeared comfortable and her friend shared she did not sleep well last night.  I also s/w Daksha Gustafson who advised d/c plan has now changed to SUE.  Will check in with pt tomorrow.

## 2019-01-28 NOTE — CHART NOTE - NSCHARTNOTEFT_GEN_A_CORE
Will sign off.  cont on heparin 5,000units sq while in hospital. No need for heparin inj on discharge home. Please reconsult if needed

## 2019-01-29 NOTE — PROGRESS NOTE ADULT - REASON FOR ADMISSION
s/p mechanical fall,  1/24/19
s/p mechanical fall,  1/24/19, polytrauma.

## 2019-01-29 NOTE — DISCHARGE NOTE ADULT - PATIENT PORTAL LINK FT
You can access the [facility], offered by [facility], by registering with the following website: [email or web address]

## 2019-01-29 NOTE — DISCHARGE NOTE ADULT - CARE PROVIDER_API CALL
Roverto Nichols), Orthopaedic Surgery  [address] [place] [address]  Phone: [phone]  Fax: [phone]    Chay Pak (), Surgery  [address] [place] [address]  Phone: [phone]  Fax: [phone]

## 2019-01-29 NOTE — PROVIDER CONTACT NOTE (OTHER) - SITUATION
Consult left with Dr. Ojeda's answering service.
Office aware of consult : spoke to Cecily
Left message with answering service and notified of consult.
Pt is being discharged and BP was abnormal on left arm.
notified service; spoke to Hazel

## 2019-01-29 NOTE — DISCHARGE NOTE ADULT - IF YOU ARE A SMOKER, IT IS IMPORTANT FOR YOUR HEALTH TO STOP SMOKING. PLEASE BE AWARE THAT SECOND HAND SMOKE IS ALSO HARMFUL.
Left message for pt to call back, Oct 1st @3430 is soonest Monday.    
No return phone call from pt, will return message to her chart.  Call as needed.  
Patient called requesting to change her appointment to a Monday. Monday are the only day that works for her. Please advise. Patient was made aware that MD and staff are out of the office and will be retuning Monday.  
Statement Selected

## 2019-01-29 NOTE — PROGRESS NOTE ADULT - ASSESSMENT
89y old Female with PMH Alzheimers, TIA, Stage 4 Breast CA, HTN, carotid artery stenosis, COPD, HLD, hypothyroidism, benign lung nodule, OA,  chronic back pain with compression of T7, GERD,  Asthma, s/p cholecystectomy, MARCELO, and hip surgery s/p mechanical fall found to have multiple rib fractures.   #Rib fractures:   -Pain control  -Encouraged incentive spirometry to avoid complications such as atelectasis and pneumonia from splinting, she already has atelectasis seen on prior CXR  #COPD: Well controlled, no wheezing  -Continue duonebs  -Continue prednisone 10 mg daily  #Fall: PT Eval
88y/o F w/ PMHx of Alzheimers, TIA, Stage 4 Breast CA, HTN, carotid artery stenosis, COPD, HLD, hypothyroidism, benign lung nodule, OA,  chronic back pain with acute compression of L2, GERD,  Asthma, s/p cholecystectomy, MARCELO, and hip surgery presents to ER s/p mechanical fall.  Patient was recently discharged from  with pneumonia on 1/19 to  [facility] living.  Daughter reports patient was standing and tripped over her walker and fell to the floor. Reports hitting head and shoulder. Patient does not recall the event or LOC but does reports pain in the middle of her back on exam. Patient normally ambulated with a walker and wheel chair. Since her fall patient has complained of thoracolumbar back pain. TLSO ordered last visit but with R breast mass was supposed to be changed ot LSO and never arrived at [facility].    IMP:  Mechanical fall-(+) hematoma but no acute vertebral frx  -possible sacral frx  -multiple rib frx    PLAN:  Continue LSO for comfort  Continue Lidoderm but increase to max 3/day  PT  Pain management  As per Medicine/Onc/Pulm  F/U Dr. Nichols in 1 weeks at [phone] AP/lat lumbar spine if in Rehab at time of follow up to office with patient
89 y old female S/P fall, Rt sided rib fracture, sacral fracture, acute on ch vertebral fractures, metastatic lung cancer, HD stable, O2 sat stable with supplemental O2  Multimodal pain control  neuro cheks Q  4  incentive spirometery  F/U CXR: stable   Vitals, IS/OS Q 4  Diet:   ( X) as tolerated   DVT/GI prophylaxis  Activity:   WBAT   PT  Hold A/C  Resume other home med  antibitoics:    Medical service following   Orthopedic /Spine following , Awaiting LSO brace.   SW for eventual D/C planning  Stable from trauma stand point.
89y old Female with PMH Alzheimers, TIA, Stage 4 Breast CA, HTN, carotid artery stenosis, COPD, HLD, hypothyroidism, benign lung nodule, OA,  chronic back pain with compression of T7, GERD,  Asthma, s/p cholecystectomy, MARCELO, and hip surgery s/p mechanical fall found to have multiple rib fractures.   #Rib fractures:   -Pain control  -Encouraged incentive spirometry to avoid complications such as atelectasis and pneumonia from splinting  #COPD: Well controlled, no wheezing  -Continue duonebs  -Continue prednisone 10 mg daily  #Fall: PT Eval
89y old Female with PMH Alzheimers, TIA, Stage 4 Breast CA, HTN, carotid artery stenosis, COPD, HLD, hypothyroidism, benign lung nodule, OA,  chronic back pain with compression of T7, GERD,  Asthma, s/p cholecystectomy, MARCELO, and hip surgery s/p mechanical fall found to have multiple rib fractures.   #Rib fractures:   -Pain control  -Encouraged incentive spirometry to avoid complications such as atelectasis and pneumonia from splinting, she already has atelectasis seen on prior CXR  #COPD: Well controlled, no wheezing  -Continue duonebs  -Continue chronic prednisone 10 mg daily  #Fall: PT Eval, ortho following
89y old Female with PMH Alzheimers, TIA, Stage 4 Breast CA, HTN, carotid artery stenosis, COPD, HLD, hypothyroidism, benign lung nodule, OA,  chronic back pain with compression of T7, GERD,  Asthma, s/p cholecystectomy, MARCELO, and hip surgery s/p mechanical fall found to have multiple rib fractures.   #Rib fractures:   -Pain control  -Encouraged incentive spirometry to avoid complications such as atelectasis and pneumonia from splinting, she already has atelectasis seen on prior CXR  -She demonstrated poor technique today when I watched her use the incentive spirometry  #COPD: Well controlled, no wheezing  -Continue duonebs  -Continue prednisone 10 mg daily  #Fall: PT Eval, ortho following
A/P:   Multiple right rib fractures, acute/subacute  Multiple thoracic/lumbar fractures  pain control   nausea control   Diet: DASH/TLC   encourage IS use   q2 turn [facility]   Hospitalist on consult  Palliative on consult  Pulmonology on consult  Medical comorbidities of dementia, COPD, GERD, Glaucoma, HTN, HLD, Hypothyroidism, Lung nodule, metastatic breast cancer, Osteoporosis, Polymyalgia rheumatica, TIA  Cont current care and meds  SS for d/c planning
A/P:   Multiple right rib fractures, acute/subacute  Multiple thoracic/lumbar fractures  pain control   nausea control   Diet: DASH/TLC   encourage IS use   q2 turn mattress   consult hospitalist   consult spine   consult Pulmonology   Medical comorbidities of dementia, COPD, GERD, Glaucoma, HTN, HLD, Hypothyroidism, Lung nodule, metastatic breast cancer, Osteoporosis, Polymyalgia rheumatica, TIA  Cont current care and meds  SS for d/c planning
A/P:   Multiple right rib fractures, acute/subacute  Multiple thoracic/lumbar fractures  pain control   nausea control   Diet: DASH/TLC   log roll , bedrest   encourage IS use   q2 turn mattress   consult hospitalist   consult spine   consult Pulmonology   Medical comorbidities of dementia, COPD, GERD, Glaucoma, HTN, HLD, Hypothyroidism, Lung nodule, metastatic breast cancer, Osteoporosis, Polymyalgia rheumatica, TIA  Cont current care and meds
A:  88 yo female S/P fall now with B/L ribs fx, S2 Fx with associated hematoma,  High VTE risk due to age, fxs and immobility      P:    cont Heparin 5000 units sq q 12h  monitor CBC, BMP  Venodynes to BLE  INC mobility as bobby
A:  89 yo female S/P fall now with B/L ribs fx, S2 Fx with associated hematoma,  High VTE risk due to age, fxs and immobility      P:    cont Heparin 5000 units sq q 12h  monitor CBC, BMP  Venodynes to BLE  INC mobility as bobby  will f/u
Pt is a 89y old Female with hx of breast cancer s/p chemo, COPD, CVA, dementia, PMR, RA, recurrent falls admitted from home ([facility]) on 1/24 s/p fall from tripping over her walker  On admission  noted to have multiple rib fxs, sacral fx, thoracic and lumbar fxs.  Pt recently discharged after episode of pneumonia.  Family requesting palliative consultation to discuss goals of care.    1)Pain  -Musculoskeletal pain s/p fall  -Pt reported adequate relief with PRN Tylenol yest  -Today reports uncontrolled pain but has not received Tylenol  -change Tylenol to ATC  -Please note in past pt with sensitivity to Tramadol and Percocet  - Add very low dose Dilaudid 1mg PO q 6hr PRN if tylenol is not effective  -cont Lidoderm patch  - can try heat and ice packs as well as pt has gotten relief from back pain with this in the past    2)Recurrent Falls  -Trauma/surgery input reviewed  -Imaging reviewed  -Supportive care including pain management and PT    4)Rib, Pelvic, Vertebral Fx  -Trauma/surgery input reviewed  -Imaging reviewed  -Supportive care including pain management and PT    5)COPD  -Supplemental oxygen PRN  -cont nebs    6)Metastatic Breast Ca  -Trial of chemotx 2018 which pt did not tolerate  -Due to growth of mass now abutting chest wall plan for RT per daughter  -3 Weeks RT planned as outpt post discharge    7)Advance Directives, Goals of Care  -though pt with dx of dementia has ability to participate in decision making process fpr ow order decisions, not capacitated to make decision regarding life sustaining therapies  -Pt has HCP on file naming her daughter Diana as her agent  -Pt has DNR and DNI order in place per HCP- MOLST completed on chart  -Family meeting held 1/26 with pt, and then separately with her daughter  Please see goals of care note for details.  In summary plan for return to [facility] with palliative home care and private hire aide support to receive RT then transition to home hospice at [facility]    D/w Dr. Haro

## 2019-01-29 NOTE — DISCHARGE NOTE ADULT - SECONDARY DIAGNOSIS.
Essential hypertension Gastroesophageal reflux disease, esophagitis presence not specified Alzheimer's dementia without behavioral disturbance, unspecified timing of dementia onset Compression fracture of spine Pulmonary emphysema, unspecified emphysema type

## 2019-01-29 NOTE — PROGRESS NOTE ADULT - PROVIDER SPECIALTY LIST ADULT
Anticoag Management
Anticoag Management
Hospitalist
Orthopedics
Palliative Care
Pulmonology
Trauma Surgery
Pulmonology

## 2019-01-29 NOTE — DISCHARGE NOTE ADULT - CARE PROVIDERS DIRECT ADDRESSES
,[email or web address], [email or web address]

## 2019-01-29 NOTE — PROGRESS NOTE ADULT - SUBJECTIVE AND OBJECTIVE BOX
Subjective:  Continues to have pain in the back  Breathing well  Not using incentive spirometry  Daughter at bedside, explained importance of incentive spirometry    Review of Systems:  All 10 systems reviewed in detailed and found to be negative with the exception of what has already been described above    Allergies:  Aciphex (Unknown)  Macrobid (Unknown)  Percocet 10/325 (Rash)    Meds  MEDICATIONS  (STANDING):  acetaminophen   Tablet .. 650 milliGRAM(s) Oral every 6 hours  ALBUTerol/ipratropium for Nebulization 3 milliLiter(s) Nebulizer every 6 hours  dipyridamole 200 mG/aspirin 25 mG 1 Capsule(s) Oral two times a day  docusate sodium 100 milliGRAM(s) Oral two times a day  famotidine    Tablet 20 milliGRAM(s) Oral two times a day  heparin  Injectable 5000 Unit(s) SubCutaneous every 12 hours  hydroxychloroquine 200 milliGRAM(s) Oral every 12 hours  lactobacillus acidophilus 1 Tablet(s) Oral two times a day with meals  levothyroxine 25 MICROGram(s) Oral daily  lidocaine   Patch 1 Patch Transdermal daily  multivitamin 1 Tablet(s) Oral daily  predniSONE   Tablet 10 milliGRAM(s) Oral daily  simvastatin 20 milliGRAM(s) Oral at bedtime  tamsulosin 0.4 milliGRAM(s) Oral at bedtime    MEDICATIONS  (PRN):  HYDROmorphone   Tablet 1 milliGRAM(s) Oral every 6 hours PRN mod- severe pain  ondansetron Injectable 4 milliGRAM(s) IV Push every 6 hours PRN Nausea    Physical Exam  T(C): 36.7 (01-27-19 @ 11:54), Max: 36.9 (01-26-19 @ 17:10)  HR: 89 (01-27-19 @ 13:27) (68 - 94)  BP: 92/58 (01-27-19 @ 13:59) (80/50 - 124/53)  RR: --  SpO2: 98% (01-27-19 @ 11:54) (93% - 98%)  Gen: Alert, oriented, no distress  HEENT: Anicteric sclera, moist mucous membranes, no JVD, no lymphadenopathy, good dentition  Cardio: Regular rhythm and rate, normal S1S2, no murmurs  Resp: Clear to auscultation bilaterally, no wheezing or rhonchi  GI: Nontender, nondistended, normoactive bowel sounds  Ext: No cyanosis, clubbing or edema  Neuro: Nonfocal      Labs:                        10.0   8.68  )-----------( 138      ( 27 Jan 2019 14:12 )             32.3     01-27    144  |  115<H>  |  26<H>  ----------------------------<  146<H>  4.6   |  22  |  0.81    Ca    8.5      27 Jan 2019 14:12    INTERPRETATION:  History: Chest pain    Chest:  one view.      Comparison: 1/14/2019    AP radiograph of the chest demonstrates subsegmental atelectasis at the   lung bases. The cardiac silhouette is normal in size. Osseous structures   are intact.    Impression:Subsegmental atelectasis at the lung bases.    < from: CT Chest No Cont (01.24.19 @ 10:33) >  FINDINGS:    Chest:    Lungs, Airways and Pleura: Central tracheobronchial tree is grossly   patent. No endobronchial lesions. Moderate centrilobular emphysema. No   pneumothorax. Subsegmental atelectasis in the lingula. Minimal bibasilar   atelectasis.    Heart and Great Vessels: Atherosclerotic changes of the aorta and   coronary vasculature. Heart is mildly enlarged. No pericardial effusion.    Mediastinum and Samra: Hiatal hernia with an intrathoracic component of   the proximal stomach.    Neck and Chest Wall: Large right breastmass extending to the skin   surface measuring 5.2 x 4.5 cm unchanged. Right axillary mass measuring   4.4 cm is unchanged.    Abdomen and pelvis:  Liver: within normal limits  Gallbladder: Prior cholecystectomy..  Bile ducts: No intrahepatic or extrahepatic biliary dilatation  Pancreas: within normal limits  Spleen: within normal limits  Adrenal: within normal limits  Kidneys, Ureters and Bladder: Mild bilateral renal atrophy. No   hydronephrosis. No intrarenal calculus. Left parapelvic cyst. The ureters   and bladder are within normal limits.        Pelvis: Interval development of hyperdense presacral fluid suggesting   hematoma. No pelvic mass or pelvic adenopathy. Prior hysterectomy.    Bowel: The bowel is of normal course and caliberwithout evidence of   obstruction or gross bowel wall thickening. Normal appendix visualized.   Clot diverticulosis without diverticulitis.  Peritoneum: No intra-abdominal free air, ascites or organized fluid   collections.    Retroperitoneum: withinnormal limits       Vessels: Atherosclerotic changes.        Abdominal wall and Bones: Degenerative changes. No lytic or blastic   lesions. Tree single hematoma. Hyperdense enlargement of the right   greater than left piriformis muscle suggesting intramuscular hematoma.   Fracture through the S for vertebral body. Questionable left sacral   fracture. Prior right hip pinning. Mild superior endplate deformities of   L4 and L5. Moderate to severe compression deformity of L2. Mild to   moderate compression deformities of T7 and T9. Moderate compression   deformity of T4. These are unchanged. Multiple acute/subacute and chronic   right-sided rib fractures some of which are new from prior exam. Multiple   subacute left-sided rib fractures.    IMPRESSION:      Interval development of a presacral hematoma with associated sacral   fracture. If concern for more extensive pelvic fractures, MRI can be   obtained for further evaluation. Multiple acute/subacute and chronic   right-sided rib fractures some of which are new from prior exam. Multiple   subacute left-sided rib fractures.    No other evidence of solid visceral injury in the chest, abdomen or   pelvis.    Persistent right breast mass with right axillary mass as seen on prior   exam.      < end of copied text >  < from: CT Head No Cont (01.24.19 @ 10:24) >  TECHNIQUE: Contiguous axial 5 mm sections were obtained through the head.   Sagittal and coronal 2-D reformatted images were also obtained.   This   scan was performed using automatic exposure control (radiation dose   reduction software) to obtain a diagnostic image quality scan with   patient dose as low as reasonably achievable.     FINDINGS:   CT dated 1/14/2019 is available for review.    The brain demonstrates moderate periventricular white matter ischemia.     No acute cerebral cortical infarct is seen.  No intracranial hemorrhage   is found.No mass effect is found in the brain.      The ventricles, sulci and basal cisterns show mild atrophy.         The orbits are unremarkable.  The paranasal sinuses are clear.  The nasal   cavity appears intact.  The nasopharynx is symmetric.  The central skull   base, petrous temporal bones and calvarium remain intact.    IMPRESSION:   Moderate periventricular white matter ischemia.    Mild   atrophy.          < end of copied text >
88yo/F with multiple medical problems, recent admission to  for PNA, PMH- COPD/chr resp failure on home O2 and maint prednisone, Metastatic breast CA due for XRT with DR Jean on Tuesday 1/29/19, RA on Plaquenil, PMR, HTN, hyperlipidemia, Multiple falls in the past and recent L2 compression fracture, presented s/p mechanical fall.    PMH- as above  PSH- RT hip IMN, cholecystectomy, glaucoma  SOc hx- ex-smoker quit, denies alcohol. Lives at [facility], walks with walker  Fam hx- m had COPD    1/24/19- seen in ED, c/o low back pain  1/25/19 up in a chair, denies any pain  1/26/19 doing good, up in a chair, not on O2 and feels good  1/27/19 was slightly hypotensive earlier, but responded well to IVF. No acute events on telemetry x 3 days  1/28/19 no acute events. Family now wants SUE    Vital Signs Last 24 Hrs  T(C): 36.4 (29 Jan 2019 11:20), Max: 36.9 (28 Jan 2019 18:28)  T(F): 97.6 (29 Jan 2019 11:20), Max: 98.5 (28 Jan 2019 18:28)  HR: 88 (29 Jan 2019 13:19) (84 - 93)  BP: 85/47 (29 Jan 2019 11:20) (85/47 - 111/56)  BP(mean): --  RR: 18 (29 Jan 2019 11:20) (18 - 18)  SpO2: 94% (29 Jan 2019 11:20) (94% - 97%)    ROS- negative other than above.
CC:Patient is a 89y old  Female who presents with a chief complaint of s/p mechanical fall (24 Jan 2019 14:57)      Subjective:  Pt seen and examined at bedside with chaperone. Pt is AAOx2, pt in no acute distress. Pt denied c/o fever, chills, chest pain, SOB, abd pain, N/V/D, extremity pain or dysfunction, hemoptysis, hematemesis, hematuria, hematochexia, headache, diplopia, vertigo, dizzyness. Pt tolerating diet, (+) void, (+) bowel function    ROS:  back pain,  limited ROS secondary to dementia, otherwise as abovementioned  	      Vital Signs Last 24 Hrs  T(C): 36.1 (25 Jan 2019 04:13), Max: 38.1 (24 Jan 2019 22:13)  T(F): 97 (25 Jan 2019 04:13), Max: 100.5 (24 Jan 2019 22:13)  HR: 81 (25 Jan 2019 06:00) (78 - 102)  BP: 103/62 (25 Jan 2019 06:00) (98/57 - 122/65)  BP(mean): 70 (25 Jan 2019 06:00) (61 - 105)  RR: 17 (25 Jan 2019 06:00) (17 - 30)  SpO2: 98% (25 Jan 2019 04:13) (92% - 100%)    Labs:                                11.4   11.57 )-----------( 159      ( 25 Jan 2019 05:41 )             35.6     CBC Full  -  ( 25 Jan 2019 05:41 )  WBC Count : 11.57 K/uL  Hemoglobin : 11.4 g/dL  Hematocrit : 35.6 %  Platelet Count - Automated : 159 K/uL  Mean Cell Volume : 90.1 fl  Mean Cell Hemoglobin : 28.9 pg  Mean Cell Hemoglobin Concentration : 32.0 gm/dL  Auto Neutrophil # : x  Auto Lymphocyte # : x  Auto Monocyte # : x  Auto Eosinophil # : x  Auto Basophil # : x  Auto Neutrophil % : x  Auto Lymphocyte % : x  Auto Monocyte % : x  Auto Eosinophil % : x  Auto Basophil % : x    01-25    142  |  111<H>  |  28<H>  ----------------------------<  99  4.4   |  24  |  0.76    Ca    8.6      25 Jan 2019 05:41  Phos  2.9     01-25  Mg     2.2     01-25    TPro  5.6<L>  /  Alb  3.0<L>  /  TBili  0.5  /  DBili  x   /  AST  22  /  ALT  45  /  AlkPhos  61  01-24    LIVER FUNCTIONS - ( 24 Jan 2019 11:23 )  Alb: 3.0 g/dL / Pro: 5.6 gm/dL / ALK PHOS: 61 U/L / ALT: 45 U/L / AST: 22 U/L / GGT: x           PT/INR - ( 24 Jan 2019 09:55 )   PT: 14.0 sec;   INR: 1.25 ratio         PTT - ( 24 Jan 2019 09:55 )  PTT:22.6 sec      Meds:  acetaminophen   Tablet .. 650 milliGRAM(s) Oral every 6 hours PRN  ALBUTerol    0.083% 2.5 milliGRAM(s) Nebulizer every 6 hours  ALBUTerol    0.083% 2.5 milliGRAM(s) Nebulizer every 3 hours PRN  dipyridamole 200 mG/aspirin 25 mG 1 Capsule(s) Oral two times a day  docusate sodium 100 milliGRAM(s) Oral two times a day  famotidine    Tablet 20 milliGRAM(s) Oral two times a day  heparin  Injectable 5000 Unit(s) SubCutaneous every 12 hours  hydroxychloroquine 200 milliGRAM(s) Oral every 12 hours  lactobacillus acidophilus 1 Tablet(s) Oral two times a day with meals  levothyroxine 25 MICROGram(s) Oral daily  lidocaine   Patch 1 Patch Transdermal daily  multivitamin 1 Tablet(s) Oral daily  ondansetron Injectable 4 milliGRAM(s) IV Push every 6 hours PRN  predniSONE   Tablet 10 milliGRAM(s) Oral daily  simvastatin 20 milliGRAM(s) Oral at bedtime  tamsulosin 0.4 milliGRAM(s) Oral at bedtime  tiotropium 18 MICROgram(s) Capsule 1 Capsule(s) Inhalation daily      Radiology:  < from: CT Abdomen and Pelvis No Cont (01.24.19 @ 10:33) >  EXAM:  CT ABDOMEN AND PELVIS                          EXAM:  CT CHEST                            PROCEDURE DATE:  01/24/2019          INTERPRETATION:  Exam Date: 1/24/2019 10:33 AM  Clinical Information: Fall on blood thinners  Technique:       CT of the chest , abdomen and pelvis was performed with   axial images obtained from the thoracic inlet to the pubic symphysis       without IV contrast.  Oral contrast was not administered.  Comparison:  1/14/2019    FINDINGS:    Chest:    Lungs, Airways and Pleura: Central tracheobronchial tree is grossly   patent. No endobronchial lesions. Moderate centrilobular emphysema. No   pneumothorax. Subsegmental atelectasis in the lingula. Minimal bibasilar   atelectasis.    Heart and Great Vessels: Atherosclerotic changes of the aorta and   coronary vasculature. Heart is mildly enlarged. No pericardial effusion.    Mediastinum and Samra: Hiatal hernia with an intrathoracic component of   the proximal stomach.    Neck and Chest Wall: Large right breastmass extending to the skin   surface measuring 5.2 x 4.5 cm unchanged. Right axillary mass measuring   4.4 cm is unchanged.    Abdomen and pelvis:    Liver: within normal limits  Gallbladder: Prior cholecystectomy..  Bile ducts: No intrahepatic or extrahepatic biliary dilatation  Pancreas: within normal limits    Spleen: within normal limits  Adrenal: within normal limits  Kidneys, Ureters and Bladder: Mild bilateral renal atrophy. No   hydronephrosis. No intrarenal calculus. Left parapelvic cyst. The ureters   and bladder are within normal limits.        Pelvis: Interval development of hyperdense presacral fluid suggesting   hematoma. No pelvic mass or pelvic adenopathy. Prior hysterectomy.    Bowel: The bowel is of normal course and caliberwithout evidence of   obstruction or gross bowel wall thickening. Normal appendix visualized.   Clot diverticulosis without diverticulitis.  Peritoneum: No intra-abdominal free air, ascites or organized fluid   collections.    Retroperitoneum: withinnormal limits       Vessels: Atherosclerotic changes.        Abdominal wall and Bones: Degenerative changes. No lytic or blastic   lesions. Tree single hematoma. Hyperdense enlargement of the right   greater than left piriformis muscle suggesting intramuscular hematoma.   Fracture through the S for vertebral body. Questionable left sacral   fracture. Prior right hip pinning. Mild superior endplate deformities of   L4 and L5. Moderate to severe compression deformity of L2. Mild to   moderate compression deformities of T7 and T9. Moderate compression   deformity of T4. These are unchanged. Multiple acute/subacute and chronic   right-sided rib fractures some of which are new from prior exam. Multiple   subacute left-sided rib fractures.    IMPRESSION:      Interval development of a presacral hematoma with associated sacral   fracture. If concern for more extensive pelvic fractures, MRI can be   obtained for further evaluation. Multiple acute/subacute and chronic   right-sided rib fractures some of which are new from prior exam. Multiple   subacute left-sided rib fractures.    No other evidence of solid visceral injury in the chest, abdomen or   pelvis.    Persistent right breast mass with right axillary mass as seen on prior   exam.                PEPE MOTA M.D., ATTENDING RADIOLOGIST  This document has been electronically signed. Jan 24 2019 10:55AM    < end of copied text >  < from: CT Abdomen and Pelvis No Cont (01.24.19 @ 10:33) >  EXAM:  CT ABDOMEN AND PELVIS                          EXAM:  CT CHEST                            PROCEDURE DATE:  01/24/2019          INTERPRETATION:  Exam Date: 1/24/2019 10:33 AM  Clinical Information: Fall on blood thinners  Technique:       CT of the chest , abdomen and pelvis was performed with   axial images obtained from the thoracic inlet to the pubic symphysis       without IV contrast.  Oral contrast was not administered.  Comparison:  1/14/2019    FINDINGS:    Chest:    Lungs, Airways and Pleura: Central tracheobronchial tree is grossly   patent. No endobronchial lesions. Moderate centrilobular emphysema. No   pneumothorax. Subsegmental atelectasis in the lingula. Minimal bibasilar   atelectasis.    Heart and Great Vessels: Atherosclerotic changes of the aorta and   coronary vasculature. Heart is mildly enlarged. No pericardial effusion.    Mediastinum and Samra: Hiatal hernia with an intrathoracic component of   the proximal stomach.    Neck and Chest Wall: Large right breastmass extending to the skin   surface measuring 5.2 x 4.5 cm unchanged. Right axillary mass measuring   4.4 cm is unchanged.    Abdomen and pelvis:    Liver: within normal limits  Gallbladder: Prior cholecystectomy..  Bile ducts: No intrahepatic or extrahepatic biliary dilatation  Pancreas: within normal limits    Spleen: within normal limits  Adrenal: within normal limits  Kidneys, Ureters and Bladder: Mild bilateral renal atrophy. No   hydronephrosis. No intrarenal calculus. Left parapelvic cyst. The ureters   and bladder are within normal limits.        Pelvis: Interval development of hyperdense presacral fluid suggesting   hematoma. No pelvic mass or pelvic adenopathy. Prior hysterectomy.    Bowel: The bowel is of normal course and caliberwithout evidence of   obstruction or gross bowel wall thickening. Normal appendix visualized.   Clot diverticulosis without diverticulitis.  Peritoneum: No intra-abdominal free air, ascites or organized fluid   collections.    Retroperitoneum: withinnormal limits       Vessels: Atherosclerotic changes.        Abdominal wall and Bones: Degenerative changes. No lytic or blastic   lesions. Tree single hematoma. Hyperdense enlargement of the right   greater than left piriformis muscle suggesting intramuscular hematoma.   Fracture through the S for vertebral body. Questionable left sacral   fracture. Prior right hip pinning. Mild superior endplate deformities of   L4 and L5. Moderate to severe compression deformity of L2. Mild to   moderate compression deformities of T7 and T9. Moderate compression   deformity of T4. These are unchanged. Multiple acute/subacute and chronic   right-sided rib fractures some of which are new from prior exam. Multiple   subacute left-sided rib fractures.    IMPRESSION:      Interval development of a presacral hematoma with associated sacral   fracture. If concern for more extensive pelvic fractures, MRI can be   obtained for further evaluation. Multiple acute/subacute and chronic   right-sided rib fractures some of which are new from prior exam. Multiple   subacute left-sided rib fractures.    No other evidence of solid visceral injury in the chest, abdomen or   pelvis.    Persistent right breast mass with right axillary mass as seen on prior   exam.                PEPE MOTA M.D., ATTENDING RADIOLOGIST  This document has been electronically signed. Jan 24 2019 10:55AM    < end of copied text >      Physical exam:  GCS of 15  Pt is aaox3  Pt in no acute distress  Airway is patent  Breathing is symmetric and unlabored  Neuro: CN II-XII grossly intact  Psych: normal affect  HEENT: normocephalic, SARA, EOM wnl, no gross craniofacial bony pathology to exam  Neck: No tracheal deviation, no JVD, no crepitus, no ecchymosis, no hematoma  Chest: no gross left rib pathology or tenderness to exam. (+) tenderness to right ribs from multiple acute/subacute rib fractures. No sternal pathology or tenderness to exam. No crepitus, no ecchymosis, no hematoma. No penetrating thorcoabdominal trauma. (+) right breast ca mass with right axillary mass/lymphadenopathy  Resp: CTAB  CVS: S1S2(+)  ABD: bowel sounds (+), soft, nontender, non distended, no rebound, no guarding, no rigidity, no pelvic instability to exam  EXT: no calf tenderness or edema to exam b/l, pt has good capillary refill in all digits. Sensoromotor function grossly intact, on VTE prophylaxis  Skin: no adverse skin changes to exam
CC:Patient is a 89y old  Female who presents with a chief complaint of s/p mechanical fall,  1/24/19 (25 Jan 2019 14:34)      Subjective:  Pt seen and examined at bedside with chaperone. Pt is AAOx2, pt in no acute distress. Pt denied c/o fever, chills, chest pain, SOB, abd pain, N/V/D, extremity pain or dysfunction, hemoptysis, hematemesis, hematuria, hematochexia, headache, diplopia, vertigo, dizzyness. Pt tolerating diet, (+) void, (+) bowel function    ROS:  back pain,  limited ROS secondary to dementia, otherwise as abovementioned    Vital Signs Last 24 Hrs  T(C): 36.6 (26 Jan 2019 10:25), Max: 36.8 (25 Jan 2019 17:56)  T(F): 97.9 (26 Jan 2019 10:25), Max: 98.3 (25 Jan 2019 17:56)  HR: 90 (26 Jan 2019 08:00) (81 - 101)  BP: 116/65 (26 Jan 2019 08:00) (83/69 - 139/82)  BP(mean): 78 (26 Jan 2019 08:00) (59 - 97)  RR: 24 (26 Jan 2019 08:00) (19 - 32)  SpO2: 99% (25 Jan 2019 20:33) (99% - 99%)    Labs:                                11.4   11.57 )-----------( 159      ( 25 Jan 2019 05:41 )             35.6     CBC Full  -  ( 25 Jan 2019 05:41 )  WBC Count : 11.57 K/uL  Hemoglobin : 11.4 g/dL  Hematocrit : 35.6 %  Platelet Count - Automated : 159 K/uL  Mean Cell Volume : 90.1 fl  Mean Cell Hemoglobin : 28.9 pg  Mean Cell Hemoglobin Concentration : 32.0 gm/dL  Auto Neutrophil # : x  Auto Lymphocyte # : x  Auto Monocyte # : x  Auto Eosinophil # : x  Auto Basophil # : x  Auto Neutrophil % : x  Auto Lymphocyte % : x  Auto Monocyte % : x  Auto Eosinophil % : x  Auto Basophil % : x    01-25    142  |  111<H>  |  28<H>  ----------------------------<  99  4.4   |  24  |  0.76    Ca    8.6      25 Jan 2019 05:41  Phos  2.9     01-25  Mg     2.2     01-25              Meds:  acetaminophen   Tablet .. 650 milliGRAM(s) Oral every 6 hours PRN  ALBUTerol/ipratropium for Nebulization 3 milliLiter(s) Nebulizer every 6 hours  dipyridamole 200 mG/aspirin 25 mG 1 Capsule(s) Oral two times a day  docusate sodium 100 milliGRAM(s) Oral two times a day  famotidine    Tablet 20 milliGRAM(s) Oral two times a day  heparin  Injectable 5000 Unit(s) SubCutaneous every 12 hours  hydroxychloroquine 200 milliGRAM(s) Oral every 12 hours  lactobacillus acidophilus 1 Tablet(s) Oral two times a day with meals  levothyroxine 25 MICROGram(s) Oral daily  lidocaine   Patch 1 Patch Transdermal daily  multivitamin 1 Tablet(s) Oral daily  ondansetron Injectable 4 milliGRAM(s) IV Push every 6 hours PRN  predniSONE   Tablet 10 milliGRAM(s) Oral daily  simvastatin 20 milliGRAM(s) Oral at bedtime  tamsulosin 0.4 milliGRAM(s) Oral at bedtime      Radiology:  < from: Xray Chest 1 View- PORTABLE-Routine (01.25.19 @ 09:49) >    EXAM:  XR CHEST PORTABLE ROUTINE 1V                            PROCEDURE DATE:  01/25/2019          INTERPRETATION:  History: Chest pain    Chest:  one view.      Comparison: 1/14/2019    AP radiograph of the chest demonstrates subsegmental atelectasis at the   lung bases. The cardiac silhouette is normal in size. Osseous structures   are intact.    Impression:Subsegmental atelectasis at the lung bases.                GENNA SCHWARZ M.D., ATTENDING RADIOLOGIST  This document has been electronically signed. Jan 25 2019  3:56PM          < end of copied text >      Physical exam:  GCS of 14  Pt in no acute distress  Airway is patent  Breathing is symmetric and unlabored  Neuro: CN II-XII grossly intact  Psych: normal affect  HEENT: normocephalic, SARA, EOM wnl, no gross craniofacial bony pathology to exam  Neck: No tracheal deviation, no JVD, no crepitus, no ecchymosis, no hematoma  Chest: no gross left rib pathology or tenderness to exam. (+) tenderness to right ribs from multiple acute/subacute rib fractures. No sternal pathology or tenderness to exam. No crepitus, no ecchymosis, no hematoma. No penetrating thorcoabdominal trauma. (+) right breast ca mass with right axillary mass/lymphadenopathy  Resp: CTAB  CVS: S1S2(+)  ABD: bowel sounds (+), soft, nontender, non distended, no rebound, no guarding, no rigidity, no pelvic instability to exam  EXT: no calf tenderness or edema to exam b/l, pt has good capillary refill in all digits. Sensoromotor function grossly intact, on VTE prophylaxis  Skin: no adverse skin changes to exam
CC:Patient is a 89y old  Female who presents with a chief complaint of s/p mechanical fall,  1/24/19 (27 Jan 2019 10:28)      Subjective:  Pt seen and examined at bedside with chaperone. Pt is AAOx2, pt in no acute distress. Pt denied c/o fever, chills, chest pain, SOB, abd pain, N/V/D, extremity pain or dysfunction, hemoptysis, hematemesis, hematuria, hematochexia, headache, diplopia, vertigo, dizzyness. Pt tolerating diet, (+) void, (+) bowel function    ROS:  back pain,  limited ROS secondary to dementia, otherwise as abovementioned      Vital Signs Last 24 Hrs  T(C): 36.7 (27 Jan 2019 11:54), Max: 36.9 (26 Jan 2019 17:10)  T(F): 98 (27 Jan 2019 11:54), Max: 98.4 (26 Jan 2019 17:10)  HR: 89 (27 Jan 2019 13:27) (68 - 94)  BP: 92/58 (27 Jan 2019 13:59) (80/50 - 124/53)  BP(mean): --  RR: --  SpO2: 98% (27 Jan 2019 11:54) (93% - 98%)    Labs:          Meds:  acetaminophen   Tablet .. 650 milliGRAM(s) Oral every 6 hours  ALBUTerol/ipratropium for Nebulization 3 milliLiter(s) Nebulizer every 6 hours  dipyridamole 200 mG/aspirin 25 mG 1 Capsule(s) Oral two times a day  docusate sodium 100 milliGRAM(s) Oral two times a day  famotidine    Tablet 20 milliGRAM(s) Oral two times a day  heparin  Injectable 5000 Unit(s) SubCutaneous every 12 hours  HYDROmorphone   Tablet 1 milliGRAM(s) Oral every 6 hours PRN  hydroxychloroquine 200 milliGRAM(s) Oral every 12 hours  lactobacillus acidophilus 1 Tablet(s) Oral two times a day with meals  levothyroxine 25 MICROGram(s) Oral daily  lidocaine   Patch 1 Patch Transdermal daily  multivitamin 1 Tablet(s) Oral daily  ondansetron Injectable 4 milliGRAM(s) IV Push every 6 hours PRN  predniSONE   Tablet 10 milliGRAM(s) Oral daily  simvastatin 20 milliGRAM(s) Oral at bedtime  sodium chloride 0.9% Bolus 250 milliLiter(s) IV Bolus once  tamsulosin 0.4 milliGRAM(s) Oral at bedtime      Radiology:  < from: Xray Chest 1 View- PORTABLE-Routine (01.25.19 @ 09:49) >  EXAM:  XR CHEST PORTABLE ROUTINE 1V                            PROCEDURE DATE:  01/25/2019          INTERPRETATION:  History: Chest pain    Chest:  one view.      Comparison: 1/14/2019    AP radiograph of the chest demonstrates subsegmental atelectasis at the   lung bases. The cardiac silhouette is normal in size. Osseous structures   are intact.    Impression:Subsegmental atelectasis at the lung bases.                GENNA SCHWARZ M.D., ATTENDING RADIOLOGIST  This document has been electronically signed. Jan 25 2019  3:56PM    < end of copied text >      Physical exam:  GCS of 14  Pt in no acute distress  Airway is patent  Breathing is symmetric and unlabored  Neuro: CN II-XII grossly intact  Psych: normal affect  HEENT: normocephalic, SARA, EOM wnl, no gross craniofacial bony pathology to exam  Neck: No tracheal deviation, no JVD, no crepitus, no ecchymosis, no hematoma  Chest: no gross left rib pathology or tenderness to exam. (+) tenderness to right ribs from multiple acute/subacute rib fractures. No sternal pathology or tenderness to exam. No crepitus, no ecchymosis, no hematoma. No penetrating thorcoabdominal trauma. (+) right breast ca mass with right axillary mass/lymphadenopathy  Resp: CTAB  CVS: S1S2(+)  ABD: bowel sounds (+), soft, nontender, non distended, no rebound, no guarding, no rigidity, no pelvic instability to exam  EXT: no calf tenderness or edema to exam b/l, pt has good capillary refill in all digits. Sensoromotor function grossly intact, on VTE prophylaxis  Skin: no adverse skin changes to exam
HPI:  CC: Mechanical Fall     HPI: 88y/o F w/ PMHx of Alzheimers, TIA, Stage 4 Breast CA, HTN, carotid artery stenosis, COPD, HLD, hypothyroidism, benign lung nodule, OA,  chronic back pain with compression of T7, GERD,  Asthma, s/p cholecystectomy, MARCELO, and hip surgery present s to  s/p mechanical fall.  Patient was recently discharged from  with pneumonia on  to  [facility] assisted [facility].  Daughter reports patient was standing and tripped over her walker and fell to the floor. Reports hitting head and shoulder. Patient does not recall the event or LOC but does reports pain in the middle of her back on exam. Patient normally ambulated with a walker and wheel chair. unable to complete ROS due to patient mental state.    Information obtained by daughter Christine Muñoz, [phone]. (2019 13:50)      Patient is a 89y old  Female who presents with a chief complaint of s/p mechanical fall (2019 13:50)      Consulted by Dr. Pak  for VTE prophylaxis, risk stratification, and anticoagulation management.    PAST MEDICAL & SURGICAL HISTORY:  Hypothyroidism  Breast cancer: Right  Compression fracture of spine: T7  Alzheimers disease  HTN (hypertension)  Glaucoma  TIA (transient ischemic attack):   Cerebral vascular accident: , presently on Aggrenox  Polymyalgia rheumatica  Osteoporosis  Chronic pain: mid back  Urinary retention  GERD (gastroesophageal reflux disease)  Cholelithiases  Hyperlipemia  Carotid artery stenosis  Lung nodule: biopsied , benign  COPD (chronic obstructive pulmonary disease)  Asthma  Glaucoma of both eyes: s/p repair  History of hip surgery  Gall stones  History of hysterectomy: for bleeding  Hx of cholecystectomy          CrCl:    Caprini VTE Risk Score:CAPRINI SCORE [CLOT]    AGE RELATED RISK FACTORS                                                       MOBILITY RELATED FACTORS  [ ] Age 41-60 years                                            (1 Point)                  [ ] Bed rest                                                        (1 Point)  [ ] Age: 61-74 years                                           (2 Points)                 [ ] Plaster cast                                                   (2 Points)  [ x] Age= 75 years                                              (3 Points)                 [ ] Bed bound for more than 72 hours                 (2 Points)    DISEASE RELATED RISK FACTORS                                               GENDER SPECIFIC FACTORS  [ ] Edema in the lower extremities                       (1 Point)                  [ ] Pregnancy                                                     (1 Point)  [ ] Varicose veins                                               (1 Point)                  [ ] Post-partum < 6 weeks                                   (1 Point)             [ x] BMI > 25 Kg/m2                                            (1 Point)                  [ ] Hormonal therapy  or oral contraception          (1 Point)                 [ ] Sepsis (in the previous month)                        (1 Point)                  [ ] History of pregnancy complications                 (1 point)  [ ] Pneumonia or serious lung disease                                               [ ] Unexplained or recurrent                     (1 Point)           (in the previous month)                               (1 Point)  [ x] Abnormal pulmonary function test                     (1 Point)                 SURGERY RELATED RISK FACTORS  [ ] Acute myocardial infarction                              (1 Point)                 [ ]  Section                                             (1 Point)  [ ] Congestive heart failure (in the previous month)  (1 Point)               [ ] Minor surgery                                                  (1 Point)   [ ] Inflammatory bowel disease                             (1 Point)                 [ ] Arthroscopic surgery                                        (2 Points)  [ ] Central venous access                                      (2 Points)                [ ] General surgery lasting more than 45 minutes   (2 Points)       [ ] Stroke (in the previous month)                          (5 Points)               [ ] Elective arthroplasty                                         (5 Points)            [x ] H/O malignancy ( present or previous)            ( 2 points)                                                                                                                                   HEMATOLOGY RELATED FACTORS                                                 TRAUMA RELATED RISK FACTORS  [ ] Prior episodes of VTE                                     (3 Points)                 [ ] Fracture of the hip, pelvis, or leg                       (5 Points)  [ ] Positive family history for VTE                         (3 Points)                 [ ] Acute spinal cord injury (in the previous month)  (5 Points)  [ ] Prothrombin 09066 A                                     (3 Points)                 [ ] Paralysis  (less than 1 month)                             (5 Points)  [ ] Factor V Leiden                                             (3 Points)                  [ ] Multiple Trauma within 1 month                        (5 Points)  [ ] Lupus anticoagulants                                     (3 Points)                                                           [ ] Anticardiolipin antibodies                               (3 Points)                                                       [ ] High homocysteine in the blood                      (3 Points)                                             [ ] Other congenital or acquired thrombophilia      (3 Points)                                                [ ] Heparin induced thrombocytopenia                  (3 Points)                                          Total Score [       7   ]      IMPROVE Bleeding Risk Score    Falls Risk:   High (  x)  Mod (  )  Low (  )      FAMILY HISTORY:  Family history of diabetes mellitus (DM) (Child)  Family history of COPD (chronic obstructive pulmonary disease) (Mother)    Denies any personal or familial history of clotting or bleeding disorders.    Allergies    Aciphex (Unknown)  Macrobid (Unknown)  Percocet 10/325 (Rash)    Intolerances        REVIEW OF SYSTEMS    (  )Fever	     (  )Constipation	(  )SOB				(  )Headache	(  )Dysuria  (  )Chills	     (  )Melena	(  )Dyspnea present on exertion	                    (  )Dizziness                    (  )Polyuria  (  )Nausea	     (  )Hematochezia	(  )Cough			                    (  )Syncope   	(  )Hematuria  (  )Vomiting    (  )Chest Pain	(  )Wheezing			( x )Weakness  (  )Diarrhea     (  )Palpitations	(  )Anorexia			( x )Myalgia    All  other review of systems negative: Yes          PHYSICAL EXAM:    Constitutional: Appears Well    Neurological: A& O x 1 to name only, thought she was at " [place]" and knew she was in the hospital but didn't know why    Skin: Warm    Respiratory and Thorax: normal effort; Breath sounds: normal; No rales/wheezing/rhonchi  	  Cardiovascular: S1, S2, regular, NMBR	    Gastrointestinal: BS + x 4Q, nontender	    Genitourinary:  Bladder nondistended, nontender    Musculoskeletal:   General Right:   no muscle/joint tenderness,   normal tone, no joint swelling,   ROM: limited/full	    General Left:   no muscle/joint tenderness,   normal tone, no joint swelling,   ROM: limited/full                 Lower extrems:   Right: no calf tenderness              negative francisca's sign               + pedal pulses    Left:   no calf tenderness              negative francisca's sign               + pedal pulses                          11.4   11.57 )-----------( 159      ( 2019 05:41 )             35.6           142  |  111<H>  |  28<H>  ----------------------------<  99  4.4   |  24  |  0.76    Ca    8.6      2019 05:41  Phos  2.9       Mg     2.2         TPro  5.6<L>  /  Alb  3.0<L>  /  TBili  0.5  /  DBili  x   /  AST  22  /  ALT  45  /  AlkPhos  61                            11.5   9.90  )-----------( 160      ( 2019 09:55 )             36.7       01    143  |  110<H>  |  26<H>  ----------------------------<  84  4.0   |  27  |  0.81    Ca    8.6      2019 11:23    TPro  5.6<L>  /  Alb  3.0<L>  /  TBili  0.5  /  DBili  x   /  AST  22  /  ALT  45  /  AlkPhos  61        PT/INR - ( 2019 09:55 )   PT: 14.0 sec;   INR: 1.25 ratio         PTT - ( 2019 09:55 )  PTT:22.6 sec			            FINDINGS:    Chest:    Lungs, Airways and Pleura: Central tracheobronchial tree is grossly   patent. No endobronchial lesions. Moderate centrilobular emphysema. No   pneumothorax. Subsegmental atelectasis in the lingula. Minimal bibasilar   atelectasis.    Heart and Great Vessels: Atherosclerotic changes of the aorta and   coronary vasculature. Heart is mildly enlarged. No pericardial effusion.    Mediastinum and Samra: Hiatal hernia with an intrathoracic component of   the proximal stomach.    Neck and Chest Wall: Large right breast mass extending to the skin   surface measuring 5.2 x 4.5 cm unchanged. Right axillary mass measuring   4.4 cm is unchanged.    Abdomen and pelvis:    Liver: within normal limits  Gallbladder: Prior cholecystectomy..  Bile ducts: No intrahepatic or extrahepatic biliary dilatation  Pancreas: within normal limits    Spleen: within normal limits  Adrenal: within normal limits  Kidneys, Ureters and Bladder: Mild bilateral renal atrophy. No   hydronephrosis. No intrarenal calculus. Left parapelvic cyst. The ureters   and bladder are within normal limits.        Pelvis: Interval development of hyperdense presacral fluid suggesting   hematoma. No pelvic mass or pelvic adenopathy. Prior hysterectomy.    Bowel: The bowel is of normal course and caliber without evidence of   obstruction or gross bowel wall thickening. Normal appendix visualized.   Clot diverticulosis without diverticulitis.  Peritoneum: No intra-abdominal free air, ascites or organized fluid   collections.    Retroperitoneum: within normal limits       Vessels: Atherosclerotic changes.        Abdominal wall and Bones: Degenerative changes. No lytic or blastic   lesions. Tree single hematoma. Hyperdense enlargement of the right   greater than left piriformis muscle suggesting intramuscular hematoma.   Fracture through the S for vertebral body. Questionable left sacral   fracture. Prior right hip pinning. Mild superior endplate deformities of   L4 and L5. Moderate to severe compression deformity of L2. Mild to   moderate compression deformities of T7 and T9. Moderate compression   deformity of T4. These are unchanged. Multiple acute/subacute and chronic   right-sided rib fractures some of which are new from prior exam. Multiple   subacute left-sided rib fractures.    IMPRESSION:      Interval development of a presacral hematoma with associated sacral   fracture. If concern for more extensive pelvic fractures, MRI can be   obtained for further evaluation. Multiple acute/subacute and chronic   right-sided rib fractures some of which are new from prior exam. Multiple   subacute left-sided rib fractures.    No other evidence of solid visceral injury in the chest, abdomen or   pelvis.    Persistent right breast mass with right axillary mass as seen on prior   exam.      CT head:  FINDINGS:   CT dated 2019 is available for review.    The brain demonstrates moderate periventricular white matter ischemia.     No acute cerebral cortical infarct is seen.  No intracranial hemorrhage   is found.  No mass effect is found in the brain.      The ventricles, sulci and basal cisterns show mild atrophy.         The orbits are unremarkable.  The paranasal sinuses are clear.  The nasal   cavity appears intact.  The nasopharynx is symmetric.  The central skull   base, petrous temporal bones and calvarium remain intact.      IMPRESSION:   Moderate periventricular white matter ischemia.    Mild   atrophy.                        	  MEDICATIONS  (STANDING):  ALBUTerol    0.083% 2.5 milliGRAM(s) Nebulizer every 6 hours  dipyridamole 200 mG/aspirin 25 mG 1 Capsule(s) Oral two times a day  docusate sodium 100 milliGRAM(s) Oral two times a day  famotidine    Tablet 20 milliGRAM(s) Oral two times a day  heparin  Injectable 5000 Unit(s) SubCutaneous every 12 hours  hydroxychloroquine 200 milliGRAM(s) Oral every 12 hours  lactobacillus acidophilus 1 Tablet(s) Oral two times a day with meals  levothyroxine 25 MICROGram(s) Oral daily  lidocaine   Patch 1 Patch Transdermal daily  multivitamin 1 Tablet(s) Oral daily  predniSONE   Tablet 10 milliGRAM(s) Oral daily  simvastatin 20 milliGRAM(s) Oral at bedtime  tamsulosin 0.4 milliGRAM(s) Oral at bedtime  tiotropium 18 MICROgram(s) Capsule 1 Capsule(s) Inhalation daily        Vital Signs Last 24 Hrs  T(C): 36.1 (19 @ 04:13), Max: 38.1 (19 @ 22:13)  T(F): 97 (19 @ 04:13), Max: 100.5 (19 @ 22:13)  HR: 81 (19 @ 14:11) (80 - 102)  BP: 103/62 (19 @ 06:00) (98/57 - 118/100)  BP(mean): 70 (19 @ 06:00) (61 - 105)  RR: 17 (19 @ 06:00) (17 - 30)  SpO2: 98% (19 @ 04:13) (92% - 100%)      DVT Prophylaxis:  LMWH                   (  )  Heparin SQ           ( x )  Coumadin             (  )  Xarelto                  (  )  Eliquis                   (  )  Venodynes           ( x )  Ambulation          (x  )  UFH                       (  )  Contraindicated  (  )
HPI:  CC: Mechanical Fall     HPI: 89y/o F w/ PMHx of Alzheimers, TIA, Stage 4 Breast CA, HTN, carotid artery stenosis, COPD, HLD, hypothyroidism, benign lung nodule, OA,  chronic back pain with acute compression of L2, GERD,  Asthma, s/p cholecystectomy, MARCELO, and hip surgery presents to ER s/p mechanical fall.  Patient was recently discharged from  with pneumonia on 1/19 to  [place] assisted living.  Daughter reports patient was standing and tripped over her walker and fell to the floor. Reports hitting head and shoulder. Patient does not recall the event or LOC but does reports pain in the middle of her back on exam. Patient normally ambulated with a walker and wheel chair. Since her fall patient has complained of thoracolumbar back pain. TLSO ordered last visit but with R breast mass was supposed to be changed ot LSO and never arrived at [facility].    Information obtained by daughter Christine Muñoz, [phone].     1/29/19 Patient lethargic this am but states back better    PAST MEDICAL & SURGICAL HISTORY:  Hypothyroidism  Breast cancer: Right  Compression fracture of spine: T7  Alzheimers disease  HTN (hypertension)  Glaucoma  TIA (transient ischemic attack): 8/07  Cerebral vascular accident: 2005  Polymyalgia rheumatica  Osteoporosis  Chronic pain: mid back  Urinary retention  GERD (gastroesophageal reflux disease)  Cholelithiases  Hyperlipemia  Carotid artery stenosis  Lung nodule: biopsied 2003, benign  COPD (chronic obstructive pulmonary disease)  Asthma  Glaucoma of both eyes: s/p repair  History of hip surgery  Gall stones  History of hysterectomy: for bleeding  Hx of cholecystectomy    MEDICATIONS  (STANDING):  acetaminophen   Tablet .. 650 milliGRAM(s) Oral every 6 hours  ALBUTerol/ipratropium for Nebulization 3 milliLiter(s) Nebulizer every 6 hours  dipyridamole 200 mG/aspirin 25 mG 1 Capsule(s) Oral two times a day  docusate sodium 100 milliGRAM(s) Oral two times a day  famotidine    Tablet 20 milliGRAM(s) Oral two times a day  heparin  Injectable 5000 Unit(s) SubCutaneous every 12 hours  hydroxychloroquine 200 milliGRAM(s) Oral every 12 hours  lactobacillus acidophilus 1 Tablet(s) Oral two times a day with meals  levothyroxine 25 MICROGram(s) Oral daily  lidocaine   Patch 1 Patch Transdermal daily  multivitamin 1 Tablet(s) Oral daily  predniSONE   Tablet 10 milliGRAM(s) Oral daily  simvastatin 20 milliGRAM(s) Oral at bedtime  tamsulosin 0.4 milliGRAM(s) Oral at bedtime    MEDICATIONS  (PRN):  HYDROmorphone   Tablet 1 milliGRAM(s) Oral every 6 hours PRN mod- severe pain  ondansetron Injectable 4 milliGRAM(s) IV Push every 6 hours PRN Nausea    Allergies    Aciphex (Unknown)  Macrobid (Unknown)  Percocet 10/325 (Rash)    Intolerances    SH: , retired, lives in [facility], no tob or EtOH  FH: not contributory to present illness  ROS c/w HPI and PMH  -back ad rib pain    PE:    Vital Signs Last 24 Hrs  T(C): 36.3 (29 Jan 2019 04:59), Max: 36.9 (28 Jan 2019 18:28)  T(F): 97.3 (29 Jan 2019 04:59), Max: 98.5 (28 Jan 2019 18:28)  HR: 90 (29 Jan 2019 08:10) (82 - 93)  BP: 92/57 (29 Jan 2019 04:59) (92/57 - 111/56)  BP(mean): --  RR: 18 (28 Jan 2019 18:28) (18 - 18)  SpO2: 97% (29 Jan 2019 04:59) (95% - 97%)    Patient laying in bed-sleepy but arousdable, has less c/o thoracolumbar back pain  Large R breast mass  T-L spine with diffuse spinal tenderness, tender over posterior ribs bilaterally, negative SLR, no motor deficits    CT C/A/P with acute L2 VCF but no other acute changes, chronic VCF T4, 7, 9, L4, 5, (+) hematoma in soft tissue, possible sacral fracture, marked spinal stenosis  CT neck with DDD, no acute changes
HPI:  CC: Mechanical Fall     HPI: 89y/o F w/ PMHx of Alzheimers, TIA, Stage 4 Breast CA, HTN, carotid artery stenosis, COPD, HLD, hypothyroidism, benign lung nodule, OA,  chronic back pain with compression of T7, GERD,  Asthma, s/p cholecystectomy, MARCELO, and hip surgery present s to  s/p mechanical fall.  Patient was recently discharged from  with pneumonia on  to  [place] assisted living.  Daughter reports patient was standing and tripped over her walker and fell to the floor. Reports hitting head and shoulder. Patient does not recall the event or LOC but does reports pain in the middle of her back on exam. Patient normally ambulated with a walker and wheel chair. unable to complete ROS due to patient mental state.    Information obtained by daughter Christine Muñoz, [phone]. (2019 13:50)      Patient is a 89y old  Female who presents with a chief complaint of s/p mechanical fall (2019 13:50)      Consulted by Dr. Pak  for VTE prophylaxis, risk stratification, and anticoagulation management.    PAST MEDICAL & SURGICAL HISTORY:  Hypothyroidism  Breast cancer: Right  Compression fracture of spine: T7  Alzheimers disease  HTN (hypertension)  Glaucoma  TIA (transient ischemic attack):   Cerebral vascular accident: , presently on Aggrenox  Polymyalgia rheumatica  Osteoporosis  Chronic pain: mid back  Urinary retention  GERD (gastroesophageal reflux disease)  Cholelithiases  Hyperlipemia  Carotid artery stenosis  Lung nodule: biopsied , benign  COPD (chronic obstructive pulmonary disease)  Asthma  Glaucoma of both eyes: s/p repair  History of hip surgery  Gall stones  History of hysterectomy: for bleeding  Hx of cholecystectomy          CrCl:    Caprini VTE Risk Score:CAPRINI SCORE [CLOT]    AGE RELATED RISK FACTORS                                                       MOBILITY RELATED FACTORS  [ ] Age 41-60 years                                            (1 Point)                  [ ] Bed rest                                                        (1 Point)  [ ] Age: 61-74 years                                           (2 Points)                 [ ] Plaster cast                                                   (2 Points)  [ x] Age= 75 years                                              (3 Points)                 [ ] Bed bound for more than 72 hours                 (2 Points)    DISEASE RELATED RISK FACTORS                                               GENDER SPECIFIC FACTORS  [ ] Edema in the lower extremities                       (1 Point)                  [ ] Pregnancy                                                     (1 Point)  [ ] Varicose veins                                               (1 Point)                  [ ] Post-partum < 6 weeks                                   (1 Point)             [ x] BMI > 25 Kg/m2                                            (1 Point)                  [ ] Hormonal therapy  or oral contraception          (1 Point)                 [ ] Sepsis (in the previous month)                        (1 Point)                  [ ] History of pregnancy complications                 (1 point)  [ ] Pneumonia or serious lung disease                                               [ ] Unexplained or recurrent                     (1 Point)           (in the previous month)                               (1 Point)  [ x] Abnormal pulmonary function test                     (1 Point)                 SURGERY RELATED RISK FACTORS  [ ] Acute myocardial infarction                              (1 Point)                 [ ]  Section                                             (1 Point)  [ ] Congestive heart failure (in the previous month)  (1 Point)               [ ] Minor surgery                                                  (1 Point)   [ ] Inflammatory bowel disease                             (1 Point)                 [ ] Arthroscopic surgery                                        (2 Points)  [ ] Central venous access                                      (2 Points)                [ ] General surgery lasting more than 45 minutes   (2 Points)       [ ] Stroke (in the previous month)                          (5 Points)               [ ] Elective arthroplasty                                         (5 Points)            [x ] H/O malignancy ( present or previous)            ( 2 points)                                                                                                                                   HEMATOLOGY RELATED FACTORS                                                 TRAUMA RELATED RISK FACTORS  [ ] Prior episodes of VTE                                     (3 Points)                 [ ] Fracture of the hip, pelvis, or leg                       (5 Points)  [ ] Positive family history for VTE                         (3 Points)                 [ ] Acute spinal cord injury (in the previous month)  (5 Points)  [ ] Prothrombin 99609 A                                     (3 Points)                 [ ] Paralysis  (less than 1 month)                             (5 Points)  [ ] Factor V Leiden                                             (3 Points)                  [ ] Multiple Trauma within 1 month                        (5 Points)  [ ] Lupus anticoagulants                                     (3 Points)                                                           [ ] Anticardiolipin antibodies                               (3 Points)                                                       [ ] High homocysteine in the blood                      (3 Points)                                             [ ] Other congenital or acquired thrombophilia      (3 Points)                                                [ ] Heparin induced thrombocytopenia                  (3 Points)                                          Total Score [       7   ]      IMPROVE Bleeding Risk Score    Falls Risk:   High (  x)  Mod (  )  Low (  )  26-19 Pt seen at bedside on cardiac SD, daughter present. Pt alert oriented to person only.   Discussed with daughter pt's anticoagulation with heparin sq.  Questions answered will reinforce as needed     FAMILY HISTORY:  Family history of diabetes mellitus (DM) (Child)  Family history of COPD (chronic obstructive pulmonary disease) (Mother)    Denies any personal or familial history of clotting or bleeding disorders.    Allergies    Aciphex (Unknown)  Macrobid (Unknown)  Percocet 10/325 (Rash)    Intolerances        REVIEW OF SYSTEMS    (  )Fever	     (  )Constipation	(  )SOB				(  )Headache	(  )Dysuria  (  )Chills	     (  )Melena	(  )Dyspnea present on exertion	                    (  )Dizziness                    (  )Polyuria  (  )Nausea	     (  )Hematochezia	(  )Cough			                    (  )Syncope   	(  )Hematuria  (  )Vomiting    (  )Chest Pain	(  )Wheezing			( x )Weakness  (  )Diarrhea     (  )Palpitations	(  )Anorexia			( x )Myalgia    All  other review of systems negative: Yes          PHYSICAL EXAM:    Constitutional: Appears Well    Neurological: A& O x 1 to name only, thought she was at " [facility]" and knew she was in the hospital but didn't know why    Skin: Warm    Respiratory and Thorax: normal effort; Breath sounds: normal; No rales/wheezing/rhonchi  	  Cardiovascular: S1, S2, regular, NMBR	    Gastrointestinal: BS + x 4Q, nontender	    Genitourinary:  Bladder nondistended, nontender    Musculoskeletal:   General Right:   no muscle/joint tenderness,   normal tone, no joint swelling,   ROM: full	    General Left:   no muscle/joint tenderness,   normal tone, no joint swelling,   ROM: full                 Lower extrems:   Right: no calf tenderness              negative francisca's sign               + pedal pulses    Left:   no calf tenderness              negative francisca's sign               + pedal pulses                          11.4   11.57 )-----------( 159      ( 2019 05:41 )             35.6           142  |  111<H>  |  28<H>  ----------------------------<  99  4.4   |  24  |  0.76    Ca    8.6      2019 05:41  Phos  2.9       Mg     2.2         TPro  5.6<L>  /  Alb  3.0<L>  /  TBili  0.5  /  DBili  x   /  AST  22  /  ALT  45  /  AlkPhos  61                            11.5   9.90  )-----------( 160      ( 2019 09:55 )             36.7           143  |  110<H>  |  26<H>  ----------------------------<  84  4.0   |  27  |  0.81    Ca    8.6      2019 11:23    TPro  5.6<L>  /  Alb  3.0<L>  /  TBili  0.5  /  DBili  x   /  AST  22  /  ALT  45  /  AlkPhos  61        PT/INR - ( 2019 09:55 )   PT: 14.0 sec;   INR: 1.25 ratio         PTT - ( 2019 09:55 )  PTT:22.6 sec			            FINDINGS:    Chest:    Lungs, Airways and Pleura: Central tracheobronchial tree is grossly   patent. No endobronchial lesions. Moderate centrilobular emphysema. No   pneumothorax. Subsegmental atelectasis in the lingula. Minimal bibasilar   atelectasis.    Heart and Great Vessels: Atherosclerotic changes of the aorta and   coronary vasculature. Heart is mildly enlarged. No pericardial effusion.    Mediastinum and Samra: Hiatal hernia with an intrathoracic component of   the proximal stomach.    Neck and Chest Wall: Large right breast mass extending to the skin   surface measuring 5.2 x 4.5 cm unchanged. Right axillary mass measuring   4.4 cm is unchanged.    Abdomen and pelvis:    Liver: within normal limits  Gallbladder: Prior cholecystectomy..  Bile ducts: No intrahepatic or extrahepatic biliary dilatation  Pancreas: within normal limits    Spleen: within normal limits  Adrenal: within normal limits  Kidneys, Ureters and Bladder: Mild bilateral renal atrophy. No   hydronephrosis. No intrarenal calculus. Left parapelvic cyst. The ureters   and bladder are within normal limits.        Pelvis: Interval development of hyperdense presacral fluid suggesting   hematoma. No pelvic mass or pelvic adenopathy. Prior hysterectomy.    Bowel: The bowel is of normal course and caliber without evidence of   obstruction or gross bowel wall thickening. Normal appendix visualized.   Clot diverticulosis without diverticulitis.  Peritoneum: No intra-abdominal free air, ascites or organized fluid   collections.    Retroperitoneum: within normal limits       Vessels: Atherosclerotic changes.        Abdominal wall and Bones: Degenerative changes. No lytic or blastic   lesions. Tree single hematoma. Hyperdense enlargement of the right   greater than left piriformis muscle suggesting intramuscular hematoma.   Fracture through the S for vertebral body. Questionable left sacral   fracture. Prior right hip pinning. Mild superior endplate deformities of   L4 and L5. Moderate to severe compression deformity of L2. Mild to   moderate compression deformities of T7 and T9. Moderate compression   deformity of T4. These are unchanged. Multiple acute/subacute and chronic   right-sided rib fractures some of which are new from prior exam. Multiple   subacute left-sided rib fractures.    IMPRESSION:      Interval development of a presacral hematoma with associated sacral   fracture. If concern for more extensive pelvic fractures, MRI can be   obtained for further evaluation. Multiple acute/subacute and chronic   right-sided rib fractures some of which are new from prior exam. Multiple   subacute left-sided rib fractures.    No other evidence of solid visceral injury in the chest, abdomen or   pelvis.    Persistent right breast mass with right axillary mass as seen on prior   exam.      CT head:  FINDINGS:   CT dated 2019 is available for review.    The brain demonstrates moderate periventricular white matter ischemia.     No acute cerebral cortical infarct is seen.  No intracranial hemorrhage   is found.  No mass effect is found in the brain.      The ventricles, sulci and basal cisterns show mild atrophy.         The orbits are unremarkable.  The paranasal sinuses are clear.  The nasal   cavity appears intact.  The nasopharynx is symmetric.  The central skull   base, petrous temporal bones and calvarium remain intact.      IMPRESSION:   Moderate periventricular white matter ischemia.    Mild   atrophy.                        	  MEDICATIONS  (STANDING):  ALBUTerol/ipratropium for Nebulization 3 milliLiter(s) Nebulizer every 6 hours  dipyridamole 200 mG/aspirin 25 mG 1 Capsule(s) Oral two times a day  docusate sodium 100 milliGRAM(s) Oral two times a day  famotidine    Tablet 20 milliGRAM(s) Oral two times a day  heparin  Injectable 5000 Unit(s) SubCutaneous every 12 hours  hydroxychloroquine 200 milliGRAM(s) Oral every 12 hours  lactobacillus acidophilus 1 Tablet(s) Oral two times a day with meals  levothyroxine 25 MICROGram(s) Oral daily  lidocaine   Patch 1 Patch Transdermal daily  multivitamin 1 Tablet(s) Oral daily  predniSONE   Tablet 10 milliGRAM(s) Oral daily  simvastatin 20 milliGRAM(s) Oral at bedtime  tamsulosin 0.4 milliGRAM(s) Oral at bedtime          Vital Signs Last 24 Hrs  T(C): 36.9 (19 @ 17:10), Max: 36.9 (19 @ 17:10)  T(F): 98.4 (19 @ 17:10), Max: 98.4 (19 @ 17:10)  HR: 92 (19 @ 17:42) (82 - 105)  BP: 95/56 (19 @ 17:42) (95/56 - 139/82)  BP(mean): 70 (19 @ 13:00) (59 - 97)  RR: 23 (19 @ 13:00) (20 - 32)  SpO2: 99% (19 @ 20:33) (99% - 99%)      DVT Prophylaxis:  LMWH                   (  )  Heparin SQ           ( x )  Coumadin             (  )  Xarelto                  (  )  Eliquis                   (  )  Venodynes           ( x )  Ambulation          (x  )  UFH                       (  )  Contraindicated  (  )
HPI: Pt is a 89y old Female with hx of breast cancer s/p chemo, COPD, CVA, dementia, PMR, RA, recurrent falls admitted from home ([facility]) on 1/24 s/p fall from tripping over her walker  On admission  noted to have multiple rib fxs, sacral fx, thoracic and lumbar fxs.  Pt recently discharged after episode of pneumonia.  Family requesting palliative consultation to discuss goals of care.    Pt seen and examined by me with her daughter Pat at bedside for followup of goals of care and pain.  Pt is awake and states she has pain in her back.  She states Tylenol isn't helping however she hasn't taken a dose in 24 hrs.  She denies SOB.  Her daughter Pat at bedside states she is eating pretty well.  She denies nausea, vomiting.  She is looking forward to getting out of bed and walking around.  Staff reports episode of agitation last pm- she is calm this am.  Limited historian due to dementia      PAIN: (x )Yes   ( )No  Level: moderate    DYSPNEA: ( ) Yes  (x ) No    Review of Systems:    Anxiety- denies  Depression- denies  Physical Discomfort- mod  Dyspnea- denies  Constipation- denies  Diarrhea-denies  Nausea-denies  Vomiting-denies  Anorexia-denies  Weight Loss- denies  Cough-denies  Secretions-denies  Fatigue- mild  Weakness- mild  Delirium- overnight    All other systems reviewed and negative, some per staff    PHYSICAL EXAM:    Vital Signs Last 24 Hrs  T(C): 36.6 (27 Jan 2019 05:22), Max: 36.9 (26 Jan 2019 17:10)  T(F): 97.9 (27 Jan 2019 05:22), Max: 98.4 (26 Jan 2019 17:10)  HR: 90 (27 Jan 2019 07:45) (68 - 101)  BP: 124/53 (27 Jan 2019 05:22) (95/56 - 124/53)  BP(mean): 70 (26 Jan 2019 13:00) (70 - 70)  RR: 23 (26 Jan 2019 13:00) (23 - 27)  SpO2: 94% (27 Jan 2019 05:22) (93% - 94%)    PPSV2: 40-50  %    FAST: 6A    General: pleasant elderly  female, uncomfortable  Mental Status: AOx2  HEENT: EOMI, moist oral mucosa  Lungs: decreased breath sounds b/l  Cardiac: S1S2+  GI: soft, + bowel sounds  : voiding, continent  Ext: moves all 4 exts spontaneously  Neuro: no focal deficits, + generalized weakness      LABS    No results available from today    RADIOLOGY/ADDITIONAL STUDIES:    EXAM:  XR CHEST PORTABLE ROUTINE 1V                        PROCEDURE DATE:  01/25/2019    Comparison: 1/14/2019    AP radiograph of the chest demonstrates subsegmental atelectasis at the   lung bases. The cardiac silhouette is normal in size. Osseous structures   are intact.    Impression:Subsegmental atelectasis at the lung bases.      EXAM:  XR FEMUR 2 VIEWS RT                        EXAM:  XR HIPS BI WITH PELV MIN 5V                        PROCEDURE DATE:  01/24/2019    FINDINGS:  No prior exams are available for comparison.    No fracture is seen.  The hip remains located. Hardware in the RIGHT hip   is intact. No loose body is identified.  The joint spaces show narrowing,   RIGHT greater than LEFT.  No significant productive changes or other   cortical or trabecular abnormalities are recognized.  Vascular   calcification is seen. The distal RIGHT femur is intact. The hardware is   intact.    No soft tissue abnormality is recognized.  No radiopaque foreign body is   appreciated.    Pelvic bones appear intact.  No fracture is recognized.  The hips are   located.  The sacroiliac joints and pubic symphysis remain intact.  No   pathologic calcifications are seen.     IMPRESSION:      1. BILATERAL Hip and RIGHT femur:  No fracture. Internal hardware is   intact.    2.  Pelvis:  Unremarkable radiographs of the pelvis.            EXAM:  XR SHOULDER COMP MIN 2V RT                        PROCEDURE DATE:  01/24/2019    FINDINGS:   7/9/2017 available for review.    The osseous structures of the shoulder are intact.   No fracture is seen.    No destructive bone lesion is identified.    Theglenohumeral joint is located and intact.  The acromioclavicular   joint appears aligned and intact.    No pathologic calcifications or other soft tissue abnormalities are seen.    The visualized chest wall and ribs are intact.  No radiopaque foreign  body is identified.     IMPRESSION:   Unremarkable radiographs of the shoulder.      EXAM:  XR FEMUR 2 VIEWS RT                        EXAM:  XR HIPS BI WITH PELV MIN 5V                        PROCEDURE DATE:  01/24/2019    FINDINGS:  No prior exams are available for comparison.    No fracture is seen.  The hip remains located. Hardware in the RIGHT hip   is intact. No loose body is identified.  The joint spaces show narrowing,   RIGHT greater than LEFT.  No significant productive changes or other   cortical or trabecular abnormalities are recognized.  Vascular   calcification is seen. The distal RIGHT femur is intact. The hardware is   intact.    No soft tissue abnormality is recognized.  No radiopaque foreign body is   appreciated.    Pelvic bones appear intact.  No fracture is recognized.  The hips are   located.  The sacroiliac joints and pubic symphysis remain intact.  No   pathologic calcifications are seen.     IMPRESSION:      1. BILATERAL Hip and RIGHT femur:  No fracture. Internal hardware is   intact.  2.  Pelvis:  Unremarkable radiographs of the pelvis.          EXAM:  CT ABDOMEN AND PELVIS                        EXAM:  CT CHEST                        PROCEDURE DATE:  01/24/2019    Comparison:  1/14/2019    FINDINGS:    Chest:  Lungs, Airways and Pleura: Central tracheobronchial tree is grossly   patent. No endobronchial lesions. Moderate centrilobular emphysema. No   pneumothorax. Subsegmental atelectasis in the lingula. Minimal bibasilar   atelectasis.  Heart and Great Vessels: Atherosclerotic changes of the aorta and   coronary vasculature. Heart is mildly enlarged. No pericardial effusion.  Mediastinum and Samra: Hiatal hernia with an intrathoracic component of   the proximal stomach.  Neck and Chest Wall: Large right breastmass extending to the skin   surface measuring 5.2 x 4.5 cm unchanged. Right axillary mass measuring   4.4 cm is unchanged.  Abdomen and pelvis:  Liver: within normal limits  Gallbladder: Prior cholecystectomy..  Bile ducts: No intrahepatic or extrahepatic biliary dilatation  Pancreas: within normal limits  Spleen: within normal limits  Adrenal: within normal limits  Kidneys, Ureters and Bladder: Mild bilateral renal atrophy. No   hydronephrosis. No intrarenal calculus. Left parapelvic cyst. The ureters   and bladder are within normal limits.      Pelvis: Interval development of hyperdense presacral fluid suggesting   hematoma. No pelvic mass or pelvic adenopathy. Prior hysterectomy.  Bowel: The bowel is of normal course and caliberwithout evidence of   obstruction or gross bowel wall thickening. Normal appendix visualized.   Clot diverticulosis without diverticulitis.  Peritoneum: No intra-abdominal free air, ascites or organized fluid   collections.  Retroperitoneum: withinnormal limits       Vessels: Atherosclerotic changes.      Abdominal wall and Bones: Degenerative changes. No lytic or blastic   lesions. Tree single hematoma. Hyperdense enlargement of the right   greater than left piriformis muscle suggesting intramuscular hematoma.   Fracture through the S for vertebral body. Questionable left sacral   fracture. Prior right hip pinning. Mild superior endplate deformities of   L4 and L5. Moderate to severe compression deformity of L2. Mild to   moderate compression deformities of T7 and T9. Moderate compression   deformity of T4. These are unchanged. Multiple acute/subacute and chronic   right-sided rib fractures some of which are new from prior exam. Multiple   subacute left-sided rib fractures.    IMPRESSION:      Interval development of a presacral hematoma with associated sacral   fracture. If concern for more extensive pelvic fractures, MRI can be   obtained for further evaluation. Multiple acute/subacute and chronic   right-sided rib fractures some of which are new from prior exam. Multiple   subacute left-sided rib fractures.  No other evidence of solid visceral injury in the chest, abdomen or   pelvis.  Persistent right breast mass with right axillary mass as seen on prior   exam.      EXAM:  CT BRAIN                        PROCEDURE DATE:  01/24/2019    FINDINGS:   CT dated 1/14/2019 is available for review.    The brain demonstrates moderate periventricular white matter ischemia.     No acute cerebral cortical infarct is seen.  No intracranial hemorrhage   is found.No mass effect is found in the brain.    The ventricles, sulci and basal cisterns show mild atrophy.       The orbits are unremarkable.  The paranasal sinuses are clear.  The nasal   cavity appears intact.  The nasopharynx is symmetric.  The central skull   base, petrous temporal bones and calvarium remain intact.      IMPRESSION:   Moderate periventricular white matter ischemia.    Mild   atrophy.
PCP- DR Flavio Otero    CC- s/p mechanical fall    HPI:  88yo/F with multiple medical problems, recent admission to  for PNA, PMH- COPD/chr resp failure on home O2 and maint prednisone, Metastatic breast CA due for XRT with DR Jean on Tuesday 1/29/19, RA on Plaquenil, PMR, HTN, hyperlipidemia, Multiple falls in the past and recent L2 compression fracture, presented s/p mechanical fall.    PMH- as above  PSH- RT hip IMN, cholecystectomy, glaucoma  SOc hx- ex-smoker quit, denies alcohol. Lives at [facility], walks with walker  Fam hx- m had COPD    1/24/19- seen in ED, c/o low back pain  1/25/19 up in a chair, denies any pain  1/26/19 doing good, up in a chair, not on O2 and feels good  1/27/19 was slightly hypotensive earlier, but responded well to IVF. No acute events on telemetry x 3 days    Review of system- All 10 systems reviewed and is as per HPI otherwise negative.     Vital Signs Last 24 Hrs  T(C): 36.7 (27 Jan 2019 11:54), Max: 36.9 (26 Jan 2019 17:10)  T(F): 98 (27 Jan 2019 11:54), Max: 98.4 (26 Jan 2019 17:10)  HR: 89 (27 Jan 2019 13:27) (68 - 94)  BP: 92/58 (27 Jan 2019 13:59) (80/50 - 124/53)  BP(mean): --  RR: --  SpO2: 98% (27 Jan 2019 11:54) (93% - 98%)    LABS:                        10.0   8.68  )-----------( 138      ( 27 Jan 2019 14:12 )             32.3     27 Jan 2019 14:12    144    |  115    |  26     ----------------------------<  146    4.6     |  22     |  0.81     Ca    8.5        27 Jan 2019 14:12    RADIOLOGY & ADDITIONAL TESTS:  EXAM:  CT ABDOMEN AND PELVIS                        EXAM:  CT CHEST                        PROCEDURE DATE:  01/24/2019    INTERPRETATION:  Exam Date: 1/24/2019 10:33 AM  Clinical Information: Fall on blood thinners  Technique:       CT of the chest , abdomen and pelvis was performed with   axial images obtained from the thoracic inlet to the pubic symphysis       without IV contrast.  Oral contrast was not administered.  Comparison:  1/14/2019    FINDINGS:    Chest:    Lungs, Airways and Pleura: Central tracheobronchial tree is grossly   patent. No endobronchial lesions. Moderate centrilobular emphysema. No   pneumothorax. Subsegmental atelectasis in the lingula. Minimal bibasilar   atelectasis.    Heart and Great Vessels: Atherosclerotic changes of the aorta and   coronary vasculature. Heart is mildly enlarged. No pericardial effusion.    Mediastinum and Samra: Hiatal hernia with an intrathoracic component of   the proximal stomach.    Neck and Chest Wall: Large right breastmass extending to the skin   surface measuring 5.2 x 4.5 cm unchanged. Right axillary mass measuring   4.4 cm is unchanged.    Abdomen and pelvis:    Liver: within normal limits  Gallbladder: Prior cholecystectomy..  Bile ducts: No intrahepatic or extrahepatic biliary dilatation  Pancreas: within normal limits    Spleen: within normal limits  Adrenal: within normal limits  Kidneys, Ureters and Bladder: Mild bilateral renal atrophy. No   hydronephrosis. No intrarenal calculus. Left parapelvic cyst. The ureters   and bladder are within normal limits.        Pelvis: Interval development of hyperdense presacral fluid suggesting   hematoma. No pelvic mass or pelvic adenopathy. Prior hysterectomy.    Bowel: The bowel is of normal course and caliberwithout evidence of   obstruction or gross bowel wall thickening. Normal appendix visualized.   Clot diverticulosis without diverticulitis.  Peritoneum: No intra-abdominal free air, ascites or organized fluid   collections.    Retroperitoneum: withinnormal limits       Vessels: Atherosclerotic changes.        Abdominal wall and Bones: Degenerative changes. No lytic or blastic   lesions. Tree single hematoma. Hyperdense enlargement of the right   greater than left piriformis muscle suggesting intramuscular hematoma.   Fracture through the S for vertebral body. Questionable left sacral   fracture. Prior right hip pinning. Mild superior endplate deformities of   L4 and L5. Moderate to severe compression deformity of L2. Mild to   moderate compression deformities of T7 and T9. Moderate compression   deformity of T4. These are unchanged. Multiple acute/subacute and chronic   right-sided rib fractures some of which are new from prior exam. Multiple   subacute left-sided rib fractures.    IMPRESSION:      Interval development of a presacral hematoma with associated sacral   fracture. If concern for more extensive pelvic fractures, MRI can be   obtained for further evaluation. Multiple acute/subacute and chronic   right-sided rib fractures some of which are new from prior exam. Multiple   subacute left-sided rib fractures.    No other evidence of solid visceral injury in the chest, abdomen or   pelvis.    Persistent right breast mass with right axillary mass as seen on prior   exam.    EXAM:  CT CERVICAL SPINE                        PROCEDURE DATE:  01/24/2019    INTERPRETATION:      CT cervical spine without IV contrast        CLINICAL INFORMATION: Fracture, trauma, neck pain.  Neck pain, spinal   stenosis, spondylosis. [place] 16Trauma Alert: fall  head injury on Aggrenox    TECHNIQUE:  Contiguous axial 2.0 mm sections were obtained through the   cervical spine using a single helical acquisition.  Additional 2 mm   sagittal and coronal reformatted reconstructions of the spine were   obtained.  These additional reformatted images were used to evaluate the   spine for alignment, vertebral fractures and the integrity of the the   posterior elements.   This scan was performed using automatic exposure   control (radiation dose reduction software) to obtain a diagnostic image   quality scan with patient dose as low as reasonably achievable.        FINDINGS:   No prior similar studies are available for review    Cervical vertebral body heights are maintained. No vertebral fracture is   seen. No destructive bone lesion is found.  Alignment is preserved.    Facet joints appear intact and aligned.    Cervical intervertebral disc spaces show mild disc degeneration and   spondylosis at C5-6 and C6-7 with loss of discheight and associated   degenerative endplate changes. There is narrowing of the BILATERAL C3-4,   C4-5 and C6-7 neural foramina due to uncovertebral spurring and facet   osteophytic hypertrophy. MR would be required to evaluate the   intervertebral discs at higher sensitivity for disc pathology.    The skull base appears intact.  No neck mass is recognized.  Paraspinal   soft tissues appear intact. Visualized lymph nodes appear to be within   physiologic size limits.      Mild emphysema is noted.      IMPRESSION:   No vertebral fracture is recognized.  Mild disc   degeneration and spondylosis at C5-6 and C6-7 with narrowing of the   BILATERAL C3-4, C4-5 and C6-7 neural foramina due to uncovertebral   spurring and facet osteophytic hypertrophy.           EXAM:  CT BRAIN                        PROCEDURE DATE:  01/24/2019    INTERPRETATION:    CT head without IV contrast        CLINICAL INFORMATION:  Fall   Intracranial hemorrhage.    TECHNIQUE: Contiguous axial 5 mm sections were obtained through the head.   Sagittal and coronal 2-D reformatted images were also obtained.   This   scan was performed using automatic exposure control (radiation dose   reduction software) to obtain a diagnostic image quality scan with   patient dose as low as reasonably achievable.     FINDINGS:   CT dated 1/14/2019 is available for review.    The brain demonstrates moderate periventricular white matter ischemia.     No acute cerebral cortical infarct is seen.  No intracranial hemorrhage   is found.No mass effect is found in the brain.      The ventricles, sulci and basal cisterns show mild atrophy.         The orbits are unremarkable.  The paranasal sinuses are clear.  The nasal   cavity appears intact.  The nasopharynx is symmetric.  The central skull   base, petrous temporal bones and calvarium remain intact.    IMPRESSION:   Moderate periventricular white matter ischemia.    Mild   atrophy.        PHYSICAL EXAM:  GENERAL: NAD, well-groomed, well-developed  HEAD:  Atraumatic, Normocephalic  EYES: EOMI, PERRLA, conjunctiva and sclera clear  HEENT: Moist mucous membranes  NECK: Supple, No JVD  NERVOUS SYSTEM:  Alert & Oriented X1  CHEST/LUNG: occasional rhonchi  HEART: Regular rate and rhythm; No murmurs, rubs, or gallops  ABDOMEN: Soft, Nontender, Nondistended; Bowel sounds present  GENITOURINARY- Voiding, no palpable bladder  EXTREMITIES:  2+ Peripheral Pulses, No clubbing, cyanosis, or edema  MUSCULOSKELTAL- lower back tenderness  SKIN-no rash, no lesion  CNS- alert, oriented X1, non focal     MEDICATIONS  (STANDING):  acetaminophen   Tablet .. 650 milliGRAM(s) Oral every 6 hours  ALBUTerol/ipratropium for Nebulization 3 milliLiter(s) Nebulizer every 6 hours  dipyridamole 200 mG/aspirin 25 mG 1 Capsule(s) Oral two times a day  docusate sodium 100 milliGRAM(s) Oral two times a day  famotidine    Tablet 20 milliGRAM(s) Oral two times a day  heparin  Injectable 5000 Unit(s) SubCutaneous every 12 hours  hydroxychloroquine 200 milliGRAM(s) Oral every 12 hours  lactobacillus acidophilus 1 Tablet(s) Oral two times a day with meals  levothyroxine 25 MICROGram(s) Oral daily  lidocaine   Patch 1 Patch Transdermal daily  multivitamin 1 Tablet(s) Oral daily  predniSONE   Tablet 10 milliGRAM(s) Oral daily  simvastatin 20 milliGRAM(s) Oral at bedtime  tamsulosin 0.4 milliGRAM(s) Oral at bedtime    MEDICATIONS  (PRN):  HYDROmorphone   Tablet 1 milliGRAM(s) Oral every 6 hours PRN mod- severe pain  ondansetron Injectable 4 milliGRAM(s) IV Push every 6 hours PRN Nausea    Assessment/Plan  #S/p mechanical fall with multiple rib fractures/LT sacral fracture with associated hematoma  #Multiple prior compression deformities of T4, T7, T9, L4/L5, recent L2 compression fracture  Trauma eval appreciated  PT eval- OOB  Incentive spirometry  Pain control  Spine eval DR Isaac pending  Should have TLSO from home  Monitor HH    #COPD/chr resp failure  Pulm eval DR Licona appreciated  Maintain on chronic Prednisone  Nebs prn  Stable    #Breast CA metastatic  Due for XRT with DR Jean on 1/29/19  Palliative care consult appreciated    #RA/PMR  Cont Plaquenil, steroids PO    #hyperlipidemia/hypothyroidism  Stable    #Hypotension likely hypovolemia  Responded to IVF    #Dispo- per trauma, medically stable for discharge. Will follow prn. D/w pt and daughter at bedside
PCP- DR Flavio Otero    CC- s/p mechanical fall    HPI:  88yo/F with multiple medical problems, recent admission to  for PNA, PMH- COPD/chr resp failure on home O2 and maint prednisone, Metastatic breast CA due for XRT with DR Jean on Tuesday 1/29/19, RA on Plaquenil, PMR, HTN, hyperlipidemia, Multiple falls in the past and recent L2 compression fracture, presented s/p mechanical fall.    PMH- as above  PSH- RT hip IMN, cholecystectomy, glaucoma  SOc hx- ex-smoker quit, denies alcohol. Lives at [email or web address], walks with walker  Fam hx- m had COPD    1/24/19- seen in ED, c/o low back pain  1/25/19 up in a chair, denies any pain  1/26/19 doing good, up in a chair, not on O2 and feels good  1/27/19 was slightly hypotensive earlier, but responded well to IVF. No acute events on telemetry x 3 days  1/28/19 no acute events. Family now wants SUE    Review of system- All 10 systems reviewed and is as per HPI otherwise negative.     Vital Signs Last 24 Hrs  T(C): 36.4 (28 Jan 2019 10:53), Max: 36.7 (28 Jan 2019 05:24)  T(F): 97.6 (28 Jan 2019 10:53), Max: 98.1 (28 Jan 2019 05:24)  HR: 82 (28 Jan 2019 13:33) (78 - 93)  BP: 93/53 (28 Jan 2019 10:53) (93/53 - 111/66)  BP(mean): --  RR: 18 (28 Jan 2019 10:53) (18 - 18)  SpO2: 96% (28 Jan 2019 10:53) (92% - 96%)    LABS:                        10.0   8.68  )-----------( 138      ( 27 Jan 2019 14:12 )             32.3     27 Jan 2019 14:12    144    |  115    |  26     ----------------------------<  146    4.6     |  22     |  0.81     Ca    8.5        27 Jan 2019 14:12    RADIOLOGY & ADDITIONAL TESTS:  EXAM:  CT ABDOMEN AND PELVIS                        EXAM:  CT CHEST                        PROCEDURE DATE:  01/24/2019    INTERPRETATION:  Exam Date: 1/24/2019 10:33 AM  Clinical Information: Fall on blood thinners  Technique:       CT of the chest , abdomen and pelvis was performed with   axial images obtained from the thoracic inlet to the pubic symphysis       without IV contrast.  Oral contrast was not administered.  Comparison:  1/14/2019    FINDINGS:    Chest:    Lungs, Airways and Pleura: Central tracheobronchial tree is grossly   patent. No endobronchial lesions. Moderate centrilobular emphysema. No   pneumothorax. Subsegmental atelectasis in the lingula. Minimal bibasilar   atelectasis.    Heart and Great Vessels: Atherosclerotic changes of the aorta and   coronary vasculature. Heart is mildly enlarged. No pericardial effusion.    Mediastinum and Samra: Hiatal hernia with an intrathoracic component of   the proximal stomach.    Neck and Chest Wall: Large right breastmass extending to the skin   surface measuring 5.2 x 4.5 cm unchanged. Right axillary mass measuring   4.4 cm is unchanged.    Abdomen and pelvis:    Liver: within normal limits  Gallbladder: Prior cholecystectomy..  Bile ducts: No intrahepatic or extrahepatic biliary dilatation  Pancreas: within normal limits    Spleen: within normal limits  Adrenal: within normal limits  Kidneys, Ureters and Bladder: Mild bilateral renal atrophy. No   hydronephrosis. No intrarenal calculus. Left parapelvic cyst. The ureters   and bladder are within normal limits.        Pelvis: Interval development of hyperdense presacral fluid suggesting   hematoma. No pelvic mass or pelvic adenopathy. Prior hysterectomy.    Bowel: The bowel is of normal course and caliberwithout evidence of   obstruction or gross bowel wall thickening. Normal appendix visualized.   Clot diverticulosis without diverticulitis.  Peritoneum: No intra-abdominal free air, ascites or organized fluid   collections.    Retroperitoneum: withinnormal limits       Vessels: Atherosclerotic changes.        Abdominal wall and Bones: Degenerative changes. No lytic or blastic   lesions. Tree single hematoma. Hyperdense enlargement of the right   greater than left piriformis muscle suggesting intramuscular hematoma.   Fracture through the S for vertebral body. Questionable left sacral   fracture. Prior right hip pinning. Mild superior endplate deformities of   L4 and L5. Moderate to severe compression deformity of L2. Mild to   moderate compression deformities of T7 and T9. Moderate compression   deformity of T4. These are unchanged. Multiple acute/subacute and chronic   right-sided rib fractures some of which are new from prior exam. Multiple   subacute left-sided rib fractures.    IMPRESSION:      Interval development of a presacral hematoma with associated sacral   fracture. If concern for more extensive pelvic fractures, MRI can be   obtained for further evaluation. Multiple acute/subacute and chronic   right-sided rib fractures some of which are new from prior exam. Multiple   subacute left-sided rib fractures.    No other evidence of solid visceral injury in the chest, abdomen or   pelvis.    Persistent right breast mass with right axillary mass as seen on prior   exam.    EXAM:  CT CERVICAL SPINE                        PROCEDURE DATE:  01/24/2019    INTERPRETATION:      CT cervical spine without IV contrast        CLINICAL INFORMATION: Fracture, trauma, neck pain.  Neck pain, spinal   stenosis, spondylosis. [place] 16Trauma Alert: fall  head injury on Aggrenox    TECHNIQUE:  Contiguous axial 2.0 mm sections were obtained through the   cervical spine using a single helical acquisition.  Additional 2 mm   sagittal and coronal reformatted reconstructions of the spine were   obtained.  These additional reformatted images were used to evaluate the   spine for alignment, vertebral fractures and the integrity of the the   posterior elements.   This scan was performed using automatic exposure   control (radiation dose reduction software) to obtain a diagnostic image   quality scan with patient dose as low as reasonably achievable.        FINDINGS:   No prior similar studies are available for review    Cervical vertebral body heights are maintained. No vertebral fracture is   seen. No destructive bone lesion is found.  Alignment is preserved.    Facet joints appear intact and aligned.    Cervical intervertebral disc spaces show mild disc degeneration and   spondylosis at C5-6 and C6-7 with loss of discheight and associated   degenerative endplate changes. There is narrowing of the BILATERAL C3-4,   C4-5 and C6-7 neural foramina due to uncovertebral spurring and facet   osteophytic hypertrophy. MR would be required to evaluate the   intervertebral discs at higher sensitivity for disc pathology.    The skull base appears intact.  No neck mass is recognized.  Paraspinal   soft tissues appear intact. Visualized lymph nodes appear to be within   physiologic size limits.      Mild emphysema is noted.      IMPRESSION:   No vertebral fracture is recognized.  Mild disc   degeneration and spondylosis at C5-6 and C6-7 with narrowing of the   BILATERAL C3-4, C4-5 and C6-7 neural foramina due to uncovertebral   spurring and facet osteophytic hypertrophy.           EXAM:  CT BRAIN                        PROCEDURE DATE:  01/24/2019    INTERPRETATION:    CT head without IV contrast        CLINICAL INFORMATION:  Fall   Intracranial hemorrhage.    TECHNIQUE: Contiguous axial 5 mm sections were obtained through the head.   Sagittal and coronal 2-D reformatted images were also obtained.   This   scan was performed using automatic exposure control (radiation dose   reduction software) to obtain a diagnostic image quality scan with   patient dose as low as reasonably achievable.     FINDINGS:   CT dated 1/14/2019 is available for review.    The brain demonstrates moderate periventricular white matter ischemia.     No acute cerebral cortical infarct is seen.  No intracranial hemorrhage   is found.No mass effect is found in the brain.      The ventricles, sulci and basal cisterns show mild atrophy.         The orbits are unremarkable.  The paranasal sinuses are clear.  The nasal   cavity appears intact.  The nasopharynx is symmetric.  The central skull   base, petrous temporal bones and calvarium remain intact.    IMPRESSION:   Moderate periventricular white matter ischemia.    Mild   atrophy.        PHYSICAL EXAM:  GENERAL: NAD, well-groomed, well-developed  HEAD:  Atraumatic, Normocephalic  EYES: EOMI, PERRLA, conjunctiva and sclera clear  HEENT: Moist mucous membranes  NECK: Supple, No JVD  NERVOUS SYSTEM:  Alert & Oriented X1  CHEST/LUNG: occasional rhonchi  HEART: Regular rate and rhythm; No murmurs, rubs, or gallops  ABDOMEN: Soft, Nontender, Nondistended; Bowel sounds present  GENITOURINARY- Voiding, no palpable bladder  EXTREMITIES:  2+ Peripheral Pulses, No clubbing, cyanosis, or edema  MUSCULOSKELTAL- lower back tenderness  SKIN-no rash, no lesion  CNS- alert, oriented X1, non focal     MEDICATIONS  (STANDING):  acetaminophen   Tablet .. 650 milliGRAM(s) Oral every 6 hours  ALBUTerol/ipratropium for Nebulization 3 milliLiter(s) Nebulizer every 6 hours  dipyridamole 200 mG/aspirin 25 mG 1 Capsule(s) Oral two times a day  docusate sodium 100 milliGRAM(s) Oral two times a day  famotidine    Tablet 20 milliGRAM(s) Oral two times a day  heparin  Injectable 5000 Unit(s) SubCutaneous every 12 hours  hydroxychloroquine 200 milliGRAM(s) Oral every 12 hours  lactobacillus acidophilus 1 Tablet(s) Oral two times a day with meals  levothyroxine 25 MICROGram(s) Oral daily  lidocaine   Patch 1 Patch Transdermal daily  multivitamin 1 Tablet(s) Oral daily  predniSONE   Tablet 10 milliGRAM(s) Oral daily  simvastatin 20 milliGRAM(s) Oral at bedtime  tamsulosin 0.4 milliGRAM(s) Oral at bedtime    MEDICATIONS  (PRN):  HYDROmorphone   Tablet 1 milliGRAM(s) Oral every 6 hours PRN mod- severe pain  ondansetron Injectable 4 milliGRAM(s) IV Push every 6 hours PRN Nausea    Assessment/Plan  #S/p mechanical fall with multiple rib fractures/LT sacral fracture with associated hematoma  #Multiple prior compression deformities of T4, T7, T9, L4/L5, recent L2 compression fracture  Trauma eval appreciated  PT eval- OOB  Incentive spirometry  Pain control  Spine eval DR Nichols appreciated- TLSO brace ordered as pt never had it received before  Initial plan was for assisted living, however, family now wants SUE on discharge    #COPD/chr resp failure  Pulm eval DR Licona appreciated  Maintain on chronic Prednisone  Nebs prn  Stable    #Breast CA metastatic  Due for XRT with DR Jean on 1/29/19  Palliative care consult appreciated  Family is aware that patient would have to postpone her XRT until she is done with her SUE    #RA/PMR  Cont Plaquenil, steroids PO    #hyperlipidemia/hypothyroidism  Stable    #Hypotension likely hypovolemia  Responded to IVF    #Dispo- per trauma, medically stable for discharge. Will follow prn. D/w DR De Los Santos
PCP- DR Flavio Otero    CC- s/p mechanical fall    HPI:  89yo/F with multiple medical problems, recent admission to  for PNA, PMH- COPD/chr resp failure on home O2 and maint prednisone, Metastatic breast CA due for XRT with DR Jean on Tuesday 1/29/19, RA on Plaquenil, PMR, HTN, hyperlipidemia, Multiple falls in the past and recent L2 compression fracture, presented s/p mechanical fall.    PMH- as above  PSH- RT hip IMN, cholecystectomy, glaucoma  SOc hx- ex-smoker quit, denies alcohol. Lives at [facility], walks with walker  Fam hx- m had COPD    1/24/19- seen in ED, c/o low back pain  1/25/19 up in a chair, denies any pain    Review of system- All 10 systems reviewed and is as per HPI otherwise negative.     Vital Signs Last 24 Hrs  T(C): 36.1 (25 Jan 2019 04:13), Max: 38.1 (24 Jan 2019 22:13)  T(F): 97 (25 Jan 2019 04:13), Max: 100.5 (24 Jan 2019 22:13)  HR: 87 (25 Jan 2019 08:51) (80 - 102)  BP: 103/62 (25 Jan 2019 06:00) (98/57 - 118/100)  BP(mean): 70 (25 Jan 2019 06:00) (61 - 105)  RR: 17 (25 Jan 2019 06:00) (17 - 30)  SpO2: 98% (25 Jan 2019 04:13) (92% - 100%)    LABS:                                   11.4   11.57 )-----------( 159      ( 25 Jan 2019 05:41 )             35.6     25 Jan 2019 05:41    142    |  111    |  28     ----------------------------<  99     4.4     |  24     |  0.76     Ca    8.6        25 Jan 2019 05:41  Phos  2.9       25 Jan 2019 05:41  Mg     2.2       25 Jan 2019 05:41    TPro  5.6    /  Alb  3.0    /  TBili  0.5    /  DBili  x      /  AST  22     /  ALT  45     /  AlkPhos  61     24 Jan 2019 11:23    LIVER FUNCTIONS - ( 24 Jan 2019 11:23 )  Alb: 3.0 g/dL / Pro: 5.6 gm/dL / ALK PHOS: 61 U/L / ALT: 45 U/L / AST: 22 U/L / GGT: x           PT/INR - ( 24 Jan 2019 09:55 )   PT: 14.0 sec;   INR: 1.25 ratio       PTT - ( 24 Jan 2019 09:55 )  PTT:22.6 sec    RADIOLOGY & ADDITIONAL TESTS:  EXAM:  CT ABDOMEN AND PELVIS                        EXAM:  CT CHEST                        PROCEDURE DATE:  01/24/2019    INTERPRETATION:  Exam Date: 1/24/2019 10:33 AM  Clinical Information: Fall on blood thinners  Technique:       CT of the chest , abdomen and pelvis was performed with   axial images obtained from the thoracic inlet to the pubic symphysis       without IV contrast.  Oral contrast was not administered.  Comparison:  1/14/2019    FINDINGS:    Chest:    Lungs, Airways and Pleura: Central tracheobronchial tree is grossly   patent. No endobronchial lesions. Moderate centrilobular emphysema. No   pneumothorax. Subsegmental atelectasis in the lingula. Minimal bibasilar   atelectasis.    Heart and Great Vessels: Atherosclerotic changes of the aorta and   coronary vasculature. Heart is mildly enlarged. No pericardial effusion.    Mediastinum and Samra: Hiatal hernia with an intrathoracic component of   the proximal stomach.    Neck and Chest Wall: Large right breastmass extending to the skin   surface measuring 5.2 x 4.5 cm unchanged. Right axillary mass measuring   4.4 cm is unchanged.    Abdomen and pelvis:    Liver: within normal limits  Gallbladder: Prior cholecystectomy..  Bile ducts: No intrahepatic or extrahepatic biliary dilatation  Pancreas: within normal limits    Spleen: within normal limits  Adrenal: within normal limits  Kidneys, Ureters and Bladder: Mild bilateral renal atrophy. No   hydronephrosis. No intrarenal calculus. Left parapelvic cyst. The ureters   and bladder are within normal limits.        Pelvis: Interval development of hyperdense presacral fluid suggesting   hematoma. No pelvic mass or pelvic adenopathy. Prior hysterectomy.    Bowel: The bowel is of normal course and caliberwithout evidence of   obstruction or gross bowel wall thickening. Normal appendix visualized.   Clot diverticulosis without diverticulitis.  Peritoneum: No intra-abdominal free air, ascites or organized fluid   collections.    Retroperitoneum: withinnormal limits       Vessels: Atherosclerotic changes.        Abdominal wall and Bones: Degenerative changes. No lytic or blastic   lesions. Tree single hematoma. Hyperdense enlargement of the right   greater than left piriformis muscle suggesting intramuscular hematoma.   Fracture through the S for vertebral body. Questionable left sacral   fracture. Prior right hip pinning. Mild superior endplate deformities of   L4 and L5. Moderate to severe compression deformity of L2. Mild to   moderate compression deformities of T7 and T9. Moderate compression   deformity of T4. These are unchanged. Multiple acute/subacute and chronic   right-sided rib fractures some of which are new from prior exam. Multiple   subacute left-sided rib fractures.    IMPRESSION:      Interval development of a presacral hematoma with associated sacral   fracture. If concern for more extensive pelvic fractures, MRI can be   obtained for further evaluation. Multiple acute/subacute and chronic   right-sided rib fractures some of which are new from prior exam. Multiple   subacute left-sided rib fractures.    No other evidence of solid visceral injury in the chest, abdomen or   pelvis.    Persistent right breast mass with right axillary mass as seen on prior   exam.    EXAM:  CT CERVICAL SPINE                        PROCEDURE DATE:  01/24/2019    INTERPRETATION:      CT cervical spine without IV contrast        CLINICAL INFORMATION: Fracture, trauma, neck pain.  Neck pain, spinal   stenosis, spondylosis. [place] 16Trauma Alert: fall  head injury on Aggrenox    TECHNIQUE:  Contiguous axial 2.0 mm sections were obtained through the   cervical spine using a single helical acquisition.  Additional 2 mm   sagittal and coronal reformatted reconstructions of the spine were   obtained.  These additional reformatted images were used to evaluate the   spine for alignment, vertebral fractures and the integrity of the the   posterior elements.   This scan was performed using automatic exposure   control (radiation dose reduction software) to obtain a diagnostic image   quality scan with patient dose as low as reasonably achievable.        FINDINGS:   No prior similar studies are available for review    Cervical vertebral body heights are maintained. No vertebral fracture is   seen. No destructive bone lesion is found.  Alignment is preserved.    Facet joints appear intact and aligned.    Cervical intervertebral disc spaces show mild disc degeneration and   spondylosis at C5-6 and C6-7 with loss of discheight and associated   degenerative endplate changes. There is narrowing of the BILATERAL C3-4,   C4-5 and C6-7 neural foramina due to uncovertebral spurring and facet   osteophytic hypertrophy. MR would be required to evaluate the   intervertebral discs at higher sensitivity for disc pathology.    The skull base appears intact.  No neck mass is recognized.  Paraspinal   soft tissues appear intact. Visualized lymph nodes appear to be within   physiologic size limits.      Mild emphysema is noted.      IMPRESSION:   No vertebral fracture is recognized.  Mild disc   degeneration and spondylosis at C5-6 and C6-7 with narrowing of the   BILATERAL C3-4, C4-5 and C6-7 neural foramina due to uncovertebral   spurring and facet osteophytic hypertrophy.           EXAM:  CT BRAIN                        PROCEDURE DATE:  01/24/2019    INTERPRETATION:    CT head without IV contrast        CLINICAL INFORMATION:  Fall   Intracranial hemorrhage.    TECHNIQUE: Contiguous axial 5 mm sections were obtained through the head.   Sagittal and coronal 2-D reformatted images were also obtained.   This   scan was performed using automatic exposure control (radiation dose   reduction software) to obtain a diagnostic image quality scan with   patient dose as low as reasonably achievable.     FINDINGS:   CT dated 1/14/2019 is available for review.    The brain demonstrates moderate periventricular white matter ischemia.     No acute cerebral cortical infarct is seen.  No intracranial hemorrhage   is found.No mass effect is found in the brain.      The ventricles, sulci and basal cisterns show mild atrophy.         The orbits are unremarkable.  The paranasal sinuses are clear.  The nasal   cavity appears intact.  The nasopharynx is symmetric.  The central skull   base, petrous temporal bones and calvarium remain intact.    IMPRESSION:   Moderate periventricular white matter ischemia.    Mild   atrophy.        PHYSICAL EXAM:  GENERAL: NAD, well-groomed, well-developed  HEAD:  Atraumatic, Normocephalic  EYES: EOMI, PERRLA, conjunctiva and sclera clear  HEENT: Moist mucous membranes  NECK: Supple, No JVD  NERVOUS SYSTEM:  Alert & Oriented X3, Motor Strength 5/5 B/L upper and lower extremities; DTRs 2+ intact and symmetric  CHEST/LUNG: occasional rhonchi  HEART: Regular rate and rhythm; No murmurs, rubs, or gallops  ABDOMEN: Soft, Nontender, Nondistended; Bowel sounds present  GENITOURINARY- Voiding, no palpable bladder  EXTREMITIES:  2+ Peripheral Pulses, No clubbing, cyanosis, or edema  MUSCULOSKELTAL- lower back tenderness  SKIN-no rash, no lesion  CNS- alert, oriented X3, non focal     MEDICATIONS  (STANDING):  ALBUTerol    0.083% 2.5 milliGRAM(s) Nebulizer every 6 hours  dipyridamole 200 mG/aspirin 25 mG 1 Capsule(s) Oral two times a day  docusate sodium 100 milliGRAM(s) Oral two times a day  famotidine    Tablet 20 milliGRAM(s) Oral two times a day  heparin  Injectable 5000 Unit(s) SubCutaneous every 12 hours  hydroxychloroquine 200 milliGRAM(s) Oral every 12 hours  lactobacillus acidophilus 1 Tablet(s) Oral two times a day with meals  levothyroxine 25 MICROGram(s) Oral daily  lidocaine   Patch 1 Patch Transdermal daily  multivitamin 1 Tablet(s) Oral daily  predniSONE   Tablet 10 milliGRAM(s) Oral daily  simvastatin 20 milliGRAM(s) Oral at bedtime  tamsulosin 0.4 milliGRAM(s) Oral at bedtime  tiotropium 18 MICROgram(s) Capsule 1 Capsule(s) Inhalation daily    MEDICATIONS  (PRN):  acetaminophen   Tablet .. 650 milliGRAM(s) Oral every 6 hours PRN Temp greater or equal to 38C (100.4F), Mild Pain (1 - 3)  ALBUTerol    0.083% 2.5 milliGRAM(s) Nebulizer every 3 hours PRN Shortness of Breath and/or Wheezing  ondansetron Injectable 4 milliGRAM(s) IV Push every 6 hours PRN Nausea    Assessment/Plan  #S/p mechanical fall with multiple rib fractures/LT sacral fracture with associated hematoma  #Multiple prior compression deformities of T4, T7, T9, L4/L5, recent L2 compression fracture  Trauma eval appreciated  PT eval- OOB  Incentive spirometry  Pain control  Spine eval DR Isaac  Should have TLSO from home  Monitor HH    #COPD/chr resp failure  Pulm eval DR Ojeda  Maintain on chronic Prednisone  Nebs, spiriva    #Breast CA metastatic  Due for XRT with DR Jean on 1/29/19  Daughter is asking for palliative care consult    #RA/PMR  Cont Plaquenil, steroids PO    #HTN/hyperlipidemia/hypothyroidism  Stable    #Dispo- pain management. Maintain on pulse ox. D/w pt and DR Pak
PCP- DR Flavio Otero    CC- s/p mechanical fall    HPI:  89yo/F with multiple medical problems, recent admission to  for PNA, PMH- COPD/chr resp failure on home O2 and maint prednisone, Metastatic breast CA due for XRT with DR Jean on Tuesday 1/29/19, RA on Plaquenil, PMR, HTN, hyperlipidemia, Multiple falls in the past and recent L2 compression fracture, presented s/p mechanical fall.    PMH- as above  PSH- RT hip IMN, cholecystectomy, glaucoma  SOc hx- ex-smoker quit, denies alcohol. Lives at [facility], walks with walker  Fam hx- m had COPD    1/24/19- seen in ED, c/o low back pain  1/25/19 up in a chair, denies any pain  1/26/19 doing good, up in a chair, not on O2 and feels good    Review of system- All 10 systems reviewed and is as per HPI otherwise negative.     Vital Signs Last 24 Hrs  T(C): 36.6 (26 Jan 2019 10:25), Max: 36.8 (25 Jan 2019 17:56)  T(F): 97.9 (26 Jan 2019 10:25), Max: 98.3 (25 Jan 2019 17:56)  HR: 90 (26 Jan 2019 08:00) (82 - 101)  BP: 116/65 (26 Jan 2019 08:00) (102/48 - 139/82)  BP(mean): 78 (26 Jan 2019 08:00) (59 - 97)  RR: 24 (26 Jan 2019 08:00) (19 - 32)  SpO2: 99% (25 Jan 2019 20:33) (99% - 99%)    LABS:                        11.4   11.57 )-----------( 159      ( 25 Jan 2019 05:41 )             35.6     25 Jan 2019 05:41    142    |  111    |  28     ----------------------------<  99     4.4     |  24     |  0.76     Ca    8.6        25 Jan 2019 05:41  Phos  2.9       25 Jan 2019 05:41  Mg     2.2       25 Jan 2019 05:41    RADIOLOGY & ADDITIONAL TESTS:  EXAM:  CT ABDOMEN AND PELVIS                        EXAM:  CT CHEST                        PROCEDURE DATE:  01/24/2019    INTERPRETATION:  Exam Date: 1/24/2019 10:33 AM  Clinical Information: Fall on blood thinners  Technique:       CT of the chest , abdomen and pelvis was performed with   axial images obtained from the thoracic inlet to the pubic symphysis       without IV contrast.  Oral contrast was not administered.  Comparison:  1/14/2019    FINDINGS:    Chest:    Lungs, Airways and Pleura: Central tracheobronchial tree is grossly   patent. No endobronchial lesions. Moderate centrilobular emphysema. No   pneumothorax. Subsegmental atelectasis in the lingula. Minimal bibasilar   atelectasis.    Heart and Great Vessels: Atherosclerotic changes of the aorta and   coronary vasculature. Heart is mildly enlarged. No pericardial effusion.    Mediastinum and Samra: Hiatal hernia with an intrathoracic component of   the proximal stomach.    Neck and Chest Wall: Large right breastmass extending to the skin   surface measuring 5.2 x 4.5 cm unchanged. Right axillary mass measuring   4.4 cm is unchanged.    Abdomen and pelvis:    Liver: within normal limits  Gallbladder: Prior cholecystectomy..  Bile ducts: No intrahepatic or extrahepatic biliary dilatation  Pancreas: within normal limits    Spleen: within normal limits  Adrenal: within normal limits  Kidneys, Ureters and Bladder: Mild bilateral renal atrophy. No   hydronephrosis. No intrarenal calculus. Left parapelvic cyst. The ureters   and bladder are within normal limits.        Pelvis: Interval development of hyperdense presacral fluid suggesting   hematoma. No pelvic mass or pelvic adenopathy. Prior hysterectomy.    Bowel: The bowel is of normal course and caliberwithout evidence of   obstruction or gross bowel wall thickening. Normal appendix visualized.   Clot diverticulosis without diverticulitis.  Peritoneum: No intra-abdominal free air, ascites or organized fluid   collections.    Retroperitoneum: withinnormal limits       Vessels: Atherosclerotic changes.        Abdominal wall and Bones: Degenerative changes. No lytic or blastic   lesions. Tree single hematoma. Hyperdense enlargement of the right   greater than left piriformis muscle suggesting intramuscular hematoma.   Fracture through the S for vertebral body. Questionable left sacral   fracture. Prior right hip pinning. Mild superior endplate deformities of   L4 and L5. Moderate to severe compression deformity of L2. Mild to   moderate compression deformities of T7 and T9. Moderate compression   deformity of T4. These are unchanged. Multiple acute/subacute and chronic   right-sided rib fractures some of which are new from prior exam. Multiple   subacute left-sided rib fractures.    IMPRESSION:      Interval development of a presacral hematoma with associated sacral   fracture. If concern for more extensive pelvic fractures, MRI can be   obtained for further evaluation. Multiple acute/subacute and chronic   right-sided rib fractures some of which are new from prior exam. Multiple   subacute left-sided rib fractures.    No other evidence of solid visceral injury in the chest, abdomen or   pelvis.    Persistent right breast mass with right axillary mass as seen on prior   exam.    EXAM:  CT CERVICAL SPINE                        PROCEDURE DATE:  01/24/2019    INTERPRETATION:      CT cervical spine without IV contrast        CLINICAL INFORMATION: Fracture, trauma, neck pain.  Neck pain, spinal   stenosis, spondylosis. [facility] 16Trauma Alert: fall  head injury on Aggrenox    TECHNIQUE:  Contiguous axial 2.0 mm sections were obtained through the   cervical spine using a single helical acquisition.  Additional 2 mm   sagittal and coronal reformatted reconstructions of the spine were   obtained.  These additional reformatted images were used to evaluate the   spine for alignment, vertebral fractures and the integrity of the the   posterior elements.   This scan was performed using automatic exposure   control (radiation dose reduction software) to obtain a diagnostic image   quality scan with patient dose as low as reasonably achievable.        FINDINGS:   No prior similar studies are available for review    Cervical vertebral body heights are maintained. No vertebral fracture is   seen. No destructive bone lesion is found.  Alignment is preserved.    Facet joints appear intact and aligned.    Cervical intervertebral disc spaces show mild disc degeneration and   spondylosis at C5-6 and C6-7 with loss of discheight and associated   degenerative endplate changes. There is narrowing of the BILATERAL C3-4,   C4-5 and C6-7 neural foramina due to uncovertebral spurring and facet   osteophytic hypertrophy. MR would be required to evaluate the   intervertebral discs at higher sensitivity for disc pathology.    The skull base appears intact.  No neck mass is recognized.  Paraspinal   soft tissues appear intact. Visualized lymph nodes appear to be within   physiologic size limits.      Mild emphysema is noted.      IMPRESSION:   No vertebral fracture is recognized.  Mild disc   degeneration and spondylosis at C5-6 and C6-7 with narrowing of the   BILATERAL C3-4, C4-5 and C6-7 neural foramina due to uncovertebral   spurring and facet osteophytic hypertrophy.           EXAM:  CT BRAIN                        PROCEDURE DATE:  01/24/2019    INTERPRETATION:    CT head without IV contrast        CLINICAL INFORMATION:  Fall   Intracranial hemorrhage.    TECHNIQUE: Contiguous axial 5 mm sections were obtained through the head.   Sagittal and coronal 2-D reformatted images were also obtained.   This   scan was performed using automatic exposure control (radiation dose   reduction software) to obtain a diagnostic image quality scan with   patient dose as low as reasonably achievable.     FINDINGS:   CT dated 1/14/2019 is available for review.    The brain demonstrates moderate periventricular white matter ischemia.     No acute cerebral cortical infarct is seen.  No intracranial hemorrhage   is found.No mass effect is found in the brain.      The ventricles, sulci and basal cisterns show mild atrophy.         The orbits are unremarkable.  The paranasal sinuses are clear.  The nasal   cavity appears intact.  The nasopharynx is symmetric.  The central skull   base, petrous temporal bones and calvarium remain intact.    IMPRESSION:   Moderate periventricular white matter ischemia.    Mild   atrophy.        PHYSICAL EXAM:  GENERAL: NAD, well-groomed, well-developed  HEAD:  Atraumatic, Normocephalic  EYES: EOMI, PERRLA, conjunctiva and sclera clear  HEENT: Moist mucous membranes  NECK: Supple, No JVD  NERVOUS SYSTEM:  Alert & Oriented X3, Motor Strength 5/5 B/L upper and lower extremities; DTRs 2+ intact and symmetric  CHEST/LUNG: occasional rhonchi  HEART: Regular rate and rhythm; No murmurs, rubs, or gallops  ABDOMEN: Soft, Nontender, Nondistended; Bowel sounds present  GENITOURINARY- Voiding, no palpable bladder  EXTREMITIES:  2+ Peripheral Pulses, No clubbing, cyanosis, or edema  MUSCULOSKELTAL- lower back tenderness  SKIN-no rash, no lesion  CNS- alert, oriented X3, non focal     MEDICATIONS  (STANDING):  ALBUTerol/ipratropium for Nebulization 3 milliLiter(s) Nebulizer every 6 hours  dipyridamole 200 mG/aspirin 25 mG 1 Capsule(s) Oral two times a day  docusate sodium 100 milliGRAM(s) Oral two times a day  famotidine    Tablet 20 milliGRAM(s) Oral two times a day  heparin  Injectable 5000 Unit(s) SubCutaneous every 12 hours  hydroxychloroquine 200 milliGRAM(s) Oral every 12 hours  lactobacillus acidophilus 1 Tablet(s) Oral two times a day with meals  levothyroxine 25 MICROGram(s) Oral daily  lidocaine   Patch 1 Patch Transdermal daily  multivitamin 1 Tablet(s) Oral daily  predniSONE   Tablet 10 milliGRAM(s) Oral daily  simvastatin 20 milliGRAM(s) Oral at bedtime  tamsulosin 0.4 milliGRAM(s) Oral at bedtime    MEDICATIONS  (PRN):  acetaminophen   Tablet .. 650 milliGRAM(s) Oral every 6 hours PRN Temp greater or equal to 38C (100.4F), Mild Pain (1 - 3)  ondansetron Injectable 4 milliGRAM(s) IV Push every 6 hours PRN Nausea    Assessment/Plan  #S/p mechanical fall with multiple rib fractures/LT sacral fracture with associated hematoma  #Multiple prior compression deformities of T4, T7, T9, L4/L5, recent L2 compression fracture  Trauma eval appreciated  PT eval- OOB  Incentive spirometry  Pain control  Spine eval DR Isaac  Should have TLSO from home  Monitor HH    #COPD/chr resp failure  Pulm eval DR Licona appreciated  Maintain on chronic Prednisone  Nebs prn  Stable    #Breast CA metastatic  Due for XRT with DR Jean on 1/29/19  Palliative care consult appreciated    #RA/PMR  Cont Plaquenil, steroids PO    #HTN/hyperlipidemia/hypothyroidism  Stable    #Dispo- per trauma, medically stable for discharge. Will follow prn. D/w pt and DR Pak
Patient is a 89y old  Female who presents with a chief complaint of s/p mechanical fall,  1/24/19 (28 Jan 2019 10:08)    HPI:  CC: Mechanical Fall     HPI: 88y/o F w/ PMHx of Alzheimers, TIA, Stage 4 Breast CA, HTN, carotid artery stenosis, COPD, HLD, hypothyroidism, benign lung nodule, OA,  chronic back pain with compression of T7, GERD,  Asthma, s/p cholecystectomy, MARCELO, and hip surgery present s to  s/p mechanical fall.  Patient was recently discharged from  with pneumonia on 1/19 to  [facility].  Daughter reports patient was standing and tripped over her walker and fell to the floor. Reports hitting head and shoulder. Patient does not recall the event or LOC but does reports pain in the middle of her back on exam. Patient normally ambulated with a walker and wheel chair. unable to complete ROS due to patient mental state.    Information obtained by daughter Christine Muñoz, [phone]. (24 Jan 2019 13:50)  1/28: Pt seen and examined, pain controlled, No headache, no neck pain. neurochecks stable, no sensory motor compliant. No SOB, no chest pain. No abdominal pain. No bony pelvis pain. Moves all extremities, no focal neurological complaint. Back pain stable, feels tired.  No fever.  ROS:.  [] A ten-point review of systems was otherwise negative except as noted.  Systemic:	[ ] Fever	[ ] Chills	[ ] Night sweats    [ ] Fatigue	[ ] Other  [] Cardiovascular:  [] Pulmonary:  [] Renal/Urologic:  [] Gastrointestinal: abdominal pain, vomiting  [] Metabolic:  [] Neurologic:  [] Hematologic:  [] ENT:  [] Ophthalmologic:  [] Musculoskeletal:    [ X] Due to altered mental status/intubation, subjective information were not able to be obtained from the patient. History was obtained, to the extent possible, from review of the chart and collateral sources of information.( poor historian)     PAST MEDICAL & SURGICAL HISTORY:  Hypothyroidism  Breast cancer: Right  Compression fracture of spine: T7  Alzheimers disease  HTN (hypertension)  Glaucoma  TIA (transient ischemic attack): 8/07  Cerebral vascular accident: 2005  Polymyalgia rheumatica  Osteoporosis  Chronic pain: mid back  Urinary retention  GERD (gastroesophageal reflux disease)  Cholelithiases  Hyperlipemia  Carotid artery stenosis  Lung nodule: biopsied 2003, benign  COPD (chronic obstructive pulmonary disease)  Asthma  Glaucoma of both eyes: s/p repair  History of hip surgery  Gall stones  History of hysterectomy: for bleeding  Hx of cholecystectomy    FAMILY HISTORY:  Family history of diabetes mellitus (DM) (Child)  Family history of COPD (chronic obstructive pulmonary disease) (Mother)    NC  Social history:     Alcohol: Denied  Smoking: Denied  Drug Use: Denied        Vital Signs Last 24 Hrs  T(C): 36.4 (28 Jan 2019 10:53), Max: 36.7 (28 Jan 2019 05:24)  T(F): 97.6 (28 Jan 2019 10:53), Max: 98.1 (28 Jan 2019 05:24)  HR: 91 (28 Jan 2019 10:53) (78 - 93)  BP: 93/53 (28 Jan 2019 10:53) (92/58 - 111/66)  BP(mean): --  RR: 18 (28 Jan 2019 10:53) (18 - 18)  SpO2: 96% (28 Jan 2019 10:53) (92% - 96%)  PHYSICAL EXAM:  Constitutional: NAD, GCS: 15/15  AOX3  Eyes:  WNL  ENMT:  WNL  Neck:  WNL, non tender  Back: Non tender  Respiratory: CTABL, right chest tenderness   Cardiovascular:  S1+S2+0  Gastrointestinal: Soft, ND , NT  Genitourinary:  WNL  Extremities: NV intact  Vascular:  Intact  Neurological: No focal neurological deficit,  CN, motor and sensory system grossly intact.  Skin: WNL  Musculoskeletal: WNL, back tenderness   Psychiatric: Grossly WNL      Labs:                          10.0   8.68  )-----------( 138      ( 27 Jan 2019 14:12 )             32.3       01-27    144  |  115<H>  |  26<H>  ----------------------------<  146<H>  4.6   |  22  |  0.81    Ca    8.5      27 Jan 2019 14:12            Radiology:    < from: Xray Chest 1 View- PORTABLE-Routine (01.25.19 @ 09:49) >    Impression:Subsegmental atelectasis at the lung bases.    < end of copied text >  < from: Xray Femur 2 Views, Right (01.24.19 @ 11:12) >  IMPRESSION:      1. BILATERAL Hip and RIGHT femur:  No fracture. Internal hardware is   intact.    2.  Pelvis:  Unremarkable radiographs of the pelvis.          < from: CT Cervical Spine No Cont (01.24.19 @ 10:27) >    IMPRESSION:   No vertebral fracture is recognized.  Mild disc   degeneration and spondylosis at C5-6 and C6-7 with narrowing of the   BILATERAL C3-4, C4-5 and C6-7 neural foramina due to uncovertebral   spurring and facet osteophytic hypertrophy.             < from: Xray Shoulder 2 Views, Right (01.24.19 @ 11:08) >  IMPRESSION:   Unremarkable radiographs of the shoulder.          < from: CT Abdomen and Pelvis No Cont (01.24.19 @ 10:33) >    IMPRESSION:      Interval development of a presacral hematoma with associated sacral   fracture. If concern for more extensive pelvic fractures, MRI can be   obtained for further evaluation. Multiple acute/subacute and chronic   right-sided rib fractures some of which are new from prior exam. Multiple   subacute left-sided rib fractures.    No other evidence of solid visceral injury in the chest, abdomen or   pelvis.    Persistent right breast mass with right axillary mass as seen on prior   exam.        < end of copied text >
Subjective:  Breathing well  Continues to not use incentive spirometry  Continues to have pain in her back    Review of Systems:  All 10 systems reviewed in detailed and found to be negative with the exception of what has already been described above    Allergies:  Aciphex (Unknown)  Macrobid (Unknown)  Percocet 10/325 (Rash)    Meds  MEDICATIONS  (STANDING):  ALBUTerol/ipratropium for Nebulization 3 milliLiter(s) Nebulizer every 6 hours  dipyridamole 200 mG/aspirin 25 mG 1 Capsule(s) Oral two times a day  docusate sodium 100 milliGRAM(s) Oral two times a day  famotidine    Tablet 20 milliGRAM(s) Oral two times a day  heparin  Injectable 5000 Unit(s) SubCutaneous every 12 hours  hydroxychloroquine 200 milliGRAM(s) Oral every 12 hours  lactobacillus acidophilus 1 Tablet(s) Oral two times a day with meals  levothyroxine 25 MICROGram(s) Oral daily  lidocaine   Patch 1 Patch Transdermal daily  multivitamin 1 Tablet(s) Oral daily  predniSONE   Tablet 10 milliGRAM(s) Oral daily  simvastatin 20 milliGRAM(s) Oral at bedtime  tamsulosin 0.4 milliGRAM(s) Oral at bedtime    MEDICATIONS  (PRN):  acetaminophen   Tablet .. 650 milliGRAM(s) Oral every 6 hours PRN Temp greater or equal to 38C (100.4F), Mild Pain (1 - 3)  ondansetron Injectable 4 milliGRAM(s) IV Push every 6 hours PRN Nausea    Physical Exam  T(C): 36.9 (01-26-19 @ 17:10), Max: 36.9 (01-26-19 @ 17:10)  HR: 92 (01-26-19 @ 17:42) (82 - 105)  BP: 95/56 (01-26-19 @ 17:42) (95/56 - 139/82)  RR: 23 (01-26-19 @ 13:00) (20 - 32)  SpO2: 99% (01-25-19 @ 20:33) (99% - 99%)  Gen: Alert, oriented, no distress  HEENT: Anicteric sclera, moist mucous membranes, no JVD, no lymphadenopathy, good dentition  Cardio: Regular rhythm and rate, normal S1S2, no murmurs  Resp: Clear to auscultation bilaterally, no wheezing or rhonchi  GI: Nontender, nondistended, normoactive bowel sounds  Ext: No cyanosis, clubbing or edema  Neuro: Nonfocal    Labs:                        11.4   11.57 )-----------( 159      ( 25 Jan 2019 05:41 )             35.6     01-25    142  |  111<H>  |  28<H>  ----------------------------<  99  4.4   |  24  |  0.76    Ca    8.6      25 Jan 2019 05:41  Phos  2.9     01-25  Mg     2.2     01-25    INTERPRETATION:  History: Chest pain    Chest:  one view.      Comparison: 1/14/2019    AP radiograph of the chest demonstrates subsegmental atelectasis at the   lung bases. The cardiac silhouette is normal in size. Osseous structures   are intact.    Impression:Subsegmental atelectasis at the lung bases.    < from: CT Chest No Cont (01.24.19 @ 10:33) >  FINDINGS:    Chest:    Lungs, Airways and Pleura: Central tracheobronchial tree is grossly   patent. No endobronchial lesions. Moderate centrilobular emphysema. No   pneumothorax. Subsegmental atelectasis in the lingula. Minimal bibasilar   atelectasis.    Heart and Great Vessels: Atherosclerotic changes of the aorta and   coronary vasculature. Heart is mildly enlarged. No pericardial effusion.    Mediastinum and Samra: Hiatal hernia with an intrathoracic component of   the proximal stomach.    Neck and Chest Wall: Large right breastmass extending to the skin   surface measuring 5.2 x 4.5 cm unchanged. Right axillary mass measuring   4.4 cm is unchanged.    Abdomen and pelvis:    Liver: within normal limits  Gallbladder: Prior cholecystectomy..  Bile ducts: No intrahepatic or extrahepatic biliary dilatation  Pancreas: within normal limits  Spleen: within normal limits  Adrenal: within normal limits  Kidneys, Ureters and Bladder: Mild bilateral renal atrophy. No   hydronephrosis. No intrarenal calculus. Left parapelvic cyst. The ureters   and bladder are within normal limits.        Pelvis: Interval development of hyperdense presacral fluid suggesting   hematoma. No pelvic mass or pelvic adenopathy. Prior hysterectomy.    Bowel: The bowel is of normal course and caliberwithout evidence of   obstruction or gross bowel wall thickening. Normal appendix visualized.   Clot diverticulosis without diverticulitis.  Peritoneum: No intra-abdominal free air, ascites or organized fluid   collections.    Retroperitoneum: withinnormal limits       Vessels: Atherosclerotic changes.        Abdominal wall and Bones: Degenerative changes. No lytic or blastic   lesions. Tree single hematoma. Hyperdense enlargement of the right   greater than left piriformis muscle suggesting intramuscular hematoma.   Fracture through the S for vertebral body. Questionable left sacral   fracture. Prior right hip pinning. Mild superior endplate deformities of   L4 and L5. Moderate to severe compression deformity of L2. Mild to   moderate compression deformities of T7 and T9. Moderate compression   deformity of T4. These are unchanged. Multiple acute/subacute and chronic   right-sided rib fractures some of which are new from prior exam. Multiple   subacute left-sided rib fractures.    IMPRESSION:      Interval development of a presacral hematoma with associated sacral   fracture. If concern for more extensive pelvic fractures, MRI can be   obtained for further evaluation. Multiple acute/subacute and chronic   right-sided rib fractures some of which are new from prior exam. Multiple   subacute left-sided rib fractures.    No other evidence of solid visceral injury in the chest, abdomen or   pelvis.    Persistent right breast mass with right axillary mass as seen on prior   exam.      < end of copied text >  < from: CT Head No Cont (01.24.19 @ 10:24) >  TECHNIQUE: Contiguous axial 5 mm sections were obtained through the head.   Sagittal and coronal 2-D reformatted images were also obtained.   This   scan was performed using automatic exposure control (radiation dose   reduction software) to obtain a diagnostic image quality scan with   patient dose as low as reasonably achievable.     FINDINGS:   CT dated 1/14/2019 is available for review.    The brain demonstrates moderate periventricular white matter ischemia.     No acute cerebral cortical infarct is seen.  No intracranial hemorrhage   is found.No mass effect is found in the brain.      The ventricles, sulci and basal cisterns show mild atrophy.         The orbits are unremarkable.  The paranasal sinuses are clear.  The nasal   cavity appears intact.  The nasopharynx is symmetric.  The central skull   base, petrous temporal bones and calvarium remain intact.      IMPRESSION:   Moderate periventricular white matter ischemia.    Mild   atrophy.          < end of copied text >
Subjective:  No events overnight  Sleeping comfortably  No complaints    Review of Systems:  All 10 systems reviewed in detailed and found to be negative with the exception of what has already been described above    Allergies:  Aciphex (Unknown)  Macrobid (Unknown)  Percocet 10/325 (Rash)    Meds  MEDICATIONS  (STANDING):  acetaminophen   Tablet .. 650 milliGRAM(s) Oral every 6 hours  ALBUTerol/ipratropium for Nebulization 3 milliLiter(s) Nebulizer every 6 hours  dipyridamole 200 mG/aspirin 25 mG 1 Capsule(s) Oral two times a day  docusate sodium 100 milliGRAM(s) Oral two times a day  famotidine    Tablet 20 milliGRAM(s) Oral two times a day  heparin  Injectable 5000 Unit(s) SubCutaneous every 12 hours  hydroxychloroquine 200 milliGRAM(s) Oral every 12 hours  lactobacillus acidophilus 1 Tablet(s) Oral two times a day with meals  levothyroxine 25 MICROGram(s) Oral daily  lidocaine   Patch 3 Patch Transdermal daily  memantine ER 21 milliGRAM(s) Oral at bedtime  multivitamin 1 Tablet(s) Oral daily  predniSONE   Tablet 10 milliGRAM(s) Oral daily  simvastatin 20 milliGRAM(s) Oral at bedtime  tamsulosin 0.4 milliGRAM(s) Oral at bedtime    MEDICATIONS  (PRN):  HYDROmorphone   Tablet 1 milliGRAM(s) Oral every 6 hours PRN mod- severe pain  ondansetron Injectable 4 milliGRAM(s) IV Push every 6 hours PRN Nausea    Physical Exam  T(C): 36.3 (01-29-19 @ 04:59), Max: 36.9 (01-28-19 @ 18:28)  HR: 88 (01-29-19 @ 04:59) (80 - 93)  BP: 92/57 (01-29-19 @ 04:59) (92/57 - 111/56)  RR: 18 (01-28-19 @ 18:28) (18 - 18)  SpO2: 97% (01-29-19 @ 04:59) (95% - 97%)  Gen: Somnolent, no distress  HEENT: Anicteric sclera, moist mucous membranes, no JVD, no lymphadenopathy, good dentition  Cardio: Regular rhythm and rate, normal S1S2, no murmurs  Resp: Clear to auscultation bilaterally, no wheezing or rhonchi  GI: Nontender, nondistended, normoactive bowel sounds  Ext: No cyanosis, clubbing or edema  Neuro: Nonfocal    Labs:                        10.0   8.68  )-----------( 138      ( 27 Jan 2019 14:12 )             32.3     01-27    144  |  115<H>  |  26<H>    NTERPRETATION:  History: Chest pain    Chest:  one view.      Comparison: 1/14/2019    AP radiograph of the chest demonstrates subsegmental atelectasis at the   lung bases. The cardiac silhouette is normal in size. Osseous structures   are intact.    Impression:Subsegmental atelectasis at the lung bases.    < from: CT Chest No Cont (01.24.19 @ 10:33) >  FINDINGS:    Chest:    Lungs, Airways and Pleura: Central tracheobronchial tree is grossly   patent. No endobronchial lesions. Moderate centrilobular emphysema. No   pneumothorax. Subsegmental atelectasis in the lingula. Minimal bibasilar   atelectasis.    Heart and Great Vessels: Atherosclerotic changes of the aorta and   coronary vasculature. Heart is mildly enlarged. No pericardial effusion.    Mediastinum and Samra: Hiatal hernia with an intrathoracic component of   the proximal stomach.    Neck and Chest Wall: Large right breastmass extending to the skin   surface measuring 5.2 x 4.5 cm unchanged. Right axillary mass measuring   4.4 cm is unchanged.    Abdomen and pelvis:  Liver: within normal limits  Gallbladder: Prior cholecystectomy..  Bile ducts: No intrahepatic or extrahepatic biliary dilatation  Pancreas: within normal limits  Spleen: within normal limits  Adrenal: within normal limits  Kidneys, Ureters and Bladder: Mild bilateral renal atrophy. No   hydronephrosis. No intrarenal calculus. Left parapelvic cyst. The ureters   and bladder are within normal limits.        Pelvis: Interval development of hyperdense presacral fluid suggesting   hematoma. No pelvic mass or pelvic adenopathy. Prior hysterectomy.    Bowel: The bowel is of normal course and caliberwithout evidence of   obstruction or gross bowel wall thickening. Normal appendix visualized.   Clot diverticulosis without diverticulitis.  Peritoneum: No intra-abdominal free air, ascites or organized fluid   collections.    Retroperitoneum: withinnormal limits       Vessels: Atherosclerotic changes.        Abdominal wall and Bones: Degenerative changes. No lytic or blastic   lesions. Tree single hematoma. Hyperdense enlargement of the right   greater than left piriformis muscle suggesting intramuscular hematoma.   Fracture through the S for vertebral body. Questionable left sacral   fracture. Prior right hip pinning. Mild superior endplate deformities of   L4 and L5. Moderate to severe compression deformity of L2. Mild to   moderate compression deformities of T7 and T9. Moderate compression   deformity of T4. These are unchanged. Multiple acute/subacute and chronic   right-sided rib fractures some of which are new from prior exam. Multiple   subacute left-sided rib fractures.    IMPRESSION:      Interval development of a presacral hematoma with associated sacral   fracture. If concern for more extensive pelvic fractures, MRI can be   obtained for further evaluation. Multiple acute/subacute and chronic   right-sided rib fractures some of which are new from prior exam. Multiple   subacute left-sided rib fractures.    No other evidence of solid visceral injury in the chest, abdomen or   pelvis.    Persistent right breast mass with right axillary mass as seen on prior   exam.      < end of copied text >  < from: CT Head No Cont (01.24.19 @ 10:24) >  TECHNIQUE: Contiguous axial 5 mm sections were obtained through the head.   Sagittal and coronal 2-D reformatted images were also obtained.   This   scan was performed using automatic exposure control (radiation dose   reduction software) to obtain a diagnostic image quality scan with   patient dose as low as reasonably achievable.     FINDINGS:   CT dated 1/14/2019 is available for review.    The brain demonstrates moderate periventricular white matter ischemia.     No acute cerebral cortical infarct is seen.  No intracranial hemorrhage   is found.No mass effect is found in the brain.      The ventricles, sulci and basal cisterns show mild atrophy.         The orbits are unremarkable.  The paranasal sinuses are clear.  The nasal   cavity appears intact.  The nasopharynx is symmetric.  The central skull   base, petrous temporal bones and calvarium remain intact.    IMPRESSION:   Moderate periventricular white matter ischemia.    Mild   atrophy.          < end of copied text >      ----------------------------<  146<H>  4.6   |  22  |  0.81    Ca    8.5      27 Jan 2019 14:12
Subjective:  Using incentive spirometry when daughter is with her  Denies shortness of breath  Not coughing    Review of Systems:  All 10 systems reviewed in detailed and found to be negative with the exception of what has already been described above    Allergies:  Aciphex (Unknown)  Macrobid (Unknown)  Percocet 10/325 (Rash)    Meds  MEDICATIONS  (STANDING):  acetaminophen   Tablet .. 650 milliGRAM(s) Oral every 6 hours  ALBUTerol/ipratropium for Nebulization 3 milliLiter(s) Nebulizer every 6 hours  dipyridamole 200 mG/aspirin 25 mG 1 Capsule(s) Oral two times a day  docusate sodium 100 milliGRAM(s) Oral two times a day  famotidine    Tablet 20 milliGRAM(s) Oral two times a day  heparin  Injectable 5000 Unit(s) SubCutaneous every 12 hours  hydroxychloroquine 200 milliGRAM(s) Oral every 12 hours  lactobacillus acidophilus 1 Tablet(s) Oral two times a day with meals  levothyroxine 25 MICROGram(s) Oral daily  lidocaine   Patch 3 Patch Transdermal daily  memantine ER 21 milliGRAM(s) Oral at bedtime  multivitamin 1 Tablet(s) Oral daily  predniSONE   Tablet 10 milliGRAM(s) Oral daily  simvastatin 20 milliGRAM(s) Oral at bedtime  tamsulosin 0.4 milliGRAM(s) Oral at bedtime    MEDICATIONS  (PRN):  HYDROmorphone   Tablet 1 milliGRAM(s) Oral every 6 hours PRN mod- severe pain  ondansetron Injectable 4 milliGRAM(s) IV Push every 6 hours PRN Nausea    Physical Exam  T(C): 36.4 (01-28-19 @ 10:53), Max: 36.7 (01-28-19 @ 05:24)  HR: 91 (01-28-19 @ 10:53) (78 - 93)  BP: 93/53 (01-28-19 @ 10:53) (92/58 - 111/66)  RR: 18 (01-28-19 @ 10:53) (18 - 18)  SpO2: 96% (01-28-19 @ 10:53) (92% - 96%)  Gen: Alert, oriented, no distress  HEENT: Anicteric sclera, moist mucous membranes, no JVD, no lymphadenopathy, good dentition  Cardio: Regular rhythm and rate, normal S1S2, no murmurs  Resp: Clear to auscultation bilaterally, no wheezing or rhonchi  GI: Nontender, nondistended, normoactive bowel sounds  Ext: No cyanosis, clubbing or edema  Neuro: Nonfocal    Labs:                        10.0   8.68  )-----------( 138      ( 27 Jan 2019 14:12 )             32.3     01-27    144  |  115<H>  |  26<H>  ----------------------------<  146<H>  4.6   |  22  |  0.81    Ca    8.5      27 Jan 2019 14:12      INTERPRETATION:  History: Chest pain    Chest:  one view.      Comparison: 1/14/2019    AP radiograph of the chest demonstrates subsegmental atelectasis at the   lung bases. The cardiac silhouette is normal in size. Osseous structures   are intact.    Impression:Subsegmental atelectasis at the lung bases.    < from: CT Chest No Cont (01.24.19 @ 10:33) >  FINDINGS:    Chest:    Lungs, Airways and Pleura: Central tracheobronchial tree is grossly   patent. No endobronchial lesions. Moderate centrilobular emphysema. No   pneumothorax. Subsegmental atelectasis in the lingula. Minimal bibasilar   atelectasis.    Heart and Great Vessels: Atherosclerotic changes of the aorta and   coronary vasculature. Heart is mildly enlarged. No pericardial effusion.    Mediastinum and Samra: Hiatal hernia with an intrathoracic component of   the proximal stomach.    Neck and Chest Wall: Large right breastmass extending to the skin   surface measuring 5.2 x 4.5 cm unchanged. Right axillary mass measuring   4.4 cm is unchanged.    Abdomen and pelvis:  Liver: within normal limits  Gallbladder: Prior cholecystectomy..  Bile ducts: No intrahepatic or extrahepatic biliary dilatation  Pancreas: within normal limits  Spleen: within normal limits  Adrenal: within normal limits  Kidneys, Ureters and Bladder: Mild bilateral renal atrophy. No   hydronephrosis. No intrarenal calculus. Left parapelvic cyst. The ureters   and bladder are within normal limits.        Pelvis: Interval development of hyperdense presacral fluid suggesting   hematoma. No pelvic mass or pelvic adenopathy. Prior hysterectomy.    Bowel: The bowel is of normal course and caliberwithout evidence of   obstruction or gross bowel wall thickening. Normal appendix visualized.   Clot diverticulosis without diverticulitis.  Peritoneum: No intra-abdominal free air, ascites or organized fluid   collections.    Retroperitoneum: withinnormal limits       Vessels: Atherosclerotic changes.        Abdominal wall and Bones: Degenerative changes. No lytic or blastic   lesions. Tree single hematoma. Hyperdense enlargement of the right   greater than left piriformis muscle suggesting intramuscular hematoma.   Fracture through the S for vertebral body. Questionable left sacral   fracture. Prior right hip pinning. Mild superior endplate deformities of   L4 and L5. Moderate to severe compression deformity of L2. Mild to   moderate compression deformities of T7 and T9. Moderate compression   deformity of T4. These are unchanged. Multiple acute/subacute and chronic   right-sided rib fractures some of which are new from prior exam. Multiple   subacute left-sided rib fractures.    IMPRESSION:      Interval development of a presacral hematoma with associated sacral   fracture. If concern for more extensive pelvic fractures, MRI can be   obtained for further evaluation. Multiple acute/subacute and chronic   right-sided rib fractures some of which are new from prior exam. Multiple   subacute left-sided rib fractures.    No other evidence of solid visceral injury in the chest, abdomen or   pelvis.    Persistent right breast mass with right axillary mass as seen on prior   exam.      < end of copied text >  < from: CT Head No Cont (01.24.19 @ 10:24) >  TECHNIQUE: Contiguous axial 5 mm sections were obtained through the head.   Sagittal and coronal 2-D reformatted images were also obtained.   This   scan was performed using automatic exposure control (radiation dose   reduction software) to obtain a diagnostic image quality scan with   patient dose as low as reasonably achievable.     FINDINGS:   CT dated 1/14/2019 is available for review.    The brain demonstrates moderate periventricular white matter ischemia.     No acute cerebral cortical infarct is seen.  No intracranial hemorrhage   is found.No mass effect is found in the brain.      The ventricles, sulci and basal cisterns show mild atrophy.         The orbits are unremarkable.  The paranasal sinuses are clear.  The nasal   cavity appears intact.  The nasopharynx is symmetric.  The central skull   base, petrous temporal bones and calvarium remain intact.    IMPRESSION:   Moderate periventricular white matter ischemia.    Mild   atrophy.          < end of copied text >

## 2019-01-29 NOTE — DISCHARGE NOTE ADULT - MEDICATION SUMMARY - MEDICATIONS TO TAKE
I will START or STAY ON the medications listed below when I get home from the hospital:    predniSONE 10 mg oral tablet  -- 1 tab(s) by mouth once a day  -- Indication: For CoPD    acetaminophen 325 mg oral tablet  -- 2 tab(s) by mouth every 6 hours  -- Indication: For Closed fracture of multiple ribs of right side, initial encounter    tamsulosin 0.4 mg oral capsule  -- 1 cap(s) by mouth once a day (at bedtime)  -- Indication: For HTN    heparin  -- 5000 unit(s) subcutaneous every 8 hours  -- Indication: For other    traZODone 50 mg oral tablet  -- 1 tab(s) by mouth once a day (at bedtime), As Needed  -- Indication: For other    simvastatin 20 mg oral tablet  -- 1 tab(s) by mouth once a day (at bedtime)  -- Indication: For CAD    hydroxychloroquine 200 mg oral tablet  -- 1 tab(s) by mouth every 12 hours  -- Indication: For other    Aggrenox 25 mg-200 mg oral capsule, extended release  -- 1 cap(s) by mouth 2 times a day  -- Indication: For CAD    Spiriva 18 mcg inhalation capsule  -- 1 cap(s) inhaled once a day  -- Indication: For CoPD    albuterol 2.5 mg/3 mL (0.083%) inhalation solution  -- 3 milliliter(s) inhaled 4 times a day, As Needed  -- Indication: For CoPD    lidocaine 5% topical film  -- 1 patch on skin once a day  -- Indication: For Closed fracture of multiple ribs of right side, initial encounter    famotidine 20 mg oral tablet  -- 1 tab(s) by mouth 2 times a day  -- Indication: For GERD    docusate sodium 100 mg oral capsule  -- 1 cap(s) by mouth 2 times a day  -- Indication: For other    Namenda XR 21 mg oral capsule, extended release  -- 1 cap(s) by mouth once a day (at bedtime) ** PATIENT OWN MED **  -- Indication: For other    Acidophilus Probiotic Blend oral capsule  -- 2 cap(s) by mouth once a day  -- Indication: For other    NexIUM 40 mg oral delayed release capsule  -- 1 cap(s) by mouth once a day  -- Indication: For GERD    Synthroid 25 mcg (0.025 mg) oral tablet  -- 1 tab(s) by mouth once a day  -- Indication: For other    Multiple Vitamins oral tablet  -- 1 tab(s) by mouth once a day  -- Indication: For other    Calcium 600+D oral tablet  -- 1 tab(s) by mouth once a day  -- Indication: For other

## 2019-01-29 NOTE — DISCHARGE NOTE ADULT - CARE PLAN
Principal Discharge DX:	Closed fracture of multiple ribs of right side, initial encounter  Goal:	healing  Assessment and plan of treatment:	Rehab.  Secondary Diagnosis:	Compression fracture of spine  Assessment and plan of treatment:	Brace  PT  Secondary Diagnosis:	Pulmonary emphysema, unspecified emphysema type  Secondary Diagnosis:	Essential hypertension  Secondary Diagnosis:	Gastroesophageal reflux disease, esophagitis presence not specified  Secondary Diagnosis:	Alzheimer's dementia without behavioral disturbance, unspecified timing of dementia onset

## 2019-01-29 NOTE — DISCHARGE NOTE ADULT - HOSPITAL COURSE
uncomplicated    89yo/F with multiple medical problems, recent admission to  for PNA, PMH- COPD/chr resp failure on home O2 and maint prednisone, Metastatic breast CA due for XRT with DR Jean on Tuesday 1/29/19, RA on Plaquenil, PMR, HTN, hyperlipidemia, Multiple falls in the past and recent L2 compression fracture, presented s/p mechanical fall.

## 2019-02-10 NOTE — ED ADULT NURSE NOTE - OBJECTIVE STATEMENT
pt arrives to ED s/p unwitnessed fall from [facility]. denies LOC, pt takes aggrenox daily. pt denies hitting head. denies pain. pt alert and oriented x 3, no bleeding or deformities noted. denies cp, sob.

## 2019-02-10 NOTE — ED PROVIDER NOTE - MEDICAL DECISION MAKING DETAILS
with unwitnessed fall will scan head which was negative at [facility] e per maya will d/c back tyo e[facility]. return to ed for intractable HA, persistent vomiting, or new onset motor/sensory deficits

## 2019-02-10 NOTE — ED PROVIDER NOTE - OBJECTIVE STATEMENT
pt with unwitnessed fall from [facility] with no complaints. denies fever. denies HA or neck pain. no chest pain or sob. no abd pain. no n/v/d. no urinary f/u/d. no back pain. no motor or sensory deficits. denies illicit drug use. no recent travel. no rash. no other acute issues symptoms or concerns no loc no vomiting acting apporpiate per family

## 2019-03-05 PROBLEM — R41.82 CHANGE IN MENTAL STATUS: Status: ACTIVE | Noted: 2019-01-01

## 2019-03-05 PROBLEM — C50.919 TRIPLE NEGATIVE MALIGNANT NEOPLASM OF BREAST: Status: ACTIVE | Noted: 2019-01-01

## 2019-03-05 PROBLEM — I95.9 LOW BLOOD PRESSURE: Status: ACTIVE | Noted: 2019-01-01

## 2019-03-05 NOTE — ED PROVIDER NOTE - CARE PLAN
Principal Discharge DX:	Altered mental status, unspecified altered mental status type  Secondary Diagnosis:	Sepsis, due to unspecified organism

## 2019-03-05 NOTE — ED PROVIDER NOTE - OBJECTIVE STATEMENT
88 y/o female with a PMHx of Alzheimer's disease, asthma, breast CA (diagnosed in December 2017, had radiation March 2018), carotid artery stenosis, CVA, COPD, GERD, HTN, HLD, TIA, polymyalgia rheumatica presents to the ED BIBA from Dr. Muniz's office c/o AMS and hypotension. +productive cough, +AMS, +hypotension. Decreased PO intake. Took Tylenol at 10am today. Pt seen in ED in January 2019 for fall, dx with 8 fractured ribs and sacrum. Pt DNR/DNI. Oncologist: Dr. Muniz. Neurologist: Dr. Vo.

## 2019-03-05 NOTE — ED ADULT NURSE NOTE - OBJECTIVE STATEMENT
pt brought in by daughter from dr off. pts bp was low approx 70/59 per daughter and pt has become a lot more lethargic lately. pt is aox1. has stage 3 right breast cancer. bp stable once arrived.

## 2019-03-05 NOTE — HISTORY OF PRESENT ILLNESS
[de-identified] : Patient presented with a large right breast mass and axillary adenopathy in December 2017.  Evaluated by breast surgery Dr Umana.   \par Core biopsy showed invasive ductal carcinoma high grade SBR  8/9   , no definite LVI, ER/MI and HER 2 negative. \par She had PET scan which demonstrated known right breast cancer ( 3.7 cm with SUV , metastatic right axillary nodes ( largest 2.2 cm SUV 12.8)  and mildly avid small left axillary node   ( SUV 3.9 ) of indeterminate significance. \par She was felt not to be a candidate for mastectomy and she was referred here for preoperative therapy.\par \par She received one cycle CMF on 3/12/18. Did not tolerate- admitted to hospital with bad C diff colitis, hospital stay complicated by attacks of altered mental status/ ? seizures. had extensive neurologic evaluation- no obvious etiology.\par Chemotherapy stopped.\par \par Not seen here since June 2018. \par \par Lives in assisted living. Right breast mass growing, per daughters- causing discomfort. Evaluated by Rad Oncology early this year. Palliative RT to right breast was offered but patient never started because she fell, admitted to , next in [facility] rehab. \par \par Now she is home for two days. Brought to office by her two daughters who wanted to discuss option of getting PET scan/ prognosis . [de-identified] : Per daughters- back in Assisted living for tow days. She was doing "OK " for one day but yest and today- very   lethargic, was coughing more , no fever. Had CXR in [facility]- told that it was normal. \par

## 2019-03-05 NOTE — REASON FOR VISIT
[Follow-Up Visit] : a follow-up [Family Member] : family member [FreeTextEntry2] : locally advanced triple negative breast cancer

## 2019-03-05 NOTE — ED ADULT NURSE NOTE - NSIMPLEMENTINTERV_GEN_ALL_ED
Implemented All Fall with Harm Risk Interventions:  [place] to call system. Call bell, personal items and telephone within reach. Instruct patient to call for assistance. Room bathroom lighting operational. Non-slip footwear when patient is off stretcher. Physically safe environment: no spills, clutter or unnecessary equipment. Stretcher in lowest position, wheels locked, appropriate side rails in place. Provide visual cue, wrist band, yellow gown, etc. Monitor gait and stability. Monitor for mental status changes and reorient to person, place, and time. Review medications for side effects contributing to fall risk. Reinforce activity limits and safety measures with patient and family. Provide visual clues: red socks.

## 2019-03-05 NOTE — PHYSICAL EXAM
[Normal] : RRR, normal S1S2, no murmurs, rubs, gallops [de-identified] : elderly female in wheel chair very lethargic but opens eyes and answers questions [de-identified] : few rhonchi  [de-identified] : right breast: largefixed mass in UOQ breaking through skin, no active bleeding left breast- no palpable masses, right axillary adenopathy  [de-identified] : soft  [de-identified] : right axillary adenopathy [de-identified] : moving extremities but unable to walk due to lethargy/ weakness  [de-identified] : lethargic  [de-identified] : dementia ( baseline)

## 2019-03-05 NOTE — REVIEW OF SYSTEMS
[Cough] : cough [Constipation] : constipation [Joint Pain] : no joint pain [Joint Stiffness] : no joint stiffness [Negative] : Endocrine [FreeTextEntry2] : limited- patient too lethargic ; denies pain  [de-identified] : per HPI  [de-identified] : memory loss

## 2019-03-05 NOTE — ED PROVIDER NOTE - CLINICAL SUMMARY MEDICAL DECISION MAKING FREE TEXT BOX
Differential includes r/o infectious cause of AMS vs metastasis vs metabolic dehydration. Plan: CT head, chest, abd/pelvis.

## 2019-03-05 NOTE — ED PROVIDER NOTE - SKIN, MLM
Skin normal color for race, warm, dry and intact. No evidence of rash. Lateral aspect at 9 o'clock position on right breast has a patch of 4cm in diameter that is hard and erythematous No streaking, no pus. Lymphadenopathy right axilla.

## 2019-03-05 NOTE — ASSESSMENT
[FreeTextEntry1] : 88 y/o woman with large locally advanced  triple negative poorly differentiated invasive ductal cancer metastatic to axillary lymph nodes diagnosed in January 2018. No distant metastatic disease on PET scan at the time of diagnosis.\par Did not tolerate low dose chemotherapy.\par Baseline dementia- unable to make treatment decisions. \par \par Now local progression. Radiation was considered for palliation.\par Daughters asked for PET scan- will not  but if extensive metastatic disease- family will not decide to do RT and they will agree to hospice.\par \par Today- patient is very lethargic and hypotensive. Per daughters it is an acute change from her recent baseline. Recently discharged from rehab. R/o infectious process ( ? pneumonia/ UTI).\par \par Referred to ER for evaluation. Spoke with ER staff.\par

## 2019-03-06 NOTE — CONSULT NOTE ADULT - SUBJECTIVE AND OBJECTIVE BOX
Patient is a 89y old  Female who presents with a chief complaint of AMS, hypotension, cough (06 Mar 2019 10:20)    HPI:  88 y/o F resident of [facility] with PMHx significant for COPD not on home O2, CVA (), Rheumatoid arthritis (chronically on hydroxychloroquine and prednisone), chronic back pain, PMR (Polymyalgia rheumatica), stage 4 right breast Ca (triple negative poorly differentiated invasive ductal cancer, dx 2018), glaucoma OU s/p ocular surgery, hypertension, hyperlipidemia, and Alzheimer's dementia who presents to the ED BIBA from Dr. Galindo's office for further evaluation of AMS found to be hypotensive => 70/40. The patient was also noted to have a productive cough. In triage the patient's vitals => BP 99/72, HR 90/min, RR 16/min, SpO2 93%. Labs => WBC 18.49, BUN/Cr, 27/0.67. CT Chest/ABD/Pelvis showed known breast/axillary mass/LN, LLL airspace consolidation-effusion, healing rib fractures, she was given IV vancomycin/zosyn in the ER.       PMH: as above  PSH: as above  Meds: per reconciliation sheet, noted below  MEDICATIONS  (STANDING):  dipyridamole 200 mG/aspirin 25 mG 1 Capsule(s) Oral two times a day  docusate sodium 100 milliGRAM(s) Oral two times a day  famotidine    Tablet 20 milliGRAM(s) Oral two times a day  heparin  Injectable 5000 Unit(s) SubCutaneous every 8 hours  hydrocortisone sodium succinate Injectable 50 milliGRAM(s) IV Push every 6 hours  hydroxychloroquine 200 milliGRAM(s) Oral every 12 hours  lactobacillus acidophilus 1 Tablet(s) Oral daily  levothyroxine 25 MICROGram(s) Oral daily  memantine ER 21 milliGRAM(s) Oral daily  pantoprazole    Tablet 40 milliGRAM(s) Oral before breakfast  piperacillin/tazobactam IVPB. 3.375 Gram(s) IV Intermittent every 8 hours  simvastatin 20 milliGRAM(s) Oral at bedtime  tamsulosin 0.4 milliGRAM(s) Oral at bedtime  tiotropium 18 MICROgram(s) Capsule 1 Capsule(s) Inhalation daily      Allergies    Aciphex (Unknown)  Macrobid (Unknown)  Percocet 10/325 (Rash)    Intolerances      Social: no smoking, no alcohol, no illegal drugs; no recent travel, no exposure to TB  FAMILY HISTORY:  Family history of diabetes mellitus (DM) (Child)  Family history of COPD (chronic obstructive pulmonary disease) (Mother)     no history of premature cardiovascular disease in first degree relatives    ROS: unable to obtain d/t medical condition  All other systems reviewed and are negative    Vital Signs Last 24 Hrs  T(C): 36.8 (06 Mar 2019 06:44), Max: 37.2 (06 Mar 2019 05:06)  T(F): 98.2 (06 Mar 2019 06:44), Max: 99 (06 Mar 2019 05:06)  HR: 89 (06 Mar 2019 06:44) (77 - 93)  BP: 150/55 (06 Mar 2019 06:44) (99/72 - 150/55)  BP(mean): --  RR: 17 (06 Mar 2019 06:44) (16 - 28)  SpO2: 90% (06 Mar 2019 06:44) (90% - 96%)  Daily Height in cm: 157.48 (05 Mar 2019 16:32)    Daily     PE:  Constitutional: frail looking  HEENT: NC/AT, EOMI, PERRLA, conjunctivae clear; ears and nose atraumatic; pharynx benign  Neck: supple; thyroid not palpable  Back: no tenderness  Respiratory: decreased breath sounds, LLL rhonchi  Cardiovascular: S1S2 regular, no murmurs  Abdomen: soft, not tender, not distended, positive BS; liver and spleen WNL  Genitourinary: no suprapubic tenderness  Lymphatic: no LN palpable  Musculoskeletal: no muscle tenderness, no joint swelling or tenderness  Extremities: ecchymosis/bruising along extremities   Neurological/ Psychiatric:  moving all extremities  Skin: no rashes; no palpable lesions    Labs: all available labs reviewed                                   9.8    12.09 )-----------( 140      ( 06 Mar 2019 10:20 )             31.6     03-06    145  |  113<H>  |  20  ----------------------------<  98  3.8   |  24  |  0.55    Ca    8.2<L>      06 Mar 2019 10:20  Mg     1.8     03-06    TPro  5.2<L>  /  Alb  2.4<L>  /  TBili  0.5  /  DBili  x   /  AST  14<L>  /  ALT  24  /  AlkPhos  63  03-06        LIVER FUNCTIONS - ( 06 Mar 2019 10:20 )  Alb: 2.4 g/dL / Pro: 5.2 gm/dL / ALK PHOS: 63 U/L / ALT: 24 U/L / AST: 14 U/L / GGT: x           Urinalysis Basic - ( 05 Mar 2019 21:29 )    Color: Yellow / Appearance: Clear / S.015 / pH: x  Gluc: x / Ketone: Negative  / Bili: Negative / Urobili: Negative mg/dL   Blood: x / Protein: 15 mg/dL / Nitrite: Negative   Leuk Esterase: Negative / RBC: Negative /HPF / WBC 0-2   Sq Epi: x / Non Sq Epi: Negative / Bacteria: Few    Culture - Urine (19 @ 12:36)    Specimen Source: .Urine None    Culture Results:   No growth      Radiology: all available radiological tests reviewed    EXAM:  CT ABDOMEN AND PELVIS                          EXAM:  CT CHEST                            PROCEDURE DATE:  2019          INTERPRETATION:  CT CHEST, ABDOMEN AND PELVIS WITHOUT CONTRAST    History: Altered mental status, right breast malignancy, evaluate for   metastatic disease.    Technique: Axial CT images of the chest, abdomen and pelvis were obtained   from the lung apices to the pubic symphysis without oral or IV contrast.    Coronal and sagittal reformatted images were createdand reviewed.       Comparison:  Prior CT of the chest, abdomen and pelvis from 2019.    Findings:  Please note, evaluation for metastatic disease is markedly limited   without IV contrast.    Airspace consolidation and small effusion is seen inthe left lower lobe,   new compared to the prior exam. There is a background of mild to moderate   similar changes. There is no pneumothorax. A 3 mm lung nodule in the left   upper lobe (image 34, series 3), is without significant change from 2018.   A 4 mm nodule in the right upper lobe (image 19, series 2), appears to be   new compared to the prior study.    Again seen is a large right breast mass with a right axillary   mass/enlarged lymph node, similar to the prior exam (2019). There is   no mediastinal lymphadenopathy.  Evaluation for hilar lymphadenopathy is   limited without IV contrast, however there is no gross hilar   lymphadenopathy. The heart size is within normal limits. The blood pool   in the heart is hypodense relative tomyocardium, suggesting anemia.   Marked coronary artery calcifications are seen. There is no sizable   pericardial effusion. The ascending and descending aorta are not   enlarged. Severe atherosclerotic calcifications of the thoracic aorta are   noted. The pulmonary artery is not enlarged.      The unenhanced liver, spleen, pancreas, adrenal glands are unremarkable.   The unenhanced kidneys are unremarkable aside for a partially duplicated   left collecting system is noted. Patient is status postcholecystectomy.   There is no evidence for bowel obstruction or inflammation. There is a   moderate hiatal hernia.    There is no abdominopelvic lymphadenopathy, significant free fluid or   pelvic mass. Severe atherosclerotic calcifications of the abdominal aorta   are again seen. The urinary bladder is distended. Patient appears to be   status post hysterectomy.    No aggressive osseous lesion is identified. Patient is noted to be status   post right hip screw. Multiple compression deformities are seen   throughout the visualized spine, similar to prior exam, including   moderate to severe compression deformities of the T4, T7, T9, L2 and L4   vertebral bodies. The most severe compression deformities are seen at T7   and L2.   Multiple healing/subacute and chronic bilateral rib fractures   are noted. No obvious acute rib fracture. Question subtle nondisplaced   left sacral fracture, which has a similar appearance compared to the   prior study. Previously seen presacral fluid has decreased, with possible   trace residual.      IMPRESSION:  1.  Please note, evaluation for metastatic disease is markedly limited   without IV contrast. Contrast-enhanced CT would be better control for   evaluating for metastatic disease. Can also considercorrelation with PET   CT (according to our records, most recently performed on 2018.    2.  Redemonstrated large right breast mass with a right axillary   mass/enlarged lymph node, similar to the prior exam (2019).     3.  Airspace consolidation and small effusion is seen in the left lower   lobe, new compared to the prior exam. Findings could represent pneumonia   in the appropriate clinical setting.. Follow-up imaging should be   performed to document resolution and exclude the possibility of   underlying lesion.    4.  A 4 mm nodule in the right upper lobe (image 19, series 2), appears   to be new compared to the prior study. Recommend reassessment on   follow-up imaging.    5.  Multiple compression deformities, as above, similar to prior studies.    Multiple healing/subacute and chronic bilateral rib fractures are noted.   No obvious acute rib fracture. Question subtle nondisplaced left sacral   fracture, which has a similar appearance compared to the prior study.   Previously seen presacral fluid has decreased, with possible trace   residual.    6.  Background of mild to moderate emphysematous changes.    7.  Moderate sized hiatal hernia.    8.  Marked coronary artery calcifications.    9.  Blood pool in the heart is hypodense relative to myocardium,   suggesting anemia.     10.  Other findings, as above.        Advanced directives addressed: full resuscitation

## 2019-03-06 NOTE — H&P ADULT - HISTORY OF PRESENT ILLNESS
88 y/o F resident of [facility] with PMHx significant for COPD not on home O2, CVA (2005), Rheumatoid arthritis (chronically on hydroxychloroquine and prednisone), chronic back pain, PMR (Polymyalgia rheumatica), stage 4 right breast Ca (triple negative poorly differentiated invasive ductal cancer, dx 1/2018), glaucoma OU s/p ocular surgery, hypertension, hyperlipidemia, and Alzheimer's dementia who presents to the ED BIBA from Dr. Muniz's office for further evaluation of AMS found to be hypotensive => 70/40. The patient was also noted to have a productive cough. In triage the patient's vitals => BP 99/72, HR 90/min, RR 16/min, SpO2 93%. Labs => WBC 18.49, BUN/Cr, 27/0.67. CT Chest/ABD/Pelvis  => 1) Redemonstrated large right breast mass with a right axillary mass/enlarged lymph node, similar to the prior exam (1/24/2019). 2) Airspace consolidation and small effusion is seen in the left lower lobe, new compared to the prior exam. Findings could represent pneumonia in the appropriate clinical setting. Follow-up imaging should be performed to document resolution and exclude the possibility of underlying lesion. 3) A 4 mm nodule in the right upper lobe (image 19, series 2), appears to be new compared to the prior study. 4) Multiple compression deformities, similar to prior studies. Multiple healing/subacute and chronic bilateral rib fractures are noted. No obvious acute rib fracture. In the ED the ED the patient was given Piperacillin/tazobactam 3.375g IVPB x 1, Vancomycin 750mg IVPB x 1, and NS x 2L. 89 y/o F resident of [facility] with PMHx significant for COPD not on home O2, CVA (2005), Rheumatoid arthritis (chronically on hydroxychloroquine and prednisone), chronic back pain, PMR (Polymyalgia rheumatica), stage 4 right breast Ca (triple negative poorly differentiated invasive ductal cancer, dx 1/2018), glaucoma OU s/p ocular surgery, hypertension, hyperlipidemia, and Alzheimer's dementia who presents to the ED BIBA from Dr. Galindo's office for further evaluation of AMS found to be hypotensive => 70/40. The patient was also noted to have a productive cough. In triage the patient's vitals => BP 99/72, HR 90/min, RR 16/min, SpO2 93%. Labs => WBC 18.49, BUN/Cr, 27/0.67. CT Chest/ABD/Pelvis  => 1) Redemonstrated large right breast mass with a right axillary mass/enlarged lymph node, similar to the prior exam (1/24/2019). 2) Airspace consolidation and small effusion is seen in the left lower lobe, new compared to the prior exam. Findings could represent pneumonia in the appropriate clinical setting. Follow-up imaging should be performed to document resolution and exclude the possibility of underlying lesion. 3) A 4 mm nodule in the right upper lobe (image 19, series 2), appears to be new compared to the prior study. 4) Multiple compression deformities, similar to prior studies. Multiple healing/subacute and chronic bilateral rib fractures are noted. No obvious acute rib fracture. In the ED the ED the patient was given Piperacillin/tazobactam 3.375g IVPB x 1, Vancomycin 750mg IVPB x 1, and NS x 2L.

## 2019-03-06 NOTE — CONSULT NOTE ADULT - SUBJECTIVE AND OBJECTIVE BOX
HPI:  89 y/o F resident of [facility] with PMHx significant for COPD not on home O2, CVA (2005), Rheumatoid arthritis (chronically on hydroxychloroquine and prednisone), chronic back pain, PMR (Polymyalgia rheumatica), stage 4 right breast Ca (triple negative poorly differentiated invasive ductal cancer, dx 1/2018), glaucoma OU s/p ocular surgery, hypertension, hyperlipidemia, and Alzheimer's dementia who presents to the ED BIBA from Dr. Galindo's office for further evaluation of AMS found to be hypotensive => 70/40. The patient was also noted to have a productive cough. In triage the patient's vitals => BP 99/72, HR 90/min, RR 16/min, SpO2 93%. Labs => WBC 18.49, BUN/Cr, 27/0.67. CT Chest/ABD/Pelvis  => 1) Redemonstrated large right breast mass with a right axillary mass/enlarged lymph node, similar to the prior exam (1/24/2019). 2) Airspace consolidation and small effusion is seen in the left lower lobe, new compared to the prior exam. Findings could represent pneumonia in the appropriate clinical setting. Follow-up imaging should be performed to document resolution and exclude the possibility of underlying lesion. 3) A 4 mm nodule in the right upper lobe (image 19, series 2), appears to be new compared to the prior study. 4) Multiple compression deformities, similar to prior studies. Multiple healing/subacute and chronic bilateral rib fractures are noted. No obvious acute rib fracture. In the ED the ED the patient was given Piperacillin/tazobactam 3.375g IVPB x 1, Vancomycin 750mg IVPB x 1, and NS x 2L.     Patient is well known to me 89 y/o woman who presented in 2017 with a large right breast mass and axillary adenopathy. Biopsy showed triple negative ( ER/KS and HER 2  neg) breast cancer. Metastatic  w/up negative for distant mets. She started CMF chemotherapy with the goal to shrink tumor and allow resection ( tumor was unresectable at diagnosis). Unfortunately she did not tolerate chemotherapy, needed hospitalization for recurrent C diff colitis, mental status change ( extensive neuro w/up was negative) Chemotherapy was discontinued.  Early 2019- presented to breast surgery office with further progression of local disease- tumor breaking through skin, causing discomfort. She was evaluated by Radiation Oncology and palliative RT to right breast was recommended. Patient never started RT due to fall/ hospitalization for ? pneumonia and next transferred to acute rehab at [facility].  Discharged from Rehab few days ago and brought to our office yesterday by her family.  In the office yesterday- hypotensive , lethargic ( per family acute change from her baseline) and sent to ER.     Seen today 3/6 at 9 AM. Lethargic but opens eyes to voice and answers simple questions.  Denies pain.     PAST MEDICAL & SURGICAL HISTORY:  Hypothyroidism  Breast cancer: Right  Compression fracture of spine: T7  Alzheimers disease  HTN (hypertension)  Glaucoma  TIA (transient ischemic attack): 8/07  Cerebral vascular accident: 2005  Polymyalgia rheumatica  Osteoporosis  Chronic pain: mid back  Urinary retention  GERD (gastroesophageal reflux disease)  Cholelithiases  Hyperlipemia  Carotid artery stenosis  Lung nodule: biopsied 2003, benign  COPD (chronic obstructive pulmonary disease)  Asthma  Glaucoma of both eyes: s/p repair  History of hip surgery  Gall stones  History of hysterectomy: for bleeding  Hx of cholecystectomy HPI:  88 y/o F resident of [facility] with PMHx significant for COPD not on home O2, CVA (2005), Rheumatoid arthritis (chronically on hydroxychloroquine and prednisone), chronic back pain, PMR (Polymyalgia rheumatica), stage 4 right breast Ca (triple negative poorly differentiated invasive ductal cancer, dx 1/2018), glaucoma OU s/p ocular surgery, hypertension, hyperlipidemia, and Alzheimer's dementia who presents to the ED BIBA from Dr. Galindo's office for further evaluation of AMS found to be hypotensive => 70/40. The patient was also noted to have a productive cough. In triage the patient's vitals => BP 99/72, HR 90/min, RR 16/min, SpO2 93%. Labs => WBC 18.49, BUN/Cr, 27/0.67. CT Chest/ABD/Pelvis  => 1) Redemonstrated large right breast mass with a right axillary mass/enlarged lymph node, similar to the prior exam (1/24/2019). 2) Airspace consolidation and small effusion is seen in the left lower lobe, new compared to the prior exam. Findings could represent pneumonia in the appropriate clinical setting. Follow-up imaging should be performed to document resolution and exclude the possibility of underlying lesion. 3) A 4 mm nodule in the right upper lobe (image 19, series 2), appears to be new compared to the prior study. 4) Multiple compression deformities, similar to prior studies. Multiple healing/subacute and chronic bilateral rib fractures are noted. No obvious acute rib fracture. In the ED the ED the patient was given Piperacillin/tazobactam 3.375g IVPB x 1, Vancomycin 750mg IVPB x 1, and NS x 2L.     Patient is well known to me 88 y/o woman who presented in 2017 with a large right breast mass and axillary adenopathy. Biopsy showed triple negative ( ER/PA and HER 2  neg) breast cancer. Metastatic  w/up negative for distant mets. She started CMF chemotherapy with the goal to shrink tumor and allow resection ( tumor was unresectable at diagnosis). Unfortunately she did not tolerate chemotherapy, needed hospitalization for recurrent C diff colitis, mental status change ( extensive neuro w/up was negative) Chemotherapy was discontinued.  Early 2019- presented to breast surgery office with further progression of local disease- tumor breaking through skin, causing discomfort. She was evaluated by Radiation Oncology and palliative RT to right breast was recommended. Patient never started RT due to fall/ hospitalization for ? pneumonia and next transferred to acute rehab at [facility].  Discharged from Rehab few days ago and brought to our office yesterday by her family.  In the office yesterday- hypotensive , lethargic ( per family acute change from her baseline) and sent to ER.     Seen today 3/6 at 9 AM. Lethargic but opens eyes to voice and answers simple questions.  Denies pain.     PAST MEDICAL & SURGICAL HISTORY:  Hypothyroidism  Breast cancer: Right  Compression fracture of spine: T7  Alzheimers disease  HTN (hypertension)  Glaucoma  TIA (transient ischemic attack): 8/07  Cerebral vascular accident: 2005  Polymyalgia rheumatica  Osteoporosis  Chronic pain: mid back  Urinary retention  GERD (gastroesophageal reflux disease)  Cholelithiases  Hyperlipemia  Carotid artery stenosis  Lung nodule: biopsied 2003, benign  COPD (chronic obstructive pulmonary disease)  Asthma  Glaucoma of both eyes: s/p repair  History of hip surgery  Gall stones  History of hysterectomy: for bleeding  Hx of cholecystectomy    ROS : as above- limited due to patient's mental status     Vital Signs Last 24 Hrs  T(C): 36.8 (06 Mar 2019 06:44), Max: 37.2 (06 Mar 2019 05:06)  T(F): 98.2 (06 Mar 2019 06:44), Max: 99 (06 Mar 2019 05:06)  HR: 89 (06 Mar 2019 06:44) (77 - 93)  BP: 150/55 (06 Mar 2019 06:44) (99/72 - 150/55)  BP(mean): --  RR: 17 (06 Mar 2019 06:44) (16 - 28)  SpO2: 90% (06 Mar 2019 06:44) (90% - 96%)    Elderly woman lying in bed NAD, lethargic  but opens eyes to voice, answers simple questions.  Sclera not icteric. Chest few wheezes. Heart RRR. Breast- large fixed mass in lower outer quadrant breaking through skin, axillary adenopathy; left breast  no palpable masses. Abdomen soft, no overt masses. extremities- no edema. neuro- moving all extremities.                          9.8    12.09 )-----------( 140      ( 06 Mar 2019 10:20 )             31.6     03-06    145  |  113<H>  |  20  ----------------------------<  98  3.8   |  24  |  0.55    Ca    8.2<L>      06 Mar 2019 10:20  Mg     1.8     03-06    TPro  5.2<L>  /  Alb  2.4<L>  /  TBili  0.5  /  DBili  x   /  AST  14<L>  /  ALT  24  /  AlkPhos  63  03-06    < from: CT Abdomen and Pelvis No Cont (03.05.19 @ 18:25) >    EXAM:  CT ABDOMEN AND PELVIS                          EXAM:  CT CHEST                            PROCEDURE DATE:  03/05/2019          INTERPRETATION:  CT CHEST, ABDOMEN AND PELVIS WITHOUT CONTRAST    History: Altered mental status, right breast malignancy, evaluate for   metastatic disease.    Technique: Axial CT images of the chest, abdomen and pelvis were obtained   from the lung apices to the pubic symphysis without oral or IV contrast.    Coronal and sagittal reformatted images were createdand reviewed.       Comparison:  Prior CT of the chest, abdomen and pelvis from 1/24/2019.    Findings:  Please note, evaluation for metastatic disease is markedly limited   without IV contrast.    Airspace consolidation and small effusion is seen inthe left lower lobe,   new compared to the prior exam. There is a background of mild to moderate   similar changes. There is no pneumothorax. A 3 mm lung nodule in the left   upper lobe (image 34, series 3), is without significant change from 2018.   A 4 mm nodule in the right upper lobe (image 19, series 2), appears to be   new compared to the prior study.    Again seen is a large right breast mass with a right axillary   mass/enlarged lymph node, similar to the prior exam (1/24/2019). There is   no mediastinal lymphadenopathy.  Evaluation for hilar lymphadenopathy is   limited without IV contrast, however there is no gross hilar   lymphadenopathy. The heart size is within normal limits. The blood pool   in the heart is hypodense relative tomyocardium, suggesting anemia.   Marked coronary artery calcifications are seen. There is no sizable   pericardial effusion. The ascending and descending aorta are not   enlarged. Severe atherosclerotic calcifications of the thoracic aorta are   noted. The pulmonary artery is not enlarged.      The unenhanced liver, spleen, pancreas, adrenal glands are unremarkable.   The unenhanced kidneys are unremarkable aside for a partially duplicated   left collecting system is noted. Patient is status postcholecystectomy.   There is no evidence for bowel obstruction or inflammation. There is a   moderate hiatal hernia.    There is no abdominopelvic lymphadenopathy, significant free fluid or   pelvic mass. Severe atherosclerotic calcifications of the abdominal aorta   are again seen. The urinary bladder is distended. Patient appears to be   status post hysterectomy.    No aggressive osseous lesion is identified. Patient is noted to be status   post right hip screw. Multiple compression deformities are seen   throughout the visualized spine, similar to prior exam, including   moderate to severe compression deformities of the T4, T7, T9, L2 and L4   vertebral bodies. The most severe compression deformities are seen at T7   and L2.   Multiple healing/subacute and chronic bilateral rib fractures   are noted. No obvious acute rib fracture. Question subtle nondisplaced   left sacral fracture, which has a similar appearance compared to the   prior study. Previously seen presacral fluid has decreased, with possible   trace residual.      IMPRESSION:  1.  Please note, evaluation for metastatic disease is markedly limited   without IV contrast. Contrast-enhanced CT would be better control for   evaluating for metastatic disease. Can also considercorrelation with PET   CT (according to our records, most recently performed on 8/18/2018.    2.  Redemonstrated large right breast mass with a right axillary   mass/enlarged lymph node, similar to the prior exam (1/24/2019).     3.  Airspace consolidation and small effusion is seen in the left lower   lobe, new compared to the prior exam. Findings could represent pneumonia   in the appropriate clinical setting.. Follow-up imaging should be   performed to document resolution and exclude the possibility of   underlying lesion.    4.  A 4 mm nodule in the right upper lobe (image 19, series 2), appears   to be new compared to the prior study. Recommend reassessment on   follow-up imaging.    5.  Multiple compression deformities, as above, similar to prior studies.    Multiple healing/subacute and chronic bilateral rib fractures are noted.   No obvious acute rib fracture. Question subtle nondisplaced left sacral   fracture, which has a similar appearance compared to the prior study.   Previously seen presacral fluid has decreased, with possible trace   residual.    6.  Background of mild to moderate emphysematous changes.    7.  Moderate sized hiatal hernia.    8.  Marked coronary artery calcifications.    9.  Blood pool in the heart is hypodense relative to myocardium,   suggesting anemia.     10.  Other findings, as above.        LIZBETH FELIZ   This document has been electronically signed. Mar  5 2019  6:46PM        MEDICATIONS  (STANDING):  dipyridamole 200 mG/aspirin 25 mG 1 Capsule(s) Oral two times a day  docusate sodium 100 milliGRAM(s) Oral two times a day  famotidine    Tablet 20 milliGRAM(s) Oral two times a day  heparin  Injectable 5000 Unit(s) SubCutaneous every 8 hours  hydrocortisone sodium succinate Injectable 50 milliGRAM(s) IV Push every 6 hours  hydroxychloroquine 200 milliGRAM(s) Oral every 12 hours  lactobacillus acidophilus 1 Tablet(s) Oral daily  levothyroxine 25 MICROGram(s) Oral daily  memantine ER 21 milliGRAM(s) Oral daily  pantoprazole    Tablet 40 milliGRAM(s) Oral before breakfast  piperacillin/tazobactam IVPB. 3.375 Gram(s) IV Intermittent every 8 hours  simvastatin 20 milliGRAM(s) Oral at bedtime  tamsulosin 0.4 milliGRAM(s) Oral at bedtime  tiotropium 18 MICROgram(s) Capsule 1 Capsule(s) Inhalation daily    MEDICATIONS  (PRN):  ALBUTerol/ipratropium for Nebulization 3 milliLiter(s) Nebulizer every 6 hours PRN Bronchospasm  melatonin 3 milliGRAM(s) Oral at bedtime PRN Sleep  ondansetron Injectable 4 milliGRAM(s) IV Push every 6 hours PRN Nausea  traZODone 50 milliGRAM(s) Oral at bedtime PRN Insomnia

## 2019-03-06 NOTE — ED ADULT NURSE REASSESSMENT NOTE - NS ED NURSE REASSESS COMMENT FT1
Received report from SUAYPA Herrera RN @[id number]. Patient comfortable in bed, daughter at bedside. VS [facility], will continue to monitor
no blood cultures needed per dr wright
pt received from previous RN. vss. pt sleeping comfortably. daughter at bedside, POC reviewed. call bell in reach, instructed to call for assistance. pending admission orders. will continue to monitor
pt medicated as ordered. daughter at bedside requesting am po meds be given with breakfast. vss. pt assisted with female urinal. no s/s of acute distress noted. pending bed placement. will continue to monitor

## 2019-03-06 NOTE — H&P ADULT - NSHPOUTPATIENTPROVIDERS_GEN_ALL_CORE
PCP; Dr. Flavio Otero  Pulmonology; Dr. Guardado  Cardiology; Dr. FALGUNI Vo  Neurology; Dr. Vo  Rheumatology; Dr. Gale  Hematology/Oncology; Dr. Galindo

## 2019-03-06 NOTE — H&P ADULT - PROBLEM SELECTOR PLAN 6
~I discussed the patient's known/documented DNR/DNI status per MOLST form from Izabela medeiros in the presence of the patient and patient's daughter => Diana Presley who is the HCP. No change to the patient's current order at this time. MOLST form updated and placed in the chart.  Total time; 16 minutes.

## 2019-03-06 NOTE — H&P ADULT - NSHPSOCIALHISTORY_GEN_ALL_CORE
Former smoker; quit >20 years ago  No hx of EtOH or illicit drug use/abuse  Lives at [facility]

## 2019-03-06 NOTE — H&P ADULT - PROBLEM SELECTOR PLAN 1
~admit to Medicine  ~f/u w/ PAN C+S  ~f/u w/ RVP  ~cont. IV abx  ~f/u w/ ID consultation in the am  ~cont. supplemental O2  ~cont. Combivent nebs

## 2019-03-06 NOTE — H&P ADULT - PROBLEM SELECTOR PLAN 4
~cont. Hydroxychloroquine 200mg po q12h  ~cont. Prednisone. ~cont. Hydroxychloroquine 200mg po q12h  ~cont. Hydrocortisone in lieu of Prednisone as patient found to have be hypotensive upon arrival and tx/rx for septicemia ~cont. Hydroxychloroquine 200mg po q12h  ~cont. Hydrocortisone in lieu of Prednisone as patient found to have be hypotensive upon arrival and tx/rx for septicemia  ~f/u w/ Rheumatology

## 2019-03-06 NOTE — H&P ADULT - PROBLEM SELECTOR PLAN 7
IMPROVE VTE Risk Score is 2  [  ] Previous VTE                                                  3  [  ] Thrombophilia                                               2  [  ] Lower limb paralysis                                      2        (unable to hold up >15 seconds)    [  ] Current Cancer                                              2         (within 6 months)  [X] Immobilization > 24 hrs                                1  [  ] ICU/CCU stay > 24 hours                              1  [X] Age > 60                                                      1  ~cont. Heparin sq. IMPROVE VTE Risk Score is 3  [  ] Previous VTE                                                  3  [  ] Thrombophilia                                               2  [  ] Lower limb paralysis                                      2        (unable to hold up >15 seconds)    [X] Current Cancer                                              2         (within 6 months)  [  ] Immobilization > 24 hrs                                1  [  ] ICU/CCU stay > 24 hours                              1  [X] Age > 60                                                      1  ~cont. Heparin sq + SCDs

## 2019-03-06 NOTE — H&P ADULT - ASSESSMENT
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 Muniz's office for further evaluation of AMS found to be hypotensive => 70/40. The patient was also noted to have a productive cough. In triage the patient's vitals => BP 99/72, HR 90/min, RR 16/min, SpO2 93%. Labs => WBC 18.49, BUN/Cr, 27/0.67. CT Chest/ABD/Pelvis  => revealed an airspace consolidation and small effusion is seen in the left lower lobe, new compared to the prior exam. Findings could represent pneumonia in the appropriate clinical setting. In the ED the ED the patient was given Piperacillin/tazobactam 3.375g IVPB x 1, Vancomycin 750mg IVPB x 1, and NS x 2L.

## 2019-03-06 NOTE — H&P ADULT - NSHPPHYSICALEXAM_GEN_ALL_CORE
Vital Signs Last 24 Hrs  T(C): 36.8 (05 Mar 2019 16:32), Max: 36.8 (05 Mar 2019 16:32)  T(F): 98.3 (05 Mar 2019 16:32), Max: 98.3 (05 Mar 2019 16:32)  HR: 82 (06 Mar 2019 01:00) (77 - 93)  BP: 119/61 (06 Mar 2019 01:00) (99/72 - 130/63)  RR: 23 (06 Mar 2019 01:00) (16 - 28)  SpO2: 95% (06 Mar 2019 01:00) (93% - 96%)

## 2019-03-07 NOTE — PROGRESS NOTE ADULT - SUBJECTIVE AND OBJECTIVE BOX
HPI:  88yo/F with multiple medical problems, recent admission to  for PNA, PMH- COPD/chr resp failure on home O2 and maint prednisone, Metastatic breast CA            PHYSICAL EXAM:  GENERAL: NAD, well-groomed, well-developed  HEAD:  Atraumatic, Normocephalic  EYES: EOMI, PERRLA, conjunctiva and sclera clear  HEENT: Moist mucous membranes  NECK: Supple, No JVD  NERVOUS SYSTEM:  Alert & Oriented X1  CHEST/LUNG: occasional rhonchi  HEART: Regular rate and rhythm; No murmurs, rubs, or gallops  ABDOMEN: Soft, Nontender, Nondistended; Bowel sounds present  GENITOURINARY- Voiding, no palpable bladder  EXTREMITIES:  2+ Peripheral Pulses, No clubbing, cyanosis, or edema  MUSCULOSKELTAL- lower back tenderness  SKIN-right breaast mass  CNS- alert, oriented X1, non focal         # Probable CAP PNA  - or not  - change to ceftin  - after I d/w daughter yesterday about comfort and hospice at [facility] this morning she called the floor and was upset asking for Pulm consult; so be it, I will consult them  - she clearly expressed desire to follow Mom's wishes- to stop intervention; and to target comfort; I don't know why she is asking for consult now; yesterday I spent 45 min in conference with her, she wanted me to do a ct scan to see if Mom has mets, and she was not sure if RXT is what her mom needs; I was hoping for hospice and medical Marijuana for her Mom, but now things are changing; pall to follow    #Breast CA metastatic      #RA/PMR  Cont Plaquenil, steroids PO    #hyperlipidemia/hypothyroidism  Stable    Pt is v comfortable; she should be left in peace and sent to [facility] with hospice

## 2019-03-08 NOTE — CONSULT NOTE ADULT - ASSESSMENT
89y old Female with PMH Alzheimers, TIA, Stage 4 Breast CA, HTN, carotid artery stenosis, COPD, HLD, hypothyroidism, benign lung nodule, OA,  chronic back pain with compression of T7, GERD,  Asthma, s/p cholecystectomy, MARCELO, now with pneumonia  #Pneumonia: Continue antibiotics  #Cough: Likely from pneumonia, has a lot of secretions  -Chest physiotherapy at hospice  -Can start NAC while awaiting placement  -Guiafenesin  #COPD: Well controlled, no wheezing  -Continue duonebs standing dose  -Hold spiriva  -Continue chronic prednisone 10 mg daily
89 y/o F resident of [facility] with PMHx significant for COPD not on home O2, CVA (2005), Rheumatoid arthritis (chronically on hydroxychloroquine and prednisone), chronic back pain, PMR (Polymyalgia rheumatica), stage 4 right breast Ca (triple negative poorly differentiated invasive ductal cancer, dx 1/2018), glaucoma OU s/p ocular surgery, hypertension, hyperlipidemia, and Alzheimer's dementia who presents to the ED BIBA from Dr. Galindo's office for further evaluation of AMS found to be hypotensive => 70/40. The patient was also noted to have a productive cough. In triage the patient's vitals => BP 99/72, HR 90/min, RR 16/min, SpO2 93%. Labs => WBC 18.49, BUN/Cr, 27/0.67. CT Chest/ABD/Pelvis showed known breast/axillary mass/LN, LLL airspace consolidation-effusion, healing rib fractures, she was given IV vancomycin/zosyn in the ER.     1. LLL PNA-aspiration. stage 4 breast ca. immuncompromised host. metabolic encephalopathy  - agree with zosyn [id number] for gnr/anaroebic coverage  - hold further vancomycin  - aspiration precautions  - f/u cultures, check sputum cx  - monitor temps  - tolerating abx well so far; no side effects noted  - reason for abx use and side effects reviewed with patient  - supportive care  - f/u cbc    2. other issues - care per medicine
88 y/o woman with locally advanced unresectable triple negative breast cancer diagnosed in 2017; did not tolerate chemotherapy. Now she has local disease progression with tumor breaking through skin. Outpatient palliative radiation to breast was offered and family ( daughters) were interested. Patient is now admitted with lethargy/ cough and evidence of pneumonia on CT scan. Started on antibiotics.  Noncontrast CT scans did not show any overt distant metastases.  Plan: antibiotics for pneumonia per ID/medicine.  ? consider C diff prophylaxis ( has hx of recurrent C diff in the past). Supportive care.         Advanced directives were discussed with family ( DNR).  When patient is well enough to be discharged home- outpatient palliative RT to right breast can be considered.   If performance status remains very poor - she will be a candidate for palliative care unit/ hospice.

## 2019-03-08 NOTE — CONSULT NOTE ADULT - SUBJECTIVE AND OBJECTIVE BOX
HPI: SHEELA GUPTA is a 89 y/o F resident of [facility] with PMHx significant for COPD not on home O2, CVA (2005), Rheumatoid arthritis (chronically on hydroxychloroquine and prednisone), chronic back pain, PMR (Polymyalgia rheumatica), stage 4 right breast Ca (triple negative poorly differentiated invasive ductal cancer, dx 1/2018) on hospice, glaucoma OU s/p ocular surgery, hypertension, hyperlipidemia, and Alzheimer's dementia who presented to the ED from Dr. Galindo's office for further evaluation of AMS found to be hypotensive => 70/40. The patient was also noted to have a productive cough. In triage the patient's vitals => BP 99/72, HR 90/min, RR 16/min, SpO2 93%. Labs => WBC 18.49, BUN/Cr, 27/0.67. CT Chest/ABD/Pelvis  => 1) Redemonstrated large right breast mass with a right axillary mass/enlarged lymph node, similar to the prior exam (1/24/2019). 2) Airspace consolidation and small effusion is seen in the left lower lobe, new compared to the prior exam. In the ED the patient was given Piperacillin/tazobactam 3.375g IVPB x 1, Vancomycin 750mg IVPB x 1, and NS x 2L.  She continues to have shortness of breath with cough. Patients daughter would like continued care for her daughter.    Past Medical History:   Hypothyroidism  Breast cancer  Compression fracture of spine  Alzheimers disease  HTN (hypertension)  Glaucoma  TIA (transient ischemic attack)  Cerebral vascular accident  Polymyalgia rheumatica  Osteoporosis  Chronic pain  Urinary retention  GERD (gastroesophageal reflux disease)  Cholelithiases  Hyperlipemia  Carotid artery stenosis  Lung nodule  COPD (chronic obstructive pulmonary disease)  Asthma    Past Surgical History:   Glaucoma of both eyes  History of hip surgery  Gall stones  History of hysterectomy  Hx of cholecystectomy    Family History:    Family history of diabetes mellitus (DM) (Child)  Family history of COPD (chronic obstructive pulmonary disease) (Mother)  No pertinent family history in first degree relatives     Social History: Home with hospice    Review of Systems:  All 10 systems reviewed in detailed and found to be negative with the exception of what has already been described above    Allergies:  Aciphex (Unknown)  Macrobid (Unknown)  Percocet 10/325 (Rash)    Meds  MEDICATIONS  (STANDING):  cefuroxime   Tablet 250 milliGRAM(s) Oral every 12 hours  dipyridamole 200 mG/aspirin 25 mG 1 Capsule(s) Oral two times a day  docusate sodium 100 milliGRAM(s) Oral two times a day  heparin  Injectable 5000 Unit(s) SubCutaneous every 8 hours  hydroxychloroquine 200 milliGRAM(s) Oral every 12 hours  lactobacillus acidophilus 1 Tablet(s) Oral daily  levothyroxine 25 MICROGram(s) Oral daily  memantine ER 21 milliGRAM(s) Oral daily  pantoprazole    Tablet 40 milliGRAM(s) Oral before breakfast  tamsulosin 0.4 milliGRAM(s) Oral at bedtime  tiotropium 18 MICROgram(s) Capsule 1 Capsule(s) Inhalation daily    MEDICATIONS  (PRN):  ALBUTerol/ipratropium for Nebulization 3 milliLiter(s) Nebulizer every 6 hours PRN Bronchospasm  melatonin 3 milliGRAM(s) Oral at bedtime PRN Sleep  ondansetron Injectable 4 milliGRAM(s) IV Push every 6 hours PRN Nausea  oxyCODONE    IR 5 milliGRAM(s) Oral every 6 hours PRN any pain  traZODone 50 milliGRAM(s) Oral at bedtime PRN Insomnia    Physical Exam  T(C): 36.4 (03-08-19 @ 04:50), Max: 36.4 (03-08-19 @ 04:50)  HR: 71 (03-08-19 @ 08:40) (71 - 83)  BP: 140/61 (03-08-19 @ 04:50) (95/48 - 140/61)  RR: 17 (03-08-19 @ 04:50) (17 - 17)  SpO2: 92% (03-08-19 @ 08:40) (92% - 94%)  Gen: Alert, mild distress from coughing  HEENT: Anicteric sclera,no JVD, no lymphadenopathy, poor dentition  Cardio: Regular rhythm and rate, normal S1S2, no murmurs  Resp: Coarse breath sounds, congested sounding  GI: Nontender, nondistended, normoactive bowel sounds  Ext: No cyanosis, clubbing or edema  Neuro: Nonfocal    Labs:                        9.8    8.93  )-----------( 147      ( 07 Mar 2019 05:36 )             31.7       Personally reviewed  < from: CT Chest No Cont (03.05.19 @ 18:08) >  PROCEDURE DATE:  03/05/2019          INTERPRETATION:  CT CHEST, ABDOMEN AND PELVIS WITHOUT CONTRAST    History: Altered mental status, right breast malignancy, evaluate for   metastatic disease.    Technique: Axial CT images of the chest, abdomen and pelvis were obtained   from the lung apices to the pubic symphysis without oral or IV contrast.    Coronal and sagittal reformatted images were createdand reviewed.       Comparison:  Prior CT of the chest, abdomen and pelvis from 1/24/2019.    Findings:  Please note, evaluation for metastatic disease is markedly limited   without IV contrast.    Airspace consolidation and small effusion is seen inthe left lower lobe,   new compared to the prior exam. There is a background of mild to moderate   similar changes. There is no pneumothorax. A 3 mm lung nodule in the left   upper lobe (image 34, series 3), is without significant change from 2018.   A 4 mm nodule in the right upper lobe (image 19, series 2), appears to be   new compared to the prior study.    Again seen is a large right breast mass with a right axillary   mass/enlarged lymph node, similar to the prior exam (1/24/2019). There is   no mediastinal lymphadenopathy.  Evaluation for hilar lymphadenopathy is   limited without IV contrast, however there is no gross hilar   lymphadenopathy. The heart size is within normal limits. The blood pool   in the heart is hypodense relative tomyocardium, suggesting anemia.   Marked coronary artery calcifications are seen. There is no sizable   pericardial effusion. The ascending and descending aorta are not   enlarged. Severe atherosclerotic calcifications of the thoracic aorta are   noted. The pulmonary artery is not enlarged.      The unenhanced liver, spleen, pancreas, adrenal glands are unremarkable.   The unenhanced kidneys are unremarkable aside for a partially duplicated   left collecting system is noted. Patient is status postcholecystectomy.   There is no evidence for bowel obstruction or inflammation. There is a   moderate hiatal hernia.    There is no abdominopelvic lymphadenopathy, significant free fluid or   pelvic mass. Severe atherosclerotic calcifications of the abdominal aorta   are again seen. The urinary bladder is distended. Patient appears to be   status post hysterectomy.    No aggressive osseous lesion is identified. Patient is noted to be status   post right hip screw. Multiple compression deformities are seen   throughout the visualized spine, similar to prior exam, including   moderate to severe compression deformities of the T4, T7, T9, L2 and L4   vertebral bodies. The most severe compression deformities are seen at T7   and L2.   Multiple healing/subacute and chronic bilateral rib fractures   are noted. No obvious acute rib fracture. Question subtle nondisplaced   left sacral fracture, which has a similar appearance compared to the   prior study. Previously seen presacral fluid has decreased, with possible   trace residual.      IMPRESSION:  1.  Please note, evaluation for metastatic disease is markedly limited   without IV contrast. Contrast-enhanced CT would be better control for   evaluating for metastatic disease. Can also considercorrelation with PET   CT (according to our records, most recently performed on 8/18/2018.    2.  Redemonstrated large right breast mass with a right axillary   mass/enlarged lymph node, similar to the prior exam (1/24/2019).     3.  Airspace consolidation and small effusion is seen in the left lower   lobe, new compared to the prior exam. Findings could represent pneumonia   in the appropriate clinical setting.. Follow-up imaging should be   performed to document resolution and exclude the possibility of   underlying lesion.    4.  A 4 mm nodule in the right upper lobe (image 19, series 2), appears   to be new compared to the prior study. Recommend reassessment on   follow-up imaging.    5.  Multiple compression deformities, as above, similar to prior studies.    Multiple healing/subacute and chronic bilateral rib fractures are noted.   No obvious acute rib fracture. Question subtle nondisplaced left sacral   fracture, which has a similar appearance compared to the prior study.   Previously seen presacral fluid has decreased, with possible trace   residual.    6.  Background of mild to moderate emphysematous changes.    7.  Moderate sized hiatal hernia.    8.  Marked coronary artery calcifications.    9.  Blood pool in the heart is hypodense relative to myocardium,   suggesting anemia.     10.  Other findings, as above.      < end of copied text >

## 2019-03-08 NOTE — PROGRESS NOTE ADULT - SUBJECTIVE AND OBJECTIVE BOX
Patient seen earlier today.    Events noted.    Vital Signs Last 24 Hrs  T(C): 36.8 (08 Mar 2019 13:15), Max: 36.8 (08 Mar 2019 13:15)  T(F): 98.2 (08 Mar 2019 13:15), Max: 98.2 (08 Mar 2019 13:15)  HR: 94 (08 Mar 2019 13:15) (71 - 94)  BP: 99/49 (08 Mar 2019 13:15) (99/49 - 140/61)  BP(mean): --  RR: 16 (08 Mar 2019 13:15) (16 - 17)  SpO2: 94% (08 Mar 2019 13:15) (92% - 94%)    Appears comfortable. Not dyspneic but with some cough.                           9.8    8.93  )-----------( 147      ( 07 Mar 2019 05:36 )             31.7       MEDICATIONS  (STANDING):  acetylcysteine 10%  Inhalation 4 milliLiter(s) Inhalation three times a day  ALBUTerol/ipratropium for Nebulization 3 milliLiter(s) Nebulizer every 6 hours  cefuroxime   Tablet 250 milliGRAM(s) Oral every 12 hours  dipyridamole 200 mG/aspirin 25 mG 1 Capsule(s) Oral two times a day  docusate sodium 100 milliGRAM(s) Oral two times a day  heparin  Injectable 5000 Unit(s) SubCutaneous every 8 hours  hydroxychloroquine 200 milliGRAM(s) Oral every 12 hours  lactobacillus acidophilus 1 Tablet(s) Oral daily  levothyroxine 25 MICROGram(s) Oral daily  memantine ER 21 milliGRAM(s) Oral daily  pantoprazole    Tablet 40 milliGRAM(s) Oral before breakfast  predniSONE   Tablet 10 milliGRAM(s) Oral daily  tamsulosin 0.4 milliGRAM(s) Oral at bedtime    MEDICATIONS  (PRN):  melatonin 3 milliGRAM(s) Oral at bedtime PRN Sleep  ondansetron Injectable 4 milliGRAM(s) IV Push every 6 hours PRN Nausea  oxyCODONE    IR 5 milliGRAM(s) Oral every 6 hours PRN any pain  traZODone 50 milliGRAM(s) Oral at bedtime PRN Insomnia

## 2019-03-08 NOTE — PROGRESS NOTE ADULT - SUBJECTIVE AND OBJECTIVE BOX
88 y/o F resident of [facility] with PMHx significant for COPD not on home O2, CVA (2005), Rheumatoid arthritis (chronically on hydroxychloroquine and prednisone), chronic back pain, PMR (Polymyalgia rheumatica), stage 4 right breast Ca (triple negative poorly differentiated invasive ductal cancer, dx 1/2018), glaucoma OU s/p ocular surgery, hypertension, hyperlipidemia, and Alzheimer's dementia who presents to the ED BIBA from Dr. Galindo's office for further evaluation of AMS found to be hypotensive => 70/40. The patient was also noted to have a productive cough. In triage the patient's vitals => BP 99/72, HR 90/min, RR 16/min, SpO2 93%. Labs => WBC 18.49, BUN/Cr, 27/0.67. CT Chest/ABD/Pelvis  => 1) Redemonstrated large right breast mass with a right axillary mass/enlarged lymph node, similar to the prior exam (1/24/2019). 2) Airspace consolidation and small effusion is seen in the left lower lobe, new compared to the prior exam. Findings could represent pneumonia in the appropriate clinical setting. Follow-up imaging should be performed to document resolution and exclude the possibility of underlying lesion. 3) A 4 mm nodule in the right upper lobe (image 19, series 2), appears to be new compared to the prior study. 4) Multiple compression deformities, similar to prior studies. Multiple healing/subacute and chronic bilateral rib fractures are noted. No obvious acute rib fracture. In the ED the ED the patient was given Piperacillin/tazobactam 3.375g IVPB x 1, Vancomycin 750mg IVPB x 1, and NS x 2L.    On 3/6 I had a 40 min long conversation with daughter and she agreed to respect Mom's wishes and send her to [facility] with hospice and not do any RXT; initially she asked for CT to see if there are any meds but at the end she agreed it was useless as this will not predict when she will die; the next day daughter was not in agreement with dc to [facility] and requested pulm consult    3/8: comfortable lady; was seen by Pulm    Vital Signs Last 24 Hrs  T(C): 36.4 (08 Mar 2019 04:50), Max: 36.4 (08 Mar 2019 04:50)  T(F): 97.6 (08 Mar 2019 04:50), Max: 97.6 (08 Mar 2019 04:50)  HR: 71 (08 Mar 2019 08:40) (71 - 83)  BP: 140/61 (08 Mar 2019 04:50) (95/48 - 140/61)  BP(mean): --  RR: 17 (08 Mar 2019 04:50) (17 - 17)  SpO2: 92% (08 Mar 2019 08:40) (92% - 94%)      HEAD:  Atraumatic, Normocephalic  EYES: EOMI, PERRLA, conjunctiva and sclera clear  NECK: Supple, No JVD  NERVOUS SYSTEM:  Alert & Oriented X1  CHEST/LUNG: occasional rhonchi  HEART: Regular rate and rhythm; No murmurs, rubs, or gallops  ABDOMEN: Soft, Nontender, Nondistended; Bowel sounds present  GENITOURINARY- Voiding, no palpable bladder  EXTREMITIES:  2+ Peripheral Pulses, No clubbing, cyanosis, or edema  SKIN-right breast  mass  CNS- alert, oriented X1, non focal         # Probable CAP PNA  - or not  - change to ceftin; Rx adjusted by Pulm  - after I d/w daughter yesterday about comfort and hospice at [facility] this morning she called the floor and was upset asking for Pulm consult; so be it, I will consult them  - she clearly expressed desire to follow Mom's wishes- to stop intervention; and to target comfort;  I was hoping for hospice and medical Marijuana for her Mom, but now things are changing; pall to follow; remains hospitalized, daughter doesn't think she is ready to return, left message for daughter again today    #Breast CA metastatic- no RXT per daughter      #RA/PMR  Cont Plaquenil, steroids PO      Pt is v comfortable; she should be left in peace and sent to [facility] with hospice

## 2019-03-08 NOTE — PROGRESS NOTE ADULT - ASSESSMENT
88 y/o woman with locally advanced unresectable triple negative breast cancer diagnosed in 2017; did not tolerate chemotherapy. Now she has local disease progression with tumor breaking through skin. Outpatient palliative radiation to breast was offered previously and family ( daughters) were interested. Patient has dementia and she is unable to make decisions.   Now admitted with lethargy/ cough and evidence of pneumonia on CT scan. On antibiotics. Hx of recent hospitalization/ rehab few weeks ago.    Her prognosis is poor. Any oncologic therapy  ( systemic therapy and even  palliative radiation to breast)  will not have any positive impact on her survival / quality of life.   I discussed that with her daughter on 3/6 evening. I strongly supported Hospice decision. Last week family wanted patient to have PET scan ( not ordered)  - now they agreed to stop unnecessary testing/  agreed to palliative care/ Hospice.

## 2019-03-09 NOTE — PHYSICAL THERAPY INITIAL EVALUATION ADULT - PREDICTED DURATION OF THERAPY (DAYS/WKS), PT EVAL
pt does not appear to be a restorative PT candidate w/ respect to MS, prognosis. Will attempt session again to try to assess ability to mobilize OOB and make approp recommendations w/ goal of maintaining comfort as priority

## 2019-03-09 NOTE — PHYSICAL THERAPY INITIAL EVALUATION ADULT - PERTINENT HX OF CURRENT PROBLEM, REHAB EVAL
admitted with lethargy/ cough/ hypotension and evidence of pneumonia on CT scan. h/o stg IV breast CA.

## 2019-03-09 NOTE — PROGRESS NOTE ADULT - ASSESSMENT
89y old Female with PMH Alzheimers, TIA, Stage 4 Breast CA, HTN, carotid artery stenosis, COPD, HLD, hypothyroidism, benign lung nodule, OA,  chronic back pain with compression of T7, GERD,  Asthma, s/p cholecystectomy, MARCELO, now with pneumonia  #Pneumonia: Continue antibiotics  #Cough: Likely from pneumonia, has a lot of secretions  -Chest physiotherapy at hospice  -Can start NAC while awaiting placement  -Guiafenesin  #COPD: Well controlled, no wheezing  -Continue duonebs standing dose  -Hold spiriva  -Continue chronic prednisone 10 mg daily

## 2019-03-09 NOTE — PROGRESS NOTE ADULT - SUBJECTIVE AND OBJECTIVE BOX
Subjective:  Breathing better  cough improved    Review of Systems:  All 10 systems reviewed in detailed and found to be negative with the exception of what has already been described above    Allergies:  Aciphex (Unknown)  Macrobid (Unknown)  Percocet 10/325 (Rash)    Meds  MEDICATIONS  (STANDING):  acetylcysteine 10%  Inhalation 4 milliLiter(s) Inhalation three times a day  ALBUTerol/ipratropium for Nebulization 3 milliLiter(s) Nebulizer every 6 hours  cefuroxime   Tablet 250 milliGRAM(s) Oral every 12 hours  dipyridamole 200 mG/aspirin 25 mG 1 Capsule(s) Oral two times a day  docusate sodium 100 milliGRAM(s) Oral two times a day  heparin  Injectable 5000 Unit(s) SubCutaneous every 8 hours  hydroxychloroquine 200 milliGRAM(s) Oral every 12 hours  lactobacillus acidophilus 1 Tablet(s) Oral daily  levothyroxine 25 MICROGram(s) Oral daily  memantine ER 21 milliGRAM(s) Oral daily  pantoprazole    Tablet 40 milliGRAM(s) Oral before breakfast  predniSONE   Tablet 10 milliGRAM(s) Oral daily  tamsulosin 0.4 milliGRAM(s) Oral at bedtime    MEDICATIONS  (PRN):  melatonin 3 milliGRAM(s) Oral at bedtime PRN Sleep  ondansetron Injectable 4 milliGRAM(s) IV Push every 6 hours PRN Nausea  oxyCODONE    IR 5 milliGRAM(s) Oral every 6 hours PRN any pain  traZODone 50 milliGRAM(s) Oral at bedtime PRN Insomnia    Physical Exam  T(C): 36.7 (03-09-19 @ 05:02), Max: 36.9 (03-08-19 @ 21:07)  HR: 79 (03-09-19 @ 05:02) (79 - 105)  BP: 139/68 (03-09-19 @ 05:02) (114/60 - 139/68)  RR: 18 (03-09-19 @ 05:02) (18 - 18)  SpO2: 94% (03-09-19 @ 05:02) (94% - 95%)  Gen: Alert, mild distress from coughing  HEENT: Anicteric sclera,no JVD, no lymphadenopathy, poor dentition  Cardio: Regular rhythm and rate, normal S1S2, no murmurs  Resp: Coarse breath sounds, congested sounding  GI: Nontender, nondistended, normoactive bowel sounds  Ext: No cyanosis, clubbing or edema  Neuro: Nonfocal    Labs:  Personally reviewed  < from: CT Chest No Cont (03.05.19 @ 18:08) >  PROCEDURE DATE:  03/05/2019          INTERPRETATION:  CT CHEST, ABDOMEN AND PELVIS WITHOUT CONTRAST    History: Altered mental status, right breast malignancy, evaluate for   metastatic disease.    Technique: Axial CT images of the chest, abdomen and pelvis were obtained   from the lung apices to the pubic symphysis without oral or IV contrast.    Coronal and sagittal reformatted images were createdand reviewed.       Comparison:  Prior CT of the chest, abdomen and pelvis from 1/24/2019.    Findings:  Please note, evaluation for metastatic disease is markedly limited   without IV contrast.    Airspace consolidation and small effusion is seen inthe left lower lobe,   new compared to the prior exam. There is a background of mild to moderate   similar changes. There is no pneumothorax. A 3 mm lung nodule in the left   upper lobe (image 34, series 3), is without significant change from 2018.   A 4 mm nodule in the right upper lobe (image 19, series 2), appears to be   new compared to the prior study.    Again seen is a large right breast mass with a right axillary   mass/enlarged lymph node, similar to the prior exam (1/24/2019). There is   no mediastinal lymphadenopathy.  Evaluation for hilar lymphadenopathy is   limited without IV contrast, however there is no gross hilar   lymphadenopathy. The heart size is within normal limits. The blood pool   in the heart is hypodense relative tomyocardium, suggesting anemia.   Marked coronary artery calcifications are seen. There is no sizable   pericardial effusion. The ascending and descending aorta are not   enlarged. Severe atherosclerotic calcifications of the thoracic aorta are   noted. The pulmonary artery is not enlarged.      The unenhanced liver, spleen, pancreas, adrenal glands are unremarkable.   The unenhanced kidneys are unremarkable aside for a partially duplicated   left collecting system is noted. Patient is status postcholecystectomy.   There is no evidence for bowel obstruction or inflammation. There is a   moderate hiatal hernia.    There is no abdominopelvic lymphadenopathy, significant free fluid or   pelvic mass. Severe atherosclerotic calcifications of the abdominal aorta   are again seen. The urinary bladder is distended. Patient appears to be   status post hysterectomy.    No aggressive osseous lesion is identified. Patient is noted to be status   post right hip screw. Multiple compression deformities are seen   throughout the visualized spine, similar to prior exam, including   moderate to severe compression deformities of the T4, T7, T9, L2 and L4   vertebral bodies. The most severe compression deformities are seen at T7   and L2.   Multiple healing/subacute and chronic bilateral rib fractures   are noted. No obvious acute rib fracture. Question subtle nondisplaced   left sacral fracture, which has a similar appearance compared to the   prior study. Previously seen presacral fluid has decreased, with possible   trace residual.      IMPRESSION:  1.  Please note, evaluation for metastatic disease is markedly limited   without IV contrast. Contrast-enhanced CT would be better control for   evaluating for metastatic disease. Can also considercorrelation with PET   CT (according to our records, most recently performed on 8/18/2018.    2.  Redemonstrated large right breast mass with a right axillary   mass/enlarged lymph node, similar to the prior exam (1/24/2019).     3.  Airspace consolidation and small effusion is seen in the left lower   lobe, new compared to the prior exam. Findings could represent pneumonia   in the appropriate clinical setting.. Follow-up imaging should be   performed to document resolution and exclude the possibility of   underlying lesion.    4.  A 4 mm nodule in the right upper lobe (image 19, series 2), appears   to be new compared to the prior study. Recommend reassessment on   follow-up imaging.    5.  Multiple compression deformities, as above, similar to prior studies.    Multiple healing/subacute and chronic bilateral rib fractures are noted.   No obvious acute rib fracture. Question subtle nondisplaced left sacral   fracture, which has a similar appearance compared to the prior study.   Previously seen presacral fluid has decreased, with possible trace   residual.    6.  Background of mild to moderate emphysematous changes.    7.  Moderate sized hiatal hernia.    8.  Marked coronary artery calcifications.    9.  Blood pool in the heart is hypodense relative to myocardium,   suggesting anemia.     10.  Other findings, as above.

## 2019-03-09 NOTE — PHYSICAL THERAPY INITIAL EVALUATION ADULT - ADDITIONAL COMMENTS
CT Chest/ABD/Pelvis  => 1) Redemonstrated large right breast mass with a right axillary mass/enlarged lymph node, similar to the prior exam (1/24/2019). 2) Airspace consolidation and small effusion is seen in the left lower lobe, new compared to the prior exam. Findings could represent pneumonia in the appropriate clinical setting. Follow-up imaging should be performed to document resolution and exclude the possibility of underlying lesion. 3) A 4 mm nodule in the right upper lobe (image 19, series 2), appears to be new compared to the prior study. 4) Multiple compression deformities, similar to prior studies. Multiple healing/subacute and chronic bilateral rib fractures are noted. CT Chest/ABD/Pelvis  => 1) Re-demonstrated large right breast mass with a right axillary mass/enlarged lymph node, similar to the prior exam (1/24/2019). 2) Airspace consolidation and small effusion is seen in the left lower lobe, new compared to the prior exam. Findings could represent pneumonia in the appropriate clinical setting. Follow-up imaging should be performed to document resolution and exclude the possibility of underlying lesion. 3) A 4 mm nodule in the right upper lobe (image 19, series 2), appears to be new compared to the prior study. 4) Multiple compression deformities, similar to prior studies. Multiple healing/subacute and chronic bilateral rib fractures are noted.

## 2019-03-09 NOTE — PROGRESS NOTE ADULT - SUBJECTIVE AND OBJECTIVE BOX
HOSPITALIST ATTENDING PROGRESS NOTE    Chart and meds reviewed.  Patient seen and examined.    HPI: 89 y/o F resident of [facility] with PMHx significant for COPD not on home O2, CVA (2005), Rheumatoid arthritis (chronically on hydroxychloroquine and prednisone), chronic back pain, PMR (Polymyalgia rheumatica), stage 4 right breast Ca (triple negative poorly differentiated invasive ductal cancer, dx 1/2018), glaucoma OU s/p ocular surgery, hypertension, hyperlipidemia, and Alzheimer's dementia who presents to the ED BIBA from Dr. aGlindo's office for further evaluation of AMS found to be hypotensive => 70/40. The patient was also noted to have a productive cough. In triage the patient's vitals => BP 99/72, HR 90/min, RR 16/min, SpO2 93%. Labs => WBC 18.49, BUN/Cr, 27/0.67. CT Chest/ABD/Pelvis  => 1) Redemonstrated large right breast mass with a right axillary mass/enlarged lymph node, similar to the prior exam (1/24/2019). 2) Airspace consolidation and small effusion is seen in the left lower lobe, new compared to the prior exam. Findings could represent pneumonia in the appropriate clinical setting. Follow-up imaging should be performed to document resolution and exclude the possibility of underlying lesion. 3) A 4 mm nodule in the right upper lobe (image 19, series 2), appears to be new compared to the prior study. 4) Multiple compression deformities, similar to prior studies. Multiple healing/subacute and chronic bilateral rib fractures are noted. No obvious acute rib fracture. In the ED the ED the patient was given Piperacillin/tazobactam 3.375g IVPB x 1, Vancomycin 750mg IVPB x 1, and NS x 2L.    On 3/6 I had a 40 min long conversation with daughter and she agreed to respect Mom's wishes and send her to AL with hospice and not do any RXT; initially she asked for CT to see if there are any meds but at the end she agreed it was useless as this will not predict when she will die; the next day daughter was not in agreement with dc to AL and requested pulm consult    3/9/19 pt seen and examined, confused, denies acute complaints, chart noted    All 10 systems reviewed and found to be negative with the exception of what has been described above.    MEDICATIONS  (STANDING):  ALBUTerol/ipratropium for Nebulization 3 milliLiter(s) Nebulizer every 6 hours  cefuroxime   Tablet 250 milliGRAM(s) Oral every 12 hours  dipyridamole 200 mG/aspirin 25 mG 1 Capsule(s) Oral two times a day  docusate sodium 100 milliGRAM(s) Oral two times a day  heparin  Injectable 5000 Unit(s) SubCutaneous every 8 hours  hydroxychloroquine 200 milliGRAM(s) Oral every 12 hours  lactobacillus acidophilus 1 Tablet(s) Oral daily  levothyroxine 25 MICROGram(s) Oral daily  memantine ER 21 milliGRAM(s) Oral daily  pantoprazole    Tablet 40 milliGRAM(s) Oral before breakfast  predniSONE   Tablet 10 milliGRAM(s) Oral daily  tamsulosin 0.4 milliGRAM(s) Oral at bedtime    MEDICATIONS  (PRN):  melatonin 3 milliGRAM(s) Oral at bedtime PRN Sleep  ondansetron Injectable 4 milliGRAM(s) IV Push every 6 hours PRN Nausea  oxyCODONE    IR 5 milliGRAM(s) Oral every 6 hours PRN any pain  traZODone 50 milliGRAM(s) Oral at bedtime PRN Insomnia      VITALS:  T(F): 98.6 (03-09-19 @ 13:45), Max: 98.6 (03-09-19 @ 13:45)  HR: 87 (03-09-19 @ 14:36) (79 - 105)  BP: 118/49 (03-09-19 @ 13:45) (114/60 - 139/68)  RR: 16 (03-09-19 @ 13:45) (16 - 18)  SpO2: 90% (03-09-19 @ 14:36) (90% - 95%)  Wt(kg): --    I&O's Summary    08 Mar 2019 07:01  -  09 Mar 2019 07:00  --------------------------------------------------------  IN: 0 mL / OUT: 500 mL / NET: -500 mL    09 Mar 2019 06:01  -  09 Mar 2019 14:48  --------------------------------------------------------  IN: 0 mL / OUT: 700 mL / NET: -700 mL        CAPILLARY BLOOD GLUCOSE          PHYSICAL EXAM:    HEENT:  pupils equal and reactive, EOMI, no oropharyngeal lesions, erythema, exudates, oral thrush  NECK:   supple, no carotid bruits, no palpable lymph nodes, no thyromegaly  CV:  +S1, +S2, regular, no murmurs or rubs  RESP:   lungs clear to auscultation bilaterally, no wheezing, rales, rhonchi, good air entry bilaterally  BREAST:  not examined  GI:  abdomen soft, non-tender, non-distended, normal BS, no bruits, no abdominal masses, no palpable masses  RECTAL:  not examined  :  not examined  MSK:   normal muscle tone, no atrophy, no rigidity, no contractions  EXT:  no clubbing, no cyanosis, no edema, no calf pain, swelling or erythema  VASCULAR:  pulses equal and symmetric in the upper and lower extremities  NEURO:  AAOX3, no focal neurological deficits, follows all commands, able to move extremities spontaneously  SKIN:  no ulcers, lesions or rashes    LABS:                                                      CULTURES:

## 2019-03-09 NOTE — PHYSICAL THERAPY INITIAL EVALUATION ADULT - ACTIVE RANGE OF MOTION EXAMINATION, REHAB EVAL
not following for formal assessment, observed some AROM x 4 extremities, B elbow flex WFL, R shld flex ~90deg, able to cross/uncross LEs

## 2019-03-09 NOTE — PROGRESS NOTE ADULT - ASSESSMENT
# CAP  # Cough due to above  # COPD  # Locally advanced unresectable triple negative breast cancer diagnosed in 2017  # RA/PMR    Plan:   - poor prognosis  - Hemeonc note appreciated, not a candidate for chemo, palliative rad  - PO abx   - hospice candidate, will discuss with family  Cont Plaquenil, steroids PO

## 2019-03-09 NOTE — PHYSICAL THERAPY INITIAL EVALUATION ADULT - GENERAL OBSERVATIONS, REHAB EVAL
pt rec'd supine in bed on 1N, pleasant, appears comfortable, (politely) refusing OOB/mobility despite coaxing/encouragement ("I don't have to do what I don't want to do")

## 2019-03-10 NOTE — PROGRESS NOTE ADULT - SUBJECTIVE AND OBJECTIVE BOX
Subjective:  Feeling improved  Has coughed up a lot of sputum  Denies cough now  Off O2    Review of Systems:  All 10 systems reviewed in detailed and found to be negative with the exception of what has already been described above    Allergies:  Aciphex (Unknown)  Macrobid (Unknown)  Percocet 10/325 (Rash)    Meds  MEDICATIONS  (STANDING):  ALBUTerol/ipratropium for Nebulization 3 milliLiter(s) Nebulizer every 6 hours  cefuroxime   Tablet 250 milliGRAM(s) Oral every 12 hours  dipyridamole 200 mG/aspirin 25 mG 1 Capsule(s) Oral two times a day  docusate sodium 100 milliGRAM(s) Oral two times a day  heparin  Injectable 5000 Unit(s) SubCutaneous every 8 hours  hydroxychloroquine 200 milliGRAM(s) Oral every 12 hours  lactobacillus acidophilus 1 Tablet(s) Oral daily  levothyroxine 25 MICROGram(s) Oral daily  memantine ER 21 milliGRAM(s) Oral daily  pantoprazole    Tablet 40 milliGRAM(s) Oral before breakfast  predniSONE   Tablet 10 milliGRAM(s) Oral daily  tamsulosin 0.4 milliGRAM(s) Oral at bedtime    MEDICATIONS  (PRN):  melatonin 3 milliGRAM(s) Oral at bedtime PRN Sleep  ondansetron Injectable 4 milliGRAM(s) IV Push every 6 hours PRN Nausea  traZODone 50 milliGRAM(s) Oral at bedtime PRN Insomnia    Physical Exam  T(C): 36.7 (03-10-19 @ 12:08), Max: 37 (03-09-19 @ 13:45)  HR: 102 (03-10-19 @ 12:08) (87 - 102)  BP: 104/67 (03-10-19 @ 12:08) (104/67 - 118/49)  RR: 17 (03-10-19 @ 12:08) (16 - 18)  SpO2: 95% (03-10-19 @ 12:08) (90% - 95%)  Gen: Alert, no distress  HEENT: Anicteric sclera,no JVD, no lymphadenopathy, poor dentition  Cardio: Regular rhythm and rate, normal S1S2, no murmurs  Resp: Crackles left lower lung base  GI: Nontender, nondistended, normoactive bowel sounds  Ext: No cyanosis, clubbing or edema  Neuro: Nonfocal      Labs:                        10.5   6.26  )-----------( 156      ( 10 Mar 2019 06:04 )             32.8     < from: CT Chest No Cont (03.05.19 @ 18:08) >  PROCEDURE DATE:  03/05/2019          INTERPRETATION:  CT CHEST, ABDOMEN AND PELVIS WITHOUT CONTRAST    History: Altered mental status, right breast malignancy, evaluate for   metastatic disease.    Technique: Axial CT images of the chest, abdomen and pelvis were obtained   from the lung apices to the pubic symphysis without oral or IV contrast.    Coronal and sagittal reformatted images were createdand reviewed.       Comparison:  Prior CT of the chest, abdomen and pelvis from 1/24/2019.    Findings:  Please note, evaluation for metastatic disease is markedly limited   without IV contrast.    Airspace consolidation and small effusion is seen inthe left lower lobe,   new compared to the prior exam. There is a background of mild to moderate   similar changes. There is no pneumothorax. A 3 mm lung nodule in the left   upper lobe (image 34, series 3), is without significant change from 2018.   A 4 mm nodule in the right upper lobe (image 19, series 2), appears to be   new compared to the prior study.    Again seen is a large right breast mass with a right axillary   mass/enlarged lymph node, similar to the prior exam (1/24/2019). There is   no mediastinal lymphadenopathy.  Evaluation for hilar lymphadenopathy is   limited without IV contrast, however there is no gross hilar   lymphadenopathy. The heart size is within normal limits. The blood pool   in the heart is hypodense relative tomyocardium, suggesting anemia.   Marked coronary artery calcifications are seen. There is no sizable   pericardial effusion. The ascending and descending aorta are not   enlarged. Severe atherosclerotic calcifications of the thoracic aorta are   noted. The pulmonary artery is not enlarged.      The unenhanced liver, spleen, pancreas, adrenal glands are unremarkable.   The unenhanced kidneys are unremarkable aside for a partially duplicated   left collecting system is noted. Patient is status postcholecystectomy.   There is no evidence for bowel obstruction or inflammation. There is a   moderate hiatal hernia.    There is no abdominopelvic lymphadenopathy, significant free fluid or   pelvic mass. Severe atherosclerotic calcifications of the abdominal aorta   are again seen. The urinary bladder is distended. Patient appears to be   status post hysterectomy.    No aggressive osseous lesion is identified. Patient is noted to be status   post right hip screw. Multiple compression deformities are seen   throughout the visualized spine, similar to prior exam, including   moderate to severe compression deformities of the T4, T7, T9, L2 and L4   vertebral bodies. The most severe compression deformities are seen at T7   and L2.   Multiple healing/subacute and chronic bilateral rib fractures   are noted. No obvious acute rib fracture. Question subtle nondisplaced   left sacral fracture, which has a similar appearance compared to the   prior study. Previously seen presacral fluid has decreased, with possible   trace residual.      IMPRESSION:  1.  Please note, evaluation for metastatic disease is markedly limited   without IV contrast. Contrast-enhanced CT would be better control for   evaluating for metastatic disease. Can also considercorrelation with PET   CT (according to our records, most recently performed on 8/18/2018.    2.  Redemonstrated large right breast mass with a right axillary   mass/enlarged lymph node, similar to the prior exam (1/24/2019).     3.  Airspace consolidation and small effusion is seen in the left lower   lobe, new compared to the prior exam. Findings could represent pneumonia   in the appropriate clinical setting.. Follow-up imaging should be   performed to document resolution and exclude the possibility of   underlying lesion.    4.  A 4 mm nodule in the right upper lobe (image 19, series 2), appears   to be new compared to the prior study. Recommend reassessment on   follow-up imaging.    5.  Multiple compression deformities, as above, similar to prior studies.    Multiple healing/subacute and chronic bilateral rib fractures are noted.   No obvious acute rib fracture. Question subtle nondisplaced left sacral   fracture, which has a similar appearance compared to the prior study.   Previously seen presacral fluid has decreased, with possible trace   residual.    6.  Background of mild to moderate emphysematous changes.    7.  Moderate sized hiatal hernia.    8.  Marked coronary artery calcifications.    9.  Blood pool in the heart is hypodense relative to myocardium,   suggesting anemia.     10.  Other findings, as above.

## 2019-03-10 NOTE — PROGRESS NOTE ADULT - ASSESSMENT
· Assessment		  # CAP  # Cough due to above  # COPD  # Locally advanced unresectable triple negative breast cancer diagnosed in 2017  # RA/PMR    Plan:   - poor prognosis  - Hemeonc note appreciated, not a candidate for chemo, palliative rad  - PO abx   - hospice candidate, discussed with keyana today  Cont Plaquenil, steroids PO

## 2019-03-10 NOTE — PROGRESS NOTE ADULT - SUBJECTIVE AND OBJECTIVE BOX
HOSPITALIST ATTENDING PROGRESS NOTE    Chart and meds reviewed.  Patient seen and examined.    HPI: 88 y/o F resident of [facility] with PMHx significant for COPD not on home O2, CVA (2005), Rheumatoid arthritis (chronically on hydroxychloroquine and prednisone), chronic back pain, PMR (Polymyalgia rheumatica), stage 4 right breast Ca (triple negative poorly differentiated invasive ductal cancer, dx 1/2018), glaucoma OU s/p ocular surgery, hypertension, hyperlipidemia, and Alzheimer's dementia who presents to the ED BIBA from Dr. Galindo's office for further evaluation of AMS found to be hypotensive => 70/40. The patient was also noted to have a productive cough. In triage the patient's vitals => BP 99/72, HR 90/min, RR 16/min, SpO2 93%. Labs => WBC 18.49, BUN/Cr, 27/0.67. CT Chest/ABD/Pelvis  => 1) Redemonstrated large right breast mass with a right axillary mass/enlarged lymph node, similar to the prior exam (1/24/2019). 2) Airspace consolidation and small effusion is seen in the left lower lobe, new compared to the prior exam. Findings could represent pneumonia in the appropriate clinical setting. Follow-up imaging should be performed to document resolution and exclude the possibility of underlying lesion. 3) A 4 mm nodule in the right upper lobe (image 19, series 2), appears to be new compared to the prior study. 4) Multiple compression deformities, similar to prior studies. Multiple healing/subacute and chronic bilateral rib fractures are noted. No obvious acute rib fracture. In the ED the ED the patient was given Piperacillin/tazobactam 3.375g IVPB x 1, Vancomycin 750mg IVPB x 1, and NS x 2L.    On 3/6 I had a 40 min long conversation with daughter and she agreed to respect Mom's wishes and send her to AL with hospice and not do any RXT; initially she asked for CT to see if there are any meds but at the end she agreed it was useless as this will not predict when she will die; the next day daughter was not in agreement with dc to AL and requested pulm consult    3/9/19 pt seen and examined, confused, denies acute complaints, chart noted    3/10/19 pt seen and examined, family friend at bedside, discussed with daughter keyana at [phone].       All 10 systems reviewed and found to be negative with the exception of what has been described above.    MEDICATIONS  (STANDING):  ALBUTerol/ipratropium for Nebulization 3 milliLiter(s) Nebulizer every 6 hours  cefuroxime   Tablet 250 milliGRAM(s) Oral every 12 hours  dipyridamole 200 mG/aspirin 25 mG 1 Capsule(s) Oral two times a day  docusate sodium 100 milliGRAM(s) Oral two times a day  heparin  Injectable 5000 Unit(s) SubCutaneous every 8 hours  hydroxychloroquine 200 milliGRAM(s) Oral every 12 hours  lactobacillus acidophilus 1 Tablet(s) Oral daily  levothyroxine 25 MICROGram(s) Oral daily  memantine ER 21 milliGRAM(s) Oral daily  pantoprazole    Tablet 40 milliGRAM(s) Oral before breakfast  predniSONE   Tablet 10 milliGRAM(s) Oral daily  tamsulosin 0.4 milliGRAM(s) Oral at bedtime    MEDICATIONS  (PRN):  melatonin 3 milliGRAM(s) Oral at bedtime PRN Sleep  ondansetron Injectable 4 milliGRAM(s) IV Push every 6 hours PRN Nausea  traZODone 50 milliGRAM(s) Oral at bedtime PRN Insomnia      VITALS:  T(F): 98.1 (03-10-19 @ 12:08), Max: 98.1 (03-10-19 @ 12:08)  HR: 102 (03-10-19 @ 12:08) (91 - 102)  BP: 104/67 (03-10-19 @ 12:08) (104/67 - 114/76)  RR: 17 (03-10-19 @ 12:08) (17 - 18)  SpO2: 95% (03-10-19 @ 12:08) (93% - 95%)  Wt(kg): --    I&O's Summary    09 Mar 2019 06:01  -  10 Mar 2019 07:00  --------------------------------------------------------  IN: 0 mL / OUT: 1075 mL / NET: -1075 mL        CAPILLARY BLOOD GLUCOSE          PHYSICAL EXAM:    HEENT:  pupils equal and reactive, EOMI, no oropharyngeal lesions, erythema, exudates, oral thrush  NECK:   supple, no carotid bruits, no palpable lymph nodes, no thyromegaly  CV:  +S1, +S2, regular, no murmurs or rubs  RESP:   lungs clear to auscultation bilaterally, no wheezing, rales, rhonchi, good air entry bilaterally  BREAST:  not examined  GI:  abdomen soft, non-tender, non-distended, normal BS, no bruits, no abdominal masses, no palpable masses  RECTAL:  not examined  :  not examined  MSK:   normal muscle tone, no atrophy, no rigidity, no contractions  EXT:  no clubbing, no cyanosis, no edema, no calf pain, swelling or erythema  VASCULAR:  pulses equal and symmetric in the upper and lower extremities  NEURO:  AAOX3, no focal neurological deficits, follows all commands, able to move extremities spontaneously  SKIN:  no ulcers, lesions or rashes    LABS:                            10.5   6.26  )-----------( 156      ( 10 Mar 2019 06:04 )             32.8                                                   CULTURES:

## 2019-03-10 NOTE — PROGRESS NOTE ADULT - ASSESSMENT
89y old Female with PMH Alzheimers, TIA, Stage 4 Breast CA, HTN, carotid artery stenosis, COPD, HLD, hypothyroidism, benign lung nodule, OA,  chronic back pain with compression of T7, GERD,  Asthma, s/p cholecystectomy, MARCELO, now with pneumonia  #Pneumonia: Continue antibiotics  #Cough: Likely from pneumonia, improved  -D/c NAC, chest physiotherapy  -Guiafenesin  #COPD: Well controlled, no wheezing  -Continue duonebs standing dose  -Hold spiriva  -Continue chronic prednisone 10 mg daily

## 2019-03-11 NOTE — PROGRESS NOTE ADULT - SUBJECTIVE AND OBJECTIVE BOX
HOSPITALIST ATTENDING PROGRESS NOTE    Chart and meds reviewed.  Patient seen and examined.    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 Galindo's office for further evaluation of AMS found to be hypotensive => 70/40. The patient was also noted to have a productive cough. In triage the patient's vitals => BP 99/72, HR 90/min, RR 16/min, SpO2 93%. Labs => WBC 18.49, BUN/Cr, 27/0.67. CT Chest/ABD/Pelvis  => 1) Redemonstrated large right breast mass with a right axillary mass/enlarged lymph node, similar to the prior exam (1/24/2019). 2) Airspace consolidation and small effusion is seen in the left lower lobe, new compared to the prior exam. Findings could represent pneumonia in the appropriate clinical setting. Follow-up imaging should be performed to document resolution and exclude the possibility of underlying lesion. 3) A 4 mm nodule in the right upper lobe (image 19, series 2), appears to be new compared to the prior study. 4) Multiple compression deformities, similar to prior studies. Multiple healing/subacute and chronic bilateral rib fractures are noted. No obvious acute rib fracture. In the ED the ED the patient was given Piperacillin/tazobactam 3.375g IVPB x 1, Vancomycin 750mg IVPB x 1, and NS x 2L.    On 3/6 I had a 40 min long conversation with daughter and she agreed to respect Mom's wishes and send her to [place] with hospice and not do any RXT; initially she asked for CT to see if there are any meds but at the end she agreed it was useless as this will not predict when she will die; the next day daughter was not in agreement with dc to AL and requested pulm consult    3/9/19 pt seen and examined, confused, denies acute complaints, chart noted    3/10/19 pt seen and examined, family friend at bedside, discussed with daughter keyana at [phone].     3/11/19 pt seen and examined, family friend at bedside, requiring IV morphine for pain control. not eating as much      All 10 systems reviewed and found to be negative with the exception of what has been described above.    MEDICATIONS  (STANDING):  ALBUTerol/ipratropium for Nebulization 3 milliLiter(s) Nebulizer every 6 hours  cefuroxime   Tablet 250 milliGRAM(s) Oral every 12 hours  dipyridamole 200 mG/aspirin 25 mG 1 Capsule(s) Oral two times a day  docusate sodium 100 milliGRAM(s) Oral two times a day  heparin  Injectable 5000 Unit(s) SubCutaneous every 8 hours  hydroxychloroquine 200 milliGRAM(s) Oral every 12 hours  lactobacillus acidophilus 1 Tablet(s) Oral daily  levothyroxine 25 MICROGram(s) Oral daily  memantine ER 21 milliGRAM(s) Oral daily  pantoprazole    Tablet 40 milliGRAM(s) Oral before breakfast  predniSONE   Tablet 10 milliGRAM(s) Oral daily  tamsulosin 0.4 milliGRAM(s) Oral at bedtime    MEDICATIONS  (PRN):  ketorolac   Injectable 30 milliGRAM(s) IntraMuscular every 8 hours PRN Moderate Pain (4 - 6)  melatonin 3 milliGRAM(s) Oral at bedtime PRN Sleep  morphine  - Injectable 1 milliGRAM(s) IV Push every 4 hours PRN Moderate Pain (4 - 6)  ondansetron Injectable 4 milliGRAM(s) IV Push every 6 hours PRN Nausea  traZODone 50 milliGRAM(s) Oral at bedtime PRN Insomnia      VITALS:  T(F): 98.7 (03-11-19 @ 13:18), Max: 98.7 (03-11-19 @ 13:18)  HR: 95 (03-11-19 @ 13:18) (71 - 100)  BP: 94/60 (03-11-19 @ 13:18) (94/60 - 110/55)  RR: 16 (03-11-19 @ 13:18) (16 - 17)  SpO2: 96% (03-11-19 @ 13:18) (91% - 96%)  Wt(kg): --    I&O's Summary    10 Mar 2019 07:01  -  11 Mar 2019 07:00  --------------------------------------------------------  IN: 0 mL / OUT: 400 mL / NET: -400 mL        CAPILLARY BLOOD GLUCOSE          PHYSICAL EXAM:    HEENT:  pupils equal and reactive, EOMI, no oropharyngeal lesions, erythema, exudates, oral thrush  NECK:   supple, no carotid bruits, no palpable lymph nodes, no thyromegaly  CV:  +S1, +S2, regular, no murmurs or rubs  RESP:   lungs clear to auscultation bilaterally, no wheezing, rales, rhonchi, good air entry bilaterally  BREAST:  not examined  GI:  abdomen soft, non-tender, non-distended, normal BS, no bruits, no abdominal masses, no palpable masses  RECTAL:  not examined  :  not examined  MSK:   normal muscle tone, no atrophy, no rigidity, no contractions  EXT:  no clubbing, no cyanosis, no edema, no calf pain, swelling or erythema  VASCULAR:  pulses equal and symmetric in the upper and lower extremities  NEURO:  AAOX3, no focal neurological deficits, follows all commands, able to move extremities spontaneously  SKIN:  no ulcers, lesions or rashes    LABS:                            10.5   6.26  )-----------( 156      ( 10 Mar 2019 06:04 )             32.8                                                   CULTURES:

## 2019-03-11 NOTE — PROGRESS NOTE ADULT - SUBJECTIVE AND OBJECTIVE BOX
Subjective:  Somnolent today  received morphine yesterday  Not coughing  plans to go to hospice    Review of Systems:  All 10 systems reviewed in detailed and found to be negative with the exception of what has already been described above    Allergies:  Aciphex (Unknown)  Macrobid (Unknown)  Percocet 10/325 (Rash)    Meds  MEDICATIONS  (STANDING):  ALBUTerol/ipratropium for Nebulization 3 milliLiter(s) Nebulizer every 6 hours  cefuroxime   Tablet 250 milliGRAM(s) Oral every 12 hours  dipyridamole 200 mG/aspirin 25 mG 1 Capsule(s) Oral two times a day  docusate sodium 100 milliGRAM(s) Oral two times a day  heparin  Injectable 5000 Unit(s) SubCutaneous every 8 hours  hydroxychloroquine 200 milliGRAM(s) Oral every 12 hours  lactobacillus acidophilus 1 Tablet(s) Oral daily  levothyroxine 25 MICROGram(s) Oral daily  memantine ER 21 milliGRAM(s) Oral daily  pantoprazole    Tablet 40 milliGRAM(s) Oral before breakfast  predniSONE   Tablet 10 milliGRAM(s) Oral daily  tamsulosin 0.4 milliGRAM(s) Oral at bedtime    MEDICATIONS  (PRN):  ketorolac   Injectable 30 milliGRAM(s) IntraMuscular every 8 hours PRN Moderate Pain (4 - 6)  melatonin 3 milliGRAM(s) Oral at bedtime PRN Sleep  morphine  - Injectable 1 milliGRAM(s) IV Push every 4 hours PRN Moderate Pain (4 - 6)  ondansetron Injectable 4 milliGRAM(s) IV Push every 6 hours PRN Nausea  traZODone 50 milliGRAM(s) Oral at bedtime PRN Insomnia    Physical Exam  T(C): 36.5 (03-11-19 @ 05:12), Max: 36.7 (03-10-19 @ 12:08)  HR: 76 (03-11-19 @ 08:09) (71 - 102)  BP: 110/55 (03-11-19 @ 05:12) (104/67 - 110/55)  RR: 17 (03-11-19 @ 05:12) (17 - 17)  SpO2: 91% (03-11-19 @ 05:12) (91% - 95%)  Gen: Somnolent but arousable  HEENT: Anicteric sclera,no JVD, no lymphadenopathy, poor dentition  Cardio: Regular rhythm and rate, normal S1S2, no murmurs  Resp: Crackles left lower lung base  GI: Nontender, nondistended, normoactive bowel sounds  Ext: No cyanosis, clubbing or edema  Neuro: Nonfocal    Labs:                        10.5   6.26  )-----------( 156      ( 10 Mar 2019 06:04 )             32.8     < from: CT Chest No Cont (03.05.19 @ 18:08) >  PROCEDURE DATE:  03/05/2019          INTERPRETATION:  CT CHEST, ABDOMEN AND PELVIS WITHOUT CONTRAST    History: Altered mental status, right breast malignancy, evaluate for   metastatic disease.    Technique: Axial CT images of the chest, abdomen and pelvis were obtained   from the lung apices to the pubic symphysis without oral or IV contrast.    Coronal and sagittal reformatted images were createdand reviewed.       Comparison:  Prior CT of the chest, abdomen and pelvis from 1/24/2019.    Findings:  Please note, evaluation for metastatic disease is markedly limited   without IV contrast.    Airspace consolidation and small effusion is seen inthe left lower lobe,   new compared to the prior exam. There is a background of mild to moderate   similar changes. There is no pneumothorax. A 3 mm lung nodule in the left   upper lobe (image 34, series 3), is without significant change from 2018.   A 4 mm nodule in the right upper lobe (image 19, series 2), appears to be   new compared to the prior study.    Again seen is a large right breast mass with a right axillary   mass/enlarged lymph node, similar to the prior exam (1/24/2019). There is   no mediastinal lymphadenopathy.  Evaluation for hilar lymphadenopathy is   limited without IV contrast, however there is no gross hilar   lymphadenopathy. The heart size is within normal limits. The blood pool   in the heart is hypodense relative tomyocardium, suggesting anemia.   Marked coronary artery calcifications are seen. There is no sizable   pericardial effusion. The ascending and descending aorta are not   enlarged. Severe atherosclerotic calcifications of the thoracic aorta are   noted. The pulmonary artery is not enlarged.      The unenhanced liver, spleen, pancreas, adrenal glands are unremarkable.   The unenhanced kidneys are unremarkable aside for a partially duplicated   left collecting system is noted. Patient is status postcholecystectomy.   There is no evidence for bowel obstruction or inflammation. There is a   moderate hiatal hernia.    There is no abdominopelvic lymphadenopathy, significant free fluid or   pelvic mass. Severe atherosclerotic calcifications of the abdominal aorta   are again seen. The urinary bladder is distended. Patient appears to be   status post hysterectomy.    No aggressive osseous lesion is identified. Patient is noted to be status   post right hip screw. Multiple compression deformities are seen   throughout the visualized spine, similar to prior exam, including   moderate to severe compression deformities of the T4, T7, T9, L2 and L4   vertebral bodies. The most severe compression deformities are seen at T7   and L2.   Multiple healing/subacute and chronic bilateral rib fractures   are noted. No obvious acute rib fracture. Question subtle nondisplaced   left sacral fracture, which has a similar appearance compared to the   prior study. Previously seen presacral fluid has decreased, with possible   trace residual.      IMPRESSION:  1.  Please note, evaluation for metastatic disease is markedly limited   without IV contrast. Contrast-enhanced CT would be better control for   evaluating for metastatic disease. Can also considercorrelation with PET   CT (according to our records, most recently performed on 8/18/2018.    2.  Redemonstrated large right breast mass with a right axillary   mass/enlarged lymph node, similar to the prior exam (1/24/2019).     3.  Airspace consolidation and small effusion is seen in the left lower   lobe, new compared to the prior exam. Findings could represent pneumonia   in the appropriate clinical setting.. Follow-up imaging should be   performed to document resolution and exclude the possibility of   underlying lesion.    4.  A 4 mm nodule in the right upper lobe (image 19, series 2), appears   to be new compared to the prior study. Recommend reassessment on   follow-up imaging.    5.  Multiple compression deformities, as above, similar to prior studies.    Multiple healing/subacute and chronic bilateral rib fractures are noted.   No obvious acute rib fracture. Question subtle nondisplaced left sacral   fracture, which has a similar appearance compared to the prior study.   Previously seen presacral fluid has decreased, with possible trace   residual.    6.  Background of mild to moderate emphysematous changes.    7.  Moderate sized hiatal hernia.    8.  Marked coronary artery calcifications.    9.  Blood pool in the heart is hypodense relative to myocardium,   suggesting anemia.     10.  Other findings, as above.

## 2019-03-11 NOTE — PROGRESS NOTE ADULT - ASSESSMENT
· Assessment		  # CAP  # Cough due to above  # COPD  # Locally advanced unresectable triple negative breast cancer diagnosed in 2017  # RA/PMR    Plan:   - poor prognosis  - Hemeonc note appreciated, not a candidate for chemo, palliative rad  - PO abx   - hospice candidate  - have discussed with daughter keyana      - discussed with RN, SW and CM today along with palliative care team    Cont Plaquenil, steroids PO

## 2019-03-11 NOTE — PROGRESS NOTE ADULT - ASSESSMENT
89y old Female with PMH Alzheimers, TIA, Stage 4 Breast CA, HTN, carotid artery stenosis, COPD, HLD, hypothyroidism, benign lung nodule, OA,  chronic back pain with compression of T7, GERD,  Asthma, s/p cholecystectomy, MARCELO, now with pneumonia  #Pneumonia: Continue antibiotics  #Cough: Likely from pneumonia, improved  -Guiafenesin  #COPD: Well controlled, no wheezing  -Continue duonebs standing dose  -Hold spiriva  -Continue chronic prednisone 10 mg daily  #stage 4 breast cancer: poor prognosis  -working on d/c to hospice

## 2019-03-11 NOTE — GOALS OF CARE CONVERSATION - PERSONAL ADVANCE DIRECTIVE - CONVERSATION DETAILS
HOSPICE [facility]ARE NETWORK REFERRAL    Pt assessed, lethargic, grimaced upon movement. As per nursing staff, pt tolerating PO medications at times and received one dose of IV morphine on 3/10/19. Case presented to hospice physician and pt not approved for inpatient hospice at this time. Telephone call to daughter, notified of updates and verbalized agreement for patient to return to [facility] with home hospice services. Daughter in process of placing private hire home health aides. Discharge pending confirmation of 24/7 [facility] start date. TC to KJ Fitch and notified of updates.     Please call [facility] at [phone] to obtain related medications and e-prescribe related medications to [facility].     If patient presents with worsening dyspnea, pain or agitation and requires persistent doses of IV medications, please call [facility] for re- evaluation or for hospitalist physician to call to present to hospice physician for inpatient hospice approval, at [phone]. Informed nursing staff and DAIN Gonzalez of same.     Thank you.

## 2019-03-12 NOTE — DISCHARGE NOTE NURSING/CASE MANAGEMENT/SOCIAL WORK - NSDCDPATPORTLINK_GEN_ALL_CORE
You can access the [employer] Patient Portal, offered by [facility], by registering with the following website: [email or web address]

## 2019-03-12 NOTE — DISCHARGE NOTE NURSING/CASE MANAGEMENT/SOCIAL WORK - NSDCPNDISPN_GEN_ALL_CORE
Activities of daily living, including home environment that might     exacerbate pain or reduce effectiveness of the pain management plan of care as well as strategies to address these issues/Side effects of pain management treatment

## 2019-03-12 NOTE — PROGRESS NOTE ADULT - SUBJECTIVE AND OBJECTIVE BOX
HOSPITALIST ATTENDING PROGRESS NOTE    Chart and meds reviewed.  Patient seen and examined.    HPI: 88 y/o F resident of [facility] with PMHx significant for COPD not on home O2, CVA (2005), Rheumatoid arthritis (chronically on hydroxychloroquine and prednisone), chronic back pain, PMR (Polymyalgia rheumatica), stage 4 right breast Ca (triple negative poorly differentiated invasive ductal cancer, dx 1/2018), glaucoma OU s/p ocular surgery, hypertension, hyperlipidemia, and Alzheimer's dementia who presents to the ED BIBA from Dr. Galindo's office for further evaluation of AMS found to be hypotensive => 70/40. The patient was also noted to have a productive cough. In triage the patient's vitals => BP 99/72, HR 90/min, RR 16/min, SpO2 93%. Labs => WBC 18.49, BUN/Cr, 27/0.67. CT Chest/ABD/Pelvis  => 1) Redemonstrated large right breast mass with a right axillary mass/enlarged lymph node, similar to the prior exam (1/24/2019). 2) Airspace consolidation and small effusion is seen in the left lower lobe, new compared to the prior exam. Findings could represent pneumonia in the appropriate clinical setting. Follow-up imaging should be performed to document resolution and exclude the possibility of underlying lesion. 3) A 4 mm nodule in the right upper lobe (image 19, series 2), appears to be new compared to the prior study. 4) Multiple compression deformities, similar to prior studies. Multiple healing/subacute and chronic bilateral rib fractures are noted. No obvious acute rib fracture. In the ED the ED the patient was given Piperacillin/tazobactam 3.375g IVPB x 1, Vancomycin 750mg IVPB x 1, and NS x 2L.    On 3/6 I had a 40 min long conversation with daughter and she agreed to respect Mom's wishes and send her to [place] with hospice and not do any RXT; initially she asked for CT to see if there are any meds but at the end she agreed it was useless as this will not predict when she will die; the next day daughter was not in agreement with dc to [facility] and requested pulm consult    3/9/19 pt seen and examined, confused, denies acute complaints, chart noted    3/10/19 pt seen and examined, family friend at bedside, discussed with daughter keyana at [phone].     3/11/19 pt seen and examined, family friend at bedside, requiring IV morphine for pain control. not eating as much    3/12/19 pt seen and examined, tired in the morning, BP mildly low for her,  ml given, awaiting aides at the assisted living.     All 10 systems reviewed and found to be negative with the exception of what has been described above.    MEDICATIONS  (STANDING):  ALBUTerol/ipratropium for Nebulization 3 milliLiter(s) Nebulizer every 6 hours  cefuroxime   Tablet 250 milliGRAM(s) Oral every 12 hours  dipyridamole 200 mG/aspirin 25 mG 1 Capsule(s) Oral two times a day  docusate sodium 100 milliGRAM(s) Oral two times a day  heparin  Injectable 5000 Unit(s) SubCutaneous every 8 hours  hydroxychloroquine 200 milliGRAM(s) Oral every 12 hours  lactobacillus acidophilus 1 Tablet(s) Oral daily  levothyroxine 25 MICROGram(s) Oral daily  memantine ER 21 milliGRAM(s) Oral daily  pantoprazole    Tablet 40 milliGRAM(s) Oral before breakfast  predniSONE   Tablet 10 milliGRAM(s) Oral daily  sodium chloride 0.9%. 250 milliLiter(s) (50 mL/Hr) IV Continuous <Continuous>  tamsulosin 0.4 milliGRAM(s) Oral at bedtime    MEDICATIONS  (PRN):  ketorolac   Injectable 30 milliGRAM(s) IntraMuscular every 8 hours PRN Moderate Pain (4 - 6)  melatonin 3 milliGRAM(s) Oral at bedtime PRN Sleep  morphine  - Injectable 1 milliGRAM(s) IV Push every 4 hours PRN Moderate Pain (4 - 6)  morphine Concentrate 2.5 milliGRAM(s) Oral every 6 hours PRN Severe Pain (7 - 10)  ondansetron Injectable 4 milliGRAM(s) IV Push every 6 hours PRN Nausea  traZODone 50 milliGRAM(s) Oral at bedtime PRN Insomnia      VITALS:  T(F): 97.8 (03-12-19 @ 11:57), Max: 97.8 (03-12-19 @ 11:57)  HR: 84 (03-12-19 @ 11:47) (76 - 87)  BP: 88/56 (03-12-19 @ 11:47) (88/56 - 119/55)  RR: 18 (03-12-19 @ 11:57) (18 - 18)  SpO2: 99% (03-12-19 @ 11:57) (90% - 99%)  Wt(kg): --    I&O's Summary    11 Mar 2019 07:01  -  12 Mar 2019 07:00  --------------------------------------------------------  IN: 0 mL / OUT: 600 mL / NET: -600 mL    12 Mar 2019 07:01  -  12 Mar 2019 16:32  --------------------------------------------------------  IN: 250 mL / OUT: 0 mL / NET: 250 mL        CAPILLARY BLOOD GLUCOSE          PHYSICAL EXAM:    HEENT:  pupils equal and reactive, EOMI, no oropharyngeal lesions, erythema, exudates, oral thrush  NECK:   supple, no carotid bruits, no palpable lymph nodes, no thyromegaly  CV:  +S1, +S2, regular, no murmurs or rubs  RESP:   lungs clear to auscultation bilaterally, no wheezing, rales, rhonchi, good air entry bilaterally  BREAST:  not examined  GI:  abdomen soft, non-tender, non-distended, normal BS, no bruits, no abdominal masses, no palpable masses  RECTAL:  not examined  :  not examined  MSK:   normal muscle tone, no atrophy, no rigidity, no contractions  EXT:  no clubbing, no cyanosis, no edema, no calf pain, swelling or erythema  VASCULAR:  pulses equal and symmetric in the upper and lower extremities  NEURO:  AAOX3, no focal neurological deficits, follows all commands, able to move extremities spontaneously  SKIN:  no ulcers, lesions or rashes    LABS:                                                      CULTURES:

## 2019-03-12 NOTE — PROGRESS NOTE ADULT - ASSESSMENT
· Assessment		  # CAP  # Cough due to above  # COPD  # Locally advanced unresectable triple negative breast cancer diagnosed in 2017  # RA/PMR    Plan:   - poor prognosis  - Hemeonc note appreciated, not a candidate for chemo, palliative rad  - PO abx   - hospice candidate  - have discussed with daughter keyana    -  pain control with po morphine if tolerated  - discussed with RN, SW and CM today    Cont Plaquenil, steroids PO

## 2019-03-12 NOTE — PROGRESS NOTE ADULT - SUBJECTIVE AND OBJECTIVE BOX
SUBJECTIVE     Patient confused and comfortable breathing not in distress       PAST MEDICAL & SURGICAL HISTORY:  Hypothyroidism  Breast cancer: Right  Compression fracture of spine: T7  Alzheimers disease  HTN (hypertension)  Glaucoma  TIA (transient ischemic attack): 8/07  Cerebral vascular accident: 2005  Polymyalgia rheumatica  Osteoporosis  Chronic pain: mid back  Urinary retention  GERD (gastroesophageal reflux disease)  Cholelithiases  Hyperlipemia  Carotid artery stenosis  Lung nodule: biopsied 2003, benign  COPD (chronic obstructive pulmonary disease)  Asthma  Glaucoma of both eyes: s/p repair  History of hip surgery  Gall stones  History of hysterectomy: for bleeding  Hx of cholecystectomy    OBJECTIVE   Vital Signs Last 24 Hrs  T(C): 36.3 (12 Mar 2019 04:56), Max: 37.1 (11 Mar 2019 13:18)  T(F): 97.3 (12 Mar 2019 04:56), Max: 98.7 (11 Mar 2019 13:18)  HR: 76 (12 Mar 2019 04:56) (76 - 95)  BP: 119/55 (12 Mar 2019 04:56) (94/60 - 119/55)  BP(mean): --  RR: 18 (12 Mar 2019 04:56) (16 - 18)  SpO2: 90% (12 Mar 2019 04:56) (90% - 96%)    Review of systems   unable to do it with the confusion     PHYSICAL EXAM:  Constitutional: , awake and alert, and confused   HEENT: Normo cephalic atraumatic  Neck: Soft and supple, No J.V.D   Respiratory: vesicular breathing , with the transmitted sounds with the rales over the bases   Cardiovascular: S1 and S2, regular rate .   Gastrointestinal:  soft, nontender,   Extremities: No  edema or calf tenderness .  CNS confused with the dementia     MEDICATIONS  (STANDING):  ALBUTerol/ipratropium for Nebulization 3 milliLiter(s) Nebulizer every 6 hours  cefuroxime   Tablet 250 milliGRAM(s) Oral every 12 hours  dipyridamole 200 mG/aspirin 25 mG 1 Capsule(s) Oral two times a day  docusate sodium 100 milliGRAM(s) Oral two times a day  heparin  Injectable 5000 Unit(s) SubCutaneous every 8 hours  hydroxychloroquine 200 milliGRAM(s) Oral every 12 hours  lactobacillus acidophilus 1 Tablet(s) Oral daily  levothyroxine 25 MICROGram(s) Oral daily  memantine ER 21 milliGRAM(s) Oral daily  pantoprazole    Tablet 40 milliGRAM(s) Oral before breakfast  predniSONE   Tablet 10 milliGRAM(s) Oral daily  tamsulosin 0.4 milliGRAM(s) Oral at bedtime

## 2019-03-12 NOTE — PROGRESS NOTE ADULT - ASSESSMENT
89y old Female with PMH Alzheimers, TIA, Stage 4 Breast CA, HTN, carotid artery stenosis, COPD, HLD, hypothyroidism, benign lung nodule, OA,  chronic back pain with compression of T7, GERD,  Asthma, s/p cholecystectomy, MARCELO, now with pneumonia  #Pneumonia: Continue antibiotics with the ceftin with the new left lower lobe consolidation   #Cough: Likely from pneumonia, improved  -Guiafenesin  #COPD: on prednisone and nebs   -#stage 4 breast cancer: poor prognosis  -working on d/c to hospice. discharge planning as per the medicine

## 2019-03-13 NOTE — PROGRESS NOTE ADULT - ASSESSMENT
· Assessment		  # CAP  # Cough due to above  # COPD  # Locally advanced unresectable triple negative breast cancer diagnosed in 2017  # RA/PMR    Plan:   - poor prognosis  - Hemeonc note appreciated, not a candidate for chemo, palliative rad  - PO abx   - hospice candidate  - have discussed with daughter keyana    -  pain control with po morphine if tolerated  - discussed with RN, SW and CM today    Cont Plaquenil, steroids PO    DC home with home hospice abby

## 2019-03-13 NOTE — CHART NOTE - NSCHARTNOTEFT_GEN_A_CORE
Upon Nutritional Assessment by the Registered Dietitian your patient was determined to meet criteria / has evidence of the following diagnosis/diagnoses:          [ ]  Mild Protein Calorie Malnutrition        [x ]  Moderate Protein Calorie Malnutrition        [ ] Severe Protein Calorie Malnutrition        [ ] Unspecified Protein Calorie Malnutrition        [ ] Underweight / BMI <19        [ ] Morbid Obesity / BMI > 40      Findings as based on:  •  Comprehensive nutrition assessment and consultation  •  Calorie counts (nutrient intake analysis)  •  Food acceptance and intake status from observations by staff  •  Follow up  •  Patient education  •  Intervention secondary to interdisciplinary rounds  •   concerns    Pt meets criteria for moderate protein-energy malnutrition in the context of chronic illness AEB NFPE significant for moderate/severe muscle wasting in temples, clavicles, shoulders, interosseous, thighs, calves and moderate/severe fat loss in orbitals and triceps.     Treatment:    The following diet has been recommended:    1) Encourage PO intake >80% with continued feeding assistance to meet ENN.   2) Continue with Ensure Enlive TID.   3) Provide food preferences as requested for optimal PO intake.   4) monitor wts weekly/labs/electroyltes.     PROVIDER Section:     By signing this assessment you are acknowledging and agree with the diagnosis/diagnoses assigned by the Registered Dietitian    Comments:

## 2019-03-13 NOTE — DIETITIAN INITIAL EVALUATION ADULT. - PROBLEM SELECTOR PLAN 4
~cont. Hydroxychloroquine 200mg po q12h  ~cont. Hydrocortisone in lieu of Prednisone as patient found to have be hypotensive upon arrival and tx/rx for septicemia  ~f/u w/ Rheumatology

## 2019-03-13 NOTE — DIETITIAN INITIAL EVALUATION ADULT. - NS AS NUTRI DX NUTRIENT
Pt meets criteria for severe protein-energy malnutrition in the context of chronic illness/Malnutrition

## 2019-03-13 NOTE — DIETITIAN INITIAL EVALUATION ADULT. - ETIOLOGY
AEB NFPE significant for moderate/severe muscle wasting in temples, clavicles, shoulders, interosseous, thighs, calves and moderate/severe fat loss in orbitals and triceps.

## 2019-03-13 NOTE — DIETITIAN INITIAL EVALUATION ADULT. - OTHER INFO
Pt seen for an initial nutrition assessment. Pt is an 88yo FM admitted 3/5 for AMS, hypotension and cough. PMH of COPD, CVA, RA, chronic back pain, PMR, Stage 4 rt breast CA, glaucoma, HTN, HLD and Alzheimer's dementia. Pt was consulted by Palliative Care for consideration of hospice, however not approved at this time. Pt continues to be a candidate for hospice 2/2 poor prognosis. Nurse reports pt has poor PO intake, sometimes drinks the Ensures, but often has dementia as the pt says she ate but presents an untouched food tray. Pt is reported to sleep often. Pt seen for an initial nutrition assessment. Pt is an 89yo FM admitted 3/5 for AMS, hypotension and cough. PMH of COPD, CVA, RA, chronic back pain, PMR, Stage 4 rt breast CA, glaucoma, HTN, HLD and high risk diagnosis of Alzheimer's dementia. Anival score 14, no edema, multiple healing/subacute and chronic b/l rib fractures, and large mass on rt breast noted. Pt was consulted by Palliative Care for consideration of hospice, however not approved at this time. Pt continues to be a candidate for hospice 2/2 poor prognosis. Nurse reports pt has poor PO intake, sometimes drinks the Ensures, but often has dementia as the pt says she ate but presents an untouched food tray. Pt is reported to sleep often. Current diet order is Regular. Pt requires assistance with eating meals. Receives 250mL IVF. Pt meets criteria for severe protein-energy malnutrition in the context of chronic illness AEB NFPE significant for moderate/severe muscle wasting in temples, clavicles, shoulders, interosseous, thighs, calves and moderate/severe fat loss in orbitals and triceps. Recommendations 1) Encourage PO intake >80% with continued feeding assistance to meet ENN. 2) Continue with Ensure Enlive TID. 3) Provide food preferences as requested for optimal PO intake. 4) monitor wts weekly/labs/electroyltes. RD will follow up as needed. Pt seen for an initial nutrition assessment. Pt is an 89yo FM admitted 3/5 for AMS, hypotension and cough. PMH of COPD, CVA, RA, chronic back pain, PMR, Stage 4 rt breast CA, glaucoma, HTN, HLD and high risk diagnosis of Alzheimer's dementia. Anival score 14, no edema, multiple healing/subacute and chronic b/l rib fractures, and large mass on rt breast noted. Pt was consulted by Palliative Care for consideration of hospice, however not approved at this time. Pt continues to be a candidate for hospice 2/2 poor prognosis. Nurse reports pt has poor PO intake, sometimes drinks the Ensures, but often has dementia as the pt says she ate but presents an untouched food tray. Pt is reported to sleep often. Current diet order is Regular. Pt requires assistance with eating meals. Receives 250mL IVF. Pt meets criteria for moderate protein-energy malnutrition in the context of chronic illness AEB NFPE significant for moderate/severe muscle wasting in temples, clavicles, shoulders, interosseous, thighs, calves and moderate/severe fat loss in orbitals and triceps. Recommendations 1) Encourage PO intake >80% with continued feeding assistance to meet ENN. 2) Continue with Ensure Enlive TID. 3) Provide food preferences as requested for optimal PO intake. 4) monitor wts weekly/labs/electroyltes. RD will follow up as needed.

## 2019-03-13 NOTE — PROGRESS NOTE ADULT - SUBJECTIVE AND OBJECTIVE BOX
HOSPITALIST ATTENDING PROGRESS NOTE    Chart and meds reviewed.  Patient seen and examined.    HPI: 88 y/o F resident of [facility] with PMHx significant for COPD not on home O2, CVA (2005), Rheumatoid arthritis (chronically on hydroxychloroquine and prednisone), chronic back pain, PMR (Polymyalgia rheumatica), stage 4 right breast Ca (triple negative poorly differentiated invasive ductal cancer, dx 1/2018), glaucoma OU s/p ocular surgery, hypertension, hyperlipidemia, and Alzheimer's dementia who presents to the ED BIBA from Dr. Galindo's office for further evaluation of AMS found to be hypotensive => 70/40. The patient was also noted to have a productive cough. In triage the patient's vitals => BP 99/72, HR 90/min, RR 16/min, SpO2 93%. Labs => WBC 18.49, BUN/Cr, 27/0.67. CT Chest/ABD/Pelvis  => 1) Redemonstrated large right breast mass with a right axillary mass/enlarged lymph node, similar to the prior exam (1/24/2019). 2) Airspace consolidation and small effusion is seen in the left lower lobe, new compared to the prior exam. Findings could represent pneumonia in the appropriate clinical setting. Follow-up imaging should be performed to document resolution and exclude the possibility of underlying lesion. 3) A 4 mm nodule in the right upper lobe (image 19, series 2), appears to be new compared to the prior study. 4) Multiple compression deformities, similar to prior studies. Multiple healing/subacute and chronic bilateral rib fractures are noted. No obvious acute rib fracture. In the ED the ED the patient was given Piperacillin/tazobactam 3.375g IVPB x 1, Vancomycin 750mg IVPB x 1, and NS x 2L.    On 3/6 I had a 40 min long conversation with daughter and she agreed to respect Mom's wishes and send her to [facility] with hospice and not do any RXT; initially she asked for CT to see if there are any meds but at the end she agreed it was useless as this will not predict when she will die; the next day daughter was not in agreement with dc to [facility] and requested pulm consult    3/9/19 pt seen and examined, confused, denies acute complaints, chart noted    3/10/19 pt seen and examined, family friend at bedside, discussed with daughter keyana at [phone].     3/11/19 pt seen and examined, family friend at bedside, requiring IV morphine for pain control. not eating as much    3/12/19 pt seen and examined, tired in the morning, BP mildly low for her,  ml given, awaiting aides at the assisted living.     3/13/19 pt seen and examined, more pain, care provider at bedside.     All 10 systems reviewed and found to be negative with the exception of what has been described above.    MEDICATIONS  (STANDING):  ALBUTerol/ipratropium for Nebulization 3 milliLiter(s) Nebulizer every 6 hours  cefuroxime   Tablet 250 milliGRAM(s) Oral every 12 hours  dipyridamole 200 mG/aspirin 25 mG 1 Capsule(s) Oral two times a day  docusate sodium 100 milliGRAM(s) Oral two times a day  heparin  Injectable 5000 Unit(s) SubCutaneous every 8 hours  hydroxychloroquine 200 milliGRAM(s) Oral every 12 hours  lactobacillus acidophilus 1 Tablet(s) Oral daily  levothyroxine 25 MICROGram(s) Oral daily  memantine ER 21 milliGRAM(s) Oral daily  pantoprazole    Tablet 40 milliGRAM(s) Oral before breakfast  predniSONE   Tablet 10 milliGRAM(s) Oral daily  sodium chloride 0.9%. 250 milliLiter(s) (50 mL/Hr) IV Continuous <Continuous>  tamsulosin 0.4 milliGRAM(s) Oral at bedtime    MEDICATIONS  (PRN):  ketorolac   Injectable 30 milliGRAM(s) IntraMuscular every 8 hours PRN Moderate Pain (4 - 6)  melatonin 3 milliGRAM(s) Oral at bedtime PRN Sleep  morphine  - Injectable 1 milliGRAM(s) IV Push every 4 hours PRN Moderate Pain (4 - 6)  morphine Concentrate 2.5 milliGRAM(s) Oral every 6 hours PRN Severe Pain (7 - 10)  ondansetron Injectable 4 milliGRAM(s) IV Push every 6 hours PRN Nausea  traZODone 50 milliGRAM(s) Oral at bedtime PRN Insomnia      VITALS:  T(F): 98.2 (03-13-19 @ 13:34), Max: 98.2 (03-13-19 @ 13:34)  HR: 79 (03-13-19 @ 13:34) (76 - 85)  BP: 90/43 (03-13-19 @ 13:34) (90/43 - 120/68)  RR: 18 (03-13-19 @ 13:34) (18 - 18)  SpO2: 96% (03-13-19 @ 13:34) (95% - 96%)  Wt(kg): --    I&O's Summary    12 Mar 2019 07:01  -  13 Mar 2019 07:00  --------------------------------------------------------  IN: 250 mL / OUT: 375 mL / NET: -125 mL        CAPILLARY BLOOD GLUCOSE          PHYSICAL EXAM:    HEENT:  pupils equal and reactive, EOMI, no oropharyngeal lesions, erythema, exudates, oral thrush  NECK:   supple, no carotid bruits, no palpable lymph nodes, no thyromegaly  CV:  +S1, +S2, regular, no murmurs or rubs  RESP:   lungs clear to auscultation bilaterally, no wheezing, rales, rhonchi, good air entry bilaterally  BREAST:  not examined  GI:  abdomen soft, non-tender, non-distended, normal BS, no bruits, no abdominal masses, no palpable masses  RECTAL:  not examined  :  not examined  MSK:   normal muscle tone, no atrophy, no rigidity, no contractions  EXT:  no clubbing, no cyanosis, no edema, no calf pain, swelling or erythema  VASCULAR:  pulses equal and symmetric in the upper and lower extremities  NEURO:  AAOX3, no focal neurological deficits, follows all commands, able to move extremities spontaneously  SKIN:  no ulcers, lesions or rashes    LABS:                                                      CULTURES:

## 2019-03-13 NOTE — PROGRESS NOTE ADULT - ASSESSMENT
89y old Female with PMH Alzheimers, TIA, Stage 4 Breast CA, HTN, carotid artery stenosis, COPD, HLD, hypothyroidism, benign lung nodule, OA,  chronic back pain with compression of T7, GERD,  Asthma, s/p cholecystectomy, MARCELO, now with pneumonia  #Pneumonia: Finish out treatment with ceftin  #Cough: Likely from pneumonia, improved  -Guiafenesin  #COPD: Well controlled, no wheezing  -Continue duonebs standing dose  -Hold spiriva  -Continue chronic prednisone 10 mg daily  #stage 4 breast cancer: poor prognosis  -working on d/c to hospice

## 2019-03-13 NOTE — DIETITIAN INITIAL EVALUATION ADULT. - PROBLEM SELECTOR PLAN 7
IMPROVE VTE Risk Score is 3  [  ] Previous VTE                                                  3  [  ] Thrombophilia                                               2  [  ] Lower limb paralysis                                      2        (unable to hold up >15 seconds)    [X] Current Cancer                                              2         (within 6 months)  [  ] Immobilization > 24 hrs                                1  [  ] ICU/CCU stay > 24 hours                              1  [X] Age > 60                                                      1  ~cont. Heparin sq + SCDs

## 2019-03-13 NOTE — PROGRESS NOTE ADULT - REASON FOR ADMISSION
AMS, hypotension, cough
fever
AMS, hypotension, cough

## 2019-03-13 NOTE — DIETITIAN INITIAL EVALUATION ADULT. - PERTINENT MEDS FT
MEDICATIONS  (STANDING):  ALBUTerol/ipratropium for Nebulization 3 milliLiter(s) Nebulizer every 6 hours  cefuroxime   Tablet 250 milliGRAM(s) Oral every 12 hours  dipyridamole 200 mG/aspirin 25 mG 1 Capsule(s) Oral two times a day  docusate sodium 100 milliGRAM(s) Oral two times a day  heparin  Injectable 5000 Unit(s) SubCutaneous every 8 hours  hydroxychloroquine 200 milliGRAM(s) Oral every 12 hours  lactobacillus acidophilus 1 Tablet(s) Oral daily  levothyroxine 25 MICROGram(s) Oral daily  memantine ER 21 milliGRAM(s) Oral daily  pantoprazole    Tablet 40 milliGRAM(s) Oral before breakfast  predniSONE   Tablet 10 milliGRAM(s) Oral daily  sodium chloride 0.9%. 250 milliLiter(s) (50 mL/Hr) IV Continuous <Continuous>  tamsulosin 0.4 milliGRAM(s) Oral at bedtime    MEDICATIONS  (PRN):  ketorolac   Injectable 30 milliGRAM(s) IntraMuscular every 8 hours PRN Moderate Pain (4 - 6)  melatonin 3 milliGRAM(s) Oral at bedtime PRN Sleep  morphine  - Injectable 1 milliGRAM(s) IV Push every 4 hours PRN Moderate Pain (4 - 6)  morphine Concentrate 2.5 milliGRAM(s) Oral every 6 hours PRN Severe Pain (7 - 10)  ondansetron Injectable 4 milliGRAM(s) IV Push every 6 hours PRN Nausea  traZODone 50 milliGRAM(s) Oral at bedtime PRN Insomnia

## 2019-03-13 NOTE — PROGRESS NOTE ADULT - SUBJECTIVE AND OBJECTIVE BOX
Subjective:  Awake, no distress  Wondering when breakfast is coming  Denies cough    Review of Systems:  All 10 systems reviewed in detailed and found to be negative with the exception of what has already been described above    Allergies:  Aciphex (Unknown)  Macrobid (Unknown)  Percocet 10/325 (Rash)    Meds  MEDICATIONS  (STANDING):  ALBUTerol/ipratropium for Nebulization 3 milliLiter(s) Nebulizer every 6 hours  cefuroxime   Tablet 250 milliGRAM(s) Oral every 12 hours  dipyridamole 200 mG/aspirin 25 mG 1 Capsule(s) Oral two times a day  docusate sodium 100 milliGRAM(s) Oral two times a day  heparin  Injectable 5000 Unit(s) SubCutaneous every 8 hours  hydroxychloroquine 200 milliGRAM(s) Oral every 12 hours  lactobacillus acidophilus 1 Tablet(s) Oral daily  levothyroxine 25 MICROGram(s) Oral daily  memantine ER 21 milliGRAM(s) Oral daily  pantoprazole    Tablet 40 milliGRAM(s) Oral before breakfast  predniSONE   Tablet 10 milliGRAM(s) Oral daily  sodium chloride 0.9%. 250 milliLiter(s) (50 mL/Hr) IV Continuous <Continuous>  tamsulosin 0.4 milliGRAM(s) Oral at bedtime    MEDICATIONS  (PRN):  ketorolac   Injectable 30 milliGRAM(s) IntraMuscular every 8 hours PRN Moderate Pain (4 - 6)  melatonin 3 milliGRAM(s) Oral at bedtime PRN Sleep  morphine  - Injectable 1 milliGRAM(s) IV Push every 4 hours PRN Moderate Pain (4 - 6)  morphine Concentrate 2.5 milliGRAM(s) Oral every 6 hours PRN Severe Pain (7 - 10)  ondansetron Injectable 4 milliGRAM(s) IV Push every 6 hours PRN Nausea  traZODone 50 milliGRAM(s) Oral at bedtime PRN Insomnia    Physical Exam  T(C): 36.2 (03-13-19 @ 05:43), Max: 36.6 (03-12-19 @ 11:57)  HR: 79 (03-13-19 @ 05:43) (79 - 85)  BP: 120/68 (03-13-19 @ 05:43) (86/50 - 120/68)  RR: 18 (03-13-19 @ 05:43) (18 - 18)  SpO2: 95% (03-13-19 @ 05:43) (95% - 99%)  Gen: Alert, oriented, no distress  HEENT: Anicteric sclera,no JVD, no lymphadenopathy, poor dentition  Cardio: Regular rhythm and rate, normal S1S2, no murmurs  Resp: Crackles left lower lung base  GI: Nontender, nondistended, normoactive bowel sounds  Ext: No cyanosis, clubbing or edema  Neuro: Nonfocal      Labs:      < from: CT Chest No Cont (03.05.19 @ 18:08) >  PROCEDURE DATE:  03/05/2019          INTERPRETATION:  CT CHEST, ABDOMEN AND PELVIS WITHOUT CONTRAST    History: Altered mental status, right breast malignancy, evaluate for   metastatic disease.    Technique: Axial CT images of the chest, abdomen and pelvis were obtained   from the lung apices to the pubic symphysis without oral or IV contrast.    Coronal and sagittal reformatted images were createdand reviewed.       Comparison:  Prior CT of the chest, abdomen and pelvis from 1/24/2019.    Findings:  Please note, evaluation for metastatic disease is markedly limited   without IV contrast.    Airspace consolidation and small effusion is seen inthe left lower lobe,   new compared to the prior exam. There is a background of mild to moderate   similar changes. There is no pneumothorax. A 3 mm lung nodule in the left   upper lobe (image 34, series 3), is without significant change from 2018.   A 4 mm nodule in the right upper lobe (image 19, series 2), appears to be   new compared to the prior study.    Again seen is a large right breast mass with a right axillary   mass/enlarged lymph node, similar to the prior exam (1/24/2019). There is   no mediastinal lymphadenopathy.  Evaluation for hilar lymphadenopathy is   limited without IV contrast, however there is no gross hilar   lymphadenopathy. The heart size is within normal limits. The blood pool   in the heart is hypodense relative tomyocardium, suggesting anemia.   Marked coronary artery calcifications are seen. There is no sizable   pericardial effusion. The ascending and descending aorta are not   enlarged. Severe atherosclerotic calcifications of the thoracic aorta are   noted. The pulmonary artery is not enlarged.      The unenhanced liver, spleen, pancreas, adrenal glands are unremarkable.   The unenhanced kidneys are unremarkable aside for a partially duplicated   left collecting system is noted. Patient is status postcholecystectomy.   There is no evidence for bowel obstruction or inflammation. There is a   moderate hiatal hernia.    There is no abdominopelvic lymphadenopathy, significant free fluid or   pelvic mass. Severe atherosclerotic calcifications of the abdominal aorta   are again seen. The urinary bladder is distended. Patient appears to be   status post hysterectomy.    No aggressive osseous lesion is identified. Patient is noted to be status   post right hip screw. Multiple compression deformities are seen   throughout the visualized spine, similar to prior exam, including   moderate to severe compression deformities of the T4, T7, T9, L2 and L4   vertebral bodies. The most severe compression deformities are seen at T7   and L2.   Multiple healing/subacute and chronic bilateral rib fractures   are noted. No obvious acute rib fracture. Question subtle nondisplaced   left sacral fracture, which has a similar appearance compared to the   prior study. Previously seen presacral fluid has decreased, with possible   trace residual.      IMPRESSION:  1.  Please note, evaluation for metastatic disease is markedly limited   without IV contrast. Contrast-enhanced CT would be better control for   evaluating for metastatic disease. Can also considercorrelation with PET   CT (according to our records, most recently performed on 8/18/2018.    2.  Redemonstrated large right breast mass with a right axillary   mass/enlarged lymph node, similar to the prior exam (1/24/2019).     3.  Airspace consolidation and small effusion is seen in the left lower   lobe, new compared to the prior exam. Findings could represent pneumonia   in the appropriate clinical setting.. Follow-up imaging should be   performed to document resolution and exclude the possibility of   underlying lesion.    4.  A 4 mm nodule in the right upper lobe (image 19, series 2), appears   to be new compared to the prior study. Recommend reassessment on   follow-up imaging.    5.  Multiple compression deformities, as above, similar to prior studies.    Multiple healing/subacute and chronic bilateral rib fractures are noted.   No obvious acute rib fracture. Question subtle nondisplaced left sacral   fracture, which has a similar appearance compared to the prior study.   Previously seen presacral fluid has decreased, with possible trace   residual.    6.  Background of mild to moderate emphysematous changes.    7.  Moderate sized hiatal hernia.    8.  Marked coronary artery calcifications.    9.  Blood pool in the heart is hypodense relative to myocardium,   suggesting anemia.     10.  Other findings, as above.

## 2019-03-14 NOTE — DISCHARGE NOTE PROVIDER - CARE PROVIDERS DIRECT ADDRESSES
,DirectAddress_Unknown,[email or web address]

## 2019-03-14 NOTE — DISCHARGE NOTE PROVIDER - NSDCCPCAREPLAN_GEN_ALL_CORE_FT
PRINCIPAL DISCHARGE DIAGNOSIS  Diagnosis: Altered mental status, unspecified altered mental status type  Assessment and Plan of Treatment: polyphamrcacy?      SECONDARY DISCHARGE DIAGNOSES  Diagnosis: Breast cancer  Assessment and Plan of Treatment: no further tx at this time, home hospice    Diagnosis: Sepsis, due to unspecified organism  Assessment and Plan of Treatment: CAP

## 2019-03-14 NOTE — PROVIDER CONTACT NOTE (OTHER) - SITUATION
pt complaining of cramping in LLE, no swelling noted
Spoke with Andrea in the answering service regarding consult
left message with service
spoke with Sherri in office regarding consult
voicemail left with office regarding patient admission

## 2019-03-14 NOTE — PROGRESS NOTE ADULT - SUBJECTIVE AND OBJECTIVE BOX
Subjective:  Somnolent but in no distress today    Review of Systems:  All 10 systems reviewed in detailed and found to be negative with the exception of what has already been described above    Allergies:  Aciphex (Unknown)  Macrobid (Unknown)  Percocet 10/325 (Rash)    Meds  MEDICATIONS  (STANDING):  ALBUTerol/ipratropium for Nebulization 3 milliLiter(s) Nebulizer every 6 hours  cefuroxime   Tablet 250 milliGRAM(s) Oral every 12 hours  dipyridamole 200 mG/aspirin 25 mG 1 Capsule(s) Oral two times a day  docusate sodium 100 milliGRAM(s) Oral two times a day  heparin  Injectable 5000 Unit(s) SubCutaneous every 8 hours  hydroxychloroquine 200 milliGRAM(s) Oral every 12 hours  lactobacillus acidophilus 1 Tablet(s) Oral daily  levothyroxine 25 MICROGram(s) Oral daily  memantine ER 21 milliGRAM(s) Oral daily  pantoprazole    Tablet 40 milliGRAM(s) Oral before breakfast  predniSONE   Tablet 10 milliGRAM(s) Oral daily  sodium chloride 0.9%. 250 milliLiter(s) (50 mL/Hr) IV Continuous <Continuous>  tamsulosin 0.4 milliGRAM(s) Oral at bedtime    MEDICATIONS  (PRN):  ketorolac   Injectable 30 milliGRAM(s) IntraMuscular every 8 hours PRN Moderate Pain (4 - 6)  melatonin 3 milliGRAM(s) Oral at bedtime PRN Sleep  morphine  - Injectable 1 milliGRAM(s) IV Push every 4 hours PRN Moderate Pain (4 - 6)  morphine Concentrate 2.5 milliGRAM(s) Oral every 6 hours PRN Severe Pain (7 - 10)  ondansetron Injectable 4 milliGRAM(s) IV Push every 6 hours PRN Nausea  traZODone 50 milliGRAM(s) Oral at bedtime PRN Insomnia    Physical Exam  T(C): 36.3 (03-14-19 @ 05:35), Max: 36.8 (03-13-19 @ 13:34)  HR: 90 (03-14-19 @ 05:35) (79 - 94)  BP: 99/29 (03-14-19 @ 05:35) (90/43 - 99/29)  RR: 18 (03-14-19 @ 05:35) (18 - 18)  SpO2: 92% (03-14-19 @ 05:35) (92% - 96%)  Gen: Somnolent but arousable, no distress  HEENT: Anicteric sclera,no JVD, no lymphadenopathy, poor dentition  Cardio: Regular rhythm and rate, normal S1S2, no murmurs  Resp: Crackles left lower lung base  GI: Nontender, nondistended, normoactive bowel sounds  Ext: No cyanosis, clubbing or edema  Neuro: Nonfocal      Labs:  < from: CT Chest No Cont (03.05.19 @ 18:08) >  PROCEDURE DATE:  03/05/2019          INTERPRETATION:  CT CHEST, ABDOMEN AND PELVIS WITHOUT CONTRAST    History: Altered mental status, right breast malignancy, evaluate for   metastatic disease.    Technique: Axial CT images of the chest, abdomen and pelvis were obtained   from the lung apices to the pubic symphysis without oral or IV contrast.    Coronal and sagittal reformatted images were createdand reviewed.       Comparison:  Prior CT of the chest, abdomen and pelvis from 1/24/2019.    Findings:  Please note, evaluation for metastatic disease is markedly limited   without IV contrast.    Airspace consolidation and small effusion is seen inthe left lower lobe,   new compared to the prior exam. There is a background of mild to moderate   similar changes. There is no pneumothorax. A 3 mm lung nodule in the left   upper lobe (image 34, series 3), is without significant change from 2018.   A 4 mm nodule in the right upper lobe (image 19, series 2), appears to be   new compared to the prior study.    Again seen is a large right breast mass with a right axillary   mass/enlarged lymph node, similar to the prior exam (1/24/2019). There is   no mediastinal lymphadenopathy.  Evaluation for hilar lymphadenopathy is   limited without IV contrast, however there is no gross hilar   lymphadenopathy. The heart size is within normal limits. The blood pool   in the heart is hypodense relative tomyocardium, suggesting anemia.   Marked coronary artery calcifications are seen. There is no sizable   pericardial effusion. The ascending and descending aorta are not   enlarged. Severe atherosclerotic calcifications of the thoracic aorta are   noted. The pulmonary artery is not enlarged.      The unenhanced liver, spleen, pancreas, adrenal glands are unremarkable.   The unenhanced kidneys are unremarkable aside for a partially duplicated   left collecting system is noted. Patient is status postcholecystectomy.   There is no evidence for bowel obstruction or inflammation. There is a   moderate hiatal hernia.    There is no abdominopelvic lymphadenopathy, significant free fluid or   pelvic mass. Severe atherosclerotic calcifications of the abdominal aorta   are again seen. The urinary bladder is distended. Patient appears to be   status post hysterectomy.    No aggressive osseous lesion is identified. Patient is noted to be status   post right hip screw. Multiple compression deformities are seen   throughout the visualized spine, similar to prior exam, including   moderate to severe compression deformities of the T4, T7, T9, L2 and L4   vertebral bodies. The most severe compression deformities are seen at T7   and L2.   Multiple healing/subacute and chronic bilateral rib fractures   are noted. No obvious acute rib fracture. Question subtle nondisplaced   left sacral fracture, which has a similar appearance compared to the   prior study. Previously seen presacral fluid has decreased, with possible   trace residual.      IMPRESSION:  1.  Please note, evaluation for metastatic disease is markedly limited   without IV contrast. Contrast-enhanced CT would be better control for   evaluating for metastatic disease. Can also considercorrelation with PET   CT (according to our records, most recently performed on 8/18/2018.    2.  Redemonstrated large right breast mass with a right axillary   mass/enlarged lymph node, similar to the prior exam (1/24/2019).     3.  Airspace consolidation and small effusion is seen in the left lower   lobe, new compared to the prior exam. Findings could represent pneumonia   in the appropriate clinical setting.. Follow-up imaging should be   performed to document resolution and exclude the possibility of   underlying lesion.    4.  A 4 mm nodule in the right upper lobe (image 19, series 2), appears   to be new compared to the prior study. Recommend reassessment on   follow-up imaging.    5.  Multiple compression deformities, as above, similar to prior studies.    Multiple healing/subacute and chronic bilateral rib fractures are noted.   No obvious acute rib fracture. Question subtle nondisplaced left sacral   fracture, which has a similar appearance compared to the prior study.   Previously seen presacral fluid has decreased, with possible trace   residual.    6.  Background of mild to moderate emphysematous changes.    7.  Moderate sized hiatal hernia.    8.  Marked coronary artery calcifications.    9.  Blood pool in the heart is hypodense relative to myocardium,   suggesting anemia.     10.  Other findings, as above.

## 2019-03-14 NOTE — DISCHARGE NOTE PROVIDER - CARE PROVIDER_API CALL
Tone Licona)  Internal Medicine; Pulmonary Disease  [address] [place] [address]  Phone: [phone]  Fax: [phone]  Follow Up Time:     Olivia Calvo)  Hematology; Medical Oncology  [address] [facility], [place] [address]  Phone: [phone]  Fax: [phone]  Follow Up Time:

## 2019-03-14 NOTE — DISCHARGE NOTE PROVIDER - HOSPITAL COURSE
88 y/o F resident of [facility] with PMHx significant for COPD not on home O2, CVA (2005), Rheumatoid arthritis (chronically on hydroxychloroquine and prednisone), chronic back pain, PMR (Polymyalgia rheumatica), stage 4 right breast Ca (triple negative poorly differentiated invasive ductal cancer, dx 1/2018), glaucoma OU s/p ocular surgery, hypertension, hyperlipidemia, and Alzheimer's dementia who presents to the ED BIBA from Dr. Galindo's office for further evaluation of AMS found to be hypotensive => 70/40. The patient was also noted to have a productive cough. In triage the patient's vitals => BP 99/72, HR 90/min, RR 16/min, SpO2 93%. Labs => WBC 18.49, BUN/Cr, 27/0.67. CT Chest/ABD/Pelvis  => 1) Redemonstrated large right breast mass with a right axillary mass/enlarged lymph node, similar to the prior exam (1/24/2019). 2) Airspace consolidation and small effusion is seen in the left lower lobe, new compared to the prior exam. Findings could represent pneumonia in the appropriate clinical setting. Follow-up imaging should be performed to document resolution and exclude the possibility of underlying lesion. 3) A 4 mm nodule in the right upper lobe (image 19, series 2), appears to be new compared to the prior study. 4) Multiple compression deformities, similar to prior studies. Multiple healing/subacute and chronic bilateral rib fractures are noted. No obvious acute rib fracture. In the ED the ED the patient was given Piperacillin/tazobactam 3.375g IVPB x 1, Vancomycin 750mg IVPB x 1, and NS x 2L.        Hospital Course: Patient was admitted for CAP and has local metastatic breast CA. Was seen by [facility] and no treatment options were avalaible at this time. She was seen by pulmonary as well and was treated aiwth PO ceftin for her PNA. She is able to eat on her own and will answer to certain questions. Her pain is progressing and is placed on PO morphine which she tolerates well here. She is stable for DC home with home hospice. She was not a inpatient hospice candeidate at this time.             PHYSICAL EXAM:        Constitutional: NAD, awake and alert, well-developed    HEENT: PERR, EOMI, Normal Hearing, MMM    Neck: Soft and supple, No LAD, No JVD    Respiratory: Breath sounds are clear bilaterally, No wheezing, rales or rhonchi    Cardiovascular: S1 and S2, regular rate and rhythm, no Murmurs, gallops or rubs    Gastrointestinal: Bowel Sounds present, soft, nontender, nondistended, no guarding, no rebound    Extremities: No peripheral edema    Vascular: 2+ peripheral pulses    Neurological: A/O x 3, no focal deficits    Musculoskeletal: 5/5 strength b/l upper and lower extremities    Skin: No rashes        total time for discharge: 60 minutes

## 2019-07-07 PROBLEM — Z86.19 HISTORY OF CLOSTRIDIUM DIFFICILE COLITIS: Status: RESOLVED | Noted: 2018-01-31 | Resolved: 2019-07-07

## 2020-01-04 NOTE — ED STATDOCS - NS_ATTENDINGSCRIBE_ED_ALL_ED
shalini I personally performed the service described in the documentation recorded by the scribe in my presence, and it accurately and completely records my words and actions.

## 2020-01-30 NOTE — ED ADULT NURSE NOTE - NSFALLRSKUNASSIST_ED_ALL_ED
Office Progress Note    Sylvain Hall is a 61 year old male.  Chief Complaint   Patient presents with   • Hospital F/U     HPI:   Patient comes in today for a hospital follow-up.  He was having more shortness of breath fatigue dizziness he was also getting nosebleeds and blood in his stool.  He went to the ER he was noted to be in A. fib with bradycardia rates were dropping to the mid 30s and 40s.  He was also hypotensive his diltiazem and hydrochlorothiazide were stopped his metoprolol was decreased.  He saw Dr. Bermudez in the hospital and the thought was that he would continue antiplatelet agents for his history of coronary disease but with his A. fib were going to consider watchman device.    He is here today he is feeling overall okay.  Symptoms have improved he is not complaining of dizziness he does still have some shortness of breath with exertion.  His pulmonary doctor started him on prednisone for possible upper respiratory infection.  And he will be following up with him in a couple weeks.  Blood pressure is stable today A. fib rates are stable as well  Labs:  Lab Results   Component Value Date    CHOLESTEROL 90 03/09/2019    CALCLDL 44 03/09/2019    HDL 29 03/09/2019    TRIGLYCERIDE 86 03/09/2019    AST 16 09/24/2019       Current Outpatient Medications   Medication Sig Dispense Refill   • atorvastatin (LIPITOR) 20 MG tablet Take 1 tablet by mouth daily. 90 tablet 1   • clopidogrel (PLAVIX) 75 MG tablet Take 1 tablet by mouth daily. 90 tablet 1   • metoPROLOL tartrate (LOPRESSOR) 100 MG tablet Take 1 tablet by mouth 2 times daily. 180 tablet 1   • hydroxyurea (HYDREA) 500 MG capsule Take 1,000 mg by mouth 2 times daily.     • umeclidinium-vilanterol (ANORO ELLIPTA) 62.5-25 MCG/INH inhaler Inhale 1 puff into the lungs.     • albuterol (PROVENTIL HFA) 108 (90 Base) MCG/ACT inhaler Inhale 2 puffs into the lungs.     • aspirin 81 MG EC tablet Take 81 mg by mouth daily.       No current  facility-administered medications for this visit.       Past Medical History:   Diagnosis Date   • Atrial fibrillation (CMS/HCC)    • CAD (coronary artery disease)    • Non Q wave myocardial infarction (CMS/HCC)       Social History:  Social History     Tobacco Use   • Smoking status: Former Smoker     Packs/day: 0.00   • Smokeless tobacco: Never Used   • Tobacco comment:  Former tobacco use. used to smoke but quit   Substance Use Topics   • Alcohol use: Yes     Comment: drinks socially   • Drug use: Not on file     Family History  Family History   Problem Relation Age of Onset   • Coronary Artery Disease Other         Significant for premature CAD        REVIEW OF SYSTEMS:   GENERAL HEALTH: feels well otherwise  SKIN: denies any unusual skin lesions or rashes  RESPIRATORY: denies shortness of breath with exertion  CARDIOVASCULAR:See HPI  GI: denies abdominal pain and denies heartburn  NEURO: denies headaches  Remainder of review of systems is completed and negative    EXAM:     Visit Vitals  /80 (BP Location: RUE - Right upper extremity, Patient Position: Sitting, Cuff Size: Large Adult)   Pulse 80   Resp 18   Ht 5' 6\" (1.676 m)   Wt 114.3 kg (252 lb)   SpO2 98%   BMI 40.67 kg/m²     GENERAL: well developed, well nourished,in no apparent distress  SKIN: no rashes,no suspicious lesions  HEENT: atraumatic, normocephalic,ears and throat are clear  NECK: supple,no adenopathy,no bruits  LUNGS: clear to auscultation  CARDIO: regular rate and rhythm,nl S1,S2,no murmur  GI: good BS's,no masses, HSM or tenderness  EXTREMITIES: no cyanosis, clubbing or edema  NEURO: no focal deficits  PSYCH:alert and oriented x3    Assessment   ASSESSMENT AND PLAN:     Problem List Items Addressed This Visit     None          Patient is doing better from a cardiac standpoint.  He has A. fib with slow ventricular response his diltiazem was stopped his metoprolol was decreased rates are stable.  He is not on any anticoagulation appears  that Dr. Ly wants him to go for watchman device and stay on his Plavix and aspirin for his coronary disease.  He will be seen his primary doctor and pulmonary doctor we will check blood pressures at that time and see how his trends are he may need to restart hydrochlorothiazide if pressures are high.  We will have him follow-up with him in about a month and he says he will call for an appointment at [facility] to see if he is a candidate.  Will call sooner if any questions or concerns  The patient indicates understanding of these issues and agrees to the plan.  The patient is asked to return in 1 month.     no

## 2020-05-09 NOTE — SWALLOW BEDSIDE ASSESSMENT ADULT - SWALLOW EVAL: DIAGNOSIS
EXAM: Chest, single view.

 

HISTORY: Fistula.

 

COMPARISON: 5/7/2020

 

FINDINGS: A frontal view of the chest is obtained. There is a tracheostomy

device overlying the thoracic inlet. There is a nasogastric tube within 

the stomach. There is a right PICC with the tip in the superior cavoatrial

junction or superior right atrium. There is stable diffuse central 

predominant infiltrate and moderate pleural effusions. The heart is normal

in size. There is no pneumothorax.

 

IMPRESSION: 

1. Stable diffuse infiltrate and moderate pleural effusions.

2. Stable support lines and tubes.

 

Electronically signed by: Irish Rios MD (5/9/2020 2:38 PM) [facility] 1) Patient demonstrates Oropharyngeal Swallowing abilities which subjectively appears to be stable and within functional parameters for age when in an alert state. No behavioral aspiration signs were exhibited on exam. Oropharyngeal swallow status appears stable and is likely at usual state.   2) Patient demonstrates baseline cognitive deficits associated with dementia(i.e decreased STM, reduced insight). With that being stated, pt was able to verbalize her intents without an overt primary motor speech or primary linguistic pathology and her communicative integrity is at or close to reported baseline,

## 2020-06-25 NOTE — H&P ADULT - HISTORY OF PRESENT ILLNESS
[FreeTextEntry1] : Pt returns today for a follow up visit. \par Continues to have lower back pain. Does take Ambien to sleep and Hydrocodone with Ibuprfen for pain .\par Denies alcohol use . \par Denies depression .\par Discussed with patient keeping medications in a locked box . \par 
87 y/o female with  hx dementia, HTN, TIA/CVA, HLD, GERD, Osteoporosis, Polymyalgia, Glaucoma, COPD, recent diagnose of breast CA, started on chemo by Dr. Galindo on 3/12/18, she developed diarrhea and had positive stool test for C. Diff and was started on po Vanco.  who was sent to the ER from [facility] for AMS.The patient was at her baseline functioning yesterday according to her daughter. She had similar symptoms in the past when she had a UTI in the past.
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
## 2021-12-29 NOTE — ED ADULT NURSE NOTE - NS TRANSFER PATIENT BELONGINGS
Impression: Dry eye syndrome of bilateral lacrimal glands: H04.123. Plan: Cont art tears 1gtt 3-4x/day ou. Clothing

## 2022-03-02 NOTE — PROGRESS NOTE ADULT - MINUTES
35 Carac Counseling:  I discussed with the patient the risks of Carac including but not limited to erythema, scaling, itching, weeping, crusting, and pain.

## 2022-03-21 NOTE — PROGRESS NOTE ADULT - SUBJECTIVE AND OBJECTIVE BOX
HOSPITALIST PROGRESS NOTE:  SUBJECTIVE:  PCP: PMD: Diana Otreo  Pulm: Lynette Guardado  Cardio: [place]  Chief Complaint: Patient is a 87y old  Female who presents with a chief complaint of Cough, SOB, sputum (2017 19:41)      HPI:  87 year old female resident of [facility] with history of COPD not on home O2, CVA, Rheumatoid arthritis, PMR, Glaucoma s/p ocular surgery, Hyperlipidemia, Dementia who presents with c/o worsening SOB, cough and sputum for 2 weeks.  Pt was treated with azithromycin and medrol pack at [place] without improvement.  Pt denies fever, chills, anorexia, malaise, myalgia, dysuria, confusion or headache.      Pt seen bed side and evaluated in presence of patient's daughter.  Pt is saturating well but is coughing and wheezing. (2017 19:41)    : patient has no complaints.   3/1:  Patient has no complaints. Discussed management in length with daughter yesterday      Allergies:  Aciphex (Unknown)  Macrobid (Unknown)  Percocet 10325 (Rash)    REVIEW OF SYSTEMS:  See HPI. All other review of systems is negative unless indicated above.     OBJECTIVE  Physical Exam:  Vital Signs:  Vital Signs Last 24 Hrs  T(C): 36.7, Max: 36.7 ( @ 06:22)  T(F): 98.1, Max: 98.1 ( @ 15:38)  HR: 84 (74 - 96)  BP: 127/69 (127/69 - 167/58)  BP(mean): --  RR: 17 (17 - 17)  SpO2: 94% (93% - 94%)    Constitutional: NAD, awake and alert, well-developed  Neurological: AAO x 3, no focal deficits  HEENT: PERRLA, EOMI, MMM  Neck: Soft and supple, No LAD, No JVD  Respiratory: Breath sounds are clear bilaterally, No wheezing or rhonchi; Positive rales on LLL  Cardiovascular: S1 and S2, regular rate and rhythm; no Murmurs, gallops or rubs  Gastrointestinal: Bowel Sounds present, soft, nontender, nondistended, no guarding, no rebound tenderness  Back: No CVA tenderness   Extremities: No peripheral edema  Vascular: 2+ peripheral pulses  Musculoskeletal: 5/5 strength b/l upper and lower extremities  Skin: No rashes  Breast: Deferred  Rectal: Deferred    MEDICATIONS  (STANDING):  traZODone 50milliGRAM(s) Oral at bedtime  simvastatin 20milliGRAM(s) Oral at bedtime  tiotropium 18 MICROgram(s) Capsule 1Capsule(s) Inhalation daily  atropine 1% Solution 1Drop(s) Right EYE daily  ofloxacin 0.3% Solution 1Drop(s) Right EYE four times a day  heparin  Injectable 5000Unit(s) SubCutaneous every 8 hours  insulin lispro (HumaLOG) corrective regimen sliding scale  SubCutaneous three times a day before meals  insulin lispro (HumaLOG) corrective regimen sliding scale  SubCutaneous at bedtime  dextrose 50% Injectable 12.5Gram(s) IV Push once  docusate sodium 100milliGRAM(s) Oral three times a day  multivitamin 1Tablet(s) Oral daily  ALBUTerol/ipratropium for Nebulization 3milliLiter(s) Nebulizer every 6 hours  methylPREDNISolone sodium succinate Injectable 40milliGRAM(s) IV Push every 8 hours  cefuroxime   Tablet 250milliGRAM(s) Oral every 12 hours  pantoprazole    Tablet 40milliGRAM(s) Oral before breakfast  difluprednate 0.05% Ophthalmic Emulsion 1Drop(s) Right EYE once  guaiFENesin    Syrup 200milliGRAM(s) Oral every 6 hours  memantine 5milliGRAM(s) Oral two times a day  sodium chloride 0.9%. 1000milliLiter(s) IV Continuous <Continuous>    Lab Results:  CBC  CBC Full  -  ( 01 Mar 2017 07:31 )  WBC Count : 27.5 K/uL  Hemoglobin : 10.5 g/dL  Hematocrit : 33.4 %  Platelet Count - Automated : 229 K/uL  Mean Cell Volume : 86.2 fl  Mean Cell Hemoglobin : 27.0 pg  Mean Cell Hemoglobin Concentration : 31.4 gm/dL  Auto Neutrophil # : 25.9 K/uL  Auto Lymphocyte # : 0.4 K/uL  Auto Monocyte # : 1.2 K/uL  Auto Eosinophil # : 0.0 K/uL  Auto Basophil # : 0.0 K/uL  Auto Neutrophil % : 94.0 %  Auto Lymphocyte % : 1.5 %  Auto Monocyte % : 4.4 %  Auto Eosinophil % : 0.0 %  Auto Basophil % : 0.1 %    .		Differential:	[] Automated		[] Manual  Chemistry                        10.5   27.5  )-----------( 229      ( 01 Mar 2017 07:31 )             33.4     01 Mar 2017 07:31    146    |  112    |  23     ----------------------------<  122    3.6     |  23     |  0.94     Ca    8.8        01 Mar 2017 07:31  Mg     1.9       2017 05:42          Urinalysis Basic - ( 2017 16:04 )    Color: Yellow / Appearance: Clear / S.020 / pH: x  Gluc: x / Ketone: Negative  / Bili: Negative / Urobili: Negative mg/dL   Blood: x / Protein: Negative mg/dL / Nitrite: Negative   Leuk Esterase: Negative / RBC: x / WBC x   Sq Epi: x / Non Sq Epi: x / Bacteria: x            MICROBIOLOGY/CULTURES:  Culture Results:   No growth to date. ( @ 16:41)  Culture Results:   No growth to date. ( @ 14:58)      RADIOLOGY RESULTS:      RADIOLOGY/EKG:  EXAM:  CT CHEST  No evidence of pneumonia. Small amount of bibasilar segmental   and subsegmental atelectasis is present..  Mild to moderate centrilobular   emphysema. Rest of the chronic findings are unchanged as above.      EXAM:  CHEST SINGLE VIEW FRONTAL      Impression:subsegmental atelectasis at the lung bases Detail Level: Zone Detail Level: Generalized Patient Specific Counseling (Will Not Stick From Patient To Patient): Return to clinic if lesions arise out of excoriations. Detail Level: Simple

## 2022-05-12 NOTE — PATIENT PROFILE ADULT. - NS TRANSFER DISPOSITION PATIENT BELONGINGS
Please take your multivitamin at night (separate from the thyroid)    Avoid supplements with high doses of biotin (multivitamins usually contain a small amount, this is fine).    Take thyroid pill on an empty stomach and make sure you wait at least 30 min before eating      
given to family

## 2022-05-31 NOTE — ED ADULT NURSE NOTE - NSFALLRSKOUTCOME_ED_ALL_ED
5/31/2022  [facility]    DIABETES HOME INSTRUCTIONS    Healthy Eating   Eat a consistent amount of carb per meal  Eat regularly during the day, every 4-5 hours.    Do not skip meals.    Physical Activity Increase walking by parking further from store entrance, taking the stairs rather than the elevator and increasing your steps throughout the day.     Taking Diabetes Medication   Metformin 1000 mg, take 1 tablet by mouth twice daily with meals    Monitoring Blood Sugar   Check daily, alternating times before breakfast and two hours after a meal.    Your Blood Sugar Target is  Fasting and premeal:  mg/dL  2 hours after a meal: less than 180 mg/dL   If testing before a meal and 2 hours after a meal you are trying to be between 30-50 points higher than your premeal.   Bedtime 110-150 mg/dL     Call Your Doctor If Blood Sugar Is  Higher than 300 mg/dL or lower than 70 mg/dL for two tests in a row.    Miscellaneous Get a home sharps container.    Goal Planning:  The goal you selected is:  I will check my blood sugar every day and bring meter to next visit  Program goal: obtain an annual dilated eye exam     We Suggest Making An Appointment With Your    Doctor's Office (at least every 3-6 months, Eye Doctor (at least yearly), Dentist (at least every 6 months), Foot Doctor (as needed)    It is recommended that you get a flu vaccine every year and a pneumonia vaccine as indicated.     Bring your blood glucose meter, supplies, record book and written materials to your next education visit.     Fall with Harm Risk
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
## 2022-08-16 NOTE — PHYSICAL THERAPY INITIAL EVALUATION ADULT - DISCHARGE DISPOSITION, PT EVAL
discussed with dtr Diana/home w/ outpatient services/home w/ home PT Z Plasty Text: The lesion was extirpated to the level of the fat with a #15 scalpel blade.  Given the location of the defect, shape of the defect and the proximity to free margins a Z-plasty was deemed most appropriate for repair.  Using a sterile surgical marker, the appropriate transposition arms of the Z-plasty were drawn incorporating the defect and placing the expected incisions within the relaxed skin tension lines where possible.    The area thus outlined was incised deep to adipose tissue with a #15 scalpel blade.  The skin margins were undermined to an appropriate distance in all directions utilizing iris scissors.  The opposing transposition arms were then transposed into place in opposite direction and anchored with interrupted buried subcutaneous sutures.

## 2022-08-31 NOTE — PHYSICAL THERAPY INITIAL EVALUATION ADULT - PLANNED THERAPY INTERVENTIONS, PT EVAL
no wheezing/no dyspnea/no cough/no hemoptysis/no pleuritic chest pain strengthening/bed mobility training/transfer training/gait training

## 2022-09-16 NOTE — DISCHARGE NOTE ADULT - CASE MANAGER'S NAME
Thank you for your confidence in our team    We appreciate you and welcome your feedback  If you receive a survey from us, please take a few moments to let us know how we are doing     Sincerely,  LEIGHANN Malloy Mayra Ya R.N.

## 2023-05-08 NOTE — ED ADULT NURSE NOTE - CCCP TRG CHIEF CMPLNT
Diagnosis Orders   1. Essential hypertension  Lipid Panel     Basic Metabolic Panel       2. Gouty arthritis of left great toe  allopurinol (ZYLOPRIM) 100 MG tablet     Uric Acid       3. Chronic bilateral low back pain without sciatica          4. Low testosterone  Testosterone Free and Total, Non-Male          Return in about 6 months (around 5/8/2023) for for review of outcome of today's recommendation. Patient Instructions   No changes in medication     Concentration on static core exercises to alleviate back pain. Low testosterone, supplementation to be considered. Blood pressure well controlled but would be better improved with weight loss. use: Yes     Comment: occasional    Drug use: Yes     Types: Marijuana Littie Plump)    Sexual activity: Yes     Partners: Female   Other Topics Concern    Not on file   Social History Narrative    Not on file     Social Determinants of Health     Financial Resource Strain: Low Risk     Difficulty of Paying Living Expenses: Not hard at all   Food Insecurity: No Food Insecurity    Worried About [address] in the Last Year: Never true    [address] in the Last Year: Never true   Transportation Needs: Unknown    Lack of Transportation (Medical): Not on file    Lack of Transportation (Non-Medical): No   Physical Activity: Not on file   Stress: Not on file   Social Connections: Not on file   Intimate Partner Violence: Not on file   Housing Stability: Unknown    Unable to Pay for Housing in the Last Year: Not on file    Number of Places Lived in the Last Year: Not on file    Unstable Housing in the Last Year: No     Family History   Problem Relation Age of Onset    Heart Disease Maternal Grandfather     High Blood Pressure Maternal Grandfather     Diabetes Father         Type 2    Heart Disease Maternal Grandmother      Allergies:  Patient has no known allergies. Review of Systems   Constitutional:  Negative for activity change, appetite change, diaphoresis and unexpected weight change. Eyes:  Negative for photophobia and visual disturbance. Respiratory:  Negative for chest tightness and shortness of breath. No orthopnea   Cardiovascular:  Negative for chest pain, palpitations and leg swelling. Gastrointestinal:  Negative for abdominal distention and abdominal pain. Genitourinary:  Negative for flank pain and frequency. Musculoskeletal:  Negative for gait problem and joint swelling. Neurological:  Negative for dizziness, weakness, light-headedness and headaches. Psychiatric/Behavioral:  Negative for confusion.       Objective:   /88   Pulse 88   Temp 98.4 °F (36.9 °C)   Ht 5' 8\" shortness of breath

## 2023-07-05 NOTE — GOALS OF CARE CONVERSATION - PERSONAL ADVANCE DIRECTIVE - NS PRO AD ANY ON CHART
ITP updated, medications reviewed and updated in Epic as needed, MET level obtained and recorded.
Yes

## 2023-09-15 NOTE — CONSULT NOTE ADULT - PROVIDER SPECIALTY LIST ADULT
Anticoag Management
Gastroenterology
Orthopedics
Dose Inhalation NOW    albuterol (PROVENTIL) (2.5 MG/3ML) 0.083% nebulizer solution 2.5 mg  2.5 mg Nebulization Once     Current Outpatient Medications   Medication Sig    albuterol (PROVENTIL) (5 MG/ML) 0.5% nebulizer solution Take 0.5 mLs by nebulization every 6 hours as needed for Wheezing    ondansetron (ZOFRAN) 4 MG tablet Take 1 tablet by mouth 3 times daily as needed for Nausea or Vomiting       Disposition:  Home     DISCHARGE NOTE:   Pt has been reexamined. Patient has no new complaints, changes, or physical findings. Care plan outlined and precautions discussed. Results of workup were reviewed with the patient. All medications were reviewed with the patient. All of pt's questions and concerns were addressed. Patient was instructed and agrees to follow up with PCP as well as to return to the ED upon further deterioration. Patient is ready to go home. Medication List        START taking these medications      albuterol (5 MG/ML) 0.5% nebulizer solution  Commonly known as: PROVENTIL  Take 0.5 mLs by nebulization every 6 hours as needed for Wheezing     ondansetron 4 MG tablet  Commonly known as: ZOFRAN  Take 1 tablet by mouth 3 times daily as needed for Nausea or Vomiting               Where to Get Your Medications        These medications were sent to [address], [address] [place] [address],[address]      Phone: [phone]   albuterol (5 MG/ML) 0.5% nebulizer solution  ondansetron 4 MG tablet             Diagnosis     Clinical Impression:   1. Moderate asthma with exacerbation, unspecified whether persistent          \"Please note that this dictation was completed with [email or web address], the [employer] voice recognition software. Quite often unanticipated grammatical, syntax, homophones, and other interpretive errors are inadvertently transcribed by the computer software. Please disregard these errors.  Please excuse any

## 2023-11-06 NOTE — CONSULT NOTE ADULT - ASSESSMENT
PVR performed bedside after making Pt urinate- 26 ml.   No need for Tellez at this point.   Do timed voiding every 3 hours while awake.  Follow up with Gyn as out pt for Cystocele.   Follow up as out pt. Bilobed Flap Text: The defect edges were debeveled with a #15 scalpel blade. Given the location of the defect and the proximity to free margins a bilobe flap was deemed most appropriate. Using a sterile surgical marker, an appropriate bilobe flap drawn around the defect. The area thus outlined was incised deep to adipose tissue with a #15 scalpel blade. The skin margins were undermined to an appropriate distance in all directions utilizing iris scissors. Following this, the designed flap was carried over into the primary defect and sutured into place.

## 2023-12-14 NOTE — H&P ADULT - ENMT
negative Initiate Treatment: fluorouracil 5 % topical cream Bid\\nApply a thin layer to scalp twice daily x2 weeks then take a 2 week break then repeat twice daily x2 weeks. Detail Level: Zone

## 2023-12-20 NOTE — ED PROVIDER NOTE - OBJECTIVE STATEMENT
PMHx of Alzheimers, TIA, Stage 4 Breast CA (no tx, plan for radiation), HTN, carotid artery stenosis, COPD, HLD, hypothyroidism, benign lung nodule, osteoporosis,  chronic back pain with copression of T7 (seen by back surgeon, no surgery yet), GERD,  Asthma, s/p cholecystectomy, MARCELO, and hip surgery recently discharged from  w/ PNA (1/4-1/19) w/ AMS BIBA from [facility] living s/p mechanical fall.  Pt was standing and tripped over her walker to the floor.  She fell to the carpet and hit her head and right shoulder on the floor.  Pt states pain in her shoulder, head and lower back.  Pt recalls entire event, no LOC.  Pt normally ambulates w/ a walker for short distances in her room and a wheel chair around the facilities.  Unable to obtain a complete history as pt has dementia and is a poor historian.  History provided by daughter at bedside.  Allergy to Percocet, Aciphex and Macrobide. : Yes 89y/o F w/ PMHx of Alzheimers, TIA, Stage 4 Breast CA (no tx, plan for radiation), HTN, carotid artery stenosis, COPD, HLD, hypothyroidism, benign lung nodule, osteoporosis,  chronic back pain with copression of T7 (seen by back surgeon, no surgery yet), GERD,  Asthma, s/p cholecystectomy, MARCELO, and hip surgery recently discharged from  w/ PNA (1/4-1/19) w/ AMS BIBA from [facility] living s/p mechanical fall.  Pt was standing and tripped over her walker to the floor.  She fell to the carpet and hit her head and right shoulder on the floor.  Pt states pain in her shoulder, head and lower back.  Pt recalls entire event, no LOC.  Pt normally ambulates w/ a walker for short distances in her room and a wheel chair around the facilities.  Unable to obtain a complete history as pt has dementia and is a poor historian.  History provided by daughter at bedside.  Allergy to Percocet, Aciphex and Macrobide. 88y/o F w/ PMHx of Alzheimers, TIA, Stage 4 Breast CA (no tx, plan for radiation), HTN, carotid artery stenosis, COPD, HLD, hypothyroidism, benign lung nodule, osteoporosis,  chronic back pain with copression of T7 (seen by back surgeon, no surgery yet), GERD,  Asthma, s/p cholecystectomy, MARCELO, and hip surgery recently discharged from  w/ PNA (1/4-1/19) w/ AMS BIBA from [facility] living s/p mechanical fall.  Pt was standing and tripped over her walker to the floor.  She fell to the carpet and hit her head and right shoulder on the floor.  Pt states pain in her shoulder, head and lower back.  Pt recalls entire event, no LOC.  Pt normally ambulates w/ a walker for short distances in her room and a wheel chair around the facilities.  Unable to obtain a complete history as pt has dementia and is a poor historian.  History provided by daughter at bedside.  Allergy to Percocet, Aciphex and Macrobide.  Daughter: Chirstine Muñoz, [phone].

## 2024-05-01 NOTE — ED ADULT TRIAGE NOTE - ESI TRIAGE ACUITY LEVEL, MLM
3
MORNING BEFORE BREAKFAST 240 tablet 1    VITAMIN D-VITAMIN K PO Take 1 tablet by mouth daily      b complex vitamins capsule Take 1 capsule by mouth daily      Melatonin 5 MG CAPS Take 1 capsule by mouth nightly as needed      benzoyl peroxide 5 % external liquid Wash affected areas once daily 227 g 3    clindamycin (CLEOCIN T) 1 % lotion Apply to affected areas daily 60 mL 3     No facility-administered medications prior to visit.       Reviewed recent PFT and other imaging with the patient  Patient is currently on albuterol  Recommended starting Symbicort  Prescribed Lasix for lower extremity edema  Reviewed use of rescue vs controlling agents and potential side effects  Reviewed use, techniques, schedule and side effects of all inhaled medications  Refills were provided as requested  Barriers to medication compliance addressed  Patient to have prednisone and an antibiotic available for use during an exacerbation    SUPPLEMENTAL OXYGEN/NIV RECOMMENDATIONS:  Patient is not on home oxygen therapy.    SLEEP DISORDER MANAGEMENT RECOMMENDATIONS:  Patient reports unrefreshing and nonrestorative sleep  Polysomnogram ordered  We'll see the patient back after the sleep study  Weight loss recommended and discussed  Recommend good sleep hygiene and instructions provided  Patient not to drive or operate heavy machinery if sleepy     LUNG CANCER SCREENING RECOMMENDATION:  After reviewing the patient's smoking history, age and other clinical data, patient DOES NOT meet the following Low Dose CT (LDCT) Lung Screening criteria:    [] Age: Screening recommended if age range is 55-77(Medicare) or 50-80 (USPSTF)  [x] Pack-yr: Screening recommended if pack year smoking >20  [] Duration since quit: Screening recommended if still smoking or less than 15 year since quit  [] Lung cancer: Screening not recommended if there are sign or symptoms of lung cancer   [] Duration since last CT: Screening recommended if > 11 months since last LDCT

## 2024-05-15 NOTE — H&P ADULT - [facility] PE MLT D [facility]
Lot/Batch Number (Optional): [id number]
Samples Given: Bimzelx
Expiration Date (Optional): 09/2025
Detail Level: Zone
regular rate and rhythm

## 2024-06-14 NOTE — ED ADULT TRIAGE NOTE - HEIGHT IN FEET
Lab ordered cbc  
Lab ordered cmp  
Patient had low vitamin D level last year. States did not take vit D supplement. Order placed to recheck the vitamin D level.   
Patient seeing psychiatrist for first time next week. Follow  up as directed.   Also work on good sleep routine, healthier diet and routine exercise.   
5

## 2024-08-20 NOTE — PHYSICAL THERAPY INITIAL EVALUATION ADULT - PRECAUTIONS/LIMITATIONS, REHAB EVAL
fall precautions Detail Level: Simple Render Risk Assessment In Note?: no Additional Notes: Pt report onset following completion of Isotretinoin course. \\n? Correlation to Isotretinoin course\\nWe will monitor closely for improvement with Nizoral use & do further work-up in clinic if persistent in the next 1-2 months.

## 2024-11-11 NOTE — PHYSICAL THERAPY INITIAL EVALUATION ADULT - FUNCTIONAL LIMITATIONS, PT EVAL
Pt arrives via ambulatory walk in for a infected tooth/gum abscess. Pt has surgery scheduled for tomorrow to remove the tooth but states that pain is intolerable. She has been taking amoxicillin x 3 day without improvement. No fevers. A/O x 4.   
self-care

## 2024-12-19 NOTE — ED PROVIDER NOTE - NS ED MD EM SELECTION
No. DALLIN screening performed.  STOP BANG Legend: 0-2 = LOW Risk; 3-4 = INTERMEDIATE Risk; 5-8 = HIGH Risk [id number] Comprehensive

## 2025-01-23 NOTE — ED PROVIDER NOTE - DISPOSITION TYPE
Inpatient consult to Pulmonology  Consult performed by: Mustapha Dcikey DO  Consult ordered by: Sandy Meza MD          Reason for Consult: Pneumothorax     History of present illness: Patient is a 55 y/o female with PMHx of follicular lymphoma (grade 3A), recurrent ascites, anxiety, MDD, and migraines, admitted on 12/8/24 for abdominal pain. Consult to pulmonology for CXR findings consistent with small left-sided pneumothorax.    Patient is s/p US guided thoracentesis yesterday (1/22) with 1.4L of fluid removed. CXR on 1/22 showing a small left-sided pneumothorax. Started on 1L NC. Repeat CXR 1/23 appears to be without pneumothorax. Patient reports she has had 3 thoracentesis sessions within the past year. States she does not notice significant improvement in breathing after thoracentesis.     This morning on evaluation, patient endorses intermittent spasms of left side of her chest. Worsens with sitting forward. States this has been going on for about 1 week. Patient also endorses mild SOB with exertion but reports this is typical for her. Denies any SOB, chest pain, or wheezing at this time. Patient does not have cough, fever, or leukocytosis.    Of note, patient's last chemotherapy session was 9/2024 and is not receiving any other treatments. She denies any recent sickness.       SocHx: Denies smoking, ETOH, and drug use. Prior to admission patient was living at NH. No known sick contacts. No home O2 requirement at baseline. Able to ambulate independently without difficulty.       REVIEW OF SYSTEMS:   Review of Systems   Respiratory:  Negative for cough, shortness of breath and wheezing.         Spasm of left chest wall   All other systems reviewed and are negative.           Past medical history:  Past Medical History:   Diagnosis Date    Achalasia     s/p myotomy at age 18    Cherry angioma 10/13/2021    Chronic diarrhea     Compression fracture of body of thoracic vertebra  (CMD)     T11 compression  fracture; x-rays from [facility] on 2/15/16    COVID-19 virus infection 01/26/2024    Febrile neutropenia (CMD) 01/22/2024    Generalized anxiety disorder with panic attacks     Lentigines     Major depressive disorder, recurrent severe without psychotic features  (CMD)     Malignant neoplasm  (CMD)     lymphoma    Migraine     MSSA infection, non-invasive 01/26/2024    of wound on hand    Multiple benign nevi     Osteoarthritis     Uterine fibroid     Vertigo          Past surgical history  Past Surgical History:   Procedure Laterality Date    Colonoscopy  06/14/2024    biopsies of terminal ileum and colon normal    Esophagogastroduodenoscopy transoral flex w/bx single or mult  06/14/2024    Biopsy of esophagus showed acute esophagitis. Gram stain, CMV, HSV, H. pylori negative. Congo red stain negative.    Paracentesis      7/2, 7/12, 7/19, 7/26    Port indwelling implantable          Current Facility-Administered Medications   Medication Dose Route Frequency Provider Last Rate Last Admin    lidocaine (LIDOCARE) 4 % patch 1 patch  1 patch Transdermal Daily Sandy Meza MD   1 patch at 01/21/25 1323    allopurinol (ZYLOPRIM) tablet 300 mg  300 mg Oral Daily Raheem Wood MD   300 mg at 01/19/25 0946    fat emul fish oil/plant based 20% (SMOFLIPID) 250 mL infusion  250 mL Intravenous Q24H Kathy Desai MD 20.8 mL/hr at 01/23/25 0641 Rate Verify at 01/23/25 0641    sodium chloride 0.9 % injection 10 mL  10 mL Injection 2 times per day Kandi Sotomayor MD   10 mL at 01/22/25 2200    sodium chloride 0.9 % injection 10 mL  10 mL Injection 2 times per day Kandi Sotomayor MD   10 mL at 01/22/25 2200    simethicone (MYLICON) 40 MG/0.6ML drops 160 mg  160 mg Oral Once Bassam Staley MD        polyethylene glycol (MIRALAX) packet 119 g  119 g Oral Once Bassam Staley MD        ALPRAZolam (XANAX) tablet 0.5 mg  0.5 mg Oral Once Johanna Blanco MD        [Held by provider] ferrous sulfate (65 mg Fe per 325 mg) tablet 325 mg   325 mg Oral Daily with breakfast Johanna Blanco MD   325 mg at 01/03/25 1145    lidocaine (LIDOCARE) 4 % patch 1 patch  1 patch Transdermal Daily Filiberto Beard MD   1 patch at 01/22/25 2209    midodrine (PROAMATINE) tablet 20 mg  20 mg Oral 4 times per day Mustapha Barros MD   20 mg at 01/22/25 1249    diclofenac (VOLTAREN) 1 % gel 4 g  4 g Topical 4x Daily Johanna Blanco MD   4 g at 01/22/25 2200    PARENTERAL NUTRITION - DIETITIAN/PHARMACIST MANAGED   Does not apply See Admin Instructions Johanna Blanco MD        pantoprazole (PROTONIX INJECT) injection 40 mg  40 mg Intravenous 2 times per day Gregorio Cline MD   40 mg at 01/22/25 2200    cyanocobalamin (Vitamin B-12) tablet 2,000 mcg  2,000 mcg Oral Daily Sandy Meza MD   2,000 mcg at 01/05/25 1146    lipase-protease-amylase 24,000-76,000-120,000 units (CREON) per capsule 3 capsule  3 capsule Oral TID  Bassam Staley MD   3 capsule at 01/22/25 1249    [Held by provider] vitamin A capsule 3 mg  3 mg Oral Daily Sandy Meza MD        [Held by provider] zinc sulfate (ZINCATE) capsule 220 mg  220 mg Oral Daily with breakfast Johanna Blanco MD        [Held by provider] cholecalciferol (VITAMIN D) tablet 100 mcg  100 mcg Oral Daily Sandy Meza MD          Current Facility-Administered Medications   Medication Dose Route Frequency Provider Last Rate Last Admin    PHENYLephrine-mineral oil-petrolatum (PREPARATION H) rectal ointment   Rectal BID PRN Sandy Meza MD        lidocaine viscous 2 % oral solution 10 mL  10 mL Oral BID PRN Raheem Wood MD        butalbital-APAP-caffeine (FIORICET) -40 MG tablet 1 tablet  1 tablet Oral Q4H PRN Raheem Wood MD        sodium chloride 0.9 % injection 10 mL  10 mL Injection PRN Kandi Sotomayor MD        sodium chloride 0.9 % injection 20 mL  20 mL Injection PRN Kandi Sotomayor MD        loperamide (IMODIUM) capsule 2 mg  2 mg Oral BID PRN Sandy Meaz MD   2 mg at 01/19/25 9748    loperamide  (IMODIUM) capsule 2 mg  2 mg Oral PRN Bassam Staley MD   2 mg at 01/21/25 1145    ondansetron (ZOFRAN) injection 4 mg  4 mg Intravenous Q4H PRN Blanca Weinberg MD   4 mg at 01/22/25 2200    prochlorperazine (COMPAZINE) injection 10 mg  10 mg Intravenous Q6H PRN Kayley Colunga MD   10 mg at 01/16/25 1105    hydrOXYzine (ATARAX) tablet 25 mg  25 mg Oral QHS PRN Johanna Blanco MD   25 mg at 01/12/25 2305    hydroCORTisone (CORTIZONE) 1 % cream   Topical 4x Daily PRN Meeta Medina MD   Given at 01/06/25 2136    diphenhydrAMINE (BENADRYL) injection 25 mg  25 mg Intravenous Q4H PRN Meeta Medina MD   25 mg at 01/15/25 0626    albumin human (SPA) 25 % injection 12.5 g  12.5 g Intravenous Q8H PRN Johanna Blanco MD        cetirizine (ZyrTEC) tablet 10 mg  10 mg Oral Daily PRN Johanna Blanco MD   10 mg at 01/05/25 1145    sodium chloride 0.9 % injection 10 mL  10 mL Injection PRN Fab Fu MD        sodium chloride 0.9 % injection 20 mL  20 mL Injection PRN Fab Fu MD   20 mL at 12/21/24 0952    cyclobenzaprine (FLEXERIL) tablet 5 mg  5 mg Oral TID PRN Alessio Arellano MD   5 mg at 01/22/25 0609    sodium chloride 0.9% infusion   Intravenous Continuous PRN Michele Merino MD        simethicone (MYLICON) tablet 125 mg  125 mg Oral 4x Daily PRN Michele Merino MD   125 mg at 01/22/25 2200    HYDROmorphone (DILAUDID) injection 0.2 mg  0.2 mg Intravenous Q4H PRN Michele Merino MD   0.2 mg at 01/21/25 0857    dextrose 50 % injection 25 g  25 g Intravenous PRN Michele Merino MD        dextrose 50 % injection 12.5 g  12.5 g Intravenous PRN Michele Merino MD   12.5 g at 01/11/25 0005    glucagon (GLUCAGEN) injection 1 mg  1 mg Intramuscular PRN Michele Merino MD        dextrose (GLUTOSE) 40 % gel 15 g  15 g Oral PRN Michele Merino MD   15 g at 12/17/24 1229    dextrose (GLUTOSE) 40 % gel 30 g  30 g Oral PRN Michele Merino MD        hydrOXYzine (ATARAX) tablet 25 mg  25 mg Oral Q6H PRN Tank,  MD Fab   25 mg at 01/10/25 0547    CARBOXYMethylcellulose (REFRESH TEARS) 0.5 % ophthalmic solution 1 drop  1 drop Both Eyes PRN Filiberto Beard MD        acetaminophen (TYLENOL) tablet 650 mg  650 mg Oral Q4H PRN Meeta Medina MD        Or    acetaminophen (TYLENOL) suppository 650 mg  650 mg Rectal Q4H PRN Meeta Medina MD        sodium chloride 0.9 % injection 10 mL  10 mL Intravenous PRN Meeta Medina MD   10 mL at 12/11/24 0855    aluminum-magnesium hydroxide-simethicone (MAALOX) 200-200-20 MG/5ML suspension 10 mL  10 mL Oral Q4H PRN Johanna Blanco MD        hydroCORTisone (ANUSOL-HC) 2.5 % rectal cream 1 application.  1 application. Rectal TID PRN Johanna Blanco MD        rizatriptan (MAXALT-MLT) disintegrating tablet 10 mg  10 mg Oral Q8H PRN Johanna Blanco MD   10 mg at 01/16/25 1225    zinc oxide 20 % ointment   Topical PRN Johanna Blanco MD        alteplase (CATHFLO ACTIVASE) injection 2 mg  2 mg Intracatheter PRN Johanna Blanco MD   2 mg at 01/09/25 1714    HYDROcodone-acetaminophen (NORCO) 5-325 MG per tablet 1 tablet  1 tablet Oral Q4H PRN Johanna Blanco MD   1 tablet at 01/21/25 2202    clonazePAM (KlonoPIN) tablet 0.5 mg  0.5 mg Oral BID PRN Johanna Blnaco MD   0.5 mg at 01/22/25 2200          Family history   Family History   Problem Relation Age of Onset    Atrial Fibrilliation Mother     Asthma Mother     Osteoporosis Mother     Depression Mother     Migraine Mother     Cancer Father         Fatty Sarcoma    Alcohol Abuse Father     Substance Abuse Father     Bipolar disorder Sister                Social history   Social History     Socioeconomic History    Marital status: Single     Spouse name: Not on file    Number of children: Not on file    Years of education: Not on file    Highest education level: Not on file   Occupational History    Not on file   Tobacco Use    Smoking status: Never    Smokeless tobacco: Never   Vaping Use    Vaping status: never used    Substance and Sexual Activity    Alcohol use: Not Currently     Comment: occassionally    Drug use: Yes     Types: Prescription Drugs    Sexual activity: Not on file   Other Topics Concern    Not on file   Social History Narrative    Not on file     Social Determinants of Health     Financial Resource Strain: Patient Declined (12/8/2024)    Financial Resource Strain     Unable to Get: Patient declined   Food Insecurity: Low Risk  (12/8/2024)    Food Insecurity     Worried about Food: Never true     Food is Gone: Never true   Recent Concern: Food Insecurity - High Risk (9/24/2024)    Received from [facility]    Food Insecurity     Have there been times that your food ran out, and you didn't have money to get more?: No     Are there times that you worry that this might happen?: Yes   Transportation Needs: Not At Risk (12/8/2024)    Transportation Needs     Lack of Reliable Transportation: No   Physical Activity: Not on file   Stress: Not on file   Social Connections: Low Risk  (12/8/2024)    Social Connections     Social Connectivity: 5 or more times a week   Interpersonal Safety: Low Risk  (12/8/2024)    Interpersonal Safety     How often physically hurt: Never     How often insulted or talked down to: Never     How often threatened with harm: Never     How often scream or curse at: Never   Recent Concern: Interpersonal Safety - Medium Risk (10/22/2024)    Interpersonal Safety     How often physically hurt: Never     How often insulted or talked down to: Rarely     How often threatened with harm: Rarely     How often scream or curse at: Rarely       Social History     Tobacco Use    Smoking status: Never    Smokeless tobacco: Never   Substance Use Topics    Alcohol use: Not Currently     Comment: occassionally       Visit Vitals  /62 (Patient Position: Supine)   Pulse 82   Temp 97.5 °F (36.4 °C) (Oral)   Resp 18   Ht 5' 4.02\" (1.626 m)   Wt 55.5 kg (122 lb 5.7 oz)   SpO2 95%   BMI 21.00  kg/m²         Intake/Output Summary (Last 24 hours) at 1/23/2025 0837  Last data filed at 1/23/2025 0641  Gross per 24 hour   Intake 972.41 ml   Output --   Net 972.41 ml         PHYSICAL EXAM  GENERAL: Awake and alert, NAD, cachectic-appearing  HEENT: Head is normocephalic, atraumatic, no raised JVD, mmm  PULMONARY: Diminished BS at right lung base,  no wheezes or crackles, normal WOB  CARDIAC: S1, S2 present, no murmur    ABDOMEN: Soft, NT, distended, BS present    MUSCULOSKELETAL: No edema, clubbing or cyanosis   NEUROLOGIC EXAM: No focal deficit         CBC  Recent Labs   Lab 01/23/25  0310 01/22/25  0341 01/20/25  0238 01/19/25  0212   WBC 4.1* 4.0* 4.7 5.1   RBC 2.46* 2.59* 2.49* 2.48*   HGB 7.6* 7.9* 7.7* 7.6*   HCT 24.2* 26.1* 25.1* 24.7*   MCV 98.4 100.8* 100.8* 99.6   MCH 30.9 30.5 30.9 30.6   MCHC 31.4* 30.3* 30.7* 30.8*   RDW-CV 14.6 14.6 14.8 14.9    301 351 335   Lymphocytes, Percent 10 9  --  10     CMP  Recent Labs   Lab 01/23/25  0310 01/22/25  0341 01/21/25  0309 01/20/25  0238   Sodium 144 143 144 143   Chloride 109 107 106 106   BUN 30* 35* 38* 35*   Potassium 4.0 4.1 4.0 4.0   Glucose 99 87 100* 98   Creatinine 0.57 0.60 0.56 0.61   Calcium 8.0* 8.0* 8.1* 8.0*       XR CHEST AP OR PA         XR CHEST AP OR PA   Final Result      1.   Small left-sided pneumothorax possibly ex vacuo in nature. Follow-up   recommended      2.   Slightly increasing left base effusion with underlying atelectasis               Electronically Signed by: HEATHER BAHENA M.D.    Signed on: 1/22/2025 1:42 PM    Workstation ID: [id number]      XR CHEST AP OR PA   Final Result      No pneumothorax post left thoracentesis.               Electronically Signed by: MARCOS SUH M.D.    Signed on: 1/20/2025 1:58 PM    Workstation ID: [id number]      US THORACENTESIS   Final Result   Technically successful ultrasound-guided left thoracentesis.      PLAN: Chest xray has been ordered.    ___________________________________________________________________________   ______________________________________      PROCEDURES PERFORMED:   Ultrasound-guided access into the pleural space.   Diagnostic and Therapeutic  Thoracentesis      ANESTHESIA/SEDATION: Conscious sedation was not used for the procedure.      PROPHYLACTIC ANTIBIOTIC: None.      PREPARATION: The site was prepared and draped and all elements of maximal   sterile barrier technique including sterile gloves, sterile gown, mask,   large sterile sheet, hand hygiene and cutaneous antisepsis with 2%   chlorhexidine +70% isopropyl alcohol were used. Discussion of Risks,   Benefits and Alternatives completed with patient/authorized decision maker.   Additionally, potential problems related to recuperation, likelihood of   achieving care, treatment and service goals were discussed. Relevant risks,   benefits and side effects related to alternatives, including the possible   results of not receiving care, treatment and services discussed. When   indicated, any limitations on the confidentiality of information learned   from or about the patient were explained. All patient/authorized decision   maker questions answered and consent for procedure was provided. The   patient's identification, correct procedure and position were verified. The   appropriate site was verified and marked as appropriate. Equipment/Implants   were checked for functionality and availability.      PROCEDURE/FINDINGS:   Ultrasound-guided Access:   A catheter/needle was introduced into the largest pocket of fluid in the   pleural space under real ultrasound guidance. Image demonstrated the tip of   the needle within the target area. An image was sent to the PACS for   documentation purposes.      Needle: 18-gauge Yueh      Thoracentesis: Using this access, a thoracentesis was performed. The   catheter was then removed, and manual pressure was applied to the puncture   site,  followed by application of a sterile dressing.      Total amount of fluid removed: 1400 ml   Description of the fluid: Cloudy yellow   Additional description of the procedure: none      SPECIMENS: None      COMPLICATIONS: No immediate complications      IMPLANTS: None      ESTIMATED BLOOD LOSS: Minimal      RADIATION/CONTRAST DOSE: None         Electronically Signed by: MARCOS SUH M.D.    Signed on: 1/20/2025 1:54 PM    Workstation ID: [id number]      XR CHEST AP OR PA   Final Result   Moderate left-sided pleural effusion and left base   atelectasis/consolidation.            Electronically Signed by: JOEL WALDRON M.D.    Signed on: 1/19/2025 7:12 AM    Workstation ID: [id number]      US PARACENTESIS   Final Result   Technically successful ultrasound-guided paracentesis.   ___________________________________________________________________________   ______________________________________      PROCEDURES PERFORMED:   Ultrasound Guided Therapeutic paracentesis      ANESTHESIA/SEDATION: Conscious sedation was not used for the procedure.      PROPHYLACTIC ANTIBIOTIC: None.      PREPARATION: The site was prepared and draped and all elements of maximal   sterile barrier technique including sterile gloves, sterile gown, mask,   large sterile sheet, hand hygiene and cutaneous antisepsis with 2%   chlorhexidine +70% isopropyl alcohol were used. The benefits, potential   risk, and alternatives of the procedure were explained to the patient,   including but not limited to bleeding, infection and even failure of   intended outcome.  In addition, the patient was informed of the right to   refused the procedure.  Informed consent was signed and documented in the   medical record.      PROCEDURE/FINDINGS:   ULTRASOUND GUIDED PARACENTESIS: A catheter/needle was introduced into the   largest pocket of fluid in the abdominal cavity under real ultrasound   guidance.  The image demonstrates the tip of the needle within the  target   area.  An image was sent to the PACS for documentation purposes.  Using   this access, a paracentesis was performed. The catheter was then removed,   and manual pressure was applied to the puncture site, followed by   application of a sterile dressing.        Location: Right lower quadrant        Needle: Jessica        Description of the fluid: Cloudy white        Albumin: 25 grams intravenously        Amount drained: 5000 cc       SPECIMENS: Samples were sent for laboratory analysis      COMPLICATIONS: None      ESTIMATED BLOOD LOSS: Minimal      RADIATION/CONTRAST DOSE: None.         Electronically Signed by: ANNE MARIE BARNES MD    Signed on: 1/16/2025 4:27 PM    Workstation ID: [id number]      XR CHEST AP OR PA   Final Result      Small to moderate left pleural effusion with associated atelectasis,   slightly decreased since 12/26/2024.               Electronically Signed by: ANNE MARIE BARNES MD    Signed on: 1/13/2025 12:36 PM    Workstation ID: [id number]      US PARACENTESIS   Final Result   Technically successful ultrasound-guided paracentesis.      PLANS: none   ___________________________________________________________________________   ______________________________________      PROCEDURES PERFORMED:   Ultrasound Guided Diagnostic and Therapeutic Paracentesis      ANESTHESIA/SEDATION: Conscious sedation was not used for the procedure.      PROPHYLACTIC ANTIBIOTIC: None.      PREPARATION: The site was prepared and draped and all elements of maximal   sterile barrier technique including sterile gloves, sterile gown, mask,   large sterile sheet, hand hygiene and cutaneous antisepsis with 2%   chlorhexidine +70% isopropyl alcohol were used. Discussion of Risks,   Benefits and Alternatives completed with patient/authorized decision maker.   Additionally, potential problems related to recuperation, likelihood of   achieving care, treatment and service goals were discussed. Relevant risks,   benefits and  side effects related to alternatives, including the possible   results of not receiving care, treatment and services discussed. When   indicated, any limitations on the confidentiality of information learned   from or about the patient were explained. All patient/authorized decision   maker questions answered and consent for procedure was provided. The   patient's identification, correct procedure and position were verified. The   appropriate site was verified and marked as appropriate. Equipment/Implants   were checked for functionality and availability.      PROCEDURE/FINDINGS:   ULTRASOUND GUIDED PARACENTESIS:   A catheter/needle was introduced into the largest pocket of fluid in the   abdominal cavity under real ultrasound guidance.  The image demonstrates   the tip of the needle within the target area.  An image was sent to the   PACS for documentation purposes.  Using this access, a paracentesis was   performed. The catheter was then removed, and manual pressure was applied   to the puncture site, followed by application of a sterile dressing.      Location: Right lower quadrant   Needle: Jessica Needle   Description of the fluid: Cloudy yellow   Albumin: 25 grams intravenously   Amount drained: 5000 ml       SPECIMENS: None      COMPLICATIONS: None      IMPLANTS: None      ESTIMATED BLOOD LOSS: Minimal      RADIATION/CONTRAST DOSE: None.      Electronically Signed by: MARCOS SUH M.D.    Signed on: 1/9/2025 11:05 AM    Workstation ID: [id number]      Esophagogastroduodenoscopy (EGD) w Video Capsule Endoscopy   Final Result      CT ABDOMEN PELVIS WO CONTRAST   Final Result         1. Moderate abdominal and moderate to large amount of pelvic ascites which   has shown some mild to moderate improvement from prior examination. No   definite abscess noted however this would be limited due to lack of   contrast and presence of ascites      2. Stable mild retroperitoneal lymphadenopathy      3. Large left and  small right-sided pleural effusions with underlying   atelectasis at the bases            Electronically Signed by: HEATHER BAHENA M.D.    Signed on: 1/3/2025 11:39 AM    Workstation ID: [id number]      US PARACENTESIS   Final Result   Impression: Successful ultrasound-guided paracentesis with removal of 6.2   liters.      Electronically Signed by: SHARMILA CHOI MD    Signed on: 1/2/2025 4:42 PM    Workstation ID: [id number]      US VASC UPPER EXTREMITY VENOUS DUPLEX RIGHT   Final Result   1.  Normal exam.  Specifically, there is no evidence of DVT.         Electronically Signed by: GRICELDA LOTT M.D.    Signed on: 12/31/2024 7:30 AM    Workstation ID: [id number]      US PARACENTESIS   Final Result   Technically successful ultrasound-guided paracentesis.   ___________________________________________________________________________   ______________________________________      PROCEDURES PERFORMED:   Ultrasound Guided Diagnostic and Therapeutic Paracentesis      ANESTHESIA/SEDATION: Conscious sedation was not used for the procedure.      PROPHYLACTIC ANTIBIOTIC: None.      PREPARATION: The site was prepared and draped and all elements of maximal   sterile barrier technique including sterile gloves, sterile gown, mask,   large sterile sheet, hand hygiene and cutaneous antisepsis with 2%   chlorhexidine +70% isopropyl alcohol were used. The benefits, potential   risk, and alternatives of the procedure were explained to the patient,   including but not limited to bleeding, infection and even failure of   intended outcome.  In addition, the patient was informed of the right to   refused the procedure.  Informed consent was signed and documented in the   medical record.      PROCEDURE/FINDINGS:   ULTRASOUND GUIDED PARACENTESIS: A catheter/needle was introduced into the   largest pocket of fluid in the abdominal cavity under real ultrasound   guidance.  The image demonstrates the tip of the needle  within the target   area.  An image was sent to the PACS for documentation purposes.  Using   this access, a paracentesis was performed. The catheter was then removed,   and manual pressure was applied to the puncture site, followed by   application of a sterile dressing.        Location: Right lower quadrant        Needle: Jessica        Description of the fluid: Milky white        Albumin:  Given on floor        Amount drained: 5000 cc       SPECIMENS: Samples were sent for laboratory analysis      COMPLICATIONS: None      ESTIMATED BLOOD LOSS: Minimal      RADIATION/CONTRAST DOSE: None.         Electronically Signed by: ANNE MARIE BARNES MD    Signed on: 12/27/2024 3:34 PM    Workstation ID: [id number]      XR CHEST AP OR PA   Final Result      1.   Stable appearance to right-sided Port-A-Cath and left-sided PICC line   with both tips overlying the superior vena cava      2.   Redemonstration of bilateral pleural effusions with underlying   atelectasis (left greater than right)               Electronically Signed by: HEATHER BAHENA M.D.    Signed on: 12/26/2024 2:32 PM    Workstation ID: [id number]      XR CHEST AP OR PA   Final Result      1.   There has been placement of a left-sided PICC line which has its tip   overlying the superior vena cava in good position      2.   Bilateral effusions with underlying infiltrates and/or atelectasis   (left greater than right). Findings have increased from prior exam               Electronically Signed by: HEATHER BAHENA M.D.    Signed on: 12/26/2024 8:10 AM    Workstation ID: [id number]      US PARACENTESIS   Final Result   Technically successful ultrasound-guided paracentesis.      PLANS: none   ___________________________________________________________________________   ______________________________________      PROCEDURES PERFORMED:   Ultrasound Guided Diagnostic and Therapeutic Paracentesis      ANESTHESIA/SEDATION: Conscious sedation was not used for the  procedure.      PROPHYLACTIC ANTIBIOTIC: None.      PREPARATION: The site was prepared and draped and all elements of maximal   sterile barrier technique including sterile gloves, sterile gown, mask,   large sterile sheet, hand hygiene and cutaneous antisepsis with 2%   chlorhexidine +70% isopropyl alcohol were used. Discussion of Risks,   Benefits and Alternatives completed with patient/authorized decision maker.   Additionally, potential problems related to recuperation, likelihood of   achieving care, treatment and service goals were discussed. Relevant risks,   benefits and side effects related to alternatives, including the possible   results of not receiving care, treatment and services discussed. When   indicated, any limitations on the confidentiality of information learned   from or about the patient were explained. All patient/authorized decision   maker questions answered and consent for procedure was provided. The   patient's identification, correct procedure and position were verified. The   appropriate site was verified and marked as appropriate. Equipment/Implants   were checked for functionality and availability.      PROCEDURE/FINDINGS:   ULTRASOUND GUIDED PARACENTESIS:   A catheter/needle was introduced into the largest pocket of fluid in the   abdominal cavity under real ultrasound guidance.  The image demonstrates   the tip of the needle within the target area.  An image was sent to the   PACS for documentation purposes.  Using this access, a paracentesis was   performed. The catheter was then removed, and manual pressure was applied   to the puncture site, followed by application of a sterile dressing.      Location: Right lower quadrant   Needle: Yueh Needle   Description of the fluid: White milky   Albumin: 50 grams intravenously   Amount drained: 4900 ml       SPECIMENS: None      COMPLICATIONS: None      IMPLANTS: None      ESTIMATED BLOOD LOSS: Minimal      RADIATION/CONTRAST DOSE: None.       Electronically Signed by: MARCOS SUH M.D.    Signed on: 1/3/2025 8:26 AM    Workstation ID: [id number]      US VASC UPPER EXTREMITY VENOUS DUPLEX BILATERAL   Final Result   No evidence of acute DVT in the bilateral upper extremities.   Superficial venous thrombosis around the PICC line in the left basilic vein   in proximal upper arm.      Electronically Signed by: SHOBHA SLOAN M.D.    Signed on: 12/20/2024 7:26 AM    Workstation ID: [id number]      US VASC LOWER EXTREMITY VENOUS DUPLEX BILATERAL   Final Result   Normal lower extremity venous duplex study. No evidence of   acute DVT.         Electronically Signed by: SHOBHA SLOAN M.D.    Signed on: 12/20/2024 7:24 AM    Workstation ID: [id number]      CT ABDOMEN PELVIS WO CONTRAST   Final Result   No evidence of bleeding.      Moderate to large left pleural effusion and small right pleural effusion.      Large volume ascites within the abdomen/pelvis.      Retroperitoneal lymphadenopathy.         Electronically Signed by: MARCOS SUH M.D.    Signed on: 12/15/2024 7:26 PM    Workstation ID: [id number]      US PARACENTESIS   Final Result   Impression: Successful ultrasound-guided paracentesis with removal of 5   liters.      Electronically Signed by: SHARMILA CHOI MD    Signed on: 12/13/2024 2:10 PM    Workstation ID: [id number]      XR CHEST AP OR PA   Final Result      Persistent left lower lobe infiltrate and left pleural effusion.               Electronically Signed by: VICTOR MANUEL JONES M.D.    Signed on: 12/12/2024 5:28 PM    Workstation ID: [id number]      XR CHEST AP OR PA   Final Result   No pneumothorax status post left thoracentesis..               Electronically Signed by: MARCOS SUH M.D.    Signed on: 12/11/2024 2:57 PM    Workstation ID: [id number]      US THORACENTESIS   Final Result   Technically successful ultrasound-guided left thoracentesis.      PLAN: Chest xray has been ordered.  followed by application of a sterile dressing.      Total amount of fluid removed: 1200 ml   Description of the fluid: Milky yellow   Additional description of the procedure: none      SPECIMENS: Samples were sent for laboratory analysis      COMPLICATIONS: No immediate complications      IMPLANTS: None      ESTIMATED BLOOD LOSS: Minimal      RADIATION/CONTRAST DOSE: None         Electronically Signed by: MARCOS SUH M.D.    Signed on: 12/11/2024 10:35 AM    Workstation ID: [id number]      Esophagogastroduodenoscopy (EGD)   Final Result      XR CHEST AP OR PA   Final Result   1.   Enlarging moderate, loculated left pleural effusion, with increased   left mid to lower lung airspace opacities and left basilar consolidation.   The right lung is relatively clear.            Electronically Signed by: WALKER CODY M.D.    Signed on: 12/8/2024 7:16 AM    Workstation ID: [id number]                          Assessment: 53 y/o female with PMHx of follicular lymphoma (grade 3A), recurrent ascites, anxiety, depression, and migraines, admitted on 12/8/24 for abdominal pain. Consult to pulmonology for CXR consistent with small left-sided pneumothorax. Tarted on 1L NC. Repeat CXR on 1/23 appears to be without pneumothorax.     Patient reports breathing worsened with spasms, which is likely related to pneumothorax/trapped lung. This is likely to occur again with repeat thoracentesis. If there is a trapped lung component, fluid is also likely to re-accumulate and would most likely only improve with a procedure like a VATS/decortication. Given there may be an association with her recurrent ascites and her pleural effusion, a permanent peritoneal drain can lead to improvement in left pleural space over time, especially if this is complemented with chest physiotherapy to left lower lobe to help with lung re-expansion. Also given patient has not had chemotherapy for a few months now, she is likely functionally  immunocompromised. Her LLL is likely atelectasis although there is also appearance of air bronchograms so an infection/ opportunistic organism cannot be completely ruled out, although she does not have cough, fever, or leukocytosis. If she has a pneumonia component, this may be a parapneumonic effusion, but that's difficult to determine in the absence of recent pleural fluid studies.    - Follicular lymphoma, grade 3A  - S/p thoracentesis  - Left pleural effusion with atelectasis  - Small left-sided pneumothorax, appears to be resolved      Plan:  - Started on 1L O2 this morning  - Consider bronchoscopy if heme and primary team agree  - Will discuss with Dr. Sotomayor      Discussed with attending physician, Dr. Dickey. Please refer to attending note/addendum for final recommendations.       Anny Jenkins, M4  1/23/2025     No. ADMIT

## 2025-01-24 NOTE — ED ADULT NURSE NOTE - NSINTERVENTIONOPT_GEN_ALL_ED
Thank you for visiting our emergency department for your healthcare needs.  Please follow-up with your regular doctor in the next 1 to 2 days.  If you have any additional problems please return to the immediate care.  Please take all medications as prescribed.  Hold Keflex while you are taking Duricef.   Hourly Rounding/Enhanced Supervision

## 2025-05-23 NOTE — PATIENT PROFILE ADULT. - PRO ANTICIPATED DISCH DISP
1. Abdominal Pain:  -Monitor     2. Prediabetes:  -Start Wegovy as directed   -Stop Metformin    3. Severe Depression:   -Continue Lorazepam   -Continue to follow with psychiatry      assisted living/unsure

## 2025-06-20 NOTE — PHYSICAL THERAPY INITIAL EVALUATION ADULT - ACTIVE RANGE OF MOTION EXAMINATION, REHAB EVAL
Anesthesia Evaluation     Patient summary reviewed   no history of anesthetic complications:   NPO Solid Status: > 8 hours  NPO Liquid Status: > 8 hours           Airway   Mallampati: II  Small opening  Dental    (+) edentulous    Pulmonary    (+) a smoker Former, COPD,sleep apnea  (-) asthma  Cardiovascular     (+) hypertension, valvular problems/murmurs murmur and TI, CAD, CABG, CHF , PVD, hyperlipidemia,  carotid artery disease right carotid  (-) past MI, angina    ROS comment: Echo:  ·  Left ventricular systolic function is normal. Left ventricular ejection fraction appears to be 56 - 60%.  ·  Left ventricular wall thickness is consistent with mild septal asymmetric hypertrophy.  ·  Left ventricular diastolic function is consistent with (grade II w/high LAP) pseudonormalization.  ·  Normal right ventricular cavity size and systolic function noted.  ·  The left atrial cavity is mild to moderately dilated.  ·  The right atrial cavity is dilated.  ·  Mild aortic valve stenosis is present.  ·  Mild to moderate mitral valve regurgitation is present.  ·  Moderate to severe tricuspid valve regurgitation is present.  ·  Estimated right ventricular systolic pressure from tricuspid regurgitation is markedly elevated (>55 mmHg).      Neuro/Psych- negative ROS  (-) seizures, TIA, CVA  GI/Hepatic/Renal/Endo    (+) GERD, renal disease- CRI, ESRD and dialysis, thyroid problem hypothyroidism  (-) liver disease, diabetes    Musculoskeletal     Abdominal    Substance History      OB/GYN          Other      history of cancer                  Anesthesia Plan    ASA 4     MAC     (Stat BMP)  intravenous induction     Anesthetic plan, risks, benefits, and alternatives have been provided, discussed and informed consent has been obtained with: patient.    CODE STATUS:    Code Status (Patient has no pulse and is not breathing): CPR (Attempt to Resuscitate)  Medical Interventions (Patient has pulse or is breathing): Full Support      
bilateral  lower extremity Active ROM was WFL (within functional limits)/bilateral upper extremity Active ROM was WFL (within functional limits)

## 2025-07-02 NOTE — ED PROVIDER NOTE - HEME/LYMPH NEGATIVE STATEMENT, MLM
No protocol for requested medication     Medication: adderall xr 20 mg   Last office visit date: 1/30/25    Return in about 6 months (around 7/30/2025) for Physical exam, 40 minute, fasting lab work 1 week.  [facility]: [facility]    Order pended, routed to clinician for review.     
no anemia, no easy bruising, no jaundice, no swollen lymph nodes.